# Patient Record
Sex: FEMALE | Race: WHITE | NOT HISPANIC OR LATINO | Employment: OTHER | URBAN - METROPOLITAN AREA
[De-identification: names, ages, dates, MRNs, and addresses within clinical notes are randomized per-mention and may not be internally consistent; named-entity substitution may affect disease eponyms.]

---

## 2018-03-28 LAB
CHOLEST SERPL-MSCNC: 184 MG/DL (ref 0–200)
HDL/CHOLESTEROL RATIO: 3
HDLC SERPL-MCNC: 63 MG/DL (ref 35–70)
LDLC SERPL CALC-MCNC: 99 MG/DL (ref 0–160)
TRIGL SERPL-MCNC: 138 MG/DL (ref 40–160)

## 2018-04-20 RX ORDER — CLOBETASOL PROPIONATE 0.5 MG/G
OINTMENT TOPICAL 2 TIMES DAILY
Qty: 1 TUBE | Refills: 0 | Status: SHIPPED | OUTPATIENT
Start: 2018-04-20 | End: 2018-04-30

## 2018-06-15 ENCOUNTER — OFFICE VISIT (OUTPATIENT)
Dept: OBSTETRICS AND GYNECOLOGY | Facility: CLINIC | Age: 68
End: 2018-06-15
Payer: MEDICARE

## 2018-06-15 VITALS — WEIGHT: 158.75 LBS | HEIGHT: 63 IN | BODY MASS INDEX: 28.13 KG/M2

## 2018-06-15 DIAGNOSIS — N90.4 LICHEN SCLEROSUS ET ATROPHICUS OF THE VULVA: Primary | ICD-10-CM

## 2018-06-15 DIAGNOSIS — Z01.419 ENCOUNTER FOR GYNECOLOGICAL EXAMINATION (GENERAL) (ROUTINE) WITHOUT ABNORMAL FINDINGS: ICD-10-CM

## 2018-06-15 PROCEDURE — G0101 CA SCREEN;PELVIC/BREAST EXAM: HCPCS | Mod: S$PBB,,, | Performed by: OBSTETRICS & GYNECOLOGY

## 2018-06-15 PROCEDURE — 99999 PR PBB SHADOW E&M-EST. PATIENT-LVL II: CPT | Mod: PBBFAC,,, | Performed by: OBSTETRICS & GYNECOLOGY

## 2018-06-15 PROCEDURE — 99212 OFFICE O/P EST SF 10 MIN: CPT | Mod: PBBFAC,PN,25 | Performed by: OBSTETRICS & GYNECOLOGY

## 2018-06-15 PROCEDURE — G0101 CA SCREEN;PELVIC/BREAST EXAM: HCPCS | Mod: PBBFAC,PN | Performed by: OBSTETRICS & GYNECOLOGY

## 2018-06-15 RX ORDER — OMEPRAZOLE 20 MG/1
20 CAPSULE, DELAYED RELEASE ORAL DAILY
COMMUNITY
Start: 2018-04-11 | End: 2020-10-16 | Stop reason: SDUPTHER

## 2018-06-15 RX ORDER — CLOBETASOL PROPIONATE 0.5 MG/G
OINTMENT TOPICAL 2 TIMES DAILY
COMMUNITY
End: 2018-06-15 | Stop reason: SDUPTHER

## 2018-06-15 RX ORDER — CLOBETASOL PROPIONATE 0.5 MG/G
OINTMENT TOPICAL 2 TIMES DAILY
Qty: 45 G | Refills: 3 | Status: SHIPPED | OUTPATIENT
Start: 2018-06-15 | End: 2018-09-28 | Stop reason: SDUPTHER

## 2018-06-15 NOTE — PROGRESS NOTES
Subjective:       Patient ID: Denise VIVAS Cousins is a 68 y.o. female.    Chief Complaint:  Well Woman (Annual Exam, last mammo DIS ordered by pcp. C/o vaginal discomfort)      There is no problem list on file for this patient.      History of Present Illness  68 y.o. yo  here for annual exam. Using Clobetasol with some good relief of itching.  with ED and upset not sexually active. Patietn stopped HRT after stroke discovered on CT scan. Talk x 15 min about this. All questions answered    Past Medical History:   Diagnosis Date    Acid reflux     Graves disease     diffuse Toxic Goiter    Hypertension     IBS (irritable bowel syndrome)     Lichen sclerosus     Migraine     Stroke        Past Surgical History:   Procedure Laterality Date    HYSTERECTOMY      CINDI for ?? cervical cancer, had normal paps       OB History    Para Term  AB Living   0 0 0 0 0 0   SAB TAB Ectopic Multiple Live Births   0 0 0 0               No LMP recorded. Patient has had a hysterectomy.   Date of Last Pap: No result found    Review of Systems  Review of Systems   Constitutional: Negative for fatigue and unexpected weight change.   Respiratory: Negative for shortness of breath.    Cardiovascular: Negative for chest pain.   Gastrointestinal: Negative for abdominal pain, constipation, diarrhea, nausea and vomiting.   Genitourinary: Negative for dysuria.   Musculoskeletal: Negative for back pain.   Skin: Negative for rash.   Neurological: Negative for headaches.   Hematological: Does not bruise/bleed easily.   Psychiatric/Behavioral: Negative for behavioral problems.        Objective:   Physical Exam:   Constitutional: She is oriented to person, place, and time. Vital signs are normal. She appears well-developed and well-nourished. No distress.        Pulmonary/Chest: She exhibits no mass. Right breast exhibits no mass, no nipple discharge, no skin change, no tenderness, no bleeding and no swelling. Left breast  exhibits no mass, no nipple discharge, no skin change, no tenderness, no bleeding and no swelling. Breasts are symmetrical.        Abdominal: Soft. Normal appearance and bowel sounds are normal. She exhibits no distension and no mass. There is no tenderness. There is no rebound.     Genitourinary: Vagina normal. There is no rash, tenderness, lesion or injury on the right labia. There is no rash, tenderness, lesion or injury on the left labia. Uterus is absent. Right adnexum displays no mass, no tenderness and no fullness. Left adnexum displays no mass, no tenderness and no fullness. No erythema, tenderness, rectocele, cystocele or unspecified prolapse of vaginal walls in the vagina. No vaginal discharge found. Cervix exhibits absence.           Musculoskeletal: Normal range of motion and moves all extremeties.      Lymphadenopathy:     She has no axillary adenopathy.        Right: No supraclavicular adenopathy present.        Left: No supraclavicular adenopathy present.    Neurological: She is alert and oriented to person, place, and time.    Skin: Skin is warm and dry.    Psychiatric: She has a normal mood and affect. Her behavior is normal. Judgment normal.        Assessment/ Plan:     1. Lichen sclerosus et atrophicus of the vulva  clobetasol 0.05% (TEMOVATE) 0.05 % Oint   2. Encounter for gynecological examination (general) (routine) without abnormal findings         Follow-up with me in 1 year

## 2018-09-28 DIAGNOSIS — N90.4 LICHEN SCLEROSUS ET ATROPHICUS OF THE VULVA: ICD-10-CM

## 2018-10-01 RX ORDER — CLOBETASOL PROPIONATE 0.5 MG/G
OINTMENT TOPICAL 2 TIMES DAILY
Qty: 45 G | Refills: 3 | Status: SHIPPED | OUTPATIENT
Start: 2018-10-01 | End: 2018-10-02 | Stop reason: SDUPTHER

## 2018-10-02 ENCOUNTER — TELEPHONE (OUTPATIENT)
Dept: OBSTETRICS AND GYNECOLOGY | Facility: CLINIC | Age: 68
End: 2018-10-02

## 2018-10-02 DIAGNOSIS — N90.4 LICHEN SCLEROSUS ET ATROPHICUS OF THE VULVA: ICD-10-CM

## 2018-10-03 RX ORDER — CLOBETASOL PROPIONATE 0.5 MG/G
OINTMENT TOPICAL 2 TIMES DAILY
Qty: 45 G | Refills: 4 | Status: SHIPPED | OUTPATIENT
Start: 2018-10-03 | End: 2020-02-13

## 2019-08-28 ENCOUNTER — OFFICE VISIT (OUTPATIENT)
Dept: FAMILY MEDICINE | Facility: CLINIC | Age: 69
End: 2019-08-28
Payer: MEDICARE

## 2019-08-28 VITALS
TEMPERATURE: 98 F | DIASTOLIC BLOOD PRESSURE: 76 MMHG | WEIGHT: 162.25 LBS | HEIGHT: 63 IN | HEART RATE: 85 BPM | SYSTOLIC BLOOD PRESSURE: 124 MMHG | BODY MASS INDEX: 28.75 KG/M2 | OXYGEN SATURATION: 94 %

## 2019-08-28 DIAGNOSIS — G43.809 OTHER MIGRAINE WITHOUT STATUS MIGRAINOSUS, NOT INTRACTABLE: ICD-10-CM

## 2019-08-28 DIAGNOSIS — E55.9 VITAMIN D DEFICIENCY: ICD-10-CM

## 2019-08-28 DIAGNOSIS — N32.81 OVERACTIVE BLADDER: ICD-10-CM

## 2019-08-28 DIAGNOSIS — N18.30 BENIGN HYPERTENSION WITH CKD (CHRONIC KIDNEY DISEASE) STAGE III: Primary | ICD-10-CM

## 2019-08-28 DIAGNOSIS — I63.9 CEREBRAL INFARCTION, UNSPECIFIED MECHANISM: ICD-10-CM

## 2019-08-28 DIAGNOSIS — K21.9 GASTROESOPHAGEAL REFLUX DISEASE WITHOUT ESOPHAGITIS: ICD-10-CM

## 2019-08-28 DIAGNOSIS — E03.8 OTHER SPECIFIED HYPOTHYROIDISM: ICD-10-CM

## 2019-08-28 DIAGNOSIS — I12.9 BENIGN HYPERTENSION WITH CKD (CHRONIC KIDNEY DISEASE) STAGE III: Primary | ICD-10-CM

## 2019-08-28 DIAGNOSIS — Z11.59 NEED FOR HEPATITIS C SCREENING TEST: ICD-10-CM

## 2019-08-28 DIAGNOSIS — M81.0 OSTEOPOROSIS, UNSPECIFIED OSTEOPOROSIS TYPE, UNSPECIFIED PATHOLOGICAL FRACTURE PRESENCE: ICD-10-CM

## 2019-08-28 DIAGNOSIS — F41.9 ANXIETY: ICD-10-CM

## 2019-08-28 DIAGNOSIS — N18.30 CHRONIC RENAL INSUFFICIENCY, STAGE III (MODERATE): ICD-10-CM

## 2019-08-28 PROBLEM — E78.5 DYSLIPIDEMIA: Status: ACTIVE | Noted: 2019-08-28

## 2019-08-28 PROBLEM — I10 HYPERTENSION: Status: ACTIVE | Noted: 2019-08-28

## 2019-08-28 LAB
ALBUMIN SERPL BCP-MCNC: 4.1 G/DL (ref 3.5–5.2)
ALP SERPL-CCNC: 65 U/L (ref 55–135)
ALT SERPL W/O P-5'-P-CCNC: 21 U/L (ref 10–44)
ANION GAP SERPL CALC-SCNC: 11 MMOL/L (ref 8–16)
AST SERPL-CCNC: 25 U/L (ref 10–40)
BILIRUB SERPL-MCNC: 0.5 MG/DL (ref 0.1–1)
BUN SERPL-MCNC: 17 MG/DL (ref 8–23)
CALCIUM SERPL-MCNC: 9.7 MG/DL (ref 8.7–10.5)
CHLORIDE SERPL-SCNC: 105 MMOL/L (ref 95–110)
CO2 SERPL-SCNC: 26 MMOL/L (ref 23–29)
CREAT SERPL-MCNC: 1 MG/DL (ref 0.5–1.4)
EST. GFR  (AFRICAN AMERICAN): >60 ML/MIN/1.73 M^2
EST. GFR  (NON AFRICAN AMERICAN): 56.8 ML/MIN/1.73 M^2
GLUCOSE SERPL-MCNC: 81 MG/DL (ref 70–110)
POTASSIUM SERPL-SCNC: 4.4 MMOL/L (ref 3.5–5.1)
PROT SERPL-MCNC: 8.1 G/DL (ref 6–8.4)
SODIUM SERPL-SCNC: 142 MMOL/L (ref 136–145)
T4 FREE SERPL-MCNC: 1.34 NG/DL (ref 0.71–1.51)
TSH SERPL DL<=0.005 MIU/L-ACNC: 1.46 UIU/ML (ref 0.4–4)

## 2019-08-28 PROCEDURE — 36415 COLL VENOUS BLD VENIPUNCTURE: CPT

## 2019-08-28 PROCEDURE — 84443 ASSAY THYROID STIM HORMONE: CPT

## 2019-08-28 PROCEDURE — 99999 PR PBB SHADOW E&M-EST. PATIENT-LVL IV: CPT | Mod: PBBFAC,,, | Performed by: INTERNAL MEDICINE

## 2019-08-28 PROCEDURE — 99999 PR PBB SHADOW E&M-EST. PATIENT-LVL IV: ICD-10-PCS | Mod: PBBFAC,,, | Performed by: INTERNAL MEDICINE

## 2019-08-28 PROCEDURE — 99214 OFFICE O/P EST MOD 30 MIN: CPT | Mod: S$PBB,,, | Performed by: INTERNAL MEDICINE

## 2019-08-28 PROCEDURE — 86803 HEPATITIS C AB TEST: CPT

## 2019-08-28 PROCEDURE — 80053 COMPREHEN METABOLIC PANEL: CPT

## 2019-08-28 PROCEDURE — 99214 OFFICE O/P EST MOD 30 MIN: CPT | Mod: PBBFAC,PN | Performed by: INTERNAL MEDICINE

## 2019-08-28 PROCEDURE — 84439 ASSAY OF FREE THYROXINE: CPT

## 2019-08-28 PROCEDURE — 99214 PR OFFICE/OUTPT VISIT, EST, LEVL IV, 30-39 MIN: ICD-10-PCS | Mod: S$PBB,,, | Performed by: INTERNAL MEDICINE

## 2019-08-28 RX ORDER — NORTRIPTYLINE HYDROCHLORIDE 10 MG/1
1 CAPSULE ORAL DAILY
COMMUNITY
Start: 2019-08-21 | End: 2020-02-20 | Stop reason: SDUPTHER

## 2019-08-28 RX ORDER — ERGOCALCIFEROL 1.25 MG/1
50000 CAPSULE ORAL
Qty: 12 CAPSULE | Refills: 3 | Status: SHIPPED | OUTPATIENT
Start: 2019-08-28 | End: 2020-07-12 | Stop reason: SDUPTHER

## 2019-08-28 NOTE — PROGRESS NOTES
[facility] Note    Chief Complaint      Chief Complaint   Patient presents with    Establish Care     Thyroid, HTN.      History of Present Illness      Denise VIVAS Cousins is a 69 y.o. female who presents today for HTN, hypothyroidism.  Patient comes to appointment alone.    Problem List Items Addressed This Visit     Anxiety    Current Assessment & Plan     Has been stressed but doing ok. Not on meds.         Other specified hypothyroidism    Overview     post radioactive treatment for grave's         Current Assessment & Plan     Stable on synthroid 100 mcg daily.  No S/S of hypo/hyperthyroidism.         Relevant Orders    TSH    T4, free    Osteoporosis    Current Assessment & Plan     Didn't tolerate Fosamax in past because of GERD.  Given patient info on Prolia.         Cerebral infarction    Current Assessment & Plan     No residual deficits.  Presented initially with UE weakness.  On asa and statin.         Chronic renal insufficiency, stage III (moderate)    Current Assessment & Plan     GFR stable on 3/2019.  Counseled on avoiding NSAIDS/dehydration.         Benign hypertension with CKD (chronic kidney disease) stage III - Primary    Current Assessment & Plan     BP controlled on losartan 50 and lopressor 50 mg BID.  No CP/SOB/HA.         Relevant Orders    Comprehensive metabolic panel    Migraine headache    Current Assessment & Plan     Sees Dr. Mena at .  Had an MRI, in media tab.  Has only had 2-3 aura.  Taking nortriptyline.         Overactive bladder    Current Assessment & Plan     Controlled on vesicare.         Gastroesophageal reflux disease without esophagitis    Current Assessment & Plan     Stable on prilosec. No nausea/belching.  Sees Dr. Tarango.         Vitamin D deficiency    Current Assessment & Plan     Level low in 3/2019, has not been taking ergo.           Other Visit Diagnoses     Need for hepatitis C screening test        Relevant Orders    Hepatitis C  antibody          Health Maintenance   Topic Date Due    Hepatitis C Screening  1950    Lipid Panel  1950    TETANUS VACCINE  01/12/1966    Mammogram  01/12/1988    High Dose Statin  08/28/2020    Aspirin/Antiplatelet Therapy  08/28/2020    DEXA SCAN  06/01/2021    Colonoscopy  04/01/2027    Pneumococcal Vaccine (65+ Low/Medium Risk)  Completed       Past Medical History:   Diagnosis Date    Acid reflux     Graves disease     diffuse Toxic Goiter    Hypertension     IBS (irritable bowel syndrome)     Lichen sclerosus     Migraine     Stroke        Past Surgical History:   Procedure Laterality Date    EYE SURGERY      HYSTERECTOMY      CINDI for ?? cervical cancer, had normal paps       family history includes Hypertension in her father; Kidney failure in her father; Stomach cancer in her mother.    Social History     Tobacco Use    Smoking status: Never Smoker    Smokeless tobacco: Never Used   Substance Use Topics    Alcohol use: Yes    Drug use: No       Review of Systems   Constitutional: Negative for chills and fever.   HENT: Negative for congestion and sore throat.    Eyes: Negative for blurred vision and discharge.   Respiratory: Negative for cough and shortness of breath.    Cardiovascular: Negative for chest pain and palpitations.   Gastrointestinal: Negative for constipation, diarrhea, nausea and vomiting.   Genitourinary: Negative for dysuria and hematuria.   Musculoskeletal: Negative for falls and myalgias.   Skin: Negative for itching and rash.   Neurological: Negative for dizziness and headaches.        Outpatient Encounter Medications as of 8/28/2019   Medication Sig Note Dispense Refill    aspirin (ECOTRIN) 81 MG EC tablet once a day       atorvastatin (LIPITOR) 20 MG tablet        clobetasol 0.05% (TEMOVATE) 0.05 % Oint Apply topically 2 (two) times daily.  45 g 4    levothyroxine (SYNTHROID) 100 MCG tablet        losartan (COZAAR) 50 MG tablet        metoprolol  "tartrate (LOPRESSOR) 50 MG tablet Take 50 mg by mouth 2 (two) times daily. TAKE 1/2 TAB PO BID       omeprazole (PRILOSEC) 20 MG capsule        VESICARE 10 mg tablet        ergocalciferol (ERGOCALCIFEROL) 50,000 unit Cap Take 1 capsule (50,000 Units total) by mouth every 7 days.  12 capsule 3    nortriptyline (PAMELOR) 10 MG capsule Take 1 capsule by mouth once daily.       [DISCONTINUED] alendronate (FOSAMAX) 70 MG tablet Take 70 mg by mouth once a week. 9/30/2016: Received from: External Pharmacy  3    [DISCONTINUED] clobetasol (CLOBEX) 0.05 % shampoo APPLY AS DIRECTED TO SCALP SECOND AFTER KETOCONAZOLE SHAMPOO 9/30/2016: Received from: External Pharmacy  5     No facility-administered encounter medications on file as of 8/28/2019.         Review of patient's allergies indicates:   Allergen Reactions    Cefaclor     Methimazole (bulk)     Penicillin     Phenobarbital     Sulfa (sulfonamide antibiotics)     Thyroid      Note:     Topamax [topiramate]      GI symptoms       Physical Exam      Vital Signs  Temp: 97.9 °F (36.6 °C)  Temp src: Oral  Pulse: 85  SpO2: (!) 94 %  BP: 124/76  BP Location: Left arm  Patient Position: Sitting  Pain Score: 0-No pain  Height and Weight  Height: 5' 3" (160 cm)  Weight: 73.6 kg (162 lb 4.1 oz)  BSA (Calculated - sq m): 1.81 sq meters  BMI (Calculated): 28.8  Weight in (lb) to have BMI = 25: 140.8]    Physical Exam   Constitutional: She is oriented to person, place, and time. She appears well-developed and well-nourished.   HENT:   Head: Normocephalic and atraumatic.   Right Ear: External ear normal.   Left Ear: External ear normal.   Eyes: Right eye exhibits no discharge. Left eye exhibits no discharge.   Neck: Normal range of motion. No thyromegaly present.   Cardiovascular: Normal rate, regular rhythm, normal heart sounds and intact distal pulses.   No murmur heard.  Pulmonary/Chest: Effort normal and breath sounds normal. No respiratory distress.   Abdominal: Soft. " Bowel sounds are normal. She exhibits no distension. There is no tenderness.   Musculoskeletal: Normal range of motion. She exhibits no deformity.   Neurological: She is alert and oriented to person, place, and time.   Skin: Skin is warm and dry. No rash noted.   Psychiatric: She has a normal mood and affect. Her behavior is normal.        Laboratory:  CBC:  No results for input(s): WBC, RBC, HGB, HCT, PLT, MCV, MCH, MCHC in the last 2160 hours.  CMP:  No results for input(s): GLU, CALCIUM, ALBUMIN, PROT, NA, K, CO2, CL, BUN, ALKPHOS, ALT, AST, BILITOT in the last 2160 hours.    Invalid input(s): CREATININ  URINALYSIS:  No results for input(s): COLORU, CLARITYU, SPECGRAV, PHUR, PROTEINUA, GLUCOSEU, BILIRUBINCON, BLOODU, WBCU, RBCU, BACTERIA, MUCUS, NITRITE, LEUKOCYTESUR, UROBILINOGEN, HYALINECASTS in the last 2160 hours.   LIPIDS:  No results for input(s): TSH, HDL, CHOL, TRIG, LDLCALC, CHOLHDL, NONHDLCHOL, TOTALCHOLEST in the last 2160 hours.  TSH:  No results for input(s): TSH in the last 2160 hours.  A1C:  No results for input(s): HGBA1C in the last 2160 hours.    Radiology:  No imaging on file.    Assessment/Plan     Denise Berrios is a 69 y.o.female with:    1. Benign hypertension with CKD (chronic kidney disease) stage III  - Comprehensive metabolic panel    2. Overactive bladder    3. Gastroesophageal reflux disease without esophagitis    4. Other specified hypothyroidism  - TSH  - T4, free    5. Vitamin D deficiency    6. Need for hepatitis C screening test  - Hepatitis C antibody    7. Osteoporosis, unspecified osteoporosis type, unspecified pathological fracture presence    8. Cerebral infarction, unspecified mechanism    9. Chronic renal insufficiency, stage III (moderate)    10. Anxiety    11. Other migraine without status migrainosus, not intractable    -restart ergo, check vit D next visit  -Continue current medications and maintain follow up with specialists.  Return to clinic in 6  months.      Aleksandra Carey MD  [facility] [place]

## 2019-08-29 ENCOUNTER — TELEPHONE (OUTPATIENT)
Dept: FAMILY MEDICINE | Facility: CLINIC | Age: 69
End: 2019-08-29

## 2019-08-29 LAB — HCV AB SERPL QL IA: NEGATIVE

## 2019-08-29 NOTE — TELEPHONE ENCOUNTER
----- Message from Aleksandra Carey MD sent at 8/29/2019  7:12 AM CDT -----  Thyroid labs look great, kidney function stable.

## 2019-09-03 DIAGNOSIS — Z12.39 BREAST CANCER SCREENING: ICD-10-CM

## 2019-09-06 ENCOUNTER — IMMUNIZATION (OUTPATIENT)
Dept: PHARMACY | Facility: CLINIC | Age: 69
End: 2019-09-06
Payer: MEDICARE

## 2019-09-16 RX ORDER — LOSARTAN POTASSIUM 50 MG/1
50 TABLET ORAL DAILY
Qty: 90 TABLET | Refills: 1 | Status: SHIPPED | OUTPATIENT
Start: 2019-09-16 | End: 2020-01-27 | Stop reason: SDUPTHER

## 2019-09-16 NOTE — TELEPHONE ENCOUNTER
Dr. Carey pt, can you address?    Received refill request from [employer] for   Losartan 50mg 1 po qd.     Last appt 8/28  Next appt is 2/13. Ok to fill?

## 2019-10-15 DIAGNOSIS — N90.4 LICHEN SCLEROSUS ET ATROPHICUS OF THE VULVA: ICD-10-CM

## 2019-10-15 RX ORDER — CLOBETASOL PROPIONATE 0.5 MG/G
OINTMENT TOPICAL
Qty: 45 G | Refills: 15 | Status: SHIPPED | OUTPATIENT
Start: 2019-10-15 | End: 2020-02-23

## 2019-10-28 RX ORDER — LEVOTHYROXINE SODIUM 100 UG/1
100 TABLET ORAL
Qty: 90 TABLET | Refills: 1 | Status: SHIPPED | OUTPATIENT
Start: 2019-10-28 | End: 2020-04-27

## 2019-10-28 RX ORDER — ATORVASTATIN CALCIUM 20 MG/1
20 TABLET, FILM COATED ORAL DAILY
Qty: 90 TABLET | Refills: 1 | Status: SHIPPED | OUTPATIENT
Start: 2019-10-28 | End: 2020-04-27

## 2019-10-28 NOTE — TELEPHONE ENCOUNTER
[facility] faxed refill request for Atorvastatin 20mg 1 po qd and Levothyroxine 100mcg 1 po qd. Last appt 8/28, next appt 2/13    Ok to fill?

## 2019-11-08 ENCOUNTER — OFFICE VISIT (OUTPATIENT)
Dept: FAMILY MEDICINE | Facility: CLINIC | Age: 69
End: 2019-11-08
Payer: MEDICARE

## 2019-11-08 VITALS
HEIGHT: 63 IN | OXYGEN SATURATION: 90 % | TEMPERATURE: 98 F | BODY MASS INDEX: 28.34 KG/M2 | SYSTOLIC BLOOD PRESSURE: 132 MMHG | DIASTOLIC BLOOD PRESSURE: 78 MMHG | WEIGHT: 159.94 LBS | HEART RATE: 75 BPM

## 2019-11-08 DIAGNOSIS — J01.90 ACUTE NON-RECURRENT SINUSITIS, UNSPECIFIED LOCATION: Primary | ICD-10-CM

## 2019-11-08 PROCEDURE — 99999 PR PBB SHADOW E&M-EST. PATIENT-LVL III: ICD-10-PCS | Mod: PBBFAC,,, | Performed by: INTERNAL MEDICINE

## 2019-11-08 PROCEDURE — 99999 PR PBB SHADOW E&M-EST. PATIENT-LVL III: CPT | Mod: PBBFAC,,, | Performed by: INTERNAL MEDICINE

## 2019-11-08 PROCEDURE — 99214 PR OFFICE/OUTPT VISIT, EST, LEVL IV, 30-39 MIN: ICD-10-PCS | Mod: S$PBB,,, | Performed by: INTERNAL MEDICINE

## 2019-11-08 PROCEDURE — 99213 OFFICE O/P EST LOW 20 MIN: CPT | Mod: PBBFAC,PN | Performed by: INTERNAL MEDICINE

## 2019-11-08 PROCEDURE — 99214 OFFICE O/P EST MOD 30 MIN: CPT | Mod: S$PBB,,, | Performed by: INTERNAL MEDICINE

## 2019-11-08 RX ORDER — AZITHROMYCIN 250 MG/1
TABLET, FILM COATED ORAL
Qty: 6 TABLET | Refills: 0 | Status: SHIPPED | OUTPATIENT
Start: 2019-11-08 | End: 2019-11-13

## 2019-11-08 NOTE — PROGRESS NOTES
[facility] Note    Chief Complaint      Chief Complaint   Patient presents with    Otalgia    Sinusitis    Sore Throat     History of Present Illness      Denise VIVAS Cousins is a 69 y.o. female who presents today for sinusitis.  Patient comes to appointment with .    Had sore throat on Monday, felt fatigued.  No fever.  Got more hoarse, sore throat got worse.  Lots of congestion, runny nose with clear sputum.  Lots of post nasal drip.  Has been taking flonase, started old medrol dose kristel. Took allegra and sudafed for 2 days. Both ears are sore.  Has not taken Mucinex.    Problem List Items Addressed This Visit     None      Visit Diagnoses     Acute non-recurrent sinusitis, unspecified location    -  Primary          Health Maintenance   Topic Date Due    Lipid Panel  1950    Mammogram  01/12/1988    High Dose Statin  11/08/2020    Aspirin/Antiplatelet Therapy  11/08/2020    DEXA SCAN  06/01/2021    Colonoscopy  04/01/2027    TETANUS VACCINE  08/28/2029    Hepatitis C Screening  Completed    Pneumococcal Vaccine (65+ High/Highest Risk)  Completed       Past Medical History:   Diagnosis Date    Acid reflux     Graves disease     diffuse Toxic Goiter    Hypertension     IBS (irritable bowel syndrome)     Lichen sclerosus     Migraine     Stroke        Past Surgical History:   Procedure Laterality Date    EYE SURGERY      HYSTERECTOMY      CINDI for ?? cervical cancer, had normal paps       family history includes Hypertension in her father; Kidney failure in her father; Stomach cancer in her mother.    Social History     Tobacco Use    Smoking status: Never Smoker    Smokeless tobacco: Never Used   Substance Use Topics    Alcohol use: Yes    Drug use: No       Review of Systems   Constitutional: Negative for chills and fever.   HENT: Negative for congestion and sore throat.    Eyes: Negative for blurred vision and discharge.   Respiratory: Negative for cough  and shortness of breath.    Cardiovascular: Negative for chest pain and palpitations.   Gastrointestinal: Negative for constipation, diarrhea, nausea and vomiting.   Genitourinary: Negative for dysuria and hematuria.   Musculoskeletal: Negative for falls and myalgias.   Skin: Negative for itching and rash.   Neurological: Negative for dizziness and headaches.        Outpatient Encounter Medications as of 11/8/2019   Medication Sig Dispense Refill    aspirin (ECOTRIN) 81 MG EC tablet once a day      atorvastatin (LIPITOR) 20 MG tablet Take 1 tablet (20 mg total) by mouth once daily. 90 tablet 1    clobetasol 0.05% (TEMOVATE) 0.05 % Oint Apply topically 2 (two) times daily. 45 g 4    clobetasol 0.05% (TEMOVATE) 0.05 % Oint APPLY TOPICALLY TWICE A DAY 45 g 15    ergocalciferol (ERGOCALCIFEROL) 50,000 unit Cap Take 1 capsule (50,000 Units total) by mouth every 7 days. 12 capsule 3    levothyroxine (SYNTHROID) 100 MCG tablet Take 1 tablet (100 mcg total) by mouth before breakfast. 90 tablet 1    losartan (COZAAR) 50 MG tablet Take 1 tablet (50 mg total) by mouth once daily. 90 tablet 1    metoprolol tartrate (LOPRESSOR) 50 MG tablet Take 50 mg by mouth 2 (two) times daily. TAKE 1/2 TAB PO BID      nortriptyline (PAMELOR) 10 MG capsule Take 1 capsule by mouth once daily.      omeprazole (PRILOSEC) 20 MG capsule       VESICARE 10 mg tablet       azithromycin (Z-SIMÓN) 250 MG tablet Take 2 tablets by mouth on day 1; Take 1 tablet by mouth on days 2-5 6 tablet 0     No facility-administered encounter medications on file as of 11/8/2019.         Review of patient's allergies indicates:   Allergen Reactions    Cefaclor     Methimazole (bulk)     Penicillin     Phenobarbital     Sulfa (sulfonamide antibiotics)     Thyroid      Note:     Topamax [topiramate]      GI symptoms       Physical Exam      Vital Signs  Temp: 98.1 °F (36.7 °C)  Temp src: Oral  Pulse: 75  SpO2: (!) 90 %  BP: 132/78  BP Location: Left  "arm  Patient Position: Sitting  Pain Score:   6  Pain Loc: Throat  Height and Weight  Height: 5' 3" (160 cm)  Weight: 72.6 kg (159 lb 15.1 oz)  BSA (Calculated - sq m): 1.8 sq meters  BMI (Calculated): 28.3  Weight in (lb) to have BMI = 25: 140.8]    Physical Exam   Constitutional: She is oriented to person, place, and time. She appears well-developed and well-nourished.   HENT:   Head: Normocephalic and atraumatic.   Right Ear: External ear normal.   Left Ear: External ear normal.   Eyes: Right eye exhibits no discharge. Left eye exhibits no discharge.   Neck: Normal range of motion. No thyromegaly present.   Cardiovascular: Normal rate, regular rhythm, normal heart sounds and intact distal pulses.   No murmur heard.  Pulmonary/Chest: Effort normal and breath sounds normal. No respiratory distress.   Abdominal: Soft. Bowel sounds are normal. She exhibits no distension. There is no tenderness.   Musculoskeletal: Normal range of motion. She exhibits no deformity.   Neurological: She is alert and oriented to person, place, and time.   Skin: Skin is warm and dry. No rash noted.   Psychiatric: She has a normal mood and affect. Her behavior is normal.      Laboratory:  CBC:  No results for input(s): WBC, RBC, HGB, HCT, PLT, MCV, MCH, MCHC in the last 2160 hours.  CMP:  Recent Labs   Lab Result Units 08/28/19  1427   Glucose mg/dL 81   Calcium mg/dL 9.7   Albumin g/dL 4.1   Total Protein g/dL 8.1   Sodium mmol/L 142   Potassium mmol/L 4.4   CO2 mmol/L 26   Chloride mmol/L 105   BUN, Bld mg/dL 17   Alkaline Phosphatase U/L 65   ALT U/L 21   AST U/L 25   Total Bilirubin mg/dL 0.5     URINALYSIS:  No results for input(s): COLORU, CLARITYU, SPECGRAV, PHUR, PROTEINUA, GLUCOSEU, BILIRUBINCON, BLOODU, WBCU, RBCU, BACTERIA, MUCUS, NITRITE, LEUKOCYTESUR, UROBILINOGEN, HYALINECASTS in the last 2160 hours.   LIPIDS:  Recent Labs   Lab Result Units 08/28/19  1427   TSH uIU/mL 1.457     TSH:  Recent Labs   Lab Result Units " 08/28/19  1427   TSH uIU/mL 1.457     A1C:  No results for input(s): HGBA1C in the last 2160 hours.    Radiology:  No imaging on file.    Assessment/Plan     Denise Berrios is a 69 y.o.female with:    1. Acute non-recurrent sinusitis, unspecified location    -Given lack of improvement, will treat with abx.  Will add Mucinex, continue flonase and allegra.  -Continue current medications and maintain follow up with specialists.  Return to clinic in 6 months.      Aleksandra Carey MD  [facility]

## 2019-11-21 ENCOUNTER — IMMUNIZATION (OUTPATIENT)
Dept: PHARMACY | Facility: CLINIC | Age: 69
End: 2019-11-21
Payer: MEDICARE

## 2020-01-27 DIAGNOSIS — N32.81 OVERACTIVE BLADDER: Primary | ICD-10-CM

## 2020-01-27 RX ORDER — METOPROLOL TARTRATE 50 MG/1
50 TABLET ORAL 2 TIMES DAILY
Qty: 180 TABLET | Refills: 1 | Status: SHIPPED | OUTPATIENT
Start: 2020-01-27 | End: 2020-02-20 | Stop reason: SDUPTHER

## 2020-01-27 RX ORDER — LOSARTAN POTASSIUM 50 MG/1
50 TABLET ORAL DAILY
Qty: 90 TABLET | Refills: 1 | Status: SHIPPED | OUTPATIENT
Start: 2020-01-27 | End: 2021-01-14 | Stop reason: SDUPTHER

## 2020-01-27 RX ORDER — SOLIFENACIN SUCCINATE 10 MG/1
10 TABLET, FILM COATED ORAL DAILY
Qty: 90 TABLET | Refills: 1 | Status: SHIPPED | OUTPATIENT
Start: 2020-01-27 | End: 2020-04-08 | Stop reason: SDUPTHER

## 2020-01-27 NOTE — TELEPHONE ENCOUNTER
----- Message from Duke Scott sent at 1/27/2020 10:54 AM CST -----  Contact: Kristi/[facility]  Ms.Kristi called in to speak with someone at the office regarding the patients medications. She can be reached at    Ph:[phone]  Fax:[phone]

## 2020-01-30 ENCOUNTER — PATIENT OUTREACH (OUTPATIENT)
Dept: ADMINISTRATIVE | Facility: HOSPITAL | Age: 70
End: 2020-01-30

## 2020-01-30 NOTE — LETTER
AUTHORIZATION FOR RELEASE OF   CONFIDENTIAL INFORMATION    Dear [facility] ,    We are seeing Denise Berrios, date of birth 1950, in the clinic at [facility]. Aleksandra Carey MD is the patient's PCP. Denise Berrios has an outstanding lab/procedure at the time we reviewed her chart. In order to help keep her health information updated, she has authorized us to request the following medical record(s):        ( X )  MAMMOGRAM                                             Please fax records to [facility], Jenna C Jordan, MD,       [facility]                                                        Please Fax to [facility] at [phone].     Thank you for your help, Ernestine Estrada LPKELVIN-CCC. If I can be of any assistance you can call at [phone]                       Patient Name: Denise Berrios  : 1950  Patient Phone #: [phone]

## 2020-02-10 ENCOUNTER — TELEPHONE (OUTPATIENT)
Dept: FAMILY MEDICINE | Facility: CLINIC | Age: 70
End: 2020-02-10

## 2020-02-10 DIAGNOSIS — E55.9 VITAMIN D DEFICIENCY: Primary | ICD-10-CM

## 2020-02-10 DIAGNOSIS — E78.5 DYSLIPIDEMIA: ICD-10-CM

## 2020-02-10 DIAGNOSIS — E03.8 OTHER SPECIFIED HYPOTHYROIDISM: ICD-10-CM

## 2020-02-10 NOTE — TELEPHONE ENCOUNTER
Pt has an appt 02/13 asking if due for labs, if so please put in [facility] so we can cancel for tomorrow

## 2020-02-10 NOTE — TELEPHONE ENCOUNTER
----- Message from Scarlet Cortes sent at 2/10/2020 11:41 AM CST -----  Contact: Pt  Pt is requesting a callback from office today regarding orders for blood work says she need to get the orders so that she can scheduled the blood work on tomorrow    Pt can be reached at [phone]

## 2020-02-11 ENCOUNTER — LAB VISIT (OUTPATIENT)
Dept: LAB | Facility: HOSPITAL | Age: 70
End: 2020-02-11
Attending: INTERNAL MEDICINE
Payer: MEDICARE

## 2020-02-11 DIAGNOSIS — E03.8 OTHER SPECIFIED HYPOTHYROIDISM: ICD-10-CM

## 2020-02-11 DIAGNOSIS — E55.9 VITAMIN D DEFICIENCY: ICD-10-CM

## 2020-02-11 DIAGNOSIS — E78.5 DYSLIPIDEMIA: ICD-10-CM

## 2020-02-11 LAB
25(OH)D3+25(OH)D2 SERPL-MCNC: 29 NG/ML (ref 30–96)
ALBUMIN SERPL BCP-MCNC: 4 G/DL (ref 3.5–5.2)
ALP SERPL-CCNC: 70 U/L (ref 55–135)
ALT SERPL W/O P-5'-P-CCNC: 26 U/L (ref 10–44)
ANION GAP SERPL CALC-SCNC: 9 MMOL/L (ref 8–16)
AST SERPL-CCNC: 26 U/L (ref 10–40)
BASOPHILS # BLD AUTO: 0.04 K/UL (ref 0–0.2)
BASOPHILS NFR BLD: 0.6 % (ref 0–1.9)
BILIRUB SERPL-MCNC: 0.7 MG/DL (ref 0.1–1)
BUN SERPL-MCNC: 12 MG/DL (ref 8–23)
CALCIUM SERPL-MCNC: 9.9 MG/DL (ref 8.7–10.5)
CHLORIDE SERPL-SCNC: 107 MMOL/L (ref 95–110)
CHOLEST SERPL-MCNC: 195 MG/DL (ref 120–199)
CHOLEST/HDLC SERPL: 3.3 {RATIO} (ref 2–5)
CO2 SERPL-SCNC: 26 MMOL/L (ref 23–29)
CREAT SERPL-MCNC: 1 MG/DL (ref 0.5–1.4)
DIFFERENTIAL METHOD: ABNORMAL
EOSINOPHIL # BLD AUTO: 0.1 K/UL (ref 0–0.5)
EOSINOPHIL NFR BLD: 2 % (ref 0–8)
ERYTHROCYTE [DISTWIDTH] IN BLOOD BY AUTOMATED COUNT: 13.3 % (ref 11.5–14.5)
EST. GFR  (AFRICAN AMERICAN): >60 ML/MIN/1.73 M^2
EST. GFR  (NON AFRICAN AMERICAN): 56 ML/MIN/1.73 M^2
GLUCOSE SERPL-MCNC: 107 MG/DL (ref 70–110)
HCT VFR BLD AUTO: 41.1 % (ref 37–48.5)
HDLC SERPL-MCNC: 60 MG/DL (ref 40–75)
HDLC SERPL: 30.8 % (ref 20–50)
HGB BLD-MCNC: 13.5 G/DL (ref 12–16)
IMM GRANULOCYTES # BLD AUTO: 0.03 K/UL (ref 0–0.04)
IMM GRANULOCYTES NFR BLD AUTO: 0.5 % (ref 0–0.5)
LDLC SERPL CALC-MCNC: 106.2 MG/DL (ref 63–159)
LYMPHOCYTES # BLD AUTO: 1.7 K/UL (ref 1–4.8)
LYMPHOCYTES NFR BLD: 26.5 % (ref 18–48)
MCH RBC QN AUTO: 31.5 PG (ref 27–31)
MCHC RBC AUTO-ENTMCNC: 32.8 G/DL (ref 32–36)
MCV RBC AUTO: 96 FL (ref 82–98)
MONOCYTES # BLD AUTO: 0.8 K/UL (ref 0.3–1)
MONOCYTES NFR BLD: 11.5 % (ref 4–15)
NEUTROPHILS # BLD AUTO: 3.9 K/UL (ref 1.8–7.7)
NEUTROPHILS NFR BLD: 58.9 % (ref 38–73)
NONHDLC SERPL-MCNC: 135 MG/DL
NRBC BLD-RTO: 0 /100 WBC
PLATELET # BLD AUTO: 254 K/UL (ref 150–350)
PMV BLD AUTO: 10.4 FL (ref 9.2–12.9)
POTASSIUM SERPL-SCNC: 4.3 MMOL/L (ref 3.5–5.1)
PROT SERPL-MCNC: 7.8 G/DL (ref 6–8.4)
RBC # BLD AUTO: 4.29 M/UL (ref 4–5.4)
SODIUM SERPL-SCNC: 142 MMOL/L (ref 136–145)
T4 FREE SERPL-MCNC: 1.35 NG/DL (ref 0.71–1.51)
TRIGL SERPL-MCNC: 144 MG/DL (ref 30–150)
TSH SERPL DL<=0.005 MIU/L-ACNC: 1.07 UIU/ML (ref 0.4–4)
WBC # BLD AUTO: 6.53 K/UL (ref 3.9–12.7)

## 2020-02-11 PROCEDURE — 85025 COMPLETE CBC W/AUTO DIFF WBC: CPT

## 2020-02-11 PROCEDURE — 80053 COMPREHEN METABOLIC PANEL: CPT

## 2020-02-11 PROCEDURE — 82306 VITAMIN D 25 HYDROXY: CPT

## 2020-02-11 PROCEDURE — 80061 LIPID PANEL: CPT

## 2020-02-11 PROCEDURE — 84439 ASSAY OF FREE THYROXINE: CPT

## 2020-02-11 PROCEDURE — 84443 ASSAY THYROID STIM HORMONE: CPT

## 2020-02-11 PROCEDURE — 36415 COLL VENOUS BLD VENIPUNCTURE: CPT

## 2020-02-13 ENCOUNTER — OFFICE VISIT (OUTPATIENT)
Dept: FAMILY MEDICINE | Facility: CLINIC | Age: 70
End: 2020-02-13
Payer: MEDICARE

## 2020-02-13 VITALS
OXYGEN SATURATION: 92 % | WEIGHT: 157.06 LBS | TEMPERATURE: 98 F | HEART RATE: 75 BPM | SYSTOLIC BLOOD PRESSURE: 122 MMHG | BODY MASS INDEX: 27.83 KG/M2 | DIASTOLIC BLOOD PRESSURE: 70 MMHG

## 2020-02-13 DIAGNOSIS — N18.30 BENIGN HYPERTENSION WITH CKD (CHRONIC KIDNEY DISEASE) STAGE III: Primary | ICD-10-CM

## 2020-02-13 DIAGNOSIS — E55.9 VITAMIN D DEFICIENCY: ICD-10-CM

## 2020-02-13 DIAGNOSIS — I12.9 BENIGN HYPERTENSION WITH CKD (CHRONIC KIDNEY DISEASE) STAGE III: Primary | ICD-10-CM

## 2020-02-13 DIAGNOSIS — E78.5 DYSLIPIDEMIA: ICD-10-CM

## 2020-02-13 DIAGNOSIS — K58.9 IRRITABLE BOWEL SYNDROME, UNSPECIFIED TYPE: ICD-10-CM

## 2020-02-13 DIAGNOSIS — N32.81 OVERACTIVE BLADDER: ICD-10-CM

## 2020-02-13 DIAGNOSIS — K21.9 GASTROESOPHAGEAL REFLUX DISEASE WITHOUT ESOPHAGITIS: ICD-10-CM

## 2020-02-13 DIAGNOSIS — F41.9 ANXIETY: ICD-10-CM

## 2020-02-13 DIAGNOSIS — E03.8 OTHER SPECIFIED HYPOTHYROIDISM: ICD-10-CM

## 2020-02-13 DIAGNOSIS — G43.809 OTHER MIGRAINE WITHOUT STATUS MIGRAINOSUS, NOT INTRACTABLE: ICD-10-CM

## 2020-02-13 PROCEDURE — 99214 PR OFFICE/OUTPT VISIT, EST, LEVL IV, 30-39 MIN: ICD-10-PCS | Mod: S$PBB,,, | Performed by: INTERNAL MEDICINE

## 2020-02-13 PROCEDURE — 99213 OFFICE O/P EST LOW 20 MIN: CPT | Mod: PBBFAC,PN | Performed by: INTERNAL MEDICINE

## 2020-02-13 PROCEDURE — 99999 PR PBB SHADOW E&M-EST. PATIENT-LVL III: ICD-10-PCS | Mod: PBBFAC,,, | Performed by: INTERNAL MEDICINE

## 2020-02-13 PROCEDURE — 99999 PR PBB SHADOW E&M-EST. PATIENT-LVL III: CPT | Mod: PBBFAC,,, | Performed by: INTERNAL MEDICINE

## 2020-02-13 PROCEDURE — 99214 OFFICE O/P EST MOD 30 MIN: CPT | Mod: S$PBB,,, | Performed by: INTERNAL MEDICINE

## 2020-02-13 RX ORDER — BUTALBITAL, ACETAMINOPHEN AND CAFFEINE 50; 325; 40 MG/1; MG/1; MG/1
1 TABLET ORAL NIGHTLY PRN
COMMUNITY
Start: 2020-01-27 | End: 2021-09-30 | Stop reason: SDUPTHER

## 2020-02-13 NOTE — PROGRESS NOTES
[facility] Note    Chief Complaint      Chief Complaint   Patient presents with    Follow-up     History of Present Illness      Denise VIVAS Cousins is a 70 y.o. female who presents today for follow up of hypothyroidism.  Patient comes to appointment with , [place]    Problem List Items Addressed This Visit     Anxiety    Current Assessment & Plan     Doing ok at this point, gets stressed on occasion. Not on meds.         Other specified hypothyroidism    Overview     post radioactive treatment for grave's         Current Assessment & Plan     Stable on synthroid 100 mcg daily.  No S/S of hypo/hyperthyroidism.         Relevant Orders    TSH    T4, free    Dyslipidemia    Current Assessment & Plan     Stable on lipitor 20 mg daily, no myalgias.         Benign hypertension with CKD (chronic kidney disease) stage III - Primary    Current Assessment & Plan     BP controlled on losartan 50 and lopressor 50 mg BID.  No CP/SOB/HA.         Relevant Orders    Comprehensive metabolic panel    Irritable bowel syndrome    Current Assessment & Plan     Bowel have been regular.         Migraine headache    Current Assessment & Plan     Sees Dr. Mena at .  Had an MRI, in media tab.  Has only had 2-3 aura.  Taking nortriptyline, didn't do well with higher doses in past.  Hasn't been having migraines, just aura.           Overactive bladder    Current Assessment & Plan     Controlled on vesicare.         Gastroesophageal reflux disease without esophagitis    Current Assessment & Plan     Stable on prilosec. No nausea/belching.  Sees Dr. Tarango.         Vitamin D deficiency    Current Assessment & Plan     Level 29 in 2/2020, taking ergo weekly.         Relevant Orders    Vitamin D          Health Maintenance   Topic Date Due    Mammogram  11/01/2019    High Dose Statin  02/13/2021    Aspirin/Antiplatelet Therapy  02/13/2021    DEXA SCAN  06/01/2021    Lipid Panel  02/11/2025    Colonoscopy   04/01/2027    TETANUS VACCINE  08/28/2029    Hepatitis C Screening  Completed    Pneumococcal Vaccine (65+ High/Highest Risk)  Completed       Past Medical History:   Diagnosis Date    Acid reflux     Graves disease     diffuse Toxic Goiter    Hypertension     IBS (irritable bowel syndrome)     Lichen sclerosus     Migraine     Stroke        Past Surgical History:   Procedure Laterality Date    EYE SURGERY      HYSTERECTOMY      CINDI for ?? cervical cancer, had normal paps       family history includes Hypertension in her father; Kidney failure in her father; Stomach cancer in her mother.    Social History     Tobacco Use    Smoking status: Never Smoker    Smokeless tobacco: Never Used   Substance Use Topics    Alcohol use: Yes    Drug use: No       Review of Systems   Constitutional: Negative for chills and fever.   HENT: Negative for congestion and sore throat.    Eyes: Negative for blurred vision and discharge.   Respiratory: Negative for cough and shortness of breath.    Cardiovascular: Negative for chest pain and palpitations.   Gastrointestinal: Negative for constipation, diarrhea, nausea and vomiting.   Genitourinary: Negative for dysuria and hematuria.   Musculoskeletal: Negative for falls and myalgias.   Skin: Negative for itching and rash.   Neurological: Negative for dizziness and headaches.        Outpatient Encounter Medications as of 2/13/2020   Medication Sig Dispense Refill    aspirin (ECOTRIN) 81 MG EC tablet once a day      atorvastatin (LIPITOR) 20 MG tablet Take 1 tablet (20 mg total) by mouth once daily. 90 tablet 1    butalbital-acetaminophen-caffeine -40 mg (FIORICET, ESGIC) -40 mg per tablet Take 1 tablet by mouth nightly as needed.      clobetasol 0.05% (TEMOVATE) 0.05 % Oint APPLY TOPICALLY TWICE A DAY 45 g 15    ergocalciferol (ERGOCALCIFEROL) 50,000 unit Cap Take 1 capsule (50,000 Units total) by mouth every 7 days. 12 capsule 3    levothyroxine  (SYNTHROID) 100 MCG tablet Take 1 tablet (100 mcg total) by mouth before breakfast. 90 tablet 1    losartan (COZAAR) 50 MG tablet Take 1 tablet (50 mg total) by mouth once daily. 90 tablet 1    metoprolol tartrate (LOPRESSOR) 50 MG tablet Take 1 tablet (50 mg total) by mouth 2 (two) times daily. TAKE 1/2 TAB PO  tablet 1    nortriptyline (PAMELOR) 10 MG capsule Take 1 capsule by mouth once daily.      omeprazole (PRILOSEC) 20 MG capsule Take 20 mg by mouth once daily.       VESICARE 10 mg tablet Take 1 tablet (10 mg total) by mouth once daily. 90 tablet 1    [DISCONTINUED] clobetasol 0.05% (TEMOVATE) 0.05 % Oint Apply topically 2 (two) times daily. (Patient not taking: Reported on 2/13/2020) 45 g 4     No facility-administered encounter medications on file as of 2/13/2020.         Review of patient's allergies indicates:   Allergen Reactions    Cefaclor     Methimazole (bulk)     Penicillin     Phenobarbital     Sulfa (sulfonamide antibiotics)     Thyroid      Note:     Topamax [topiramate]      GI symptoms       Physical Exam      Vital Signs  Temp: 98.1 °F (36.7 °C)  Temp src: Oral  Pulse: 75  SpO2: (!) 92 %  BP: 122/70  BP Location: Left arm  Patient Position: Sitting  Pain Score: 0-No pain  Height and Weight  Weight: 71.2 kg (157 lb 1.2 oz)]    Physical Exam   Constitutional: She is oriented to person, place, and time. She appears well-developed and well-nourished.   HENT:   Head: Normocephalic and atraumatic.   Right Ear: External ear normal.   Left Ear: External ear normal.   Eyes: Right eye exhibits no discharge. Left eye exhibits no discharge.   Neck: Normal range of motion. No thyromegaly present.   Cardiovascular: Normal rate, regular rhythm, normal heart sounds and intact distal pulses.   No murmur heard.  Pulmonary/Chest: Effort normal and breath sounds normal. No respiratory distress.   Abdominal: Soft. Bowel sounds are normal. She exhibits no distension. There is no tenderness.    Musculoskeletal: Normal range of motion. She exhibits no deformity.   Neurological: She is alert and oriented to person, place, and time.   Skin: Skin is warm and dry. No rash noted.   Psychiatric: She has a normal mood and affect. Her behavior is normal.      Laboratory:  CBC:  Recent Labs   Lab Result Units 02/11/20  0910   WBC K/uL 6.53   RBC M/uL 4.29   Hemoglobin g/dL 13.5   Hematocrit % 41.1   Platelets K/uL 254   Mean Corpuscular Volume fL 96   Mean Corpuscular Hemoglobin pg 31.5*   Mean Corpuscular Hemoglobin Conc g/dL 32.8     CMP:  Recent Labs   Lab Result Units 02/11/20  0910   Glucose mg/dL 107   Calcium mg/dL 9.9   Albumin g/dL 4.0   Total Protein g/dL 7.8   Sodium mmol/L 142   Potassium mmol/L 4.3   CO2 mmol/L 26   Chloride mmol/L 107   BUN, Bld mg/dL 12   Alkaline Phosphatase U/L 70   ALT U/L 26   AST U/L 26   Total Bilirubin mg/dL 0.7     URINALYSIS:  No results for input(s): COLORU, CLARITYU, SPECGRAV, PHUR, PROTEINUA, GLUCOSEU, BILIRUBINCON, BLOODU, WBCU, RBCU, BACTERIA, MUCUS, NITRITE, LEUKOCYTESUR, UROBILINOGEN, HYALINECASTS in the last 2160 hours.   LIPIDS:  Recent Labs   Lab Result Units 02/11/20  0910   TSH uIU/mL 1.068   HDL mg/dL 60   Cholesterol mg/dL 195   Triglycerides mg/dL 144   LDL Cholesterol mg/dL 106.2   Hdl/Cholesterol Ratio % 30.8   Non-HDL Cholesterol mg/dL 135   Total Cholesterol/HDL Ratio  3.3     TSH:  Recent Labs   Lab Result Units 02/11/20  0910   TSH uIU/mL 1.068     A1C:  No results for input(s): HGBA1C in the last 2160 hours.    Radiology:  No imaging on file.    Assessment/Plan     Denise Berrios is a 70 y.o.female with:    1. Benign hypertension with CKD (chronic kidney disease) stage III  - Comprehensive metabolic panel; Future    2. Other specified hypothyroidism  - TSH; Future  - T4, free; Future    3. Overactive bladder    4. Dyslipidemia    5. Anxiety    6. Gastroesophageal reflux disease without esophagitis    7. Other migraine without status migrainosus, not  intractable    8. Vitamin D deficiency  - Vitamin D; Future    9. Irritable bowel syndrome, unspecified type    -Continue current medications and maintain follow up with specialists.  Return to clinic in 6 months.      Aleksandra Carey MD  [facility] [place]

## 2020-02-13 NOTE — ASSESSMENT & PLAN NOTE
Sees Dr. Mena at .  Had an MRI, in media tab.  Has only had 2-3 aura.  Taking nortriptyline, didn't do well with higher doses in past.  Hasn't been having migraines, just aura.

## 2020-02-14 ENCOUNTER — TELEPHONE (OUTPATIENT)
Dept: ADMINISTRATIVE | Facility: HOSPITAL | Age: 70
End: 2020-02-14

## 2020-02-14 ENCOUNTER — PATIENT OUTREACH (OUTPATIENT)
Dept: ADMINISTRATIVE | Facility: HOSPITAL | Age: 70
End: 2020-02-14

## 2020-02-20 RX ORDER — NORTRIPTYLINE HYDROCHLORIDE 10 MG/1
10 CAPSULE ORAL DAILY
Qty: 90 CAPSULE | Refills: 1 | Status: SHIPPED | OUTPATIENT
Start: 2020-02-20 | End: 2020-08-18 | Stop reason: SDUPTHER

## 2020-02-20 RX ORDER — METOPROLOL TARTRATE 50 MG/1
25 TABLET ORAL 2 TIMES DAILY
Qty: 30 TABLET | Refills: 0 | Status: SHIPPED | OUTPATIENT
Start: 2020-02-20 | End: 2020-02-26 | Stop reason: SDUPTHER

## 2020-02-20 RX ORDER — METOPROLOL TARTRATE 50 MG/1
25 TABLET ORAL 2 TIMES DAILY
Qty: 90 TABLET | Refills: 3 | Status: SHIPPED | OUTPATIENT
Start: 2020-02-20 | End: 2020-02-20 | Stop reason: SDUPTHER

## 2020-02-20 NOTE — TELEPHONE ENCOUNTER
----- Message from Janessa Covarrubias sent at 2/20/2020  9:06 AM CST -----  Contact: patient  Type:  RX Refill Request    Who Called: Denise  Refill or New Rx: refill  RX Name and Strength: metoprolol tartrate (LOPRESSOR) 50 MG tablet                                      How is the patient currently taking it? (ex. 1XDay): 1Xday  Is this a 30 day or 90 day RX: 90 day  Preferred Pharmacy with phone number: [facility] [address]  Local or Mail Order: mail order  Ordering Provider: Aurelio  Would the patient rather a call back or a response via [facility]?  Call back  Best Call Back Number: [phone]  Additional Information: needs a prior authorization sent to [employer] for VESICARE 10 mg tablet; she also needs a 30 day supply of metoprolol tartrate (LOPRESSOR) 50 MG tablet sent to [facility] [address] while waiting on the mail order refills

## 2020-02-20 NOTE — TELEPHONE ENCOUNTER
Pt needed Metoprolol Not Notriptyline.... She takes Metoprolol 50mg 1/2 am 1/2 tab pm, pplease send 30 to [facility] and [facility] to [facility]

## 2020-02-21 DIAGNOSIS — N90.4 LICHEN SCLEROSUS ET ATROPHICUS OF THE VULVA: ICD-10-CM

## 2020-02-23 RX ORDER — CLOBETASOL PROPIONATE 0.5 MG/G
OINTMENT TOPICAL
Qty: 45 G | Refills: 1 | Status: SHIPPED | OUTPATIENT
Start: 2020-02-23 | End: 2021-01-14 | Stop reason: SDUPTHER

## 2020-02-26 RX ORDER — METOPROLOL TARTRATE 50 MG/1
25 TABLET ORAL 2 TIMES DAILY
Qty: 180 TABLET | Refills: 3 | Status: ON HOLD | OUTPATIENT
Start: 2020-02-26 | End: 2020-10-19 | Stop reason: SDUPTHER

## 2020-04-08 DIAGNOSIS — N32.81 OVERACTIVE BLADDER: ICD-10-CM

## 2020-04-08 RX ORDER — SOLIFENACIN SUCCINATE 10 MG/1
10 TABLET, FILM COATED ORAL DAILY
Qty: 90 TABLET | Refills: 1 | Status: SHIPPED | OUTPATIENT
Start: 2020-04-08 | End: 2020-04-21

## 2020-04-08 NOTE — TELEPHONE ENCOUNTER
----- Message from Seema Jose sent at 4/8/2020  2:31 PM CDT -----  Contact: Patient   Type:  RX Refill Request    Who Called:  Denise  Refill or New Rx: refill   RX Name and Strength: VESICARE 10 mg tablet  How is the patient currently taking it? (ex. 1XDay): 1 X day   Is this a 30 day or 90 day RX: 90 day   Preferred Pharmacy with phone number: [id number] - [place], [place] - [address] AT [facility] [address] [phone] (Phone)   Local or Mail Order: local   Ordering Provider: Dr. Carey   Would the patient rather a call back or a response via [facility]?  Call back   Best Call Back Number: [phone]   Additional Information:  Patient needs medication soon. She stated that she doesn't have any more and will not get medication before [facility] sends it by mail. She will need a 30 day supply.

## 2020-04-21 DIAGNOSIS — N32.81 OVERACTIVE BLADDER: ICD-10-CM

## 2020-04-21 RX ORDER — SOLIFENACIN SUCCINATE 10 MG/1
10 TABLET, FILM COATED ORAL DAILY
Qty: 90 TABLET | Refills: 1 | Status: CANCELLED | OUTPATIENT
Start: 2020-04-21

## 2020-04-21 RX ORDER — SOLIFENACIN SUCCINATE 10 MG/1
10 TABLET, FILM COATED ORAL DAILY
Qty: 90 TABLET | Refills: 3 | Status: SHIPPED | OUTPATIENT
Start: 2020-04-21 | End: 2020-08-17 | Stop reason: SDUPTHER

## 2020-04-21 NOTE — TELEPHONE ENCOUNTER
----- Message from Janessa Covarrubias sent at 4/21/2020  9:28 AM CDT -----  Contact: patient  Type:  RX Refill Request    Who Called:  patient  Refill or New Rx: refill  RX Name and Strength: VESICARE 10 mg tablet  How is the patient currently taking it? (ex. 1XDay): as directed  Is this a 30 day or 90 day RX: 30 day  Preferred Pharmacy with phone number: [id number] - [place], [place] - [address] AT [facility] [address]  Local or Mail Order: Local  Ordering Provider: Aurelio  Would the patient rather a call back or a response via [facility]?  Call back  Best Call Back Number: [phone]  Additional Information: needs the generic version as her insurance does not pay for brand name

## 2020-04-21 NOTE — TELEPHONE ENCOUNTER
Patient stated that she needs the generic version of this medication as her insurance does not pay for the brand name. Please advise.

## 2020-04-23 ENCOUNTER — TELEPHONE (OUTPATIENT)
Dept: FAMILY MEDICINE | Facility: CLINIC | Age: 70
End: 2020-04-23

## 2020-04-23 NOTE — TELEPHONE ENCOUNTER
Vesicare  CaseId:[id number];  Status:Approved  Prior Auth;  Coverage Start Date:03/24/2020;  Coverage End Date:12/31/2099

## 2020-04-27 RX ORDER — ATORVASTATIN CALCIUM 20 MG/1
TABLET, FILM COATED ORAL
Qty: 90 TABLET | Refills: 3 | Status: ON HOLD | OUTPATIENT
Start: 2020-04-27 | End: 2020-10-20 | Stop reason: HOSPADM

## 2020-04-27 RX ORDER — LEVOTHYROXINE SODIUM 100 UG/1
TABLET ORAL
Qty: 90 TABLET | Refills: 3 | Status: SHIPPED | OUTPATIENT
Start: 2020-04-27 | End: 2021-04-20 | Stop reason: SDUPTHER

## 2020-08-10 ENCOUNTER — TELEPHONE (OUTPATIENT)
Dept: FAMILY MEDICINE | Facility: CLINIC | Age: 70
End: 2020-08-10

## 2020-08-10 DIAGNOSIS — Z11.59 SCREENING FOR VIRAL DISEASE: Primary | ICD-10-CM

## 2020-08-10 NOTE — TELEPHONE ENCOUNTER
----- Message from Alda Gudino sent at 8/10/2020 10:54 AM CDT -----  Pt called in to speak to the nurse in regards to getting an order to have a COVID 19 test.Pt would like to be tested before she sees the doctor on 8/17/2020.    Pt can be reached at [phone].    Thank you

## 2020-08-11 ENCOUNTER — LAB VISIT (OUTPATIENT)
Dept: LAB | Facility: HOSPITAL | Age: 70
End: 2020-08-11
Attending: INTERNAL MEDICINE
Payer: MEDICARE

## 2020-08-11 DIAGNOSIS — E03.8 OTHER SPECIFIED HYPOTHYROIDISM: ICD-10-CM

## 2020-08-11 DIAGNOSIS — Z11.59 SCREENING FOR VIRAL DISEASE: ICD-10-CM

## 2020-08-11 DIAGNOSIS — E55.9 VITAMIN D DEFICIENCY: ICD-10-CM

## 2020-08-11 DIAGNOSIS — N18.30 BENIGN HYPERTENSION WITH CKD (CHRONIC KIDNEY DISEASE) STAGE III: ICD-10-CM

## 2020-08-11 DIAGNOSIS — I12.9 BENIGN HYPERTENSION WITH CKD (CHRONIC KIDNEY DISEASE) STAGE III: ICD-10-CM

## 2020-08-11 LAB
25(OH)D3+25(OH)D2 SERPL-MCNC: 34 NG/ML (ref 30–96)
ALBUMIN SERPL BCP-MCNC: 3.9 G/DL (ref 3.5–5.2)
ALP SERPL-CCNC: 70 U/L (ref 55–135)
ALT SERPL W/O P-5'-P-CCNC: 23 U/L (ref 10–44)
ANION GAP SERPL CALC-SCNC: 8 MMOL/L (ref 8–16)
AST SERPL-CCNC: 25 U/L (ref 10–40)
BILIRUB SERPL-MCNC: 0.4 MG/DL (ref 0.1–1)
BUN SERPL-MCNC: 20 MG/DL (ref 8–23)
CALCIUM SERPL-MCNC: 9.8 MG/DL (ref 8.7–10.5)
CHLORIDE SERPL-SCNC: 108 MMOL/L (ref 95–110)
CO2 SERPL-SCNC: 28 MMOL/L (ref 23–29)
CREAT SERPL-MCNC: 1.3 MG/DL (ref 0.5–1.4)
EST. GFR  (AFRICAN AMERICAN): 47 ML/MIN/1.73 M^2
EST. GFR  (NON AFRICAN AMERICAN): 41 ML/MIN/1.73 M^2
GLUCOSE SERPL-MCNC: 100 MG/DL (ref 70–110)
POTASSIUM SERPL-SCNC: 4.8 MMOL/L (ref 3.5–5.1)
PROT SERPL-MCNC: 7.7 G/DL (ref 6–8.4)
SARS-COV-2 IGG SERPLBLD QL IA.RAPID: NEGATIVE
SODIUM SERPL-SCNC: 144 MMOL/L (ref 136–145)
T4 FREE SERPL-MCNC: 1.04 NG/DL (ref 0.71–1.51)
TSH SERPL DL<=0.005 MIU/L-ACNC: 3 UIU/ML (ref 0.4–4)

## 2020-08-11 PROCEDURE — 86769 SARS-COV-2 COVID-19 ANTIBODY: CPT

## 2020-08-11 PROCEDURE — 80053 COMPREHEN METABOLIC PANEL: CPT

## 2020-08-11 PROCEDURE — 82306 VITAMIN D 25 HYDROXY: CPT

## 2020-08-11 PROCEDURE — 84439 ASSAY OF FREE THYROXINE: CPT

## 2020-08-11 PROCEDURE — 84443 ASSAY THYROID STIM HORMONE: CPT

## 2020-08-11 PROCEDURE — 36415 COLL VENOUS BLD VENIPUNCTURE: CPT

## 2020-08-17 ENCOUNTER — OFFICE VISIT (OUTPATIENT)
Dept: FAMILY MEDICINE | Facility: CLINIC | Age: 70
End: 2020-08-17
Payer: MEDICARE

## 2020-08-17 VITALS
WEIGHT: 155.31 LBS | TEMPERATURE: 98 F | BODY MASS INDEX: 27.51 KG/M2 | SYSTOLIC BLOOD PRESSURE: 124 MMHG | OXYGEN SATURATION: 98 % | DIASTOLIC BLOOD PRESSURE: 80 MMHG | HEART RATE: 73 BPM

## 2020-08-17 DIAGNOSIS — R42 VERTIGO: ICD-10-CM

## 2020-08-17 DIAGNOSIS — N32.81 OVERACTIVE BLADDER: ICD-10-CM

## 2020-08-17 DIAGNOSIS — E03.8 OTHER SPECIFIED HYPOTHYROIDISM: ICD-10-CM

## 2020-08-17 DIAGNOSIS — K58.9 IRRITABLE BOWEL SYNDROME, UNSPECIFIED TYPE: ICD-10-CM

## 2020-08-17 DIAGNOSIS — I12.9 BENIGN HYPERTENSION WITH CKD (CHRONIC KIDNEY DISEASE) STAGE III: Primary | ICD-10-CM

## 2020-08-17 DIAGNOSIS — Z78.0 ASYMPTOMATIC POSTMENOPAUSAL STATUS: ICD-10-CM

## 2020-08-17 DIAGNOSIS — K21.9 GASTROESOPHAGEAL REFLUX DISEASE WITHOUT ESOPHAGITIS: ICD-10-CM

## 2020-08-17 DIAGNOSIS — M81.0 AGE-RELATED OSTEOPOROSIS WITHOUT CURRENT PATHOLOGICAL FRACTURE: ICD-10-CM

## 2020-08-17 DIAGNOSIS — Z12.31 ENCOUNTER FOR SCREENING MAMMOGRAM FOR MALIGNANT NEOPLASM OF BREAST: ICD-10-CM

## 2020-08-17 DIAGNOSIS — E78.5 DYSLIPIDEMIA: ICD-10-CM

## 2020-08-17 DIAGNOSIS — G43.809 OTHER MIGRAINE WITHOUT STATUS MIGRAINOSUS, NOT INTRACTABLE: ICD-10-CM

## 2020-08-17 DIAGNOSIS — N18.30 BENIGN HYPERTENSION WITH CKD (CHRONIC KIDNEY DISEASE) STAGE III: Primary | ICD-10-CM

## 2020-08-17 DIAGNOSIS — Z86.73 HISTORY OF CEREBRAL INFARCTION: ICD-10-CM

## 2020-08-17 DIAGNOSIS — E55.9 VITAMIN D DEFICIENCY: ICD-10-CM

## 2020-08-17 DIAGNOSIS — F41.9 ANXIETY: ICD-10-CM

## 2020-08-17 DIAGNOSIS — E66.3 OVERWEIGHT: ICD-10-CM

## 2020-08-17 PROCEDURE — 99999 PR PBB SHADOW E&M-EST. PATIENT-LVL IV: CPT | Mod: PBBFAC,,, | Performed by: INTERNAL MEDICINE

## 2020-08-17 PROCEDURE — 99214 OFFICE O/P EST MOD 30 MIN: CPT | Mod: S$PBB,,, | Performed by: INTERNAL MEDICINE

## 2020-08-17 PROCEDURE — 99999 PR PBB SHADOW E&M-EST. PATIENT-LVL IV: ICD-10-PCS | Mod: PBBFAC,,, | Performed by: INTERNAL MEDICINE

## 2020-08-17 PROCEDURE — 99214 OFFICE O/P EST MOD 30 MIN: CPT | Mod: PBBFAC,PN | Performed by: INTERNAL MEDICINE

## 2020-08-17 PROCEDURE — 99214 PR OFFICE/OUTPT VISIT, EST, LEVL IV, 30-39 MIN: ICD-10-PCS | Mod: S$PBB,,, | Performed by: INTERNAL MEDICINE

## 2020-08-17 RX ORDER — SOLIFENACIN SUCCINATE 10 MG/1
10 TABLET, FILM COATED ORAL DAILY
Qty: 90 TABLET | Refills: 3 | Status: SHIPPED | OUTPATIENT
Start: 2020-08-17 | End: 2021-09-21

## 2020-08-17 RX ORDER — ESCITALOPRAM OXALATE 5 MG/1
5 TABLET ORAL DAILY
Qty: 30 TABLET | Refills: 11 | Status: SHIPPED | OUTPATIENT
Start: 2020-08-17 | End: 2020-10-27 | Stop reason: SDUPTHER

## 2020-08-17 NOTE — ASSESSMENT & PLAN NOTE
No panic attacks since last visit.  Has been very emotional, has been stressed.  Her step sons don't speak to each other, so family dynamic is off.  Hasn't been able to see sister because of COVID, worries about nephew who is asst chief of [employer] in [place].  Cat was put to sleep.  Took lexapro in past and did ok with it.

## 2020-08-17 NOTE — ASSESSMENT & PLAN NOTE
Sees Dr. Mena at , last MRI in media.  Taking nortriptyline, didn't do well with higher doses in past.  Hasn't been having migraines recently, just aura.

## 2020-08-17 NOTE — PROGRESS NOTES
[facility] Note    Chief Complaint      Chief Complaint   Patient presents with    Follow-up     6m f/u      History of Present Illness      Denise VIVAS Cousins is a 70 y.o. female who presents today for follow up of hypothyroidism.  Patient comes to appointment alone.    Has been having generalized achiness for the last month, has been taking aleve/advil at night which helps.  Was exercising prior to COVID, has not been back yet.  Weight has gone up.  Has been sleeping ok at night, has been having mild night sweats which is not new.  Had similar symptoms in past when anxiety was acting up.    Woke up one morning in March and couldn't walk because she was so dizzy, went to Dr. Casimiro Cole, put on prednisone and valium for vertigo which solved the issue.  No dizziness today.  Problem List Items Addressed This Visit     Anxiety    Current Assessment & Plan     No panic attacks since last visit.  Has been very emotional, has been stressed.  Her step sons don't speak to each other, so family dynamic is off.  Hasn't been able to see sister because of COVID, worries about nephew who is asst chief of [employer] in [place].  Cat was put to sleep.  Took lexapro in past and did ok with it.         Relevant Medications    escitalopram oxalate (LEXAPRO) 5 MG Tab    Other specified hypothyroidism    Overview     post radioactive treatment for grave's         Current Assessment & Plan     Stable on synthroid 100 mcg daily.  No S/S of hypo/hyperthyroidism.           Relevant Orders    TSH    T4, free    Osteoporosis    Current Assessment & Plan     Didn't tolerate Fosamax in past because of GERD.  Given patient info on Prolia.         History of cerebral infarction    Current Assessment & Plan     No residual deficits.  Presented initially with UE weakness.  On asa and statin.         Dyslipidemia    Current Assessment & Plan     Stable on lipitor 20 mg daily, no myalgias.         Relevant Orders    CBC  auto differential    Lipid Panel    Comprehensive metabolic panel    Benign hypertension with CKD (chronic kidney disease) stage III - Primary    Current Assessment & Plan     BP controlled on losartan 50 and lopressor 50 mg BID.  No CP/SOB/HA.  GFR 41 in 8/2020.         Irritable bowel syndrome    Current Assessment & Plan     Has been having some cramping and soft stools the last couple days.         Migraine headache    Current Assessment & Plan     Sees Dr. Mena at , last MRI in media.  Taking nortriptyline, didn't do well with higher doses in past.  Hasn't been having migraines recently, just aura.           Overactive bladder    Current Assessment & Plan     Controlled on vesicare.  No leakage/incontinence at present.         Relevant Medications    solifenacin (VESICARE) 10 MG tablet    Gastroesophageal reflux disease without esophagitis    Current Assessment & Plan     Stable on prilosec. No nausea/belching.  Sees Dr. Tarango.         Vitamin D deficiency    Current Assessment & Plan     Level 34 in 8/2020 after taking ergo weekly.           Other Visit Diagnoses     Vertigo        Overweight        Encounter for screening mammogram for malignant neoplasm of breast        Relevant Orders    Mammo Digital Screening Bilat w/ Alirio    Asymptomatic postmenopausal status        Relevant Orders    DXA Bone Density Spine And Hip          Health Maintenance   Topic Date Due    Mammogram  04/09/2021    DEXA SCAN  06/01/2021    High Dose Statin  08/17/2021    Aspirin/Antiplatelet Therapy  08/17/2021    Lipid Panel  02/11/2025    TETANUS VACCINE  08/28/2029    Hepatitis C Screening  Completed    Pneumococcal Vaccine (65+ Low/Medium Risk)  Completed       Past Medical History:   Diagnosis Date    Acid reflux     Graves disease     diffuse Toxic Goiter    Hypertension     IBS (irritable bowel syndrome)     Lichen sclerosus     Migraine     Stroke        Past Surgical History:   Procedure Laterality  Date    EYE SURGERY      HYSTERECTOMY      CINDI for ?? cervical cancer, had normal paps       family history includes Hypertension in her father; Kidney failure in her father; Stomach cancer in her mother.    Social History     Tobacco Use    Smoking status: Never Smoker    Smokeless tobacco: Never Used   Substance Use Topics    Alcohol use: Yes    Drug use: No       Review of Systems   Constitutional: Negative for chills and fever.   HENT: Negative for congestion and sore throat.    Eyes: Negative for blurred vision and discharge.   Respiratory: Negative for cough and shortness of breath.    Cardiovascular: Negative for chest pain and palpitations.   Gastrointestinal: Negative for constipation, diarrhea, nausea and vomiting.   Genitourinary: Negative for dysuria and hematuria.   Musculoskeletal: Negative for falls and myalgias.   Skin: Negative for itching and rash.   Neurological: Negative for dizziness and headaches.        Outpatient Encounter Medications as of 8/17/2020   Medication Sig Dispense Refill    aspirin (ECOTRIN) 81 MG EC tablet once a day      atorvastatin (LIPITOR) 20 MG tablet TAKE 1 TABLET DAILY 90 tablet 3    butalbital-acetaminophen-caffeine -40 mg (FIORICET, ESGIC) -40 mg per tablet Take 1 tablet by mouth nightly as needed.      clobetasol 0.05% (TEMOVATE) 0.05 % Oint APPLY TOPICALLY TWICE A DAY 45 g 1    levothyroxine (SYNTHROID) 100 MCG tablet TAKE 1 TABLET BEFORE BREAKFAST 90 tablet 3    losartan (COZAAR) 50 MG tablet Take 1 tablet (50 mg total) by mouth once daily. 90 tablet 1    metoprolol tartrate (LOPRESSOR) 50 MG tablet Take 0.5 tablets (25 mg total) by mouth 2 (two) times daily. 180 tablet 3    nortriptyline (PAMELOR) 10 MG capsule Take 1 capsule (10 mg total) by mouth once daily. 90 capsule 1    omeprazole (PRILOSEC) 20 MG capsule Take 20 mg by mouth once daily.       solifenacin (VESICARE) 10 MG tablet Take 1 tablet (10 mg total) by mouth once daily. 90  tablet 3    VITAMIN D2 1,250 mcg (50,000 unit) capsule TAKE 1 CAPSULE EVERY 7 DAYS 12 capsule 3    [DISCONTINUED] solifenacin (VESICARE) 10 MG tablet Take 1 tablet (10 mg total) by mouth once daily. 90 tablet 3    escitalopram oxalate (LEXAPRO) 5 MG Tab Take 1 tablet (5 mg total) by mouth once daily. 30 tablet 11    [DISCONTINUED] ergocalciferol (ERGOCALCIFEROL) 50,000 unit Cap Take 1 capsule (50,000 Units total) by mouth every 7 days. 12 capsule 3     No facility-administered encounter medications on file as of 8/17/2020.         Review of patient's allergies indicates:   Allergen Reactions    Cefaclor     Methimazole (bulk)     Penicillin     Phenobarbital     Plavix [clopidogrel]     Sulfa (sulfonamide antibiotics)     Tapazole [methimazole]     Thyroid      Note: PTU    Topamax [topiramate]      GI symptoms       Physical Exam      Vital Signs  Temp: 97.6 °F (36.4 °C)  Temp src: Oral  Pulse: 73  SpO2: 98 %  BP: 124/80  BP Location: Left arm  Patient Position: Sitting  Pain Score:   4  Pain Loc: Generalized  Height and Weight  Weight: 70.5 kg (155 lb 5 oz)]  Body mass index is 27.51 kg/m².    Physical Exam  Constitutional:       Appearance: She is well-developed.   HENT:      Head: Normocephalic and atraumatic.      Right Ear: External ear normal.      Left Ear: External ear normal.   Eyes:      General:         Right eye: No discharge.         Left eye: No discharge.   Neck:      Musculoskeletal: Normal range of motion.      Thyroid: No thyromegaly.   Cardiovascular:      Rate and Rhythm: Normal rate and regular rhythm.      Heart sounds: Normal heart sounds. No murmur.   Pulmonary:      Effort: Pulmonary effort is normal. No respiratory distress.      Breath sounds: Normal breath sounds.   Abdominal:      General: Bowel sounds are normal. There is no distension.      Palpations: Abdomen is soft.      Tenderness: There is no abdominal tenderness.   Musculoskeletal: Normal range of motion.          General: No deformity.   Skin:     General: Skin is warm and dry.      Findings: No rash.   Neurological:      Mental Status: She is alert and oriented to person, place, and time.   Psychiatric:         Behavior: Behavior normal.        Laboratory:  CBC:  No results for input(s): WBC, RBC, HGB, HCT, PLT, MCV, MCH, MCHC in the last 2160 hours.  CMP:  Recent Labs   Lab Result Units 08/11/20  1026   Glucose mg/dL 100   Calcium mg/dL 9.8   Albumin g/dL 3.9   Total Protein g/dL 7.7   Sodium mmol/L 144   Potassium mmol/L 4.8   CO2 mmol/L 28   Chloride mmol/L 108   BUN, Bld mg/dL 20   Alkaline Phosphatase U/L 70   ALT U/L 23   AST U/L 25   Total Bilirubin mg/dL 0.4     URINALYSIS:  No results for input(s): COLORU, CLARITYU, SPECGRAV, PHUR, PROTEINUA, GLUCOSEU, BILIRUBINCON, BLOODU, WBCU, RBCU, BACTERIA, MUCUS, NITRITE, LEUKOCYTESUR, UROBILINOGEN, HYALINECASTS in the last 2160 hours.   LIPIDS:  Recent Labs   Lab Result Units 08/11/20  1026   TSH uIU/mL 3.000     TSH:  Recent Labs   Lab Result Units 08/11/20  1026   TSH uIU/mL 3.000     A1C:  No results for input(s): HGBA1C in the last 2160 hours.    Radiology:  No imaging on file.    Assessment/Plan     Denise Berrios is a 70 y.o.female with:    1. Benign hypertension with CKD (chronic kidney disease) stage III    2. Dyslipidemia  - CBC auto differential; Future  - Lipid Panel; Future  - Comprehensive metabolic panel; Future    3. Other specified hypothyroidism  - TSH; Future  - T4, free; Future    4. History of cerebral infarction    5. Vertigo    6. Other migraine without status migrainosus, not intractable    7. Anxiety  - escitalopram oxalate (LEXAPRO) 5 MG Tab; Take 1 tablet (5 mg total) by mouth once daily.  Dispense: 30 tablet; Refill: 11    8. Vitamin D deficiency    9. Gastroesophageal reflux disease without esophagitis    10. Irritable bowel syndrome, unspecified type    11. Overweight    12. Overactive bladder  - solifenacin (VESICARE) 10 MG tablet; Take 1  tablet (10 mg total) by mouth once daily.  Dispense: 90 tablet; Refill: 3    13. Age-related osteoporosis without current pathological fracture    14. Encounter for screening mammogram for malignant neoplasm of breast  - Mammo Digital Screening Bilat w/ Alirio; Future  - Mammo Digital Screening Bilat w/ Alirio    15. Asymptomatic postmenopausal status  - DXA Bone Density Spine And Hip; Future  - DXA Bone Density Spine And Hip     -re-start lexapro 5 mg daily  -Counseled on getting flu shot in Fall  -Counseled on NSAID avoidance given dip in GFR  -Counseled on increasing exercise  -MMG/DEXA sent to [facility] per patient request  -Continue current medications and maintain follow up with specialists.  Return to clinic in 6 months with labs prior.      Aleksandra Carey MD  [facility] [place]

## 2020-08-18 RX ORDER — NORTRIPTYLINE HYDROCHLORIDE 10 MG/1
CAPSULE ORAL
Qty: 90 CAPSULE | Refills: 3 | Status: SHIPPED | OUTPATIENT
Start: 2020-08-18 | End: 2023-07-26 | Stop reason: SDUPTHER

## 2020-08-18 NOTE — PROGRESS NOTES
Refill Routing Note   Medication(s) are not appropriate for processing by [facility]:       - Non-participating provider           Medication reconciliation completed: No      Automatic Epic Generated Protocol Data:        Requested Prescriptions   Pending Prescriptions Disp Refills    nortriptyline (PAMELOR) 10 MG capsule [Pharmacy Med Name: NORTRIPTYLINE CAPS 10MG] 90 capsule 3     Sig: TAKE 1 CAPSULE DAILY       Tricyclic Antidepressants Passed - 8/18/2020 10:19 AM        Passed - Patient is not currently pregnant        Passed - No positive pregnancy test in past 12 months         Passed - Patient has a recent visit in past 12 months or next 90 days              Appointments  past 12m or future 3m with PCP    Date Provider   Last Visit   8/17/2020 Aleksandra Carey MD   Next Visit   Visit date not found Aleksandra Carey MD   ED visits in past 90 days: 0     Note composed:2:03 PM 08/18/2020

## 2020-08-28 ENCOUNTER — TELEPHONE (OUTPATIENT)
Dept: FAMILY MEDICINE | Facility: CLINIC | Age: 70
End: 2020-08-28

## 2020-08-28 NOTE — TELEPHONE ENCOUNTER
PATIENT STATES SINCE  SHE IS ON HER STOMACH MEDS RIGHT NOW, SHE CAN TOLERATE VIT D A LOT BETTER , STATES SHE IS WILLING TO TRY FOSAMAX FIRST ,THEN IF THAT DO NOT WORK THEN SHE WILL TRY PROLIA

## 2020-08-28 NOTE — TELEPHONE ENCOUNTER
Please let patient know her mammogram results are normal.      DEXA still shows osteoporosis and hips are worse.  Needs to be on medication to increase bone density and prevent fracture.  Is she willing to try Prolia?

## 2020-08-31 ENCOUNTER — PATIENT OUTREACH (OUTPATIENT)
Dept: ADMINISTRATIVE | Facility: HOSPITAL | Age: 70
End: 2020-08-31

## 2020-08-31 ENCOUNTER — TELEPHONE (OUTPATIENT)
Dept: ADMINISTRATIVE | Facility: HOSPITAL | Age: 70
End: 2020-08-31

## 2020-08-31 RX ORDER — ALENDRONATE SODIUM 70 MG/1
70 TABLET ORAL
Qty: 12 TABLET | Refills: 3 | Status: SHIPPED | OUTPATIENT
Start: 2020-08-31 | End: 2021-03-31

## 2020-08-31 RX ORDER — ALENDRONATE SODIUM 70 MG/1
70 TABLET ORAL
Qty: 4 TABLET | Refills: 11 | Status: SHIPPED | OUTPATIENT
Start: 2020-08-31 | End: 2020-08-31

## 2020-08-31 RX ORDER — ALENDRONATE SODIUM 70 MG/1
70 TABLET ORAL
Qty: 4 TABLET | Refills: 0 | Status: ON HOLD | OUTPATIENT
Start: 2020-08-31 | End: 2020-10-19 | Stop reason: HOSPADM

## 2020-08-31 NOTE — TELEPHONE ENCOUNTER
Called and spoke with pt in regards of Fosamax being sent to her pharmacy. Pt states that she only needs 30 days of meds sent to [facility], and then a 90 day supply sent to [facility] with 3 refills. Pt states that her insurance won't cover her getting the meds there full term, that why she wants it it to [facility]. Please advise.

## 2020-10-05 ENCOUNTER — TELEPHONE (OUTPATIENT)
Dept: FAMILY MEDICINE | Facility: CLINIC | Age: 70
End: 2020-10-05

## 2020-10-05 ENCOUNTER — CLINICAL SUPPORT (OUTPATIENT)
Dept: FAMILY MEDICINE | Facility: CLINIC | Age: 70
End: 2020-10-05
Payer: MEDICARE

## 2020-10-05 DIAGNOSIS — Z23 NEEDS FLU SHOT: Primary | ICD-10-CM

## 2020-10-05 PROCEDURE — 90694 VACC AIIV4 NO PRSRV 0.5ML IM: CPT | Mod: PBBFAC,PN

## 2020-10-05 PROCEDURE — G0008 ADMIN INFLUENZA VIRUS VAC: HCPCS | Mod: PBBFAC,PN

## 2020-10-05 NOTE — TELEPHONE ENCOUNTER
----- Message from Oneyda Goyal sent at 10/5/2020 12:03 PM CDT -----  Contact: self [phone]  Patient is calling to ask if you have the flu shots for over 65 . Please call

## 2020-10-13 ENCOUNTER — TELEPHONE (OUTPATIENT)
Dept: FAMILY MEDICINE | Facility: CLINIC | Age: 70
End: 2020-10-13

## 2020-10-13 ENCOUNTER — HOSPITAL ENCOUNTER (EMERGENCY)
Facility: HOSPITAL | Age: 70
Discharge: HOME | End: 2020-10-13
Attending: EMERGENCY MEDICINE
Payer: MEDICARE

## 2020-10-13 VITALS
TEMPERATURE: 99 F | HEIGHT: 64 IN | WEIGHT: 150 LBS | RESPIRATION RATE: 22 BRPM | HEART RATE: 72 BPM | SYSTOLIC BLOOD PRESSURE: 153 MMHG | DIASTOLIC BLOOD PRESSURE: 74 MMHG | OXYGEN SATURATION: 97 % | BODY MASS INDEX: 25.61 KG/M2

## 2020-10-13 DIAGNOSIS — R00.2 PALPITATION: ICD-10-CM

## 2020-10-13 DIAGNOSIS — R00.2 PALPITATIONS: Primary | ICD-10-CM

## 2020-10-13 LAB
ALBUMIN SERPL BCP-MCNC: 4 G/DL (ref 3.5–5.2)
ALP SERPL-CCNC: 68 U/L (ref 55–135)
ALT SERPL W/O P-5'-P-CCNC: 19 U/L (ref 10–44)
ANION GAP SERPL CALC-SCNC: 11 MMOL/L (ref 8–16)
AST SERPL-CCNC: 23 U/L (ref 10–40)
BACTERIA #/AREA URNS HPF: ABNORMAL /HPF
BASOPHILS # BLD AUTO: 0.06 K/UL (ref 0–0.2)
BASOPHILS NFR BLD: 0.8 % (ref 0–1.9)
BILIRUB SERPL-MCNC: 0.5 MG/DL (ref 0.1–1)
BILIRUB UR QL STRIP: NEGATIVE
BNP SERPL-MCNC: 101 PG/ML (ref 0–99)
BUN SERPL-MCNC: 16 MG/DL (ref 8–23)
CALCIUM SERPL-MCNC: 9.1 MG/DL (ref 8.7–10.5)
CHLORIDE SERPL-SCNC: 107 MMOL/L (ref 95–110)
CLARITY UR: CLEAR
CO2 SERPL-SCNC: 22 MMOL/L (ref 23–29)
COLOR UR: YELLOW
CREAT SERPL-MCNC: 1 MG/DL (ref 0.5–1.4)
DIFFERENTIAL METHOD: ABNORMAL
EOSINOPHIL # BLD AUTO: 0.2 K/UL (ref 0–0.5)
EOSINOPHIL NFR BLD: 2 % (ref 0–8)
ERYTHROCYTE [DISTWIDTH] IN BLOOD BY AUTOMATED COUNT: 12.6 % (ref 11.5–14.5)
EST. GFR  (AFRICAN AMERICAN): >60 ML/MIN/1.73 M^2
EST. GFR  (NON AFRICAN AMERICAN): 56 ML/MIN/1.73 M^2
GLUCOSE SERPL-MCNC: 110 MG/DL (ref 70–110)
GLUCOSE UR QL STRIP: NEGATIVE
HCT VFR BLD AUTO: 42.4 % (ref 37–48.5)
HGB BLD-MCNC: 14.1 G/DL (ref 12–16)
HGB UR QL STRIP: NEGATIVE
IMM GRANULOCYTES # BLD AUTO: 0.02 K/UL (ref 0–0.04)
IMM GRANULOCYTES NFR BLD AUTO: 0.3 % (ref 0–0.5)
KETONES UR QL STRIP: NEGATIVE
LEUKOCYTE ESTERASE UR QL STRIP: ABNORMAL
LYMPHOCYTES # BLD AUTO: 2.2 K/UL (ref 1–4.8)
LYMPHOCYTES NFR BLD: 29.3 % (ref 18–48)
MAGNESIUM SERPL-MCNC: 1.8 MG/DL (ref 1.6–2.6)
MCH RBC QN AUTO: 31.8 PG (ref 27–31)
MCHC RBC AUTO-ENTMCNC: 33.3 G/DL (ref 32–36)
MCV RBC AUTO: 96 FL (ref 82–98)
MICROSCOPIC COMMENT: ABNORMAL
MONOCYTES # BLD AUTO: 0.9 K/UL (ref 0.3–1)
MONOCYTES NFR BLD: 11.7 % (ref 4–15)
NEUTROPHILS # BLD AUTO: 4.1 K/UL (ref 1.8–7.7)
NEUTROPHILS NFR BLD: 55.9 % (ref 38–73)
NITRITE UR QL STRIP: NEGATIVE
NRBC BLD-RTO: 0 /100 WBC
PH UR STRIP: 7 [PH] (ref 5–8)
PLATELET # BLD AUTO: 257 K/UL (ref 150–350)
PMV BLD AUTO: 10.6 FL (ref 9.2–12.9)
POTASSIUM SERPL-SCNC: 4 MMOL/L (ref 3.5–5.1)
PROT SERPL-MCNC: 7.7 G/DL (ref 6–8.4)
PROT UR QL STRIP: NEGATIVE
RBC # BLD AUTO: 4.43 M/UL (ref 4–5.4)
RBC #/AREA URNS HPF: 5 /HPF (ref 0–4)
SODIUM SERPL-SCNC: 140 MMOL/L (ref 136–145)
SP GR UR STRIP: 1.01 (ref 1–1.03)
SQUAMOUS #/AREA URNS HPF: 1 /HPF
TROPONIN I SERPL DL<=0.01 NG/ML-MCNC: 0.01 NG/ML (ref 0–0.03)
TROPONIN I SERPL DL<=0.01 NG/ML-MCNC: 0.01 NG/ML (ref 0–0.03)
TSH SERPL DL<=0.005 MIU/L-ACNC: 2.92 UIU/ML (ref 0.4–4)
URN SPEC COLLECT METH UR: ABNORMAL
UROBILINOGEN UR STRIP-ACNC: NEGATIVE EU/DL
WBC # BLD AUTO: 7.41 K/UL (ref 3.9–12.7)
WBC #/AREA URNS HPF: 1 /HPF (ref 0–5)

## 2020-10-13 PROCEDURE — 80053 COMPREHEN METABOLIC PANEL: CPT

## 2020-10-13 PROCEDURE — 83880 ASSAY OF NATRIURETIC PEPTIDE: CPT

## 2020-10-13 PROCEDURE — 93005 ELECTROCARDIOGRAM TRACING: CPT

## 2020-10-13 PROCEDURE — 81000 URINALYSIS NONAUTO W/SCOPE: CPT

## 2020-10-13 PROCEDURE — 99285 EMERGENCY DEPT VISIT HI MDM: CPT | Mod: 25

## 2020-10-13 PROCEDURE — 83735 ASSAY OF MAGNESIUM: CPT

## 2020-10-13 PROCEDURE — 84443 ASSAY THYROID STIM HORMONE: CPT

## 2020-10-13 PROCEDURE — 84484 ASSAY OF TROPONIN QUANT: CPT

## 2020-10-13 PROCEDURE — 85025 COMPLETE CBC W/AUTO DIFF WBC: CPT

## 2020-10-13 NOTE — ED TRIAGE NOTES
"intermittent palpitations x 3 days. pt initiall thought it was related to  caffiene intake but palpitations began this am upon waking, pt states also feels " lump in throat"   "

## 2020-10-13 NOTE — MEDICAL/APP STUDENT
History     Chief Complaint   Patient presents with    Palpitations     intermittent palpitations x 3 days. pt initiall thought it was r/t caffiene intake but palpitations began this am upon waking.       Ms Coumeenu is a 70 y.o. F w/ PMHx of HTN, CVA, GERD, Graves disease, anxiety. She presented to ED due to progressively worsening palpitations. Onset 4 days, duration varied, non-exertional, random frequency, no alleviating or aggravating symptoms, she reports associated chest heaviness. She denies CP, fatigue, SOB, light headedness, diaphoresis, leg swelling, headache, unintentional weight change. Denies any PMHx of cardiac issues except the HTN. Saw PCP 6 weeks ago and was put on Lexapro for anxiety - she states it is helping and working. Denies any other new or changes to home Rx. Current cardiac Rx consists of losartan, metoprolol, aspirin, atorvastatin. She presents with her spouse. Her son is a PGY-3 anesthesia at [employer] [facility]. She states a change from decaf coffee to regular strength since a few weeks ago, but denies having coffee this morning.         The history is provided by the patient and medical records.     Interval Hx  10/13/20 -   - EKG upon presentation showed multiple PVCs and regularly irregular rate and rhythym.  - Pt cardiac wrkup insignificant, EKG showed PVCs earlier have resolved currently. Cardiology was consulted. Plan is to increase Metoprolol dosage and discharge pt. With suggestion of seeing PCP soon for referral to cardiologist.     Past Medical History:   Diagnosis Date    Acid reflux     Graves disease     diffuse Toxic Goiter    Hypertension     IBS (irritable bowel syndrome)     Lichen sclerosus     Migraine     Stroke        Past Surgical History:   Procedure Laterality Date    EYE SURGERY      HYSTERECTOMY      CINDI for ?? cervical cancer, had normal paps       Family History   Problem Relation Age of Onset    Kidney failure Father     Hypertension Father      "Stomach cancer Mother     Breast cancer Neg Hx        Social History     Tobacco Use    Smoking status: Never Smoker    Smokeless tobacco: Never Used   Substance Use Topics    Alcohol use: Yes    Drug use: No       Review of Systems   Constitutional: Negative.    HENT: Negative.    Eyes: Negative.    Respiratory: Negative.    Cardiovascular: Positive for palpitations.        Chest heaviness   Gastrointestinal: Negative.    Endocrine: Negative.    Genitourinary: Negative.    Musculoskeletal: Negative.    Allergic/Immunologic: Negative.    Neurological: Negative.    Hematological: Negative.    Psychiatric/Behavioral: Negative.        Physical Exam   BP (!) 213/97   Pulse 95   Temp 98.6 °F (37 °C)   Resp 17   Ht 5' 4" (1.626 m)   Wt 68 kg (150 lb)   SpO2 98%   BMI 25.75 kg/m²     Physical Exam    Nursing note and vitals reviewed.  Constitutional: She appears well-developed and well-nourished.   HENT:   Head: Normocephalic and atraumatic.   Right Ear: External ear normal.   Left Ear: External ear normal.   Nose: Nose normal.   Mouth/Throat: Oropharynx is clear and moist.   Eyes: Conjunctivae and EOM are normal. Pupils are equal, round, and reactive to light.   Neck: Normal range of motion. Neck supple.   Cardiovascular: Intact distal pulses and normal pulses. A regularly irregular rhythm present.  Frequent extrasystoles are present.  Exam reveals gallop and S3.    Murmur heard.  Pulmonary/Chest: Breath sounds normal.   Abdominal: Soft. Bowel sounds are normal.   Genitourinary:    Genitourinary Comments: Not performed     Musculoskeletal: Normal range of motion.   Neurological: She is alert and oriented to person, place, and time. She has normal strength and normal reflexes. GCS score is 15. GCS eye subscore is 4. GCS verbal subscore is 5. GCS motor subscore is 6.   Skin: Skin is warm and dry. Capillary refill takes less than 2 seconds.   Psychiatric: She has a normal mood and affect. Her behavior is normal. " Judgment and thought content normal.         ED Course

## 2020-10-13 NOTE — ED PROVIDER NOTES
Encounter Date: 10/13/2020    COVID Statement  The [place] of [employer] and Fermin Craig, Governor of the [facility], have declared a State of Public Health Emergency due to the spread of a novel coronavirus and disease (COVID-19).  There is no currently accepted treatment except conservative measures and respiratory support if appropriate.  This has lead to significant resource capacity and potential delays in care.      SCRIBE #1 NOTE: I, Hussein Crespo, am scribing for, and in the presence of, Bradley Hernandez MD.       History     Chief Complaint   Patient presents with    Palpitations     intermittent palpitations x 3 days. pt initiall thought it was r/t caffiene intake but palpitations began this am upon waking.       Denise Berrios is a 70 y.o. female who  has a past medical history of Acid reflux, Graves disease, Hypertension, IBS (irritable bowel syndrome), Lichen sclerosus, Migraine, and Stroke.    The patient presents to the ED due to palpitations for the past 3 days. Patient reports associated chest discomfort. She denies chest pain, SOB, leg swelling, diaphoresis or light-headedness.  Patient has been taking Lexapro for anxiety the past 6 weeks but denies other recent medication change. She is currently on Losartan, Metoprolol, baby Aspirin, atorvastatin. She denies cardiac history. She mentions recent change from decaffeinated  coffee to regular strength since a few weeks ago, but did not have coffee this morning.    The history is provided by the patient.       Past Medical History:   Diagnosis Date    Acid reflux     Graves disease     diffuse Toxic Goiter    Hypertension     IBS (irritable bowel syndrome)     Lichen sclerosus     Migraine     Stroke      Past Surgical History:   Procedure Laterality Date    EYE SURGERY      HYSTERECTOMY      CINDI for ?? cervical cancer, had normal paps     Family History   Problem Relation Age of Onset    Kidney failure Father      Hypertension Father     Stomach cancer Mother     Breast cancer Neg Hx      Social History     Tobacco Use    Smoking status: Never Smoker    Smokeless tobacco: Never Used   Substance Use Topics    Alcohol use: Yes    Drug use: No     Review of Systems   Constitutional: Negative for chills, diaphoresis and fever.   HENT: Negative for rhinorrhea, sore throat and trouble swallowing.    Eyes: Negative for visual disturbance.   Respiratory: Negative for cough and shortness of breath.    Cardiovascular: Positive for palpitations. Negative for chest pain and leg swelling.        + chest heaviness   Gastrointestinal: Negative for abdominal pain, diarrhea and vomiting.   Genitourinary: Negative for dysuria and hematuria.   Musculoskeletal: Negative for back pain.   Skin: Negative for rash.   Neurological: Negative for light-headedness, numbness and headaches.   Hematological: Negative for adenopathy.   All other systems reviewed and are negative.      Physical Exam     Initial Vitals [10/13/20 1014]   BP Pulse Resp Temp SpO2   (!) 213/97 (!) 56 17 98.6 °F (37 °C) 97 %      MAP       --         Physical Exam    Constitutional:  Non-toxic appearance. No distress.   HENT:   Head: Normocephalic and atraumatic.   Eyes: Conjunctivae and EOM are normal. Pupils are equal, round, and reactive to light. Right eye exhibits no nystagmus. Left eye exhibits no nystagmus.   Neck: Neck supple.   Cardiovascular: Regular rhythm, S1 normal and S2 normal.   No murmur heard.  Pulmonary/Chest: Breath sounds normal. No respiratory distress. She has no wheezes. She has no rales.   Abdominal: Soft. She exhibits no distension. There is no abdominal tenderness.   Neurological: She is alert. No cranial nerve deficit. GCS eye subscore is 4. GCS verbal subscore is 5. GCS motor subscore is 6.   Skin: Skin is warm. No rash noted.   Psychiatric: She has a normal mood and affect. Her behavior is normal.         ED Course   Procedures  Labs  Reviewed   CBC W/ AUTO DIFFERENTIAL - Abnormal; Notable for the following components:       Result Value    Mean Corpuscular Hemoglobin 31.8 (*)     All other components within normal limits   COMPREHENSIVE METABOLIC PANEL - Abnormal; Notable for the following components:    CO2 22 (*)     eGFR if non  56 (*)     All other components within normal limits   B-TYPE NATRIURETIC PEPTIDE - Abnormal; Notable for the following components:     (*)     All other components within normal limits   URINALYSIS, REFLEX TO URINE CULTURE - Abnormal; Notable for the following components:    Leukocytes, UA 1+ (*)     All other components within normal limits    Narrative:     Specimen Source->Urine   URINALYSIS MICROSCOPIC - Abnormal; Notable for the following components:    RBC, UA 5 (*)     Bacteria Few (*)     All other components within normal limits    Narrative:     Specimen Source->Urine   TSH   MAGNESIUM   TROPONIN I   TROPONIN I   TROPONIN I        ECG Results          EKG 12-lead (In process)  Result time 10/13/20 11:37:11    In process by Interface, Lab In [facility] (10/13/20 11:37:11)                 Narrative:    Test Reason : R00.2,    Vent. Rate : 084 BPM     Atrial Rate : 084 BPM     P-R Int : 166 ms          QRS Dur : 082 ms      QT Int : 414 ms       P-R-T Axes : 069 051 071 degrees     QTc Int : 489 ms    Sinus rhythm with frequent Premature ventricular complexes in a pattern of  bigeminy  Otherwise normal ECG  No previous ECGs available    Referred By: [email or web address]   SELF           Confirmed By:                             Imaging Results          X-Ray Chest AP Portable (Final result)  Result time 10/13/20 11:18:01    Final result by Faiza Seymour MD (10/13/20 11:18:01)                 Impression:      Bilateral faint reticular opacities which could be due to mild interstitial edema, small airways inflammatory process, or mild interstitial lung disease. No focal consolidation.    Blunting  the right costophrenic angle suggesting pleural fluid or scarring    Atherosclerosis      Electronically signed by: Faiza Seymour MD  Date:    10/13/2020  Time:    11:18             Narrative:    EXAMINATION:  XR CHEST AP PORTABLE    CLINICAL HISTORY:  palpitations;    TECHNIQUE:  Single frontal view of the chest was performed.    COMPARISON:  None    FINDINGS:  The mediastinal structures are midline.  The cardiac silhouette is not well evaluated due to AP technique.  There is atherosclerotic calcification of the aortic arch.  There are bilateral reticular opacities which could be due to mild interstitial edema, small airways inflammatory process, or interstitial lung disease.  No focal consolidation.  There is some blunting of the right costophrenic angle suggesting pleural fluid or scarring.    No osseous abnormalities are seen.                                 Medical Decision Making:   Differential Diagnosis:   Differential diagnosis includes but is not limited to:  Arrhythmia, electrolyte abnormality, thyroid disorder, medication effect, ACS    Clinical Tests:   Lab Tests: Reviewed  Radiological Study: Reviewed  Medical Tests: Reviewed  ED Management:  Labs without significant abnormality.  Troponin negative x2.  Chest pain-free on reassessment and in no apparent distress.  Low concern for ACS at this time.     After taking into careful account the historical factors and physical exam findings of the patient's presentation today, in conjunction with the empirical and objective data obtained on ED workup, no acute emergent medical condition has been identified. The patient appears to be low risk for an emergent medical condition and I feel it is safe and appropriate at this time for the patient to be discharged to follow-up as detailed in their discharge instructions for reevaluation and possible continued outpatient workup and management. I have discussed the specifics of the workup with the patient and the  patient has verbalized understanding of the details of the workup, the diagnosis, the treatment plan, and the need for outpatient follow-up.  Although the patient has no emergent etiology today this does not preclude the development of an emergent condition so in addition, I have advised the patient that they can return to the ED and/or activate EMS at any time with worsening of their symptoms, change of their symptoms, or with any other medical complaint.  The patient remained comfortable and stable during their visit in the ED.  Discharge and follow-up instructions discussed with the patient who expressed understanding and willingness to comply with my recommendations.                     ED Course as of Oct 13 1405   Tue Oct 13, 2020   1021 EKG:  Rate 98.  Sinus rhythm with occasional PVCs.  .  No STEMI.    [RN]   1200 Discussed with Dr. Lemus who reviewed patient's ECG. Recommends increasing metoprolol and PCP follow up.    [RN]   1324 Patient resting on reassessment. Sinus rhythm on monitor. Will recommend she take 50mg in am of metoprolol and 25mg at night. PCP follow up in 2-4 days. Return precautions for worsening symptoms dizziness shortness of breath or any other concerns.       [RN]      ED Course User Index  [RN] Bradley Hernandez Jr., MD            Clinical Impression:       ICD-10-CM ICD-9-CM   1. Palpitations  R00.2 785.1   2. Palpitation  R00.2 785.1                          I, Bradley Hernandez,  personally performed the services described in this documentation. All medical record entries made by the scribe were at my direction and in my presence.  I have reviewed the chart and agree that the record reflects my personal performance and is accurate and complete. Bradley Hernandez M.D. 2:06 PM10/13/2020    Portions of this note were dictated using voice recognition software and may contain dictation related errors in spelling/grammar/syntax not found on text review                        Bradley  IZAIAH Hernandez Jr., MD  10/13/20 1392

## 2020-10-13 NOTE — TELEPHONE ENCOUNTER
----- Message from Danni Resendez sent at 10/13/2020  2:47 PM CDT -----  Regarding: call back  Contact: [phone]  Patient is requesting to schedule an appointment for this week for hospital follow up since she was in the ER today.

## 2020-10-14 ENCOUNTER — PES CALL (OUTPATIENT)
Dept: ADMINISTRATIVE | Facility: CLINIC | Age: 70
End: 2020-10-14

## 2020-10-16 ENCOUNTER — OFFICE VISIT (OUTPATIENT)
Dept: FAMILY MEDICINE | Facility: CLINIC | Age: 70
End: 2020-10-16
Payer: MEDICARE

## 2020-10-16 VITALS
WEIGHT: 152.44 LBS | BODY MASS INDEX: 26.17 KG/M2 | SYSTOLIC BLOOD PRESSURE: 142 MMHG | DIASTOLIC BLOOD PRESSURE: 60 MMHG | TEMPERATURE: 97 F | HEART RATE: 71 BPM | OXYGEN SATURATION: 98 %

## 2020-10-16 DIAGNOSIS — I49.3 PVC (PREMATURE VENTRICULAR CONTRACTION): ICD-10-CM

## 2020-10-16 DIAGNOSIS — I12.9 BENIGN HYPERTENSION WITH CKD (CHRONIC KIDNEY DISEASE) STAGE III: ICD-10-CM

## 2020-10-16 DIAGNOSIS — N18.30 BENIGN HYPERTENSION WITH CKD (CHRONIC KIDNEY DISEASE) STAGE III: ICD-10-CM

## 2020-10-16 DIAGNOSIS — K21.9 GASTROESOPHAGEAL REFLUX DISEASE WITHOUT ESOPHAGITIS: Primary | ICD-10-CM

## 2020-10-16 PROCEDURE — 99214 OFFICE O/P EST MOD 30 MIN: CPT | Mod: S$PBB,,, | Performed by: INTERNAL MEDICINE

## 2020-10-16 PROCEDURE — 99999 PR PBB SHADOW E&M-EST. PATIENT-LVL IV: CPT | Mod: PBBFAC,,, | Performed by: INTERNAL MEDICINE

## 2020-10-16 PROCEDURE — 99214 PR OFFICE/OUTPT VISIT, EST, LEVL IV, 30-39 MIN: ICD-10-PCS | Mod: S$PBB,,, | Performed by: INTERNAL MEDICINE

## 2020-10-16 PROCEDURE — 99214 OFFICE O/P EST MOD 30 MIN: CPT | Mod: PBBFAC,PN | Performed by: INTERNAL MEDICINE

## 2020-10-16 PROCEDURE — 99999 PR PBB SHADOW E&M-EST. PATIENT-LVL IV: ICD-10-PCS | Mod: PBBFAC,,, | Performed by: INTERNAL MEDICINE

## 2020-10-16 RX ORDER — OMEPRAZOLE 40 MG/1
40 CAPSULE, DELAYED RELEASE ORAL DAILY
Qty: 30 CAPSULE | Refills: 0 | Status: ON HOLD | OUTPATIENT
Start: 2020-10-16 | End: 2020-10-19 | Stop reason: HOSPADM

## 2020-10-16 RX ORDER — OMEPRAZOLE 40 MG/1
40 CAPSULE, DELAYED RELEASE ORAL DAILY
Qty: 90 CAPSULE | Refills: 3 | Status: SHIPPED | OUTPATIENT
Start: 2020-10-16 | End: 2021-09-30 | Stop reason: SDUPTHER

## 2020-10-16 NOTE — PROGRESS NOTES
[facility] Note    Chief Complaint      Chief Complaint   Patient presents with    Hospital Follow Up     History of Present Illness      Denise VIVAS Coudoryss is a 70 y.o. female who presents today for hospital discharge for palpitations.  Patient comes to appointment alone.    Had issues with reflux, had been taking NSAIDS and eating poorly. Then started having palpitations and discomfot for 2-3 days.  Went to ED on 10/13/2020, BP was very high at 213/97 in ED, pulse 56.  Had frequent PVC's on EKG, has history of this. Metoprolol increased to 50 mg in AM, 25 mg at night. Has not had sensation in chest/palpitations since discharge.  Cut out caffeine since discharge, GERD has been better since starting Prilosec every day.    Problem List Items Addressed This Visit     Benign hypertension with CKD (chronic kidney disease) stage III    Current Assessment & Plan     BP controlled on losartan 50 and lopressor 50/25 mg BID.  No CP/SOB/HA.  GFR 41 in 8/2020.         Gastroesophageal reflux disease without esophagitis - Primary    PVC (premature ventricular contraction)          Health Maintenance   Topic Date Due    High Dose Statin  10/16/2021    Aspirin/Antiplatelet Therapy  10/16/2021    Mammogram  08/28/2022    DEXA SCAN  08/28/2023    Lipid Panel  02/11/2025    TETANUS VACCINE  08/28/2029    Hepatitis C Screening  Completed    Pneumococcal Vaccine (65+ Low/Medium Risk)  Completed       Past Medical History:   Diagnosis Date    Acid reflux     Graves disease     diffuse Toxic Goiter    Hypertension     IBS (irritable bowel syndrome)     Lichen sclerosus     Migraine     Stroke        Past Surgical History:   Procedure Laterality Date    EYE SURGERY      HYSTERECTOMY      CINDI for ?? cervical cancer, had normal paps       family history includes Hypertension in her father; Kidney failure in her father; Stomach cancer in her mother.    Social History     Tobacco Use    Smoking  status: Never Smoker    Smokeless tobacco: Never Used   Substance Use Topics    Alcohol use: Yes    Drug use: No       Review of Systems   Constitutional: Negative for chills and fever.   HENT: Negative for congestion and sore throat.    Eyes: Negative for blurred vision and discharge.   Respiratory: Negative for cough and shortness of breath.    Cardiovascular: Negative for chest pain and palpitations.   Gastrointestinal: Negative for constipation, diarrhea, nausea and vomiting.   Genitourinary: Negative for dysuria and hematuria.   Musculoskeletal: Negative for falls and myalgias.   Skin: Negative for itching and rash.   Neurological: Negative for dizziness and headaches.        Outpatient Encounter Medications as of 10/16/2020   Medication Sig Dispense Refill    alendronate (FOSAMAX) 70 MG tablet Take 1 tablet (70 mg total) by mouth every 7 days. 4 tablet 0    alendronate (FOSAMAX) 70 MG tablet Take 1 tablet (70 mg total) by mouth every 7 days. 12 tablet 3    aspirin (ECOTRIN) 81 MG EC tablet once a day      atorvastatin (LIPITOR) 20 MG tablet TAKE 1 TABLET DAILY 90 tablet 3    butalbital-acetaminophen-caffeine -40 mg (FIORICET, ESGIC) -40 mg per tablet Take 1 tablet by mouth nightly as needed.      clobetasol 0.05% (TEMOVATE) 0.05 % Oint APPLY TOPICALLY TWICE A DAY 45 g 1    escitalopram oxalate (LEXAPRO) 5 MG Tab Take 1 tablet (5 mg total) by mouth once daily. 30 tablet 11    levothyroxine (SYNTHROID) 100 MCG tablet TAKE 1 TABLET BEFORE BREAKFAST 90 tablet 3    losartan (COZAAR) 50 MG tablet Take 1 tablet (50 mg total) by mouth once daily. 90 tablet 1    metoprolol tartrate (LOPRESSOR) 50 MG tablet Take 0.5 tablets (25 mg total) by mouth 2 (two) times daily. 180 tablet 3    nortriptyline (PAMELOR) 10 MG capsule TAKE 1 CAPSULE DAILY 90 capsule 3    omeprazole (PRILOSEC) 40 MG capsule Take 1 capsule (40 mg total) by mouth once daily. 30 capsule 0    solifenacin (VESICARE) 10 MG tablet  Take 1 tablet (10 mg total) by mouth once daily. 90 tablet 3    VITAMIN D2 1,250 mcg (50,000 unit) capsule TAKE 1 CAPSULE EVERY 7 DAYS 12 capsule 3    [DISCONTINUED] omeprazole (PRILOSEC) 20 MG capsule Take 20 mg by mouth once daily.       omeprazole (PRILOSEC) 40 MG capsule Take 1 capsule (40 mg total) by mouth once daily. 90 capsule 3    [DISCONTINUED] ergocalciferol (ERGOCALCIFEROL) 50,000 unit Cap Take 1 capsule (50,000 Units total) by mouth every 7 days. 12 capsule 3    [DISCONTINUED] nortriptyline (PAMELOR) 10 MG capsule Take 1 capsule (10 mg total) by mouth once daily. 90 capsule 1     No facility-administered encounter medications on file as of 10/16/2020.         Review of patient's allergies indicates:   Allergen Reactions    Cefaclor     Methimazole (bulk)     Penicillin     Phenobarbital     Plavix [clopidogrel]     Sulfa (sulfonamide antibiotics)     Tapazole [methimazole]     Thyroid      Note: PTU    Topamax [topiramate]      GI symptoms       Physical Exam      Vital Signs  Temp: 97.2 °F (36.2 °C)  Temp src: Temporal  Pulse: 71  SpO2: 98 %  BP: (!) 142/60  BP Location: Left arm  Patient Position: Sitting  Height and Weight  Weight: 69.2 kg (152 lb 7.2 oz)]  Body mass index is 26.17 kg/m².    Physical Exam  Constitutional:       Appearance: She is well-developed.   HENT:      Head: Normocephalic and atraumatic.      Right Ear: External ear normal.      Left Ear: External ear normal.   Eyes:      General:         Right eye: No discharge.         Left eye: No discharge.   Neck:      Musculoskeletal: Normal range of motion.      Thyroid: No thyromegaly.   Cardiovascular:      Rate and Rhythm: Normal rate and regular rhythm.      Heart sounds: Normal heart sounds. No murmur.   Pulmonary:      Effort: Pulmonary effort is normal. No respiratory distress.      Breath sounds: Normal breath sounds.   Abdominal:      General: Bowel sounds are normal. There is no distension.      Palpations:  Abdomen is soft.      Tenderness: There is no abdominal tenderness.   Musculoskeletal: Normal range of motion.         General: No deformity.   Skin:     General: Skin is warm and dry.      Findings: No rash.   Neurological:      Mental Status: She is alert and oriented to person, place, and time.   Psychiatric:         Behavior: Behavior normal.        Laboratory:  CBC:  Recent Labs   Lab Result Units 10/13/20  1038   WBC K/uL 7.41   RBC M/uL 4.43   Hemoglobin g/dL 14.1   Hematocrit % 42.4   Platelets K/uL 257   Mean Corpuscular Volume fL 96   Mean Corpuscular Hemoglobin pg 31.8*   Mean Corpuscular Hemoglobin Conc g/dL 33.3     CMP:  Recent Labs   Lab Result Units 08/11/20  1026 10/13/20  1038   Glucose mg/dL 100 110   Calcium mg/dL 9.8 9.1   Albumin g/dL 3.9 4.0   Total Protein g/dL 7.7 7.7   Sodium mmol/L 144 140   Potassium mmol/L 4.8 4.0   CO2 mmol/L 28 22*   Chloride mmol/L 108 107   BUN, Bld mg/dL 20 16   Alkaline Phosphatase U/L 70 68   ALT U/L 23 19   AST U/L 25 23   Total Bilirubin mg/dL 0.4 0.5     URINALYSIS:  Recent Labs   Lab Result Units 10/13/20  1204   Color, UA  Yellow   Specific Gravity, UA  1.010   pH, UA  7.0   Protein, UA  Negative   Bacteria /hpf Few*   Nitrite, UA  Negative   Leukocytes, UA  1+*   Urobilinogen, UA EU/dL Negative      LIPIDS:  Recent Labs   Lab Result Units 08/11/20  1026 10/13/20  1038   TSH uIU/mL 3.000 2.923     TSH:  Recent Labs   Lab Result Units 08/11/20  1026 10/13/20  1038   TSH uIU/mL 3.000 2.923     A1C:  No results for input(s): HGBA1C in the last 2160 hours.    Radiology:  No imaging on file.    Assessment/Plan     Denise Berrios is a 70 y.o.female with:    1. Benign hypertension with CKD (chronic kidney disease) stage III    2. Gastroesophageal reflux disease without esophagitis    3. PVC (premature ventricular contraction)       -Rx sent for Prilosec, will continue  -Continue metoprolol, will call me with BP's in 1 week, may need to increase Losartan  -Continue  current medications and maintain follow up with specialists.  Return to clinic PRN.      Aleksandra Carey MD  [facility]

## 2020-10-18 ENCOUNTER — HOSPITAL ENCOUNTER (OUTPATIENT)
Facility: HOSPITAL | Age: 70
Discharge: HOME | End: 2020-10-20
Attending: EMERGENCY MEDICINE | Admitting: HOSPITALIST
Payer: MEDICARE

## 2020-10-18 DIAGNOSIS — R05.9 COUGH: ICD-10-CM

## 2020-10-18 DIAGNOSIS — R00.2 PALPITATIONS: ICD-10-CM

## 2020-10-18 DIAGNOSIS — Z98.890 STATUS POST CORONARY ANGIOGRAM: ICD-10-CM

## 2020-10-18 DIAGNOSIS — I49.3 PVC (PREMATURE VENTRICULAR CONTRACTION): ICD-10-CM

## 2020-10-18 DIAGNOSIS — R94.39 ABNORMAL STRESS TEST: Primary | ICD-10-CM

## 2020-10-18 DIAGNOSIS — R07.9 CHEST PAIN: ICD-10-CM

## 2020-10-18 LAB
ALBUMIN SERPL BCP-MCNC: 4.1 G/DL (ref 3.5–5.2)
ALP SERPL-CCNC: 68 U/L (ref 55–135)
ALT SERPL W/O P-5'-P-CCNC: 31 U/L (ref 10–44)
ANION GAP SERPL CALC-SCNC: 8 MMOL/L (ref 8–16)
AST SERPL-CCNC: 36 U/L (ref 10–40)
BASOPHILS # BLD AUTO: 0.07 K/UL (ref 0–0.2)
BASOPHILS NFR BLD: 0.8 % (ref 0–1.9)
BILIRUB SERPL-MCNC: 0.2 MG/DL (ref 0.1–1)
BNP SERPL-MCNC: 89 PG/ML (ref 0–99)
BUN SERPL-MCNC: 17 MG/DL (ref 8–23)
CALCIUM SERPL-MCNC: 9.6 MG/DL (ref 8.7–10.5)
CHLORIDE SERPL-SCNC: 105 MMOL/L (ref 95–110)
CO2 SERPL-SCNC: 27 MMOL/L (ref 23–29)
CREAT SERPL-MCNC: 1.1 MG/DL (ref 0.5–1.4)
DIFFERENTIAL METHOD: ABNORMAL
EOSINOPHIL # BLD AUTO: 0.2 K/UL (ref 0–0.5)
EOSINOPHIL NFR BLD: 1.9 % (ref 0–8)
ERYTHROCYTE [DISTWIDTH] IN BLOOD BY AUTOMATED COUNT: 12.5 % (ref 11.5–14.5)
EST. GFR  (AFRICAN AMERICAN): 58 ML/MIN/1.73 M^2
EST. GFR  (NON AFRICAN AMERICAN): 50 ML/MIN/1.73 M^2
GLUCOSE SERPL-MCNC: 97 MG/DL (ref 70–110)
HCT VFR BLD AUTO: 40.7 % (ref 37–48.5)
HGB BLD-MCNC: 13.6 G/DL (ref 12–16)
IMM GRANULOCYTES # BLD AUTO: 0.02 K/UL (ref 0–0.04)
IMM GRANULOCYTES NFR BLD AUTO: 0.2 % (ref 0–0.5)
LIPASE SERPL-CCNC: 50 U/L (ref 4–60)
LYMPHOCYTES # BLD AUTO: 2.9 K/UL (ref 1–4.8)
LYMPHOCYTES NFR BLD: 32.6 % (ref 18–48)
MAGNESIUM SERPL-MCNC: 1.9 MG/DL (ref 1.6–2.6)
MCH RBC QN AUTO: 31.9 PG (ref 27–31)
MCHC RBC AUTO-ENTMCNC: 33.4 G/DL (ref 32–36)
MCV RBC AUTO: 96 FL (ref 82–98)
MONOCYTES # BLD AUTO: 1.2 K/UL (ref 0.3–1)
MONOCYTES NFR BLD: 13.2 % (ref 4–15)
NEUTROPHILS # BLD AUTO: 4.5 K/UL (ref 1.8–7.7)
NEUTROPHILS NFR BLD: 51.3 % (ref 38–73)
NRBC BLD-RTO: 0 /100 WBC
PLATELET # BLD AUTO: 241 K/UL (ref 150–350)
PMV BLD AUTO: 10.6 FL (ref 9.2–12.9)
POTASSIUM SERPL-SCNC: 4.3 MMOL/L (ref 3.5–5.1)
PROT SERPL-MCNC: 7.8 G/DL (ref 6–8.4)
RBC # BLD AUTO: 4.26 M/UL (ref 4–5.4)
SODIUM SERPL-SCNC: 140 MMOL/L (ref 136–145)
T4 FREE SERPL-MCNC: 1.13 NG/DL (ref 0.71–1.51)
TROPONIN I SERPL DL<=0.01 NG/ML-MCNC: 0.02 NG/ML (ref 0–0.03)
TSH SERPL DL<=0.005 MIU/L-ACNC: 4.56 UIU/ML (ref 0.4–4)
WBC # BLD AUTO: 8.74 K/UL (ref 3.9–12.7)

## 2020-10-18 PROCEDURE — 85025 COMPLETE CBC W/AUTO DIFF WBC: CPT

## 2020-10-18 PROCEDURE — 84484 ASSAY OF TROPONIN QUANT: CPT

## 2020-10-18 PROCEDURE — 84439 ASSAY OF FREE THYROXINE: CPT

## 2020-10-18 PROCEDURE — 96376 TX/PRO/DX INJ SAME DRUG ADON: CPT

## 2020-10-18 PROCEDURE — 96374 THER/PROPH/DIAG INJ IV PUSH: CPT

## 2020-10-18 PROCEDURE — 83735 ASSAY OF MAGNESIUM: CPT

## 2020-10-18 PROCEDURE — 25000003 PHARM REV CODE 250: Performed by: EMERGENCY MEDICINE

## 2020-10-18 PROCEDURE — 80053 COMPREHEN METABOLIC PANEL: CPT

## 2020-10-18 PROCEDURE — 84443 ASSAY THYROID STIM HORMONE: CPT

## 2020-10-18 PROCEDURE — 99285 EMERGENCY DEPT VISIT HI MDM: CPT | Mod: 25

## 2020-10-18 PROCEDURE — 83880 ASSAY OF NATRIURETIC PEPTIDE: CPT

## 2020-10-18 PROCEDURE — 83690 ASSAY OF LIPASE: CPT

## 2020-10-18 RX ORDER — METOPROLOL TARTRATE 1 MG/ML
5 INJECTION, SOLUTION INTRAVENOUS
Status: COMPLETED | OUTPATIENT
Start: 2020-10-18 | End: 2020-10-18

## 2020-10-18 RX ORDER — METOPROLOL TARTRATE 25 MG/1
25 TABLET, FILM COATED ORAL
Status: COMPLETED | OUTPATIENT
Start: 2020-10-18 | End: 2020-10-19

## 2020-10-18 RX ORDER — METOPROLOL TARTRATE 1 MG/ML
5 INJECTION, SOLUTION INTRAVENOUS
Status: COMPLETED | OUTPATIENT
Start: 2020-10-18 | End: 2020-10-19

## 2020-10-18 RX ADMIN — METOROPROLOL TARTRATE 5 MG: 5 INJECTION, SOLUTION INTRAVENOUS at 11:10

## 2020-10-18 NOTE — Clinical Note
Catheter is inserted into the ostium   left main. Angiography performed of the left coronary arteries in multiple views. Angiography performed via hand injection with 25 mL of contrast.

## 2020-10-18 NOTE — Clinical Note
Catheter insertion attempt made into the ostium   right coronary artery. Angiography performed of the right coronary arteries in multiple views. Angiography performed via hand injection with 15 mL of contrast.

## 2020-10-19 DIAGNOSIS — R00.2 PALPITATIONS: Primary | ICD-10-CM

## 2020-10-19 PROBLEM — I10 ESSENTIAL HYPERTENSION: Status: ACTIVE | Noted: 2020-10-19

## 2020-10-19 LAB
ANION GAP SERPL CALC-SCNC: 10 MMOL/L (ref 8–16)
AORTIC ROOT ANNULUS: 3.58 CM
ASCENDING AORTA: 2.85 CM
AV INDEX (PROSTH): 0.68
AV MEAN GRADIENT: 6 MMHG
AV PEAK GRADIENT: 9 MMHG
AV VALVE AREA: 1.92 CM2
AV VELOCITY RATIO: 0.67
BASOPHILS # BLD AUTO: 0.06 K/UL (ref 0–0.2)
BASOPHILS NFR BLD: 0.7 % (ref 0–1.9)
BSA FOR ECHO PROCEDURE: 1.71 M2
BUN SERPL-MCNC: 14 MG/DL (ref 8–23)
CALCIUM SERPL-MCNC: 9.3 MG/DL (ref 8.7–10.5)
CHLORIDE SERPL-SCNC: 111 MMOL/L (ref 95–110)
CHOLEST SERPL-MCNC: 172 MG/DL (ref 120–199)
CHOLEST/HDLC SERPL: 3.4 {RATIO} (ref 2–5)
CO2 SERPL-SCNC: 19 MMOL/L (ref 23–29)
CREAT SERPL-MCNC: 0.9 MG/DL (ref 0.5–1.4)
CV ECHO LV RWT: 0.65 CM
CV STRESS BASE HR: 88 BPM
DIASTOLIC BLOOD PRESSURE: 85 MMHG
DIFFERENTIAL METHOD: ABNORMAL
DOP CALC AO PEAK VEL: 1.52 M/S
DOP CALC AO VTI: 30.62 CM
DOP CALC LVOT AREA: 2.8 CM2
DOP CALC LVOT DIAMETER: 1.89 CM
DOP CALC LVOT PEAK VEL: 1.02 M/S
DOP CALC LVOT STROKE VOLUME: 58.75 CM3
DOP CALCLVOT PEAK VEL VTI: 20.95 CM
E WAVE DECELERATION TIME: 132.12 MSEC
E/A RATIO: 0.68
E/E' RATIO: 20.6 M/S
ECHO LV POSTERIOR WALL: 1.29 CM (ref 0.6–1.1)
EOSINOPHIL # BLD AUTO: 0.2 K/UL (ref 0–0.5)
EOSINOPHIL NFR BLD: 1.9 % (ref 0–8)
ERYTHROCYTE [DISTWIDTH] IN BLOOD BY AUTOMATED COUNT: 12.8 % (ref 11.5–14.5)
EST. GFR  (AFRICAN AMERICAN): >60 ML/MIN/1.73 M^2
EST. GFR  (NON AFRICAN AMERICAN): >60 ML/MIN/1.73 M^2
FRACTIONAL SHORTENING: 26 % (ref 28–44)
GLUCOSE SERPL-MCNC: 105 MG/DL (ref 70–110)
HCT VFR BLD AUTO: 42.9 % (ref 37–48.5)
HDLC SERPL-MCNC: 51 MG/DL (ref 40–75)
HDLC SERPL: 29.7 % (ref 20–50)
HGB BLD-MCNC: 13.5 G/DL (ref 12–16)
IMM GRANULOCYTES # BLD AUTO: 0.02 K/UL (ref 0–0.04)
IMM GRANULOCYTES NFR BLD AUTO: 0.2 % (ref 0–0.5)
INTERVENTRICULAR SEPTUM: 1.12 CM (ref 0.6–1.1)
LA MAJOR: 4.79 CM
LA MINOR: 4.86 CM
LA WIDTH: 3.27 CM
LDLC SERPL CALC-MCNC: 95 MG/DL (ref 63–159)
LEFT ATRIUM SIZE: 3.86 CM
LEFT ATRIUM VOLUME INDEX: 31.2 ML/M2
LEFT ATRIUM VOLUME: 51.76 CM3
LEFT INTERNAL DIMENSION IN SYSTOLE: 2.92 CM (ref 2.1–4)
LEFT VENTRICLE DIASTOLIC VOLUME INDEX: 41.05 ML/M2
LEFT VENTRICLE DIASTOLIC VOLUME: 68.19 ML
LEFT VENTRICLE MASS INDEX: 99 G/M2
LEFT VENTRICLE SYSTOLIC VOLUME INDEX: 19.7 ML/M2
LEFT VENTRICLE SYSTOLIC VOLUME: 32.71 ML
LEFT VENTRICULAR INTERNAL DIMENSION IN DIASTOLE: 3.96 CM (ref 3.5–6)
LEFT VENTRICULAR MASS: 163.98 G
LV LATERAL E/E' RATIO: 20.6 M/S
LV SEPTAL E/E' RATIO: 20.6 M/S
LYMPHOCYTES # BLD AUTO: 2.4 K/UL (ref 1–4.8)
LYMPHOCYTES NFR BLD: 28.1 % (ref 18–48)
MAGNESIUM SERPL-MCNC: 1.9 MG/DL (ref 1.6–2.6)
MCH RBC QN AUTO: 31.4 PG (ref 27–31)
MCHC RBC AUTO-ENTMCNC: 31.5 G/DL (ref 32–36)
MCV RBC AUTO: 100 FL (ref 82–98)
MONOCYTES # BLD AUTO: 1.1 K/UL (ref 0.3–1)
MONOCYTES NFR BLD: 12.6 % (ref 4–15)
MV PEAK A VEL: 1.51 M/S
MV PEAK E VEL: 1.03 M/S
MV STENOSIS PRESSURE HALF TIME: 38.31 MS
MV VALVE AREA P 1/2 METHOD: 5.74 CM2
NEUTROPHILS # BLD AUTO: 4.7 K/UL (ref 1.8–7.7)
NEUTROPHILS NFR BLD: 56.5 % (ref 38–73)
NONHDLC SERPL-MCNC: 121 MG/DL
NRBC BLD-RTO: 0 /100 WBC
OHS CV CPX 1 MINUTE RECOVERY HEART RATE: 120 BPM
OHS CV CPX 85 PERCENT MAX PREDICTED HEART RATE MALE: 121
OHS CV CPX ESTIMATED METS: 7
OHS CV CPX MAX PREDICTED HEART RATE: 143
OHS CV CPX PATIENT IS FEMALE: 1
OHS CV CPX PATIENT IS MALE: 0
OHS CV CPX PEAK DIASTOLIC BLOOD PRESSURE: 104 MMHG
OHS CV CPX PEAK HEAR RATE: 137 BPM
OHS CV CPX PEAK RATE PRESSURE PRODUCT: ABNORMAL
OHS CV CPX PEAK SYSTOLIC BLOOD PRESSURE: 215 MMHG
OHS CV CPX PERCENT MAX PREDICTED HEART RATE ACHIEVED: 96
OHS CV CPX RATE PRESSURE PRODUCT PRESENTING: ABNORMAL
PISA TR MAX VEL: 2.66 M/S
PLATELET # BLD AUTO: 229 K/UL (ref 150–350)
PMV BLD AUTO: 10.5 FL (ref 9.2–12.9)
POTASSIUM SERPL-SCNC: 4.5 MMOL/L (ref 3.5–5.1)
RA MAJOR: 3.79 CM
RA PRESSURE: 3 MMHG
RA WIDTH: 3.03 CM
RBC # BLD AUTO: 4.3 M/UL (ref 4–5.4)
RIGHT VENTRICULAR END-DIASTOLIC DIMENSION: 2.59 CM
SARS-COV-2 RDRP RESP QL NAA+PROBE: NEGATIVE
SODIUM SERPL-SCNC: 140 MMOL/L (ref 136–145)
STJ: 2.65 CM
STRESS ANGINA INDEX: 0
STRESS ECHO POST EXERCISE DUR MIN: 6 MINUTES
STRESS ECHO POST EXERCISE DUR SEC: 0 SECONDS
SYSTOLIC BLOOD PRESSURE: 143 MMHG
TDI LATERAL: 0.05 M/S
TDI SEPTAL: 0.05 M/S
TDI: 0.05 M/S
TR MAX PG: 28 MMHG
TRICUSPID ANNULAR PLANE SYSTOLIC EXCURSION: 1.78 CM
TRIGL SERPL-MCNC: 130 MG/DL (ref 30–150)
TROPONIN I SERPL DL<=0.01 NG/ML-MCNC: 0.01 NG/ML (ref 0–0.03)
TV REST PULMONARY ARTERY PRESSURE: 31 MMHG
WBC # BLD AUTO: 8.4 K/UL (ref 3.9–12.7)

## 2020-10-19 PROCEDURE — 80061 LIPID PANEL: CPT

## 2020-10-19 PROCEDURE — 25000003 PHARM REV CODE 250: Performed by: HOSPITALIST

## 2020-10-19 PROCEDURE — 36415 COLL VENOUS BLD VENIPUNCTURE: CPT

## 2020-10-19 PROCEDURE — 84484 ASSAY OF TROPONIN QUANT: CPT

## 2020-10-19 PROCEDURE — 83735 ASSAY OF MAGNESIUM: CPT

## 2020-10-19 PROCEDURE — 99204 OFFICE O/P NEW MOD 45 MIN: CPT | Mod: ,,, | Performed by: INTERNAL MEDICINE

## 2020-10-19 PROCEDURE — 80048 BASIC METABOLIC PNL TOTAL CA: CPT

## 2020-10-19 PROCEDURE — 25000003 PHARM REV CODE 250: Performed by: NURSE PRACTITIONER

## 2020-10-19 PROCEDURE — U0002 COVID-19 LAB TEST NON-CDC: HCPCS

## 2020-10-19 PROCEDURE — G0378 HOSPITAL OBSERVATION PER HR: HCPCS

## 2020-10-19 PROCEDURE — 99204 PR OFFICE/OUTPT VISIT, NEW, LEVL IV, 45-59 MIN: ICD-10-PCS | Mod: ,,, | Performed by: INTERNAL MEDICINE

## 2020-10-19 PROCEDURE — 25000003 PHARM REV CODE 250: Performed by: EMERGENCY MEDICINE

## 2020-10-19 PROCEDURE — 85025 COMPLETE CBC W/AUTO DIFF WBC: CPT

## 2020-10-19 RX ORDER — ATORVASTATIN CALCIUM 40 MG/1
40 TABLET, FILM COATED ORAL DAILY
Status: DISCONTINUED | OUTPATIENT
Start: 2020-10-20 | End: 2020-10-20 | Stop reason: HOSPADM

## 2020-10-19 RX ORDER — METOPROLOL TARTRATE 25 MG/1
25 TABLET, FILM COATED ORAL NIGHTLY
Status: DISCONTINUED | OUTPATIENT
Start: 2020-10-19 | End: 2020-10-19

## 2020-10-19 RX ORDER — CLOPIDOGREL BISULFATE 75 MG/1
300 TABLET ORAL ONCE
Status: DISCONTINUED | OUTPATIENT
Start: 2020-10-19 | End: 2020-10-19

## 2020-10-19 RX ORDER — ACETAMINOPHEN 325 MG/1
650 TABLET ORAL EVERY 4 HOURS PRN
Status: DISCONTINUED | OUTPATIENT
Start: 2020-10-19 | End: 2020-10-20 | Stop reason: HOSPADM

## 2020-10-19 RX ORDER — PANTOPRAZOLE SODIUM 40 MG/1
40 TABLET, DELAYED RELEASE ORAL DAILY
Status: DISCONTINUED | OUTPATIENT
Start: 2020-10-19 | End: 2020-10-20 | Stop reason: HOSPADM

## 2020-10-19 RX ORDER — MAG HYDROX/ALUMINUM HYD/SIMETH 200-200-20
30 SUSPENSION, ORAL (FINAL DOSE FORM) ORAL
Status: DISCONTINUED | OUTPATIENT
Start: 2020-10-19 | End: 2020-10-20 | Stop reason: HOSPADM

## 2020-10-19 RX ORDER — METOPROLOL TARTRATE 50 MG/1
50 TABLET ORAL 2 TIMES DAILY
Qty: 180 TABLET | Refills: 3 | Status: SHIPPED | OUTPATIENT
Start: 2020-10-19 | End: 2020-10-19 | Stop reason: SDUPTHER

## 2020-10-19 RX ORDER — METOPROLOL TARTRATE 50 MG/1
50 TABLET ORAL 2 TIMES DAILY
Status: DISCONTINUED | OUTPATIENT
Start: 2020-10-19 | End: 2020-10-20 | Stop reason: HOSPADM

## 2020-10-19 RX ORDER — ATORVASTATIN CALCIUM 20 MG/1
20 TABLET, FILM COATED ORAL DAILY
Status: DISCONTINUED | OUTPATIENT
Start: 2020-10-19 | End: 2020-10-19

## 2020-10-19 RX ORDER — METOPROLOL TARTRATE 50 MG/1
50 TABLET ORAL DAILY
Status: DISCONTINUED | OUTPATIENT
Start: 2020-10-19 | End: 2020-10-19

## 2020-10-19 RX ORDER — OXYBUTYNIN CHLORIDE 5 MG/1
10 TABLET, EXTENDED RELEASE ORAL DAILY
Status: DISCONTINUED | OUTPATIENT
Start: 2020-10-19 | End: 2020-10-20 | Stop reason: HOSPADM

## 2020-10-19 RX ORDER — ASPIRIN 81 MG/1
81 TABLET ORAL DAILY
Status: DISCONTINUED | OUTPATIENT
Start: 2020-10-19 | End: 2020-10-20 | Stop reason: HOSPADM

## 2020-10-19 RX ORDER — CLOPIDOGREL BISULFATE 75 MG/1
75 TABLET ORAL DAILY
Status: DISCONTINUED | OUTPATIENT
Start: 2020-10-20 | End: 2020-10-19

## 2020-10-19 RX ORDER — ONDANSETRON 2 MG/ML
4 INJECTION INTRAMUSCULAR; INTRAVENOUS EVERY 8 HOURS PRN
Status: DISCONTINUED | OUTPATIENT
Start: 2020-10-19 | End: 2020-10-20 | Stop reason: HOSPADM

## 2020-10-19 RX ORDER — NORTRIPTYLINE HYDROCHLORIDE 10 MG/1
10 CAPSULE ORAL DAILY
Status: DISCONTINUED | OUTPATIENT
Start: 2020-10-19 | End: 2020-10-20 | Stop reason: HOSPADM

## 2020-10-19 RX ORDER — TALC
6 POWDER (GRAM) TOPICAL NIGHTLY PRN
Status: DISCONTINUED | OUTPATIENT
Start: 2020-10-19 | End: 2020-10-20 | Stop reason: HOSPADM

## 2020-10-19 RX ORDER — LORAZEPAM 0.5 MG/1
0.5 TABLET ORAL EVERY 6 HOURS PRN
Status: DISCONTINUED | OUTPATIENT
Start: 2020-10-19 | End: 2020-10-20 | Stop reason: HOSPADM

## 2020-10-19 RX ORDER — METOPROLOL TARTRATE 50 MG/1
50 TABLET ORAL 2 TIMES DAILY
Qty: 180 TABLET | Refills: 3 | Status: SHIPPED | OUTPATIENT
Start: 2020-10-19 | End: 2020-10-20 | Stop reason: SDUPTHER

## 2020-10-19 RX ORDER — ESCITALOPRAM OXALATE 5 MG/1
5 TABLET ORAL DAILY
Status: DISCONTINUED | OUTPATIENT
Start: 2020-10-19 | End: 2020-10-20 | Stop reason: HOSPADM

## 2020-10-19 RX ORDER — LOSARTAN POTASSIUM 50 MG/1
50 TABLET ORAL DAILY
Status: DISCONTINUED | OUTPATIENT
Start: 2020-10-19 | End: 2020-10-20 | Stop reason: HOSPADM

## 2020-10-19 RX ORDER — LEVOTHYROXINE SODIUM 100 UG/1
100 TABLET ORAL
Status: DISCONTINUED | OUTPATIENT
Start: 2020-10-19 | End: 2020-10-20 | Stop reason: HOSPADM

## 2020-10-19 RX ORDER — HYDRALAZINE HYDROCHLORIDE 25 MG/1
25 TABLET, FILM COATED ORAL
Status: COMPLETED | OUTPATIENT
Start: 2020-10-19 | End: 2020-10-19

## 2020-10-19 RX ORDER — SODIUM CHLORIDE 0.9 % (FLUSH) 0.9 %
10 SYRINGE (ML) INJECTION
Status: DISCONTINUED | OUTPATIENT
Start: 2020-10-19 | End: 2020-10-20 | Stop reason: HOSPADM

## 2020-10-19 RX ORDER — CLOPIDOGREL BISULFATE 75 MG/1
600 TABLET ORAL ONCE
Status: DISCONTINUED | OUTPATIENT
Start: 2020-10-19 | End: 2020-10-19

## 2020-10-19 RX ORDER — METOPROLOL TARTRATE 50 MG/1
50 TABLET ORAL ONCE
Status: COMPLETED | OUTPATIENT
Start: 2020-10-19 | End: 2020-10-19

## 2020-10-19 RX ADMIN — ESCITALOPRAM OXALATE 5 MG: 5 TABLET, FILM COATED ORAL at 08:10

## 2020-10-19 RX ADMIN — METOPROLOL TARTRATE 50 MG: 50 TABLET, FILM COATED ORAL at 09:10

## 2020-10-19 RX ADMIN — ALUMINUM HYDROXIDE, MAGNESIUM HYDROXIDE, AND SIMETHICONE 30 ML: 200; 200; 20 SUSPENSION ORAL at 08:10

## 2020-10-19 RX ADMIN — ALUMINUM HYDROXIDE, MAGNESIUM HYDROXIDE, AND SIMETHICONE 30 ML: 200; 200; 20 SUSPENSION ORAL at 12:10

## 2020-10-19 RX ADMIN — METOPROLOL TARTRATE 25 MG: 25 TABLET ORAL at 12:10

## 2020-10-19 RX ADMIN — ASPIRIN 81 MG: 81 TABLET, COATED ORAL at 08:10

## 2020-10-19 RX ADMIN — PANTOPRAZOLE SODIUM 40 MG: 40 TABLET, DELAYED RELEASE ORAL at 08:10

## 2020-10-19 RX ADMIN — ATORVASTATIN CALCIUM 20 MG: 20 TABLET, FILM COATED ORAL at 08:10

## 2020-10-19 RX ADMIN — ALUMINUM HYDROXIDE, MAGNESIUM HYDROXIDE, AND SIMETHICONE 30 ML: 200; 200; 20 SUSPENSION ORAL at 09:10

## 2020-10-19 RX ADMIN — METOPROLOL TARTRATE 50 MG: 50 TABLET, FILM COATED ORAL at 11:10

## 2020-10-19 RX ADMIN — Medication 6 MG: at 09:10

## 2020-10-19 RX ADMIN — HYDRALAZINE HYDROCHLORIDE 25 MG: 25 TABLET, FILM COATED ORAL at 01:10

## 2020-10-19 RX ADMIN — Medication 6 MG: at 03:10

## 2020-10-19 RX ADMIN — OXYBUTYNIN CHLORIDE 10 MG: 5 TABLET, EXTENDED RELEASE ORAL at 08:10

## 2020-10-19 RX ADMIN — METOROPROLOL TARTRATE 5 MG: 5 INJECTION, SOLUTION INTRAVENOUS at 12:10

## 2020-10-19 RX ADMIN — LOSARTAN POTASSIUM 50 MG: 50 TABLET ORAL at 08:10

## 2020-10-19 RX ADMIN — LORAZEPAM 0.5 MG: 0.5 TABLET ORAL at 09:10

## 2020-10-19 RX ADMIN — LEVOTHYROXINE SODIUM 100 MCG: 100 TABLET ORAL at 06:10

## 2020-10-19 NOTE — ASSESSMENT & PLAN NOTE
-Renal function stable on admit   -Renal dose meds   -Avoid nephrotoxic meds   -monitor events that may lead to decreased renal perfusion (hypovolemia, hypotension, sepsis)

## 2020-10-19 NOTE — PLAN OF CARE
Discharge instruction and education provided. Patient voices understanding. IV site removed cath tip intact. Telemetry discontinued without adverse reaction. Patient shows no acute distress. Pt received printed prescription.

## 2020-10-19 NOTE — ASSESSMENT & PLAN NOTE
- SBP 140s-180s overnight  - on Metoprolol and Losartan  - continue medications and adjust meds prn

## 2020-10-19 NOTE — ASSESSMENT & PLAN NOTE
-patient presents with intermittent episodes of palpitations over the past week   -Initial EKG shows SR with frequent PVCs, bigeminy pattern   -no chest pain on exam   -patient reports metoprolol recently increased to 50 mg daily + 25 mg qhs, may benefit from metoprolol 50 mg BID   -electrolytes wnl   -cardiology consulted per ED- await recommendations   -monitor tele   -avoid caffeine

## 2020-10-19 NOTE — H&P
[facility]  History & Physical    Patient Name: Denise Berrios  MRN: [id number]  Admission Date: 10/18/2020  Attending Physician: Harsha Puckett, *   Primary Care Provider: Aleksandra Carey MD         Patient information was obtained from patient, past medical records and ER records.     Subjective:     Principal Problem:Palpitations    Chief Complaint:   Chief Complaint   Patient presents with    Palpitations     Pt presents with c/o palpitations that began this evening; pt was seen 4 days ago for the same complaint and told she was experiencing pvcs. Pt denies pain but reports some pressure in her upper chest. Pt as hx of refulx and took prilosec and pepto bismol which she reports eased the pressure.         HPI: Denise Berrios is a 71 yo female with pmh of HTN, HLD, hypothyroidism, TIA, GERD and CKD stage 3 who presented to ED for evaluation of palpitations. She has had episodes of palpitations intermittently over the last several weeks which are precipitated by episodes of reflux.  She was seen earlier this week for a similar presentation and noted to have several PVCs on her EKG, she was given some medication, she followed up with her primary physician who adjusted her blood pressure medications including metoprolol increasing it from 25 mg twice daily to 50 in the morning and 25 at night.  She continued to have issues however which returned today after she drank some milk and ate some vanilla wafers.  She has been having persistent discomfort at the base of the chest with some burning and reflux type symptoms as well as the palpitations. Patient reports drinking regular coffee which may have precipitated event earlier this week but she has since switched back to decaf with ongoing palpitations. She has not seen a cardiologist in the past 5 years. She reports extensive cardiac work up at that time. Pt is a retired Registered Nurse.     Labs unremarkable. CXR  negative. EKG shows SR with frequent PVCs in a pattern of bigeminy. VSS. She received metoprolol and hydralazine. Cardiology consulted per  ED. Pt admitted to [facility].         Past Medical History:   Diagnosis Date    Acid reflux     Graves disease     diffuse Toxic Goiter    Hypertension     IBS (irritable bowel syndrome)     Lichen sclerosus     Migraine     Stroke        Past Surgical History:   Procedure Laterality Date    EYE SURGERY      HYSTERECTOMY      CINDI for ?? cervical cancer, had normal paps       Review of patient's allergies indicates:   Allergen Reactions    Phenobarbital Anaphylaxis    Thyroid      Note: PTU    Topamax [topiramate] Diarrhea     GI symptoms    Cefaclor Rash    Methimazole (bulk) Rash    Penicillin Rash    Plavix [clopidogrel] Rash    Sulfa (sulfonamide antibiotics) Rash    Tapazole [methimazole] Rash       No current facility-administered medications on file prior to encounter.      Current Outpatient Medications on File Prior to Encounter   Medication Sig    aspirin (ECOTRIN) 81 MG EC tablet once a day    atorvastatin (LIPITOR) 20 MG tablet TAKE 1 TABLET DAILY    butalbital-acetaminophen-caffeine -40 mg (FIORICET, ESGIC) -40 mg per tablet Take 1 tablet by mouth nightly as needed.    clobetasol 0.05% (TEMOVATE) 0.05 % Oint APPLY TOPICALLY TWICE A DAY    escitalopram oxalate (LEXAPRO) 5 MG Tab Take 1 tablet (5 mg total) by mouth once daily.    levothyroxine (SYNTHROID) 100 MCG tablet TAKE 1 TABLET BEFORE BREAKFAST    losartan (COZAAR) 50 MG tablet Take 1 tablet (50 mg total) by mouth once daily.    metoprolol tartrate (LOPRESSOR) 50 MG tablet Take 0.5 tablets (25 mg total) by mouth 2 (two) times daily. (Patient taking differently: Take 50 mg by mouth 2 (two) times daily. 50mg in am 25mg pm)    nortriptyline (PAMELOR) 10 MG capsule TAKE 1 CAPSULE DAILY    omeprazole (PRILOSEC) 40 MG capsule Take 1 capsule (40 mg total) by mouth  once daily.    omeprazole (PRILOSEC) 40 MG capsule Take 1 capsule (40 mg total) by mouth once daily.    solifenacin (VESICARE) 10 MG tablet Take 1 tablet (10 mg total) by mouth once daily.    alendronate (FOSAMAX) 70 MG tablet Take 1 tablet (70 mg total) by mouth every 7 days.    alendronate (FOSAMAX) 70 MG tablet Take 1 tablet (70 mg total) by mouth every 7 days.    VITAMIN D2 1,250 mcg (50,000 unit) capsule TAKE 1 CAPSULE EVERY 7 DAYS    [DISCONTINUED] ergocalciferol (ERGOCALCIFEROL) 50,000 unit Cap Take 1 capsule (50,000 Units total) by mouth every 7 days.    [DISCONTINUED] nortriptyline (PAMELOR) 10 MG capsule Take 1 capsule (10 mg total) by mouth once daily.     Family History     Problem Relation (Age of Onset)    Hypertension Father    Kidney failure Father    Stomach cancer Mother        Tobacco Use    Smoking status: Never Smoker    Smokeless tobacco: Never Used   Substance and Sexual Activity    Alcohol use: Yes    Drug use: No    Sexual activity: Yes     Partners: Male     Birth control/protection: See Surgical Hx     Review of Systems   Constitutional: Negative for chills, diaphoresis and fever.   Eyes: Negative for photophobia.   Respiratory: Negative for cough, chest tightness, shortness of breath and wheezing.    Cardiovascular: Positive for palpitations. Negative for chest pain and leg swelling.   Gastrointestinal: Positive for abdominal pain (epigastric ). Negative for diarrhea, nausea and vomiting.   Genitourinary: Negative for dysuria, flank pain, frequency and hematuria.   Musculoskeletal: Negative for back pain and myalgias.   Neurological: Negative for dizziness, syncope, light-headedness and headaches.   Psychiatric/Behavioral: Negative for confusion.     Objective:     Vital Signs (Most Recent):  Temp: 98.6 °F (37 °C) (10/19/20 0229)  Pulse: 79 (10/19/20 0400)  Resp: 16 (10/19/20 0229)  BP: (!) 140/60 (10/19/20 0323)  SpO2: 98 % (10/19/20 0229) Vital Signs (24h Range):  Temp:   [98.6 °F (37 °C)-98.8 °F (37.1 °C)] 98.6 °F (37 °C)  Pulse:  [] 79  Resp:  [15-31] 16  SpO2:  [95 %-98 %] 98 %  BP: (135-211)/(60-79) 140/60     Weight: 68.9 kg (152 lb)  Body mass index is 29.69 kg/m².    Physical Exam  Constitutional:       General: She is not in acute distress.     Appearance: She is well-developed.   HENT:      Head: Normocephalic and atraumatic.   Eyes:      Conjunctiva/sclera: Conjunctivae normal.      Pupils: Pupils are equal, round, and reactive to light.   Neck:      Musculoskeletal: Normal range of motion and neck supple.      Vascular: No JVD.   Cardiovascular:      Rate and Rhythm: Normal rate and regular rhythm.      Pulses: Normal pulses.      Heart sounds: Normal heart sounds.   Pulmonary:      Effort: Pulmonary effort is normal. No respiratory distress.      Breath sounds: Normal breath sounds. No wheezing.   Abdominal:      General: Bowel sounds are normal. There is no distension.      Palpations: Abdomen is soft.      Tenderness: There is no abdominal tenderness. There is no guarding.   Musculoskeletal: Normal range of motion.         General: No tenderness.   Skin:     General: Skin is warm and dry.      Capillary Refill: Capillary refill takes less than 2 seconds.      Findings: No erythema.   Neurological:      Mental Status: She is alert and oriented to person, place, and time.   Psychiatric:         Behavior: Behavior normal.           CRANIAL NERVES     CN III, IV, VI   Pupils are equal, round, and reactive to light.       Significant Labs:   BMP:   Recent Labs   Lab 10/18/20  2148   GLU 97      K 4.3      CO2 27   BUN 17   CREATININE 1.1   CALCIUM 9.6   MG 1.9     CBC:   Recent Labs   Lab 10/18/20  2148 10/19/20  0601   WBC 8.74 8.40   HGB 13.6 13.5   HCT 40.7 42.9    229     Urine Studies: No results for input(s): COLORU, APPEARANCEUA, PHUR, SPECGRAV, PROTEINUA, GLUCUA, KETONESU, BILIRUBINUA, OCCULTUA, NITRITE, UROBILINOGEN, LEUKOCYTESUR, RBCUA,  WBCUA, BACTERIA, SQUAMEPITHEL, HYALINECASTS in the last 48 hours.    Invalid input(s): PILY    Significant Imaging: I have reviewed all pertinent imaging results/findings within the past 24 hours.    Assessment/Plan:     * Palpitations  -patient presents with intermittent episodes of palpitations over the past week   -Initial EKG shows SR with frequent PVCs, bigeminy pattern   -no chest pain on exam   -patient reports metoprolol recently increased to 50 mg daily + 25 mg qhs, may benefit from metoprolol 50 mg BID   -electrolytes wnl   -cardiology consulted per ED- await recommendations   -monitor tele   -avoid caffeine         Essential hypertension  sbp 135-211   Continue losartan 50 mg daily   Continue metoprolol 50 mg daily +25 mg qhs       Irritable bowel syndrome  Chronic stable       Benign hypertension with CKD (chronic kidney disease) stage III  -Renal function stable on admit   -Renal dose meds   -Avoid nephrotoxic meds   -monitor events that may lead to decreased renal perfusion (hypovolemia, hypotension, sepsis)      Dyslipidemia  Continue atorvastatin 20 mg daily       History of cerebral infarction  Continue ASA, statin       Other specified hypothyroidism  Post radioactive tx for Grave's   Continue synthroid 100 mcg daily           Anxiety  Continue escitalopram 5 mg daily         VTE Risk Mitigation (From admission, onward)         Ordered     IP VTE HIGH RISK PATIENT  Once      10/19/20 0057     Place sequential compression device  Until discontinued      10/19/20 0057                   Gina Washington NP  Department of Hospital Medicine   [facility]

## 2020-10-19 NOTE — SUBJECTIVE & OBJECTIVE
Past Medical History:   Diagnosis Date    Acid reflux     Graves disease     diffuse Toxic Goiter    Hypertension     IBS (irritable bowel syndrome)     Lichen sclerosus     Migraine     Stroke        Past Surgical History:   Procedure Laterality Date    EYE SURGERY      HYSTERECTOMY      CINDI for ?? cervical cancer, had normal paps       Review of patient's allergies indicates:   Allergen Reactions    Phenobarbital Anaphylaxis    Thyroid      Note: PTU    Topamax [topiramate] Diarrhea     GI symptoms    Cefaclor Rash    Methimazole (bulk) Rash    Penicillin Rash    Plavix [clopidogrel] Rash    Sulfa (sulfonamide antibiotics) Rash    Tapazole [methimazole] Rash       No current facility-administered medications on file prior to encounter.      Current Outpatient Medications on File Prior to Encounter   Medication Sig    aspirin (ECOTRIN) 81 MG EC tablet once a day    atorvastatin (LIPITOR) 20 MG tablet TAKE 1 TABLET DAILY    butalbital-acetaminophen-caffeine -40 mg (FIORICET, ESGIC) -40 mg per tablet Take 1 tablet by mouth nightly as needed.    clobetasol 0.05% (TEMOVATE) 0.05 % Oint APPLY TOPICALLY TWICE A DAY    escitalopram oxalate (LEXAPRO) 5 MG Tab Take 1 tablet (5 mg total) by mouth once daily.    levothyroxine (SYNTHROID) 100 MCG tablet TAKE 1 TABLET BEFORE BREAKFAST    losartan (COZAAR) 50 MG tablet Take 1 tablet (50 mg total) by mouth once daily.    metoprolol tartrate (LOPRESSOR) 50 MG tablet Take 0.5 tablets (25 mg total) by mouth 2 (two) times daily. (Patient taking differently: Take 50 mg by mouth 2 (two) times daily. 50mg in am 25mg pm)    nortriptyline (PAMELOR) 10 MG capsule TAKE 1 CAPSULE DAILY    omeprazole (PRILOSEC) 40 MG capsule Take 1 capsule (40 mg total) by mouth once daily.    omeprazole (PRILOSEC) 40 MG capsule Take 1 capsule (40 mg total) by mouth once daily.    solifenacin (VESICARE) 10 MG tablet Take 1 tablet (10 mg total) by mouth once daily.     alendronate (FOSAMAX) 70 MG tablet Take 1 tablet (70 mg total) by mouth every 7 days.    alendronate (FOSAMAX) 70 MG tablet Take 1 tablet (70 mg total) by mouth every 7 days.    VITAMIN D2 1,250 mcg (50,000 unit) capsule TAKE 1 CAPSULE EVERY 7 DAYS    [DISCONTINUED] ergocalciferol (ERGOCALCIFEROL) 50,000 unit Cap Take 1 capsule (50,000 Units total) by mouth every 7 days.    [DISCONTINUED] nortriptyline (PAMELOR) 10 MG capsule Take 1 capsule (10 mg total) by mouth once daily.     Family History     Problem Relation (Age of Onset)    Hypertension Father    Kidney failure Father    Stomach cancer Mother        Tobacco Use    Smoking status: Never Smoker    Smokeless tobacco: Never Used   Substance and Sexual Activity    Alcohol use: Yes    Drug use: No    Sexual activity: Yes     Partners: Male     Birth control/protection: See Surgical Hx     Review of Systems   Constitutional: Negative for chills, diaphoresis and fever.   Eyes: Negative for photophobia.   Respiratory: Negative for cough, chest tightness, shortness of breath and wheezing.    Cardiovascular: Positive for palpitations. Negative for chest pain and leg swelling.   Gastrointestinal: Positive for abdominal pain (epigastric ). Negative for diarrhea, nausea and vomiting.   Genitourinary: Negative for dysuria, flank pain, frequency and hematuria.   Musculoskeletal: Negative for back pain and myalgias.   Neurological: Negative for dizziness, syncope, light-headedness and headaches.   Psychiatric/Behavioral: Negative for confusion.     Objective:     Vital Signs (Most Recent):  Temp: 98.6 °F (37 °C) (10/19/20 0229)  Pulse: 79 (10/19/20 0400)  Resp: 16 (10/19/20 0229)  BP: (!) 140/60 (10/19/20 0323)  SpO2: 98 % (10/19/20 0229) Vital Signs (24h Range):  Temp:  [98.6 °F (37 °C)-98.8 °F (37.1 °C)] 98.6 °F (37 °C)  Pulse:  [] 79  Resp:  [15-31] 16  SpO2:  [95 %-98 %] 98 %  BP: (135-211)/(60-79) 140/60     Weight: 68.9 kg (152 lb)  Body mass index is  29.69 kg/m².    Physical Exam  Constitutional:       General: She is not in acute distress.     Appearance: She is well-developed.   HENT:      Head: Normocephalic and atraumatic.   Eyes:      Conjunctiva/sclera: Conjunctivae normal.      Pupils: Pupils are equal, round, and reactive to light.   Neck:      Musculoskeletal: Normal range of motion and neck supple.      Vascular: No JVD.   Cardiovascular:      Rate and Rhythm: Normal rate and regular rhythm.      Pulses: Normal pulses.      Heart sounds: Normal heart sounds.   Pulmonary:      Effort: Pulmonary effort is normal. No respiratory distress.      Breath sounds: Normal breath sounds. No wheezing.   Abdominal:      General: Bowel sounds are normal. There is no distension.      Palpations: Abdomen is soft.      Tenderness: There is no abdominal tenderness. There is no guarding.   Musculoskeletal: Normal range of motion.         General: No tenderness.   Skin:     General: Skin is warm and dry.      Capillary Refill: Capillary refill takes less than 2 seconds.      Findings: No erythema.   Neurological:      Mental Status: She is alert and oriented to person, place, and time.   Psychiatric:         Behavior: Behavior normal.           CRANIAL NERVES     CN III, IV, VI   Pupils are equal, round, and reactive to light.       Significant Labs:   BMP:   Recent Labs   Lab 10/18/20  2148   GLU 97      K 4.3      CO2 27   BUN 17   CREATININE 1.1   CALCIUM 9.6   MG 1.9     CBC:   Recent Labs   Lab 10/18/20  2148 10/19/20  0601   WBC 8.74 8.40   HGB 13.6 13.5   HCT 40.7 42.9    229     Urine Studies: No results for input(s): COLORU, APPEARANCEUA, PHUR, SPECGRAV, PROTEINUA, GLUCUA, KETONESU, BILIRUBINUA, OCCULTUA, NITRITE, UROBILINOGEN, LEUKOCYTESUR, RBCUA, WBCUA, BACTERIA, SQUAMEPITHEL, HYALINECASTS in the last 48 hours.    Invalid input(s): PILY    Significant Imaging: I have reviewed all pertinent imaging results/findings within the past 24  hours.

## 2020-10-19 NOTE — ED NOTES
Patient has verified the spelling of their name and  on armband.   APPEARANCE: Patient is alert, calm, oriented x 4, and does not appear distressed.  SKIN: Skin is normal for race, warm, and dry. Normal skin turgor and mucous membranes moist.  CARDIAC: pt is bradycardic on arrival. ekg performed and shown to MD. Pt denies any chest pain at this time. Pt reports some chest pressure.   RESPIRATORY:Normal rate and effort. Breath sounds clear bilaterally throughout chest. Respirations are equal and unlabored.  Pt denies any SOB or cough.   GASTRO: Bowel sounds normal, abdomen is soft, no tenderness, and no abdominal distention.  MUSCLE: Full ROM. No bony tenderness or soft tissue tenderness. No obvious deformity.  PERIPHERAL VASCULAR: peripheral pulses present. Normal cap refill. No edema. Warm to touch.  NEURO: 5/5 strength major flexors/extensors bilaterally. Sensory intact to light touch bilaterally. [place] coma scale: eyes open spontaneously-4, oriented & converses-5, obeys commands-6. No neurological abnormalities.   MENTAL STATUS: awake, alert and aware of environment.  : Voids without complication

## 2020-10-19 NOTE — H&P (VIEW-ONLY)
[facility]  Cardiology  Consult Note    Patient Name: Denise Berrios  MRN: [id number]  Admission Date: 10/18/2020  Hospital Length of Stay: 0 days  Code Status: Full Code   Attending Provider: Harsha Puckett, *   Consulting Provider: BARBI Anne ANP  Primary Care Physician: Aelksandra Carey MD  Principal Problem:Palpitations    Patient information was obtained from patient, past medical records and ER records.     Inpatient consult to [facility]  Consult performed by: BARBI Dodson ANP  Consult ordered by: Gina Washington NP  Reason for consult: Palpitations; PVCs        Subjective:      Chief Complaint:  Palpitations    HPI:   69yo female with hypothyroidism, PDA closure as a child, CVA, HLP, CKD stage III, IBS, HTN, GERD, PVCs and OAB who presented to the ER with complaints of palpitations. Ms. Berrios reports intermittent palpitations for a while that were not overly concerning. However for the past week she notes an increase in the palpitations. She was seen in the ER last week and her Metoprolol was increased to 50mg in the morning and 25mg in the evening. She reports continued palpitations with no major improvement prompting her to return to the ER. She denies any chest pain, SOB or syncope. She was drinking regular coffee but recently changed back to decaffeinated. She denies excessive soft drink or ETOH intake. She had a full cardiac workup when she had a CVA 5 years ago but none recently. She underwent PDA repair as a child     Hospital Course:    10/18/2020 Presented to the ER with complaints of palpitations. Admitted to [facility]   10/19/2020 Monitored on telemetry overnight with evidence of PVCs and bigeminy; no afib noted. Troponin .017-.014. BP 140s-180s/60s-70s Cardiology consulted for evaluation of palpitations     Past Medical History:   Diagnosis Date    Acid reflux     Graves disease     diffuse Toxic Goiter    Hypertension      IBS (irritable bowel syndrome)     Lichen sclerosus     Migraine     Stroke        Past Surgical History:   Procedure Laterality Date    EYE SURGERY      HYSTERECTOMY      CINDI for ?? cervical cancer, had normal paps       Review of patient's allergies indicates:   Allergen Reactions    Phenobarbital Anaphylaxis    Thyroid      Note: PTU    Topamax [topiramate] Diarrhea     GI symptoms    Cefaclor Rash    Methimazole (bulk) Rash    Penicillin Rash    Plavix [clopidogrel] Rash    Sulfa (sulfonamide antibiotics) Rash    Tapazole [methimazole] Rash       No current facility-administered medications on file prior to encounter.      Current Outpatient Medications on File Prior to Encounter   Medication Sig    aspirin (ECOTRIN) 81 MG EC tablet once a day    atorvastatin (LIPITOR) 20 MG tablet TAKE 1 TABLET DAILY    butalbital-acetaminophen-caffeine -40 mg (FIORICET, ESGIC) -40 mg per tablet Take 1 tablet by mouth nightly as needed.    clobetasol 0.05% (TEMOVATE) 0.05 % Oint APPLY TOPICALLY TWICE A DAY    escitalopram oxalate (LEXAPRO) 5 MG Tab Take 1 tablet (5 mg total) by mouth once daily.    levothyroxine (SYNTHROID) 100 MCG tablet TAKE 1 TABLET BEFORE BREAKFAST    losartan (COZAAR) 50 MG tablet Take 1 tablet (50 mg total) by mouth once daily.    metoprolol tartrate (LOPRESSOR) 50 MG tablet Take 0.5 tablets (25 mg total) by mouth 2 (two) times daily. (Patient taking differently: Take 50 mg by mouth 2 (two) times daily. 50mg in am 25mg pm)    nortriptyline (PAMELOR) 10 MG capsule TAKE 1 CAPSULE DAILY    omeprazole (PRILOSEC) 40 MG capsule Take 1 capsule (40 mg total) by mouth once daily.    omeprazole (PRILOSEC) 40 MG capsule Take 1 capsule (40 mg total) by mouth once daily.    solifenacin (VESICARE) 10 MG tablet Take 1 tablet (10 mg total) by mouth once daily.    alendronate (FOSAMAX) 70 MG tablet Take 1 tablet (70 mg total) by mouth every 7 days.    alendronate (FOSAMAX) 70  MG tablet Take 1 tablet (70 mg total) by mouth every 7 days.    VITAMIN D2 1,250 mcg (50,000 unit) capsule TAKE 1 CAPSULE EVERY 7 DAYS    [DISCONTINUED] ergocalciferol (ERGOCALCIFEROL) 50,000 unit Cap Take 1 capsule (50,000 Units total) by mouth every 7 days.    [DISCONTINUED] nortriptyline (PAMELOR) 10 MG capsule Take 1 capsule (10 mg total) by mouth once daily.     Family History     Problem Relation (Age of Onset)    Hypertension Father    Kidney failure Father    Stomach cancer Mother        Tobacco Use    Smoking status: Never Smoker    Smokeless tobacco: Never Used   Substance and Sexual Activity    Alcohol use: Yes    Drug use: No    Sexual activity: Yes     Partners: Male     Birth control/protection: See Surgical Hx     Review of Systems   Constitution: Negative for chills, decreased appetite, diaphoresis, fever, malaise/fatigue, weight gain and weight loss.   Cardiovascular: Positive for palpitations. Negative for chest pain, claudication, cyanosis, dyspnea on exertion, irregular heartbeat, leg swelling, near-syncope, orthopnea, paroxysmal nocturnal dyspnea and syncope.   Respiratory: Negative for cough, shortness of breath, snoring, sputum production and wheezing.    Endocrine: Negative for cold intolerance, heat intolerance, polydipsia, polyphagia and polyuria.   Skin: Negative for color change, dry skin, itching, nail changes and poor wound healing.   Musculoskeletal: Negative for back pain, gout, joint pain and joint swelling.   Gastrointestinal: Negative for bloating, abdominal pain, constipation, diarrhea, hematemesis, hematochezia, melena, nausea and vomiting.   Genitourinary: Negative for dysuria, hematuria and nocturia.   Neurological: Negative for dizziness, headaches, light-headedness, numbness, paresthesias and weakness.   Psychiatric/Behavioral: Negative for altered mental status, depression and memory loss.     Objective:     Vital Signs (Most Recent):  Temp: 96.6 °F (35.9 °C)  (10/19/20 0921)  Pulse: (!) 56 (10/19/20 0921)  Resp: 17 (10/19/20 0921)  BP: (!) 141/67 (10/19/20 0921)  SpO2: (!) 94 % (10/19/20 0921) Vital Signs (24h Range):  Temp:  [96.6 °F (35.9 °C)-98.8 °F (37.1 °C)] 96.6 °F (35.9 °C)  Pulse:  [] 56  Resp:  [15-31] 17  SpO2:  [94 %-98 %] 94 %  BP: (135-211)/(60-79) 141/67     Weight: 68.9 kg (152 lb)  Body mass index is 29.69 kg/m².    SpO2: (!) 94 %  O2 Device (Oxygen Therapy): room air    No intake or output data in the 24 hours ending 10/19/20 1051    Lines/Drains/Airways     Peripheral Intravenous Line                 Peripheral IV - Single Lumen 10/18/20 2148 20 G Right Forearm less than 1 day                Physical Exam   Constitutional: She is oriented to person, place, and time. She appears well-developed and well-nourished. No distress.   Cardiovascular: Normal rate and regular rhythm. Exam reveals no gallop.   No murmur heard.  Pulmonary/Chest: Effort normal and breath sounds normal. No respiratory distress. She has no wheezes.   Abdominal: Soft. Bowel sounds are normal. She exhibits no distension. There is no abdominal tenderness.   Neurological: She is alert and oriented to person, place, and time.   Skin: Skin is warm and dry.       Significant Labs:     Recent Labs   Lab 10/19/20  0601   WBC 8.40   RBC 4.30   HGB 13.5   HCT 42.9      *   MCH 31.4*   MCHC 31.5*     Recent Labs   Lab 10/19/20  0601      K 4.5   *   CO2 19*   BUN 14   CREATININE 0.9   MG 1.9     Recent Labs   Lab 10/19/20  0001   TROPONINI 0.014       Significant Imaging: ALVINA pending     Assessment and Plan:     * Palpitations  - complaints of palpitations with bigeminy and PVCs; no afib noted on telemetry  - on Metoprolol Tartrate 50mg in the morning and 25mg in the evening; will increase to 50mg po BID  - ALVINA for evaluation of PVCs during exercise  - will arrange for 48 hour Holter monitor as an outpatient to assess PVC burden; if PVC burden high then may need  referral to EP for discussion of PVC ablation   - TSH normal; instructed to avoid excessive caffeine or ETOH intake     Essential hypertension  - SBP 140s-180s overnight  - on Metoprolol and Losartan  - continue medications and adjust meds prn     Dyslipidemia  - continue statin therapy         VTE Risk Mitigation (From admission, onward)         Ordered     IP VTE HIGH RISK PATIENT  Once      10/19/20 0057     Place sequential compression device  Until discontinued      10/19/20 0057                Thank you for your consult.     BARBI Anne, ANP  Cardiology   [facility]

## 2020-10-19 NOTE — CONSULTS
[facility]  Cardiology  Consult Note    Patient Name: Denise Berrios  MRN: [id number]  Admission Date: 10/18/2020  Hospital Length of Stay: 0 days  Code Status: Full Code   Attending Provider: Harsha Puckett, *   Consulting Provider: BARBI Anne ANP  Primary Care Physician: Aleksandra Carey MD  Principal Problem:Palpitations    Patient information was obtained from patient, past medical records and ER records.     Inpatient consult to [facility]  Consult performed by: BARBI Dodson ANP  Consult ordered by: Gina Washington NP  Reason for consult: Palpitations; PVCs        Subjective:      Chief Complaint:  Palpitations    HPI:   69yo female with hypothyroidism, PDA closure as a child, CVA, HLP, CKD stage III, IBS, HTN, GERD, PVCs and OAB who presented to the ER with complaints of palpitations. Ms. Berrios reports intermittent palpitations for a while that were not overly concerning. However for the past week she notes an increase in the palpitations. She was seen in the ER last week and her Metoprolol was increased to 50mg in the morning and 25mg in the evening. She reports continued palpitations with no major improvement prompting her to return to the ER. She denies any chest pain, SOB or syncope. She was drinking regular coffee but recently changed back to decaffeinated. She denies excessive soft drink or ETOH intake. She had a full cardiac workup when she had a CVA 5 years ago but none recently. She underwent PDA repair as a child     Hospital Course:    10/18/2020 Presented to the ER with complaints of palpitations. Admitted to [facility]   10/19/2020 Monitored on telemetry overnight with evidence of PVCs and bigeminy; no afib noted. Troponin .017-.014. BP 140s-180s/60s-70s Cardiology consulted for evaluation of palpitations     Past Medical History:   Diagnosis Date    Acid reflux     Graves disease     diffuse Toxic Goiter    Hypertension      IBS (irritable bowel syndrome)     Lichen sclerosus     Migraine     Stroke        Past Surgical History:   Procedure Laterality Date    EYE SURGERY      HYSTERECTOMY      CINDI for ?? cervical cancer, had normal paps       Review of patient's allergies indicates:   Allergen Reactions    Phenobarbital Anaphylaxis    Thyroid      Note: PTU    Topamax [topiramate] Diarrhea     GI symptoms    Cefaclor Rash    Methimazole (bulk) Rash    Penicillin Rash    Plavix [clopidogrel] Rash    Sulfa (sulfonamide antibiotics) Rash    Tapazole [methimazole] Rash       No current facility-administered medications on file prior to encounter.      Current Outpatient Medications on File Prior to Encounter   Medication Sig    aspirin (ECOTRIN) 81 MG EC tablet once a day    atorvastatin (LIPITOR) 20 MG tablet TAKE 1 TABLET DAILY    butalbital-acetaminophen-caffeine -40 mg (FIORICET, ESGIC) -40 mg per tablet Take 1 tablet by mouth nightly as needed.    clobetasol 0.05% (TEMOVATE) 0.05 % Oint APPLY TOPICALLY TWICE A DAY    escitalopram oxalate (LEXAPRO) 5 MG Tab Take 1 tablet (5 mg total) by mouth once daily.    levothyroxine (SYNTHROID) 100 MCG tablet TAKE 1 TABLET BEFORE BREAKFAST    losartan (COZAAR) 50 MG tablet Take 1 tablet (50 mg total) by mouth once daily.    metoprolol tartrate (LOPRESSOR) 50 MG tablet Take 0.5 tablets (25 mg total) by mouth 2 (two) times daily. (Patient taking differently: Take 50 mg by mouth 2 (two) times daily. 50mg in am 25mg pm)    nortriptyline (PAMELOR) 10 MG capsule TAKE 1 CAPSULE DAILY    omeprazole (PRILOSEC) 40 MG capsule Take 1 capsule (40 mg total) by mouth once daily.    omeprazole (PRILOSEC) 40 MG capsule Take 1 capsule (40 mg total) by mouth once daily.    solifenacin (VESICARE) 10 MG tablet Take 1 tablet (10 mg total) by mouth once daily.    alendronate (FOSAMAX) 70 MG tablet Take 1 tablet (70 mg total) by mouth every 7 days.    alendronate (FOSAMAX) 70  MG tablet Take 1 tablet (70 mg total) by mouth every 7 days.    VITAMIN D2 1,250 mcg (50,000 unit) capsule TAKE 1 CAPSULE EVERY 7 DAYS    [DISCONTINUED] ergocalciferol (ERGOCALCIFEROL) 50,000 unit Cap Take 1 capsule (50,000 Units total) by mouth every 7 days.    [DISCONTINUED] nortriptyline (PAMELOR) 10 MG capsule Take 1 capsule (10 mg total) by mouth once daily.     Family History     Problem Relation (Age of Onset)    Hypertension Father    Kidney failure Father    Stomach cancer Mother        Tobacco Use    Smoking status: Never Smoker    Smokeless tobacco: Never Used   Substance and Sexual Activity    Alcohol use: Yes    Drug use: No    Sexual activity: Yes     Partners: Male     Birth control/protection: See Surgical Hx     Review of Systems   Constitution: Negative for chills, decreased appetite, diaphoresis, fever, malaise/fatigue, weight gain and weight loss.   Cardiovascular: Positive for palpitations. Negative for chest pain, claudication, cyanosis, dyspnea on exertion, irregular heartbeat, leg swelling, near-syncope, orthopnea, paroxysmal nocturnal dyspnea and syncope.   Respiratory: Negative for cough, shortness of breath, snoring, sputum production and wheezing.    Endocrine: Negative for cold intolerance, heat intolerance, polydipsia, polyphagia and polyuria.   Skin: Negative for color change, dry skin, itching, nail changes and poor wound healing.   Musculoskeletal: Negative for back pain, gout, joint pain and joint swelling.   Gastrointestinal: Negative for bloating, abdominal pain, constipation, diarrhea, hematemesis, hematochezia, melena, nausea and vomiting.   Genitourinary: Negative for dysuria, hematuria and nocturia.   Neurological: Negative for dizziness, headaches, light-headedness, numbness, paresthesias and weakness.   Psychiatric/Behavioral: Negative for altered mental status, depression and memory loss.     Objective:     Vital Signs (Most Recent):  Temp: 96.6 °F (35.9 °C)  (10/19/20 0921)  Pulse: (!) 56 (10/19/20 0921)  Resp: 17 (10/19/20 0921)  BP: (!) 141/67 (10/19/20 0921)  SpO2: (!) 94 % (10/19/20 0921) Vital Signs (24h Range):  Temp:  [96.6 °F (35.9 °C)-98.8 °F (37.1 °C)] 96.6 °F (35.9 °C)  Pulse:  [] 56  Resp:  [15-31] 17  SpO2:  [94 %-98 %] 94 %  BP: (135-211)/(60-79) 141/67     Weight: 68.9 kg (152 lb)  Body mass index is 29.69 kg/m².    SpO2: (!) 94 %  O2 Device (Oxygen Therapy): room air    No intake or output data in the 24 hours ending 10/19/20 1051    Lines/Drains/Airways     Peripheral Intravenous Line                 Peripheral IV - Single Lumen 10/18/20 2148 20 G Right Forearm less than 1 day                Physical Exam   Constitutional: She is oriented to person, place, and time. She appears well-developed and well-nourished. No distress.   Cardiovascular: Normal rate and regular rhythm. Exam reveals no gallop.   No murmur heard.  Pulmonary/Chest: Effort normal and breath sounds normal. No respiratory distress. She has no wheezes.   Abdominal: Soft. Bowel sounds are normal. She exhibits no distension. There is no abdominal tenderness.   Neurological: She is alert and oriented to person, place, and time.   Skin: Skin is warm and dry.       Significant Labs:     Recent Labs   Lab 10/19/20  0601   WBC 8.40   RBC 4.30   HGB 13.5   HCT 42.9      *   MCH 31.4*   MCHC 31.5*     Recent Labs   Lab 10/19/20  0601      K 4.5   *   CO2 19*   BUN 14   CREATININE 0.9   MG 1.9     Recent Labs   Lab 10/19/20  0001   TROPONINI 0.014       Significant Imaging: ALVINA pending     Assessment and Plan:     * Palpitations  - complaints of palpitations with bigeminy and PVCs; no afib noted on telemetry  - on Metoprolol Tartrate 50mg in the morning and 25mg in the evening; will increase to 50mg po BID  - ALVINA for evaluation of PVCs during exercise  - will arrange for 48 hour Holter monitor as an outpatient to assess PVC burden; if PVC burden high then may need  referral to EP for discussion of PVC ablation   - TSH normal; instructed to avoid excessive caffeine or ETOH intake     Essential hypertension  - SBP 140s-180s overnight  - on Metoprolol and Losartan  - continue medications and adjust meds prn     Dyslipidemia  - continue statin therapy         VTE Risk Mitigation (From admission, onward)         Ordered     IP VTE HIGH RISK PATIENT  Once      10/19/20 0057     Place sequential compression device  Until discontinued      10/19/20 0057                Thank you for your consult.     BARBI Anne, ANP  Cardiology   [facility]

## 2020-10-19 NOTE — NURSING
Patient called stating she feels her palpitations are getting worse.    VSS, called monitor tech to verify patient's heart rate and rhythm. Normal sinus with bigeminy, HR 70-80.    Manual BP was 140/60  Oxygen sat 98% on RA.    Patient appears restless, requested something for sleep. Will give melatonin and call doctor to relay patient's concerns.    No new orders received, cardiology consult in the AM

## 2020-10-19 NOTE — HPI
71yo female with hypothyroidism, PDA closure as a child, CVA, HLP, CKD stage III, IBS, HTN, GERD, PVCs and OAB who presented to the ER with complaints of palpitations. Ms. Berrios reports intermittent palpitations for a while that were not overly concerning. However for the past week she notes an increase in the palpitations. She was seen in the ER last week and her Metoprolol was increased to 50mg in the morning and 25mg in the evening. She reports continued palpitations with no major improvement prompting her to return to the ER. She denies any chest pain, SOB or syncope. She was drinking regular coffee but recently changed back to decaffeinated. She denies excessive soft drink or ETOH intake. She had a full cardiac workup when she had a CVA 5 years ago but none recently. She underwent PDA repair as a child

## 2020-10-19 NOTE — HPI
Denise Berrios is a 71 yo female with pmh of HTN, HLD, hypothyroidism, TIA, GERD and CKD stage 3 who presented to ED for evaluation of palpitations. She has had episodes of palpitations intermittently over the last several weeks which are precipitated by episodes of reflux.  She was seen earlier this week for a similar presentation and noted to have several PVCs on her EKG, she was given some medication, she followed up with her primary physician who adjusted her blood pressure medications including metoprolol increasing it from 25 mg twice daily to 50 in the morning and 25 at night.  She continued to have issues however which returned today after she drank some milk and ate some vanilla wafers.  She has been having persistent discomfort at the base of the chest with some burning and reflux type symptoms as well as the palpitations. Patient reports drinking regular coffee which may have precipitated event earlier this week but she has since switched back to decaf with ongoing palpitations. She has not seen a cardiologist in the past 5 years. She reports extensive cardiac work up at that time. Pt is a retired Registered Nurse.     Labs unremarkable. CXR negative. EKG shows SR with frequent PVCs in a pattern of bigeminy. VSS. She received metoprolol and hydralazine. Cardiology consulted per  ED. Pt admitted to [facility].

## 2020-10-19 NOTE — PROGRESS NOTES
[facility]                    Pharmacy       Discharge Medication Education    Patient ACCEPTED medication education. Pharmacy has provided education on the name, indication, and possible side effects of the medication(s) prescribed, using teach-back method.     The following medications have also been discussed, during this admission.        Medication List        START taking these medications      metoprolol tartrate 50 MG tablet  Commonly known as: LOPRESSOR  Take 1 tablet (50 mg total) by mouth 2 (two) times daily.            CHANGE how you take these medications      alendronate 70 MG tablet  Commonly known as: FOSAMAX  Take 1 tablet (70 mg total) by mouth every 7 days.  What changed: Another medication with the same name was removed. Continue taking this medication, and follow the directions you see here.     omeprazole 40 MG capsule  Commonly known as: PRILOSEC  Take 1 capsule (40 mg total) by mouth once daily.  What changed: Another medication with the same name was removed. Continue taking this medication, and follow the directions you see here.            CONTINUE taking these medications      aspirin 81 MG EC tablet  Commonly known as: ECOTRIN     atorvastatin 20 MG tablet  Commonly known as: LIPITOR  TAKE 1 TABLET DAILY     butalbital-acetaminophen-caffeine -40 mg -40 mg per tablet  Commonly known as: FIORICET, ESGIC     clobetasol 0.05% 0.05 % Oint  Commonly known as: TEMOVATE  APPLY TOPICALLY TWICE A DAY     escitalopram oxalate 5 MG Tab  Commonly known as: LEXAPRO  Take 1 tablet (5 mg total) by mouth once daily.     levothyroxine 100 MCG tablet  Commonly known as: SYNTHROID  TAKE 1 TABLET BEFORE BREAKFAST     losartan 50 MG tablet  Commonly known as: COZAAR  Take 1 tablet (50 mg total) by mouth once daily.     nortriptyline 10 MG capsule  Commonly known as: PAMELOR  TAKE 1 CAPSULE DAILY     solifenacin 10 MG tablet  Commonly known as: VESICARE  Take 1 tablet (10 mg  total) by mouth once daily.     VITAMIN D2 50,000 unit Cap  Generic drug: ergocalciferol  TAKE 1 CAPSULE EVERY 7 DAYS               Where to Get Your Medications        These medications were sent to [facility] - [address] [address]      Phone: [phone]   metoprolol tartrate 50 MG tablet          Thank you  Alexei Crespo, PharmD  [phone]

## 2020-10-19 NOTE — PLAN OF CARE
TN met with patient and patient's  John [phone] at bedside to complete discharge assessment. Currently, patient lives at with her  and is independent with ADL's. No DME or HH noted. Per John, upon discharge he will provide transportation home and will be available to help as needed. Patient's PCP is Dr. Carey.\      TN updated whiteboard with contact information. Discharge brochure given to patient. TN will continue to follow throughout transitions of care and will assist with discharge needs.          10/19/20 0541   Discharge Assessment   Assessment Type Discharge Planning Assessment   Confirmed/corrected address and phone number on facesheet? Yes   Assessment information obtained from? Patient;Medical Record   Expected Length of Stay (days) 1   Prior to hospitilization cognitive status: Alert/Oriented   Prior to hospitalization functional status: Independent   Current cognitive status: Alert/Oriented   Current Functional Status: Independent   Lives With spouse  ([phone])   Able to Return to Prior Arrangements yes   Is patient able to care for self after discharge?   (Patient will have help at home from her )   Patient's perception of discharge disposition home or selfcare   Readmission Within the Last 30 Days no previous admission in last 30 days   Patient currently being followed by outpatient case management? No   Patient currently receives any other outside agency services? No   Equipment Currently Used at Home none   Do you have any problems affording any of your prescribed medications? No   Is the patient taking medications as prescribed? yes   Does the patient have transportation home? Yes   Transportation Anticipated family or friend will provide   Does the patient receive services at the [facility]? No   Discharge Plan A Home with family   DME Needed Upon Discharge  none   Patient/Family in Agreement with Plan yes

## 2020-10-19 NOTE — ED NOTES
Pt placed on tele monitor and being transported upstairs. Vital signs normal at this. Pt in no apparent distress.

## 2020-10-19 NOTE — SUBJECTIVE & OBJECTIVE
Past Medical History:   Diagnosis Date    Acid reflux     Graves disease     diffuse Toxic Goiter    Hypertension     IBS (irritable bowel syndrome)     Lichen sclerosus     Migraine     Stroke        Past Surgical History:   Procedure Laterality Date    EYE SURGERY      HYSTERECTOMY      CINDI for ?? cervical cancer, had normal paps       Review of patient's allergies indicates:   Allergen Reactions    Phenobarbital Anaphylaxis    Thyroid      Note: PTU    Topamax [topiramate] Diarrhea     GI symptoms    Cefaclor Rash    Methimazole (bulk) Rash    Penicillin Rash    Plavix [clopidogrel] Rash    Sulfa (sulfonamide antibiotics) Rash    Tapazole [methimazole] Rash       No current facility-administered medications on file prior to encounter.      Current Outpatient Medications on File Prior to Encounter   Medication Sig    aspirin (ECOTRIN) 81 MG EC tablet once a day    atorvastatin (LIPITOR) 20 MG tablet TAKE 1 TABLET DAILY    butalbital-acetaminophen-caffeine -40 mg (FIORICET, ESGIC) -40 mg per tablet Take 1 tablet by mouth nightly as needed.    clobetasol 0.05% (TEMOVATE) 0.05 % Oint APPLY TOPICALLY TWICE A DAY    escitalopram oxalate (LEXAPRO) 5 MG Tab Take 1 tablet (5 mg total) by mouth once daily.    levothyroxine (SYNTHROID) 100 MCG tablet TAKE 1 TABLET BEFORE BREAKFAST    losartan (COZAAR) 50 MG tablet Take 1 tablet (50 mg total) by mouth once daily.    metoprolol tartrate (LOPRESSOR) 50 MG tablet Take 0.5 tablets (25 mg total) by mouth 2 (two) times daily. (Patient taking differently: Take 50 mg by mouth 2 (two) times daily. 50mg in am 25mg pm)    nortriptyline (PAMELOR) 10 MG capsule TAKE 1 CAPSULE DAILY    omeprazole (PRILOSEC) 40 MG capsule Take 1 capsule (40 mg total) by mouth once daily.    omeprazole (PRILOSEC) 40 MG capsule Take 1 capsule (40 mg total) by mouth once daily.    solifenacin (VESICARE) 10 MG tablet Take 1 tablet (10 mg total) by mouth once daily.     alendronate (FOSAMAX) 70 MG tablet Take 1 tablet (70 mg total) by mouth every 7 days.    alendronate (FOSAMAX) 70 MG tablet Take 1 tablet (70 mg total) by mouth every 7 days.    VITAMIN D2 1,250 mcg (50,000 unit) capsule TAKE 1 CAPSULE EVERY 7 DAYS    [DISCONTINUED] ergocalciferol (ERGOCALCIFEROL) 50,000 unit Cap Take 1 capsule (50,000 Units total) by mouth every 7 days.    [DISCONTINUED] nortriptyline (PAMELOR) 10 MG capsule Take 1 capsule (10 mg total) by mouth once daily.     Family History     Problem Relation (Age of Onset)    Hypertension Father    Kidney failure Father    Stomach cancer Mother        Tobacco Use    Smoking status: Never Smoker    Smokeless tobacco: Never Used   Substance and Sexual Activity    Alcohol use: Yes    Drug use: No    Sexual activity: Yes     Partners: Male     Birth control/protection: See Surgical Hx     Review of Systems   Constitution: Negative for chills, decreased appetite, diaphoresis, fever, malaise/fatigue, weight gain and weight loss.   Cardiovascular: Positive for palpitations. Negative for chest pain, claudication, cyanosis, dyspnea on exertion, irregular heartbeat, leg swelling, near-syncope, orthopnea, paroxysmal nocturnal dyspnea and syncope.   Respiratory: Negative for cough, shortness of breath, snoring, sputum production and wheezing.    Endocrine: Negative for cold intolerance, heat intolerance, polydipsia, polyphagia and polyuria.   Skin: Negative for color change, dry skin, itching, nail changes and poor wound healing.   Musculoskeletal: Negative for back pain, gout, joint pain and joint swelling.   Gastrointestinal: Negative for bloating, abdominal pain, constipation, diarrhea, hematemesis, hematochezia, melena, nausea and vomiting.   Genitourinary: Negative for dysuria, hematuria and nocturia.   Neurological: Negative for dizziness, headaches, light-headedness, numbness, paresthesias and weakness.   Psychiatric/Behavioral: Negative for altered mental  status, depression and memory loss.     Objective:     Vital Signs (Most Recent):  Temp: 96.6 °F (35.9 °C) (10/19/20 0921)  Pulse: (!) 56 (10/19/20 0921)  Resp: 17 (10/19/20 0921)  BP: (!) 141/67 (10/19/20 0921)  SpO2: (!) 94 % (10/19/20 0921) Vital Signs (24h Range):  Temp:  [96.6 °F (35.9 °C)-98.8 °F (37.1 °C)] 96.6 °F (35.9 °C)  Pulse:  [] 56  Resp:  [15-31] 17  SpO2:  [94 %-98 %] 94 %  BP: (135-211)/(60-79) 141/67     Weight: 68.9 kg (152 lb)  Body mass index is 29.69 kg/m².    SpO2: (!) 94 %  O2 Device (Oxygen Therapy): room air    No intake or output data in the 24 hours ending 10/19/20 1051    Lines/Drains/Airways     Peripheral Intravenous Line                 Peripheral IV - Single Lumen 10/18/20 2148 20 G Right Forearm less than 1 day                Physical Exam   Constitutional: She is oriented to person, place, and time. She appears well-developed and well-nourished. No distress.   Cardiovascular: Normal rate and regular rhythm. Exam reveals no gallop.   No murmur heard.  Pulmonary/Chest: Effort normal and breath sounds normal. No respiratory distress. She has no wheezes.   Abdominal: Soft. Bowel sounds are normal. She exhibits no distension. There is no abdominal tenderness.   Neurological: She is alert and oriented to person, place, and time.   Skin: Skin is warm and dry.       Significant Labs:     Recent Labs   Lab 10/19/20  0601   WBC 8.40   RBC 4.30   HGB 13.5   HCT 42.9      *   MCH 31.4*   MCHC 31.5*     Recent Labs   Lab 10/19/20  0601      K 4.5   *   CO2 19*   BUN 14   CREATININE 0.9   MG 1.9     Recent Labs   Lab 10/19/20  0001   TROPONINI 0.014       Significant Imaging: ALVINA pending

## 2020-10-19 NOTE — DISCHARGE SUMMARY
[facility] Hospital Medicine  Discharge Summary      Patient Name: Denise Berrios  MRN: [id number]  Admission Date: 10/18/2020  Hospital Length of Stay: 0 days  Discharge Date and Time:  10/19/2020 3:56 PM  Attending Physician: Harsha Puckett, *   Discharging Provider: Harsha Puckett MD  Primary Care Provider: Aleksandra Carey MD      HPI:   Denise Berrios is a 71 yo female with pmh of HTN, HLD, hypothyroidism, TIA, GERD and CKD stage 3 who presented to ED for evaluation of palpitations. She has had episodes of palpitations intermittently over the last several weeks which are precipitated by episodes of reflux.  She was seen earlier this week for a similar presentation and noted to have several PVCs on her EKG, she was given some medication, she followed up with her primary physician who adjusted her blood pressure medications including metoprolol increasing it from 25 mg twice daily to 50 in the morning and 25 at night.  She continued to have issues however which returned today after she drank some milk and ate some vanilla wafers.  She has been having persistent discomfort at the base of the chest with some burning and reflux type symptoms as well as the palpitations. Patient reports drinking regular coffee which may have precipitated event earlier this week but she has since switched back to decaf with ongoing palpitations. She has not seen a cardiologist in the past 5 years. She reports extensive cardiac work up at that time. Pt is a retired Registered Nurse.     Labs unremarkable. CXR negative. EKG shows SR with frequent PVCs in a pattern of bigeminy. VSS. She received metoprolol and hydralazine. Cardiology consulted per  ED. Pt admitted to [facility] hospital medicine.         * No surgery found *      Hospital Course:   Ms. Berrios presented with palpitations due to symptomatic PVCs. Initial ACS evaluation with EKG and troponin negative. Performed exercise stress test without  worsening of PVCs. Increasing metoprolol to 50mg bid dosing. Cardiology evaluated while in house. Will set up with Holter as outpatient to assess PVC burden with EP follow up for possible ablation.    BP (!) 149/67 (BP Location: Left arm, Patient Position: Sitting)   Pulse 71   Temp 97.6 °F (36.4 °C) (Axillary)   Resp 17   Ht 5' (1.524 m)   Wt 68.9 kg (152 lb)   SpO2 (!) 93%   Breastfeeding No   BMI 29.69 kg/m²   Physical Exam  Constitutional:       General: She is not in acute distress.     Appearance: She is well-developed.   HENT:      Head: Normocephalic and atraumatic.   Eyes:      Conjunctiva/sclera: Conjunctivae normal.      Pupils: Pupils are equal, round, and reactive to light.   Neck:      Musculoskeletal: Normal range of motion and neck supple.      Vascular: No JVD.   Cardiovascular:      Rate and Rhythm: Normal rate and regular rhythm.      Pulses: Normal pulses.      Heart sounds: Normal heart sounds.   Pulmonary:      Effort: Pulmonary effort is normal. No respiratory distress.      Breath sounds: Normal breath sounds. No wheezing.   Abdominal:      General: Bowel sounds are normal. There is no distension.      Palpations: Abdomen is soft.      Tenderness: There is no abdominal tenderness. There is no guarding.   Musculoskeletal: Normal range of motion.         General: No tenderness.   Skin:     General: Skin is warm and dry.      Capillary Refill: Capillary refill takes less than 2 seconds.      Findings: No erythema.   Neurological:      Mental Status: She is alert and oriented to person, place, and time.   Psychiatric:         Behavior: Behavior normal.      Consults:   Consults (From admission, onward)        Status Ordering Provider     Inpatient consult to [facility]  Once     Provider:  (Not yet assigned)    NIDA Ervin new Assessment & Plan notes have been filed under this hospital service since the last note was generated.  Service: [facility]  Medicine    Final Active Diagnoses:    Diagnosis Date Noted POA    PRINCIPAL PROBLEM:  Palpitations [R00.2] 10/19/2020 Yes    Essential hypertension [I10] 10/19/2020 Yes    Anxiety [F41.9] 08/28/2019 Yes    Other specified hypothyroidism [E03.8] 08/28/2019 Yes    History of cerebral infarction [Z86.73] 08/28/2019 Not Applicable    Dyslipidemia [E78.5] 08/28/2019 Yes    Benign hypertension with CKD (chronic kidney disease) stage III [I12.9, N18.30] 08/28/2019 Yes    Irritable bowel syndrome [K58.9] 09/04/2014 Yes      Problems Resolved During this Admission:       Discharged Condition: good    Disposition: Home or Self Care    Follow Up:  Follow-up Information     Aleksandra Carey MD On 10/30/2020.    Specialty: Internal Medicine  Why: For your hospital follow up  at 2:00pm.  Contact information:  [address]  [address]  [place] [address]  [phone]             Bony Schultz MD On 10/29/2020.    Specialties: Cardiology, INTERVENTIONAL CARDIOLOGY  Why: For your follow up appointment at 9:40 am.  Contact information:  [address]  [address]  [place] [address]  [phone]                 Patient Instructions:      Ambulatory referral/consult to Electrophysiology   Standing Status: Future   Referral Priority: Routine Referral Type: Consultation   Referral Reason: Specialty Services Required   Requested Specialty: Electrophysiology   Number of Visits Requested: 1     Diet Cardiac     Holter Monitor - 3-14 Day Adult   Standing Status: Future Standing Exp. Date: 10/19/21     Activity as tolerated       Significant Diagnostic Studies: Labs:   CMP   Recent Labs   Lab 10/18/20  2148 10/19/20  0601    140   K 4.3 4.5    111*   CO2 27 19*   GLU 97 105   BUN 17 14   CREATININE 1.1 0.9   CALCIUM 9.6 9.3   PROT 7.8  --    ALBUMIN 4.1  --    BILITOT 0.2  --    ALKPHOS 68  --    AST 36  --    ALT 31  --    ANIONGAP 8 10   ESTGFRAFRICA 58* >60   EGFRNONAA 50* >60   , CBC   Recent Labs   Lab  10/18/20  2148 10/19/20  0601   WBC 8.74 8.40   HGB 13.6 13.5   HCT 40.7 42.9    229   , INR No results found for: INR, PROTIME, Lipid Panel   Lab Results   Component Value Date    CHOL 195 02/11/2020    HDL 60 02/11/2020    LDLCALC 106.2 02/11/2020    TRIG 144 02/11/2020    CHOLHDL 30.8 02/11/2020   , Troponin   Recent Labs   Lab 10/13/20  1243 10/18/20  2148 10/19/20  0001   TROPONINI 0.006 0.017 0.014   , A1C: No results for input(s): HGBA1C in the last 4320 hours. and All labs within the past 24 hours have been reviewed  Radiology: X-Ray: CXR: X-Ray Chest 1 View (CXR): No results found for this visit on 10/18/20. and X-Ray Chest PA and Lateral (CXR): No results found for this visit on 10/18/20.  Cardiac Graphics: Echocardiogram: 2D echo with color flow doppler: No results found for this or any previous visit. and Transthoracic echo (TTE) complete (Cupid Only): No results found for this or any previous visit.    Pending Diagnostic Studies:     Procedure Component Value Units Date/Time    Echo Color Flow Doppler? Yes [[id number]]     Order Status: Sent Lab Status: No result     Stress Echo Which stress agent will be used? Treadmill Exercise; Color Flow Doppler? No [[id number]]  (Abnormal) Resulted: 10/19/20 1121    Order Status: Sent Lab Status: In process Updated: 10/19/20 1124     BSA 1.71 m2      TDI SEPTAL 0.05 m/s      LV LATERAL E/E' RATIO 20.60 m/s      LV SEPTAL E/E' RATIO 20.60 m/s      LA WIDTH 3.27 cm      Systolic blood pressure 143 mmHg      Diastolic blood pressure 85 mmHg      HR at rest 88 bpm      RPP 12,584     Peak  bpm      Peak Systolic  mmHg      Peak Diatolic  mmHg      Peak RPP 29,455     Estimated METs 7     Max Predicted      85% Max Predicted      % Max HR Achieved 96     1 Minute Recovery  bpm      OHS CV CPX PATIENT IS MALE 0     OHS CV CPX PATIENT IS FEMALE 1     TDI LATERAL 0.05 m/s      Exercise duration (sec) 0 seconds      LVIDd 3.96 cm       IVS 1.12 cm      Posterior Wall 1.29 cm      Ao root annulus 3.58 cm      LVIDs 2.92 cm      FS 26 %      LA volume 51.76 cm3      STJ 2.65 cm      Ascending aorta 2.85 cm      LV mass 163.98 g      LA size 3.86 cm      Exercise duration (min) 6 minutes      RVDD 2.59 cm      TAPSE 1.78 cm      Angina Index 0     Left Ventricle Relative Wall Thickness 0.65 cm      AV mean gradient 6 mmHg      AV valve area 1.92 cm2      AV Velocity Ratio 0.67     AV index (prosthetic) 0.68     MV valve area p 1/2 method 5.74 cm2      E/A ratio 0.68     Mean e' 0.05 m/s      E wave decelartion time 132.12 msec      LVOT diameter 1.89 cm      LVOT area 2.8 cm2      LVOT peak toni 1.02 m/s      LVOT peak VTI 20.95 cm      Ao peak toni 1.52 m/s      Ao VTI 30.62 cm      LVOT stroke volume 58.75 cm3      AV peak gradient 9 mmHg      E/E' ratio 20.60 m/s      MV Peak E Toni 1.03 m/s      TR Max Toni 2.66 m/s      MV stenosis pressure 1/2 time 38.31 ms      MV Peak A Toni 1.51 m/s      LV Systolic Volume 32.71 mL      LV Systolic Volume Index 19.7 mL/m2      LV Diastolic Volume 68.19 mL      LV Diastolic Volume Index 41.05 mL/m2      LA Volume Index 31.2 mL/m2      LV Mass Index 99 g/m2      RA Major Axis 3.79 cm      Left Atrium Minor Axis 4.86 cm      Left Atrium Major Axis 4.79 cm      Triscuspid Valve Regurgitation Peak Gradient 28 mmHg      RA Width 3.03 cm     Narrative:      · The test was stopped because the patient experienced shortness of   breath.  · There were no arrhythmias during stress.  · The left ventricle is normal in size  · There is left ventricular concentric hypertrophy.       Impression:               Medications:  Reconciled Home Medications:      Medication List      START taking these medications    metoprolol tartrate 50 MG tablet  Commonly known as: LOPRESSOR  Take 1 tablet (50 mg total) by mouth 2 (two) times daily.        CHANGE how you take these medications    alendronate 70 MG tablet  Commonly known as:  FOSAMAX  Take 1 tablet (70 mg total) by mouth every 7 days.  What changed: Another medication with the same name was removed. Continue taking this medication, and follow the directions you see here.     omeprazole 40 MG capsule  Commonly known as: PRILOSEC  Take 1 capsule (40 mg total) by mouth once daily.  What changed: Another medication with the same name was removed. Continue taking this medication, and follow the directions you see here.        CONTINUE taking these medications    aspirin 81 MG EC tablet  Commonly known as: ECOTRIN  once a day     atorvastatin 20 MG tablet  Commonly known as: LIPITOR  TAKE 1 TABLET DAILY     butalbital-acetaminophen-caffeine -40 mg -40 mg per tablet  Commonly known as: FIORICET, ESGIC  Take 1 tablet by mouth nightly as needed.     clobetasol 0.05% 0.05 % Oint  Commonly known as: TEMOVATE  APPLY TOPICALLY TWICE A DAY     escitalopram oxalate 5 MG Tab  Commonly known as: LEXAPRO  Take 1 tablet (5 mg total) by mouth once daily.     levothyroxine 100 MCG tablet  Commonly known as: SYNTHROID  TAKE 1 TABLET BEFORE BREAKFAST     losartan 50 MG tablet  Commonly known as: COZAAR  Take 1 tablet (50 mg total) by mouth once daily.     nortriptyline 10 MG capsule  Commonly known as: PAMELOR  TAKE 1 CAPSULE DAILY     solifenacin 10 MG tablet  Commonly known as: VESICARE  Take 1 tablet (10 mg total) by mouth once daily.     VITAMIN D2 50,000 unit Cap  Generic drug: ergocalciferol  TAKE 1 CAPSULE EVERY 7 DAYS            Indwelling Lines/Drains at time of discharge:   Lines/Drains/Airways     None                 Time spent on the discharge of patient: 40 minutes  Patient was seen and examined on the date of discharge and determined to be suitable for discharge.         Harsha Puckett MD  Department of Hospital Medicine  [facility]

## 2020-10-19 NOTE — HOSPITAL COURSE
10/18/2020 Presented to the ER with complaints of palpitations. Admitted to [facility] Medicine   10/19/2020 Monitored on telemetry overnight with evidence of PVCs and bigeminy; no afib noted. Troponin .017-.014. BP 140s-180s/60s-70s Cardiology consulted for evaluation of palpitations

## 2020-10-19 NOTE — HOSPITAL COURSE
Ms. Cousins presented with palpitations due to symptomatic PVCs. Initial ACS evaluation with EKG and troponin negative. Performed exercise stress test without worsening of PVCs. Increasing metoprolol to 50mg bid dosing. Cardiology evaluated while in house. Will set up with Holter as outpatient to assess PVC burden with EP follow up for possible ablation.    She had a stress test done that was positive for ischemia and so her DC was held so that she could undergo a LHC. She also has significant PVCs during stress.   LHC  · The pre-procedure left ventricular end diastolic pressure was 7.  · The ejection fraction was greater than 55% by visual estimate.  · The coronary arteries were normal..     - Continue medical therapy   - Lopressor for PVCs  - Abnormal stress echo is false +ve dur to the PVCs.   - Holter monitor as outpatient    The patient was discharged to follow up with Cardiology and PCP.

## 2020-10-19 NOTE — ASSESSMENT & PLAN NOTE
- complaints of palpitations with bigeminy and PVCs; no afib noted on telemetry  - on Metoprolol Tartrate 50mg in the morning and 25mg in the evening; will increase to 50mg po BID  - ALVINA for evaluation of PVCs during exercise  - will arrange for 48 hour Holter monitor as an outpatient to assess PVC burden; if PVC burden high then may need referral to EP for discussion of PVC ablation   - TSH normal; instructed to avoid excessive caffeine or ETOH intake

## 2020-10-19 NOTE — PLAN OF CARE
Problem: Adult Inpatient Plan of Care  Goal: Plan of Care Review  Outcome: Ongoing, Progressing  Notes, labs and care plan reviewed

## 2020-10-20 VITALS
SYSTOLIC BLOOD PRESSURE: 159 MMHG | OXYGEN SATURATION: 97 % | DIASTOLIC BLOOD PRESSURE: 75 MMHG | RESPIRATION RATE: 18 BRPM | TEMPERATURE: 97 F | HEIGHT: 60 IN | WEIGHT: 152 LBS | BODY MASS INDEX: 29.84 KG/M2 | HEART RATE: 72 BPM

## 2020-10-20 PROBLEM — Z78.9 FALSE POSITIVE CARDIAC STRESS TEST: Status: ACTIVE | Noted: 2020-10-20

## 2020-10-20 LAB
ANION GAP SERPL CALC-SCNC: 8 MMOL/L (ref 8–16)
BASOPHILS # BLD AUTO: 0.03 K/UL (ref 0–0.2)
BASOPHILS NFR BLD: 0.5 % (ref 0–1.9)
BUN SERPL-MCNC: 18 MG/DL (ref 8–23)
CALCIUM SERPL-MCNC: 9.1 MG/DL (ref 8.7–10.5)
CHLORIDE SERPL-SCNC: 106 MMOL/L (ref 95–110)
CO2 SERPL-SCNC: 26 MMOL/L (ref 23–29)
CREAT SERPL-MCNC: 1 MG/DL (ref 0.5–1.4)
DIFFERENTIAL METHOD: ABNORMAL
EOSINOPHIL # BLD AUTO: 0.1 K/UL (ref 0–0.5)
EOSINOPHIL NFR BLD: 1.7 % (ref 0–8)
ERYTHROCYTE [DISTWIDTH] IN BLOOD BY AUTOMATED COUNT: 12.6 % (ref 11.5–14.5)
EST. GFR  (AFRICAN AMERICAN): >60 ML/MIN/1.73 M^2
EST. GFR  (NON AFRICAN AMERICAN): 56 ML/MIN/1.73 M^2
GLUCOSE SERPL-MCNC: 101 MG/DL (ref 70–110)
HCT VFR BLD AUTO: 38.1 % (ref 37–48.5)
HGB BLD-MCNC: 12.5 G/DL (ref 12–16)
IMM GRANULOCYTES # BLD AUTO: 0.02 K/UL (ref 0–0.04)
IMM GRANULOCYTES NFR BLD AUTO: 0.3 % (ref 0–0.5)
LYMPHOCYTES # BLD AUTO: 2.2 K/UL (ref 1–4.8)
LYMPHOCYTES NFR BLD: 33.8 % (ref 18–48)
MAGNESIUM SERPL-MCNC: 1.9 MG/DL (ref 1.6–2.6)
MCH RBC QN AUTO: 31.6 PG (ref 27–31)
MCHC RBC AUTO-ENTMCNC: 32.8 G/DL (ref 32–36)
MCV RBC AUTO: 96 FL (ref 82–98)
MONOCYTES # BLD AUTO: 0.8 K/UL (ref 0.3–1)
MONOCYTES NFR BLD: 12.6 % (ref 4–15)
NEUTROPHILS # BLD AUTO: 3.4 K/UL (ref 1.8–7.7)
NEUTROPHILS NFR BLD: 51.1 % (ref 38–73)
NRBC BLD-RTO: 0 /100 WBC
PLATELET # BLD AUTO: 224 K/UL (ref 150–350)
PMV BLD AUTO: 11.1 FL (ref 9.2–12.9)
POTASSIUM SERPL-SCNC: 4.1 MMOL/L (ref 3.5–5.1)
RBC # BLD AUTO: 3.96 M/UL (ref 4–5.4)
SODIUM SERPL-SCNC: 140 MMOL/L (ref 136–145)
WBC # BLD AUTO: 6.6 K/UL (ref 3.9–12.7)

## 2020-10-20 PROCEDURE — 93010 EKG 12-LEAD: ICD-10-PCS | Mod: ,,, | Performed by: INTERNAL MEDICINE

## 2020-10-20 PROCEDURE — G0378 HOSPITAL OBSERVATION PER HR: HCPCS

## 2020-10-20 PROCEDURE — 25000003 PHARM REV CODE 250: Performed by: HOSPITALIST

## 2020-10-20 PROCEDURE — 63600175 PHARM REV CODE 636 W HCPCS: Performed by: INTERNAL MEDICINE

## 2020-10-20 PROCEDURE — C1887 CATHETER, GUIDING: HCPCS | Performed by: INTERNAL MEDICINE

## 2020-10-20 PROCEDURE — 25000003 PHARM REV CODE 250: Performed by: INTERNAL MEDICINE

## 2020-10-20 PROCEDURE — 80048 BASIC METABOLIC PNL TOTAL CA: CPT

## 2020-10-20 PROCEDURE — C1894 INTRO/SHEATH, NON-LASER: HCPCS | Performed by: INTERNAL MEDICINE

## 2020-10-20 PROCEDURE — 93010 ELECTROCARDIOGRAM REPORT: CPT | Mod: ,,, | Performed by: INTERNAL MEDICINE

## 2020-10-20 PROCEDURE — 85025 COMPLETE CBC W/AUTO DIFF WBC: CPT

## 2020-10-20 PROCEDURE — 99152 MOD SED SAME PHYS/QHP 5/>YRS: CPT | Mod: ,,, | Performed by: INTERNAL MEDICINE

## 2020-10-20 PROCEDURE — 63600175 PHARM REV CODE 636 W HCPCS: Performed by: NURSE PRACTITIONER

## 2020-10-20 PROCEDURE — 94761 N-INVAS EAR/PLS OXIMETRY MLT: CPT

## 2020-10-20 PROCEDURE — 93005 ELECTROCARDIOGRAM TRACING: CPT

## 2020-10-20 PROCEDURE — 93458 L HRT ARTERY/VENTRICLE ANGIO: CPT | Mod: 26,,, | Performed by: INTERNAL MEDICINE

## 2020-10-20 PROCEDURE — 93458 L HRT ARTERY/VENTRICLE ANGIO: CPT | Performed by: INTERNAL MEDICINE

## 2020-10-20 PROCEDURE — 25000003 PHARM REV CODE 250: Performed by: NURSE PRACTITIONER

## 2020-10-20 PROCEDURE — 83735 ASSAY OF MAGNESIUM: CPT

## 2020-10-20 PROCEDURE — 93458 PR CATH PLACE/CORON ANGIO, IMG SUPER/INTERP,W LEFT HEART VENTRICULOGRAPHY: ICD-10-PCS | Mod: 26,,, | Performed by: INTERNAL MEDICINE

## 2020-10-20 PROCEDURE — C1769 GUIDE WIRE: HCPCS | Performed by: INTERNAL MEDICINE

## 2020-10-20 PROCEDURE — 25500020 PHARM REV CODE 255: Performed by: INTERNAL MEDICINE

## 2020-10-20 PROCEDURE — 96375 TX/PRO/DX INJ NEW DRUG ADDON: CPT | Mod: 59

## 2020-10-20 PROCEDURE — 99152 PR MOD CONSCIOUS SEDATION, SAME PHYS, 5+ YRS, FIRST 15 MIN: ICD-10-PCS | Mod: ,,, | Performed by: INTERNAL MEDICINE

## 2020-10-20 PROCEDURE — 36415 COLL VENOUS BLD VENIPUNCTURE: CPT

## 2020-10-20 RX ORDER — DIPHENHYDRAMINE HYDROCHLORIDE 50 MG/ML
INJECTION INTRAMUSCULAR; INTRAVENOUS
Status: DISCONTINUED | OUTPATIENT
Start: 2020-10-20 | End: 2020-10-20 | Stop reason: HOSPADM

## 2020-10-20 RX ORDER — PROMETHAZINE HYDROCHLORIDE 25 MG/ML
INJECTION, SOLUTION INTRAMUSCULAR; INTRAVENOUS
Status: DISCONTINUED | OUTPATIENT
Start: 2020-10-20 | End: 2020-10-20 | Stop reason: HOSPADM

## 2020-10-20 RX ORDER — HEPARIN SODIUM 1000 [USP'U]/ML
INJECTION, SOLUTION INTRAVENOUS; SUBCUTANEOUS
Status: DISCONTINUED | OUTPATIENT
Start: 2020-10-20 | End: 2020-10-20 | Stop reason: HOSPADM

## 2020-10-20 RX ORDER — FENTANYL CITRATE 50 UG/ML
INJECTION, SOLUTION INTRAMUSCULAR; INTRAVENOUS
Status: DISCONTINUED | OUTPATIENT
Start: 2020-10-20 | End: 2020-10-20 | Stop reason: HOSPADM

## 2020-10-20 RX ORDER — SODIUM CHLORIDE 9 MG/ML
20 INJECTION, SOLUTION INTRAVENOUS CONTINUOUS
Status: ACTIVE | OUTPATIENT
Start: 2020-10-20 | End: 2020-10-20

## 2020-10-20 RX ORDER — IODIXANOL 320 MG/ML
INJECTION, SOLUTION INTRAVASCULAR
Status: DISCONTINUED | OUTPATIENT
Start: 2020-10-20 | End: 2020-10-20 | Stop reason: HOSPADM

## 2020-10-20 RX ORDER — MIDAZOLAM HYDROCHLORIDE 1 MG/ML
INJECTION, SOLUTION INTRAMUSCULAR; INTRAVENOUS
Status: DISCONTINUED | OUTPATIENT
Start: 2020-10-20 | End: 2020-10-20 | Stop reason: HOSPADM

## 2020-10-20 RX ORDER — METOPROLOL TARTRATE 50 MG/1
50 TABLET ORAL 2 TIMES DAILY
Qty: 60 TABLET | Refills: 0 | Status: SHIPPED | OUTPATIENT
Start: 2020-10-20 | End: 2020-11-12 | Stop reason: SDUPTHER

## 2020-10-20 RX ORDER — ATORVASTATIN CALCIUM 40 MG/1
40 TABLET, FILM COATED ORAL DAILY
Qty: 90 TABLET | Refills: 3 | Status: SHIPPED | OUTPATIENT
Start: 2020-10-21 | End: 2020-11-12 | Stop reason: SDUPTHER

## 2020-10-20 RX ORDER — SODIUM CHLORIDE 9 MG/ML
INJECTION, SOLUTION INTRAVENOUS CONTINUOUS
Status: DISCONTINUED | OUTPATIENT
Start: 2020-10-20 | End: 2020-10-20

## 2020-10-20 RX ORDER — LOPERAMIDE HYDROCHLORIDE 2 MG/1
2 CAPSULE ORAL 4 TIMES DAILY PRN
Status: DISCONTINUED | OUTPATIENT
Start: 2020-10-20 | End: 2020-10-20 | Stop reason: HOSPADM

## 2020-10-20 RX ADMIN — LORAZEPAM 0.5 MG: 0.5 TABLET ORAL at 03:10

## 2020-10-20 RX ADMIN — METOPROLOL TARTRATE 50 MG: 50 TABLET, FILM COATED ORAL at 08:10

## 2020-10-20 RX ADMIN — LOSARTAN POTASSIUM 50 MG: 50 TABLET ORAL at 08:10

## 2020-10-20 RX ADMIN — LOPERAMIDE HYDROCHLORIDE 2 MG: 2 CAPSULE ORAL at 11:10

## 2020-10-20 RX ADMIN — LEVOTHYROXINE SODIUM 100 MCG: 100 TABLET ORAL at 06:10

## 2020-10-20 RX ADMIN — ONDANSETRON 4 MG: 2 INJECTION INTRAMUSCULAR; INTRAVENOUS at 10:10

## 2020-10-20 RX ADMIN — OXYBUTYNIN CHLORIDE 10 MG: 5 TABLET, EXTENDED RELEASE ORAL at 08:10

## 2020-10-20 RX ADMIN — NORTRIPTYLINE HYDROCHLORIDE 10 MG: 10 CAPSULE ORAL at 10:10

## 2020-10-20 RX ADMIN — TICAGRELOR 180 MG: 90 TABLET ORAL at 08:10

## 2020-10-20 RX ADMIN — SODIUM CHLORIDE 1000 ML: 0.9 INJECTION, SOLUTION INTRAVENOUS at 12:10

## 2020-10-20 RX ADMIN — SODIUM CHLORIDE: 0.9 INJECTION, SOLUTION INTRAVENOUS at 08:10

## 2020-10-20 RX ADMIN — ATORVASTATIN CALCIUM 40 MG: 40 TABLET, FILM COATED ORAL at 08:10

## 2020-10-20 RX ADMIN — ALUMINUM HYDROXIDE, MAGNESIUM HYDROXIDE, AND SIMETHICONE 30 ML: 200; 200; 20 SUSPENSION ORAL at 06:10

## 2020-10-20 RX ADMIN — ALUMINUM HYDROXIDE, MAGNESIUM HYDROXIDE, AND SIMETHICONE 30 ML: 200; 200; 20 SUSPENSION ORAL at 10:10

## 2020-10-20 RX ADMIN — ASPIRIN 81 MG: 81 TABLET, COATED ORAL at 08:10

## 2020-10-20 RX ADMIN — PANTOPRAZOLE SODIUM 40 MG: 40 TABLET, DELAYED RELEASE ORAL at 08:10

## 2020-10-20 RX ADMIN — ESCITALOPRAM OXALATE 5 MG: 5 TABLET, FILM COATED ORAL at 08:10

## 2020-10-20 NOTE — BRIEF OP NOTE
POST CATH NOTE    HPI:     s/p catheterization secondary to:  Abnormal stress test     Cath Results:  Access: Rt radial   LM: 0% VERONICA- flow  LAD: 0% VERONICA- flow  LCx:  0% VERONICA- flow  RCA: 0 % VERONICA- flow  LVgram: LVEDP 7, LVEF  55%  Intervention:  None   Closure device: Vasc band  Patient tolerated procedure well, no complications    Post Cath Exam:  /63   Pulse 69   Temp 98.1 °F (36.7 °C)   Resp 18   Ht 5' (1.524 m)   Wt 68.9 kg (152 lb)   SpO2 97%   Breastfeeding No   BMI 29.69 kg/m²     Assessment:   Normal coronaries  Medical tx  Holter 48 hors   BB

## 2020-10-20 NOTE — PLAN OF CARE
Plan of care reviewed and pt. verbalized understanding. Alert and oriented x 4. On room air with O2 sats between 95-96%. Normal sinus on telemetry with heart rate in the 60s. 22G in L forearm with dressing clean, dry, and intact. Pt. NPO after midnight per MD order due to having L heart cath in the AM. Bed is in lowest position with wheels locked, call light within reach, and pt. told to call for assistance if needed. Pt. is refusing the bed alarm.

## 2020-10-20 NOTE — NURSING
Slowly removing air from right radial vasc band w/o any incident; no visitor present at this time; pt has no belongings at bedside; sinus on monitor; post procedure teaching initiated and pt verbalizes understanding; iv NS patent left arm iv access

## 2020-10-20 NOTE — INTERVAL H&P NOTE
The patient has been examined and the H&P has been reviewed:    I concur with the findings and changes have been noted since the H&P was written: Stress echo was noted to be abnormal. Hence willplan for angiogram. Patient has reported allergy to Plavix,  She was tried on brilinta and developed reaction. N/Diarrhea. Will proceed with angio diagnostic only.     Anesthesia/Surgery risks, benefits and alternative options discussed and understood by patient/family.          Active Hospital Problems    Diagnosis  POA    *Palpitations [R00.2]  Yes    Essential hypertension [I10]  Yes    Anxiety [F41.9]  Yes    Other specified hypothyroidism [E03.8]  Yes     post radioactive treatment for grave's      History of cerebral infarction [Z86.73]  Not Applicable    Dyslipidemia [E78.5]  Yes    Benign hypertension with CKD (chronic kidney disease) stage III [I12.9, N18.30]  Yes    Irritable bowel syndrome [K58.9]  Yes      Resolved Hospital Problems   No resolved problems to display.

## 2020-10-20 NOTE — NURSING
2 cc of air removed from right radial vasc band. No hematoma noted. Will continue to monitor. Dr whitmore at bedside and discussed results and plan with pt; pt resting quietly in bed w/o complaints

## 2020-10-20 NOTE — PLAN OF CARE
Discharge instruction and education provided. Patient voices understanding. IV site removed cath tip intact. Telemetry discontinued without adverse reaction. Patient shows no acute distress. Medications delivered to bedside.  at bedside. Wheel chair transport ordered.

## 2020-10-20 NOTE — PHARMACY MED REC
"[facility]           Pharmacy  Admission Medication History     The home medication history was taken by Alexei Crespo PharmD.  Based on information gathered for medication list, you may go to "Admission" then "Reconcile Home Medications" tabs to review and/or act upon those items.     Potential issues to be addressed PRIOR TO DISCHARGE  o none  No current facility-administered medications on file prior to encounter.      Current Outpatient Medications on File Prior to Encounter   Medication Sig Dispense Refill    aspirin (ECOTRIN) 81 MG EC tablet once a day      atorvastatin (LIPITOR) 20 MG tablet TAKE 1 TABLET DAILY 90 tablet 3    butalbital-acetaminophen-caffeine -40 mg (FIORICET, ESGIC) -40 mg per tablet Take 1 tablet by mouth nightly as needed.      clobetasol 0.05% (TEMOVATE) 0.05 % Oint APPLY TOPICALLY TWICE A DAY 45 g 1    escitalopram oxalate (LEXAPRO) 5 MG Tab Take 1 tablet (5 mg total) by mouth once daily. 30 tablet 11    levothyroxine (SYNTHROID) 100 MCG tablet TAKE 1 TABLET BEFORE BREAKFAST 90 tablet 3    losartan (COZAAR) 50 MG tablet Take 1 tablet (50 mg total) by mouth once daily. 90 tablet 1    nortriptyline (PAMELOR) 10 MG capsule TAKE 1 CAPSULE DAILY 90 capsule 3    omeprazole (PRILOSEC) 40 MG capsule Take 1 capsule (40 mg total) by mouth once daily. 90 capsule 3    solifenacin (VESICARE) 10 MG tablet Take 1 tablet (10 mg total) by mouth once daily. 90 tablet 3    alendronate (FOSAMAX) 70 MG tablet Take 1 tablet (70 mg total) by mouth every 7 days. 12 tablet 3    VITAMIN D2 1,250 mcg (50,000 unit) capsule TAKE 1 CAPSULE EVERY 7 DAYS 12 capsule 3    [DISCONTINUED] ergocalciferol (ERGOCALCIFEROL) 50,000 unit Cap Take 1 capsule (50,000 Units total) by mouth every 7 days. 12 capsule 3    [DISCONTINUED] nortriptyline (PAMELOR) 10 MG capsule Take 1 capsule (10 mg total) by mouth once daily. 90 capsule 1       Please address this information as you see fit.  " Feel free to contact us if you have any questions or require assistance.    Alexei Crespo, PharmD  [phone]                .    .

## 2020-10-20 NOTE — NURSING
Pt ambulated to bathroom upon arrival to room to urinate and had offered po fluid but declined until in room; bedside report to Mimi; iv fluids in progress and placed on iv pump; right radial drsg cdi and no hematoma or bleeding; pt placed on tele box monitor; pt had no belongings and wore a blue surgical mask for transport; pts spouse at bedside

## 2020-10-20 NOTE — NURSING
Pt to room per stretcher on monitor with nurse; pt has surgical blue mask in place and has no belongings; ns infusing; rt radial drsg cdi and no bleeding nor hematoma

## 2020-10-20 NOTE — PLAN OF CARE
"Pt reporting N/V, diarrhea, and SOB. SpO2 100%.  Pt states "I looked up the side effects of Brilinta this medication and I am having all of them. I think I'm just sensitive to this type of medications." Notified Karl CHOWDARY of reaction and Nate PharmD. Nate PharmD stated "SOB is a common side effect of Brilinta  but please let the doctor know." Karl CHOWDARY stated she would let Yousef MD know.   "

## 2020-10-20 NOTE — PLAN OF CARE
Problem: Adult Inpatient Plan of Care  Goal: Plan of Care Review  Outcome: Ongoing, Progressing    Patient's chart, progress notes, and care plan reviewed. Will be available as needed.

## 2020-10-20 NOTE — NURSING
Remaining air removed from right radial vasc band. Gauze and tegaderm dressing applied c.d.i. No drainage or shadowing noted. No bleeding or hematoma noted around site. +2 eze radial pulses palpated. Skin normal in color, warm to touch, < 3 sec cap refill. Pt tolerated well.  Will continue to monitor pt.

## 2020-10-20 NOTE — PLAN OF CARE
Problem: Adult Inpatient Plan of Care  Goal: Plan of Care Review  10/19/2020 2250 by Gina Rodríguez RN  Outcome: Ongoing, Progressing  10/19/2020 2250 by Gina Rodríguez RN  Reactivated

## 2020-10-20 NOTE — NURSING
Assumed care for this patient in recovery room cath lab; pt is aoo and resting quietly in bed; color pk; radial and pedal pulses palpable bilaterally; iv patent left arm w/ ns infusing; right radial vasc band intact w/ good sensation and circ check and no hematoma nor bleeding

## 2020-10-20 NOTE — PLAN OF CARE
10/20/20 1552   Discharge Assessment   Assessment Type Discharge Planning Assessment   Confirmed/corrected address and phone number on facesheet? Yes   Assessment information obtained from? Patient   Communicated expected length of stay with patient/caregiver yes   Prior to hospitilization cognitive status: Alert/Oriented   Prior to hospitalization functional status: Independent   Current cognitive status: Alert/Oriented   Current Functional Status: Independent   Lives With spouse   Able to Return to Prior Arrangements yes   Is patient able to care for self after discharge? Yes   Patient's perception of discharge disposition home or selfcare   Readmission Within the Last 30 Days no previous admission in last 30 days   Patient currently being followed by outpatient case management? No   Patient currently receives any other outside agency services? No   Equipment Currently Used at Home none   Do you have any problems affording any of your prescribed medications? No   Is the patient taking medications as prescribed? yes   Does the patient have transportation home? Yes   Transportation Anticipated family or friend will provide   Discharge Plan A Home   Discharge Plan B Home with family   DME Needed Upon Discharge  none   Patient/Family in Agreement with Plan yes     Walter Pearl RN,   [phone]

## 2020-10-21 LAB — CATH EF ESTIMATED: 60 %

## 2020-10-23 ENCOUNTER — HOSPITAL ENCOUNTER (OUTPATIENT)
Dept: CARDIOLOGY | Facility: HOSPITAL | Age: 70
Discharge: HOME | End: 2020-10-23
Attending: NURSE PRACTITIONER
Payer: MEDICARE

## 2020-10-23 DIAGNOSIS — R00.2 PALPITATIONS: ICD-10-CM

## 2020-10-23 PROCEDURE — 93227 XTRNL ECG REC<48 HR R&I: CPT | Mod: ,,, | Performed by: INTERNAL MEDICINE

## 2020-10-23 PROCEDURE — 93227 HOLTER MONITOR - 48 HOUR (CUPID ONLY): ICD-10-PCS | Mod: ,,, | Performed by: INTERNAL MEDICINE

## 2020-10-23 PROCEDURE — 93225 XTRNL ECG REC<48 HRS REC: CPT

## 2020-10-26 ENCOUNTER — TELEPHONE (OUTPATIENT)
Dept: CARDIOLOGY | Facility: CLINIC | Age: 70
End: 2020-10-26

## 2020-10-26 NOTE — TELEPHONE ENCOUNTER
----- Message from Kristina Cameron sent at 10/26/2020  9:12 AM CDT -----  Contact: [phone]/Self  Type: Requesting to speak with nurse    Who Called: Pt  Regarding: speak with NP Karl or sooner appt    Would the patient rather a call back or a response via [facility]? Call back  Best Call Back Number: [phone]  Additional Information:

## 2020-10-26 NOTE — TELEPHONE ENCOUNTER
Reached out in regards to message.     Reports the 48 hr holter was completed yesterday at 11:15 am.  Around 5 pm last night she started to experience fullness in her chest and could feel an irregulare pattern of her heart beat. She stated that it resolved this morning after breakfast.   Instructed her to continue to monitor her pulse and if she was to experience symptoms such as but not limited to a high rate, SOB, chest pain then to seek immediate emergent care.     Verbalized understanding, verified appt on 10/29 at 9:40 am

## 2020-10-27 DIAGNOSIS — F41.9 ANXIETY: ICD-10-CM

## 2020-10-27 LAB
OHS CV EVENT MONITOR DAY: 0
OHS CV HOLTER LENGTH DECIMAL HOURS: 48
OHS CV HOLTER LENGTH HOURS: 48
OHS CV HOLTER LENGTH MINUTES: 0

## 2020-10-27 RX ORDER — ESCITALOPRAM OXALATE 5 MG/1
5 TABLET ORAL DAILY
Qty: 90 TABLET | Refills: 3 | Status: SHIPPED | OUTPATIENT
Start: 2020-10-27 | End: 2020-11-12 | Stop reason: SDUPTHER

## 2020-10-27 NOTE — ASSESSMENT & PLAN NOTE
LHC  · The pre-procedure left ventricular end diastolic pressure was 7.  · The ejection fraction was greater than 55% by visual estimate.  · The coronary arteries were normal..     - Continue medical therapy

## 2020-10-27 NOTE — DISCHARGE SUMMARY
[facility] Medicine  Discharge Summary      Patient Name: Denise Berrios  MRN: [id number]  Admission Date: 10/18/2020  Hospital Length of Stay: 0 days  Discharge Date and Time: 10/20/2020  6:02 PM  Attending Physician: Kellee att. providers found   Discharging Provider: Susan Carvajal MD  Primary Care Provider: Aleksandra Carey MD      HPI:   Denise Berrios is a 69 yo female with pmh of HTN, HLD, hypothyroidism, TIA, GERD and CKD stage 3 who presented to ED for evaluation of palpitations. She has had episodes of palpitations intermittently over the last several weeks which are precipitated by episodes of reflux.  She was seen earlier this week for a similar presentation and noted to have several PVCs on her EKG, she was given some medication, she followed up with her primary physician who adjusted her blood pressure medications including metoprolol increasing it from 25 mg twice daily to 50 in the morning and 25 at night.  She continued to have issues however which returned today after she drank some milk and ate some vanilla wafers.  She has been having persistent discomfort at the base of the chest with some burning and reflux type symptoms as well as the palpitations. Patient reports drinking regular coffee which may have precipitated event earlier this week but she has since switched back to decaf with ongoing palpitations. She has not seen a cardiologist in the past 5 years. She reports extensive cardiac work up at that time. Pt is a retired Registered Nurse.     Labs unremarkable. CXR negative. EKG shows SR with frequent PVCs in a pattern of bigeminy. VSS. She received metoprolol and hydralazine. Cardiology consulted per  ED. Pt admitted to [facility].         Procedure(s) (LRB):  Left heart cath (N/A)  Ventriculogram, Left      Hospital Course:   Ms. Berrios presented with palpitations due to symptomatic PVCs. Initial ACS evaluation with EKG and troponin negative.  Performed exercise stress test without worsening of PVCs. Increasing metoprolol to 50mg bid dosing. Cardiology evaluated while in house. Will set up with Holter as outpatient to assess PVC burden with EP follow up for possible ablation.    She had a stress test done that was positive for ischemia and so her DC was held so that she could undergo a LHC. She also has significant PVCs during stress.   LHC  · The pre-procedure left ventricular end diastolic pressure was 7.  · The ejection fraction was greater than 55% by visual estimate.  · The coronary arteries were normal..     - Continue medical therapy   - Lopressor for PVCs  - Abnormal stress echo is false +ve dur to the PVCs.   - Holter monitor as outpatient    The patient was discharged to follow up with Cardiology and PCP.       Consults:   Consults (From admission, onward)        Status Ordering Provider     Inpatient consult to [facility]  Once     Provider:  (Not yet assigned)    NIDA Ervin          * Palpitations  Outpatient monitoring  Titrate BB        False positive cardiac stress test   LHC  · The pre-procedure left ventricular end diastolic pressure was 7.  · The ejection fraction was greater than 55% by visual estimate.  · The coronary arteries were normal..     - Continue medical therapy    Essential hypertension  sbp 135-211   Continue losartan 50 mg daily   Continue metoprolol 50 mg daily +25 mg qhs       Irritable bowel syndrome  Chronic stable       Benign hypertension with CKD (chronic kidney disease) stage III  Stable       Dyslipidemia  Continue atorvastatin 20 mg daily       History of cerebral infarction  Continue ASA, statin       Other specified hypothyroidism  Post radioactive tx for Grave's   Continue synthroid 100 mcg daily           Anxiety  Continue escitalopram 5 mg daily         Final Active Diagnoses:    Diagnosis Date Noted POA    PRINCIPAL PROBLEM:  Palpitations [R00.2] 10/19/2020 Yes    False positive  cardiac stress test [Z78.9] 10/20/2020 Not Applicable    Essential hypertension [I10] 10/19/2020 Yes    Anxiety [F41.9] 08/28/2019 Yes    Other specified hypothyroidism [E03.8] 08/28/2019 Yes    History of cerebral infarction [Z86.73] 08/28/2019 Not Applicable    Dyslipidemia [E78.5] 08/28/2019 Yes    Benign hypertension with CKD (chronic kidney disease) stage III [I12.9, N18.30] 08/28/2019 Yes    Irritable bowel syndrome [K58.9] 09/04/2014 Yes      Problems Resolved During this Admission:       Discharged Condition: good    Disposition: Home or Self Care    Follow Up:  Follow-up Information     Aleksandra Carey MD On 10/30/2020.    Specialty: Internal Medicine  Why: For your hospital follow up  at 2:00pm.  Contact information:  [address] [facility]  [address]  [place] [address]  [phone]             Bony Schultz MD On 10/29/2020.    Specialties: Cardiology, INTERVENTIONAL CARDIOLOGY  Why: For your follow up appointment at 9:40 am.  Contact information:  [address]  [address]  [place] [address]  [phone]                 Patient Instructions:      Ambulatory referral/consult to Electrophysiology   Standing Status: Future   Referral Priority: Routine Referral Type: Consultation   Referral Reason: Specialty Services Required   Requested Specialty: Electrophysiology   Number of Visits Requested: 1     Diet Cardiac     Diet Cardiac     Notify your health care provider if you experience any of the following:  temperature >100.4     Notify your health care provider if you experience any of the following:  difficulty breathing or increased cough     Holter Monitor - 3-14 Day Adult   Standing Status: Future Standing Exp. Date: 10/19/21     Activity as tolerated     Activity as tolerated       Significant Diagnostic Studies: Labs: BMP: No results for input(s): GLU, NA, K, CL, CO2, BUN, CREATININE, CALCIUM, MG in the last 48 hours., CMP No results for input(s): NA, K, CL, CO2, GLU, BUN, CREATININE, CALCIUM,  PROT, ALBUMIN, BILITOT, ALKPHOS, AST, ALT, ANIONGAP, ESTGFRAFRICA, EGFRNONAA in the last 48 hours. and CBC No results for input(s): WBC, HGB, HCT, PLT in the last 48 hours.    Pending Diagnostic Studies:     Procedure Component Value Units Date/Time    EKG 12-LEAD starting tomorrow [[id number]]     Order Status: Sent Lab Status: No result          Medications:  Reconciled Home Medications:      Medication List      START taking these medications    metoprolol tartrate 50 MG tablet  Commonly known as: LOPRESSOR  Take 1 tablet (50 mg total) by mouth 2 (two) times daily.        CHANGE how you take these medications    alendronate 70 MG tablet  Commonly known as: FOSAMAX  Take 1 tablet (70 mg total) by mouth every 7 days.  What changed: Another medication with the same name was removed. Continue taking this medication, and follow the directions you see here.     atorvastatin 40 MG tablet  Commonly known as: LIPITOR  Take 1 tablet (40 mg total) by mouth once daily.  What changed:   · medication strength  · how much to take     omeprazole 40 MG capsule  Commonly known as: PRILOSEC  Take 1 capsule (40 mg total) by mouth once daily.  What changed: Another medication with the same name was removed. Continue taking this medication, and follow the directions you see here.        CONTINUE taking these medications    aspirin 81 MG EC tablet  Commonly known as: ECOTRIN  once a day     butalbital-acetaminophen-caffeine -40 mg -40 mg per tablet  Commonly known as: FIORICET, ESGIC  Take 1 tablet by mouth nightly as needed.     clobetasol 0.05% 0.05 % Oint  Commonly known as: TEMOVATE  APPLY TOPICALLY TWICE A DAY     levothyroxine 100 MCG tablet  Commonly known as: SYNTHROID  TAKE 1 TABLET BEFORE BREAKFAST     losartan 50 MG tablet  Commonly known as: COZAAR  Take 1 tablet (50 mg total) by mouth once daily.     nortriptyline 10 MG capsule  Commonly known as: PAMELOR  TAKE 1 CAPSULE DAILY     solifenacin 10 MG  tablet  Commonly known as: VESICARE  Take 1 tablet (10 mg total) by mouth once daily.     VITAMIN D2 50,000 unit Cap  Generic drug: ergocalciferol  TAKE 1 CAPSULE EVERY 7 DAYS            Indwelling Lines/Drains at time of discharge:   Lines/Drains/Airways     None                 Time spent on the discharge of patient: 35 minutes  Patient was seen and examined on the date of discharge and determined to be suitable for discharge.         Susan Carvajal MD  Department of Hospital Medicine  [facility]

## 2020-10-29 ENCOUNTER — OFFICE VISIT (OUTPATIENT)
Dept: CARDIOLOGY | Facility: CLINIC | Age: 70
End: 2020-10-29
Payer: MEDICARE

## 2020-10-29 VITALS
HEART RATE: 62 BPM | BODY MASS INDEX: 30.27 KG/M2 | SYSTOLIC BLOOD PRESSURE: 122 MMHG | DIASTOLIC BLOOD PRESSURE: 62 MMHG | HEIGHT: 60 IN | OXYGEN SATURATION: 94 % | WEIGHT: 154.19 LBS

## 2020-10-29 DIAGNOSIS — Z78.9 FALSE POSITIVE CARDIAC STRESS TEST: ICD-10-CM

## 2020-10-29 DIAGNOSIS — I12.9 BENIGN HYPERTENSION WITH CKD (CHRONIC KIDNEY DISEASE) STAGE III: ICD-10-CM

## 2020-10-29 DIAGNOSIS — I49.3 PVC (PREMATURE VENTRICULAR CONTRACTION): Primary | ICD-10-CM

## 2020-10-29 DIAGNOSIS — I10 ESSENTIAL HYPERTENSION: ICD-10-CM

## 2020-10-29 DIAGNOSIS — N18.30 BENIGN HYPERTENSION WITH CKD (CHRONIC KIDNEY DISEASE) STAGE III: ICD-10-CM

## 2020-10-29 PROCEDURE — 99999 PR PBB SHADOW E&M-EST. PATIENT-LVL IV: CPT | Mod: PBBFAC,,, | Performed by: INTERNAL MEDICINE

## 2020-10-29 PROCEDURE — 99214 OFFICE O/P EST MOD 30 MIN: CPT | Mod: PBBFAC,PO | Performed by: INTERNAL MEDICINE

## 2020-10-29 PROCEDURE — 99214 OFFICE O/P EST MOD 30 MIN: CPT | Mod: S$PBB,,, | Performed by: INTERNAL MEDICINE

## 2020-10-29 PROCEDURE — 99214 PR OFFICE/OUTPT VISIT, EST, LEVL IV, 30-39 MIN: ICD-10-PCS | Mod: S$PBB,,, | Performed by: INTERNAL MEDICINE

## 2020-10-29 PROCEDURE — 99999 PR PBB SHADOW E&M-EST. PATIENT-LVL IV: ICD-10-PCS | Mod: PBBFAC,,, | Performed by: INTERNAL MEDICINE

## 2020-10-29 NOTE — PATIENT INSTRUCTIONS
Aerobic Exercise for a Healthy Heart  Exercise is a lot more than an energy booster and a stress reliever. It also strengthens your heart muscle, lowers your blood pressure and cholesterol, and burns calories. It can also improve your resting muscle tone, and your mood.     Remember, some activity is better than none.    Choose an aerobic activity  Choose an activity that makes your heart and lungs work harder than they do when you rest or walk normally. This aerobic exercise can improve the way your heart and other muscles use oxygen. Make it fun by exercising with a friend and choosing an activity you enjoy. Here are some ideas:  · Walking  · Swimming  · Bicycling  · Stair climbing  · Dancing  · Jogging  · Gardening  Exercise regularly  If you havent been exercising regularly,  get your doctors OK first. Then start slowly.  Here are some tips:  · Begin exercising 3 times a week for 5 to 10 minutes at a time.  · When you feel comfortable, add a few minutes each session.  · Slowly build up to exercising 3 to 4 times each week. Each session should last for 40 minutes, on average, and involve moderate- to vigorous-intensity physical activity.  · If you have been given nitroglycerin, be sure to carry it when you exercise.  · If you get chest pain (angina) when youre exercising, stop what youre doing, take your nitroglycerin, and call your doctor.  Date Last Reviewed: 6/2/2016  © 3986-8378 [employer] [address] [place] [address]. All rights reserved. This information is not intended as a substitute for professional medical care. Always follow your healthcare professional's instructions.          Eating Heart-Healthy Foods  Eating has a big impact on your heart health. In fact, eating healthier can improve several of your heart risks at once. For instance, it helps you manage weight, cholesterol, and blood pressure. Here are ideas to help you make heart-healthy changes without giving up  all the foods and flavors you love.  Getting started  · Talk with your health care provider about eating plans, such as the DASH or Mediterranean diet. You may also be referred to a dietitian.  · Change a few things at a time. Give yourself time to get used to a few eating changes before adding more.  · Work to create a tasty, healthy eating plan that you can stick to for the rest of your life.    Goals for healthy eating  Below are some tips to improve your eating habits:  · Limit saturated fats and trans fats. Saturated fats raise your levels of cholesterol, so keep these fats to a minimum. They are found in foods such as fatty meats, whole milk, cheese, and palm and coconut oils. Avoid trans fats because they lower good cholesterol as well as raise bad cholesterol. Trans fats are most often found in processed foods.  · Reduce sodium (salt) intake. Eating too much salt may increase your blood pressure. Limit your sodium intake to 2,300 milligrams (mg) per day, or less if your health care provider recommends it. Dining out less often and eating fewer processed foods are two great ways to decrease the amount of salt you consume.  · Managing calories. A calorie is a unit of energy. Your body burns calories for fuel, but if you eat more calories than your body burns, the extras are stored as fat. Your health care provider can help you create a diet plan to manage your calories. This will likely include eating healthier foods as well as exercising regularly. To help you track your progress, keep a diary to record what you eat and how often you exercise.  Choose the right foods  Aim to make these foods staples of your diet. If you have diabetes, you may have different recommendations than what is listed here:  · Fruits and vegetable provide plenty of nutrients without a lot of calories. At meals, fill half your plate with these foods. Split the other half of your plate between whole grains and lean protein.  · Whole  grains are high in fiber and rich in vitamins and nutrients. Good choices include whole-wheat bread, pasta, and brown rice.  · Lean proteins give you nutrition with less fat. Good choices include fish, skinless chicken, and beans.  · Low-fat or nonfat dairy provides nutrients without a lot of fat. Try low-fat or nonfat milk, cheese, or yogurt.  · Healthy fats can be good for you in small amounts. These are unsaturated fats, such as olive oil, nuts, and fish. Try to have at least 2 servings per week of fatty fish such as salmon, sardines, mackerel, rainbow trout, and albacore tuna. These contain omega-3 fatty acids, which are good for your heart. Flaxseed is another source of a heart-healthy fat.  More on heart healthy eating    Read food labels  Healthy eating starts at the grocery store. Be sure to pay attention to food labels on packaged foods. Look for products that are high in fiber and protein, and low in saturated fat, cholesterol, and sodium. Avoid products that contain trans fat. And pay close attention to serving size. For instance, if you plan to eat two servings, double all the numbers on the label.  Prepare food right  A key part of healthy cooking is cutting down on added fat and salt. Look on the internet for lower-fat, lower-sodium recipes. Also, try these tips:  · Remove fat from meat and skin from poultry before cooking.  · Skim fat from the surface of soups and sauces.  · Broil, boil, bake, steam, grill, and microwave food without added fats.  · Choose ingredients that spice up your food without adding calories, fat, or sodium. Try these items: horseradish, hot sauce, lemon, mustard, nonfat salad dressings, and vinegar. For salt-free herbs and spices, try basil, cilantro, cinnamon, pepper, and rosemary.  Date Last Reviewed: 6/25/2015  © 3085-7440 [employer]. [address] [place] [address], [place] [address]. All rights reserved. This information is not intended as a substitute for  professional medical care. Always follow your healthcare professional's instructions.

## 2020-10-29 NOTE — PROGRESS NOTES
Subjective:   @Patient ID:  Denise VIVAS Coumeenu is a 70 y.o. female who presents for follow-up of PVCs/Palpitations  HPI:   Here for post hospital discharge follow up  She is accompanied by her   She stated that she is feeling better. She had the holter monitor which showed rare pvcs, during the monitor she didn't have palpitations, but after she took it off she had a brief episode.   Lopressor helped her a lot.     Historically: admitted with palpitations, stress echo was abnormal, LHC with normal cors. Lopressor dose increased.     She had hx of PDA s/p surgical repair at the age of 1       Prior cardiovascular  Hx  --------------------------------    - Heart Catheterization  10/20/2020  · The pre-procedure left ventricular end diastolic pressure was 7.  · The ejection fraction was greater than 55% by visual estimate.  · The coronary arteries were normal..     - Continue medical therapy   - Lopressor for PVCs  - Abnormal stress echo is false +ve due to the PVCs.     - Holter monitor 10/23/2020  Sinus Rhythm during recording period  Sinus Bradycardia 42 bpm during sleep hours  Rare PACs, PVCs           - ECHO stress 10/19/2020  · The ECG portion of this study is positive for myocardial ischemia.  · The stress echo portion of this study is positive for myocardial ischemia.  · There is left ventricular concentric hypertrophy.  · The left ventricle is normal in size with normal systolic function. The estimated ejection fraction is 60%.  · Indeterminate diastolic function.  · Normal right ventricular systolic function.  · Mild aortic regurgitation.  · Mild pulmonic regurgitation.  · Normal central venous pressure (3 mmHg).  · The estimated PA systolic pressure is 31 mmHg.  · The test was stopped because the patient experienced shortness of breath.  · During stress, the following significant arrhythmias were observed: PVCs.         Patient Active Problem List    Diagnosis Date Noted    False positive cardiac stress  test 10/20/2020    Palpitations 10/19/2020    Essential hypertension 10/19/2020    PVC (premature ventricular contraction) 10/16/2020    Anxiety 08/28/2019    Other specified hypothyroidism 08/28/2019     post radioactive treatment for grave's      Osteoporosis 08/28/2019    History of cerebral infarction 08/28/2019    Dyslipidemia 08/28/2019    Benign hypertension with CKD (chronic kidney disease) stage III 08/28/2019    Overactive bladder 08/28/2019    Gastroesophageal reflux disease without esophagitis 08/28/2019    Vitamin D deficiency 08/28/2019    Irritable bowel syndrome 09/04/2014    Migraine headache 09/04/2014                    LAST HbA1c  No results found for: HGBA1C    Lipid panel  Lab Results   Component Value Date    CHOL 172 10/19/2020    CHOL 195 02/11/2020    CHOL 184 03/28/2018     Lab Results   Component Value Date    HDL 51 10/19/2020    HDL 60 02/11/2020    HDL 63 03/28/2018     Lab Results   Component Value Date    LDLCALC 95.0 10/19/2020    LDLCALC 106.2 02/11/2020    LDLCALC 99 03/28/2018     Lab Results   Component Value Date    TRIG 130 10/19/2020    TRIG 144 02/11/2020    TRIG 138 03/28/2018     Lab Results   Component Value Date    CHOLHDL 29.7 10/19/2020    CHOLHDL 30.8 02/11/2020    CHOLHDL 3 03/28/2018            Review of Systems   Constitution: Negative for chills and fever.   HENT: Negative for hearing loss and nosebleeds.    Eyes: Negative for blurred vision.   Cardiovascular: Negative for chest pain and leg swelling.        As in HPI    Respiratory: Negative for hemoptysis and shortness of breath.    Hematologic/Lymphatic: Negative for bleeding problem.   Skin: Negative for itching.   Musculoskeletal: Negative for falls.   Gastrointestinal: Negative for abdominal pain and hematochezia.   Genitourinary: Negative for hematuria.   Neurological: Negative for dizziness and loss of balance.   Psychiatric/Behavioral: Negative for altered mental status and depression.        Objective:   Physical Exam   Constitutional: She is oriented to person, place, and time. She appears well-developed and well-nourished.   HENT:   Head: Normocephalic and atraumatic.   Eyes: Conjunctivae are normal.   Neck: Neck supple. No JVD present. Carotid bruit is not present.   Cardiovascular: Normal rate, regular rhythm and normal heart sounds. Exam reveals no gallop and no friction rub.   No murmur heard.  Pulses:       Carotid pulses are 2+ on the right side and 2+ on the left side.       Radial pulses are 2+ on the right side and 2+ on the left side.   Pulmonary/Chest: Effort normal and breath sounds normal. No stridor. No respiratory distress. She has no wheezes.   Neurological: She is alert and oriented to person, place, and time.   Skin: Skin is warm and dry.   Psychiatric: She has a normal mood and affect. Her behavior is normal.       Assessment:     1. PVC (premature ventricular contraction)    2. Benign hypertension with CKD (chronic kidney disease) stage III    3. Essential hypertension    4. False positive cardiac stress test        Plan:     - Continue Lopressor 50 mg bid for symptomatic PVCs. Well controlled by meds and limiting caffeine intake   - BP well controlled.      I spent 5-10 minutes asking, assessing, assisting, arranging and advising heart healthy diet improvements. This included low-salt meals, portion control and health food alternatives. I also encourage 30 minutes of moderate exercise 3-4x a week.     Pertinent cardiac images and EKG reviewed independently.    Continue with current medical plan and lifestyle changes.  Return sooner for concerns or questions. If symptoms persist go to the ED  I have reviewed all pertinent data including patient's medical history in detail and updated the computerized patient record.     No orders of the defined types were placed in this encounter.      Follow up as scheduled.     She expressed verbal understanding and agreed with the  plan    Patient's Medications   New Prescriptions    No medications on file   Previous Medications    ALENDRONATE (FOSAMAX) 70 MG TABLET    Take 1 tablet (70 mg total) by mouth every 7 days.    ASPIRIN (ECOTRIN) 81 MG EC TABLET    once a day    ATORVASTATIN (LIPITOR) 40 MG TABLET    Take 1 tablet (40 mg total) by mouth once daily.    BUTALBITAL-ACETAMINOPHEN-CAFFEINE -40 MG (FIORICET, ESGIC) -40 MG PER TABLET    Take 1 tablet by mouth nightly as needed.    CLOBETASOL 0.05% (TEMOVATE) 0.05 % OINT    APPLY TOPICALLY TWICE A DAY    ESCITALOPRAM OXALATE (LEXAPRO) 5 MG TAB    Take 1 tablet (5 mg total) by mouth once daily.    LEVOTHYROXINE (SYNTHROID) 100 MCG TABLET    TAKE 1 TABLET BEFORE BREAKFAST    LOSARTAN (COZAAR) 50 MG TABLET    Take 1 tablet (50 mg total) by mouth once daily.    METOPROLOL TARTRATE (LOPRESSOR) 50 MG TABLET    Take 1 tablet (50 mg total) by mouth 2 (two) times daily.    NORTRIPTYLINE (PAMELOR) 10 MG CAPSULE    TAKE 1 CAPSULE DAILY    OMEPRAZOLE (PRILOSEC) 40 MG CAPSULE    Take 1 capsule (40 mg total) by mouth once daily.    SOLIFENACIN (VESICARE) 10 MG TABLET    Take 1 tablet (10 mg total) by mouth once daily.    VITAMIN D2 1,250 MCG (50,000 UNIT) CAPSULE    TAKE 1 CAPSULE EVERY 7 DAYS   Modified Medications    No medications on file   Discontinued Medications    No medications on file

## 2020-11-12 ENCOUNTER — OFFICE VISIT (OUTPATIENT)
Dept: FAMILY MEDICINE | Facility: CLINIC | Age: 70
End: 2020-11-12
Payer: MEDICARE

## 2020-11-12 VITALS
HEIGHT: 60 IN | WEIGHT: 151.56 LBS | BODY MASS INDEX: 29.76 KG/M2 | OXYGEN SATURATION: 95 % | SYSTOLIC BLOOD PRESSURE: 126 MMHG | TEMPERATURE: 97 F | DIASTOLIC BLOOD PRESSURE: 54 MMHG | HEART RATE: 65 BPM

## 2020-11-12 DIAGNOSIS — R00.2 PALPITATIONS: Primary | ICD-10-CM

## 2020-11-12 DIAGNOSIS — F41.9 ANXIETY: ICD-10-CM

## 2020-11-12 DIAGNOSIS — E78.5 DYSLIPIDEMIA: ICD-10-CM

## 2020-11-12 PROCEDURE — 99213 OFFICE O/P EST LOW 20 MIN: CPT | Mod: PBBFAC,PN | Performed by: INTERNAL MEDICINE

## 2020-11-12 PROCEDURE — 99999 PR PBB SHADOW E&M-EST. PATIENT-LVL III: CPT | Mod: PBBFAC,,, | Performed by: INTERNAL MEDICINE

## 2020-11-12 PROCEDURE — 99214 OFFICE O/P EST MOD 30 MIN: CPT | Mod: S$PBB,,, | Performed by: INTERNAL MEDICINE

## 2020-11-12 PROCEDURE — 99999 PR PBB SHADOW E&M-EST. PATIENT-LVL III: ICD-10-PCS | Mod: PBBFAC,,, | Performed by: INTERNAL MEDICINE

## 2020-11-12 PROCEDURE — 99214 PR OFFICE/OUTPT VISIT, EST, LEVL IV, 30-39 MIN: ICD-10-PCS | Mod: S$PBB,,, | Performed by: INTERNAL MEDICINE

## 2020-11-12 RX ORDER — ESCITALOPRAM OXALATE 5 MG/1
5 TABLET ORAL DAILY
Qty: 90 TABLET | Refills: 3 | Status: SHIPPED | OUTPATIENT
Start: 2020-11-12 | End: 2021-09-30 | Stop reason: SDUPTHER

## 2020-11-12 RX ORDER — METOPROLOL TARTRATE 50 MG/1
50 TABLET ORAL 2 TIMES DAILY
Qty: 180 TABLET | Refills: 3 | Status: SHIPPED | OUTPATIENT
Start: 2020-11-12 | End: 2021-12-03 | Stop reason: SDUPTHER

## 2020-11-12 RX ORDER — ATORVASTATIN CALCIUM 40 MG/1
40 TABLET, FILM COATED ORAL DAILY
Qty: 90 TABLET | Refills: 3 | Status: SHIPPED | OUTPATIENT
Start: 2020-11-12 | End: 2021-03-31 | Stop reason: SDUPTHER

## 2020-11-12 NOTE — PROGRESS NOTES
[facility] Note    Chief Complaint      Chief Complaint   Patient presents with    Hospital Follow Up     pt here for hospital f/u      History of Present Illness      Denise Berrios is a 70 y.o. female who presents today for hospital discharge for palpitations.  Patient comes to appointment with .    Admitted at [facility] 10/18-10/20/2020.  Presented with CP, reflux type symptoms.    Cardiology increased metroprolol to 50 mg BID. Stress echo was positive, LHC was clean.  Thought to be false positive 2/2 PVC's.  Seen by mickie Schultz for DC follow up on 10/29/2020. 2 days ago, was having chest heaviness, felt like reflux.  None since, has been drinking plenty of water.  Needs to start walking.     Problem List Items Addressed This Visit     None          Health Maintenance   Topic Date Due    High Dose Statin  11/12/2021    Mammogram  08/28/2022    DEXA SCAN  08/28/2023    Lipid Panel  10/19/2025    TETANUS VACCINE  08/28/2029    Hepatitis C Screening  Completed    Pneumococcal Vaccine (65+ Low/Medium Risk)  Completed       Past Medical History:   Diagnosis Date    Acid reflux     Graves disease     diffuse Toxic Goiter    Hypertension     IBS (irritable bowel syndrome)     Lichen sclerosus     Migraine     Stroke        Past Surgical History:   Procedure Laterality Date    EYE SURGERY      HYSTERECTOMY      CINDI for ?? cervical cancer, had normal paps    LEFT HEART CATHETERIZATION N/A 10/20/2020    Procedure: Left heart cath;  Surgeon: Bony Schultz MD;  Location: [facility] CATH LAB/EP;  Service: Cardiology;  Laterality: N/A;       family history includes Hypertension in her father; Kidney failure in her father; Stomach cancer in her mother.    Social History     Tobacco Use    Smoking status: Never Smoker    Smokeless tobacco: Never Used   Substance Use Topics    Alcohol use: Yes    Drug use: No       Review of Systems   Constitutional: Negative for chills and  fever.   HENT: Negative for congestion and sore throat.    Eyes: Negative for blurred vision and discharge.   Respiratory: Negative for cough and shortness of breath.    Cardiovascular: Negative for chest pain and palpitations.   Gastrointestinal: Negative for constipation, diarrhea, nausea and vomiting.   Genitourinary: Negative for dysuria and hematuria.   Musculoskeletal: Negative for falls and myalgias.   Skin: Negative for itching and rash.   Neurological: Negative for dizziness and headaches.        Outpatient Encounter Medications as of 11/12/2020   Medication Sig Dispense Refill    alendronate (FOSAMAX) 70 MG tablet Take 1 tablet (70 mg total) by mouth every 7 days. (Patient taking differently: Take 70 mg by mouth every 7 days. Takes on Tuesdays) 12 tablet 3    aspirin (ECOTRIN) 81 MG EC tablet once a day      atorvastatin (LIPITOR) 40 MG tablet Take 1 tablet (40 mg total) by mouth once daily. 90 tablet 3    butalbital-acetaminophen-caffeine -40 mg (FIORICET, ESGIC) -40 mg per tablet Take 1 tablet by mouth nightly as needed.      clobetasol 0.05% (TEMOVATE) 0.05 % Oint APPLY TOPICALLY TWICE A DAY 45 g 1    escitalopram oxalate (LEXAPRO) 5 MG Tab Take 1 tablet (5 mg total) by mouth once daily. 90 tablet 3    levothyroxine (SYNTHROID) 100 MCG tablet TAKE 1 TABLET BEFORE BREAKFAST 90 tablet 3    losartan (COZAAR) 50 MG tablet Take 1 tablet (50 mg total) by mouth once daily. 90 tablet 1    metoprolol tartrate (LOPRESSOR) 50 MG tablet Take 1 tablet (50 mg total) by mouth 2 (two) times daily. 60 tablet 0    nortriptyline (PAMELOR) 10 MG capsule TAKE 1 CAPSULE DAILY 90 capsule 3    omeprazole (PRILOSEC) 40 MG capsule Take 1 capsule (40 mg total) by mouth once daily. 90 capsule 3    solifenacin (VESICARE) 10 MG tablet Take 1 tablet (10 mg total) by mouth once daily. 90 tablet 3    VITAMIN D2 1,250 mcg (50,000 unit) capsule TAKE 1 CAPSULE EVERY 7 DAYS (Patient taking differently: Takes on  mondays) 12 capsule 3    [DISCONTINUED] ergocalciferol (ERGOCALCIFEROL) 50,000 unit Cap Take 1 capsule (50,000 Units total) by mouth every 7 days. 12 capsule 3    [DISCONTINUED] nortriptyline (PAMELOR) 10 MG capsule Take 1 capsule (10 mg total) by mouth once daily. 90 capsule 1     No facility-administered encounter medications on file as of 11/12/2020.         Review of patient's allergies indicates:   Allergen Reactions    Cefaclor     Methimazole (bulk)     Penicillin     Phenobarbital     Plavix [clopidogrel]     Sulfa (sulfonamide antibiotics)     Tapazole [methimazole]     Thyroid      Note: PTU    Topamax [topiramate]      GI symptoms       Physical Exam      Vital Signs  Temp: 97.2 °F (36.2 °C)  Temp src: Oral  Pulse: 65  SpO2: 95 %  BP: (!) 126/54  BP Location: Right arm  Patient Position: Sitting  Pain Score: 0-No pain  Height and Weight  Height: 5' (152.4 cm)  Weight: 68.8 kg (151 lb 9.1 oz)  BSA (Calculated - sq m): 1.71 sq meters  BMI (Calculated): 29.6  Weight in (lb) to have BMI = 25: 127.7]  Body mass index is 29.6 kg/m².    Physical Exam  Constitutional:       Appearance: She is well-developed.   HENT:      Head: Normocephalic and atraumatic.      Right Ear: External ear normal.      Left Ear: External ear normal.   Eyes:      General:         Right eye: No discharge.         Left eye: No discharge.   Neck:      Musculoskeletal: Normal range of motion.      Thyroid: No thyromegaly.   Cardiovascular:      Rate and Rhythm: Normal rate and regular rhythm.      Heart sounds: Normal heart sounds. No murmur.   Pulmonary:      Effort: Pulmonary effort is normal. No respiratory distress.      Breath sounds: Normal breath sounds.   Abdominal:      General: Bowel sounds are normal. There is no distension.      Palpations: Abdomen is soft.      Tenderness: There is no abdominal tenderness.   Musculoskeletal: Normal range of motion.         General: No deformity.   Skin:     General: Skin is warm and  dry.      Findings: No rash.   Neurological:      Mental Status: She is alert and oriented to person, place, and time.   Psychiatric:         Behavior: Behavior normal.        Laboratory:  CBC:  Recent Labs   Lab Result Units 10/18/20  2148 10/19/20  0601 10/20/20  0709   WBC K/uL 8.74 8.40 6.60   RBC M/uL 4.26 4.30 3.96*   Hemoglobin g/dL 13.6 13.5 12.5   Hematocrit % 40.7 42.9 38.1   Platelets K/uL 241 229 224   MCV fL 96 100* 96   MCH pg 31.9* 31.4* 31.6*   MCHC g/dL 33.4 31.5* 32.8     CMP:  Recent Labs   Lab Result Units 10/13/20  1038 10/18/20  2148  10/20/20  0709   Glucose mg/dL 110 97   < > 101   Calcium mg/dL 9.1 9.6   < > 9.1   Albumin g/dL 4.0 4.1  --   --    Total Protein g/dL 7.7 7.8  --   --    Sodium mmol/L 140 140   < > 140   Potassium mmol/L 4.0 4.3   < > 4.1   CO2 mmol/L 22* 27   < > 26   Chloride mmol/L 107 105   < > 106   BUN mg/dL 16 17   < > 18   Alkaline Phosphatase U/L 68 68  --   --    ALT U/L 19 31  --   --    AST U/L 23 36  --   --    Total Bilirubin mg/dL 0.5 0.2  --   --     < > = values in this interval not displayed.     URINALYSIS:  Recent Labs   Lab Result Units 10/13/20  1204   Color, UA  Yellow   Specific Gravity, UA  1.010   pH, UA  7.0   Protein, UA  Negative   Bacteria /hpf Few*   Nitrite, UA  Negative   Leukocytes, UA  1+*   Urobilinogen, UA EU/dL Negative      LIPIDS:  Recent Labs   Lab Result Units 10/13/20  1038 10/18/20  2148 10/19/20  0601   TSH uIU/mL 2.923 4.556*  --    HDL mg/dL  --   --  51   Cholesterol mg/dL  --   --  172   Triglycerides mg/dL  --   --  130   LDL Cholesterol mg/dL  --   --  95.0   HDL/Cholesterol Ratio %  --   --  29.7   Non-HDL Cholesterol mg/dL  --   --  121   Total Cholesterol/HDL Ratio   --   --  3.4     TSH:  Recent Labs   Lab Result Units 10/13/20  1038 10/18/20  2148   TSH uIU/mL 2.923 4.556*     A1C:  No results for input(s): HGBA1C in the last 2160 hours.    Radiology:  No imaging on file.    Assessment/Plan     Denise Berrios is a 70  y.o.female with:    There are no diagnoses linked to this encounter.     -  -Continue current medications and maintain follow up with specialists.  Return to clinic PRN.      Aleksandra Carey MD  [facility]

## 2021-01-10 ENCOUNTER — IMMUNIZATION (OUTPATIENT)
Dept: INTERNAL MEDICINE | Facility: CLINIC | Age: 71
End: 2021-01-10
Payer: MEDICARE

## 2021-01-10 DIAGNOSIS — Z23 NEED FOR VACCINATION: ICD-10-CM

## 2021-01-10 PROCEDURE — 91300 COVID-19, MRNA, LNP-S, PF, 30 MCG/0.3 ML DOSE VACCINE: CPT | Mod: PBBFAC,PO

## 2021-01-14 DIAGNOSIS — N90.4 LICHEN SCLEROSUS ET ATROPHICUS OF THE VULVA: ICD-10-CM

## 2021-01-14 RX ORDER — LOSARTAN POTASSIUM 50 MG/1
50 TABLET ORAL DAILY
Qty: 90 TABLET | Refills: 1 | Status: SHIPPED | OUTPATIENT
Start: 2021-01-14 | End: 2021-08-01 | Stop reason: SDUPTHER

## 2021-01-14 RX ORDER — CLOBETASOL PROPIONATE 0.5 MG/G
OINTMENT TOPICAL
Qty: 45 G | Refills: 1 | Status: SHIPPED | OUTPATIENT
Start: 2021-01-14 | End: 2023-10-05 | Stop reason: SDUPTHER

## 2021-01-31 ENCOUNTER — IMMUNIZATION (OUTPATIENT)
Dept: INTERNAL MEDICINE | Facility: CLINIC | Age: 71
End: 2021-01-31
Payer: MEDICARE

## 2021-01-31 DIAGNOSIS — Z23 NEED FOR VACCINATION: Primary | ICD-10-CM

## 2021-01-31 PROCEDURE — 0002A COVID-19, MRNA, LNP-S, PF, 30 MCG/0.3 ML DOSE VACCINE: CPT | Mod: PBBFAC | Performed by: FAMILY MEDICINE

## 2021-01-31 PROCEDURE — 91300 COVID-19, MRNA, LNP-S, PF, 30 MCG/0.3 ML DOSE VACCINE: CPT | Mod: PBBFAC | Performed by: FAMILY MEDICINE

## 2021-02-14 ENCOUNTER — HOSPITAL ENCOUNTER (EMERGENCY)
Facility: HOSPITAL | Age: 71
Discharge: HOME | End: 2021-02-14
Attending: EMERGENCY MEDICINE
Payer: MEDICARE

## 2021-02-14 VITALS
DIASTOLIC BLOOD PRESSURE: 66 MMHG | HEIGHT: 60 IN | BODY MASS INDEX: 29.45 KG/M2 | SYSTOLIC BLOOD PRESSURE: 157 MMHG | RESPIRATION RATE: 19 BRPM | HEART RATE: 79 BPM | OXYGEN SATURATION: 95 % | WEIGHT: 150 LBS | TEMPERATURE: 98 F

## 2021-02-14 DIAGNOSIS — S42.211A CLOSED FRACTURE OF NECK OF RIGHT HUMERUS, INITIAL ENCOUNTER: ICD-10-CM

## 2021-02-14 DIAGNOSIS — S00.83XA FOREHEAD CONTUSION, INITIAL ENCOUNTER: ICD-10-CM

## 2021-02-14 PROBLEM — M25.511 RIGHT SHOULDER PAIN: Status: ACTIVE | Noted: 2021-02-14

## 2021-02-14 PROCEDURE — 99284 EMERGENCY DEPT VISIT MOD MDM: CPT | Mod: 25

## 2021-02-14 PROCEDURE — 96372 THER/PROPH/DIAG INJ SC/IM: CPT

## 2021-02-14 PROCEDURE — 25000003 PHARM REV CODE 250: Performed by: EMERGENCY MEDICINE

## 2021-02-14 PROCEDURE — 63600175 PHARM REV CODE 636 W HCPCS: Performed by: EMERGENCY MEDICINE

## 2021-02-14 RX ORDER — ONDANSETRON 4 MG/1
4 TABLET, ORALLY DISINTEGRATING ORAL
Status: COMPLETED | OUTPATIENT
Start: 2021-02-14 | End: 2021-02-14

## 2021-02-14 RX ORDER — HYDROCODONE BITARTRATE AND ACETAMINOPHEN 10; 325 MG/1; MG/1
1 TABLET ORAL
Status: COMPLETED | OUTPATIENT
Start: 2021-02-14 | End: 2021-02-14

## 2021-02-14 RX ORDER — HYDROCODONE BITARTRATE AND ACETAMINOPHEN 10; 325 MG/1; MG/1
1 TABLET ORAL EVERY 6 HOURS PRN
Qty: 20 TABLET | Refills: 0 | Status: SHIPPED | OUTPATIENT
Start: 2021-02-14 | End: 2021-02-23

## 2021-02-14 RX ORDER — ONDANSETRON 4 MG/1
4 TABLET, ORALLY DISINTEGRATING ORAL EVERY 6 HOURS PRN
Qty: 10 TABLET | Refills: 0 | Status: SHIPPED | OUTPATIENT
Start: 2021-02-14 | End: 2021-03-31

## 2021-02-14 RX ORDER — HYDROMORPHONE HYDROCHLORIDE 1 MG/ML
1 INJECTION, SOLUTION INTRAMUSCULAR; INTRAVENOUS; SUBCUTANEOUS
Status: COMPLETED | OUTPATIENT
Start: 2021-02-14 | End: 2021-02-14

## 2021-02-14 RX ADMIN — HYDROCODONE BITARTRATE AND ACETAMINOPHEN 1 TABLET: 10; 325 TABLET ORAL at 09:02

## 2021-02-14 RX ADMIN — ONDANSETRON 4 MG: 4 TABLET, ORALLY DISINTEGRATING ORAL at 09:02

## 2021-02-14 RX ADMIN — HYDROMORPHONE HYDROCHLORIDE 1 MG: 1 INJECTION, SOLUTION INTRAMUSCULAR; INTRAVENOUS; SUBCUTANEOUS at 10:02

## 2021-02-15 ENCOUNTER — TELEPHONE (OUTPATIENT)
Dept: ORTHOPEDICS | Facility: CLINIC | Age: 71
End: 2021-02-15

## 2021-02-18 ENCOUNTER — OFFICE VISIT (OUTPATIENT)
Dept: ORTHOPEDICS | Facility: CLINIC | Age: 71
End: 2021-02-18
Payer: MEDICARE

## 2021-02-18 ENCOUNTER — TELEPHONE (OUTPATIENT)
Dept: ORTHOPEDICS | Facility: CLINIC | Age: 71
End: 2021-02-18

## 2021-02-18 ENCOUNTER — PATIENT MESSAGE (OUTPATIENT)
Dept: ORTHOPEDICS | Facility: CLINIC | Age: 71
End: 2021-02-18

## 2021-02-18 VITALS
SYSTOLIC BLOOD PRESSURE: 114 MMHG | BODY MASS INDEX: 29.44 KG/M2 | WEIGHT: 149.94 LBS | DIASTOLIC BLOOD PRESSURE: 62 MMHG | HEART RATE: 71 BPM | HEIGHT: 60 IN

## 2021-02-18 DIAGNOSIS — S42.91XA CLOSED FRACTURE OF RIGHT SHOULDER, INITIAL ENCOUNTER: Primary | ICD-10-CM

## 2021-02-18 DIAGNOSIS — S42.211A CLOSED FRACTURE OF NECK OF RIGHT HUMERUS, INITIAL ENCOUNTER: ICD-10-CM

## 2021-02-18 PROCEDURE — 99999 PR PBB SHADOW E&M-EST. PATIENT-LVL IV: ICD-10-PCS | Mod: PBBFAC,,, | Performed by: ORTHOPAEDIC SURGERY

## 2021-02-18 PROCEDURE — 99203 PR OFFICE/OUTPT VISIT, NEW, LEVL III, 30-44 MIN: ICD-10-PCS | Mod: S$PBB,,, | Performed by: ORTHOPAEDIC SURGERY

## 2021-02-18 PROCEDURE — 99203 OFFICE O/P NEW LOW 30 MIN: CPT | Mod: S$PBB,,, | Performed by: ORTHOPAEDIC SURGERY

## 2021-02-18 PROCEDURE — 99214 OFFICE O/P EST MOD 30 MIN: CPT | Mod: PBBFAC,PN | Performed by: ORTHOPAEDIC SURGERY

## 2021-02-18 PROCEDURE — 99999 PR PBB SHADOW E&M-EST. PATIENT-LVL IV: CPT | Mod: PBBFAC,,, | Performed by: ORTHOPAEDIC SURGERY

## 2021-02-18 RX ORDER — OXYCODONE AND ACETAMINOPHEN 7.5; 325 MG/1; MG/1
1 TABLET ORAL EVERY 6 HOURS PRN
Qty: 28 TABLET | Refills: 0 | Status: SHIPPED | OUTPATIENT
Start: 2021-02-18 | End: 2021-02-25

## 2021-02-18 RX ORDER — METHOCARBAMOL 750 MG/1
TABLET, FILM COATED ORAL
COMMUNITY
Start: 2021-02-16 | End: 2022-01-07

## 2021-02-22 ENCOUNTER — TELEPHONE (OUTPATIENT)
Dept: ORTHOPEDICS | Facility: CLINIC | Age: 71
End: 2021-02-22

## 2021-02-23 ENCOUNTER — TELEPHONE (OUTPATIENT)
Dept: ORTHOPEDICS | Facility: CLINIC | Age: 71
End: 2021-02-23

## 2021-02-23 DIAGNOSIS — S42.91XA CLOSED FRACTURE OF RIGHT SHOULDER, INITIAL ENCOUNTER: Primary | ICD-10-CM

## 2021-02-23 RX ORDER — HYDROCODONE BITARTRATE AND ACETAMINOPHEN 10; 325 MG/1; MG/1
1 TABLET ORAL EVERY 8 HOURS PRN
Qty: 30 TABLET | Refills: 0 | Status: SHIPPED | OUTPATIENT
Start: 2021-02-23 | End: 2021-03-05

## 2021-02-26 ENCOUNTER — HOSPITAL ENCOUNTER (EMERGENCY)
Facility: HOSPITAL | Age: 71
Discharge: HOME | End: 2021-02-27
Attending: EMERGENCY MEDICINE
Payer: MEDICARE
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
## 2021-02-26 DIAGNOSIS — I63.89 CEREBROVASCULAR ACCIDENT (CVA) DUE TO OTHER MECHANISM: ICD-10-CM

## 2021-02-26 DIAGNOSIS — R90.89 ABNORMAL CT OF BRAIN: Primary | ICD-10-CM

## 2021-02-26 DIAGNOSIS — I63.9 CEREBROVASCULAR ACCIDENT (CVA), UNSPECIFIED MECHANISM: ICD-10-CM

## 2021-02-26 LAB
ANION GAP SERPL CALC-SCNC: 15 MMOL/L (ref 8–16)
BUN SERPL-MCNC: 19 MG/DL (ref 8–23)
CALCIUM SERPL-MCNC: 9.5 MG/DL (ref 8.7–10.5)
CHLORIDE SERPL-SCNC: 103 MMOL/L (ref 95–110)
CO2 SERPL-SCNC: 21 MMOL/L (ref 23–29)
CREAT SERPL-MCNC: 0.8 MG/DL (ref 0.5–1.4)
ERYTHROCYTE [DISTWIDTH] IN BLOOD BY AUTOMATED COUNT: 13.6 % (ref 11.5–14.5)
EST. GFR  (AFRICAN AMERICAN): >60 ML/MIN/1.73 M^2
EST. GFR  (NON AFRICAN AMERICAN): >60 ML/MIN/1.73 M^2
GLUCOSE SERPL-MCNC: 92 MG/DL (ref 70–110)
HCT VFR BLD AUTO: 38.7 % (ref 37–48.5)
HGB BLD-MCNC: 12.7 G/DL (ref 12–16)
MCH RBC QN AUTO: 31.7 PG (ref 27–31)
MCHC RBC AUTO-ENTMCNC: 32.8 G/DL (ref 32–36)
MCV RBC AUTO: 97 FL (ref 82–98)
PLATELET # BLD AUTO: 362 K/UL (ref 150–350)
PMV BLD AUTO: 10 FL (ref 9.2–12.9)
POTASSIUM SERPL-SCNC: 4.1 MMOL/L (ref 3.5–5.1)
RBC # BLD AUTO: 4.01 M/UL (ref 4–5.4)
SODIUM SERPL-SCNC: 139 MMOL/L (ref 136–145)
WBC # BLD AUTO: 8.65 K/UL (ref 3.9–12.7)

## 2021-02-26 PROCEDURE — 99285 EMERGENCY DEPT VISIT HI MDM: CPT | Mod: 25

## 2021-02-26 PROCEDURE — 85027 COMPLETE CBC AUTOMATED: CPT

## 2021-02-26 PROCEDURE — 93010 ELECTROCARDIOGRAM REPORT: CPT | Mod: ,,, | Performed by: INTERNAL MEDICINE

## 2021-02-26 PROCEDURE — 25000003 PHARM REV CODE 250: Performed by: EMERGENCY MEDICINE

## 2021-02-26 PROCEDURE — 93010 EKG 12-LEAD: ICD-10-PCS | Mod: ,,, | Performed by: INTERNAL MEDICINE

## 2021-02-26 PROCEDURE — 96361 HYDRATE IV INFUSION ADD-ON: CPT

## 2021-02-26 PROCEDURE — 80048 BASIC METABOLIC PNL TOTAL CA: CPT

## 2021-02-26 PROCEDURE — 96360 HYDRATION IV INFUSION INIT: CPT

## 2021-02-26 PROCEDURE — 93005 ELECTROCARDIOGRAM TRACING: CPT

## 2021-02-26 RX ORDER — SODIUM CHLORIDE 9 MG/ML
INJECTION, SOLUTION INTRAVENOUS
Status: COMPLETED | OUTPATIENT
Start: 2021-02-26 | End: 2021-02-26

## 2021-02-26 RX ADMIN — SODIUM CHLORIDE 500 ML/HR: 0.9 INJECTION, SOLUTION INTRAVENOUS at 08:02

## 2021-02-27 VITALS
HEART RATE: 74 BPM | DIASTOLIC BLOOD PRESSURE: 74 MMHG | SYSTOLIC BLOOD PRESSURE: 170 MMHG | OXYGEN SATURATION: 96 % | TEMPERATURE: 97 F | WEIGHT: 152 LBS | BODY MASS INDEX: 29.69 KG/M2 | RESPIRATION RATE: 18 BRPM

## 2021-02-27 PROCEDURE — 25000003 PHARM REV CODE 250: Performed by: EMERGENCY MEDICINE

## 2021-02-27 RX ORDER — HYDROCODONE BITARTRATE AND ACETAMINOPHEN 5; 325 MG/1; MG/1
1 TABLET ORAL
Status: COMPLETED | OUTPATIENT
Start: 2021-02-27 | End: 2021-02-27

## 2021-02-27 RX ORDER — CILOSTAZOL 50 MG/1
50 TABLET ORAL 2 TIMES DAILY
Qty: 60 TABLET | Refills: 0 | Status: SHIPPED | OUTPATIENT
Start: 2021-02-27 | End: 2021-03-31

## 2021-02-27 RX ORDER — ACETAMINOPHEN 500 MG
1000 TABLET ORAL
Status: DISCONTINUED | OUTPATIENT
Start: 2021-02-27 | End: 2021-02-27 | Stop reason: HOSPADM

## 2021-02-27 RX ORDER — CILOSTAZOL 50 MG/1
100 TABLET ORAL 2 TIMES DAILY
Status: DISCONTINUED | OUTPATIENT
Start: 2021-02-27 | End: 2021-02-27 | Stop reason: HOSPADM

## 2021-02-27 RX ADMIN — HYDROCODONE BITARTRATE AND ACETAMINOPHEN 1 TABLET: 5; 325 TABLET ORAL at 12:02

## 2021-02-27 RX ADMIN — CILOSTAZOL 100 MG: 50 TABLET ORAL at 05:02

## 2021-03-03 ENCOUNTER — TELEPHONE (OUTPATIENT)
Dept: NEUROLOGY | Facility: CLINIC | Age: 71
End: 2021-03-03

## 2021-03-03 NOTE — TELEPHONE ENCOUNTER
----- Message from Karely Mcelroy sent at 3/3/2021  9:13 AM CST -----  Regarding: Appointment  Contact: [phone]  Calling in regards to speaking with nurse to schedule an appointment as soon as possible for stroke follow up, transfer from [facility]. Patient would like to be seen within a week. Please call

## 2021-03-09 ENCOUNTER — HOSPITAL ENCOUNTER (OUTPATIENT)
Dept: RADIOLOGY | Facility: HOSPITAL | Age: 71
Discharge: HOME | End: 2021-03-09
Attending: ORTHOPAEDIC SURGERY
Payer: MEDICARE

## 2021-03-09 ENCOUNTER — OFFICE VISIT (OUTPATIENT)
Dept: ORTHOPEDICS | Facility: CLINIC | Age: 71
End: 2021-03-09
Payer: MEDICARE

## 2021-03-09 VITALS — BODY MASS INDEX: 29.82 KG/M2 | HEIGHT: 60 IN | WEIGHT: 151.88 LBS

## 2021-03-09 DIAGNOSIS — S42.91XA CLOSED FRACTURE OF RIGHT SHOULDER, INITIAL ENCOUNTER: Primary | ICD-10-CM

## 2021-03-09 DIAGNOSIS — S42.91XA CLOSED FRACTURE OF RIGHT SHOULDER, INITIAL ENCOUNTER: ICD-10-CM

## 2021-03-09 PROCEDURE — 99213 PR OFFICE/OUTPT VISIT, EST, LEVL III, 20-29 MIN: ICD-10-PCS | Mod: S$PBB,,, | Performed by: ORTHOPAEDIC SURGERY

## 2021-03-09 PROCEDURE — 99213 OFFICE O/P EST LOW 20 MIN: CPT | Mod: S$PBB,,, | Performed by: ORTHOPAEDIC SURGERY

## 2021-03-09 PROCEDURE — 99999 PR PBB SHADOW E&M-EST. PATIENT-LVL III: ICD-10-PCS | Mod: PBBFAC,,, | Performed by: ORTHOPAEDIC SURGERY

## 2021-03-09 PROCEDURE — 73030 X-RAY EXAM OF SHOULDER: CPT | Mod: TC,PN,RT

## 2021-03-09 PROCEDURE — 99213 OFFICE O/P EST LOW 20 MIN: CPT | Mod: PBBFAC,25,PN | Performed by: ORTHOPAEDIC SURGERY

## 2021-03-09 PROCEDURE — 73030 X-RAY EXAM OF SHOULDER: CPT | Mod: 26,RT,, | Performed by: RADIOLOGY

## 2021-03-09 PROCEDURE — 73030 XR SHOULDER COMPLETE 2 OR MORE VIEWS RIGHT: ICD-10-PCS | Mod: 26,RT,, | Performed by: RADIOLOGY

## 2021-03-09 PROCEDURE — 99999 PR PBB SHADOW E&M-EST. PATIENT-LVL III: CPT | Mod: PBBFAC,,, | Performed by: ORTHOPAEDIC SURGERY

## 2021-03-09 RX ORDER — TRAMADOL HYDROCHLORIDE 50 MG/1
50 TABLET ORAL EVERY 6 HOURS PRN
Qty: 40 TABLET | Refills: 0 | Status: SHIPPED | OUTPATIENT
Start: 2021-03-09 | End: 2021-03-19

## 2021-03-11 ENCOUNTER — HOSPITAL ENCOUNTER (OUTPATIENT)
Dept: RADIOLOGY | Facility: HOSPITAL | Age: 71
Discharge: HOME | End: 2021-03-11
Attending: ORTHOPAEDIC SURGERY
Payer: MEDICARE

## 2021-03-11 DIAGNOSIS — S42.91XA CLOSED FRACTURE OF RIGHT SHOULDER, INITIAL ENCOUNTER: ICD-10-CM

## 2021-03-11 PROCEDURE — 73200 CT UPPER EXTREMITY W/O DYE: CPT | Mod: 26,RT,, | Performed by: RADIOLOGY

## 2021-03-11 PROCEDURE — 73200 CT UPPER EXTREMITY W/O DYE: CPT | Mod: TC,RT

## 2021-03-11 PROCEDURE — 73200 CT SHOULDER WITHOUT CONTRAST RIGHT: ICD-10-PCS | Mod: 26,RT,, | Performed by: RADIOLOGY

## 2021-03-18 ENCOUNTER — OFFICE VISIT (OUTPATIENT)
Dept: ORTHOPEDICS | Facility: CLINIC | Age: 71
End: 2021-03-18
Payer: MEDICARE

## 2021-03-18 VITALS — WEIGHT: 151.88 LBS | HEIGHT: 60 IN | BODY MASS INDEX: 29.82 KG/M2 | TEMPERATURE: 98 F

## 2021-03-18 DIAGNOSIS — S42.91XD CLOSED FRACTURE OF RIGHT SHOULDER WITH ROUTINE HEALING, SUBSEQUENT ENCOUNTER: ICD-10-CM

## 2021-03-18 PROBLEM — S42.91XA CLOSED FRACTURE OF RIGHT SHOULDER: Status: ACTIVE | Noted: 2021-03-18

## 2021-03-18 PROCEDURE — 99213 OFFICE O/P EST LOW 20 MIN: CPT | Mod: S$PBB,,, | Performed by: ORTHOPAEDIC SURGERY

## 2021-03-18 PROCEDURE — 99999 PR PBB SHADOW E&M-EST. PATIENT-LVL IV: CPT | Mod: PBBFAC,,, | Performed by: ORTHOPAEDIC SURGERY

## 2021-03-18 PROCEDURE — 99214 OFFICE O/P EST MOD 30 MIN: CPT | Mod: PBBFAC,PN | Performed by: ORTHOPAEDIC SURGERY

## 2021-03-18 PROCEDURE — 99213 PR OFFICE/OUTPT VISIT, EST, LEVL III, 20-29 MIN: ICD-10-PCS | Mod: S$PBB,,, | Performed by: ORTHOPAEDIC SURGERY

## 2021-03-18 PROCEDURE — 99999 PR PBB SHADOW E&M-EST. PATIENT-LVL IV: ICD-10-PCS | Mod: PBBFAC,,, | Performed by: ORTHOPAEDIC SURGERY

## 2021-03-18 RX ORDER — TRAMADOL HYDROCHLORIDE 50 MG/1
50 TABLET ORAL EVERY 6 HOURS PRN
Qty: 30 TABLET | Refills: 0 | Status: SHIPPED | OUTPATIENT
Start: 2021-03-18 | End: 2021-03-28

## 2021-03-22 ENCOUNTER — CLINICAL SUPPORT (OUTPATIENT)
Dept: REHABILITATION | Facility: HOSPITAL | Age: 71
End: 2021-03-22
Attending: ORTHOPAEDIC SURGERY
Payer: MEDICARE

## 2021-03-22 DIAGNOSIS — M79.601 PAIN OF RIGHT UPPER EXTREMITY: ICD-10-CM

## 2021-03-22 DIAGNOSIS — S42.91XD CLOSED FRACTURE OF RIGHT SHOULDER WITH ROUTINE HEALING, SUBSEQUENT ENCOUNTER: ICD-10-CM

## 2021-03-22 DIAGNOSIS — R53.1 WEAKNESS: ICD-10-CM

## 2021-03-22 DIAGNOSIS — M25.60 STIFFNESS IN JOINT: ICD-10-CM

## 2021-03-22 PROCEDURE — 97165 OT EVAL LOW COMPLEX 30 MIN: CPT | Mod: PN

## 2021-03-22 PROCEDURE — 97110 THERAPEUTIC EXERCISES: CPT | Mod: PN

## 2021-03-26 ENCOUNTER — CLINICAL SUPPORT (OUTPATIENT)
Dept: REHABILITATION | Facility: HOSPITAL | Age: 71
End: 2021-03-26
Attending: ORTHOPAEDIC SURGERY
Payer: MEDICARE

## 2021-03-26 DIAGNOSIS — R53.1 WEAKNESS: ICD-10-CM

## 2021-03-26 DIAGNOSIS — M25.60 STIFFNESS IN JOINT: ICD-10-CM

## 2021-03-26 DIAGNOSIS — M79.601 PAIN OF RIGHT UPPER EXTREMITY: ICD-10-CM

## 2021-03-26 PROCEDURE — 97140 MANUAL THERAPY 1/> REGIONS: CPT | Mod: PN

## 2021-03-26 PROCEDURE — 97110 THERAPEUTIC EXERCISES: CPT | Mod: PN

## 2021-03-28 ENCOUNTER — PATIENT MESSAGE (OUTPATIENT)
Dept: FAMILY MEDICINE | Facility: CLINIC | Age: 71
End: 2021-03-28

## 2021-03-29 ENCOUNTER — LAB VISIT (OUTPATIENT)
Dept: LAB | Facility: HOSPITAL | Age: 71
End: 2021-03-29
Attending: INTERNAL MEDICINE
Payer: MEDICARE

## 2021-03-29 DIAGNOSIS — E03.8 OTHER SPECIFIED HYPOTHYROIDISM: ICD-10-CM

## 2021-03-29 DIAGNOSIS — E78.5 DYSLIPIDEMIA: ICD-10-CM

## 2021-03-29 LAB
ALBUMIN SERPL BCP-MCNC: 3.6 G/DL (ref 3.5–5.2)
ALP SERPL-CCNC: 78 U/L (ref 55–135)
ALT SERPL W/O P-5'-P-CCNC: 25 U/L (ref 10–44)
ANION GAP SERPL CALC-SCNC: 12 MMOL/L (ref 8–16)
AST SERPL-CCNC: 30 U/L (ref 10–40)
BASOPHILS # BLD AUTO: 0.05 K/UL (ref 0–0.2)
BASOPHILS NFR BLD: 0.7 % (ref 0–1.9)
BILIRUB SERPL-MCNC: 0.4 MG/DL (ref 0.1–1)
BUN SERPL-MCNC: 15 MG/DL (ref 8–23)
CALCIUM SERPL-MCNC: 9.3 MG/DL (ref 8.7–10.5)
CHLORIDE SERPL-SCNC: 106 MMOL/L (ref 95–110)
CHOLEST SERPL-MCNC: 141 MG/DL (ref 120–199)
CHOLEST/HDLC SERPL: 3.8 {RATIO} (ref 2–5)
CO2 SERPL-SCNC: 25 MMOL/L (ref 23–29)
CREAT SERPL-MCNC: 1.1 MG/DL (ref 0.5–1.4)
DIFFERENTIAL METHOD: ABNORMAL
EOSINOPHIL # BLD AUTO: 0.3 K/UL (ref 0–0.5)
EOSINOPHIL NFR BLD: 4.4 % (ref 0–8)
ERYTHROCYTE [DISTWIDTH] IN BLOOD BY AUTOMATED COUNT: 12.9 % (ref 11.5–14.5)
EST. GFR  (AFRICAN AMERICAN): 58 ML/MIN/1.73 M^2
EST. GFR  (NON AFRICAN AMERICAN): 50 ML/MIN/1.73 M^2
GLUCOSE SERPL-MCNC: 91 MG/DL (ref 70–110)
HCT VFR BLD AUTO: 39.6 % (ref 37–48.5)
HDLC SERPL-MCNC: 37 MG/DL (ref 40–75)
HDLC SERPL: 26.2 % (ref 20–50)
HGB BLD-MCNC: 12.9 G/DL (ref 12–16)
IMM GRANULOCYTES # BLD AUTO: 0.02 K/UL (ref 0–0.04)
IMM GRANULOCYTES NFR BLD AUTO: 0.3 % (ref 0–0.5)
LDLC SERPL CALC-MCNC: 82.2 MG/DL (ref 63–159)
LYMPHOCYTES # BLD AUTO: 1.6 K/UL (ref 1–4.8)
LYMPHOCYTES NFR BLD: 22.8 % (ref 18–48)
MCH RBC QN AUTO: 31.3 PG (ref 27–31)
MCHC RBC AUTO-ENTMCNC: 32.6 G/DL (ref 32–36)
MCV RBC AUTO: 96 FL (ref 82–98)
MONOCYTES # BLD AUTO: 0.8 K/UL (ref 0.3–1)
MONOCYTES NFR BLD: 11.6 % (ref 4–15)
NEUTROPHILS # BLD AUTO: 4.1 K/UL (ref 1.8–7.7)
NEUTROPHILS NFR BLD: 60.2 % (ref 38–73)
NONHDLC SERPL-MCNC: 104 MG/DL
NRBC BLD-RTO: 0 /100 WBC
PLATELET # BLD AUTO: 241 K/UL (ref 150–450)
PMV BLD AUTO: 10.9 FL (ref 9.2–12.9)
POTASSIUM SERPL-SCNC: 5 MMOL/L (ref 3.5–5.1)
PROT SERPL-MCNC: 7.2 G/DL (ref 6–8.4)
RBC # BLD AUTO: 4.12 M/UL (ref 4–5.4)
SODIUM SERPL-SCNC: 143 MMOL/L (ref 136–145)
T4 FREE SERPL-MCNC: 1.37 NG/DL (ref 0.71–1.51)
TRIGL SERPL-MCNC: 109 MG/DL (ref 30–150)
TSH SERPL DL<=0.005 MIU/L-ACNC: 1.59 UIU/ML (ref 0.4–4)
WBC # BLD AUTO: 6.83 K/UL (ref 3.9–12.7)

## 2021-03-29 PROCEDURE — 80061 LIPID PANEL: CPT | Performed by: INTERNAL MEDICINE

## 2021-03-29 PROCEDURE — 80053 COMPREHEN METABOLIC PANEL: CPT | Performed by: INTERNAL MEDICINE

## 2021-03-29 PROCEDURE — 84439 ASSAY OF FREE THYROXINE: CPT | Performed by: INTERNAL MEDICINE

## 2021-03-29 PROCEDURE — 36415 COLL VENOUS BLD VENIPUNCTURE: CPT | Performed by: INTERNAL MEDICINE

## 2021-03-29 PROCEDURE — 85025 COMPLETE CBC W/AUTO DIFF WBC: CPT | Performed by: INTERNAL MEDICINE

## 2021-03-29 PROCEDURE — 84443 ASSAY THYROID STIM HORMONE: CPT | Performed by: INTERNAL MEDICINE

## 2021-03-31 ENCOUNTER — OFFICE VISIT (OUTPATIENT)
Dept: FAMILY MEDICINE | Facility: CLINIC | Age: 71
End: 2021-03-31
Payer: MEDICARE

## 2021-03-31 VITALS
SYSTOLIC BLOOD PRESSURE: 118 MMHG | OXYGEN SATURATION: 98 % | DIASTOLIC BLOOD PRESSURE: 70 MMHG | TEMPERATURE: 98 F | WEIGHT: 143.44 LBS | HEART RATE: 62 BPM | BODY MASS INDEX: 28.01 KG/M2

## 2021-03-31 DIAGNOSIS — I12.9 BENIGN HYPERTENSION WITH CKD (CHRONIC KIDNEY DISEASE) STAGE III: ICD-10-CM

## 2021-03-31 DIAGNOSIS — I49.3 PVC (PREMATURE VENTRICULAR CONTRACTION): ICD-10-CM

## 2021-03-31 DIAGNOSIS — Z12.31 ENCOUNTER FOR SCREENING MAMMOGRAM FOR MALIGNANT NEOPLASM OF BREAST: Primary | ICD-10-CM

## 2021-03-31 DIAGNOSIS — Z78.9 FALSE POSITIVE CARDIAC STRESS TEST: ICD-10-CM

## 2021-03-31 DIAGNOSIS — I10 ESSENTIAL HYPERTENSION: ICD-10-CM

## 2021-03-31 DIAGNOSIS — S42.91XD CLOSED FRACTURE OF RIGHT SHOULDER WITH ROUTINE HEALING, SUBSEQUENT ENCOUNTER: ICD-10-CM

## 2021-03-31 DIAGNOSIS — E03.8 OTHER SPECIFIED HYPOTHYROIDISM: ICD-10-CM

## 2021-03-31 DIAGNOSIS — N32.81 OVERACTIVE BLADDER: ICD-10-CM

## 2021-03-31 DIAGNOSIS — Z86.73 HISTORY OF CEREBRAL INFARCTION: ICD-10-CM

## 2021-03-31 DIAGNOSIS — M81.0 AGE-RELATED OSTEOPOROSIS WITHOUT CURRENT PATHOLOGICAL FRACTURE: ICD-10-CM

## 2021-03-31 DIAGNOSIS — N18.30 BENIGN HYPERTENSION WITH CKD (CHRONIC KIDNEY DISEASE) STAGE III: ICD-10-CM

## 2021-03-31 DIAGNOSIS — R00.2 PALPITATIONS: ICD-10-CM

## 2021-03-31 DIAGNOSIS — G43.809 OTHER MIGRAINE WITHOUT STATUS MIGRAINOSUS, NOT INTRACTABLE: ICD-10-CM

## 2021-03-31 DIAGNOSIS — K21.9 GASTROESOPHAGEAL REFLUX DISEASE WITHOUT ESOPHAGITIS: ICD-10-CM

## 2021-03-31 DIAGNOSIS — E78.5 DYSLIPIDEMIA: ICD-10-CM

## 2021-03-31 DIAGNOSIS — F41.9 ANXIETY: ICD-10-CM

## 2021-03-31 DIAGNOSIS — K58.9 IRRITABLE BOWEL SYNDROME, UNSPECIFIED TYPE: ICD-10-CM

## 2021-03-31 PROCEDURE — 99999 PR PBB SHADOW E&M-EST. PATIENT-LVL III: ICD-10-PCS | Mod: PBBFAC,,, | Performed by: INTERNAL MEDICINE

## 2021-03-31 PROCEDURE — 99999 PR PBB SHADOW E&M-EST. PATIENT-LVL III: CPT | Mod: PBBFAC,,, | Performed by: INTERNAL MEDICINE

## 2021-03-31 PROCEDURE — 99214 PR OFFICE/OUTPT VISIT, EST, LEVL IV, 30-39 MIN: ICD-10-PCS | Mod: S$PBB,,, | Performed by: INTERNAL MEDICINE

## 2021-03-31 PROCEDURE — 99214 OFFICE O/P EST MOD 30 MIN: CPT | Mod: S$PBB,,, | Performed by: INTERNAL MEDICINE

## 2021-03-31 PROCEDURE — 99213 OFFICE O/P EST LOW 20 MIN: CPT | Mod: PBBFAC,PN | Performed by: INTERNAL MEDICINE

## 2021-03-31 RX ORDER — ATORVASTATIN CALCIUM 80 MG/1
80 TABLET, FILM COATED ORAL DAILY
Qty: 90 TABLET | Refills: 3 | Status: SHIPPED | OUTPATIENT
Start: 2021-03-31 | End: 2022-02-15

## 2021-03-31 RX ORDER — TRAMADOL HYDROCHLORIDE 50 MG/1
50 TABLET ORAL EVERY 6 HOURS PRN
COMMUNITY
End: 2021-05-11 | Stop reason: SDUPTHER

## 2021-03-31 RX ORDER — ATORVASTATIN CALCIUM 80 MG/1
80 TABLET, FILM COATED ORAL DAILY
Qty: 90 TABLET | Refills: 3 | Status: SHIPPED | OUTPATIENT
Start: 2021-03-31 | End: 2021-03-31

## 2021-04-01 ENCOUNTER — CLINICAL SUPPORT (OUTPATIENT)
Dept: REHABILITATION | Facility: HOSPITAL | Age: 71
End: 2021-04-01
Attending: ORTHOPAEDIC SURGERY
Payer: MEDICARE

## 2021-04-01 DIAGNOSIS — Z78.9 DECREASED ACTIVITIES OF DAILY LIVING (ADL): ICD-10-CM

## 2021-04-01 DIAGNOSIS — M25.611 DECREASED RANGE OF MOTION OF RIGHT SHOULDER: ICD-10-CM

## 2021-04-01 DIAGNOSIS — S42.91XD CLOSED FRACTURE OF RIGHT SHOULDER WITH ROUTINE HEALING, SUBSEQUENT ENCOUNTER: Primary | ICD-10-CM

## 2021-04-01 PROBLEM — M25.619 DECREASED RANGE OF MOTION (ROM) OF SHOULDER: Status: ACTIVE | Noted: 2021-04-01

## 2021-04-01 PROCEDURE — 97140 MANUAL THERAPY 1/> REGIONS: CPT | Mod: PN

## 2021-04-01 PROCEDURE — 97110 THERAPEUTIC EXERCISES: CPT | Mod: PN

## 2021-04-06 ENCOUNTER — CLINICAL SUPPORT (OUTPATIENT)
Dept: REHABILITATION | Facility: HOSPITAL | Age: 71
End: 2021-04-06
Attending: ORTHOPAEDIC SURGERY
Payer: MEDICARE

## 2021-04-06 DIAGNOSIS — M25.611 DECREASED RANGE OF MOTION OF RIGHT SHOULDER: ICD-10-CM

## 2021-04-06 DIAGNOSIS — Z78.9 DECREASED ACTIVITIES OF DAILY LIVING (ADL): ICD-10-CM

## 2021-04-06 PROCEDURE — 97110 THERAPEUTIC EXERCISES: CPT | Mod: PN

## 2021-04-06 PROCEDURE — 97140 MANUAL THERAPY 1/> REGIONS: CPT | Mod: PN

## 2021-04-08 ENCOUNTER — CLINICAL SUPPORT (OUTPATIENT)
Dept: REHABILITATION | Facility: HOSPITAL | Age: 71
End: 2021-04-08
Attending: ORTHOPAEDIC SURGERY
Payer: MEDICARE

## 2021-04-08 DIAGNOSIS — Z78.9 DECREASED ACTIVITIES OF DAILY LIVING (ADL): ICD-10-CM

## 2021-04-08 DIAGNOSIS — M25.611 DECREASED RANGE OF MOTION OF RIGHT SHOULDER: ICD-10-CM

## 2021-04-08 PROCEDURE — 97110 THERAPEUTIC EXERCISES: CPT | Mod: PN

## 2021-04-08 PROCEDURE — 97140 MANUAL THERAPY 1/> REGIONS: CPT | Mod: PN

## 2021-04-12 ENCOUNTER — CLINICAL SUPPORT (OUTPATIENT)
Dept: REHABILITATION | Facility: HOSPITAL | Age: 71
End: 2021-04-12
Attending: ORTHOPAEDIC SURGERY
Payer: MEDICARE

## 2021-04-12 DIAGNOSIS — M25.611 DECREASED RANGE OF MOTION OF RIGHT SHOULDER: ICD-10-CM

## 2021-04-12 DIAGNOSIS — Z78.9 DECREASED ACTIVITIES OF DAILY LIVING (ADL): ICD-10-CM

## 2021-04-12 PROCEDURE — 97140 MANUAL THERAPY 1/> REGIONS: CPT | Mod: PN

## 2021-04-12 PROCEDURE — 97110 THERAPEUTIC EXERCISES: CPT | Mod: PN

## 2021-04-14 ENCOUNTER — TELEPHONE (OUTPATIENT)
Dept: ORTHOPEDICS | Facility: CLINIC | Age: 71
End: 2021-04-14

## 2021-04-14 ENCOUNTER — CLINICAL SUPPORT (OUTPATIENT)
Dept: REHABILITATION | Facility: HOSPITAL | Age: 71
End: 2021-04-14
Attending: ORTHOPAEDIC SURGERY
Payer: MEDICARE

## 2021-04-14 DIAGNOSIS — Z78.9 DECREASED ACTIVITIES OF DAILY LIVING (ADL): ICD-10-CM

## 2021-04-14 DIAGNOSIS — S42.91XD CLOSED FRACTURE OF RIGHT SHOULDER WITH ROUTINE HEALING, SUBSEQUENT ENCOUNTER: Primary | ICD-10-CM

## 2021-04-14 DIAGNOSIS — M25.611 DECREASED RANGE OF MOTION OF RIGHT SHOULDER: ICD-10-CM

## 2021-04-14 PROCEDURE — 97140 MANUAL THERAPY 1/> REGIONS: CPT | Mod: PN

## 2021-04-14 PROCEDURE — 97110 THERAPEUTIC EXERCISES: CPT | Mod: PN

## 2021-04-15 ENCOUNTER — OFFICE VISIT (OUTPATIENT)
Dept: ORTHOPEDICS | Facility: CLINIC | Age: 71
End: 2021-04-15
Payer: MEDICARE

## 2021-04-15 ENCOUNTER — HOSPITAL ENCOUNTER (OUTPATIENT)
Dept: RADIOLOGY | Facility: HOSPITAL | Age: 71
Discharge: HOME | End: 2021-04-15
Attending: ORTHOPAEDIC SURGERY
Payer: MEDICARE

## 2021-04-15 ENCOUNTER — OFFICE VISIT (OUTPATIENT)
Dept: NEUROLOGY | Facility: CLINIC | Age: 71
End: 2021-04-15
Payer: MEDICARE

## 2021-04-15 VITALS — BODY MASS INDEX: 28.53 KG/M2 | HEIGHT: 60 IN | WEIGHT: 145.31 LBS

## 2021-04-15 VITALS
BODY MASS INDEX: 28.51 KG/M2 | HEIGHT: 60 IN | WEIGHT: 145.19 LBS | DIASTOLIC BLOOD PRESSURE: 69 MMHG | HEART RATE: 69 BPM | SYSTOLIC BLOOD PRESSURE: 112 MMHG

## 2021-04-15 DIAGNOSIS — S42.91XD CLOSED FRACTURE OF RIGHT SHOULDER WITH ROUTINE HEALING, SUBSEQUENT ENCOUNTER: Primary | ICD-10-CM

## 2021-04-15 DIAGNOSIS — I63.9 CEREBROVASCULAR ACCIDENT (CVA), UNSPECIFIED MECHANISM: Primary | ICD-10-CM

## 2021-04-15 DIAGNOSIS — S42.91XD CLOSED FRACTURE OF RIGHT SHOULDER WITH ROUTINE HEALING, SUBSEQUENT ENCOUNTER: ICD-10-CM

## 2021-04-15 PROCEDURE — 73030 X-RAY EXAM OF SHOULDER: CPT | Mod: 26,RT,, | Performed by: RADIOLOGY

## 2021-04-15 PROCEDURE — 99999 PR PBB SHADOW E&M-EST. PATIENT-LVL III: ICD-10-PCS | Mod: PBBFAC,,, | Performed by: ORTHOPAEDIC SURGERY

## 2021-04-15 PROCEDURE — 99999 PR PBB SHADOW E&M-EST. PATIENT-LVL III: CPT | Mod: PBBFAC,,, | Performed by: ORTHOPAEDIC SURGERY

## 2021-04-15 PROCEDURE — 73030 XR SHOULDER COMPLETE 2 OR MORE VIEWS RIGHT: ICD-10-PCS | Mod: 26,RT,, | Performed by: RADIOLOGY

## 2021-04-15 PROCEDURE — 99204 PR OFFICE/OUTPT VISIT, NEW, LEVL IV, 45-59 MIN: ICD-10-PCS | Mod: S$PBB,,, | Performed by: PSYCHIATRY & NEUROLOGY

## 2021-04-15 PROCEDURE — 99213 OFFICE O/P EST LOW 20 MIN: CPT | Mod: PBBFAC,25,PN | Performed by: ORTHOPAEDIC SURGERY

## 2021-04-15 PROCEDURE — 99999 PR PBB SHADOW E&M-EST. PATIENT-LVL II: CPT | Mod: PBBFAC,,, | Performed by: PSYCHIATRY & NEUROLOGY

## 2021-04-15 PROCEDURE — 73030 X-RAY EXAM OF SHOULDER: CPT | Mod: TC,PN,RT

## 2021-04-15 PROCEDURE — 99204 OFFICE O/P NEW MOD 45 MIN: CPT | Mod: S$PBB,,, | Performed by: PSYCHIATRY & NEUROLOGY

## 2021-04-15 PROCEDURE — 99213 OFFICE O/P EST LOW 20 MIN: CPT | Mod: S$PBB,,, | Performed by: ORTHOPAEDIC SURGERY

## 2021-04-15 PROCEDURE — 99999 PR PBB SHADOW E&M-EST. PATIENT-LVL II: ICD-10-PCS | Mod: PBBFAC,,, | Performed by: PSYCHIATRY & NEUROLOGY

## 2021-04-15 PROCEDURE — 99212 OFFICE O/P EST SF 10 MIN: CPT | Mod: PBBFAC,25,27 | Performed by: PSYCHIATRY & NEUROLOGY

## 2021-04-15 PROCEDURE — 99213 PR OFFICE/OUTPT VISIT, EST, LEVL III, 20-29 MIN: ICD-10-PCS | Mod: S$PBB,,, | Performed by: ORTHOPAEDIC SURGERY

## 2021-04-15 RX ORDER — TRAMADOL HYDROCHLORIDE 50 MG/1
50 TABLET ORAL EVERY 12 HOURS PRN
Qty: 40 TABLET | Refills: 0 | Status: SHIPPED | OUTPATIENT
Start: 2021-04-15 | End: 2021-04-25

## 2021-04-19 ENCOUNTER — CLINICAL SUPPORT (OUTPATIENT)
Dept: REHABILITATION | Facility: HOSPITAL | Age: 71
End: 2021-04-19
Attending: ORTHOPAEDIC SURGERY
Payer: MEDICARE

## 2021-04-19 DIAGNOSIS — Z78.9 DECREASED ACTIVITIES OF DAILY LIVING (ADL): ICD-10-CM

## 2021-04-19 DIAGNOSIS — M25.611 DECREASED RANGE OF MOTION OF RIGHT SHOULDER: ICD-10-CM

## 2021-04-19 PROCEDURE — 97140 MANUAL THERAPY 1/> REGIONS: CPT | Mod: PN

## 2021-04-19 PROCEDURE — 97110 THERAPEUTIC EXERCISES: CPT | Mod: PN

## 2021-04-21 ENCOUNTER — CLINICAL SUPPORT (OUTPATIENT)
Dept: REHABILITATION | Facility: HOSPITAL | Age: 71
End: 2021-04-21
Attending: ORTHOPAEDIC SURGERY
Payer: MEDICARE

## 2021-04-21 DIAGNOSIS — M25.611 DECREASED RANGE OF MOTION OF RIGHT SHOULDER: ICD-10-CM

## 2021-04-21 DIAGNOSIS — Z78.9 DECREASED ACTIVITIES OF DAILY LIVING (ADL): ICD-10-CM

## 2021-04-21 PROCEDURE — 97110 THERAPEUTIC EXERCISES: CPT | Mod: PN

## 2021-04-21 PROCEDURE — 97140 MANUAL THERAPY 1/> REGIONS: CPT | Mod: PN

## 2021-04-22 ENCOUNTER — HOSPITAL ENCOUNTER (OUTPATIENT)
Dept: RADIOLOGY | Facility: HOSPITAL | Age: 71
Discharge: HOME | End: 2021-04-22
Attending: PSYCHIATRY & NEUROLOGY
Payer: MEDICARE

## 2021-04-22 DIAGNOSIS — I63.9 CEREBROVASCULAR ACCIDENT (CVA), UNSPECIFIED MECHANISM: ICD-10-CM

## 2021-04-22 LAB
CREAT SERPL-MCNC: 1.1 MG/DL (ref 0.5–1.4)
SAMPLE: NORMAL

## 2021-04-22 PROCEDURE — 70496 CT ANGIOGRAPHY HEAD: CPT | Mod: 26,,, | Performed by: RADIOLOGY

## 2021-04-22 PROCEDURE — 70496 CT ANGIOGRAPHY HEAD: CPT | Mod: TC

## 2021-04-22 PROCEDURE — 70496 CTA HEAD AND NECK (XPD): ICD-10-PCS | Mod: 26,,, | Performed by: RADIOLOGY

## 2021-04-22 PROCEDURE — 70498 CT ANGIOGRAPHY NECK: CPT | Mod: 26,,, | Performed by: RADIOLOGY

## 2021-04-22 PROCEDURE — 25500020 PHARM REV CODE 255: Performed by: PSYCHIATRY & NEUROLOGY

## 2021-04-22 PROCEDURE — 70498 CTA HEAD AND NECK (XPD): ICD-10-PCS | Mod: 26,,, | Performed by: RADIOLOGY

## 2021-04-22 RX ADMIN — IOHEXOL 75 ML: 350 INJECTION, SOLUTION INTRAVENOUS at 09:04

## 2021-04-27 ENCOUNTER — CLINICAL SUPPORT (OUTPATIENT)
Dept: REHABILITATION | Facility: HOSPITAL | Age: 71
End: 2021-04-27
Attending: ORTHOPAEDIC SURGERY
Payer: MEDICARE

## 2021-04-27 DIAGNOSIS — Z78.9 DECREASED ACTIVITIES OF DAILY LIVING (ADL): ICD-10-CM

## 2021-04-27 DIAGNOSIS — M25.611 DECREASED RANGE OF MOTION OF RIGHT SHOULDER: ICD-10-CM

## 2021-04-27 PROCEDURE — 97140 MANUAL THERAPY 1/> REGIONS: CPT | Mod: PN

## 2021-04-27 PROCEDURE — 97110 THERAPEUTIC EXERCISES: CPT | Mod: PN

## 2021-04-29 ENCOUNTER — CLINICAL SUPPORT (OUTPATIENT)
Dept: REHABILITATION | Facility: HOSPITAL | Age: 71
End: 2021-04-29
Attending: ORTHOPAEDIC SURGERY
Payer: MEDICARE

## 2021-04-29 DIAGNOSIS — Z78.9 DECREASED ACTIVITIES OF DAILY LIVING (ADL): ICD-10-CM

## 2021-04-29 DIAGNOSIS — M25.611 DECREASED RANGE OF MOTION OF RIGHT SHOULDER: ICD-10-CM

## 2021-04-29 PROCEDURE — 97140 MANUAL THERAPY 1/> REGIONS: CPT | Mod: PN

## 2021-04-29 PROCEDURE — 97110 THERAPEUTIC EXERCISES: CPT | Mod: PN

## 2021-05-03 ENCOUNTER — CLINICAL SUPPORT (OUTPATIENT)
Dept: REHABILITATION | Facility: HOSPITAL | Age: 71
End: 2021-05-03
Attending: ORTHOPAEDIC SURGERY
Payer: MEDICARE

## 2021-05-03 DIAGNOSIS — Z78.9 DECREASED ACTIVITIES OF DAILY LIVING (ADL): ICD-10-CM

## 2021-05-03 DIAGNOSIS — M25.611 DECREASED RANGE OF MOTION OF RIGHT SHOULDER: ICD-10-CM

## 2021-05-03 PROCEDURE — 97110 THERAPEUTIC EXERCISES: CPT | Mod: PN

## 2021-05-03 PROCEDURE — 97140 MANUAL THERAPY 1/> REGIONS: CPT | Mod: PN

## 2021-05-05 ENCOUNTER — CLINICAL SUPPORT (OUTPATIENT)
Dept: REHABILITATION | Facility: HOSPITAL | Age: 71
End: 2021-05-05
Attending: ORTHOPAEDIC SURGERY
Payer: MEDICARE

## 2021-05-05 DIAGNOSIS — M25.611 DECREASED RANGE OF MOTION OF RIGHT SHOULDER: ICD-10-CM

## 2021-05-05 DIAGNOSIS — Z78.9 DECREASED ACTIVITIES OF DAILY LIVING (ADL): ICD-10-CM

## 2021-05-05 PROCEDURE — 97140 MANUAL THERAPY 1/> REGIONS: CPT | Mod: PN

## 2021-05-05 PROCEDURE — 97110 THERAPEUTIC EXERCISES: CPT | Mod: PN

## 2021-05-10 ENCOUNTER — CLINICAL SUPPORT (OUTPATIENT)
Dept: REHABILITATION | Facility: HOSPITAL | Age: 71
End: 2021-05-10
Attending: ORTHOPAEDIC SURGERY
Payer: MEDICARE

## 2021-05-10 DIAGNOSIS — M25.611 DECREASED RANGE OF MOTION OF RIGHT SHOULDER: ICD-10-CM

## 2021-05-10 DIAGNOSIS — Z78.9 DECREASED ACTIVITIES OF DAILY LIVING (ADL): ICD-10-CM

## 2021-05-10 PROCEDURE — 97110 THERAPEUTIC EXERCISES: CPT | Mod: PN

## 2021-05-10 PROCEDURE — 97140 MANUAL THERAPY 1/> REGIONS: CPT | Mod: PN

## 2021-05-11 RX ORDER — TRAMADOL HYDROCHLORIDE 50 MG/1
50 TABLET ORAL EVERY 6 HOURS PRN
Qty: 28 TABLET | Refills: 0 | Status: SHIPPED | OUTPATIENT
Start: 2021-05-11 | End: 2021-06-18

## 2021-05-12 ENCOUNTER — CLINICAL SUPPORT (OUTPATIENT)
Dept: REHABILITATION | Facility: HOSPITAL | Age: 71
End: 2021-05-12
Attending: ORTHOPAEDIC SURGERY
Payer: MEDICARE

## 2021-05-12 DIAGNOSIS — Z78.9 DECREASED ACTIVITIES OF DAILY LIVING (ADL): ICD-10-CM

## 2021-05-12 DIAGNOSIS — M25.611 DECREASED RANGE OF MOTION OF RIGHT SHOULDER: ICD-10-CM

## 2021-05-12 PROCEDURE — 97110 THERAPEUTIC EXERCISES: CPT | Mod: PN

## 2021-05-12 PROCEDURE — 97140 MANUAL THERAPY 1/> REGIONS: CPT | Mod: PN

## 2021-05-17 ENCOUNTER — CLINICAL SUPPORT (OUTPATIENT)
Dept: REHABILITATION | Facility: HOSPITAL | Age: 71
End: 2021-05-17
Attending: ORTHOPAEDIC SURGERY
Payer: MEDICARE

## 2021-05-17 DIAGNOSIS — Z78.9 DECREASED ACTIVITIES OF DAILY LIVING (ADL): ICD-10-CM

## 2021-05-17 DIAGNOSIS — M25.611 DECREASED RANGE OF MOTION OF RIGHT SHOULDER: ICD-10-CM

## 2021-05-17 PROCEDURE — 97140 MANUAL THERAPY 1/> REGIONS: CPT | Mod: PN

## 2021-05-17 PROCEDURE — 97110 THERAPEUTIC EXERCISES: CPT | Mod: PN

## 2021-05-19 ENCOUNTER — CLINICAL SUPPORT (OUTPATIENT)
Dept: REHABILITATION | Facility: HOSPITAL | Age: 71
End: 2021-05-19
Attending: ORTHOPAEDIC SURGERY
Payer: MEDICARE

## 2021-05-19 DIAGNOSIS — M25.611 DECREASED RANGE OF MOTION OF RIGHT SHOULDER: ICD-10-CM

## 2021-05-19 DIAGNOSIS — Z78.9 DECREASED ACTIVITIES OF DAILY LIVING (ADL): ICD-10-CM

## 2021-05-19 PROCEDURE — 97110 THERAPEUTIC EXERCISES: CPT | Mod: PN

## 2021-05-19 PROCEDURE — 97140 MANUAL THERAPY 1/> REGIONS: CPT | Mod: PN

## 2021-05-26 ENCOUNTER — CLINICAL SUPPORT (OUTPATIENT)
Dept: REHABILITATION | Facility: HOSPITAL | Age: 71
End: 2021-05-26
Attending: ORTHOPAEDIC SURGERY
Payer: MEDICARE

## 2021-05-26 DIAGNOSIS — Z78.9 DECREASED ACTIVITIES OF DAILY LIVING (ADL): ICD-10-CM

## 2021-05-26 DIAGNOSIS — M25.611 DECREASED RANGE OF MOTION OF RIGHT SHOULDER: ICD-10-CM

## 2021-05-26 PROCEDURE — 97110 THERAPEUTIC EXERCISES: CPT | Mod: PN

## 2021-05-26 PROCEDURE — 97140 MANUAL THERAPY 1/> REGIONS: CPT | Mod: PN

## 2021-05-27 ENCOUNTER — OFFICE VISIT (OUTPATIENT)
Dept: ORTHOPEDICS | Facility: CLINIC | Age: 71
End: 2021-05-27
Payer: MEDICARE

## 2021-05-27 ENCOUNTER — HOSPITAL ENCOUNTER (OUTPATIENT)
Dept: RADIOLOGY | Facility: HOSPITAL | Age: 71
Discharge: HOME | End: 2021-05-27
Attending: ORTHOPAEDIC SURGERY
Payer: MEDICARE

## 2021-05-27 VITALS — HEIGHT: 60 IN | BODY MASS INDEX: 28.47 KG/M2 | WEIGHT: 145 LBS

## 2021-05-27 DIAGNOSIS — S42.91XD CLOSED FRACTURE OF RIGHT SHOULDER WITH ROUTINE HEALING, SUBSEQUENT ENCOUNTER: ICD-10-CM

## 2021-05-27 DIAGNOSIS — S42.91XD CLOSED FRACTURE OF RIGHT SHOULDER WITH ROUTINE HEALING, SUBSEQUENT ENCOUNTER: Primary | ICD-10-CM

## 2021-05-27 PROCEDURE — 99999 PR PBB SHADOW E&M-EST. PATIENT-LVL IV: ICD-10-PCS | Mod: PBBFAC,,, | Performed by: ORTHOPAEDIC SURGERY

## 2021-05-27 PROCEDURE — 99999 PR PBB SHADOW E&M-EST. PATIENT-LVL IV: CPT | Mod: PBBFAC,,, | Performed by: ORTHOPAEDIC SURGERY

## 2021-05-27 PROCEDURE — 99214 OFFICE O/P EST MOD 30 MIN: CPT | Mod: PBBFAC,25,PN | Performed by: ORTHOPAEDIC SURGERY

## 2021-05-27 PROCEDURE — 73030 X-RAY EXAM OF SHOULDER: CPT | Mod: TC,PN,RT

## 2021-05-27 PROCEDURE — 99213 OFFICE O/P EST LOW 20 MIN: CPT | Mod: S$PBB,,, | Performed by: ORTHOPAEDIC SURGERY

## 2021-05-27 PROCEDURE — 73030 XR SHOULDER COMPLETE 2 OR MORE VIEWS RIGHT: ICD-10-PCS | Mod: 26,RT,, | Performed by: RADIOLOGY

## 2021-05-27 PROCEDURE — 73030 X-RAY EXAM OF SHOULDER: CPT | Mod: 26,RT,, | Performed by: RADIOLOGY

## 2021-05-27 PROCEDURE — 99213 PR OFFICE/OUTPT VISIT, EST, LEVL III, 20-29 MIN: ICD-10-PCS | Mod: S$PBB,,, | Performed by: ORTHOPAEDIC SURGERY

## 2021-05-27 RX ORDER — TRAMADOL HYDROCHLORIDE 50 MG/1
50 TABLET ORAL EVERY 6 HOURS PRN
Qty: 40 TABLET | Refills: 0 | Status: SHIPPED | OUTPATIENT
Start: 2021-05-27 | End: 2021-06-06

## 2021-06-02 ENCOUNTER — CLINICAL SUPPORT (OUTPATIENT)
Dept: REHABILITATION | Facility: HOSPITAL | Age: 71
End: 2021-06-02
Attending: ORTHOPAEDIC SURGERY
Payer: MEDICARE

## 2021-06-02 DIAGNOSIS — Z78.9 DECREASED ACTIVITIES OF DAILY LIVING (ADL): ICD-10-CM

## 2021-06-02 DIAGNOSIS — S42.91XD CLOSED FRACTURE OF RIGHT SHOULDER WITH ROUTINE HEALING, SUBSEQUENT ENCOUNTER: ICD-10-CM

## 2021-06-02 DIAGNOSIS — M25.611 DECREASED RANGE OF MOTION OF RIGHT SHOULDER: ICD-10-CM

## 2021-06-02 PROCEDURE — 97110 THERAPEUTIC EXERCISES: CPT | Mod: PN

## 2021-06-02 PROCEDURE — 97140 MANUAL THERAPY 1/> REGIONS: CPT | Mod: PN

## 2021-06-07 ENCOUNTER — CLINICAL SUPPORT (OUTPATIENT)
Dept: REHABILITATION | Facility: HOSPITAL | Age: 71
End: 2021-06-07
Attending: ORTHOPAEDIC SURGERY
Payer: MEDICARE

## 2021-06-07 DIAGNOSIS — Z78.9 DECREASED ACTIVITIES OF DAILY LIVING (ADL): ICD-10-CM

## 2021-06-07 DIAGNOSIS — M25.611 DECREASED RANGE OF MOTION OF RIGHT SHOULDER: ICD-10-CM

## 2021-06-07 PROCEDURE — 97140 MANUAL THERAPY 1/> REGIONS: CPT | Mod: PN

## 2021-06-07 PROCEDURE — 97110 THERAPEUTIC EXERCISES: CPT | Mod: PN

## 2021-06-09 ENCOUNTER — CLINICAL SUPPORT (OUTPATIENT)
Dept: REHABILITATION | Facility: HOSPITAL | Age: 71
End: 2021-06-09
Attending: ORTHOPAEDIC SURGERY
Payer: MEDICARE

## 2021-06-09 DIAGNOSIS — M25.611 DECREASED RANGE OF MOTION OF RIGHT SHOULDER: ICD-10-CM

## 2021-06-09 DIAGNOSIS — Z78.9 DECREASED ACTIVITIES OF DAILY LIVING (ADL): ICD-10-CM

## 2021-06-09 PROCEDURE — 97140 MANUAL THERAPY 1/> REGIONS: CPT | Mod: PN

## 2021-06-09 PROCEDURE — 97110 THERAPEUTIC EXERCISES: CPT | Mod: PN

## 2021-06-14 ENCOUNTER — CLINICAL SUPPORT (OUTPATIENT)
Dept: REHABILITATION | Facility: HOSPITAL | Age: 71
End: 2021-06-14
Attending: ORTHOPAEDIC SURGERY
Payer: MEDICARE

## 2021-06-14 DIAGNOSIS — M25.611 DECREASED RANGE OF MOTION OF RIGHT SHOULDER: ICD-10-CM

## 2021-06-14 DIAGNOSIS — Z78.9 DECREASED ACTIVITIES OF DAILY LIVING (ADL): ICD-10-CM

## 2021-06-14 PROCEDURE — 97140 MANUAL THERAPY 1/> REGIONS: CPT | Mod: PN

## 2021-06-14 PROCEDURE — 97110 THERAPEUTIC EXERCISES: CPT | Mod: PN

## 2021-06-16 ENCOUNTER — CLINICAL SUPPORT (OUTPATIENT)
Dept: REHABILITATION | Facility: HOSPITAL | Age: 71
End: 2021-06-16
Attending: ORTHOPAEDIC SURGERY
Payer: MEDICARE

## 2021-06-16 DIAGNOSIS — M25.611 DECREASED RANGE OF MOTION OF RIGHT SHOULDER: ICD-10-CM

## 2021-06-16 DIAGNOSIS — Z78.9 DECREASED ACTIVITIES OF DAILY LIVING (ADL): ICD-10-CM

## 2021-06-16 PROCEDURE — 97140 MANUAL THERAPY 1/> REGIONS: CPT | Mod: PN

## 2021-06-16 PROCEDURE — 97110 THERAPEUTIC EXERCISES: CPT | Mod: PN

## 2021-06-21 ENCOUNTER — CLINICAL SUPPORT (OUTPATIENT)
Dept: REHABILITATION | Facility: HOSPITAL | Age: 71
End: 2021-06-21
Attending: ORTHOPAEDIC SURGERY
Payer: MEDICARE

## 2021-06-21 DIAGNOSIS — Z78.9 DECREASED ACTIVITIES OF DAILY LIVING (ADL): ICD-10-CM

## 2021-06-21 DIAGNOSIS — M25.611 DECREASED RANGE OF MOTION OF RIGHT SHOULDER: ICD-10-CM

## 2021-06-21 PROCEDURE — 97140 MANUAL THERAPY 1/> REGIONS: CPT | Mod: PN

## 2021-06-21 PROCEDURE — 97110 THERAPEUTIC EXERCISES: CPT | Mod: PN

## 2021-06-23 ENCOUNTER — CLINICAL SUPPORT (OUTPATIENT)
Dept: REHABILITATION | Facility: HOSPITAL | Age: 71
End: 2021-06-23
Attending: ORTHOPAEDIC SURGERY
Payer: MEDICARE

## 2021-06-23 DIAGNOSIS — Z78.9 DECREASED ACTIVITIES OF DAILY LIVING (ADL): ICD-10-CM

## 2021-06-23 DIAGNOSIS — M25.611 DECREASED RANGE OF MOTION OF RIGHT SHOULDER: ICD-10-CM

## 2021-06-23 PROCEDURE — 97110 THERAPEUTIC EXERCISES: CPT | Mod: PN

## 2021-06-23 PROCEDURE — 97140 MANUAL THERAPY 1/> REGIONS: CPT | Mod: PN

## 2021-06-24 ENCOUNTER — HOSPITAL ENCOUNTER (OUTPATIENT)
Dept: RADIOLOGY | Facility: HOSPITAL | Age: 71
Discharge: HOME | End: 2021-06-24
Attending: ORTHOPAEDIC SURGERY
Payer: MEDICARE

## 2021-06-24 ENCOUNTER — OFFICE VISIT (OUTPATIENT)
Dept: ORTHOPEDICS | Facility: CLINIC | Age: 71
End: 2021-06-24
Payer: MEDICARE

## 2021-06-24 ENCOUNTER — TELEPHONE (OUTPATIENT)
Dept: ORTHOPEDICS | Facility: CLINIC | Age: 71
End: 2021-06-24

## 2021-06-24 VITALS — BODY MASS INDEX: 28.47 KG/M2 | WEIGHT: 145 LBS | HEIGHT: 60 IN

## 2021-06-24 DIAGNOSIS — S42.91XD CLOSED FRACTURE OF RIGHT SHOULDER WITH ROUTINE HEALING, SUBSEQUENT ENCOUNTER: Primary | ICD-10-CM

## 2021-06-24 DIAGNOSIS — S42.91XD CLOSED FRACTURE OF RIGHT SHOULDER WITH ROUTINE HEALING, SUBSEQUENT ENCOUNTER: ICD-10-CM

## 2021-06-24 PROCEDURE — 20610 DRAIN/INJ JOINT/BURSA W/O US: CPT | Mod: S$PBB,RT,, | Performed by: ORTHOPAEDIC SURGERY

## 2021-06-24 PROCEDURE — 99213 PR OFFICE/OUTPT VISIT, EST, LEVL III, 20-29 MIN: ICD-10-PCS | Mod: S$PBB,25,, | Performed by: ORTHOPAEDIC SURGERY

## 2021-06-24 PROCEDURE — 99213 OFFICE O/P EST LOW 20 MIN: CPT | Mod: S$PBB,25,, | Performed by: ORTHOPAEDIC SURGERY

## 2021-06-24 PROCEDURE — 73030 XR SHOULDER COMPLETE 2 OR MORE VIEWS RIGHT: ICD-10-PCS | Mod: 26,RT,, | Performed by: RADIOLOGY

## 2021-06-24 PROCEDURE — 20610 DRAIN/INJ JOINT/BURSA W/O US: CPT | Mod: PBBFAC,PN | Performed by: ORTHOPAEDIC SURGERY

## 2021-06-24 PROCEDURE — 73030 X-RAY EXAM OF SHOULDER: CPT | Mod: 26,RT,, | Performed by: RADIOLOGY

## 2021-06-24 PROCEDURE — 20610 PR DRAIN/INJECT LARGE JOINT/BURSA: ICD-10-PCS | Mod: S$PBB,RT,, | Performed by: ORTHOPAEDIC SURGERY

## 2021-06-24 PROCEDURE — 99213 OFFICE O/P EST LOW 20 MIN: CPT | Mod: PBBFAC,25,PN | Performed by: ORTHOPAEDIC SURGERY

## 2021-06-24 PROCEDURE — 99999 PR PBB SHADOW E&M-EST. PATIENT-LVL III: ICD-10-PCS | Mod: PBBFAC,,, | Performed by: ORTHOPAEDIC SURGERY

## 2021-06-24 PROCEDURE — 73030 X-RAY EXAM OF SHOULDER: CPT | Mod: TC,PN,RT

## 2021-06-24 PROCEDURE — 99999 PR PBB SHADOW E&M-EST. PATIENT-LVL III: CPT | Mod: PBBFAC,,, | Performed by: ORTHOPAEDIC SURGERY

## 2021-06-24 RX ORDER — ETODOLAC 400 MG/1
400 TABLET, EXTENDED RELEASE ORAL DAILY
Qty: 30 TABLET | Refills: 0 | Status: SHIPPED | OUTPATIENT
Start: 2021-06-24 | End: 2021-08-01 | Stop reason: SDUPTHER

## 2021-06-24 RX ORDER — TRIAMCINOLONE ACETONIDE 40 MG/ML
40 INJECTION, SUSPENSION INTRA-ARTICULAR; INTRAMUSCULAR
Status: COMPLETED | OUTPATIENT
Start: 2021-06-24 | End: 2021-06-24

## 2021-06-24 RX ADMIN — TRIAMCINOLONE ACETONIDE 40 MG: 40 INJECTION, SUSPENSION INTRA-ARTICULAR; INTRAMUSCULAR at 03:06

## 2021-06-28 ENCOUNTER — TELEPHONE (OUTPATIENT)
Dept: REHABILITATION | Facility: HOSPITAL | Age: 71
End: 2021-06-28

## 2021-07-08 ENCOUNTER — TELEPHONE (OUTPATIENT)
Dept: NEUROLOGY | Facility: CLINIC | Age: 71
End: 2021-07-08

## 2021-07-08 DIAGNOSIS — I63.532 CEREBROVASCULAR ACCIDENT (CVA) DUE TO OCCLUSION OF LEFT POSTERIOR CEREBRAL ARTERY: Primary | ICD-10-CM

## 2021-08-01 RX ORDER — LOSARTAN POTASSIUM 50 MG/1
50 TABLET ORAL DAILY
Qty: 90 TABLET | Refills: 1 | Status: SHIPPED | OUTPATIENT
Start: 2021-08-01 | End: 2022-02-04 | Stop reason: SDUPTHER

## 2021-08-03 ENCOUNTER — TELEPHONE (OUTPATIENT)
Dept: NEUROLOGY | Facility: CLINIC | Age: 71
End: 2021-08-03

## 2021-08-04 ENCOUNTER — PATIENT OUTREACH (OUTPATIENT)
Dept: ADMINISTRATIVE | Facility: OTHER | Age: 71
End: 2021-08-04

## 2021-08-05 ENCOUNTER — HOSPITAL ENCOUNTER (OUTPATIENT)
Dept: CARDIOLOGY | Facility: HOSPITAL | Age: 71
Discharge: HOME | End: 2021-08-05
Attending: PSYCHIATRY & NEUROLOGY
Payer: MEDICARE

## 2021-08-05 ENCOUNTER — OFFICE VISIT (OUTPATIENT)
Dept: ORTHOPEDICS | Facility: CLINIC | Age: 71
End: 2021-08-05
Payer: MEDICARE

## 2021-08-05 VITALS — WEIGHT: 145 LBS | BODY MASS INDEX: 28.32 KG/M2

## 2021-08-05 VITALS — HEIGHT: 60 IN | WEIGHT: 145 LBS | BODY MASS INDEX: 28.47 KG/M2

## 2021-08-05 DIAGNOSIS — S42.91XD CLOSED FRACTURE OF RIGHT SHOULDER WITH ROUTINE HEALING, SUBSEQUENT ENCOUNTER: Primary | ICD-10-CM

## 2021-08-05 DIAGNOSIS — I63.532 CEREBROVASCULAR ACCIDENT (CVA) DUE TO OCCLUSION OF LEFT POSTERIOR CEREBRAL ARTERY: ICD-10-CM

## 2021-08-05 LAB
AORTIC ROOT ANNULUS: 3.51 CM
ASCENDING AORTA: 3.08 CM
AV INDEX (PROSTH): 0.91
AV MEAN GRADIENT: 1 MMHG
AV PEAK GRADIENT: 2 MMHG
AV VALVE AREA: 2.8 CM2
AV VELOCITY RATIO: 0.99
BSA FOR ECHO PROCEDURE: 1.67 M2
CV ECHO LV RWT: 0.43 CM
DOP CALC AO PEAK VEL: 0.77 M/S
DOP CALC AO VTI: 16.86 CM
DOP CALC LVOT AREA: 3.1 CM2
DOP CALC LVOT DIAMETER: 1.98 CM
DOP CALC LVOT PEAK VEL: 0.76 M/S
DOP CALC LVOT STROKE VOLUME: 47.27 CM3
DOP CALCLVOT PEAK VEL VTI: 15.36 CM
E WAVE DECELERATION TIME: 265.22 MSEC
E/A RATIO: 0.95
E/E' RATIO: 20.2 M/S
ECHO LV POSTERIOR WALL: 1.07 CM (ref 0.6–1.1)
EJECTION FRACTION: 55 %
FRACTIONAL SHORTENING: 39 % (ref 28–44)
INTERVENTRICULAR SEPTUM: 1 CM (ref 0.6–1.1)
LA MAJOR: 4.21 CM
LA MINOR: 4.68 CM
LA WIDTH: 3.65 CM
LEFT ATRIUM SIZE: 3.82 CM
LEFT ATRIUM VOLUME INDEX MOD: 26.4 ML/M2
LEFT ATRIUM VOLUME INDEX: 32.2 ML/M2
LEFT ATRIUM VOLUME MOD: 43.03 CM3
LEFT ATRIUM VOLUME: 52.53 CM3
LEFT INTERNAL DIMENSION IN SYSTOLE: 3.01 CM (ref 2.1–4)
LEFT VENTRICLE DIASTOLIC VOLUME INDEX: 70.47 ML/M2
LEFT VENTRICLE DIASTOLIC VOLUME: 114.86 ML
LEFT VENTRICLE MASS INDEX: 115 G/M2
LEFT VENTRICLE SYSTOLIC VOLUME INDEX: 21.7 ML/M2
LEFT VENTRICLE SYSTOLIC VOLUME: 35.29 ML
LEFT VENTRICULAR INTERNAL DIMENSION IN DIASTOLE: 4.94 CM (ref 3.5–6)
LEFT VENTRICULAR MASS: 186.9 G
LV LATERAL E/E' RATIO: 20.2 M/S
LV SEPTAL E/E' RATIO: 20.2 M/S
MV A" WAVE DURATION": 14.27 MSEC
MV MEAN GRADIENT: 1 MMHG
MV PEAK A VEL: 1.06 M/S
MV PEAK E VEL: 1.01 M/S
MV PEAK GRADIENT: 5 MMHG
MV STENOSIS PRESSURE HALF TIME: 76.91 MS
MV VALVE AREA P 1/2 METHOD: 2.86 CM2
PISA TR MAX VEL: 1.57 M/S
PULM VEIN S/D RATIO: 0.85
PV PEAK D VEL: 0.54 M/S
PV PEAK S VEL: 0.46 M/S
PV PEAK VELOCITY: 1.15 CM/S
RA MAJOR: 3.83 CM
RA PRESSURE: 3 MMHG
RA WIDTH: 3.05 CM
RIGHT VENTRICULAR END-DIASTOLIC DIMENSION: 2.39 CM
RV TISSUE DOPPLER FREE WALL SYSTOLIC VELOCITY 1 (APICAL 4 CHAMBER VIEW): 11.13 CM/S
STJ: 2.76 CM
TDI LATERAL: 0.05 M/S
TDI SEPTAL: 0.05 M/S
TDI: 0.05 M/S
TR MAX PG: 10 MMHG
TRICUSPID ANNULAR PLANE SYSTOLIC EXCURSION: 1.81 CM
TV REST PULMONARY ARTERY PRESSURE: 13 MMHG

## 2021-08-05 PROCEDURE — 93306 TTE W/DOPPLER COMPLETE: CPT | Mod: 26,,, | Performed by: INTERNAL MEDICINE

## 2021-08-05 PROCEDURE — 99213 OFFICE O/P EST LOW 20 MIN: CPT | Mod: S$PBB,,, | Performed by: PHYSICIAN ASSISTANT

## 2021-08-05 PROCEDURE — 93306 TTE W/DOPPLER COMPLETE: CPT

## 2021-08-05 PROCEDURE — 99999 PR PBB SHADOW E&M-EST. PATIENT-LVL III: ICD-10-PCS | Mod: PBBFAC,,, | Performed by: PHYSICIAN ASSISTANT

## 2021-08-05 PROCEDURE — 99999 PR PBB SHADOW E&M-EST. PATIENT-LVL III: CPT | Mod: PBBFAC,,, | Performed by: PHYSICIAN ASSISTANT

## 2021-08-05 PROCEDURE — 99213 OFFICE O/P EST LOW 20 MIN: CPT | Mod: PBBFAC,25,PN | Performed by: PHYSICIAN ASSISTANT

## 2021-08-05 PROCEDURE — 93306 ECHO (CUPID ONLY): ICD-10-PCS | Mod: 26,,, | Performed by: INTERNAL MEDICINE

## 2021-08-05 PROCEDURE — 99213 PR OFFICE/OUTPT VISIT, EST, LEVL III, 20-29 MIN: ICD-10-PCS | Mod: S$PBB,,, | Performed by: PHYSICIAN ASSISTANT

## 2021-08-11 ENCOUNTER — HOSPITAL ENCOUNTER (OUTPATIENT)
Dept: RADIOLOGY | Facility: HOSPITAL | Age: 71
Discharge: HOME | End: 2021-08-11
Attending: PHYSICIAN ASSISTANT
Payer: MEDICARE

## 2021-08-11 DIAGNOSIS — S42.91XD CLOSED FRACTURE OF RIGHT SHOULDER WITH ROUTINE HEALING, SUBSEQUENT ENCOUNTER: ICD-10-CM

## 2021-08-11 PROCEDURE — 73221 MRI JOINT UPR EXTREM W/O DYE: CPT | Mod: TC,RT

## 2021-08-11 PROCEDURE — 73221 MRI JOINT UPR EXTREM W/O DYE: CPT | Mod: 26,RT,, | Performed by: RADIOLOGY

## 2021-08-11 PROCEDURE — 73221 MRI SHOULDER WITHOUT CONTRAST RIGHT: ICD-10-PCS | Mod: 26,RT,, | Performed by: RADIOLOGY

## 2021-08-17 ENCOUNTER — OFFICE VISIT (OUTPATIENT)
Dept: ORTHOPEDICS | Facility: CLINIC | Age: 71
End: 2021-08-17
Payer: MEDICARE

## 2021-08-17 VITALS — BODY MASS INDEX: 28.47 KG/M2 | WEIGHT: 145 LBS | HEIGHT: 60 IN

## 2021-08-17 DIAGNOSIS — S42.91XD CLOSED FRACTURE OF RIGHT SHOULDER WITH ROUTINE HEALING, SUBSEQUENT ENCOUNTER: Primary | ICD-10-CM

## 2021-08-17 PROCEDURE — 99213 PR OFFICE/OUTPT VISIT, EST, LEVL III, 20-29 MIN: ICD-10-PCS | Mod: S$PBB,,, | Performed by: PHYSICIAN ASSISTANT

## 2021-08-17 PROCEDURE — 99213 OFFICE O/P EST LOW 20 MIN: CPT | Mod: S$PBB,,, | Performed by: PHYSICIAN ASSISTANT

## 2021-08-17 PROCEDURE — 99213 OFFICE O/P EST LOW 20 MIN: CPT | Mod: PBBFAC,PN | Performed by: PHYSICIAN ASSISTANT

## 2021-08-17 PROCEDURE — 99999 PR PBB SHADOW E&M-EST. PATIENT-LVL III: ICD-10-PCS | Mod: PBBFAC,,, | Performed by: PHYSICIAN ASSISTANT

## 2021-08-17 PROCEDURE — 99999 PR PBB SHADOW E&M-EST. PATIENT-LVL III: CPT | Mod: PBBFAC,,, | Performed by: PHYSICIAN ASSISTANT

## 2021-09-02 ENCOUNTER — PATIENT MESSAGE (OUTPATIENT)
Dept: NEUROLOGY | Facility: CLINIC | Age: 71
End: 2021-09-02

## 2021-09-09 RX ORDER — TRAMADOL HYDROCHLORIDE 50 MG/1
50 TABLET ORAL EVERY 6 HOURS PRN
Qty: 40 TABLET | Refills: 2 | Status: SHIPPED | OUTPATIENT
Start: 2021-09-09 | End: 2021-11-09 | Stop reason: SDUPTHER

## 2021-09-09 RX ORDER — ETODOLAC 400 MG/1
400 TABLET, EXTENDED RELEASE ORAL DAILY
Qty: 30 TABLET | Refills: 0 | Status: SHIPPED | OUTPATIENT
Start: 2021-09-09 | End: 2021-11-03

## 2021-09-21 ENCOUNTER — TELEPHONE (OUTPATIENT)
Dept: FAMILY MEDICINE | Facility: CLINIC | Age: 71
End: 2021-09-21

## 2021-09-21 DIAGNOSIS — N32.81 OVERACTIVE BLADDER: ICD-10-CM

## 2021-09-21 DIAGNOSIS — E55.9 VITAMIN D DEFICIENCY: ICD-10-CM

## 2021-09-21 DIAGNOSIS — E78.5 DYSLIPIDEMIA: ICD-10-CM

## 2021-09-21 DIAGNOSIS — E03.8 OTHER SPECIFIED HYPOTHYROIDISM: Primary | ICD-10-CM

## 2021-09-21 RX ORDER — SOLIFENACIN SUCCINATE 10 MG/1
TABLET, FILM COATED ORAL
Qty: 90 TABLET | Refills: 3 | Status: SHIPPED | OUTPATIENT
Start: 2021-09-21 | End: 2022-08-30

## 2021-09-22 RX ORDER — TRAMADOL HYDROCHLORIDE 50 MG/1
50 TABLET ORAL EVERY 6 HOURS PRN
Qty: 40 TABLET | Refills: 2 | Status: CANCELLED | OUTPATIENT
Start: 2021-09-22

## 2021-09-24 ENCOUNTER — PATIENT OUTREACH (OUTPATIENT)
Dept: ADMINISTRATIVE | Facility: OTHER | Age: 71
End: 2021-09-24

## 2021-09-24 ENCOUNTER — LAB VISIT (OUTPATIENT)
Dept: LAB | Facility: HOSPITAL | Age: 71
End: 2021-09-24
Attending: INTERNAL MEDICINE
Payer: MEDICARE

## 2021-09-24 DIAGNOSIS — E55.9 VITAMIN D DEFICIENCY: ICD-10-CM

## 2021-09-24 DIAGNOSIS — E78.5 DYSLIPIDEMIA: ICD-10-CM

## 2021-09-24 DIAGNOSIS — E03.8 OTHER SPECIFIED HYPOTHYROIDISM: ICD-10-CM

## 2021-09-24 LAB
25(OH)D3+25(OH)D2 SERPL-MCNC: 39 NG/ML (ref 30–96)
ALBUMIN SERPL BCP-MCNC: 3.4 G/DL (ref 3.5–5.2)
ALP SERPL-CCNC: 63 U/L (ref 55–135)
ALT SERPL W/O P-5'-P-CCNC: 20 U/L (ref 10–44)
ANION GAP SERPL CALC-SCNC: 9 MMOL/L (ref 8–16)
AST SERPL-CCNC: 23 U/L (ref 10–40)
BASOPHILS # BLD AUTO: 0.07 K/UL (ref 0–0.2)
BASOPHILS NFR BLD: 1.1 % (ref 0–1.9)
BILIRUB SERPL-MCNC: 0.6 MG/DL (ref 0.1–1)
BUN SERPL-MCNC: 19 MG/DL (ref 8–23)
CALCIUM SERPL-MCNC: 9.6 MG/DL (ref 8.7–10.5)
CHLORIDE SERPL-SCNC: 108 MMOL/L (ref 95–110)
CHOLEST SERPL-MCNC: 161 MG/DL (ref 120–199)
CHOLEST/HDLC SERPL: 3.5 {RATIO} (ref 2–5)
CO2 SERPL-SCNC: 24 MMOL/L (ref 23–29)
CREAT SERPL-MCNC: 1 MG/DL (ref 0.5–1.4)
DIFFERENTIAL METHOD: ABNORMAL
EOSINOPHIL # BLD AUTO: 0.4 K/UL (ref 0–0.5)
EOSINOPHIL NFR BLD: 6 % (ref 0–8)
ERYTHROCYTE [DISTWIDTH] IN BLOOD BY AUTOMATED COUNT: 13.4 % (ref 11.5–14.5)
EST. GFR  (AFRICAN AMERICAN): >60 ML/MIN/1.73 M^2
EST. GFR  (NON AFRICAN AMERICAN): 56 ML/MIN/1.73 M^2
GLUCOSE SERPL-MCNC: 94 MG/DL (ref 70–110)
HCT VFR BLD AUTO: 38.7 % (ref 37–48.5)
HDLC SERPL-MCNC: 46 MG/DL (ref 40–75)
HDLC SERPL: 28.6 % (ref 20–50)
HGB BLD-MCNC: 12.3 G/DL (ref 12–16)
IMM GRANULOCYTES # BLD AUTO: 0.02 K/UL (ref 0–0.04)
IMM GRANULOCYTES NFR BLD AUTO: 0.3 % (ref 0–0.5)
LDLC SERPL CALC-MCNC: 96 MG/DL (ref 63–159)
LYMPHOCYTES # BLD AUTO: 1.1 K/UL (ref 1–4.8)
LYMPHOCYTES NFR BLD: 17.1 % (ref 18–48)
MCH RBC QN AUTO: 30.7 PG (ref 27–31)
MCHC RBC AUTO-ENTMCNC: 31.8 G/DL (ref 32–36)
MCV RBC AUTO: 97 FL (ref 82–98)
MONOCYTES # BLD AUTO: 0.7 K/UL (ref 0.3–1)
MONOCYTES NFR BLD: 11 % (ref 4–15)
NEUTROPHILS # BLD AUTO: 4.3 K/UL (ref 1.8–7.7)
NEUTROPHILS NFR BLD: 64.5 % (ref 38–73)
NONHDLC SERPL-MCNC: 115 MG/DL
NRBC BLD-RTO: 0 /100 WBC
PLATELET # BLD AUTO: 245 K/UL (ref 150–450)
PMV BLD AUTO: 10.1 FL (ref 9.2–12.9)
POTASSIUM SERPL-SCNC: 4.8 MMOL/L (ref 3.5–5.1)
PROT SERPL-MCNC: 6.9 G/DL (ref 6–8.4)
RBC # BLD AUTO: 4.01 M/UL (ref 4–5.4)
SODIUM SERPL-SCNC: 141 MMOL/L (ref 136–145)
T4 FREE SERPL-MCNC: 1.32 NG/DL (ref 0.71–1.51)
TRIGL SERPL-MCNC: 95 MG/DL (ref 30–150)
TSH SERPL DL<=0.005 MIU/L-ACNC: 0.51 UIU/ML (ref 0.4–4)
WBC # BLD AUTO: 6.62 K/UL (ref 3.9–12.7)

## 2021-09-24 PROCEDURE — 82306 VITAMIN D 25 HYDROXY: CPT | Performed by: INTERNAL MEDICINE

## 2021-09-24 PROCEDURE — 85025 COMPLETE CBC W/AUTO DIFF WBC: CPT | Performed by: INTERNAL MEDICINE

## 2021-09-24 PROCEDURE — 80061 LIPID PANEL: CPT | Performed by: INTERNAL MEDICINE

## 2021-09-24 PROCEDURE — 36415 COLL VENOUS BLD VENIPUNCTURE: CPT | Performed by: INTERNAL MEDICINE

## 2021-09-24 PROCEDURE — 84439 ASSAY OF FREE THYROXINE: CPT | Performed by: INTERNAL MEDICINE

## 2021-09-24 PROCEDURE — 80053 COMPREHEN METABOLIC PANEL: CPT | Performed by: INTERNAL MEDICINE

## 2021-09-24 PROCEDURE — 84443 ASSAY THYROID STIM HORMONE: CPT | Performed by: INTERNAL MEDICINE

## 2021-09-28 ENCOUNTER — OFFICE VISIT (OUTPATIENT)
Dept: ORTHOPEDICS | Facility: CLINIC | Age: 71
End: 2021-09-28
Payer: MEDICARE

## 2021-09-28 VITALS — HEIGHT: 60 IN | BODY MASS INDEX: 28.48 KG/M2 | WEIGHT: 145.06 LBS

## 2021-09-28 DIAGNOSIS — S42.91XD CLOSED FRACTURE OF RIGHT SHOULDER WITH ROUTINE HEALING, SUBSEQUENT ENCOUNTER: Primary | ICD-10-CM

## 2021-09-28 PROCEDURE — 20610 PR DRAIN/INJECT LARGE JOINT/BURSA: ICD-10-PCS | Mod: S$PBB,RT,, | Performed by: ORTHOPAEDIC SURGERY

## 2021-09-28 PROCEDURE — 20610 DRAIN/INJ JOINT/BURSA W/O US: CPT | Mod: S$PBB,RT,, | Performed by: ORTHOPAEDIC SURGERY

## 2021-09-28 PROCEDURE — 20610 DRAIN/INJ JOINT/BURSA W/O US: CPT | Mod: PBBFAC,PN,RT | Performed by: ORTHOPAEDIC SURGERY

## 2021-09-28 PROCEDURE — 99213 PR OFFICE/OUTPT VISIT, EST, LEVL III, 20-29 MIN: ICD-10-PCS | Mod: S$PBB,25,, | Performed by: ORTHOPAEDIC SURGERY

## 2021-09-28 PROCEDURE — 99213 OFFICE O/P EST LOW 20 MIN: CPT | Mod: S$PBB,25,, | Performed by: ORTHOPAEDIC SURGERY

## 2021-09-28 PROCEDURE — 99999 PR PBB SHADOW E&M-EST. PATIENT-LVL III: CPT | Mod: PBBFAC,,, | Performed by: ORTHOPAEDIC SURGERY

## 2021-09-28 PROCEDURE — 99213 OFFICE O/P EST LOW 20 MIN: CPT | Mod: PBBFAC,PN,25 | Performed by: ORTHOPAEDIC SURGERY

## 2021-09-28 PROCEDURE — 99999 PR PBB SHADOW E&M-EST. PATIENT-LVL III: ICD-10-PCS | Mod: PBBFAC,,, | Performed by: ORTHOPAEDIC SURGERY

## 2021-09-28 RX ORDER — TRIAMCINOLONE ACETONIDE 40 MG/ML
40 INJECTION, SUSPENSION INTRA-ARTICULAR; INTRAMUSCULAR
Status: COMPLETED | OUTPATIENT
Start: 2021-09-28 | End: 2021-09-28

## 2021-09-28 RX ADMIN — TRIAMCINOLONE ACETONIDE 40 MG: 40 INJECTION, SUSPENSION INTRA-ARTICULAR; INTRAMUSCULAR at 09:09

## 2021-09-30 ENCOUNTER — OFFICE VISIT (OUTPATIENT)
Dept: FAMILY MEDICINE | Facility: CLINIC | Age: 71
End: 2021-09-30
Payer: MEDICARE

## 2021-09-30 VITALS
DIASTOLIC BLOOD PRESSURE: 76 MMHG | BODY MASS INDEX: 26.01 KG/M2 | TEMPERATURE: 98 F | OXYGEN SATURATION: 94 % | SYSTOLIC BLOOD PRESSURE: 136 MMHG | WEIGHT: 133.19 LBS | HEART RATE: 61 BPM

## 2021-09-30 DIAGNOSIS — M81.0 AGE-RELATED OSTEOPOROSIS WITHOUT CURRENT PATHOLOGICAL FRACTURE: ICD-10-CM

## 2021-09-30 DIAGNOSIS — I12.9 BENIGN HYPERTENSION WITH CKD (CHRONIC KIDNEY DISEASE) STAGE III: ICD-10-CM

## 2021-09-30 DIAGNOSIS — I49.3 PVC (PREMATURE VENTRICULAR CONTRACTION): ICD-10-CM

## 2021-09-30 DIAGNOSIS — Z78.9 FALSE POSITIVE CARDIAC STRESS TEST: ICD-10-CM

## 2021-09-30 DIAGNOSIS — E78.5 DYSLIPIDEMIA: ICD-10-CM

## 2021-09-30 DIAGNOSIS — F41.9 ANXIETY: ICD-10-CM

## 2021-09-30 DIAGNOSIS — E03.8 OTHER SPECIFIED HYPOTHYROIDISM: ICD-10-CM

## 2021-09-30 DIAGNOSIS — G43.809 OTHER MIGRAINE WITHOUT STATUS MIGRAINOSUS, NOT INTRACTABLE: ICD-10-CM

## 2021-09-30 DIAGNOSIS — S42.91XD CLOSED FRACTURE OF RIGHT SHOULDER WITH ROUTINE HEALING, SUBSEQUENT ENCOUNTER: ICD-10-CM

## 2021-09-30 DIAGNOSIS — I10 ESSENTIAL HYPERTENSION: ICD-10-CM

## 2021-09-30 DIAGNOSIS — N18.30 BENIGN HYPERTENSION WITH CKD (CHRONIC KIDNEY DISEASE) STAGE III: ICD-10-CM

## 2021-09-30 DIAGNOSIS — K21.9 GASTROESOPHAGEAL REFLUX DISEASE WITHOUT ESOPHAGITIS: ICD-10-CM

## 2021-09-30 DIAGNOSIS — Z12.31 ENCOUNTER FOR SCREENING MAMMOGRAM FOR MALIGNANT NEOPLASM OF BREAST: Primary | ICD-10-CM

## 2021-09-30 DIAGNOSIS — K58.9 IRRITABLE BOWEL SYNDROME, UNSPECIFIED TYPE: ICD-10-CM

## 2021-09-30 DIAGNOSIS — N32.81 OVERACTIVE BLADDER: ICD-10-CM

## 2021-09-30 DIAGNOSIS — Z86.73 HISTORY OF CEREBRAL INFARCTION: ICD-10-CM

## 2021-09-30 DIAGNOSIS — E55.9 VITAMIN D DEFICIENCY: ICD-10-CM

## 2021-09-30 PROCEDURE — 99214 OFFICE O/P EST MOD 30 MIN: CPT | Mod: PBBFAC,PN | Performed by: INTERNAL MEDICINE

## 2021-09-30 PROCEDURE — 99214 OFFICE O/P EST MOD 30 MIN: CPT | Mod: S$PBB,,, | Performed by: INTERNAL MEDICINE

## 2021-09-30 PROCEDURE — 99999 PR PBB SHADOW E&M-EST. PATIENT-LVL IV: CPT | Mod: PBBFAC,,, | Performed by: INTERNAL MEDICINE

## 2021-09-30 PROCEDURE — 99214 PR OFFICE/OUTPT VISIT, EST, LEVL IV, 30-39 MIN: ICD-10-PCS | Mod: S$PBB,,, | Performed by: INTERNAL MEDICINE

## 2021-09-30 PROCEDURE — 99999 PR PBB SHADOW E&M-EST. PATIENT-LVL IV: ICD-10-PCS | Mod: PBBFAC,,, | Performed by: INTERNAL MEDICINE

## 2021-09-30 RX ORDER — ESCITALOPRAM OXALATE 10 MG/1
10 TABLET ORAL DAILY
Qty: 90 TABLET | Refills: 3 | Status: SHIPPED | OUTPATIENT
Start: 2021-09-30 | End: 2022-03-29 | Stop reason: SDUPTHER

## 2021-09-30 RX ORDER — LEVOTHYROXINE SODIUM 100 UG/1
100 TABLET ORAL
Qty: 90 TABLET | Refills: 3 | Status: SHIPPED | OUTPATIENT
Start: 2021-09-30 | End: 2022-03-29 | Stop reason: SDUPTHER

## 2021-09-30 RX ORDER — OMEPRAZOLE 40 MG/1
40 CAPSULE, DELAYED RELEASE ORAL DAILY
Qty: 90 CAPSULE | Refills: 3 | Status: SHIPPED | OUTPATIENT
Start: 2021-09-30 | End: 2022-09-21

## 2021-09-30 RX ORDER — BUTALBITAL, ACETAMINOPHEN AND CAFFEINE 50; 325; 40 MG/1; MG/1; MG/1
1 TABLET ORAL NIGHTLY PRN
Qty: 30 TABLET | Refills: 5 | Status: SHIPPED | OUTPATIENT
Start: 2021-09-30

## 2021-10-05 ENCOUNTER — PATIENT MESSAGE (OUTPATIENT)
Dept: ADMINISTRATIVE | Facility: HOSPITAL | Age: 71
End: 2021-10-05

## 2021-10-27 ENCOUNTER — PATIENT MESSAGE (OUTPATIENT)
Dept: FAMILY MEDICINE | Facility: CLINIC | Age: 71
End: 2021-10-27
Payer: MEDICARE

## 2021-10-29 ENCOUNTER — TELEPHONE (OUTPATIENT)
Dept: ADMINISTRATIVE | Facility: HOSPITAL | Age: 71
End: 2021-10-29
Payer: MEDICARE

## 2021-11-08 ENCOUNTER — PATIENT OUTREACH (OUTPATIENT)
Dept: ADMINISTRATIVE | Facility: OTHER | Age: 71
End: 2021-11-08
Payer: MEDICARE

## 2021-11-09 ENCOUNTER — OFFICE VISIT (OUTPATIENT)
Dept: ORTHOPEDICS | Facility: CLINIC | Age: 71
End: 2021-11-09
Payer: MEDICARE

## 2021-11-09 VITALS — WEIGHT: 133 LBS | BODY MASS INDEX: 25.97 KG/M2

## 2021-11-09 DIAGNOSIS — S42.91XD CLOSED FRACTURE OF RIGHT SHOULDER WITH ROUTINE HEALING, SUBSEQUENT ENCOUNTER: Primary | ICD-10-CM

## 2021-11-09 PROCEDURE — 20610 DRAIN/INJ JOINT/BURSA W/O US: CPT | Mod: S$PBB,RT,, | Performed by: ORTHOPAEDIC SURGERY

## 2021-11-09 PROCEDURE — 99213 PR OFFICE/OUTPT VISIT, EST, LEVL III, 20-29 MIN: ICD-10-PCS | Mod: 25,S$PBB,, | Performed by: ORTHOPAEDIC SURGERY

## 2021-11-09 PROCEDURE — 99999 PR PBB SHADOW E&M-EST. PATIENT-LVL III: CPT | Mod: PBBFAC,,, | Performed by: ORTHOPAEDIC SURGERY

## 2021-11-09 PROCEDURE — 20610 DRAIN/INJ JOINT/BURSA W/O US: CPT | Mod: PBBFAC,PN,RT | Performed by: ORTHOPAEDIC SURGERY

## 2021-11-09 PROCEDURE — 99999 PR PBB SHADOW E&M-EST. PATIENT-LVL III: ICD-10-PCS | Mod: PBBFAC,,, | Performed by: ORTHOPAEDIC SURGERY

## 2021-11-09 PROCEDURE — 99213 OFFICE O/P EST LOW 20 MIN: CPT | Mod: PBBFAC,PN | Performed by: ORTHOPAEDIC SURGERY

## 2021-11-09 PROCEDURE — 99213 OFFICE O/P EST LOW 20 MIN: CPT | Mod: 25,S$PBB,, | Performed by: ORTHOPAEDIC SURGERY

## 2021-11-09 PROCEDURE — 20610 PR DRAIN/INJECT LARGE JOINT/BURSA: ICD-10-PCS | Mod: S$PBB,RT,, | Performed by: ORTHOPAEDIC SURGERY

## 2021-11-09 RX ORDER — TRAMADOL HYDROCHLORIDE 50 MG/1
50 TABLET ORAL EVERY 12 HOURS PRN
Qty: 40 TABLET | Refills: 2 | Status: SHIPPED | OUTPATIENT
Start: 2021-11-09 | End: 2022-02-04 | Stop reason: SDUPTHER

## 2021-11-09 RX ORDER — ETODOLAC 400 MG/1
TABLET, EXTENDED RELEASE ORAL
Qty: 30 TABLET | Refills: 3 | Status: SHIPPED | OUTPATIENT
Start: 2021-11-09 | End: 2022-01-18 | Stop reason: SDUPTHER

## 2021-11-09 RX ORDER — TRIAMCINOLONE ACETONIDE 40 MG/ML
40 INJECTION, SUSPENSION INTRA-ARTICULAR; INTRAMUSCULAR
Status: COMPLETED | OUTPATIENT
Start: 2021-11-09 | End: 2021-11-09

## 2021-11-09 RX ADMIN — TRIAMCINOLONE ACETONIDE 40 MG: 40 INJECTION, SUSPENSION INTRA-ARTICULAR; INTRAMUSCULAR at 09:11

## 2021-11-19 ENCOUNTER — TELEPHONE (OUTPATIENT)
Dept: FAMILY MEDICINE | Facility: CLINIC | Age: 71
End: 2021-11-19
Payer: MEDICARE

## 2021-11-19 DIAGNOSIS — M80.00XD AGE-RELATED OSTEOPOROSIS WITH CURRENT PATHOLOGICAL FRACTURE WITH ROUTINE HEALING: ICD-10-CM

## 2021-12-29 ENCOUNTER — INFUSION (OUTPATIENT)
Dept: INFECTIOUS DISEASES | Facility: HOSPITAL | Age: 71
End: 2021-12-29
Attending: INTERNAL MEDICINE
Payer: MEDICARE

## 2021-12-29 VITALS
WEIGHT: 133.69 LBS | SYSTOLIC BLOOD PRESSURE: 162 MMHG | BODY MASS INDEX: 26.11 KG/M2 | HEART RATE: 70 BPM | DIASTOLIC BLOOD PRESSURE: 70 MMHG | RESPIRATION RATE: 16 BRPM | OXYGEN SATURATION: 97 % | TEMPERATURE: 97 F

## 2021-12-29 DIAGNOSIS — M80.00XD AGE-RELATED OSTEOPOROSIS WITH CURRENT PATHOLOGICAL FRACTURE WITH ROUTINE HEALING: Primary | ICD-10-CM

## 2021-12-29 DIAGNOSIS — M81.0 AGE-RELATED OSTEOPOROSIS WITHOUT CURRENT PATHOLOGICAL FRACTURE: ICD-10-CM

## 2021-12-29 DIAGNOSIS — M25.611 DECREASED RANGE OF MOTION OF RIGHT SHOULDER: ICD-10-CM

## 2021-12-29 PROCEDURE — 96372 THER/PROPH/DIAG INJ SC/IM: CPT

## 2021-12-29 PROCEDURE — 63600175 PHARM REV CODE 636 W HCPCS: Mod: JG | Performed by: INTERNAL MEDICINE

## 2021-12-29 RX ADMIN — DENOSUMAB 60 MG: 60 INJECTION SUBCUTANEOUS at 11:12

## 2022-01-03 ENCOUNTER — HOSPITAL ENCOUNTER (EMERGENCY)
Facility: HOSPITAL | Age: 72
Discharge: HOME | End: 2022-01-03
Attending: EMERGENCY MEDICINE
Payer: MEDICARE

## 2022-01-03 VITALS
RESPIRATION RATE: 18 BRPM | HEIGHT: 60 IN | DIASTOLIC BLOOD PRESSURE: 89 MMHG | WEIGHT: 130 LBS | HEART RATE: 73 BPM | TEMPERATURE: 98 F | OXYGEN SATURATION: 97 % | SYSTOLIC BLOOD PRESSURE: 117 MMHG | BODY MASS INDEX: 25.52 KG/M2

## 2022-01-03 DIAGNOSIS — R53.1 WEAKNESS: ICD-10-CM

## 2022-01-03 DIAGNOSIS — R51.9 NONINTRACTABLE HEADACHE, UNSPECIFIED CHRONICITY PATTERN, UNSPECIFIED HEADACHE TYPE: Primary | ICD-10-CM

## 2022-01-03 LAB
ALBUMIN SERPL BCP-MCNC: 3.2 G/DL (ref 3.5–5.2)
ALP SERPL-CCNC: 58 U/L (ref 55–135)
ALT SERPL W/O P-5'-P-CCNC: 27 U/L (ref 10–44)
ANION GAP SERPL CALC-SCNC: 7 MMOL/L (ref 8–16)
AST SERPL-CCNC: 25 U/L (ref 10–40)
BACTERIA #/AREA URNS AUTO: ABNORMAL /HPF
BASOPHILS # BLD AUTO: 0.03 K/UL (ref 0–0.2)
BASOPHILS NFR BLD: 0.4 % (ref 0–1.9)
BILIRUB SERPL-MCNC: 0.4 MG/DL (ref 0.1–1)
BILIRUB UR QL STRIP: NEGATIVE
BUN SERPL-MCNC: 11 MG/DL (ref 8–23)
CALCIUM SERPL-MCNC: 7.3 MG/DL (ref 8.7–10.5)
CHLORIDE SERPL-SCNC: 112 MMOL/L (ref 95–110)
CLARITY UR REFRACT.AUTO: CLEAR
CO2 SERPL-SCNC: 22 MMOL/L (ref 23–29)
COLOR UR AUTO: YELLOW
CREAT SERPL-MCNC: 0.8 MG/DL (ref 0.5–1.4)
CTP QC/QA: YES
DIFFERENTIAL METHOD: ABNORMAL
EOSINOPHIL # BLD AUTO: 0.1 K/UL (ref 0–0.5)
EOSINOPHIL NFR BLD: 1.1 % (ref 0–8)
ERYTHROCYTE [DISTWIDTH] IN BLOOD BY AUTOMATED COUNT: 13.1 % (ref 11.5–14.5)
EST. GFR  (AFRICAN AMERICAN): >60 ML/MIN/1.73 M^2
EST. GFR  (NON AFRICAN AMERICAN): >60 ML/MIN/1.73 M^2
GLUCOSE SERPL-MCNC: 81 MG/DL (ref 70–110)
GLUCOSE UR QL STRIP: NEGATIVE
HCT VFR BLD AUTO: 39.8 % (ref 37–48.5)
HGB BLD-MCNC: 12.6 G/DL (ref 12–16)
HGB UR QL STRIP: NEGATIVE
HYALINE CASTS UR QL AUTO: 15 /LPF
IMM GRANULOCYTES # BLD AUTO: 0.03 K/UL (ref 0–0.04)
IMM GRANULOCYTES NFR BLD AUTO: 0.4 % (ref 0–0.5)
KETONES UR QL STRIP: NEGATIVE
LEUKOCYTE ESTERASE UR QL STRIP: ABNORMAL
LYMPHOCYTES # BLD AUTO: 1.3 K/UL (ref 1–4.8)
LYMPHOCYTES NFR BLD: 17.6 % (ref 18–48)
MAGNESIUM SERPL-MCNC: 1.6 MG/DL (ref 1.6–2.6)
MCH RBC QN AUTO: 31.3 PG (ref 27–31)
MCHC RBC AUTO-ENTMCNC: 31.7 G/DL (ref 32–36)
MCV RBC AUTO: 99 FL (ref 82–98)
MICROSCOPIC COMMENT: ABNORMAL
MONOCYTES # BLD AUTO: 1 K/UL (ref 0.3–1)
MONOCYTES NFR BLD: 12.5 % (ref 4–15)
NEUTROPHILS # BLD AUTO: 5.2 K/UL (ref 1.8–7.7)
NEUTROPHILS NFR BLD: 68 % (ref 38–73)
NITRITE UR QL STRIP: NEGATIVE
NRBC BLD-RTO: 0 /100 WBC
PH UR STRIP: 6 [PH] (ref 5–8)
PHOSPHATE SERPL-MCNC: 1.8 MG/DL (ref 2.7–4.5)
PLATELET # BLD AUTO: 239 K/UL (ref 150–450)
PMV BLD AUTO: 10 FL (ref 9.2–12.9)
POTASSIUM SERPL-SCNC: 4.2 MMOL/L (ref 3.5–5.1)
PROT SERPL-MCNC: 6.4 G/DL (ref 6–8.4)
PROT UR QL STRIP: NEGATIVE
RBC # BLD AUTO: 4.03 M/UL (ref 4–5.4)
RBC #/AREA URNS AUTO: 2 /HPF (ref 0–4)
SARS-COV-2 RDRP RESP QL NAA+PROBE: NEGATIVE
SODIUM SERPL-SCNC: 141 MMOL/L (ref 136–145)
SP GR UR STRIP: 1.01 (ref 1–1.03)
TROPONIN I SERPL DL<=0.01 NG/ML-MCNC: 0.01 NG/ML (ref 0–0.03)
TSH SERPL DL<=0.005 MIU/L-ACNC: 0.56 UIU/ML (ref 0.4–4)
URN SPEC COLLECT METH UR: ABNORMAL
WBC # BLD AUTO: 7.61 K/UL (ref 3.9–12.7)
WBC #/AREA URNS AUTO: 4 /HPF (ref 0–5)

## 2022-01-03 PROCEDURE — 93010 ELECTROCARDIOGRAM REPORT: CPT | Mod: ,,, | Performed by: INTERNAL MEDICINE

## 2022-01-03 PROCEDURE — 84484 ASSAY OF TROPONIN QUANT: CPT | Performed by: STUDENT IN AN ORGANIZED HEALTH CARE EDUCATION/TRAINING PROGRAM

## 2022-01-03 PROCEDURE — 85025 COMPLETE CBC W/AUTO DIFF WBC: CPT | Performed by: STUDENT IN AN ORGANIZED HEALTH CARE EDUCATION/TRAINING PROGRAM

## 2022-01-03 PROCEDURE — 96374 THER/PROPH/DIAG INJ IV PUSH: CPT

## 2022-01-03 PROCEDURE — U0002 COVID-19 LAB TEST NON-CDC: HCPCS | Performed by: EMERGENCY MEDICINE

## 2022-01-03 PROCEDURE — 83735 ASSAY OF MAGNESIUM: CPT | Performed by: STUDENT IN AN ORGANIZED HEALTH CARE EDUCATION/TRAINING PROGRAM

## 2022-01-03 PROCEDURE — 63600175 PHARM REV CODE 636 W HCPCS: Performed by: STUDENT IN AN ORGANIZED HEALTH CARE EDUCATION/TRAINING PROGRAM

## 2022-01-03 PROCEDURE — 25000003 PHARM REV CODE 250: Performed by: STUDENT IN AN ORGANIZED HEALTH CARE EDUCATION/TRAINING PROGRAM

## 2022-01-03 PROCEDURE — 96375 TX/PRO/DX INJ NEW DRUG ADDON: CPT

## 2022-01-03 PROCEDURE — 84443 ASSAY THYROID STIM HORMONE: CPT | Performed by: STUDENT IN AN ORGANIZED HEALTH CARE EDUCATION/TRAINING PROGRAM

## 2022-01-03 PROCEDURE — 80053 COMPREHEN METABOLIC PANEL: CPT | Performed by: STUDENT IN AN ORGANIZED HEALTH CARE EDUCATION/TRAINING PROGRAM

## 2022-01-03 PROCEDURE — 81001 URINALYSIS AUTO W/SCOPE: CPT | Performed by: STUDENT IN AN ORGANIZED HEALTH CARE EDUCATION/TRAINING PROGRAM

## 2022-01-03 PROCEDURE — 96361 HYDRATE IV INFUSION ADD-ON: CPT

## 2022-01-03 PROCEDURE — 84100 ASSAY OF PHOSPHORUS: CPT | Performed by: STUDENT IN AN ORGANIZED HEALTH CARE EDUCATION/TRAINING PROGRAM

## 2022-01-03 PROCEDURE — 93005 ELECTROCARDIOGRAM TRACING: CPT

## 2022-01-03 PROCEDURE — 93010 EKG 12-LEAD: ICD-10-PCS | Mod: ,,, | Performed by: INTERNAL MEDICINE

## 2022-01-03 PROCEDURE — 99284 PR EMERGENCY DEPT VISIT,LEVEL IV: ICD-10-PCS | Mod: GC,CS,, | Performed by: EMERGENCY MEDICINE

## 2022-01-03 PROCEDURE — 99284 EMERGENCY DEPT VISIT MOD MDM: CPT | Mod: 25

## 2022-01-03 PROCEDURE — 99284 EMERGENCY DEPT VISIT MOD MDM: CPT | Mod: GC,CS,, | Performed by: EMERGENCY MEDICINE

## 2022-01-03 RX ORDER — METOCLOPRAMIDE HYDROCHLORIDE 5 MG/ML
10 INJECTION INTRAMUSCULAR; INTRAVENOUS
Status: COMPLETED | OUTPATIENT
Start: 2022-01-03 | End: 2022-01-03

## 2022-01-03 RX ORDER — DIPHENHYDRAMINE HYDROCHLORIDE 50 MG/ML
25 INJECTION INTRAMUSCULAR; INTRAVENOUS
Status: COMPLETED | OUTPATIENT
Start: 2022-01-03 | End: 2022-01-03

## 2022-01-03 RX ADMIN — SODIUM CHLORIDE 1000 ML: 0.9 INJECTION, SOLUTION INTRAVENOUS at 01:01

## 2022-01-03 RX ADMIN — METOCLOPRAMIDE 10 MG: 5 INJECTION, SOLUTION INTRAMUSCULAR; INTRAVENOUS at 01:01

## 2022-01-03 RX ADMIN — DIPHENHYDRAMINE HYDROCHLORIDE 25 MG: 50 INJECTION INTRAMUSCULAR; INTRAVENOUS at 01:01

## 2022-01-03 NOTE — ED NOTES
LOC: The patient is awake, alert, and oriented to self, place, time, and situation. Pt is calm and cooperative. Affect is appropriate.  Speech is appropriate and clear.     APPEARANCE: Patient resting comfortably in no acute distress.  Patient is clean and well groomed.    SKIN: The skin is warm and dry; color consistent with ethnicity.  Patient has normal skin turgor and moist mucus membranes.  Skin intact; no breakdown or bruising noted.     MUSCULOSKELETAL: Patient moving upper and lower extremities without difficulty; denies pain in the extremities or back.  Reports generalized weakness.     RESPIRATORY: Airway is open and patent. Respirations spontaneous, even, easy, and non-labored.  Patient has a normal effort and rate.  No accessory muscle use noted. Denies cough.     CARDIAC:  Normal rate noted.  No peripheral edema noted. No complaints of chest pain.      ABDOMEN: Soft and non tender to palpation.  No distention noted. Pt denies abdominal pain; denies nausea, vomiting, diarrhea, or constipation.    NEUROLOGIC: Eyes open spontaneously.  Behavior appropriate to situation.  Follows commands; facial expression symmetrical.  Purposeful motor response noted; normal sensation in all extremities. Pt reports headache; denies lightheadedness or dizziness; reports visual disturbances when having a headache; denies loss of balance; denies unilateral weakness.

## 2022-01-03 NOTE — ED PROVIDER NOTES
I received signout from the previous provider.     Chief complaint:  Headache (Total body weakness in bed for 2d)      Pertinent history &exam:  Denise VIVAS Coudoryss is a 71 y.o. female with pertinent PMH of migraines, hypertension, and Graves disease  who presented to emergency department with complaint of HA    Vitals:    01/03/22 1134   BP: 117/89   Pulse: 73   Resp: 18   Temp: 98.4 °F (36.9 °C)       Imaging Studies:    No orders to display       Medications Given:  Medications   sodium chloride 0.9% bolus 1,000 mL (0 mLs Intravenous Stopped 1/3/22 1440)   metoclopramide HCl injection 10 mg (10 mg Intravenous Given 1/3/22 1342)   diphenhydrAMINE injection 25 mg (25 mg Intravenous Given 1/3/22 1341)       Pending Items/ MDM:  Patient was signed out pending labs.  I have reviewed them and they are unremarkable.  Her urinalysis showed 1+ leukocytes and rare bacteria the patient has no urinary symptoms never does not require treatment of asymptomatic bacteriuria. Patient is requesting to leave, she feels much better after receiving fluids.      Diagnostic Impression:    1. Nonintractable headache, unspecified chronicity pattern, unspecified headache type    2. Weakness         ED Disposition Condition    Discharge Good        ED Prescriptions     None        Follow-up Information     Follow up With Specialties Details Why Contact Info    Aleksandra Carey MD Internal Medicine Schedule an appointment as soon as possible for a visit  To discuss your recent ER visit and any additional concerns that you may have [address]  [address]  [facility] [address]  [phone]      [facility] - Emergency Dept Emergency Medicine Go to  As needed, If symptoms worsen [address] [address] [phone]          Patient and/or family understands the plan and is in agreement, verbalized understanding, questions answered              Harsha Zhou MD  Emergency Medicine       Harsha Zhou,  MD  Resident  01/03/22 3458       Jared Cline MD  01/04/22 1210

## 2022-01-03 NOTE — ED PROVIDER NOTES
Encounter Date: 1/3/2022       History     Chief Complaint   Patient presents with    Headache     Total body weakness in bed for 2d     71-year-old female with PMH of migraines, hypertension, and Graves disease presents with headache and generalized weakness.  The headache started 2 days ago, is intermittent, frontal, currently 3/10 intensity, and associated with aura and nausea.  Patient did not take any medications but has had significant improvement in her headache.  She reports that 2 days ago she all of a sudden started feeling very weak with body aches.  She denies any focal weakness or difficulty with speech.  She denies fever, chills, cough, congestion, chest pain, shortness of breath, abdominal pain, vomiting, dysuria, hematuria, diarrhea, constipation, and black/bloody stools.  No syncopal episodes.  She is not actively nauseous.  Overall she is feeling much better but the symptoms were concerning yesterday so she wanted to come in for evaluation.    The history is provided by the patient.     Review of patient's allergies indicates:   Allergen Reactions    Phenobarbital Anaphylaxis    Penicillins Other (See Comments)    Phenobarbital sodium     Propylthiouracil     Thyroid      Note: PTU    Topamax [topiramate] Diarrhea     GI symptoms    Cefaclor Rash and Other (See Comments)    Clopidogrel Rash    Methimazole Rash and Other (See Comments)    Methimazole (bulk) Rash    Penicillin Rash    Sulfa (sulfonamide antibiotics) Rash and Other (See Comments)     Past Medical History:   Diagnosis Date    Acid reflux     Graves disease     diffuse Toxic Goiter    Hypertension     IBS (irritable bowel syndrome)     Lichen sclerosus     Migraine     Stroke      Past Surgical History:   Procedure Laterality Date    EYE SURGERY      HYSTERECTOMY      CINDI for ?? cervical cancer, had normal paps    LEFT HEART CATHETERIZATION N/A 10/20/2020    Procedure: Left heart cath;  Surgeon: Bony Schultz,  MD;  Location: [facility] CATH LAB/EP;  Service: Cardiology;  Laterality: N/A;     Family History   Problem Relation Age of Onset    Kidney failure Father     Hypertension Father     Stomach cancer Mother     Breast cancer Neg Hx      Social History     Tobacco Use    Smoking status: Never Smoker    Smokeless tobacco: Never Used   Substance Use Topics    Alcohol use: Yes    Drug use: No     Review of Systems   Constitutional: Positive for fatigue. Negative for chills and fever.   HENT: Negative for congestion and sinus pain.    Eyes: Positive for visual disturbance. Negative for pain.   Respiratory: Negative for cough and shortness of breath.    Cardiovascular: Negative for chest pain and leg swelling.   Gastrointestinal: Positive for nausea. Negative for abdominal pain, constipation, diarrhea and vomiting.   Endocrine: Negative for polydipsia and polyuria.   Genitourinary: Negative for dysuria and hematuria.   Musculoskeletal: Negative for arthralgias and back pain.   Skin: Negative for color change and rash.   Neurological: Positive for weakness (Generalized), light-headedness and headaches. Negative for dizziness, syncope and speech difficulty.       Physical Exam     Initial Vitals [01/03/22 1134]   BP Pulse Resp Temp SpO2   117/89 73 18 98.4 °F (36.9 °C) 97 %      MAP       --         Physical Exam    Nursing note and vitals reviewed.  Constitutional: She appears well-developed and well-nourished. She is not diaphoretic. No distress.   Patient resting comfortably in bed in no acute distress   HENT:   Head: Normocephalic and atraumatic.   Eyes: Conjunctivae and EOM are normal. Pupils are equal, round, and reactive to light.   Neck: Neck supple.   Normal range of motion.  Cardiovascular: Normal rate, regular rhythm, normal heart sounds and intact distal pulses.   No murmur heard.  Pulmonary/Chest: Breath sounds normal. No respiratory distress. She has no wheezes. She has no rhonchi. She has no rales.    Abdominal: Abdomen is soft. She exhibits no distension. There is no abdominal tenderness. There is no rebound and no guarding.   Musculoskeletal:         General: No tenderness or edema.      Cervical back: Normal range of motion and neck supple.     Neurological: She is alert and oriented to person, place, and time.   Patient has 5/5 strength and full intact sensation to bilateral upper and lower extremities.  Normal speech   Skin: Skin is warm and dry. Capillary refill takes less than 2 seconds.   Psychiatric: She has a normal mood and affect.         ED Course   Procedures  Labs Reviewed   CBC W/ AUTO DIFFERENTIAL - Abnormal; Notable for the following components:       Result Value    MCV 99 (*)     MCH 31.3 (*)     MCHC 31.7 (*)     Lymph % 17.6 (*)     All other components within normal limits   COMPREHENSIVE METABOLIC PANEL - Abnormal; Notable for the following components:    Chloride 112 (*)     CO2 22 (*)     Calcium 7.3 (*)     Albumin 3.2 (*)     Anion Gap 7 (*)     All other components within normal limits   PHOSPHORUS - Abnormal; Notable for the following components:    Phosphorus 1.8 (*)     All other components within normal limits   URINALYSIS, REFLEX TO URINE CULTURE - Abnormal; Notable for the following components:    Leukocytes, UA 1+ (*)     All other components within normal limits    Narrative:     Specimen Source->Urine   URINALYSIS MICROSCOPIC - Abnormal; Notable for the following components:    Hyaline Casts, UA 15 (*)     All other components within normal limits    Narrative:     Specimen Source->Urine   MAGNESIUM   TROPONIN I   TSH   SARS-COV-2 RDRP GENE    Narrative:     This test utilizes isothermal nucleic acid amplification   technology to detect the SARS-CoV-2 RdRp nucleic acid segment.   The analytical sensitivity (limit of detection) is 125 genome   equivalents/mL.   A POSITIVE result implies infection with the SARS-CoV-2 virus;   the patient is presumed to be contagious.     A  NEGATIVE result means that SARS-CoV-2 nucleic acids are not   present above the limit of detection. A NEGATIVE result should be   treated as presumptive. It does not rule out the possibility of   COVID-19 and should not be the sole basis for treatment decisions.   If COVID-19 is strongly suspected based on clinical and exposure   history, re-testing using an alternate molecular assay should be   considered.   This test is only for use under the Food and Drug   Administration s Emergency Use Authorization (EUA).   Commercial kits are provided by [employer].   Performance characteristics of the EUA have been independently   verified by [facility] Department of   Pathology and Laboratory Medicine.   _________________________________________________________________   The authorized Fact Sheet for Healthcare Providers and the authorized Fact   Sheet for Patients of the ID NOW COVID-19 are available on the FDA   website:     https://www.fda.gov/media/863224/download  https://www.fda.gov/media/418215/download         EKG Readings: (Independently Interpreted)   Normal sinus rhythm at a rate of 71, prolonged QT of 504, normal intervals otherwise, no STEMI, normal T-wave morphology.  Overall unremarkable EKG other than prolonged QT       Imaging Results    None          Medications   sodium chloride 0.9% bolus 1,000 mL (0 mLs Intravenous Stopped 1/3/22 1440)   metoclopramide HCl injection 10 mg (10 mg Intravenous Given 1/3/22 1342)   diphenhydrAMINE injection 25 mg (25 mg Intravenous Given 1/3/22 1341)     Medical Decision Making:   Initial Assessment:   71-year-old female with PMH of migraines, hypertension, and Graves disease presents with a 2 day history of improving generalized weakness and frontal headache with associated nausea and or, she is hemodynamically stable, physical exam is unremarkable with a normal neurological exam.  Given the patient's age, we will obtain a broad workup for generalized  weakness including labs and UA.  I will give Reglan and Benadryl in addition to 1 L of fluids for her headache.  Differential Diagnosis:   Migraine, cluster headache, tension headache, UTI, electrolyte derangement, ACS  Clinical Tests:   Lab Tests: Ordered  Medical Tests: Ordered  ED Management:  Patient care handed off to the incoming team at 2:00PM. EKG unremarkable. Labs and UA still pending.  I suspect the patient's workup will be unremarkable and she can be discharged home without outpatient management.            Attending Attestation:   Physician Attestation Statement for Resident:  As the supervising MD   Physician Attestation Statement: I have personally seen and examined this patient.   I agree with the above history. -:   As the supervising MD I agree with the above PE.    As the supervising MD I agree with the above treatment, course, plan, and disposition.                         Clinical Impression:   Final diagnoses:  [R53.1] Weakness  [R51.9] Nonintractable headache, unspecified chronicity pattern, unspecified headache type (Primary)          ED Disposition Condition    Discharge Good        ED Prescriptions     None        Follow-up Information     Follow up With Specialties Details Why Contact Info    Aleksandra Carey MD Internal Medicine Schedule an appointment as soon as possible for a visit  To discuss your recent ER visit and any additional concerns that you may have [address]  [address]  [facility] [address]  [phone]      [facility] - Emergency Dept Emergency Medicine Go to  As needed, If symptoms worsen [address]  [facility] [address]  [phone]           Mando Hernandez MD  Resident  01/03/22 1550       Jared Cline MD  01/04/22 121

## 2022-01-03 NOTE — ED NOTES
Bed: [facility]  Expected date:   Expected time:   Means of arrival:   Comments:

## 2022-01-03 NOTE — DISCHARGE INSTRUCTIONS
It was a pleasure taking care of you today!     Diagnosis: Headache / Generalized Weakness      Follow-Up Plan:  - Follow-up with primary care doctor within 3 - 5 days to discuss your recent ER visit and any additional concerns that you may have.  - Additional testing and/or evaluation as directed by your primary doctor    Return to the Emergency Department for symptoms including but not limited to: persistence or worsening of symptoms, shortness of breath or chest pain, inability to drink without vomiting, passing out/fainting/ loss of consciousness, or if you have other concerns.

## 2022-01-03 NOTE — ED TRIAGE NOTES
Denise VIVAS Cousins, a 71 y.o. female presents to the ED w/ complaint of fatigue, body aches, headache x 2 days. Hx of migraines with visual disturbances.     Triage note:  Chief Complaint   Patient presents with    Headache     Total body weakness in bed for 2d     Review of patient's allergies indicates:   Allergen Reactions    Phenobarbital Anaphylaxis    Penicillins Other (See Comments)    Phenobarbital sodium     Propylthiouracil     Thyroid      Note: PTU    Topamax [topiramate] Diarrhea     GI symptoms    Cefaclor Rash and Other (See Comments)    Clopidogrel Rash    Methimazole Rash and Other (See Comments)    Methimazole (bulk) Rash    Penicillin Rash    Sulfa (sulfonamide antibiotics) Rash and Other (See Comments)     Past Medical History:   Diagnosis Date    Acid reflux     Graves disease     diffuse Toxic Goiter    Hypertension     IBS (irritable bowel syndrome)     Lichen sclerosus     Migraine     Stroke

## 2022-01-06 ENCOUNTER — TELEPHONE (OUTPATIENT)
Dept: FAMILY MEDICINE | Facility: CLINIC | Age: 72
End: 2022-01-06
Payer: MEDICARE

## 2022-01-06 NOTE — TELEPHONE ENCOUNTER
----- Message from Traci Merino sent at 1/6/2022  3:53 PM CST -----  Contact: [phone]  Patient would like to get medical advice.  Symptoms (please be specific):  chills, clammy, headaches, weakness, no appetite, congested, headache, states she has never felt this terrible before.  How long have you had these symptoms: 4 days  Would you like a call back, or a response through your [facility] portal?:   no  Pharmacy name and phone # (copy from chart):    [id number] - [place] - [address] AT [facility] [address]  [facility] [place] [address]  Phone: [phone] Fax: [phone]  Comments:  Covid negative Tuesday at [facility] but states not flu. States symptoms have worsened since she went to ER on Tuesday.

## 2022-01-06 NOTE — TELEPHONE ENCOUNTER
"Spoke to pt. S/o started Tuesday with headache, weakness, no fever. Went to ER, they found nothing, vitals were good and was dx'd with "nonspecific headache" covid was negative. Now c/o extreme weakness, feels feverish, headache, diarrhea, fatigue, feels like a post nasal drip, a little bit of coughing and congestion.   "

## 2022-01-07 ENCOUNTER — OFFICE VISIT (OUTPATIENT)
Dept: INTERNAL MEDICINE | Facility: CLINIC | Age: 72
End: 2022-01-07
Payer: MEDICARE

## 2022-01-07 VITALS
OXYGEN SATURATION: 98 % | SYSTOLIC BLOOD PRESSURE: 122 MMHG | DIASTOLIC BLOOD PRESSURE: 74 MMHG | WEIGHT: 129.75 LBS | TEMPERATURE: 98 F | HEART RATE: 72 BPM | BODY MASS INDEX: 25.34 KG/M2

## 2022-01-07 DIAGNOSIS — R05.9 COUGH: Primary | ICD-10-CM

## 2022-01-07 DIAGNOSIS — R53.1 GENERALIZED WEAKNESS: ICD-10-CM

## 2022-01-07 DIAGNOSIS — J01.90 ACUTE NON-RECURRENT SINUSITIS, UNSPECIFIED LOCATION: ICD-10-CM

## 2022-01-07 LAB
CTP QC/QA: YES
CTP QC/QA: YES
FLUAV AG NPH QL: NEGATIVE
FLUBV AG NPH QL: NEGATIVE
SARS-COV-2 RDRP RESP QL NAA+PROBE: NEGATIVE

## 2022-01-07 PROCEDURE — 99214 PR OFFICE/OUTPT VISIT, EST, LEVL IV, 30-39 MIN: ICD-10-PCS | Mod: S$PBB,,, | Performed by: INTERNAL MEDICINE

## 2022-01-07 PROCEDURE — 99214 OFFICE O/P EST MOD 30 MIN: CPT | Mod: S$PBB,,, | Performed by: INTERNAL MEDICINE

## 2022-01-07 PROCEDURE — 99999 PR PBB SHADOW E&M-EST. PATIENT-LVL III: ICD-10-PCS | Mod: PBBFAC,,, | Performed by: INTERNAL MEDICINE

## 2022-01-07 PROCEDURE — 87804 INFLUENZA ASSAY W/OPTIC: CPT | Mod: 59,PBBFAC | Performed by: INTERNAL MEDICINE

## 2022-01-07 PROCEDURE — U0002 COVID-19 LAB TEST NON-CDC: HCPCS | Mod: PBBFAC | Performed by: INTERNAL MEDICINE

## 2022-01-07 PROCEDURE — 99213 OFFICE O/P EST LOW 20 MIN: CPT | Mod: PBBFAC | Performed by: INTERNAL MEDICINE

## 2022-01-07 PROCEDURE — 99999 PR PBB SHADOW E&M-EST. PATIENT-LVL III: CPT | Mod: PBBFAC,,, | Performed by: INTERNAL MEDICINE

## 2022-01-07 RX ORDER — METHYLPREDNISOLONE 4 MG/1
TABLET ORAL
Qty: 21 EACH | Refills: 0 | Status: SHIPPED | OUTPATIENT
Start: 2022-01-07 | End: 2022-01-28

## 2022-01-07 RX ORDER — AZITHROMYCIN 250 MG/1
TABLET, FILM COATED ORAL
Qty: 6 TABLET | Refills: 0 | Status: SHIPPED | OUTPATIENT
Start: 2022-01-07 | End: 2022-01-12

## 2022-01-07 NOTE — PATIENT INSTRUCTIONS
Try the following over the counter medications to help with your symptoms:  1. Antihistamine once daily (either Zyrtec, Allegra, Claritin or Xyzal)  2. Flonase nasal spray once daily (fluticasone is generic)    3. Mucinex twice daily (Guaifenesin is generic)    Drink of lots water.

## 2022-01-07 NOTE — PROGRESS NOTES
[facility] Note    Chief Complaint      Chief Complaint   Patient presents with    Hospital Follow Up     History of Present Illness      Denise Berrios is a 71 y.o. female who presents today for cough, congestion, fatigue.  Patient comes to appointment with spouse, John Berrios.    Had severe headache/weakness/body aches starting 1/1/2022.  Had 1 episode of diarrhea.  No dysuria/hematuria/abd pain.   1/3/2022, went to ED.  COVID 19 test negative.  Labs were WNL, D/C'ed home.  Per , has not been eating and drinking as much as she should.  Weakness is so bad that she can't get up and down stairs at her house.  Temp last night 99.3.  Main complaints now are headaches and weakness.  Taking Tramadol and NSAIDS for her shoulder which helped some with headaches.  Took Fioricet for headaches.  Didn't sleep at all 2 nights ago, last night was better (takes melatonin for sleep).    Does have sinus pressure and congestion.    Problem List Items Addressed This Visit    None     Visit Diagnoses     Cough    -  Primary    Relevant Orders    POCT Influenza A/B    POCT COVID-19 Rapid Screening    Generalized weakness        Acute non-recurrent sinusitis, unspecified location              Health Maintenance   Topic Date Due    Mammogram  10/22/2022    High Dose Statin  01/07/2023    DEXA SCAN  08/28/2023    Lipid Panel  09/24/2026    TETANUS VACCINE  08/28/2029    Hepatitis C Screening  Completed       Past Medical History:   Diagnosis Date    Acid reflux     Graves disease     diffuse Toxic Goiter    Hypertension     IBS (irritable bowel syndrome)     Lichen sclerosus     Migraine     Stroke        Past Surgical History:   Procedure Laterality Date    EYE SURGERY      HYSTERECTOMY      CINDI for ?? cervical cancer, had normal paps    LEFT HEART CATHETERIZATION N/A 10/20/2020    Procedure: Left heart cath;  Surgeon: Bony Schultz MD;  Location: [facility] CATH LAB/EP;  Service: Cardiology;   Laterality: N/A;       family history includes Hypertension in her father; Kidney failure in her father; Stomach cancer in her mother.    Social History     Tobacco Use    Smoking status: Never Smoker    Smokeless tobacco: Never Used   Substance Use Topics    Alcohol use: Yes    Drug use: No       Review of Systems   Constitutional: Positive for malaise/fatigue. Negative for chills and fever.   HENT: Positive for congestion. Negative for sore throat.    Respiratory: Positive for cough. Negative for shortness of breath.    Cardiovascular: Negative for chest pain and palpitations.   Gastrointestinal: Positive for diarrhea. Negative for constipation, nausea and vomiting.   Genitourinary: Negative for dysuria and hematuria.   Musculoskeletal: Negative for falls.   Neurological: Positive for weakness and headaches.        Outpatient Encounter Medications as of 1/7/2022   Medication Sig Dispense Refill    aspirin (ECOTRIN) 81 MG EC tablet once a day      atorvastatin (LIPITOR) 80 MG tablet Take 1 tablet (80 mg total) by mouth once daily. 90 tablet 3    butalbital-acetaminophen-caffeine -40 mg (FIORICET, ESGIC) -40 mg per tablet Take 1 tablet by mouth nightly as needed for Headaches. 30 tablet 5    clobetasol 0.05% (TEMOVATE) 0.05 % Oint APPLY TOPICALLY TWICE A DAY 45 g 1    ergocalciferol (VITAMIN D2) 50,000 unit Cap Take 1 capsule (50,000 Units total) by mouth every 7 days. 12 capsule 3    EScitalopram oxalate (LEXAPRO) 10 MG tablet Take 1 tablet (10 mg total) by mouth once daily. (Patient taking differently: Take 10 mg by mouth 2 (two) times a day.) 90 tablet 3    etodolac (LODINE XL) 400 MG 24 hr tablet TAKE 1 TABLET(400 MG) BY MOUTH EVERY DAY 30 tablet 3    levothyroxine (SYNTHROID) 100 MCG tablet Take 1 tablet (100 mcg total) by mouth before breakfast. 90 tablet 3    losartan (COZAAR) 50 MG tablet Take 1 tablet (50 mg total) by mouth once daily. 90 tablet 1    metoprolol tartrate (LOPRESSOR)  50 MG tablet TAKE 1 TABLET TWICE A  tablet 3    nortriptyline (PAMELOR) 10 MG capsule TAKE 1 CAPSULE DAILY 90 capsule 3    omeprazole (PRILOSEC) 40 MG capsule Take 1 capsule (40 mg total) by mouth once daily. 90 capsule 3    solifenacin (VESICARE) 10 MG tablet TAKE 1 TABLET DAILY 90 tablet 3    traMADoL (ULTRAM) 50 mg tablet Take 1 tablet (50 mg total) by mouth every 12 (twelve) hours as needed for Pain. 40 tablet 2    azithromycin (Z-SIMÓN) 250 MG tablet Take 2 tablets by mouth on day 1; Take 1 tablet by mouth on days 2-5 6 tablet 0    methylPREDNISolone (MEDROL DOSEPACK) 4 mg tablet use as directed 21 each 0    [DISCONTINUED] methocarbamoL (ROBAXIN) 750 MG Tab TAKE 2 TABLETS BY MOUTH FOUR TIMES DAILY       No facility-administered encounter medications on file as of 1/7/2022.        Review of patient's allergies indicates:   Allergen Reactions    Phenobarbital Anaphylaxis    Penicillins Other (See Comments)    Phenobarbital sodium     Propylthiouracil     Thyroid      Note: PTU    Topamax [topiramate] Diarrhea     GI symptoms    Cefaclor Rash and Other (See Comments)    Clopidogrel Rash    Methimazole Rash and Other (See Comments)    Methimazole (bulk) Rash    Penicillin Rash    Sulfa (sulfonamide antibiotics) Rash and Other (See Comments)       Physical Exam      Vital Signs  Temp: 97.8 °F (36.6 °C)  Pulse: 72  SpO2: 98 %  BP: 122/74  BP Location: Left arm  Patient Position: Sitting  Pain Score:   5  Pain Loc: Shoulder  Height and Weight  Weight: 58.8 kg (129 lb 11.9 oz)]    Physical Exam  Constitutional:       Appearance: She is well-developed.   HENT:      Head: Normocephalic and atraumatic.      Right Ear: External ear normal.      Left Ear: External ear normal.   Eyes:      General:         Right eye: No discharge.         Left eye: No discharge.   Cardiovascular:      Rate and Rhythm: Normal rate and regular rhythm.      Heart sounds: Normal heart sounds. No murmur  heard.      Pulmonary:      Effort: Pulmonary effort is normal. No respiratory distress.      Breath sounds: Normal breath sounds.   Abdominal:      General: There is no distension.      Palpations: Abdomen is soft.      Tenderness: There is no abdominal tenderness. There is no guarding.   Musculoskeletal:         General: Normal range of motion.      Cervical back: Normal range of motion.   Skin:     General: Skin is warm and dry.   Neurological:      Mental Status: She is alert and oriented to person, place, and time.   Psychiatric:         Behavior: Behavior normal.          Laboratory:  CBC:  Recent Labs   Lab Result Units 01/03/22  1419   WBC K/uL 7.61   RBC M/uL 4.03   Hemoglobin g/dL 12.6   Hematocrit % 39.8   Platelets K/uL 239   MCV fL 99*   MCH pg 31.3*   MCHC g/dL 31.7*     CMP:  Recent Labs   Lab Result Units 01/03/22  1419   Glucose mg/dL 81   Calcium mg/dL 7.3*   Albumin g/dL 3.2*   Total Protein g/dL 6.4   Sodium mmol/L 141   Potassium mmol/L 4.2   CO2 mmol/L 22*   Chloride mmol/L 112*   BUN mg/dL 11   Alkaline Phosphatase U/L 58   ALT U/L 27   AST U/L 25   Total Bilirubin mg/dL 0.4     URINALYSIS:  Recent Labs   Lab Result Units 01/03/22  1527   Color, UA  Yellow   Specific Gravity, UA  1.010   pH, UA  6.0   Protein, UA  Negative   Bacteria /hpf Rare   Nitrite, UA  Negative   Leukocytes, UA  1+*   Hyaline Casts, UA /lpf 15*      LIPIDS:  Recent Labs   Lab Result Units 01/03/22  1419   TSH uIU/mL 0.560     TSH:  Recent Labs   Lab Result Units 01/03/22  1419   TSH uIU/mL 0.560     A1C:  No results for input(s): HGBA1C in the last 2160 hours.    Radiology:  No results found in the last 30 days.     Assessment/Plan     Denise Berrios is a 71 y.o.female with:    1. Cough  - POCT Influenza A/B  - POCT COVID-19 Rapid Screening    2. Generalized weakness    3. Acute non-recurrent sinusitis, unspecified location    -rapid flu and COVID done today  -treat for sinusitis/URI  -drink lots of water  -Continue  current medications and maintain follow up with specialists.    -Follow up if symptoms worsen or fail to improve.       Aleksandra Carey MD  [facility]

## 2022-01-10 ENCOUNTER — TELEPHONE (OUTPATIENT)
Dept: INTERNAL MEDICINE | Facility: CLINIC | Age: 72
End: 2022-01-10
Payer: MEDICARE

## 2022-01-10 NOTE — TELEPHONE ENCOUNTER
Should continue to improve, continue to hydrate.  Can take tylenol PRN to help with any fever/chills type symptoms.

## 2022-01-10 NOTE — TELEPHONE ENCOUNTER
----- Message from Jodi Hernandez sent at 1/10/2022 10:49 AM CST -----  Contact: Pt @[phone]  Patient is returning a phone call.  Who left a message for the patient: Provider     Does patient know what this is regarding:  Yes     Would you like a call back, or a response through your [facility] portal?:   yes     Comments:    Pt wants to let the provider know that she felt good on yesterday but today shaking with a nasal congestion. Please have provider call back to advise.

## 2022-01-10 NOTE — TELEPHONE ENCOUNTER
Follow up from 01/07 visit yesterday feeling fine, today woke up with nasal congestion, feels like chill but temp normal, feels a little shaky but thinks it is the meds.   Drinking Gatorade, headache is gone.

## 2022-01-18 RX ORDER — ETODOLAC 400 MG/1
400 TABLET, EXTENDED RELEASE ORAL DAILY
Qty: 90 TABLET | Refills: 0 | Status: SHIPPED | OUTPATIENT
Start: 2022-01-18 | End: 2022-03-31

## 2022-01-18 NOTE — TELEPHONE ENCOUNTER
----- Message from Hamida Covarrubias sent at 1/18/2022 10:09 AM CST -----  Contact: Denise [phone]  Requesting an RX refill or new RX.    Is this a refill or new RX: Refill    RX name and strength :  etodolac (LODINE XL) 400 MG 24 hr tablet    Patient advised that in the future they can use their [facility] account to request a refill?:  Yes    Patient advised that RX refills can take up to 72 hours?:  Yes    Pharmacy name and phone # :  [facility] - [place], [place] - [address]    Comments:

## 2022-02-04 RX ORDER — LOSARTAN POTASSIUM 50 MG/1
50 TABLET ORAL DAILY
Qty: 90 TABLET | Refills: 1 | Status: SHIPPED | OUTPATIENT
Start: 2022-02-04 | End: 2022-02-04

## 2022-02-04 RX ORDER — TRAMADOL HYDROCHLORIDE 50 MG/1
50 TABLET ORAL EVERY 12 HOURS PRN
Qty: 40 TABLET | Refills: 2 | Status: SHIPPED | OUTPATIENT
Start: 2022-02-04 | End: 2022-04-11

## 2022-02-04 NOTE — TELEPHONE ENCOUNTER
----- Message from Glenda Gonsalez sent at 2/4/2022 10:28 AM CST -----  Contact: [phone]  Requesting an RX refill or new RX.  Is this a refill or new RX: refill   RX name and strength (copy/paste from chart):  losartan (COZAAR) 50 MG tablet  Is this a 30 day or 90 day RX: 90  Pharmacy name and phone # (copy/paste from chart):  [id number] - [place] - [address] [facility] [place] [facility]   Phone:  [phone]:  [phone]  The doctors have asked that we provide their patients with the following 2 reminders -- prescription refills can take up to 72 hours, and a friendly reminder that in the future you can use your [facility] account to request refills: Patient states she is completely out of this Rx. Please advise.       Requesting an RX refill or new RX.  Is this a refill or new RX: New   RX name and strength (copy/paste from chart):  traMADoL (ULTRAM) 50 mg tablet  Is this a 30 day or 90 day RX: 30   Pharmacy name and phone # (copy/paste from chart):  [facility] #[id number] - [place] - [address] [place] [facility]   Phone:  [phone]:  [phone]  The doctors have asked that we provide their patients with the following 2 reminders -- prescription refills can take up to 72 hours, and a friendly reminder that in the future you can use your [facility] account to request refills:

## 2022-02-04 NOTE — TELEPHONE ENCOUNTER
No new care gaps identified.  Powered by [employer]. Reference number: [id number].   2/04/2022 10:39:40 AM CST

## 2022-02-11 ENCOUNTER — OFFICE VISIT (OUTPATIENT)
Dept: ORTHOPEDICS | Facility: CLINIC | Age: 72
End: 2022-02-11
Payer: MEDICARE

## 2022-02-11 ENCOUNTER — HOSPITAL ENCOUNTER (OUTPATIENT)
Dept: RADIOLOGY | Facility: HOSPITAL | Age: 72
Discharge: HOME | End: 2022-02-11
Attending: ORTHOPAEDIC SURGERY
Payer: MEDICARE

## 2022-02-11 VITALS — BODY MASS INDEX: 25.32 KG/M2 | HEIGHT: 60 IN | WEIGHT: 129 LBS

## 2022-02-11 DIAGNOSIS — G89.29 CHRONIC RIGHT SHOULDER PAIN: ICD-10-CM

## 2022-02-11 DIAGNOSIS — M25.511 CHRONIC RIGHT SHOULDER PAIN: ICD-10-CM

## 2022-02-11 DIAGNOSIS — S42.91XD CLOSED FRACTURE OF RIGHT SHOULDER WITH ROUTINE HEALING, SUBSEQUENT ENCOUNTER: Primary | ICD-10-CM
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
## 2022-02-11 PROCEDURE — 73030 X-RAY EXAM OF SHOULDER: CPT | Mod: TC,PN,RT

## 2022-02-11 PROCEDURE — 73030 XR SHOULDER COMPLETE 2 OR MORE VIEWS RIGHT: ICD-10-PCS | Mod: 26,RT,, | Performed by: RADIOLOGY

## 2022-02-11 PROCEDURE — 99999 PR PBB SHADOW E&M-EST. PATIENT-LVL II: ICD-10-PCS | Mod: PBBFAC,,, | Performed by: ORTHOPAEDIC SURGERY

## 2022-02-11 PROCEDURE — 99214 PR OFFICE/OUTPT VISIT, EST, LEVL IV, 30-39 MIN: ICD-10-PCS | Mod: 25,S$PBB,, | Performed by: ORTHOPAEDIC SURGERY

## 2022-02-11 PROCEDURE — 99212 OFFICE O/P EST SF 10 MIN: CPT | Mod: PBBFAC,PN | Performed by: ORTHOPAEDIC SURGERY

## 2022-02-11 PROCEDURE — 96372 THER/PROPH/DIAG INJ SC/IM: CPT | Mod: ,,, | Performed by: ORTHOPAEDIC SURGERY

## 2022-02-11 PROCEDURE — 99214 OFFICE O/P EST MOD 30 MIN: CPT | Mod: 25,S$PBB,, | Performed by: ORTHOPAEDIC SURGERY

## 2022-02-11 PROCEDURE — 73030 X-RAY EXAM OF SHOULDER: CPT | Mod: 26,RT,, | Performed by: RADIOLOGY

## 2022-02-11 PROCEDURE — 99999 PR PBB SHADOW E&M-EST. PATIENT-LVL II: CPT | Mod: PBBFAC,,, | Performed by: ORTHOPAEDIC SURGERY

## 2022-02-11 PROCEDURE — 96372 PR INJECTION,THERAP/PROPH/DIAG2ST, IM OR SUBCUT: ICD-10-PCS | Mod: ,,, | Performed by: ORTHOPAEDIC SURGERY

## 2022-02-11 RX ORDER — TRIAMCINOLONE ACETONIDE 40 MG/ML
40 INJECTION, SUSPENSION INTRA-ARTICULAR; INTRAMUSCULAR
Status: DISCONTINUED | OUTPATIENT
Start: 2022-02-11 | End: 2022-02-11 | Stop reason: HOSPADM

## 2022-02-11 RX ADMIN — TRIAMCINOLONE ACETONIDE 40 MG: 40 INJECTION, SUSPENSION INTRA-ARTICULAR; INTRAMUSCULAR at 01:02

## 2022-02-11 NOTE — PROGRESS NOTES
Subjective:       Patient ID: Denise Berrios is a 72 y.o. female.    Chief Complaint: Follow-up (3 month right shoulder pain.)  Denise Berrios is a 72 y.o. female presenting for f/u of a right shoulder fracture 1 year ago. She last had a CSI 11/09/21, two total. Pt states that pain never resolves completely but that the injections help for a few weeks. Stiffness is still present even with injections. Pt takes Tramadol BID and Etoldolac daily with some improvement in pain. Pain worsens with activity, rest helps. Pt also had a fall into a vanity yesterday and hit her right arm which has caused a lot of pain today.     Previously, she and Dr. Finnegan have discussed reverse total shoulder replacement. She is considering this but would like to hold off until pain interferes with function/ quality of life.     Past Medical History:   Diagnosis Date    Acid reflux     Graves disease     diffuse Toxic Goiter    Hypertension     IBS (irritable bowel syndrome)     Lichen sclerosus     Migraine     Stroke      Past Surgical History:   Procedure Laterality Date    EYE SURGERY      HYSTERECTOMY      CINDI for ?? cervical cancer, had normal paps    LEFT HEART CATHETERIZATION N/A 10/20/2020    Procedure: Left heart cath;  Surgeon: Bony Schultz MD;  Location: [facility] CATH LAB/EP;  Service: Cardiology;  Laterality: N/A;     Family History   Problem Relation Age of Onset    Kidney failure Father     Hypertension Father     Stomach cancer Mother     Breast cancer Neg Hx      Social History     Socioeconomic History    Marital status:    Tobacco Use    Smoking status: Never Smoker    Smokeless tobacco: Never Used   Substance and Sexual Activity    Alcohol use: Yes    Drug use: No    Sexual activity: Yes     Partners: Male     Birth control/protection: See Surgical Hx       Current Outpatient Medications   Medication Sig Dispense Refill    aspirin (ECOTRIN) 81 MG EC tablet once a day      atorvastatin  (LIPITOR) 80 MG tablet Take 1 tablet (80 mg total) by mouth once daily. 90 tablet 3    butalbital-acetaminophen-caffeine -40 mg (FIORICET, ESGIC) -40 mg per tablet Take 1 tablet by mouth nightly as needed for Headaches. 30 tablet 5    clobetasol 0.05% (TEMOVATE) 0.05 % Oint APPLY TOPICALLY TWICE A DAY 45 g 1    ergocalciferol (VITAMIN D2) 50,000 unit Cap Take 1 capsule (50,000 Units total) by mouth every 7 days. 12 capsule 3    EScitalopram oxalate (LEXAPRO) 10 MG tablet Take 1 tablet (10 mg total) by mouth once daily. (Patient taking differently: Take 10 mg by mouth 2 (two) times a day.) 90 tablet 3    etodolac (LODINE XL) 400 MG 24 hr tablet Take 1 tablet (400 mg total) by mouth once daily. TAKE 1 TABLET(400 MG) BY MOUTH EVERY DAY 90 tablet 0    levothyroxine (SYNTHROID) 100 MCG tablet Take 1 tablet (100 mcg total) by mouth before breakfast. 90 tablet 3    losartan (COZAAR) 50 MG tablet TAKE 1 TABLET(50 MG) BY MOUTH EVERY DAY 90 tablet 3    metoprolol tartrate (LOPRESSOR) 50 MG tablet TAKE 1 TABLET TWICE A  tablet 3    nortriptyline (PAMELOR) 10 MG capsule TAKE 1 CAPSULE DAILY 90 capsule 3    omeprazole (PRILOSEC) 40 MG capsule Take 1 capsule (40 mg total) by mouth once daily. 90 capsule 3    solifenacin (VESICARE) 10 MG tablet TAKE 1 TABLET DAILY 90 tablet 3    traMADoL (ULTRAM) 50 mg tablet Take 1 tablet (50 mg total) by mouth every 12 (twelve) hours as needed for Pain. 40 tablet 2     No current facility-administered medications for this visit.     Review of patient's allergies indicates:   Allergen Reactions    Phenobarbital Anaphylaxis    Penicillins Other (See Comments)    Phenobarbital sodium     Propylthiouracil     Thyroid      Note: PTU    Topamax [topiramate] Diarrhea     GI symptoms    Cefaclor Rash and Other (See Comments)    Clopidogrel Rash    Methimazole Rash and Other (See Comments)    Methimazole (bulk) Rash    Penicillin Rash    Sulfa (sulfonamide  antibiotics) Rash and Other (See Comments)       Review of Systems   Constitutional: Negative for fever.   HENT: Negative for drooling and voice change.    Eyes: Negative for visual disturbance.   Respiratory: Negative for cough.    Gastrointestinal: Negative for nausea and vomiting.   Genitourinary: Negative for flank pain.   Musculoskeletal: Positive for arthralgias and myalgias. Negative for gait problem and joint swelling.   Skin: Negative for color change, rash and wound.   Neurological: Negative for dizziness and syncope.   Psychiatric/Behavioral: Negative for agitation and confusion.       Objective:      Vitals:    02/11/22 1328   Weight: 58.5 kg (129 lb)   Height: 5' (1.524 m)     Physical Exam  Musculoskeletal:      Right upper arm: Swelling and tenderness present. No bony tenderness.        Arms:          Right Shoulder Exam     Tenderness   The patient is experiencing no tenderness.    Range of Motion   Active abduction: normal   Passive abduction: normal   Extension: normal   External rotation: abnormal   Forward flexion: normal Right shoulder forward flexion: painful.   Internal rotation 90 degrees: normal     Tests   Morel test: positive  Cross arm: negative  Drop arm: negative    Other   Erythema: absent  Sensation: normal  Pulse: present      Left Shoulder Exam     Tenderness   The patient is experiencing no tenderness.     Range of Motion   The patient has normal left shoulder ROM.    Other   Sensation: normal  Pulse: present           Lab Review: Radiology review: Xray   Diagnostics Review: X-Ray: Reviewed; No acute fracture or dislocation. I personally reviewed x-ray and agree with radiologist findings.      Assessment:       1. Closed fracture of right shoulder with routine healing, subsequent encounter    2. Chronic right shoulder pain    3. Fall at home, initial encounter        Plan:           X ray ordered today to evaluate for possible humeral fracture 2/2 fall. Negative for fracture or  new displacement.  Option given for joint injection to help with pain control, pt wishes to proceed.   Discussed surgical options given continued need for injections and lack of significant improvement. Pt would like to think about it and will RTC when she is ready to proceed   Pt to continue Etoldolac prn for pain. D/C for any GI upset.   Advised to ice area of fall

## 2022-02-18 ENCOUNTER — TELEPHONE (OUTPATIENT)
Dept: NEUROLOGY | Facility: CLINIC | Age: 72
End: 2022-02-18
Payer: MEDICARE

## 2022-02-18 NOTE — TELEPHONE ENCOUNTER
----- Message from Blanche Morel MA sent at 2/17/2022  4:46 PM CST -----  Contact: Pt    ----- Message -----  From: Brianna Alicia  Sent: 2/17/2022   9:25 AM CST  To: [facility]    Type:  Sooner Appoointment Request    Caller is requesting a sooner appointment.  Caller declined first available appointment listed below.  Caller will not accept being placed on the waitlist and is requesting a message be sent to doctor.  Name of Caller:Pt  When is the first available appointment?none available  Symptoms:migraine with flashes of light  Would the patient rather a call back or a response via [facility]? Call back  Best Call Back Number:[phone]  Additional Information: Pt asked for a call back to discuss appt date and time

## 2022-02-28 ENCOUNTER — OFFICE VISIT (OUTPATIENT)
Dept: NEUROLOGY | Facility: CLINIC | Age: 72
End: 2022-02-28
Payer: MEDICARE

## 2022-02-28 VITALS
WEIGHT: 133.5 LBS | DIASTOLIC BLOOD PRESSURE: 76 MMHG | HEART RATE: 65 BPM | BODY MASS INDEX: 26.21 KG/M2 | HEIGHT: 60 IN | SYSTOLIC BLOOD PRESSURE: 144 MMHG

## 2022-02-28 DIAGNOSIS — H53.9 VISUAL DISTURBANCES: Primary | ICD-10-CM

## 2022-02-28 PROCEDURE — 99999 PR PBB SHADOW E&M-EST. PATIENT-LVL III: CPT | Mod: PBBFAC,,, | Performed by: PSYCHIATRY & NEUROLOGY

## 2022-02-28 PROCEDURE — 99213 OFFICE O/P EST LOW 20 MIN: CPT | Mod: PBBFAC | Performed by: PSYCHIATRY & NEUROLOGY

## 2022-02-28 PROCEDURE — 99214 OFFICE O/P EST MOD 30 MIN: CPT | Mod: S$PBB,,, | Performed by: PSYCHIATRY & NEUROLOGY

## 2022-02-28 PROCEDURE — 99214 PR OFFICE/OUTPT VISIT, EST, LEVL IV, 30-39 MIN: ICD-10-PCS | Mod: S$PBB,,, | Performed by: PSYCHIATRY & NEUROLOGY

## 2022-02-28 PROCEDURE — 99999 PR PBB SHADOW E&M-EST. PATIENT-LVL III: ICD-10-PCS | Mod: PBBFAC,,, | Performed by: PSYCHIATRY & NEUROLOGY

## 2022-02-28 NOTE — PROGRESS NOTES
"  Denise Berrios is a 72 y.o. year old female that  presents with complains of increasing visual auras and forgetfulness. She is accompanied by her .  Chief Complaint   Patient presents with    Follow up    Visual Disturbance      Flashing Lights     Memory Loss     Recall time is slower    .     HPI:  Ms Coumeenu has hypertension, IBS (irritable bowel syndrome), Lichen sclerosus, Graves disease, episodic migraine with aura, vertigo, and history of L occipital infarct without residual deficits.  She comes in today stating that she has been doing well from a stroke standpoint but the reason why she is here is because in the past month she has been experiencing an unusual change in the frequency of her visual auras which for couple of weeks were occurring daily, off and on, and also lasting longer than usual " but in the past couple of days they seem to be gradually subsiding, it looks like switching to nortriptyline has helped".  Mrs eBrrios describe her visual auras as " flashing lights that move around my visual fields and interfere with my vision". No associated HA, vertigo, double vision, altered awareness, focal weakness or numbness, slurred speech or language impairment.  Said that had a recent eye exam that was normal.  On the other hand, her  expressed that she is getting more and more forgetful and can not recall very recent conversations or movies that she just saw.  No changes in language, gait, bladder control, personality, mood, or behavior.       Past Medical History:   Diagnosis Date    Acid reflux     Graves disease     diffuse Toxic Goiter    Hypertension     IBS (irritable bowel syndrome)     Lichen sclerosus     Migraine     Stroke      Social History     Socioeconomic History    Marital status:    Tobacco Use    Smoking status: Never Smoker    Smokeless tobacco: Never Used   Substance and Sexual Activity    Alcohol use: Yes    Drug use: No    Sexual activity: " Yes     Partners: Male     Birth control/protection: See Surgical Hx     Past Surgical History:   Procedure Laterality Date    EYE SURGERY      HYSTERECTOMY      CINDI for ?? cervical cancer, had normal paps    LEFT HEART CATHETERIZATION N/A 10/20/2020    Procedure: Left heart cath;  Surgeon: Bony Schultz MD;  Location: [facility] CATH LAB/EP;  Service: Cardiology;  Laterality: N/A;     Family History   Problem Relation Age of Onset    Kidney failure Father     Hypertension Father     Stomach cancer Mother     Breast cancer Neg Hx            Review of Systems  General ROS: negative for chills, fever or weight loss  Psychological ROS: negative for hallucination, depression or suicidal ideation  Ophthalmic ROS: negative for blurry vision, photophobia or eye pain  ENT ROS: negative for epistaxis, sore throat or rhinorrhea  Respiratory ROS: no cough, shortness of breath, or wheezing  Cardiovascular ROS: no chest pain or dyspnea on exertion  Gastrointestinal ROS: no abdominal pain, change in bowel habits, or black/ bloody stools  Genito-Urinary ROS: no dysuria, trouble voiding, or hematuria  Musculoskeletal ROS: negative for gait disturbance or muscular weakness  Neurological ROS: no syncope or seizures; no ataxia  Dermatological ROS: negative for pruritis, rash and jaundice      Physical Exam:  BP (!) 144/76 (BP Location: Left arm, Patient Position: Sitting)   Pulse 65   Ht 5' (1.524 m)   Wt 60.6 kg (133 lb 7.8 oz)   BMI 26.07 kg/m²   General appearance: alert, cooperative, no distress  Constitutional:Oriented to person, place, and time.appears well-developed and well-nourished.   HEENT: Normocephalic, atraumatic, neck symmetrical, no nasal discharge   Eyes: conjunctivae/corneas clear, PERRL, EOM's intact  Lungs: clear to auscultation bilaterally, no dullness to percussion bilaterally  Heart: regular rate and rhythm without rub; no displacement of the PMI   Abdomen: soft, non-tender; bowel sounds normoactive; no  organomegaly  Extremities: extremities symmetric; no clubbing, cyanosis, or edema  Integument: Skin color, texture, turgor normal; no rashes; hair distrubution normal  Neurologic:   Mental status: alert and awake, oriented x 4, comprehension, naming, and repetition intact. No right to left confusion. Performs serial 7's without difficulty .No dysarthria.  CN 2-12: pupils 4 mm bilaterally, reactive to light. Fundi without papilledema. Visual fields full to confrontation. EOM full without nystagmus. Face sensation normal in all distributions. Face symmetric. Hearing grossly intact. Palate elevates well. Tongue midline without atrophy or fasciculations.  Motor: 5/5 all over  Sensory: intact in all modalities.  DTR's: 2+ all over.  Plantars: no tested.  Coordination: finger to nose and heel-knee-shin intact.  Gait: no ataxia or bradykinesia                  LABS:    Complete Blood Count  Lab Results   Component Value Date    RBC 4.03 01/03/2022    HGB 12.6 01/03/2022    HCT 39.8 01/03/2022    MCV 99 (H) 01/03/2022    MCH 31.3 (H) 01/03/2022    MCHC 31.7 (L) 01/03/2022    RDW 13.1 01/03/2022     01/03/2022    MPV 10.0 01/03/2022    GRAN 5.2 01/03/2022    GRAN 68.0 01/03/2022    LYMPH 1.3 01/03/2022    LYMPH 17.6 (L) 01/03/2022    MONO 1.0 01/03/2022    MONO 12.5 01/03/2022    EOS 0.1 01/03/2022    BASO 0.03 01/03/2022    EOSINOPHIL 1.1 01/03/2022    BASOPHIL 0.4 01/03/2022    DIFFMETHOD Automated 01/03/2022       Comprehensive Metabolic Panel  Lab Results   Component Value Date    GLU 81 01/03/2022    BUN 11 01/03/2022    CREATININE 0.8 01/03/2022     01/03/2022    K 4.2 01/03/2022     (H) 01/03/2022    PROT 6.4 01/03/2022    ALBUMIN 3.2 (L) 01/03/2022    BILITOT 0.4 01/03/2022    AST 25 01/03/2022    ALKPHOS 58 01/03/2022    CO2 22 (L) 01/03/2022    ALT 27 01/03/2022    ANIONGAP 7 (L) 01/03/2022    EGFRNONAA >60.0 01/03/2022    ESTGFRAFRICA >60.0 01/03/2022       TSH  Lab Results   Component Value  Date    TSH 0.560 01/03/2022         Assessment: 71 y/o with hypertension, IBS (irritable bowel syndrome), Lichen sclerosus, Graves disease, episodic migraine with aura, vertigo, and history of L occipital infarct without residual deficits, presents for evaluation of recent increase frequency of visual scotomas as described above.  Neuro exam is unrevealing.  Although migraine with visual auras is certainly a very likely explanation, she had a previous L occipital infarct, thus I would like to exclude the possibility of recurrent infarct or even occipital lobe seizures as potential etiologies for patient inordinate frequency of visual symptomatology.          ICD-10-CM ICD-9-CM    1. Visual disturbances  H53.9 368.9 MRI Brain Without Contrast     The encounter diagnosis was Visual disturbances.      Plan:  1) Visual disturbance: as above  2) Episodic migraine with aura  3) HTN  4) IBS (irritable bowel syndrome)  5) Lichen sclerosus  6) Graves disease,  7) Vertigo  8) History of L occipital infarct without residual deficits            Orders Placed This Encounter   Procedures    MRI Brain Without Contrast           Kuldeep Morales MD

## 2022-03-11 ENCOUNTER — HOSPITAL ENCOUNTER (OUTPATIENT)
Dept: RADIOLOGY | Facility: HOSPITAL | Age: 72
Discharge: HOME | End: 2022-03-11
Attending: PSYCHIATRY & NEUROLOGY
Payer: MEDICARE

## 2022-03-11 DIAGNOSIS — H53.9 VISUAL DISTURBANCES: ICD-10-CM

## 2022-03-11 PROCEDURE — 70551 MRI BRAIN STEM W/O DYE: CPT | Mod: TC

## 2022-03-11 PROCEDURE — 70551 MRI BRAIN STEM W/O DYE: CPT | Mod: 26,,, | Performed by: RADIOLOGY

## 2022-03-11 PROCEDURE — 70551 MRI BRAIN WITHOUT CONTRAST: ICD-10-PCS | Mod: 26,,, | Performed by: RADIOLOGY

## 2022-03-21 ENCOUNTER — TELEPHONE (OUTPATIENT)
Dept: NEUROLOGY | Facility: CLINIC | Age: 72
End: 2022-03-21
Payer: MEDICARE

## 2022-03-21 NOTE — TELEPHONE ENCOUNTER
"Per "Spoke to Mrs Andrade and explained that her CTA brain and neck showed no evidence of significant vascular arterial disease. Her TTE was also unimpressive. Advised to continue Plavix.     "

## 2022-03-22 ENCOUNTER — LAB VISIT (OUTPATIENT)
Dept: LAB | Facility: HOSPITAL | Age: 72
End: 2022-03-22
Attending: INTERNAL MEDICINE
Payer: MEDICARE

## 2022-03-22 DIAGNOSIS — E03.8 OTHER SPECIFIED HYPOTHYROIDISM: ICD-10-CM

## 2022-03-22 LAB
ALBUMIN SERPL BCP-MCNC: 3.4 G/DL (ref 3.5–5.2)
ALP SERPL-CCNC: 40 U/L (ref 55–135)
ALT SERPL W/O P-5'-P-CCNC: 32 U/L (ref 10–44)
ANION GAP SERPL CALC-SCNC: 8 MMOL/L (ref 8–16)
AST SERPL-CCNC: 33 U/L (ref 10–40)
BASOPHILS # BLD AUTO: 0.04 K/UL (ref 0–0.2)
BASOPHILS NFR BLD: 0.6 % (ref 0–1.9)
BILIRUB SERPL-MCNC: 0.5 MG/DL (ref 0.1–1)
BUN SERPL-MCNC: 15 MG/DL (ref 8–23)
CALCIUM SERPL-MCNC: 9.1 MG/DL (ref 8.7–10.5)
CHLORIDE SERPL-SCNC: 107 MMOL/L (ref 95–110)
CO2 SERPL-SCNC: 27 MMOL/L (ref 23–29)
CREAT SERPL-MCNC: 0.9 MG/DL (ref 0.5–1.4)
DIFFERENTIAL METHOD: ABNORMAL
EOSINOPHIL # BLD AUTO: 0.1 K/UL (ref 0–0.5)
EOSINOPHIL NFR BLD: 2 % (ref 0–8)
ERYTHROCYTE [DISTWIDTH] IN BLOOD BY AUTOMATED COUNT: 13.2 % (ref 11.5–14.5)
EST. GFR  (AFRICAN AMERICAN): >60 ML/MIN/1.73 M^2
EST. GFR  (NON AFRICAN AMERICAN): >60 ML/MIN/1.73 M^2
GLUCOSE SERPL-MCNC: 89 MG/DL (ref 70–110)
HCT VFR BLD AUTO: 38.1 % (ref 37–48.5)
HGB BLD-MCNC: 12.4 G/DL (ref 12–16)
IMM GRANULOCYTES # BLD AUTO: 0.03 K/UL (ref 0–0.04)
IMM GRANULOCYTES NFR BLD AUTO: 0.5 % (ref 0–0.5)
LYMPHOCYTES # BLD AUTO: 1.7 K/UL (ref 1–4.8)
LYMPHOCYTES NFR BLD: 25.9 % (ref 18–48)
MCH RBC QN AUTO: 32 PG (ref 27–31)
MCHC RBC AUTO-ENTMCNC: 32.5 G/DL (ref 32–36)
MCV RBC AUTO: 98 FL (ref 82–98)
MONOCYTES # BLD AUTO: 0.8 K/UL (ref 0.3–1)
MONOCYTES NFR BLD: 12.3 % (ref 4–15)
NEUTROPHILS # BLD AUTO: 3.8 K/UL (ref 1.8–7.7)
NEUTROPHILS NFR BLD: 58.7 % (ref 38–73)
NRBC BLD-RTO: 0 /100 WBC
PLATELET # BLD AUTO: 240 K/UL (ref 150–450)
PMV BLD AUTO: 9.9 FL (ref 9.2–12.9)
POTASSIUM SERPL-SCNC: 4.5 MMOL/L (ref 3.5–5.1)
PROT SERPL-MCNC: 6.7 G/DL (ref 6–8.4)
RBC # BLD AUTO: 3.87 M/UL (ref 4–5.4)
SODIUM SERPL-SCNC: 142 MMOL/L (ref 136–145)
T4 FREE SERPL-MCNC: 1.08 NG/DL (ref 0.71–1.51)
TSH SERPL DL<=0.005 MIU/L-ACNC: 1.61 UIU/ML (ref 0.4–4)
WBC # BLD AUTO: 6.44 K/UL (ref 3.9–12.7)

## 2022-03-22 PROCEDURE — 80053 COMPREHEN METABOLIC PANEL: CPT | Performed by: INTERNAL MEDICINE

## 2022-03-22 PROCEDURE — 84443 ASSAY THYROID STIM HORMONE: CPT | Performed by: INTERNAL MEDICINE

## 2022-03-22 PROCEDURE — 36415 COLL VENOUS BLD VENIPUNCTURE: CPT | Performed by: INTERNAL MEDICINE

## 2022-03-22 PROCEDURE — 85025 COMPLETE CBC W/AUTO DIFF WBC: CPT | Performed by: INTERNAL MEDICINE

## 2022-03-22 PROCEDURE — 84439 ASSAY OF FREE THYROXINE: CPT | Performed by: INTERNAL MEDICINE

## 2022-03-29 ENCOUNTER — OFFICE VISIT (OUTPATIENT)
Dept: INTERNAL MEDICINE | Facility: CLINIC | Age: 72
End: 2022-03-29
Payer: MEDICARE

## 2022-03-29 VITALS
WEIGHT: 134.5 LBS | DIASTOLIC BLOOD PRESSURE: 84 MMHG | OXYGEN SATURATION: 94 % | SYSTOLIC BLOOD PRESSURE: 130 MMHG | BODY MASS INDEX: 26.26 KG/M2 | HEART RATE: 70 BPM | TEMPERATURE: 98 F

## 2022-03-29 DIAGNOSIS — I10 ESSENTIAL HYPERTENSION: ICD-10-CM

## 2022-03-29 DIAGNOSIS — E03.8 OTHER SPECIFIED HYPOTHYROIDISM: ICD-10-CM

## 2022-03-29 DIAGNOSIS — Z86.73 HISTORY OF CEREBRAL INFARCTION: ICD-10-CM

## 2022-03-29 DIAGNOSIS — F41.9 ANXIETY: ICD-10-CM

## 2022-03-29 DIAGNOSIS — R00.2 PALPITATIONS: ICD-10-CM

## 2022-03-29 DIAGNOSIS — E78.5 DYSLIPIDEMIA: ICD-10-CM

## 2022-03-29 DIAGNOSIS — M81.0 AGE-RELATED OSTEOPOROSIS WITHOUT CURRENT PATHOLOGICAL FRACTURE: ICD-10-CM

## 2022-03-29 DIAGNOSIS — G43.809 OTHER MIGRAINE WITHOUT STATUS MIGRAINOSUS, NOT INTRACTABLE: ICD-10-CM

## 2022-03-29 DIAGNOSIS — E55.9 VITAMIN D DEFICIENCY: ICD-10-CM

## 2022-03-29 DIAGNOSIS — S42.91XD CLOSED FRACTURE OF RIGHT SHOULDER WITH ROUTINE HEALING, SUBSEQUENT ENCOUNTER: ICD-10-CM

## 2022-03-29 DIAGNOSIS — K21.9 GASTROESOPHAGEAL REFLUX DISEASE WITHOUT ESOPHAGITIS: ICD-10-CM

## 2022-03-29 DIAGNOSIS — I49.3 PVC (PREMATURE VENTRICULAR CONTRACTION): ICD-10-CM

## 2022-03-29 PROBLEM — N18.30 BENIGN HYPERTENSION WITH CKD (CHRONIC KIDNEY DISEASE) STAGE III: Status: RESOLVED | Noted: 2019-08-28 | Resolved: 2022-03-29

## 2022-03-29 PROBLEM — I12.9 BENIGN HYPERTENSION WITH CKD (CHRONIC KIDNEY DISEASE) STAGE III: Status: RESOLVED | Noted: 2019-08-28 | Resolved: 2022-03-29

## 2022-03-29 PROCEDURE — 99999 PR PBB SHADOW E&M-EST. PATIENT-LVL III: ICD-10-PCS | Mod: PBBFAC,,, | Performed by: INTERNAL MEDICINE

## 2022-03-29 PROCEDURE — 99214 PR OFFICE/OUTPT VISIT, EST, LEVL IV, 30-39 MIN: ICD-10-PCS | Mod: S$PBB,,, | Performed by: INTERNAL MEDICINE

## 2022-03-29 PROCEDURE — 99214 OFFICE O/P EST MOD 30 MIN: CPT | Mod: S$PBB,,, | Performed by: INTERNAL MEDICINE

## 2022-03-29 PROCEDURE — 99213 OFFICE O/P EST LOW 20 MIN: CPT | Mod: PBBFAC | Performed by: INTERNAL MEDICINE

## 2022-03-29 PROCEDURE — 99999 PR PBB SHADOW E&M-EST. PATIENT-LVL III: CPT | Mod: PBBFAC,,, | Performed by: INTERNAL MEDICINE

## 2022-03-29 RX ORDER — METOPROLOL TARTRATE 50 MG/1
50 TABLET ORAL 2 TIMES DAILY
Qty: 180 TABLET | Refills: 3 | Status: SHIPPED | OUTPATIENT
Start: 2022-03-29 | End: 2023-04-04 | Stop reason: SDUPTHER

## 2022-03-29 RX ORDER — ESCITALOPRAM OXALATE 20 MG/1
20 TABLET ORAL DAILY
Qty: 90 TABLET | Refills: 3 | Status: SHIPPED | OUTPATIENT
Start: 2022-03-29 | End: 2023-03-06

## 2022-03-29 RX ORDER — LEVOTHYROXINE SODIUM 100 UG/1
100 TABLET ORAL
Qty: 90 TABLET | Refills: 3 | Status: SHIPPED | OUTPATIENT
Start: 2022-03-29 | End: 2022-07-05 | Stop reason: SDUPTHER

## 2022-03-29 NOTE — ASSESSMENT & PLAN NOTE
Stopped PT 2/2 pain.  Sees Dr. Finnegan/ANDREW Flores who is considering a reverse total shoulder replacement.  Pt is hesitant, got steroid injection in 2/2022 with no relief. Still taking Tramadol, doesn't help much with pain.
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
## 2022-03-29 NOTE — PROGRESS NOTES
[facility] Note    Chief Complaint      Chief Complaint   Patient presents with    Follow-up     History of Present Illness      Denise Berrios is a 72 y.o. female who presents today for follow up of HTN.  Patient comes to appointment with spouse, John Berrios.    Problem List Items Addressed This Visit     Anxiety    Current Assessment & Plan     No panic attacks since last visit, has been feeling frustrated about her shoulder.  Stable on Lexapro 10 mg BID. No SI/HI.           Relevant Medications    EScitalopram oxalate (LEXAPRO) 20 MG tablet    Other specified hypothyroidism    Overview     post radioactive treatment for grave's           Current Assessment & Plan     Stable on synthroid 100 mcg daily.  No S/S of hypo/hyperthyroidism.             Relevant Medications    levothyroxine (SYNTHROID) 100 MCG tablet    Osteoporosis    Current Assessment & Plan     Doesn't tolerate fosamax, last DEXA 8/2020.           History of cerebral infarction    Current Assessment & Plan     No residual deficits.  Seen on CT done by Dr. Mena after accident, sent to [facility] ED for stroke workup, MRI done in ED with old strokes. Presented initially with UE weakness.  On asa and statin.  Has not tolerated Plavix, had reaction.           Dyslipidemia    Current Assessment & Plan     Stable on lipitor 80 mg daily, no myalgias.  The ASCVD Risk score (Velma DC Jr., et al., 2013) failed to calculate for the following reasons:    The patient has a prior MI or stroke diagnosis             Migraine headache    Current Assessment & Plan     Sees Dr. Morales, last MRI in media.  Taking nortriptyline, didn't do well with higher doses in past.  Has had 1 migraine in last 2-3 months.           Gastroesophageal reflux disease without esophagitis    Current Assessment & Plan     Stable on prilosec. No nausea/belching.  Sees Dr. Tarango.           Vitamin D deficiency    Current Assessment & Plan     Level 39 in 9/2021. After  taking ergo weekly.           PVC (premature ventricular contraction)    Current Assessment & Plan     Stable on Lopressor BID, no issues at present.           Palpitations    Relevant Medications    metoprolol tartrate (LOPRESSOR) 50 MG tablet    Essential hypertension    Current Assessment & Plan     BP controlled on losartan 50 and lopressor 50 mg BID.  No CP/SOB/HA.             Closed fracture of right shoulder    Current Assessment & Plan     Stopped PT 2/2 pain.  Sees Dr. Finnegan/ANDREW Flores who is considering a reverse total shoulder replacement.  Pt is hesitant, got steroid injection in 2/2022 with no relief. Still taking Tramadol, doesn't help much with pain.                 Health Maintenance   Topic Date Due    Mammogram  10/22/2022    High Dose Statin  03/29/2023    DEXA Scan  08/28/2023    Lipid Panel  09/24/2026    TETANUS VACCINE  08/28/2029    Hepatitis C Screening  Completed       Past Medical History:   Diagnosis Date    Acid reflux     Graves disease     diffuse Toxic Goiter    Hypertension     IBS (irritable bowel syndrome)     Lichen sclerosus     Migraine     Stroke        Past Surgical History:   Procedure Laterality Date    EYE SURGERY      HYSTERECTOMY      CINDI for ?? cervical cancer, had normal paps    LEFT HEART CATHETERIZATION N/A 10/20/2020    Procedure: Left heart cath;  Surgeon: Bony Schultz MD;  Location: [facility] CATH LAB[facility]EP;  Service: Cardiology;  Laterality: N/A;       family history includes Hypertension in her father; Kidney failure in her father; Stomach cancer in her mother.    Social History     Tobacco Use    Smoking status: Never Smoker    Smokeless tobacco: Never Used   Substance Use Topics    Alcohol use: Yes    Drug use: No       Review of Systems   Constitutional: Negative for chills and fever.   HENT: Negative for sore throat.    Respiratory: Negative for cough and shortness of breath.    Cardiovascular: Negative for chest pain and palpitations.    Gastrointestinal: Negative for constipation, diarrhea, nausea and vomiting.   Genitourinary: Negative for dysuria and hematuria.   Musculoskeletal: Negative for falls.   Neurological: Negative for headaches.        Outpatient Encounter Medications as of 3/29/2022   Medication Sig Dispense Refill    aspirin (ECOTRIN) 81 MG EC tablet once a day      atorvastatin (LIPITOR) 80 MG tablet TAKE 1 TABLET DAILY 90 tablet 3    butalbital-acetaminophen-caffeine -40 mg (FIORICET, ESGIC) -40 mg per tablet Take 1 tablet by mouth nightly as needed for Headaches. 30 tablet 5    clobetasol 0.05% (TEMOVATE) 0.05 % Oint APPLY TOPICALLY TWICE A DAY 45 g 1    ergocalciferol (VITAMIN D2) 50,000 unit Cap Take 1 capsule (50,000 Units total) by mouth every 7 days. 12 capsule 3    etodolac (LODINE XL) 400 MG 24 hr tablet Take 1 tablet (400 mg total) by mouth once daily. TAKE 1 TABLET(400 MG) BY MOUTH EVERY DAY 90 tablet 0    losartan (COZAAR) 50 MG tablet TAKE 1 TABLET(50 MG) BY MOUTH EVERY DAY 90 tablet 3    nortriptyline (PAMELOR) 10 MG capsule TAKE 1 CAPSULE DAILY 90 capsule 3    omeprazole (PRILOSEC) 40 MG capsule Take 1 capsule (40 mg total) by mouth once daily. 90 capsule 3    solifenacin (VESICARE) 10 MG tablet TAKE 1 TABLET DAILY 90 tablet 3    traMADoL (ULTRAM) 50 mg tablet Take 1 tablet (50 mg total) by mouth every 12 (twelve) hours as needed for Pain. 40 tablet 2    [DISCONTINUED] EScitalopram oxalate (LEXAPRO) 10 MG tablet Take 1 tablet (10 mg total) by mouth once daily. (Patient taking differently: Take 10 mg by mouth 2 (two) times a day.) 90 tablet 3    [DISCONTINUED] levothyroxine (SYNTHROID) 100 MCG tablet Take 1 tablet (100 mcg total) by mouth before breakfast. 90 tablet 3    [DISCONTINUED] metoprolol tartrate (LOPRESSOR) 50 MG tablet TAKE 1 TABLET TWICE A  tablet 3    EScitalopram oxalate (LEXAPRO) 20 MG tablet Take 1 tablet (20 mg total) by mouth once daily. 90 tablet 3    levothyroxine  (SYNTHROID) 100 MCG tablet Take 1 tablet (100 mcg total) by mouth before breakfast. 90 tablet 3    metoprolol tartrate (LOPRESSOR) 50 MG tablet Take 1 tablet (50 mg total) by mouth 2 (two) times daily. 180 tablet 3     No facility-administered encounter medications on file as of 3/29/2022.        Review of patient's allergies indicates:   Allergen Reactions    Phenobarbital Anaphylaxis    Penicillins Other (See Comments)    Phenobarbital sodium     Propylthiouracil     Thyroid      Note: PTU    Topamax [topiramate] Diarrhea     GI symptoms    Cefaclor Rash and Other (See Comments)    Clopidogrel Rash    Methimazole Rash and Other (See Comments)    Methimazole (bulk) Rash    Penicillin Rash    Sulfa (sulfonamide antibiotics) Rash and Other (See Comments)       Physical Exam      Vital Signs  Temp: 98.3 °F (36.8 °C)  Pulse: 70  SpO2: (!) 94 %  BP: 130/84  Pain Score:   5  Pain Loc: Shoulder  Height and Weight  Weight: 61 kg (134 lb 7.7 oz)]    Physical Exam  Constitutional:       Appearance: She is well-developed.   HENT:      Head: Normocephalic and atraumatic.      Right Ear: External ear normal.      Left Ear: External ear normal.   Eyes:      General:         Right eye: No discharge.         Left eye: No discharge.   Cardiovascular:      Rate and Rhythm: Normal rate and regular rhythm.      Heart sounds: Normal heart sounds. No murmur heard.  Pulmonary:      Effort: Pulmonary effort is normal. No respiratory distress.      Breath sounds: Normal breath sounds.   Abdominal:      General: There is no distension.      Palpations: Abdomen is soft.      Tenderness: There is no abdominal tenderness. There is no guarding.   Musculoskeletal:         General: Normal range of motion.      Cervical back: Normal range of motion.   Skin:     General: Skin is warm and dry.   Neurological:      Mental Status: She is alert and oriented to person, place, and time.   Psychiatric:         Behavior: Behavior normal.           Laboratory:  CBC:  Recent Labs   Lab Result Units 01/03/22  1419 03/22/22  0901   WBC K/uL 7.61 6.44   RBC M/uL 4.03 3.87*   Hemoglobin g/dL 12.6 12.4   Hematocrit % 39.8 38.1   Platelets K/uL 239 240   MCV fL 99* 98   MCH pg 31.3* 32.0*   MCHC g/dL 31.7* 32.5     CMP:  Recent Labs   Lab Result Units 01/03/22  1419 03/22/22  0901   Glucose mg/dL 81 89   Calcium mg/dL 7.3* 9.1   Albumin g/dL 3.2* 3.4*   Total Protein g/dL 6.4 6.7   Sodium mmol/L 141 142   Potassium mmol/L 4.2 4.5   CO2 mmol/L 22* 27   Chloride mmol/L 112* 107   BUN mg/dL 11 15   Alkaline Phosphatase U/L 58 40*   ALT U/L 27 32   AST U/L 25 33   Total Bilirubin mg/dL 0.4 0.5     URINALYSIS:  Recent Labs   Lab Result Units 01/03/22  1527   Color, UA  Yellow   Specific Gravity, UA  1.010   pH, UA  6.0   Protein, UA  Negative   Bacteria /hpf Rare   Nitrite, UA  Negative   Leukocytes, UA  1+*   Hyaline Casts, UA /lpf 15*      LIPIDS:  Recent Labs   Lab Result Units 01/03/22  1419 03/22/22  0901   TSH uIU/mL 0.560 1.610     TSH:  Recent Labs   Lab Result Units 01/03/22  1419 03/22/22  0901   TSH uIU/mL 0.560 1.610     A1C:  No results for input(s): HGBA1C in the last 2160 hours.    Radiology:  No results found in the last 30 days.     Assessment/Plan     Denise Berrios is a 72 y.o.female with:    1. Palpitations  - metoprolol tartrate (LOPRESSOR) 50 MG tablet; Take 1 tablet (50 mg total) by mouth 2 (two) times daily.  Dispense: 180 tablet; Refill: 3    2. Anxiety  - EScitalopram oxalate (LEXAPRO) 20 MG tablet; Take 1 tablet (20 mg total) by mouth once daily.  Dispense: 90 tablet; Refill: 3    3. Other specified hypothyroidism  - levothyroxine (SYNTHROID) 100 MCG tablet; Take 1 tablet (100 mcg total) by mouth before breakfast.  Dispense: 90 tablet; Refill: 3    4. Essential hypertension    5. PVC (premature ventricular contraction)    6. Dyslipidemia    7. Other migraine without status migrainosus, not intractable    8. History of cerebral  infarction    9. Vitamin D deficiency    10. Gastroesophageal reflux disease without esophagitis    11. Age-related osteoporosis without current pathological fracture    12. Closed fracture of right shoulder with routine healing, subsequent encounter       -change lexapro to 20 mg daily   -urged to move up appt with ortho to discuss surgery given continued pain and lack of ROM  -Continue current medications and maintain follow up with specialists.    -Follow up in about 6 months (around 9/29/2022) for follow up of medical problems.       Aleksandra Carey MD  [facility]

## 2022-03-29 NOTE — ASSESSMENT & PLAN NOTE
No panic attacks since last visit, has been feeling frustrated about her shoulder.  Stable on Lexapro 10 mg BID. No SI/HI.

## 2022-03-29 NOTE — ASSESSMENT & PLAN NOTE
Stable on lipitor 80 mg daily, no myalgias.  The ASCVD Risk score (Jim ROSA Jr., et al., 2013) failed to calculate for the following reasons:    The patient has a prior MI or stroke diagnosis

## 2022-03-29 NOTE — ASSESSMENT & PLAN NOTE
Sees Dr. Morales, last MRI in media.  Taking nortriptyline, didn't do well with higher doses in past.  Has had 1 migraine in last 2-3 months.

## 2022-03-31 RX ORDER — ETODOLAC 400 MG/1
TABLET, EXTENDED RELEASE ORAL
Qty: 90 TABLET | Refills: 3 | Status: SHIPPED | OUTPATIENT
Start: 2022-03-31 | End: 2022-09-29

## 2022-04-11 RX ORDER — TRAMADOL HYDROCHLORIDE 50 MG/1
TABLET ORAL
Qty: 40 TABLET | Refills: 0 | Status: SHIPPED | OUTPATIENT
Start: 2022-04-11 | End: 2022-05-11 | Stop reason: SDUPTHER

## 2022-05-03 ENCOUNTER — TELEPHONE (OUTPATIENT)
Dept: INTERNAL MEDICINE | Facility: CLINIC | Age: 72
End: 2022-05-03
Payer: MEDICARE

## 2022-05-03 NOTE — TELEPHONE ENCOUNTER
----- Message from Trisha GINO Lares sent at 5/3/2022 10:55 AM CDT -----  Contact: PT - [phone]  Caller: PT - [phone]    Reason: requesting refill  traMADoL (ULTRAM) 50 mg tablet 40 tablet       [id number] - [place] - [address] [facility] [address]  [address] [address]  Phone: [phone] Fax: [phone]

## 2022-05-11 RX ORDER — TRAMADOL HYDROCHLORIDE 50 MG/1
50 TABLET ORAL EVERY 12 HOURS PRN
Qty: 40 TABLET | Refills: 0 | Status: SHIPPED | OUTPATIENT
Start: 2022-05-11 | End: 2022-05-13 | Stop reason: SDUPTHER

## 2022-05-11 NOTE — TELEPHONE ENCOUNTER
----- Message from Prema Pineda sent at 5/11/2022 11:47 AM CDT -----  Contact: Self [phone]  Requesting an RX refill or new RX.    Is this a refill or new RX: refill    RX name and strength (copy/paste from chart):  traMADoL (ULTRAM) 50 mg tablet    Is this a 30 day or 90 day RX: 30    Pharmacy name and phone # (copy/paste from chart):      [id number] - [place] - [address] AT [facility] [address]  [address] [address]  Phone: [phone] Fax: [phone]

## 2022-05-11 NOTE — TELEPHONE ENCOUNTER
No new care gaps identified.  Health [facility] Embedded Care Gaps. Reference number: [id number]. 5/11/2022   11:56:36 AM GABET

## 2022-05-12 ENCOUNTER — PATIENT OUTREACH (OUTPATIENT)
Dept: ADMINISTRATIVE | Facility: OTHER | Age: 72
End: 2022-05-12
Payer: MEDICARE

## 2022-05-12 NOTE — PROGRESS NOTES
Health Maintenance Due   Topic Date Due    Sign Pain Contract  Never done    Complete Opioid Risk Tool  Never done    COVID-19 Vaccine (3 - Booster for Pfizer series) 06/30/2021     Updates were requested from care everywhere.  Chart was reviewed for overdue Proactive [facility] Encounters (EMMETT) topics (CRS, Breast Cancer Screening, Eye exam)  Health Maintenance has been updated.  LINKS immunization registry triggered.  Immunizations were reconciled.

## 2022-05-13 ENCOUNTER — HOSPITAL ENCOUNTER (OUTPATIENT)
Dept: RADIOLOGY | Facility: HOSPITAL | Age: 72
Discharge: HOME | End: 2022-05-13
Attending: ORTHOPAEDIC SURGERY
Payer: MEDICARE

## 2022-05-13 ENCOUNTER — OFFICE VISIT (OUTPATIENT)
Dept: ORTHOPEDICS | Facility: CLINIC | Age: 72
End: 2022-05-13
Payer: MEDICARE

## 2022-05-13 ENCOUNTER — PATIENT MESSAGE (OUTPATIENT)
Dept: ORTHOPEDICS | Facility: CLINIC | Age: 72
End: 2022-05-13

## 2022-05-13 VITALS — WEIGHT: 134.5 LBS | BODY MASS INDEX: 26.26 KG/M2

## 2022-05-13 DIAGNOSIS — M25.511 CHRONIC RIGHT SHOULDER PAIN: Primary | ICD-10-CM
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
## 2022-05-13 DIAGNOSIS — S42.91XD CLOSED FRACTURE OF RIGHT SHOULDER WITH ROUTINE HEALING, SUBSEQUENT ENCOUNTER: ICD-10-CM

## 2022-05-13 DIAGNOSIS — G89.29 CHRONIC RIGHT SHOULDER PAIN: Primary | ICD-10-CM

## 2022-05-13 PROCEDURE — 99214 PR OFFICE/OUTPT VISIT, EST, LEVL IV, 30-39 MIN: ICD-10-PCS | Mod: S$PBB,,, | Performed by: ORTHOPAEDIC SURGERY

## 2022-05-13 PROCEDURE — 71100 XR RIBS 2 VIEW RIGHT: ICD-10-PCS | Mod: 26,RT,, | Performed by: RADIOLOGY

## 2022-05-13 PROCEDURE — 73030 X-RAY EXAM OF SHOULDER: CPT | Mod: 26,RT,, | Performed by: RADIOLOGY

## 2022-05-13 PROCEDURE — 73030 XR SHOULDER COMPLETE 2 OR MORE VIEWS RIGHT: ICD-10-PCS | Mod: 26,RT,, | Performed by: RADIOLOGY

## 2022-05-13 PROCEDURE — 73030 X-RAY EXAM OF SHOULDER: CPT | Mod: TC,PN,RT

## 2022-05-13 PROCEDURE — 99999 PR PBB SHADOW E&M-EST. PATIENT-LVL IV: ICD-10-PCS | Mod: PBBFAC,,, | Performed by: ORTHOPAEDIC SURGERY

## 2022-05-13 PROCEDURE — 99999 PR PBB SHADOW E&M-EST. PATIENT-LVL IV: CPT | Mod: PBBFAC,,, | Performed by: ORTHOPAEDIC SURGERY

## 2022-05-13 PROCEDURE — 99214 OFFICE O/P EST MOD 30 MIN: CPT | Mod: PBBFAC,PN | Performed by: ORTHOPAEDIC SURGERY

## 2022-05-13 PROCEDURE — 71100 X-RAY EXAM RIBS UNI 2 VIEWS: CPT | Mod: TC,PN,RT

## 2022-05-13 PROCEDURE — 99214 OFFICE O/P EST MOD 30 MIN: CPT | Mod: S$PBB,,, | Performed by: ORTHOPAEDIC SURGERY

## 2022-05-13 PROCEDURE — 71100 X-RAY EXAM RIBS UNI 2 VIEWS: CPT | Mod: 26,RT,, | Performed by: RADIOLOGY

## 2022-05-13 RX ORDER — SODIUM CHLORIDE 9 MG/ML
INJECTION, SOLUTION INTRAVENOUS CONTINUOUS
Status: CANCELLED | OUTPATIENT
Start: 2022-05-13

## 2022-05-13 RX ORDER — TRANEXAMIC ACID 100 MG/ML
1000 INJECTION, SOLUTION INTRAVENOUS
Status: CANCELLED | OUTPATIENT
Start: 2022-05-13

## 2022-05-13 RX ORDER — CEFAZOLIN SODIUM 2 G/50ML
2 SOLUTION INTRAVENOUS
Status: CANCELLED | OUTPATIENT
Start: 2022-05-13

## 2022-05-13 RX ORDER — MUPIROCIN 20 MG/G
OINTMENT TOPICAL
Status: CANCELLED | OUTPATIENT
Start: 2022-05-13

## 2022-05-13 RX ORDER — TRAMADOL HYDROCHLORIDE 50 MG/1
50 TABLET ORAL EVERY 12 HOURS PRN
Qty: 40 TABLET | Refills: 0 | Status: SHIPPED | OUTPATIENT
Start: 2022-05-13 | End: 2022-06-24 | Stop reason: SDUPTHER

## 2022-05-13 NOTE — PROGRESS NOTES
Subjective:      Patient ID: Denise Berrios is a 72 y.o. female.    Chief Complaint: Pain of the Right Shoulder      HPI: Denise Berrios is here in follow-up of chronic right shoulder pain related to previous proximal humus fracture. She is >1 year out from injury. She has received periodic CSI for the shoulder since the injury with modest improvement in pain. Sh was last seen and treated with CSI about 3 months ago. She reports injection was not helpful at all. Today, still complains of pain with any amount of movement, only non-painful at rest. She is still using Tramadol with modest benefit. She also reports another fall a few days ago in her home, she tripped stepping up inter her kitchen and landed on her bent arm. She states slight increase in pain, no change in function. Also has bruising of her breast and concerned for rib fracture.     We have previously discussed the option for reverse total shoulder, initially she was not interested. But her shoulder is limited her ADLs and quality of life. She has decided to proceed with reverse total shoulder replacement.      Past Medical History:   Diagnosis Date    Acid reflux     Graves disease     diffuse Toxic Goiter    Hypertension     IBS (irritable bowel syndrome)     Lichen sclerosus     Migraine     Stroke        Current Outpatient Medications:     aspirin (ECOTRIN) 81 MG EC tablet, once a day, Disp: , Rfl:     atorvastatin (LIPITOR) 80 MG tablet, TAKE 1 TABLET DAILY, Disp: 90 tablet, Rfl: 3    butalbital-acetaminophen-caffeine -40 mg (FIORICET, ESGIC) -40 mg per tablet, Take 1 tablet by mouth nightly as needed for Headaches., Disp: 30 tablet, Rfl: 5    clobetasol 0.05% (TEMOVATE) 0.05 % Oint, APPLY TOPICALLY TWICE A DAY, Disp: 45 g, Rfl: 1    EScitalopram oxalate (LEXAPRO) 20 MG tablet, Take 1 tablet (20 mg total) by mouth once daily., Disp: 90 tablet, Rfl: 3    etodolac (LODINE XL) 400 MG 24 hr tablet, TAKE 1 TABLET DAILY, Disp:  90 tablet, Rfl: 3    levothyroxine (SYNTHROID) 100 MCG tablet, Take 1 tablet (100 mcg total) by mouth before breakfast., Disp: 90 tablet, Rfl: 3    losartan (COZAAR) 50 MG tablet, TAKE 1 TABLET(50 MG) BY MOUTH EVERY DAY, Disp: 90 tablet, Rfl: 3    metoprolol tartrate (LOPRESSOR) 50 MG tablet, Take 1 tablet (50 mg total) by mouth 2 (two) times daily., Disp: 180 tablet, Rfl: 3    nortriptyline (PAMELOR) 10 MG capsule, TAKE 1 CAPSULE DAILY, Disp: 90 capsule, Rfl: 3    omeprazole (PRILOSEC) 40 MG capsule, Take 1 capsule (40 mg total) by mouth once daily., Disp: 90 capsule, Rfl: 3    solifenacin (VESICARE) 10 MG tablet, TAKE 1 TABLET DAILY, Disp: 90 tablet, Rfl: 3    VITAMIN D2 1,250 mcg (50,000 unit) capsule, TAKE 1 CAPSULE EVERY 7 DAYS, Disp: 12 capsule, Rfl: 3    traMADoL (ULTRAM) 50 mg tablet, Take 1 tablet (50 mg total) by mouth every 12 (twelve) hours as needed for Pain., Disp: 40 tablet, Rfl: 0  Review of patient's allergies indicates:   Allergen Reactions    Phenobarbital Anaphylaxis    Penicillins Other (See Comments)    Phenobarbital sodium     Propylthiouracil     Thyroid      Note: PTU    Topamax [topiramate] Diarrhea     GI symptoms    Cefaclor Rash and Other (See Comments)    Clopidogrel Rash    Methimazole Rash and Other (See Comments)    Methimazole (bulk) Rash    Penicillin Rash    Sulfa (sulfonamide antibiotics) Rash and Other (See Comments)       Wt 61 kg (134 lb 7.7 oz)   BMI 26.26 kg/m²     Review of Systems   Constitutional: Negative for chills and fever.   Cardiovascular: Negative for chest pain and palpitations.   Respiratory: Negative for shortness of breath and wheezing.    Skin: Negative for poor wound healing and rash.   Musculoskeletal: Positive for arthritis, falls, joint pain and stiffness.   Gastrointestinal: Negative for nausea and vomiting.   Genitourinary: Negative for dysuria and hematuria.   Neurological: Negative for seizures and tremors.    Psychiatric/Behavioral: Negative for altered mental status.   Allergic/Immunologic: Negative for environmental allergies and persistent infections.         Objective:    Ortho Exam       Right SHOULDER:  Skin: No rashes or lesions on exposed areas.  Atrophy: none noted.  Tenderness to palpation: None.  AROM (deg): abduction- 45, flexion-75, rotation- restricted, painful rotation- present.  Rotator cuff: limited by pain  Morel Impingement test- positive.  Neer Impingement test- positive.  Cross arm adduction test- positive.  Drop arm - positive.  Instability testing: negative.   Distal neuro: normal, no muscle wasting or atrophy.  Pulses: Positive peripheral pulses..  GEN: Well developed, well nourished female. AAOX3. No acute distress.   Normocephalic, atraumatic.   JESSICA  Breathing unlabored.  Mood and affect appropriate.     Assessment:     Imaging: Previous reviewed. deformity of the right proximal humerus, unchanged from 06/24/2021  Should xray repeated today 2/2 fall No new fractures. Also no rib fractures seen        1. Chronic right shoulder pain    2. Closed fracture of right shoulder with routine healing, subsequent encounter    3. Fall, initial encounter          Plan:       Orders Placed This Encounter    X-ray Shoulder 2 or More Views Right    X-Ray Ribs 2 View Right    CT Shoulder Without Contrast Right    traMADoL (ULTRAM) 50 mg tablet    Case Request Operating Room: ARTHROPLASTY, SHOULDER, TOTAL, REVERSE      No new fractures   Previous conservative treatment has failed to provide meaningful benefit. ADLS and quality of life are impaired  Recommended reverse total shoulder replacement; she is agreeable  Pre, hyacinth, and post operative procedures and expectations discussed. All questions were answered. Consent forms were explained and signed by the patient.   Denise Montgomerys will contact us if there are any questions, concerns, or changes in medical status prior to surgery.    Discussed with  Dr. Finnegan, agrees with the above  Recommends CT shoulder with 3D recon    Follow up for PO care.

## 2022-06-09 NOTE — TELEPHONE ENCOUNTER
----- Message from Cassius Johnson sent at 10/27/2020  9:50 AM CDT -----  Type:  RX Refill Request    Who Called: Denise  Refill or New Rx: refill  RX Name and Strength: escitalopram oxalate (LEXAPRO) 5 MG Tab  How is the patient currently taking it? (ex. 1XDay): 1XDay  Is this a 30 day or 90 day RX: 30  Preferred Pharmacy with phone number: [id number] - [place] - [address] AT [facility] [address] [phone] (Phone)  [phone] (Fax)  Local or Mail Order: local  Ordering Provider: Dr. Carey  Would the patient rather a call back or a response via [facility]? Call back  Best Call Back Number: [phone]  Additional Information: [facility] told the patient that they don't have any refills on this medication for the patient       She originally injured her back in January after she fell twice.  Initially she was having severe pain with radiation down the right leg.  She states that the radiation down the leg has subsided but she continues to have a chronic daily pinching pain over the right lower back.  She denies numbness, weakness, tingling.  She usually takes 600 mg of ibuprofen every morning to help with this pain and she reports improvement.  Pain usually gets worse with standing and lifting.  She did have an x-ray done at the time of injury which was unremarkable.

## 2022-06-14 ENCOUNTER — TELEPHONE (OUTPATIENT)
Dept: ORTHOPEDICS | Facility: CLINIC | Age: 72
End: 2022-06-14
Payer: MEDICARE

## 2022-06-14 NOTE — TELEPHONE ENCOUNTER
----- Message from Noah Sofia sent at 6/14/2022  1:32 PM CDT -----  Type:  Needs Medical Advice    Who Called: #self  Reason:regarding her upcoming surgery. She was exposed to covid recently from her step son who lives with them. Her 5 day isolation period will be ending on Saturday so she may have to to reschedule surgery   Would the patient rather a call back or a response via [facility]? call  Best Call Back Number: [phone]  Additional Information:none

## 2022-06-14 NOTE — TELEPHONE ENCOUNTER
----- Message from Marisela Flroes PA-C sent at 6/14/2022  2:49 PM CDT -----  That is fine   ----- Message -----  From: Kavitha Cleveland MA  Sent: 6/14/2022   1:59 PM CDT  To: Marisela Flores PA-C    Patient stated she is having her second Prolia shot on 6/20. She wants to make sure there will be no issues with her procedure scheduled for 7/5. I did tell her it should be ok as long as she receives the injections on the shoulder that is not being operated on. Please Advise  ----- Message -----  From: Noah Salazarliz  Sent: 6/14/2022   1:35 PM CDT  To: Sandra Antonio Staff    Type:  Needs Medical Advice    Who Called: #self  Reason:regarding her upcoming surgery. She was exposed to covid recently from her step son who lives with them. Her 5 day isolation period will be ending on Saturday so she may have to to reschedule surgery   Would the patient rather a call back or a response via [facility]? call  Best Call Back Number: [phone]  Additional Information:none

## 2022-06-20 ENCOUNTER — TELEPHONE (OUTPATIENT)
Dept: ORTHOPEDICS | Facility: CLINIC | Age: 72
End: 2022-06-20
Payer: MEDICARE

## 2022-06-20 NOTE — TELEPHONE ENCOUNTER
Patient rescheduled Total Shoulder for August 19th. Informed patient that someone will reach out to her for her new preadmission appointment and CT of shoulder. Patient verbalized understanding and all questions answered.

## 2022-06-24 RX ORDER — TRAMADOL HYDROCHLORIDE 50 MG/1
50 TABLET ORAL EVERY 12 HOURS PRN
Qty: 40 TABLET | Refills: 0 | Status: SHIPPED | OUTPATIENT
Start: 2022-06-24 | End: 2022-09-20

## 2022-06-30 ENCOUNTER — INFUSION (OUTPATIENT)
Dept: INFECTIOUS DISEASES | Facility: HOSPITAL | Age: 72
End: 2022-06-30
Attending: INTERNAL MEDICINE
Payer: MEDICARE

## 2022-06-30 VITALS — BODY MASS INDEX: 26.16 KG/M2 | WEIGHT: 133.94 LBS

## 2022-06-30 DIAGNOSIS — M81.0 AGE-RELATED OSTEOPOROSIS WITHOUT CURRENT PATHOLOGICAL FRACTURE: ICD-10-CM

## 2022-06-30 DIAGNOSIS — M80.00XD AGE-RELATED OSTEOPOROSIS WITH CURRENT PATHOLOGICAL FRACTURE WITH ROUTINE HEALING: Primary | ICD-10-CM

## 2022-06-30 DIAGNOSIS — M25.611 DECREASED RANGE OF MOTION OF RIGHT SHOULDER: ICD-10-CM

## 2022-06-30 PROCEDURE — 96372 THER/PROPH/DIAG INJ SC/IM: CPT

## 2022-06-30 PROCEDURE — 63600175 PHARM REV CODE 636 W HCPCS: Mod: JG | Performed by: INTERNAL MEDICINE

## 2022-06-30 RX ADMIN — DENOSUMAB 60 MG: 60 INJECTION SUBCUTANEOUS at 10:06

## 2022-06-30 NOTE — PROGRESS NOTES
Pt arrived for  Prolia injection, give 60 mg SQ via left arm tolerated well.  Left in NAD.  Presently taking Vit D.

## 2022-07-05 DIAGNOSIS — E03.8 OTHER SPECIFIED HYPOTHYROIDISM: ICD-10-CM

## 2022-07-05 NOTE — TELEPHONE ENCOUNTER
----- Message from Malik Diallo sent at 7/5/2022 12:13 PM CDT -----  Contact: pt.  .Type:  Needs Medical Advice    Who Called: pt    Pharmacy name and phone #:  [facility] #[id number] - [address] AT [facility] [address]   Phone: [phone]  Fax:  [phone]  Would the patient rather a call back or a response via [facility]? Call back  Best Call Back Number: [phone]   Additional Information: Pt. Needs a refill on her levothyroxine (SYNTHROID) 100 MCG tablet

## 2022-07-05 NOTE — TELEPHONE ENCOUNTER
Care Due:                  Date            Visit Type   Department     Provider  --------------------------------------------------------------------------------                                EP -                              PRIMARY      [facility] PRIMARY  Last Visit: 03-      CARE ([facility])   CARE           Alkesandra Carey                               -                              [facility] PRIMARY  Next Visit: 09-      CARE ([facility])   [facility]           Aleksandra Carey                                                            Last  Test          Frequency    Reason                     Performed    Due Date  --------------------------------------------------------------------------------    Lipid Panel.  12 months..  atorvastatin.............  09- 09-    Health [facility] Embedded Care Gaps. Reference number: [id number]. 7/05/2022   1:14:44 PM CDT

## 2022-07-06 RX ORDER — LEVOTHYROXINE SODIUM 100 UG/1
100 TABLET ORAL
Qty: 90 TABLET | Refills: 0 | Status: SHIPPED | OUTPATIENT
Start: 2022-07-06 | End: 2022-09-29 | Stop reason: SDUPTHER

## 2022-07-12 RX ORDER — TRAMADOL HYDROCHLORIDE 50 MG/1
50 TABLET ORAL EVERY 6 HOURS PRN
Qty: 40 TABLET | Refills: 0 | Status: SHIPPED | OUTPATIENT
Start: 2022-07-12 | End: 2022-07-22

## 2022-07-26 ENCOUNTER — TELEPHONE (OUTPATIENT)
Dept: ORTHOPEDICS | Facility: CLINIC | Age: 72
End: 2022-07-26
Payer: MEDICARE

## 2022-07-26 NOTE — TELEPHONE ENCOUNTER
Spoke with patient. CT scheduled. Pre op appointment confirmed. Informed to stop Aspirin 3 days prior to surgery.

## 2022-07-26 NOTE — TELEPHONE ENCOUNTER
----- Message from Noah Sofia sent at 7/26/2022  1:59 PM CDT -----  Type:  Needs Medical Advice    Who Called: self  Reason:questions regarding surgery    Best Call Back Number: [phone]  Additional Information: none

## 2022-07-28 ENCOUNTER — HOSPITAL ENCOUNTER (OUTPATIENT)
Dept: RADIOLOGY | Facility: HOSPITAL | Age: 72
Discharge: HOME | End: 2022-07-28
Attending: ORTHOPAEDIC SURGERY
Payer: MEDICARE

## 2022-07-28 DIAGNOSIS — S42.91XD CLOSED FRACTURE OF RIGHT SHOULDER WITH ROUTINE HEALING, SUBSEQUENT ENCOUNTER: ICD-10-CM

## 2022-07-28 DIAGNOSIS — M25.511 CHRONIC RIGHT SHOULDER PAIN: ICD-10-CM

## 2022-07-28 DIAGNOSIS — G89.29 CHRONIC RIGHT SHOULDER PAIN: ICD-10-CM

## 2022-07-28 PROCEDURE — 73200 CT UPPER EXTREMITY W/O DYE: CPT | Mod: 26,RT,, | Performed by: RADIOLOGY

## 2022-07-28 PROCEDURE — 73200 CT SHOULDER WITHOUT CONTRAST RIGHT: ICD-10-PCS | Mod: 26,RT,, | Performed by: RADIOLOGY

## 2022-07-28 PROCEDURE — 73200 CT UPPER EXTREMITY W/O DYE: CPT | Mod: TC,RT

## 2022-08-03 ENCOUNTER — TELEPHONE (OUTPATIENT)
Dept: ORTHOPEDICS | Facility: CLINIC | Age: 72
End: 2022-08-03
Payer: MEDICARE

## 2022-08-03 RX ORDER — TRAMADOL HYDROCHLORIDE 50 MG/1
50 TABLET ORAL EVERY 6 HOURS PRN
Qty: 40 TABLET | Refills: 0 | Status: SHIPPED | OUTPATIENT
Start: 2022-08-03 | End: 2022-08-13

## 2022-08-03 NOTE — TELEPHONE ENCOUNTER
----- Message from Andrey Cronin sent at 8/3/2022  9:36 AM CDT -----  .Type:  RX Refill Request    Who Called: patient  Refill or New Rx: refill  RX Name and Strength:traMADoL (ULTRAM) 50 mg tablet  How is the patient currently taking it? (ex. 1XDay): Take 1 tablet (50 mg total) by mouth every 12 (twelve) hours as needed for Pain  Is this a 30 day or 90 day RX: 40 tablets  Preferred Pharmacy with phone number: [id number] - [place] - [address] AT [facility] [address]   Phone:  [phone]  Fax:  [phone]  Local or Mail Order: Local  Ordering Provider: Bradley Finnegan  Would the patient rather a call back or a response via [facility]? Call back  Best Call Back Number: [phone]   Additional Information: patient would also like to receive a call back to talk to ravi

## 2022-08-15 ENCOUNTER — TELEPHONE (OUTPATIENT)
Dept: INTERNAL MEDICINE | Facility: CLINIC | Age: 72
End: 2022-08-15
Payer: MEDICARE

## 2022-08-15 NOTE — TELEPHONE ENCOUNTER
----- Message from Sharmin Gamble, Patient Care Assistant sent at 8/15/2022  2:18 PM CDT -----  Type:  Needs Medical Advice    Who Called:  pt  Symptoms (please be specific):  Patient would like a call back regarding pre-op lab    Would the patient rather a call back or a response via [facility]?  Please call  Best Call Back Number:  [phone]  Additional Information:

## 2022-08-15 NOTE — TELEPHONE ENCOUNTER
States she is having shoulder replacement and asking abnout labs. Seeing them tomorrow for pre op, will ask them if she needs to be cleared by us.

## 2022-08-16 ENCOUNTER — CLINICAL SUPPORT (OUTPATIENT)
Dept: LAB | Facility: HOSPITAL | Age: 72
DRG: 483 | End: 2022-08-16
Attending: ORTHOPAEDIC SURGERY
Payer: MEDICARE

## 2022-08-16 ENCOUNTER — HOSPITAL ENCOUNTER (OUTPATIENT)
Dept: PREADMISSION TESTING | Facility: HOSPITAL | Age: 72
Discharge: HOME | DRG: 483 | End: 2022-08-16
Attending: ORTHOPAEDIC SURGERY
Payer: MEDICARE

## 2022-08-16 ENCOUNTER — ANESTHESIA EVENT (OUTPATIENT)
Dept: SURGERY | Facility: HOSPITAL | Age: 72
DRG: 483 | End: 2022-08-16
Payer: MEDICARE

## 2022-08-16 VITALS
HEIGHT: 60 IN | TEMPERATURE: 98 F | WEIGHT: 135.5 LBS | RESPIRATION RATE: 16 BRPM | DIASTOLIC BLOOD PRESSURE: 65 MMHG | BODY MASS INDEX: 26.6 KG/M2 | HEART RATE: 57 BPM | OXYGEN SATURATION: 99 % | SYSTOLIC BLOOD PRESSURE: 139 MMHG

## 2022-08-16 DIAGNOSIS — Z01.818 PREOP TESTING: Primary | ICD-10-CM

## 2022-08-16 DIAGNOSIS — Z01.818 PREOP TESTING: ICD-10-CM

## 2022-08-16 RX ORDER — LIDOCAINE HYDROCHLORIDE 10 MG/ML
1 INJECTION, SOLUTION EPIDURAL; INFILTRATION; INTRACAUDAL; PERINEURAL ONCE
Status: CANCELLED | OUTPATIENT
Start: 2022-08-16 | End: 2022-08-16

## 2022-08-16 RX ORDER — SCOLOPAMINE TRANSDERMAL SYSTEM 1 MG/1
1 PATCH, EXTENDED RELEASE TRANSDERMAL
Status: CANCELLED | OUTPATIENT
Start: 2022-08-16

## 2022-08-16 RX ORDER — SODIUM CHLORIDE, SODIUM LACTATE, POTASSIUM CHLORIDE, CALCIUM CHLORIDE 600; 310; 30; 20 MG/100ML; MG/100ML; MG/100ML; MG/100ML
INJECTION, SOLUTION INTRAVENOUS CONTINUOUS
Status: CANCELLED | OUTPATIENT
Start: 2022-08-16

## 2022-08-16 NOTE — PRE-PROCEDURE INSTRUCTIONS
Admit     Allergies, medical, surgical, family and psychosocial histories reviewed with patient. Periop plan of care reviewed. Preop instructions given, including medications to take and to hold. Hibiclens soap and instructions on use given. Time allotted for questions to be addressed.  Patient verbalized understanding.

## 2022-08-16 NOTE — ANESTHESIA PREPROCEDURE EVALUATION
08/16/2022  Denise Berrios is a 72 y.o., female scheduled for ARTHROPLASTY, SHOULDER, TOTAL, REVERSE (Right ) 8/19/22.    Past Medical History:   Diagnosis Date    Acid reflux     Graves disease     diffuse Toxic Goiter    Hypertension     IBS (irritable bowel syndrome)     Lichen sclerosus     Migraine     Stroke      Past Surgical History:   Procedure Laterality Date    EYE SURGERY      HYSTERECTOMY      CINDI for ?? cervical cancer, had normal paps    LEFT HEART CATHETERIZATION N/A 10/20/2020    Procedure: Left heart cath;  Surgeon: Bony Schultz MD;  Location: [facility] CATH LAB/EP;  Service: Cardiology;  Laterality: N/A;       Pre-op Assessment    I have reviewed the Patient Summary Reports.     I have reviewed the Nursing Notes.    I have reviewed the Medications.     Review of Systems  Anesthesia Hx:  No problems with previous Anesthesia Denies Hx of Anesthetic complications   Denies Personal Hx of Anesthesia complications.   Social:  Non-Smoker, Social Alcohol Use    Hematology/Oncology:     Oncology Normal     Cardiovascular:   Exercise tolerance: good Denies Pacemaker. Hypertension, well controlled   Denies Angina. hyperlipidemia    Pulmonary:  Pulmonary Normal  Denies Shortness of breath.    Renal/:  Renal/ Normal     Hepatic/GI:   GERD, well controlled Denies Liver Disease.    Musculoskeletal:  Musculoskeletal Normal    Neurological:   Denies TIA. CVA, no residual symptoms Headaches Denies Seizures.    Endocrine:   Denies Diabetes. Hypothyroidism Denies Hyperthyroidism.  Thyroid Disease, Graves Disease, s/p radioactive iodine Rx  Denies Obesity / BMI > 30, Denies Morbid Obesity / BMI > 40  Psych:   anxiety          Physical Exam  General: Well nourished and Cooperative    Airway:  Mallampati: IV / III  Mouth Opening: Small, but > 3cm  TM Distance: Normal  Tongue: Normal  Neck ROM:  Normal ROM    Dental:  Intact    Chest/Lungs:  Clear to auscultation, Normal Respiratory Rate    Heart:  Rate: Normal  Rhythm: Regular Rhythm  Sounds: Normal        Anesthesia Plan  Type of Anesthesia, risks & benefits discussed:    Anesthesia Type: Gen ETT  Intra-op Monitoring Plan: Standard ASA Monitors  Post Op Pain Control Plan: multimodal analgesia  Induction:  IV  Airway Plan: Direct, Post-Induction  Informed Consent: Informed consent signed with the Patient and all parties understand the risks and agree with anesthesia plan.  All questions answered.   ASA Score: 3  Anesthesia Plan Notes: Anesthesia consent to be signed prior to surgery 8/19/22      Ready For Surgery From Anesthesia Perspective.     .

## 2022-08-16 NOTE — DISCHARGE INSTRUCTIONS
Your surgery is scheduled for 8/19/22.    Please report to [facility] on the 1st Floor at 0930 a.m.    THIS TIME IS SUBJECT TO CHANGE.  YOU WILL RECEIVE A PHONE CALL THE DAY BEFORE SURGERY BY 3:30 PM TO CONFIRM YOUR TIME OF ARRIVAL.  IF YOU HAVE NOT RECEIVED A PHONE CALL BY 3:30 PM THE DAY BEFORE YOUR SURGERY PLEASE CALL [phone]     INSTRUCTIONS IMPORTANT!!!  ¨ Do not eat or drink after 12 midnight-including water, candy, gum, & mints. OK to brush teeth.      ____  Proceed to [facility] on *** for additional testing.        ____  Do not wear makeup, including mascara.  ____  No powder, lotions or creams to surgical area.  ____  Please remove all jewelry, including piercings and leave at home.  ____  No money or valuables needed. Please leave at home.  ____  Please bring any documents given by your doctor.  ____  If going home the same day, arrange for a ride home. You will not be able to             drive if Anesthesia was used.  ____  Children under 18 years require a parent / guardian present the entire time             they are in surgery / recovery.  ____  Wear loose fitting clothing. Allow for dressings, bandages.  ____  Stop Aspirin, Ibuprofen, Motrin, Aleve, Goody's/BC powders, Excedrine and Naproxen (NSAIDS) at least 3-5 days before surgery, unless otherwise instructed by your doctor, or the nurse.   You MAY use Tylenol/acetaminophen until day of surgery.  ____  Wash the surgical area with Hibiclens the night before surgery, and again the             morning of surgery.  Be sure to rinse hibiclens off completely (if instructed by   nurse).  ____  If you take diabetic medication, do not take am of surgery unless instructed by Doctor.  ____  Call MD for temperature above 101 degrees or any other signs of infection such as Urinary (bladder) infection, Upper respiratory infection, skin boils, etc.  ____ Stop taking any Fish Oil supplement or any Vitamins that contain Vitamin E at  least 5 days prior to surgery.  ____ Do Not wear your contact lenses the day of your procedure.  You may wear your glasses.      ____Do not shave surgical site for 3 days prior to surgery.  ____ Practice Good hand washing before, during, and after procedure.      I have read or had read and explained to me, and understand the above information.  Additional comments or instructions:  For additional questions call [phone]      ANESTHESIA SIDE EFFECTS  -For the first 24 hours after surgery:  Do not drive, use heavy equipment, make important decisions, or drink alcohol  -It is normal to feel sleepy for several hours.  Rest until you are more awake.  -Have someone stay with you, if needed.  They can watch for problems and help keep you safe.  -Some possible post anesthesia side effects include: nausea and vomiting, sore throat and hoarseness, sleepiness, and dizziness.        Pre-Op Bathing Instructions    Before surgery, you can play an important role in your own health.    Because skin is not sterile, we need to be sure that your skin is as free of germs as possible. By following the instructions below, you can reduce the number of germs on your skin before surgery.    IMPORTANT: You will need to shower with a special soap called Hibiclens*, available at any pharmacy.  If you are allergic to Chlorhexidine (the antiseptic in Hibiclens), use an antibacterial soap such as Dial Soap for your preoperative shower.  You will shower with Hibiclens both the night before your surgery and the morning of your surgery.  Do not use Hibiclens on the head, face or genitals to avoid injury to those areas.    STEP #1: THE NIGHT BEFORE YOUR SURGERY     Do not shave the area of your body where your surgery will be performed.  Shower and wash your hair and body as usual with your normal soap and shampoo.  Rinse your hair and body thoroughly after you shower to remove all soap residue.  With your hand, apply one packet of Hibiclens soap to  the surgical site.   Wash the site gently for five (5) minutes. Do not scrub your skin too hard.   Do not wash with your regular soap after Hibiclens is used.  Rinse your body thoroughly.  Pat yourself dry with a clean, soft towel.  Do not use lotion, cream, or powder.  Wear clean clothes.    STEP #2: THE MORNING OF YOUR SURGERY     Repeat Step #1.    * Not to be used by people allergic to Chlorhexidine.     DISPLAY PLAN FREE TEXT

## 2022-08-19 ENCOUNTER — ANESTHESIA (OUTPATIENT)
Dept: SURGERY | Facility: HOSPITAL | Age: 72
DRG: 483 | End: 2022-08-19
Payer: MEDICARE

## 2022-08-19 ENCOUNTER — HOSPITAL ENCOUNTER (INPATIENT)
Facility: HOSPITAL | Age: 72
LOS: 1 days | Discharge: HOME | DRG: 483 | End: 2022-08-20
Attending: ORTHOPAEDIC SURGERY | Admitting: ORTHOPAEDIC SURGERY
Payer: MEDICARE

## 2022-08-19 DIAGNOSIS — S42.91XD CLOSED FRACTURE OF RIGHT SHOULDER WITH ROUTINE HEALING, SUBSEQUENT ENCOUNTER: ICD-10-CM

## 2022-08-19 DIAGNOSIS — M25.511 CHRONIC RIGHT SHOULDER PAIN: ICD-10-CM

## 2022-08-19 DIAGNOSIS — M19.019 SHOULDER ARTHRITIS: Primary | ICD-10-CM

## 2022-08-19 DIAGNOSIS — G89.29 CHRONIC RIGHT SHOULDER PAIN: ICD-10-CM

## 2022-08-19 LAB
ANION GAP SERPL CALC-SCNC: 8 MMOL/L (ref 8–16)
BUN SERPL-MCNC: 12 MG/DL (ref 8–23)
CALCIUM SERPL-MCNC: 7.7 MG/DL (ref 8.7–10.5)
CHLORIDE SERPL-SCNC: 110 MMOL/L (ref 95–110)
CO2 SERPL-SCNC: 22 MMOL/L (ref 23–29)
CREAT SERPL-MCNC: 0.8 MG/DL (ref 0.5–1.4)
EST. GFR  (NO RACE VARIABLE): >60 ML/MIN/1.73 M^2
GLUCOSE SERPL-MCNC: 111 MG/DL (ref 70–110)
POTASSIUM SERPL-SCNC: 4.3 MMOL/L (ref 3.5–5.1)
SODIUM SERPL-SCNC: 140 MMOL/L (ref 136–145)

## 2022-08-19 PROCEDURE — 94761 N-INVAS EAR/PLS OXIMETRY MLT: CPT

## 2022-08-19 PROCEDURE — 25000003 PHARM REV CODE 250: Performed by: ORTHOPAEDIC SURGERY

## 2022-08-19 PROCEDURE — 25000003 PHARM REV CODE 250: Performed by: NURSE PRACTITIONER

## 2022-08-19 PROCEDURE — 71000039 HC RECOVERY, EACH ADD'L HOUR: Performed by: ORTHOPAEDIC SURGERY

## 2022-08-19 PROCEDURE — 71000033 HC RECOVERY, INTIAL HOUR: Performed by: ORTHOPAEDIC SURGERY

## 2022-08-19 PROCEDURE — 63600175 PHARM REV CODE 636 W HCPCS: Performed by: STUDENT IN AN ORGANIZED HEALTH CARE EDUCATION/TRAINING PROGRAM

## 2022-08-19 PROCEDURE — 23472 RECONSTRUCT SHOULDER JOINT: CPT | Mod: RT,,, | Performed by: ORTHOPAEDIC SURGERY

## 2022-08-19 PROCEDURE — 27800903 OPTIME MED/SURG SUP & DEVICES OTHER IMPLANTS: Performed by: ORTHOPAEDIC SURGERY

## 2022-08-19 PROCEDURE — 36000711: Performed by: ORTHOPAEDIC SURGERY

## 2022-08-19 PROCEDURE — 11000001 HC ACUTE MED/SURG PRIVATE ROOM

## 2022-08-19 PROCEDURE — C1713 ANCHOR/SCREW BN/BN,TIS/BN: HCPCS | Performed by: ORTHOPAEDIC SURGERY

## 2022-08-19 PROCEDURE — C1769 GUIDE WIRE: HCPCS | Performed by: ORTHOPAEDIC SURGERY

## 2022-08-19 PROCEDURE — 25000003 PHARM REV CODE 250: Performed by: STUDENT IN AN ORGANIZED HEALTH CARE EDUCATION/TRAINING PROGRAM

## 2022-08-19 PROCEDURE — C9290 INJ, BUPIVACAINE LIPOSOME: HCPCS | Performed by: STUDENT IN AN ORGANIZED HEALTH CARE EDUCATION/TRAINING PROGRAM

## 2022-08-19 PROCEDURE — 63600175 PHARM REV CODE 636 W HCPCS: Performed by: ORTHOPAEDIC SURGERY

## 2022-08-19 PROCEDURE — 80048 BASIC METABOLIC PNL TOTAL CA: CPT | Performed by: ORTHOPAEDIC SURGERY

## 2022-08-19 PROCEDURE — 23472 PR RECONSTR TOTAL SHOULDER IMPLANT: ICD-10-PCS | Mod: RT,,, | Performed by: ORTHOPAEDIC SURGERY

## 2022-08-19 PROCEDURE — 27201423 OPTIME MED/SURG SUP & DEVICES STERILE SUPPLY: Performed by: ORTHOPAEDIC SURGERY

## 2022-08-19 PROCEDURE — 37000009 HC ANESTHESIA EA ADD 15 MINS: Performed by: ORTHOPAEDIC SURGERY

## 2022-08-19 PROCEDURE — 37000008 HC ANESTHESIA 1ST 15 MINUTES: Performed by: ORTHOPAEDIC SURGERY

## 2022-08-19 PROCEDURE — 64415 NJX AA&/STRD BRCH PLXS IMG: CPT | Performed by: STUDENT IN AN ORGANIZED HEALTH CARE EDUCATION/TRAINING PROGRAM

## 2022-08-19 PROCEDURE — 63600175 PHARM REV CODE 636 W HCPCS: Performed by: NURSE PRACTITIONER

## 2022-08-19 PROCEDURE — 36000710: Performed by: ORTHOPAEDIC SURGERY

## 2022-08-19 PROCEDURE — 36415 COLL VENOUS BLD VENIPUNCTURE: CPT | Performed by: ORTHOPAEDIC SURGERY

## 2022-08-19 PROCEDURE — C1776 JOINT DEVICE (IMPLANTABLE): HCPCS | Performed by: ORTHOPAEDIC SURGERY

## 2022-08-19 DEVICE — SCREW PERIPH REUNION 4.5X16MM: Type: IMPLANTABLE DEVICE | Site: SHOULDER | Status: FUNCTIONAL

## 2022-08-19 DEVICE — WIRE FIXATION REUN NIT PILT: Type: IMPLANTABLE DEVICE | Site: SHOULDER | Status: FUNCTIONAL

## 2022-08-19 DEVICE — SCREW BONE GLENOID 4.5X24MM: Type: IMPLANTABLE DEVICE | Site: SHOULDER | Status: FUNCTIONAL

## 2022-08-19 DEVICE — CUP HUM REUNION RFX TRAIL ADPT: Type: IMPLANTABLE DEVICE | Site: SHOULDER | Status: FUNCTIONAL

## 2022-08-19 DEVICE — IMPLANTABLE DEVICE: Type: IMPLANTABLE DEVICE | Site: SHOULDER | Status: FUNCTIONAL

## 2022-08-19 DEVICE — COMPONENT GLENOSPHERE 32X2MM: Type: IMPLANTABLE DEVICE | Site: SHOULDER | Status: FUNCTIONAL

## 2022-08-19 DEVICE — SCREW REUNION 6.5X24MM: Type: IMPLANTABLE DEVICE | Site: SHOULDER | Status: FUNCTIONAL

## 2022-08-19 DEVICE — PLUG BONE #4: Type: IMPLANTABLE DEVICE | Site: SHOULDER | Status: FUNCTIONAL

## 2022-08-19 DEVICE — SCREW BONE TSA 4.5X32MM: Type: IMPLANTABLE DEVICE | Site: SHOULDER | Status: FUNCTIONAL

## 2022-08-19 DEVICE — CEMENT BONE ANTIBIO SIMPLEX P: Type: IMPLANTABLE DEVICE | Site: SHOULDER | Status: FUNCTIONAL

## 2022-08-19 DEVICE — PUTTY HEMASORB BONE RESORBABLE: Type: IMPLANTABLE DEVICE | Site: SHOULDER | Status: FUNCTIONAL

## 2022-08-19 DEVICE — CUP HUMERAL REUN RSA 32X4MM: Type: IMPLANTABLE DEVICE | Site: SHOULDER | Status: FUNCTIONAL

## 2022-08-19 RX ORDER — CLOBETASOL PROPIONATE 0.5 MG/G
CREAM TOPICAL 2 TIMES DAILY
Status: DISCONTINUED | OUTPATIENT
Start: 2022-08-19 | End: 2022-08-20 | Stop reason: HOSPADM

## 2022-08-19 RX ORDER — ATORVASTATIN CALCIUM 40 MG/1
80 TABLET, FILM COATED ORAL DAILY
Status: DISCONTINUED | OUTPATIENT
Start: 2022-08-20 | End: 2022-08-20 | Stop reason: HOSPADM

## 2022-08-19 RX ORDER — KETOROLAC TROMETHAMINE 30 MG/ML
15 INJECTION, SOLUTION INTRAMUSCULAR; INTRAVENOUS ONCE
Status: COMPLETED | OUTPATIENT
Start: 2022-08-19 | End: 2022-08-19

## 2022-08-19 RX ORDER — TRANEXAMIC ACID 100 MG/ML
1000 INJECTION, SOLUTION INTRAVENOUS
Status: COMPLETED | OUTPATIENT
Start: 2022-08-19 | End: 2022-08-19

## 2022-08-19 RX ORDER — LIDOCAINE HYDROCHLORIDE 10 MG/ML
1 INJECTION, SOLUTION EPIDURAL; INFILTRATION; INTRACAUDAL; PERINEURAL ONCE
Status: DISCONTINUED | OUTPATIENT
Start: 2022-08-19 | End: 2022-08-19 | Stop reason: HOSPADM

## 2022-08-19 RX ORDER — ROCURONIUM BROMIDE 10 MG/ML
INJECTION, SOLUTION INTRAVENOUS
Status: DISCONTINUED | OUTPATIENT
Start: 2022-08-19 | End: 2022-08-19

## 2022-08-19 RX ORDER — SODIUM CHLORIDE 0.9 % (FLUSH) 0.9 %
10 SYRINGE (ML) INJECTION
Status: DISCONTINUED | OUTPATIENT
Start: 2022-08-19 | End: 2022-08-20 | Stop reason: HOSPADM

## 2022-08-19 RX ORDER — SODIUM CHLORIDE, SODIUM LACTATE, POTASSIUM CHLORIDE, CALCIUM CHLORIDE 600; 310; 30; 20 MG/100ML; MG/100ML; MG/100ML; MG/100ML
INJECTION, SOLUTION INTRAVENOUS CONTINUOUS
Status: DISCONTINUED | OUTPATIENT
Start: 2022-08-19 | End: 2022-08-19

## 2022-08-19 RX ORDER — CEFAZOLIN SODIUM 2 G/50ML
2 SOLUTION INTRAVENOUS
Status: COMPLETED | OUTPATIENT
Start: 2022-08-19 | End: 2022-08-20

## 2022-08-19 RX ORDER — CEFAZOLIN SODIUM 2 G/50ML
2 SOLUTION INTRAVENOUS
Status: COMPLETED | OUTPATIENT
Start: 2022-08-19 | End: 2022-08-19

## 2022-08-19 RX ORDER — MUPIROCIN 20 MG/G
OINTMENT TOPICAL 2 TIMES DAILY
Status: DISCONTINUED | OUTPATIENT
Start: 2022-08-19 | End: 2022-08-20 | Stop reason: HOSPADM

## 2022-08-19 RX ORDER — ACETAMINOPHEN 325 MG/1
650 TABLET ORAL EVERY 4 HOURS PRN
Status: DISCONTINUED | OUTPATIENT
Start: 2022-08-19 | End: 2022-08-20 | Stop reason: HOSPADM

## 2022-08-19 RX ORDER — HYDROMORPHONE HYDROCHLORIDE 2 MG/ML
0.5 INJECTION, SOLUTION INTRAMUSCULAR; INTRAVENOUS; SUBCUTANEOUS EVERY 5 MIN PRN
Status: DISCONTINUED | OUTPATIENT
Start: 2022-08-19 | End: 2022-08-19 | Stop reason: HOSPADM

## 2022-08-19 RX ORDER — DEXAMETHASONE SODIUM PHOSPHATE 4 MG/ML
INJECTION, SOLUTION INTRA-ARTICULAR; INTRALESIONAL; INTRAMUSCULAR; INTRAVENOUS; SOFT TISSUE
Status: DISCONTINUED | OUTPATIENT
Start: 2022-08-19 | End: 2022-08-19

## 2022-08-19 RX ORDER — LEVOTHYROXINE SODIUM 50 UG/1
100 TABLET ORAL
Status: DISCONTINUED | OUTPATIENT
Start: 2022-08-20 | End: 2022-08-20 | Stop reason: HOSPADM

## 2022-08-19 RX ORDER — MIDAZOLAM HYDROCHLORIDE 1 MG/ML
INJECTION, SOLUTION INTRAMUSCULAR; INTRAVENOUS
Status: DISCONTINUED | OUTPATIENT
Start: 2022-08-19 | End: 2022-08-19

## 2022-08-19 RX ORDER — SODIUM CHLORIDE 9 MG/ML
INJECTION, SOLUTION INTRAVENOUS CONTINUOUS
Status: DISCONTINUED | OUTPATIENT
Start: 2022-08-19 | End: 2022-08-20 | Stop reason: HOSPADM

## 2022-08-19 RX ORDER — MUPIROCIN 20 MG/G
OINTMENT TOPICAL
Status: DISCONTINUED | OUTPATIENT
Start: 2022-08-19 | End: 2022-08-19 | Stop reason: HOSPADM

## 2022-08-19 RX ORDER — LOSARTAN POTASSIUM 50 MG/1
50 TABLET ORAL DAILY
Status: DISCONTINUED | OUTPATIENT
Start: 2022-08-20 | End: 2022-08-20 | Stop reason: HOSPADM

## 2022-08-19 RX ORDER — OXYCODONE HYDROCHLORIDE 5 MG/1
10 TABLET ORAL EVERY 4 HOURS PRN
Status: DISCONTINUED | OUTPATIENT
Start: 2022-08-19 | End: 2022-08-20 | Stop reason: HOSPADM

## 2022-08-19 RX ORDER — BUPIVACAINE HYDROCHLORIDE 2.5 MG/ML
INJECTION, SOLUTION EPIDURAL; INFILTRATION; INTRACAUDAL
Status: DISCONTINUED | OUTPATIENT
Start: 2022-08-19 | End: 2022-08-19

## 2022-08-19 RX ORDER — LIDOCAINE HYDROCHLORIDE 20 MG/ML
INJECTION, SOLUTION EPIDURAL; INFILTRATION; INTRACAUDAL; PERINEURAL
Status: DISCONTINUED | OUTPATIENT
Start: 2022-08-19 | End: 2022-08-19

## 2022-08-19 RX ORDER — FENTANYL CITRATE 50 UG/ML
INJECTION, SOLUTION INTRAMUSCULAR; INTRAVENOUS
Status: DISCONTINUED | OUTPATIENT
Start: 2022-08-19 | End: 2022-08-19

## 2022-08-19 RX ORDER — NORTRIPTYLINE HYDROCHLORIDE 10 MG/1
10 CAPSULE ORAL DAILY
Status: DISCONTINUED | OUTPATIENT
Start: 2022-08-20 | End: 2022-08-20 | Stop reason: HOSPADM

## 2022-08-19 RX ORDER — ASPIRIN 81 MG/1
81 TABLET ORAL ONCE
Status: COMPLETED | OUTPATIENT
Start: 2022-08-19 | End: 2022-08-19

## 2022-08-19 RX ORDER — SODIUM CHLORIDE 9 MG/ML
INJECTION, SOLUTION INTRAVENOUS CONTINUOUS
Status: DISCONTINUED | OUTPATIENT
Start: 2022-08-19 | End: 2022-08-19

## 2022-08-19 RX ORDER — PROPOFOL 10 MG/ML
VIAL (ML) INTRAVENOUS
Status: DISCONTINUED | OUTPATIENT
Start: 2022-08-19 | End: 2022-08-19

## 2022-08-19 RX ORDER — ONDANSETRON 8 MG/1
8 TABLET, ORALLY DISINTEGRATING ORAL EVERY 8 HOURS PRN
Status: DISCONTINUED | OUTPATIENT
Start: 2022-08-19 | End: 2022-08-20 | Stop reason: HOSPADM

## 2022-08-19 RX ORDER — SCOLOPAMINE TRANSDERMAL SYSTEM 1 MG/1
1 PATCH, EXTENDED RELEASE TRANSDERMAL
Status: DISCONTINUED | OUTPATIENT
Start: 2022-08-19 | End: 2022-08-19 | Stop reason: HOSPADM

## 2022-08-19 RX ORDER — PANTOPRAZOLE SODIUM 40 MG/1
40 TABLET, DELAYED RELEASE ORAL DAILY
Status: DISCONTINUED | OUTPATIENT
Start: 2022-08-20 | End: 2022-08-20 | Stop reason: HOSPADM

## 2022-08-19 RX ORDER — VANCOMYCIN HCL IN 5 % DEXTROSE 1G/250ML
1000 PLASTIC BAG, INJECTION (ML) INTRAVENOUS
Status: COMPLETED | OUTPATIENT
Start: 2022-08-19 | End: 2022-08-20

## 2022-08-19 RX ORDER — HYDROMORPHONE HYDROCHLORIDE 2 MG/ML
0.2 INJECTION, SOLUTION INTRAMUSCULAR; INTRAVENOUS; SUBCUTANEOUS EVERY 5 MIN PRN
Status: DISCONTINUED | OUTPATIENT
Start: 2022-08-19 | End: 2022-08-19 | Stop reason: HOSPADM

## 2022-08-19 RX ORDER — PHENYLEPHRINE HYDROCHLORIDE 10 MG/ML
INJECTION INTRAVENOUS
Status: DISCONTINUED | OUTPATIENT
Start: 2022-08-19 | End: 2022-08-19

## 2022-08-19 RX ORDER — ONDANSETRON 2 MG/ML
4 INJECTION INTRAMUSCULAR; INTRAVENOUS DAILY PRN
Status: DISCONTINUED | OUTPATIENT
Start: 2022-08-19 | End: 2022-08-19 | Stop reason: HOSPADM

## 2022-08-19 RX ORDER — ONDANSETRON 2 MG/ML
INJECTION INTRAMUSCULAR; INTRAVENOUS
Status: DISCONTINUED | OUTPATIENT
Start: 2022-08-19 | End: 2022-08-19

## 2022-08-19 RX ORDER — NEOSTIGMINE METHYLSULFATE 1 MG/ML
INJECTION, SOLUTION INTRAVENOUS
Status: DISCONTINUED | OUTPATIENT
Start: 2022-08-19 | End: 2022-08-19

## 2022-08-19 RX ORDER — DOCUSATE SODIUM 100 MG/1
100 CAPSULE, LIQUID FILLED ORAL 2 TIMES DAILY
Status: DISCONTINUED | OUTPATIENT
Start: 2022-08-19 | End: 2022-08-20 | Stop reason: HOSPADM

## 2022-08-19 RX ORDER — OXYBUTYNIN CHLORIDE 5 MG/1
10 TABLET, EXTENDED RELEASE ORAL DAILY
Status: DISCONTINUED | OUTPATIENT
Start: 2022-08-20 | End: 2022-08-20 | Stop reason: HOSPADM

## 2022-08-19 RX ORDER — METOPROLOL TARTRATE 50 MG/1
50 TABLET ORAL 2 TIMES DAILY
Status: DISCONTINUED | OUTPATIENT
Start: 2022-08-19 | End: 2022-08-20 | Stop reason: HOSPADM

## 2022-08-19 RX ORDER — ESCITALOPRAM OXALATE 10 MG/1
20 TABLET ORAL DAILY
Status: DISCONTINUED | OUTPATIENT
Start: 2022-08-20 | End: 2022-08-20 | Stop reason: HOSPADM

## 2022-08-19 RX ORDER — BUTALBITAL, ACETAMINOPHEN AND CAFFEINE 50; 325; 40 MG/1; MG/1; MG/1
1 TABLET ORAL NIGHTLY PRN
Status: DISCONTINUED | OUTPATIENT
Start: 2022-08-19 | End: 2022-08-20 | Stop reason: HOSPADM

## 2022-08-19 RX ORDER — TRAMADOL HYDROCHLORIDE 50 MG/1
50 TABLET ORAL EVERY 4 HOURS PRN
Status: DISCONTINUED | OUTPATIENT
Start: 2022-08-19 | End: 2022-08-20 | Stop reason: HOSPADM

## 2022-08-19 RX ADMIN — BUPIVACAINE HYDROCHLORIDE 10 ML: 2.5 INJECTION, SOLUTION EPIDURAL; INFILTRATION; INTRACAUDAL; PERINEURAL at 09:08

## 2022-08-19 RX ADMIN — PHENYLEPHRINE HYDROCHLORIDE 20 MCG/MIN: 10 INJECTION INTRAVENOUS at 10:08

## 2022-08-19 RX ADMIN — PROPOFOL 120 MG: 10 INJECTION, EMULSION INTRAVENOUS at 10:08

## 2022-08-19 RX ADMIN — METOPROLOL TARTRATE 50 MG: 50 TABLET, FILM COATED ORAL at 08:08

## 2022-08-19 RX ADMIN — SODIUM CHLORIDE, SODIUM LACTATE, POTASSIUM CHLORIDE, AND CALCIUM CHLORIDE: .6; .31; .03; .02 INJECTION, SOLUTION INTRAVENOUS at 10:08

## 2022-08-19 RX ADMIN — NEOSTIGMINE METHYLSULFATE 5 MG: 1 INJECTION INTRAVENOUS at 01:08

## 2022-08-19 RX ADMIN — DOCUSATE SODIUM 100 MG: 100 CAPSULE, LIQUID FILLED ORAL at 08:08

## 2022-08-19 RX ADMIN — VANCOMYCIN HYDROCHLORIDE 1000 MG: 1 INJECTION, POWDER, LYOPHILIZED, FOR SOLUTION INTRAVENOUS at 06:08

## 2022-08-19 RX ADMIN — KETOROLAC TROMETHAMINE 15 MG: 30 INJECTION, SOLUTION INTRAMUSCULAR at 04:08

## 2022-08-19 RX ADMIN — PROPOFOL 30 MG: 10 INJECTION, EMULSION INTRAVENOUS at 12:08

## 2022-08-19 RX ADMIN — PHENYLEPHRINE HYDROCHLORIDE 100 MCG: 10 INJECTION INTRAVENOUS at 12:08

## 2022-08-19 RX ADMIN — FENTANYL CITRATE 100 MCG: 50 INJECTION, SOLUTION INTRAMUSCULAR; INTRAVENOUS at 10:08

## 2022-08-19 RX ADMIN — BUPIVACAINE 10 ML: 13.3 INJECTION, SUSPENSION, LIPOSOMAL INFILTRATION at 09:08

## 2022-08-19 RX ADMIN — LIDOCAINE HYDROCHLORIDE 100 MG: 20 INJECTION, SOLUTION EPIDURAL; INFILTRATION; INTRACAUDAL; PERINEURAL at 10:08

## 2022-08-19 RX ADMIN — SCOPALAMINE 1 PATCH: 1 PATCH, EXTENDED RELEASE TRANSDERMAL at 08:08

## 2022-08-19 RX ADMIN — MUPIROCIN: 20 OINTMENT TOPICAL at 08:08

## 2022-08-19 RX ADMIN — PHENYLEPHRINE HYDROCHLORIDE 50 MCG: 10 INJECTION INTRAVENOUS at 11:08

## 2022-08-19 RX ADMIN — PHENYLEPHRINE HYDROCHLORIDE 40 MCG/MIN: 10 INJECTION INTRAVENOUS at 10:08

## 2022-08-19 RX ADMIN — ROCURONIUM BROMIDE 10 MG: 10 INJECTION, SOLUTION INTRAVENOUS at 12:08

## 2022-08-19 RX ADMIN — ROCURONIUM BROMIDE 50 MG: 10 INJECTION, SOLUTION INTRAVENOUS at 10:08

## 2022-08-19 RX ADMIN — ROCURONIUM BROMIDE 20 MG: 10 INJECTION, SOLUTION INTRAVENOUS at 11:08

## 2022-08-19 RX ADMIN — MIDAZOLAM 2 MG: 1 INJECTION INTRAMUSCULAR; INTRAVENOUS at 09:08

## 2022-08-19 RX ADMIN — GLYCOPYRROLATE 0.8 MG: 0.2 INJECTION, SOLUTION INTRAMUSCULAR; INTRAVITREAL at 01:08

## 2022-08-19 RX ADMIN — ASPIRIN 81 MG: 81 TABLET, COATED ORAL at 04:08

## 2022-08-19 RX ADMIN — PHENYLEPHRINE HYDROCHLORIDE 100 MCG: 10 INJECTION INTRAVENOUS at 10:08

## 2022-08-19 RX ADMIN — DEXAMETHASONE SODIUM PHOSPHATE 8 MG: 4 INJECTION, SOLUTION INTRA-ARTICULAR; INTRALESIONAL; INTRAMUSCULAR; INTRAVENOUS; SOFT TISSUE at 11:08

## 2022-08-19 RX ADMIN — CEFAZOLIN SODIUM 2 G: 2 SOLUTION INTRAVENOUS at 10:08

## 2022-08-19 RX ADMIN — TRANEXAMIC ACID 1000 MG: 100 INJECTION, SOLUTION INTRAVENOUS at 11:08

## 2022-08-19 RX ADMIN — ROCURONIUM BROMIDE 5 MG: 10 INJECTION, SOLUTION INTRAVENOUS at 12:08

## 2022-08-19 RX ADMIN — SODIUM CHLORIDE: 0.9 INJECTION, SOLUTION INTRAVENOUS at 06:08

## 2022-08-19 RX ADMIN — ONDANSETRON 4 MG: 2 INJECTION, SOLUTION INTRAMUSCULAR; INTRAVENOUS at 01:08

## 2022-08-19 RX ADMIN — CEFAZOLIN SODIUM 2 G: 2 SOLUTION INTRAVENOUS at 08:08

## 2022-08-19 NOTE — PLAN OF CARE
08/19/22 1727   Admission   Initial VN Admission Questions Complete   Shift   Virtual Nurse - Patient Verbalized Approval Of Camera Use   Safety/Activity   Patient Rounds visualized patient;placement of personal items at bedside;bed wheels locked;bed in low position;call light in patient/parent reach;clutter free environment maintained;ID band on  ( and sister at bedside)

## 2022-08-19 NOTE — TRANSFER OF CARE
Anesthesia Transfer of Care Note    Patient: Denise Berrios    Procedure(s) Performed: Procedure(s) (LRB):  ARTHROPLASTY, SHOULDER, TOTAL, REVERSE (Right)    Patient location: PACU    Anesthesia Type: general    Transport from OR: Transported from OR on 6-10 L/min O2 by face mask with adequate spontaneous ventilation    Post pain: adequate analgesia    Post assessment: no apparent anesthetic complications    Post vital signs: stable    Level of consciousness: awake and alert    Nausea/Vomiting: no nausea/vomiting    Complications: none    Transfer of care protocol was followed      Last vitals:   Visit Vitals  BP (!) 127/56   Pulse (!) 57   Temp 36.7 °C (98.1 °F)   Resp 16   Ht 5' (1.524 m)   Wt 59.4 kg (131 lb)   SpO2 98%   Breastfeeding No   BMI 25.58 kg/m²

## 2022-08-19 NOTE — ANESTHESIA PROCEDURE NOTES
Peripheral Block    Patient location during procedure: pre-op   Block not for primary anesthetic.  Reason for block: at surgeon's request and post-op pain management   Post-op Pain Location: Right shoulder   Start time: 8/19/2022 9:34 AM  Timeout: 8/19/2022 9:31 AM   End time: 8/19/2022 9:37 AM    Staffing  Authorizing Provider: Miguel Angel Young MD  Performing Provider: Gabriel Samuel MD    Preanesthetic Checklist  Completed: patient identified, IV checked, site marked, risks and benefits discussed, surgical consent, monitors and equipment checked, pre-op evaluation and timeout performed  Peripheral Block  Patient position: sitting  Prep: ChloraPrep  Patient monitoring: heart rate, cardiac monitor, continuous pulse ox, continuous capnometry and frequent blood pressure checks  Block type: interscalene  Laterality: right  Injection technique: single shot  Needle  Needle type: Stimuplex   Needle gauge: 21 G  Needle length: 4 in  Needle localization: anatomical landmarks and ultrasound guidance   -ultrasound image captured on disc.  Assessment  Injection assessment: negative aspiration, negative parasthesia and local visualized surrounding nerve  Paresthesia pain: none  Heart rate change: no  Slow fractionated injection: yes  Pain Tolerance: comfortable throughout block and no complaints      Additional Notes  VSS.  [facility] RN monitoring vitals throughout procedure.  Patient tolerated procedure well.     C5, C6, C7 nerve roots, carotid artery, internal jugular vein, anterior scalene, middle scalene, sternocleidomastoid, omohyoid mucle identified under ultrasound. Area cleaned with chloraprep prior to insertion of stimuplex needle. Prior to local injection, aspiration negative for heme. Posterior C5 and C6 nerve root coverage by local anesthetic visualized under ultrasound - tracked distally to see nerve with continuous local anesthetic coverage. No complaints of parasthesias or discomfort throughout block.

## 2022-08-19 NOTE — ANESTHESIA PROCEDURE NOTES
Intubation    Date/Time: 8/19/2022 10:38 AM  Performed by: Suzan Gonsalez MD  Authorized by: Miguel Angel Young MD     Intubation:     Induction:  Intravenous    Intubated:  Postinduction    Mask Ventilation:  Easy mask    Attempts:  1    Attempted By:  Resident anesthesiologist    Method of Intubation:  Video laryngoscopy    Blade:  Rivas 3    Laryngeal View Grade: Grade I - full view of cords      Difficult Airway Encountered?: No      Complications:  None    Airway Device:  Oral endotracheal tube    Airway Device Size:  7.0    Style/Cuff Inflation:  Cuffed (inflated to minimal occlusive pressure)    Tube secured:  23    Secured at:  The teeth    Placement Verified By:  Capnometry and Revisualization with laryngoscopy    Complicating Factors:  None    Findings Post-Intubation:  BS equal bilateral

## 2022-08-19 NOTE — H&P
Patient ID: Denise Berrios is a 72 y.o. female.     Chief Complaint: Pain of the Right Shoulder        HPI: Denise Berrios is here in follow-up of chronic right shoulder pain related to previous proximal humus fracture. She is >1 year out from injury. She has received periodic CSI for the shoulder since the injury with modest improvement in pain. Sh was last seen and treated with CSI about 3 months ago. She reports injection was not helpful at all. Today, still complains of pain with any amount of movement, only non-painful at rest. She is still using Tramadol with modest benefit. She also reports another fall a few days ago in her home, she tripped stepping up inter her kitchen and landed on her bent arm. She states slight increase in pain, no change in function. Also has bruising of her breast and concerned for rib fracture.      We have previously discussed the option for reverse total shoulder, initially she was not interested. But her shoulder is limited her ADLs and quality of life. She has decided to proceed with reverse total shoulder replacement.            Past Medical History:   Diagnosis Date    Acid reflux      Graves disease       diffuse Toxic Goiter    Hypertension      IBS (irritable bowel syndrome)      Lichen sclerosus      Migraine      Stroke           Current Outpatient Medications:     aspirin (ECOTRIN) 81 MG EC tablet, once a day, Disp: , Rfl:     atorvastatin (LIPITOR) 80 MG tablet, TAKE 1 TABLET DAILY, Disp: 90 tablet, Rfl: 3    butalbital-acetaminophen-caffeine -40 mg (FIORICET, ESGIC) -40 mg per tablet, Take 1 tablet by mouth nightly as needed for Headaches., Disp: 30 tablet, Rfl: 5    clobetasol 0.05% (TEMOVATE) 0.05 % Oint, APPLY TOPICALLY TWICE A DAY, Disp: 45 g, Rfl: 1    EScitalopram oxalate (LEXAPRO) 20 MG tablet, Take 1 tablet (20 mg total) by mouth once daily., Disp: 90 tablet, Rfl: 3    etodolac (LODINE XL) 400 MG 24 hr tablet, TAKE 1 TABLET DAILY,  Disp: 90 tablet, Rfl: 3    levothyroxine (SYNTHROID) 100 MCG tablet, Take 1 tablet (100 mcg total) by mouth before breakfast., Disp: 90 tablet, Rfl: 3    losartan (COZAAR) 50 MG tablet, TAKE 1 TABLET(50 MG) BY MOUTH EVERY DAY, Disp: 90 tablet, Rfl: 3    metoprolol tartrate (LOPRESSOR) 50 MG tablet, Take 1 tablet (50 mg total) by mouth 2 (two) times daily., Disp: 180 tablet, Rfl: 3    nortriptyline (PAMELOR) 10 MG capsule, TAKE 1 CAPSULE DAILY, Disp: 90 capsule, Rfl: 3    omeprazole (PRILOSEC) 40 MG capsule, Take 1 capsule (40 mg total) by mouth once daily., Disp: 90 capsule, Rfl: 3    solifenacin (VESICARE) 10 MG tablet, TAKE 1 TABLET DAILY, Disp: 90 tablet, Rfl: 3    VITAMIN D2 1,250 mcg (50,000 unit) capsule, TAKE 1 CAPSULE EVERY 7 DAYS, Disp: 12 capsule, Rfl: 3    traMADoL (ULTRAM) 50 mg tablet, Take 1 tablet (50 mg total) by mouth every 12 (twelve) hours as needed for Pain., Disp: 40 tablet, Rfl: 0        Review of patient's allergies indicates:   Allergen Reactions    Phenobarbital Anaphylaxis    Penicillins Other (See Comments)    Phenobarbital sodium      Propylthiouracil      Thyroid         Note: PTU    Topamax [topiramate] Diarrhea       GI symptoms    Cefaclor Rash and Other (See Comments)    Clopidogrel Rash    Methimazole Rash and Other (See Comments)    Methimazole (bulk) Rash    Penicillin Rash    Sulfa (sulfonamide antibiotics) Rash and Other (See Comments)         Wt 61 kg (134 lb 7.7 oz)   BMI 26.26 kg/m²      Review of Systems   Constitutional: Negative for chills and fever.   Cardiovascular: Negative for chest pain and palpitations.   Respiratory: Negative for shortness of breath and wheezing.    Skin: Negative for poor wound healing and rash.   Musculoskeletal: Positive for arthritis, falls, joint pain and stiffness.   Gastrointestinal: Negative for nausea and vomiting.   Genitourinary: Negative for dysuria and hematuria.   Neurological: Negative for seizures and tremors.    Psychiatric/Behavioral: Negative for altered mental status.   Allergic/Immunologic: Negative for environmental allergies and persistent infections.          Objective:    Ortho Exam       Right SHOULDER:  Skin: No rashes or lesions on exposed areas.  Atrophy: none noted.  Tenderness to palpation: None.  AROM (deg): abduction- 45, flexion-75, rotation- restricted, painful rotation- present.  Rotator cuff: limited by pain  Morel Impingement test- positive.  Neer Impingement test- positive.  Cross arm adduction test- positive.  Drop arm - positive.  Instability testing: negative.   Distal neuro: normal, no muscle wasting or atrophy.  Pulses: Positive peripheral pulses..  GEN: Well developed, well nourished female. AAOX3. No acute distress.   Normocephalic, atraumatic.   JESSICA  Breathing unlabored.  Mood and affect appropriate.      Assessment:      Imaging: Previous reviewed. deformity of the right proximal humerus, unchanged from 06/24/2021  Should xray repeated today 2/2 fall No new fractures. Also no rib fractures seen         1. Chronic right shoulder pain    2. Closed fracture of right shoulder with routine healing, subsequent encounter    3. Fall, initial encounter           Plan:           Orders Placed This Encounter    X-ray Shoulder 2 or More Views Right    X-Ray Ribs 2 View Right    CT Shoulder Without Contrast Right    traMADoL (ULTRAM) 50 mg tablet    Case Request Operating Room: ARTHROPLASTY, SHOULDER, TOTAL, REVERSE      No new fractures   Previous conservative treatment has failed to provide meaningful benefit. ADLS and quality of life are impaired  Recommended reverse total shoulder replacement; she is agreeable  Pre, hyacinth, and post operative procedures and expectations discussed. All questions were answered. Consent forms were explained and signed by the patient.   Denise Berrios will contact us if there are any questions, concerns, or changes in medical status prior to  surgery.     Discussed with Dr. Finnegan, agrees with the above  Recommends CT shoulder with 3D recon

## 2022-08-19 NOTE — OP NOTE
[place] - Surgery ([facility])  Surgery Department  Operative Note    SUMMARY     Date of Procedure: 8/19/2022     Procedure: Procedure(s) (LRB):  ARTHROPLASTY, SHOULDER, TOTAL, REVERSE (Right)     Surgeon(s) and Role:     * Bradley Finnegan Jr., MD - Primary    Assisting Surgeon: None    Pre-Operative Diagnosis: Chronic right shoulder pain [M25.511, G89.29]  Closed fracture of right shoulder with routine healing, subsequent encounter [S42.91XD]    Post-Operative Diagnosis: Post-Op Diagnosis Codes:     * Chronic right shoulder pain [M25.511, G89.29]     * Closed fracture of right shoulder with routine healing, subsequent encounter [S42.91XD]    Anesthesia: General    Operative Findings (including complications, if any):  Posttraumatic arthritis right shoulder     Description of Technical Procedures:     Preop diagnosis:  Posttraumatic arthritis with deformity right shoulder proximal humerus    Postop diagnosis: Same.      Operative procedure:  Reverse right total shoulder arthroplasty using Gabino components cemented.      Surgeon: Kain.      Anesthesia: General.      EBL:  200 cc.      Complications:  None.      Specimen:  None.      Operative procedure in detail as follows:     After operative consent was obtained the patient brought the operating room placed supine operating table.  Anesthesia by general endotracheal method performed by the anesthesia staff.  After the patient was asleep carefully placed in the beach chair position head secured right shoulder and upper extremity prepped and draped out in the normal sterile fashion.    A deltopectoral skin incision made with a 10 blade.  Careful dissection carried down through the deltopectoral interval the cephalic vein was identified and retracted medially and protected throughout the procedure.  Deep retractors were placed in the conjoined tendon brought medially and the deltoid brought laterally.  Subscapularis was reflected off of the bone directly with the  electrocautery exposing the bone and the proximal humerus.  The proximal humerus had a severe malunion of multiple fragments.  The main articular fragments had collapsed and were surrounded by osteophytes an old callus from the fracture.  Severe deformity was noted.  The glenoid was also deformed from previous irregular wear.      The humerus was addressed 1st x 1st performing an extensive soft tissue release including removal of the subscapularis and the remaining rotator cuff attachments.  Axillary nerve was identified and protected throughout the procedure.  A guidewire was then placed into the humeral canal and checked under C-arm control followed by insertion of the hand reamers up to a size 12.  The cutting jig was then placed using the intramedullary cutting jig in 30° of retroversion the jig was then pinned in place followed by cutting of the bone on the proximal humerus.  Osteophytes and callus also removed with the rongeur or and the osteotome.  The hand broaches were then used in 30° of retroversion but only a size 7 broach could be placed even though the distal canal had been reamed up to a 12 the proximal canal had so much sclerotic bone that was not possible to a ream beyond a size 7.  It was felt that a cemented stem would be necessary at this point.      Attention then turned to the glenoid which was exposed carefully and soft tissue release performed removing capsule and labrum from the glenoid.  There was irregular wear noted.  The central portion was determined and a guide pin placed in the appropriate angulation into the central inferior portion of the glenoid.  Following this reaming was performed which smooth out the irregularities in the glenoid itself.  After smoothing down and removing surrounding osteophytes the glenoid base plate was carefully screwed in place with a long central screw which achieved good purchase 4 peripheral screws were also drilled and each locked in place with good  purchase.  Following this the glenosphere carefully impacted into place with good seating.      Attention then turned back to the humeral canal where a cement restrictor was placed distally and the intramedullary canal prepared with irrigation and thorough drying in standard cement technique.      The cement was then carefully injected under pressure into the intramedullary canal and held during partial thickening and following this insertion of a size 6 cemented component in 30° of retroversion carefully impacted into place and held during hardening excess cement removed.  Trial reduction maneuver was then performed with different size polyethylene liners and finally the real component impacted in the place reduced and range of motion was noted to be full with good stability.  The wound irrigated again with antibiotic saline solution under pulse lavage hemostasis achieved with Bovie.  The rotator cuff was not possible to repair because of missing proximal bone so the deltopectoral interval closed with 2-0 Vicryl and subcutaneous tissue closed with 2-0 Vicryl as well Steri-Strips applied to the skin followed by a sterile dressing and a sling the patient then extubated brought to recovery room in stable condition all sponge needle counts reported as correct no complications    Significant Surgical Tasks Conducted by the Assistant(s), if Applicable: none    Estimated Blood Loss (EBL): * No values recorded between 8/19/2022 11:09 AM and 8/19/2022  2:22 PM *           Implants:   Implant Name Type Inv. Item Serial No.  Lot No. LRB No. Used Action   PUTTY HEMASORB HEMOSTAT 4GM - [id number]  PUTTY HEMASORB HEMOSTAT 4GM  [id number] Right 1 Implanted   ReUnion RSA Gleniod Baseplate 28mm    Stellaris [id number] Right 1 Implanted   SCREW REUNION 6.5X24MM - [id number]  SCREW REUNION 6.5X24MM  [employer] MIGUEL. [id number] Right 1 Implanted   SCREW BONE GLENOID 4.5X24MM - [id number]  SCREW BONE GLENOID 4.5X24MM  MARLEY  [employer] MIGUEL. [id number] Right 1 Implanted   COMPONENT GLENOSPHERE 32X2MM - [id number]  COMPONENT GLENOSPHERE 32X2MM  MARLEY [employer] MIGUEL. [id number] Right 1 Implanted   SCREW BONE TSA 4.5X32MM - [id number]  SCREW BONE TSA 4.5X32MM  MARLEY [employer] MIGUEL. [id number] Right 1 Implanted   SCREW PERIPH REUNION 4.5X16MM - [id number]  SCREW PERIPH REUNION 4.5X16MM  MARLEY [employer] MIGUEL. [id number] Right 1 Implanted   SCREW BONE GLENOID 4.5X24MM - [id number]  SCREW BONE GLENOID 4.5X24MM  MARLEY [employer] MIGUEL. [id number] Right 1 Implanted   PLUG BONE #2 - [id number]  PLUG BONE #2  [employer] MIGUEL. [id number] Right 1 Implanted and Explanted   ReUnion TSA Modular Humeral Stem    MARLEY [id number] Right 1 Implanted   PLUG BONE #4 - [id number]  PLUG BONE #4  [employer] MIGUEL.  Right 1 Implanted   CEMENT BONE ANTIBIO SIMPLEX P - [id number]  CEMENT BONE ANTIBIO SIMPLEX P  MARLEY [employer] MIGUEL.  Right 1 Implanted   CEMENT BONE ANTIBIO SIMPLEX P - [id number]  CEMENT BONE ANTIBIO SIMPLEX P  MARLEY [employer] MIGUEL.  Right 1 Implanted   INSERT REUNION X3 HUM 6X36MM - [id number]  INSERT REUNION X3 HUM 6X36MM  MARLEY [employer] MIGUEL. [id number] Right 1 Implanted   CUP HUMERAL REUN RSA 32X4MM - [id number]  CUP HUMERAL REUN RSA 32X4MM  MARLEY [employer] MIGUEL. [id number] Right 1 Implanted       Specimens:   Specimen (24h ago, onward)            None                  Condition: Good    Disposition: PACU - hemodynamically stable.    Attestation: I was present and scrubbed for the entire procedure.

## 2022-08-19 NOTE — PLAN OF CARE
Pt recently came up from surgery to right shoulder; brace present. Nerve block in full effect. Pt reports pain of 0/10 currently. Ambulated to restroom w/ assist. Gave scheduled Ketorolac and aspirin. May need prn pain meds as block wears off.

## 2022-08-19 NOTE — ANESTHESIA POSTPROCEDURE EVALUATION
Anesthesia Post Evaluation    Patient: Denise Berrios    Procedure(s) Performed: Procedure(s) (LRB):  ARTHROPLASTY, SHOULDER, TOTAL, REVERSE (Right)    Final Anesthesia Type: general      Patient location during evaluation: PACU  Patient participation: Yes- Able to Participate  Level of consciousness: awake and alert  Post-procedure vital signs: reviewed and stable  Pain management: adequate  Airway patency: patent    PONV status at discharge: No PONV  Anesthetic complications: no      Cardiovascular status: blood pressure returned to baseline  Respiratory status: unassisted  Hydration status: euvolemic  Follow-up not needed.          Vitals Value Taken Time   /60 08/19/22 1628   Temp 36.5 °C (97.7 °F) 08/19/22 1628   Pulse 60 08/19/22 1634   Resp 18 08/19/22 1628   SpO2 90 % 08/19/22 1634         Event Time   Out of Recovery 15:43:00         Pain/Haroon Score: Pain Rating Prior to Med Admin: 0 (8/19/2022  4:59 PM)  Haroon Score: 10 (8/19/2022  2:22 PM)

## 2022-08-20 VITALS
WEIGHT: 139.75 LBS | OXYGEN SATURATION: 93 % | BODY MASS INDEX: 27.44 KG/M2 | DIASTOLIC BLOOD PRESSURE: 55 MMHG | TEMPERATURE: 98 F | SYSTOLIC BLOOD PRESSURE: 112 MMHG | HEART RATE: 71 BPM | HEIGHT: 60 IN | RESPIRATION RATE: 18 BRPM

## 2022-08-20 LAB
BASOPHILS # BLD AUTO: 0.01 K/UL (ref 0–0.2)
BASOPHILS NFR BLD: 0.1 % (ref 0–1.9)
DIFFERENTIAL METHOD: ABNORMAL
EOSINOPHIL # BLD AUTO: 0 K/UL (ref 0–0.5)
EOSINOPHIL NFR BLD: 0 % (ref 0–8)
ERYTHROCYTE [DISTWIDTH] IN BLOOD BY AUTOMATED COUNT: 13.1 % (ref 11.5–14.5)
HCT VFR BLD AUTO: 26.8 % (ref 37–48.5)
HGB BLD-MCNC: 9 G/DL (ref 12–16)
IMM GRANULOCYTES # BLD AUTO: 0.03 K/UL (ref 0–0.04)
IMM GRANULOCYTES NFR BLD AUTO: 0.3 % (ref 0–0.5)
LYMPHOCYTES # BLD AUTO: 0.9 K/UL (ref 1–4.8)
LYMPHOCYTES NFR BLD: 8.1 % (ref 18–48)
MCH RBC QN AUTO: 31 PG (ref 27–31)
MCHC RBC AUTO-ENTMCNC: 33.6 G/DL (ref 32–36)
MCV RBC AUTO: 92 FL (ref 82–98)
MONOCYTES # BLD AUTO: 1 K/UL (ref 0.3–1)
MONOCYTES NFR BLD: 8.7 % (ref 4–15)
NEUTROPHILS # BLD AUTO: 9.3 K/UL (ref 1.8–7.7)
NEUTROPHILS NFR BLD: 82.8 % (ref 38–73)
NRBC BLD-RTO: 0 /100 WBC
PLATELET # BLD AUTO: 188 K/UL (ref 150–450)
PMV BLD AUTO: 10.5 FL (ref 9.2–12.9)
RBC # BLD AUTO: 2.9 M/UL (ref 4–5.4)
WBC # BLD AUTO: 11.26 K/UL (ref 3.9–12.7)

## 2022-08-20 PROCEDURE — 99900035 HC TECH TIME PER 15 MIN (STAT)

## 2022-08-20 PROCEDURE — 97166 OT EVAL MOD COMPLEX 45 MIN: CPT

## 2022-08-20 PROCEDURE — 94761 N-INVAS EAR/PLS OXIMETRY MLT: CPT

## 2022-08-20 PROCEDURE — 97161 PT EVAL LOW COMPLEX 20 MIN: CPT

## 2022-08-20 PROCEDURE — 36415 COLL VENOUS BLD VENIPUNCTURE: CPT | Performed by: ORTHOPAEDIC SURGERY

## 2022-08-20 PROCEDURE — 85025 COMPLETE CBC W/AUTO DIFF WBC: CPT | Performed by: ORTHOPAEDIC SURGERY

## 2022-08-20 PROCEDURE — 63600175 PHARM REV CODE 636 W HCPCS: Performed by: ORTHOPAEDIC SURGERY

## 2022-08-20 PROCEDURE — 25000003 PHARM REV CODE 250: Performed by: ORTHOPAEDIC SURGERY

## 2022-08-20 PROCEDURE — 97535 SELF CARE MNGMENT TRAINING: CPT

## 2022-08-20 RX ORDER — KETOROLAC TROMETHAMINE 30 MG/ML
15 INJECTION, SOLUTION INTRAMUSCULAR; INTRAVENOUS ONCE
Status: COMPLETED | OUTPATIENT
Start: 2022-08-20 | End: 2022-08-20

## 2022-08-20 RX ORDER — HYDROCODONE BITARTRATE AND ACETAMINOPHEN 7.5; 325 MG/1; MG/1
1 TABLET ORAL EVERY 4 HOURS PRN
Qty: 40 TABLET | Refills: 0 | Status: SHIPPED | OUTPATIENT
Start: 2022-08-20 | End: 2022-08-25 | Stop reason: SDUPTHER

## 2022-08-20 RX ADMIN — SODIUM CHLORIDE: 0.9 INJECTION, SOLUTION INTRAVENOUS at 04:08

## 2022-08-20 RX ADMIN — NORTRIPTYLINE HYDROCHLORIDE 10 MG: 10 CAPSULE ORAL at 11:08

## 2022-08-20 RX ADMIN — VANCOMYCIN HYDROCHLORIDE 1000 MG: 1 INJECTION, POWDER, LYOPHILIZED, FOR SOLUTION INTRAVENOUS at 05:08

## 2022-08-20 RX ADMIN — ESCITALOPRAM OXALATE 20 MG: 10 TABLET ORAL at 10:08

## 2022-08-20 RX ADMIN — LEVOTHYROXINE SODIUM 100 MCG: 50 TABLET ORAL at 05:08

## 2022-08-20 RX ADMIN — BUTALBITAL, ACETAMINOPHEN, AND CAFFEINE 1 TABLET: 50; 325; 40 TABLET ORAL at 01:08

## 2022-08-20 RX ADMIN — MUPIROCIN: 20 OINTMENT TOPICAL at 10:08

## 2022-08-20 RX ADMIN — KETOROLAC TROMETHAMINE 15 MG: 30 INJECTION, SOLUTION INTRAMUSCULAR; INTRAVENOUS at 11:08

## 2022-08-20 RX ADMIN — CEFAZOLIN SODIUM 2 G: 2 SOLUTION INTRAVENOUS at 04:08

## 2022-08-20 RX ADMIN — PANTOPRAZOLE SODIUM 40 MG: 40 TABLET, DELAYED RELEASE ORAL at 10:08

## 2022-08-20 RX ADMIN — OXYBUTYNIN CHLORIDE 10 MG: 5 TABLET, EXTENDED RELEASE ORAL at 10:08

## 2022-08-20 RX ADMIN — METOPROLOL TARTRATE 50 MG: 50 TABLET, FILM COATED ORAL at 10:08

## 2022-08-20 RX ADMIN — ATORVASTATIN CALCIUM 80 MG: 40 TABLET, FILM COATED ORAL at 10:08

## 2022-08-20 RX ADMIN — LOSARTAN POTASSIUM 50 MG: 50 TABLET, FILM COATED ORAL at 10:08

## 2022-08-20 RX ADMIN — OXYCODONE HYDROCHLORIDE 10 MG: 5 TABLET ORAL at 08:08

## 2022-08-20 RX ADMIN — DOCUSATE SODIUM 100 MG: 100 CAPSULE, LIQUID FILLED ORAL at 10:08

## 2022-08-20 NOTE — PLAN OF CARE
AOX4. VS stable. Safety maintained. Meds given per MAR. Denies pain at this time. Dressing to right shoulder CDI. Sling @ bedside. Resting quietly. SR up X 2. Call light in reach. Bed alarm set. Will continue to monitor.       Problem: Adult Inpatient Plan of Care  Goal: Plan of Care Review  Outcome: Ongoing, Progressing  Goal: Patient-Specific Goal (Individualized)  Outcome: Ongoing, Progressing     Problem: Bleeding (Orthopaedic Fracture)  Goal: Absence of Bleeding  Outcome: Ongoing, Progressing     Problem: Neurovascular Compromise (Orthopaedic Fracture)  Goal: Effective Tissue Perfusion  Outcome: Ongoing, Progressing     Problem: Pain (Orthopaedic Fracture)  Goal: Acceptable Pain Control  Outcome: Ongoing, Progressing

## 2022-08-20 NOTE — PT/OT/SLP EVAL
Physical Therapy Evaluation    Patient Name:  Denise Berrios   MRN:  [id number]    Recommendations:     Discharge Recommendations:  outpatient OT, outpatient PT   Discharge Equipment Recommendations:     Barriers to discharge: None    Assessment:     Denise Berrios is a 72 y.o. female admitted with a medical diagnosis of Shoulder arthritis.  She presents with the following impairments/functional limitations:  weakness, impaired sensation, impaired self care skills, gait instability, impaired balance, decreased upper extremity function, decreased ROM, impaired skin, orthopedic precautions   Pt D/C home.     Rehab Prognosis: Good; patient would benefit from acute skilled PT services to address these deficits and reach maximum level of function.    Recent Surgery: Procedure(s) (LRB):  ARTHROPLASTY, SHOULDER, TOTAL, REVERSE (Right) 1 Day Post-Op    Plan:     During this hospitalization, patient to be seen   to address the identified rehab impairments via gait training, therapeutic activities, therapeutic exercises, neuromuscular re-education and progress toward the following goals:    · Plan of Care Expires:       Subjective     Chief Complaint: Shoulder Pain  Patient/Family Comments/goals: Return home  Pain/Comfort:  · Pain Rating 1: 6/10  · Location - Side 1: Right  · Location 1: shoulder  · Pain Addressed 1: Reposition, Distraction, Pre-medicate for activity  · Pain Rating Post-Intervention 1: 6/10    Patients cultural, spiritual, Latter-day conflicts given the current situation: no    Living Environment:  2 story house with  and sister helping.   Prior to admission, patients level of function was Limited due to shoulder pain.  Equipment used at home:  .  DME owned (not currently used): none.  Upon discharge, patient will have assistance from Family.    Objective:     Communicated with nurse prior to session.  Patient found supine upon PT entry to room.    General Precautions: Standard, fall   Orthopedic  Precautions:N/A   Braces: UE Sling  Respiratory Status: Room air    Exams:  · Cognitive Exam:  Patient is oriented to Person, Place, Time and Situation  · Gross Motor Coordination:  WFL  · RLE ROM: WNL  · RLE Strength: WNL  · LLE ROM: WNL  · LLE Strength: WNL    Functional Mobility:  · Bed Mobility:     · Supine to Sit: modified independence  · Sit to Supine: modified independence  · Transfers:     · Sit to Stand:  supervision with no AD  · Gait: supervison 200ft x 2  · Balance: fair +    Therapeutic Activities and Exercises:   Pt performed sit to stand with supervision without AD. Pt then ambulated down the lewis for 200ft with CGA. Pt seemed to become more sure of herself during ambulation and was able to ambulate 250ft with supervision. Pt then performed stand to sit with supervision.    AM-PAC 6 CLICK MOBILITY  Total Score:23     Patient left sitting edge of bed with OT present.    GOALS:   Multidisciplinary Problems     Physical Therapy Goals     Not on file                History:     Past Medical History:   Diagnosis Date    Acid reflux     Graves disease     diffuse Toxic Goiter    Hypertension     IBS (irritable bowel syndrome)     Lichen sclerosus     Migraine     Stroke        Past Surgical History:   Procedure Laterality Date    EYE SURGERY      HYSTERECTOMY      CINDI for ?? cervical cancer, had normal paps    LEFT HEART CATHETERIZATION N/A 10/20/2020    Procedure: Left heart cath;  Surgeon: Bony Schultz MD;  Location: [facility] CATH LAB/EP;  Service: Cardiology;  Laterality: N/A;       Time Tracking:     PT Received On: 08/20/22  PT Start Time: 1133     PT Stop Time: 1154  PT Total Time (min): 21 min     Billable Minutes: Evaluation 21 08/20/2022

## 2022-08-20 NOTE — PT/OT/SLP EVAL
Occupational Therapy   Evaluation    Name: Denise Berrios  MRN: [id number]  Admitting Diagnosis:  Shoulder arthritis  Recent Surgery: Procedure(s) (LRB):  ARTHROPLASTY, SHOULDER, TOTAL, REVERSE (Right) 1 Day Post-Op    Recommendations:     Discharge Recommendations: outpatient OT  Discharge Equipment Recommendations:   (SC vs TTB per pt preference)  Barriers to discharge:       Assessment:     Denise Berrios is a 72 y.o. female with a medical diagnosis of Shoulder arthritis.  She presents with pain but good tolerance for all therapeutic activities. Performance deficits affecting function: weakness, impaired sensation, impaired functional mobility, impaired self care skills, impaired balance, gait instability, decreased upper extremity function, decreased ROM, impaired skin, orthopedic precautions.      Rehab Prognosis: Good; patient would benefit from acute skilled OT services to address these deficits and reach maximum level of function.       Plan:     Patient to be seen   to address the above listed problems via self-care/home management, therapeutic activities, therapeutic exercises  · Plan of Care Expires: 08/20/22  · Plan of Care Reviewed with: patient, spouse, sibling    Subjective     Chief Complaint: Pt reports pain R shoulder  Patient/Family Comments/goals: To return home, to take less/no pain medication    Occupational Profile:  Living Environment: Pt lives with spouse in 2 SH, bed and bath available on first floor, 1 JELLY, tub/sh  Previous level of function: Indep ADLs and functional mobility   Roles and Routines: Caretaker to self and home. Pt states that since her accident, she has not been able to cook/clean/drive. She grocery shops with her   Equipment Used at Home:  none  Assistance upon Discharge: Spouse and sister who is staying with her to assist with recovery    Pain/Comfort:  · Pain Rating 1: 6/10  · Location - Side 1: Right  · Location - Orientation 1: generalized  · Location 1:  shoulder  · Pain Addressed 1: Reposition, Distraction, Cessation of Activity, Pre-medicate for activity  · Pain Rating Post-Intervention 1:  (did not rate)    Patients cultural, spiritual, Shinto conflicts given the current situation:      Objective:     Communicated with: nsfrancine prior to session.  Patient found HOB elevated with peripheral IV upon OT entry to room.    General Precautions: Standard, fall   Orthopedic Precautions: (RUE TSA)   Braces: UE Sling  Respiratory Status: Room air    Occupational Performance:    Bed Mobility:    · Patient completed Rolling/Turning to Left with  modified independence  · Patient completed Scooting/Bridging with modified independence  · Patient completed Supine to Sit with modified independence  · Patient completed Sit to Supine with modified independence    Functional Mobility/Transfers:  · Patient completed Sit <> Stand Transfer with supervision  with  no assistive device   Functional Mobility: Pt with fair to fair+ dynamic seated and standing balance.  ·  improved as pt continued ambulating in hallway with PT    Activities of Daily Living:  · Upper Body Dressing: minimum assistance and moderate assistance to don shirt and camisole overhead  · Lower Body Dressing: moderate assistance to don B socks, pants, and undergarments    Cognitive/Visual Perceptual:  Cognitive/Psychosocial Skills:     -       Oriented to: Person, Place, Time and Situation   -       Follows Commands/attention:Follows multistep  commands  -       Communication: clear/fluent  -       Memory: No Deficits noted  -       Safety awareness/insight to disability: intact   -       Mood/Affect/Coping skills/emotional control: Appropriate to situation    Physical Exam:  Postural examination/scapula alignment:    -       Rounded shoulders  Skin integrity:Surgical wound R shoulder, dressing c/d/i  Sensation:    -       Impaired R hand 2/2 anesthesia per pt report  Motor Planning:    -       WFL  Dominant hand:    -        R handed  Upper Extremity Range of Motion: LUE WFL     Upper Extremity Strength:  LUE grossly 3+ to 4-/5   Strength:  B hands grossly 3+/5  Fine Motor Coordination:    -       Intact  Gross motor coordination:   WFL     AMPAC 6 Click ADL:  AMPAC Total Score: 17    Treatment & Education:  Pt educated on role of OT and POC.   Pt performing skills as listed above.   Pt educated on use of adaptive dressing techniques, sister assisting pt well with dressing. Pt performed x1 pendulum swing in session and reports that she had completed these before in therapy.     Patient left HOB elevated with all lines intact, call button in reach, nsg notified and spouse and sister present    GOALS:   Multidisciplinary Problems       Occupational Therapy Goals       Not on file                    History:     Past Medical History:   Diagnosis Date    Acid reflux     Graves disease     diffuse Toxic Goiter    Hypertension     IBS (irritable bowel syndrome)     Lichen sclerosus     Migraine     Stroke        Past Surgical History:   Procedure Laterality Date    EYE SURGERY      HYSTERECTOMY      CINDI for ?? cervical cancer, had normal paps    LEFT HEART CATHETERIZATION N/A 10/20/2020    Procedure: Left heart cath;  Surgeon: Bony Schultz MD;  Location[facility] LAB/EP;  Service: Cardiology;  Laterality: N/A;       Time Tracking:     OT Date of Treatment: 08/20/22  OT Start Time: 1133  OT Stop Time: 1213  OT Total Time (min): 40 min    Billable Minutes:Evaluation 15  Self Care/Home Management 25    8/20/2022

## 2022-08-20 NOTE — PLAN OF CARE
08/20/22 1058   Final Note   Assessment Type Final Discharge Note   Anticipated Discharge Disposition Home   What phone number can be called within the next 1-3 days to see how you are doing after discharge? [phone]   Post-Acute Status   Discharge Delays None known at this time      spoke with patient regarding discharge plan to home. Patient spouse Von and sister were at the bedside. Patient lives with her spouse and independent with her ADL's. Pt has no medical equipment or HH services in the home. Pt is retired and still drives. Patient has no social issues or needs at this time.    Pt's spouse and sister will transport pt home this evening.  Follow up appointments scheduled below. No additional needs at this time.       Future Appointments   Date Time Provider Department Center   8/30/2022 10:30 AM Micheline Lay PA-C [facility] ORTHO [facility]   9/29/2022 10:15 AM Aleksandra Carey MD [facility] PR[facility]ARE [place]   1/3/2023 11:45 AM INJECTION [facility] AMB INF [facility]

## 2022-08-20 NOTE — PROGRESS NOTES
VN cued into room to review discharge instructions.  Went over doctor specific instructions, new medications, where to , follow up appointments, when to call the doctor.  All questions answered.  Informed pt of survey.  Transport requested.

## 2022-08-22 ENCOUNTER — TELEPHONE (OUTPATIENT)
Dept: ORTHOPEDICS | Facility: CLINIC | Age: 72
End: 2022-08-22
Payer: MEDICARE

## 2022-08-22 NOTE — TELEPHONE ENCOUNTER
Spoke with patient. Patient stated that she has a lot of pain from surgery. Informed patient that the pain is normal after the type of procedure that she had done. Advised to alternate taking ibuprofen along with the Norco to help with pain relief. Patient verbalized understanding.

## 2022-08-22 NOTE — DISCHARGE SUMMARY
[facility] Surg  Orthopedics  Discharge Summary      Patient Name: Denise Berrios  MRN: [id number]  Admission Date: 8/19/2022  Hospital Length of Stay: 1 days  Discharge Date and Time: 8/20/2022 12:47 PM  Attending Physician: No att. providers found   Discharging Provider: Bradley Finnegan Jr, MD  Primary Care Provider: Aleksandra Carey MD    HPI:  72-year-old female admitted for right total shoulder arthroplasty    Procedure(s) (LRB):  ARTHROPLASTY, SHOULDER, TOTAL, REVERSE (Right)      Hospital Course:  Patient was admitted on the morning of surgery underwent right total shoulder reverse arthroplasty without complication.  She was admitted overnight for pain control and IV antibiotics   On postop day 1 she was doing well she was afebrile and was able to ambulate with some assistance   She was tolerating p.o. and pain was well controlled   She was felt ready for discharge home in stable condition  Regular diet  Instructed in appropriate wound care and gentle range of motion for the right shoulder with use of the sling when walking        Significant Diagnostic Studies: No pertinent studies.    Pending Diagnostic Studies:     None        Final Active Diagnoses:    Diagnosis Date Noted POA    PRINCIPAL PROBLEM:  Shoulder arthritis [M19.019] 08/19/2022 Yes      Problems Resolved During this Admission:      Discharged Condition: good    Disposition: Home or Self Care    Follow Up:    Patient Instructions:   No discharge procedures on file.  Medications:  Reconciled Home Medications:      Medication List      START taking these medications    HYDROcodone-acetaminophen 7.5-325 mg per tablet  Commonly known as: NORCO  Take 1 tablet by mouth every 4 (four) hours as needed for Pain.        CONTINUE taking these medications    aspirin 81 MG EC tablet  Commonly known as: ECOTRIN  once a day     atorvastatin 80 MG tablet  Commonly known as: LIPITOR  TAKE 1 TABLET DAILY     butalbital-acetaminophen-caffeine -40 mg  -40 mg per tablet  Commonly known as: FIORICET, ESGIC  Take 1 tablet by mouth nightly as needed for Headaches.     clobetasol 0.05% 0.05 % Oint  Commonly known as: TEMOVATE  APPLY TOPICALLY TWICE A DAY     EScitalopram oxalate 20 MG tablet  Commonly known as: LEXAPRO  Take 1 tablet (20 mg total) by mouth once daily.     etodolac 400 MG 24 hr tablet  Commonly known as: LODINE XL  TAKE 1 TABLET DAILY     levothyroxine 100 MCG tablet  Commonly known as: SYNTHROID  Take 1 tablet (100 mcg total) by mouth before breakfast.     losartan 50 MG tablet  Commonly known as: COZAAR  TAKE 1 TABLET(50 MG) BY MOUTH EVERY DAY     metoprolol tartrate 50 MG tablet  Commonly known as: LOPRESSOR  Take 1 tablet (50 mg total) by mouth 2 (two) times daily.     nortriptyline 10 MG capsule  Commonly known as: PAMELOR  TAKE 1 CAPSULE DAILY     omeprazole 40 MG capsule  Commonly known as: PRILOSEC  Take 1 capsule (40 mg total) by mouth once daily.     solifenacin 10 MG tablet  Commonly known as: VESICARE  TAKE 1 TABLET DAILY     traMADoL 50 mg tablet  Commonly known as: ULTRAM  Take 1 tablet (50 mg total) by mouth every 12 (twelve) hours as needed for Pain.     VITAMIN D2 50,000 unit Cap  Generic drug: ergocalciferol  TAKE 1 CAPSULE EVERY 7 DAYS            Bradley Finnegan Jr, MD  Orthopedics  [place] - [facility] Surg

## 2022-08-22 NOTE — TELEPHONE ENCOUNTER
----- Message from Justina King sent at 8/22/2022  3:20 PM CDT -----  Needs advice from nurse:      Who Called:pt  Regarding:patient in a lot of pain since surgery and is not getting a lot of relief//pain scale: pain is a 15  Would the patient rather a call back or VIA [facility]?  Best Call Back number:[phone]  Additional Info:

## 2022-08-25 RX ORDER — HYDROCODONE BITARTRATE AND ACETAMINOPHEN 7.5; 325 MG/1; MG/1
1 TABLET ORAL EVERY 4 HOURS PRN
Qty: 40 TABLET | Refills: 0 | Status: SHIPPED | OUTPATIENT
Start: 2022-08-25 | End: 2022-09-20

## 2022-08-26 ENCOUNTER — PES CALL (OUTPATIENT)
Dept: ADMINISTRATIVE | Facility: OTHER | Age: 72
End: 2022-08-26
Payer: MEDICARE

## 2022-08-26 ENCOUNTER — TELEPHONE (OUTPATIENT)
Dept: ORTHOPEDICS | Facility: CLINIC | Age: 72
End: 2022-08-26
Payer: MEDICARE

## 2022-08-26 NOTE — TELEPHONE ENCOUNTER
----- Message from Bradley Finnegan Jr., MD sent at 8/25/2022  5:22 PM CDT -----  Contact: pt  OK  ----- Message -----  From: Kavitha Cleveland MA  Sent: 8/25/2022   3:29 PM CDT  To: Bradley Finnegan Jr., MD    Patient is requesting refill on Norco  ----- Message -----  From: Ming Galeano  Sent: 8/25/2022   1:18 PM CDT  To: [facility] ALLEN Staff    Type:  RX Refill Request    Who Called: PT  Refill or New Rx:refill  RX Name and Strength:HYDROcodone-acetaminophen (NORCO) 7.5-325 mg per tablet  How is the patient currently taking it? (ex. 1XDay):Take 1 tablet by mouth every 4 (four) hours as needed for Pain. - Oral  Is this a 30 day or 90 day RX:40  Preferred Pharmacy with phone number:[id number]  [place]  [facility] [address] AT [facility] [address]   Phone: [phone]  Fax:  [phone]    Local or Mail Order:local  Ordering Provider:[facility]  Would the patient rather a call back or a response via [facility]? Call to verify   Best Call Back Number:[phone]  Additional Information: states she has enough for 2 days of medication

## 2022-08-30 ENCOUNTER — OFFICE VISIT (OUTPATIENT)
Dept: ORTHOPEDICS | Facility: CLINIC | Age: 72
End: 2022-08-30
Payer: MEDICARE

## 2022-08-30 ENCOUNTER — HOSPITAL ENCOUNTER (OUTPATIENT)
Dept: RADIOLOGY | Facility: HOSPITAL | Age: 72
Discharge: HOME | End: 2022-08-30
Attending: PHYSICIAN ASSISTANT
Payer: MEDICARE

## 2022-08-30 VITALS — WEIGHT: 139 LBS | BODY MASS INDEX: 27.29 KG/M2 | HEIGHT: 60 IN

## 2022-08-30 DIAGNOSIS — Z96.611 STATUS POST TOTAL SHOULDER ARTHROPLASTY, RIGHT: ICD-10-CM

## 2022-08-30 DIAGNOSIS — Z96.611 STATUS POST REVERSE TOTAL SHOULDER REPLACEMENT, RIGHT: Primary | ICD-10-CM

## 2022-08-30 DIAGNOSIS — Z96.611 STATUS POST TOTAL SHOULDER ARTHROPLASTY, RIGHT: Primary | ICD-10-CM

## 2022-08-30 PROCEDURE — 99024 PR POST-OP FOLLOW-UP VISIT: ICD-10-PCS | Mod: POP,,, | Performed by: PHYSICIAN ASSISTANT

## 2022-08-30 PROCEDURE — 99024 POSTOP FOLLOW-UP VISIT: CPT | Mod: POP,,, | Performed by: PHYSICIAN ASSISTANT

## 2022-08-30 PROCEDURE — 73030 X-RAY EXAM OF SHOULDER: CPT | Mod: 26,RT,, | Performed by: RADIOLOGY

## 2022-08-30 PROCEDURE — 73030 X-RAY EXAM OF SHOULDER: CPT | Mod: TC,PN,RT

## 2022-08-30 PROCEDURE — 99214 OFFICE O/P EST MOD 30 MIN: CPT | Mod: PBBFAC,PN | Performed by: PHYSICIAN ASSISTANT

## 2022-08-30 PROCEDURE — 99999 PR PBB SHADOW E&M-EST. PATIENT-LVL IV: ICD-10-PCS | Mod: PBBFAC,,, | Performed by: PHYSICIAN ASSISTANT

## 2022-08-30 PROCEDURE — 73030 XR SHOULDER COMPLETE 2 OR MORE VIEWS RIGHT: ICD-10-PCS | Mod: 26,RT,, | Performed by: RADIOLOGY

## 2022-08-30 PROCEDURE — 99999 PR PBB SHADOW E&M-EST. PATIENT-LVL IV: CPT | Mod: PBBFAC,,, | Performed by: PHYSICIAN ASSISTANT

## 2022-08-30 RX ORDER — HYDROCODONE BITARTRATE AND ACETAMINOPHEN 7.5; 325 MG/1; MG/1
1 TABLET ORAL EVERY 6 HOURS PRN
Qty: 28 TABLET | Refills: 0 | Status: SHIPPED | OUTPATIENT
Start: 2022-08-30 | End: 2022-09-20

## 2022-08-30 NOTE — PROGRESS NOTES
Subjective:      Patient ID: Denise Berrios is a 72 y.o. female.    Chief Complaint: Post-op Evaluation (R shoulder )      HPI: Denise Berrios is here for a postop visit. She is 12 days s/p RIGHT, Reverse, Total Shoulder Arthroplasty (DOS: 8-). The patient is complaining of intermittent moderate to severe pain that requires Rx Norco for relief. She is not c/o fevers, nausea, SOB, HA, or new onset injury. Pt wearing UE sling during times of activity. She has not begun formal therapy yet.     Past Medical History:   Diagnosis Date    Acid reflux     Graves disease     diffuse Toxic Goiter    Hypertension     IBS (irritable bowel syndrome)     Lichen sclerosus     Migraine     Stroke        Current Outpatient Medications:     aspirin (ECOTRIN) 81 MG EC tablet, once a day, Disp: , Rfl:     atorvastatin (LIPITOR) 80 MG tablet, TAKE 1 TABLET DAILY, Disp: 90 tablet, Rfl: 3    butalbital-acetaminophen-caffeine -40 mg (FIORICET, ESGIC) -40 mg per tablet, Take 1 tablet by mouth nightly as needed for Headaches., Disp: 30 tablet, Rfl: 5    clobetasol 0.05% (TEMOVATE) 0.05 % Oint, APPLY TOPICALLY TWICE A DAY, Disp: 45 g, Rfl: 1    EScitalopram oxalate (LEXAPRO) 20 MG tablet, Take 1 tablet (20 mg total) by mouth once daily., Disp: 90 tablet, Rfl: 3    etodolac (LODINE XL) 400 MG 24 hr tablet, TAKE 1 TABLET DAILY, Disp: 90 tablet, Rfl: 3    HYDROcodone-acetaminophen (NORCO) 7.5-325 mg per tablet, Take 1 tablet by mouth every 4 (four) hours as needed for Pain., Disp: 40 tablet, Rfl: 0    levothyroxine (SYNTHROID) 100 MCG tablet, Take 1 tablet (100 mcg total) by mouth before breakfast., Disp: 90 tablet, Rfl: 0    losartan (COZAAR) 50 MG tablet, TAKE 1 TABLET(50 MG) BY MOUTH EVERY DAY, Disp: 90 tablet, Rfl: 3    metoprolol tartrate (LOPRESSOR) 50 MG tablet, Take 1 tablet (50 mg total) by mouth 2 (two) times daily., Disp: 180 tablet, Rfl: 3    nortriptyline (PAMELOR) 10 MG capsule, TAKE 1 CAPSULE DAILY, Disp:  90 capsule, Rfl: 3    omeprazole (PRILOSEC) 40 MG capsule, Take 1 capsule (40 mg total) by mouth once daily., Disp: 90 capsule, Rfl: 3    solifenacin (VESICARE) 10 MG tablet, TAKE 1 TABLET DAILY, Disp: 90 tablet, Rfl: 3    traMADoL (ULTRAM) 50 mg tablet, Take 1 tablet (50 mg total) by mouth every 12 (twelve) hours as needed for Pain., Disp: 40 tablet, Rfl: 0    VITAMIN D2 1,250 mcg (50,000 unit) capsule, TAKE 1 CAPSULE EVERY 7 DAYS, Disp: 12 capsule, Rfl: 3  Review of patient's allergies indicates:   Allergen Reactions    Phenobarbital Anaphylaxis    Penicillins Other (See Comments)    Phenobarbital sodium     Propylthiouracil     Thyroid      Note: PTU    Topamax [topiramate] Diarrhea     GI symptoms    Cefaclor Rash and Other (See Comments)    Clopidogrel Rash    Methimazole Rash and Other (See Comments)    Methimazole (bulk) Rash    Penicillin Rash    Sulfa (sulfonamide antibiotics) Rash and Other (See Comments)       Ht 5' (1.524 m)   Wt 63 kg (139 lb)   BMI 27.15 kg/m²     Review of Systems   Constitutional:  Negative for chills and fever.   Respiratory:  Negative for shortness of breath.    Cardiovascular:  Negative for chest pain and leg swelling.   Gastrointestinal:  Negative for nausea and vomiting.   Genitourinary:  Negative for dysuria.   Musculoskeletal:  Positive for joint pain and myalgias. Negative for falls.   Skin:  Negative for rash.   Neurological:  Negative for dizziness and sensory change.        Objective:    Ortho Exam   Right Shoulder:  Wound margins are well approximated and healing nicely. No redness, warmth, drainage, or other signs of infection.   ROM elbow, wrist and fingers full without difficulty.    Sensation grossly intact distally. Pulses present.    GEN: Well developed, well nourished female. AAOX3. No acute distress.   Breathing unlabored.  Mood and affect appropriate.         Assessment:     Imaging:     I have personally reviewed and interpreted the radiographs. Xray of the  Right Shoulder demonstrate:    2v R shoulder show well-positioned and adhered total joint arthroplasty hardware components. No additional acute osseous abnormalities appreciated.        1. Status post reverse total shoulder replacement, right          Plan:       Pain control: We discussed a multi-modal approach to pain including ice, NSAIDs staggered with Rx Norco for uncontrolled pain only  Therapy: PT referral provided. Pt to begin follow 2wks postop for gentle shoulder ROM. Pt instructed on gentle ROM pendulums to begin today.  WB: NWB to R shoulder x3mos  DME: sling to be worn during activity and while leaving the house  Wound Care: Regular wound care explained with soap and water.    Follow Up: 4wks

## 2022-09-13 ENCOUNTER — TELEPHONE (OUTPATIENT)
Dept: INTERNAL MEDICINE | Facility: CLINIC | Age: 72
End: 2022-09-13
Payer: MEDICARE

## 2022-09-13 NOTE — TELEPHONE ENCOUNTER
Asking for A1C to be scheduled on 10/3 at 10am at the [place] location. Can you sign? Last one was July.

## 2022-09-13 NOTE — TELEPHONE ENCOUNTER
----- Message from Lakeisha Ledesma sent at 9/13/2022 10:34 AM CDT -----  Regarding: appt  Type:  Patient Returning Call    Who Called:patient      Who Left Message for Patient:n/a    Does the patient know what this is regarding?:would like to reschedule his appt to the same day as his wife     Would the patient rather a call back or a response via [facility]? Call back     Best Call Back Number:[phone]  Additional Information: n/a

## 2022-09-20 ENCOUNTER — TELEPHONE (OUTPATIENT)
Dept: ORTHOPEDICS | Facility: CLINIC | Age: 72
End: 2022-09-20
Payer: MEDICARE

## 2022-09-20 RX ORDER — HYDROCODONE BITARTRATE AND ACETAMINOPHEN 5; 325 MG/1; MG/1
1 TABLET ORAL EVERY 12 HOURS PRN
Qty: 14 TABLET | Refills: 0 | Status: ON HOLD | OUTPATIENT
Start: 2022-09-20 | End: 2023-05-04

## 2022-09-20 RX ORDER — NALOXONE HYDROCHLORIDE 4 MG/.1ML
SPRAY NASAL
Qty: 1 EACH | Refills: 11 | Status: SHIPPED | OUTPATIENT
Start: 2022-09-20

## 2022-09-20 NOTE — TELEPHONE ENCOUNTER
----- Message from Mariana Farley sent at 9/20/2022  1:46 PM CDT -----  Type:  RX Refill Request    Who Called: pt      HYDROcodone-acetaminophen (NORCO) 7.5-325 mg per tablet 28 tablet 0 8/30/2022  No  Sig - Route: Take 1 tablet by mouth every 6 (six) hours as needed for Pain (as needed for severe pain only). - Oral  Sent to pharmacy as: HYDROcodone-acetaminophen (NORCO) 7.5-325 mg per tablet  Class: Normal  Earliest Fill Date: 8/30/2022  Notes to Pharmacy: n/a   Order: [id number]  Date/Time Signed: 8/30/2022 10:59      E-Prescribing Status: Receipt confirmed by pharmacy (8/30/2022 11:00 AM CDT)    Pharmacy      [facility] #[id number] - [place] - [address] AT [facility] [address]            Would the patient rather a call back or a response via [facility]? call  Best Call Back Number:[phone] (M)   Additional Information: also wants to speak to provider about her PT before she is begin

## 2022-09-20 NOTE — TELEPHONE ENCOUNTER
Spoke with Ms. Berrios- pt states she didn't start therapy and would like a refill on her pain medication- Norco. Per pt, she didn't realize she was suppose to start PT. Pt was informed this is required / advised following surgery- pt expressed v/u.

## 2022-09-21 ENCOUNTER — CLINICAL SUPPORT (OUTPATIENT)
Dept: REHABILITATION | Facility: HOSPITAL | Age: 72
End: 2022-09-21
Payer: MEDICARE

## 2022-09-21 DIAGNOSIS — R53.1 WEAKNESS: ICD-10-CM

## 2022-09-21 DIAGNOSIS — Z96.611 STATUS POST REVERSE TOTAL SHOULDER REPLACEMENT, RIGHT: ICD-10-CM

## 2022-09-21 DIAGNOSIS — M25.60 STIFFNESS DUE TO IMMOBILITY: ICD-10-CM

## 2022-09-21 DIAGNOSIS — Z74.09 STIFFNESS DUE TO IMMOBILITY: ICD-10-CM

## 2022-09-21 DIAGNOSIS — M79.601 PAIN OF RIGHT UPPER EXTREMITY: ICD-10-CM

## 2022-09-21 PROCEDURE — 97165 OT EVAL LOW COMPLEX 30 MIN: CPT | Mod: PN

## 2022-09-21 PROCEDURE — 97110 THERAPEUTIC EXERCISES: CPT | Mod: PN

## 2022-09-21 NOTE — PLAN OF CARE
"  [facility] Occupational Therapy  Initial Evaluation     Date: 9/21/2022  Patient: Denise Berrios  Chart Number: [id number]    Therapy Diagnosis:   Encounter Diagnoses   Name Primary?    Status post reverse total shoulder replacement, right     Pain of right upper extremity     Weakness     Stiffness due to immobility      Physician: Micheline Lay PA-C    Physician Orders: OT evaluate and treat  Medical Diagnosis: Z96.611 (ICD-10-CM) - Status post reverse total shoulder replacement, right  Evaluation Date: 9/21/2022  Insurance Authorization period Expiration: 12/31/2022  Plan of Care Expiration Period: 11/18/2022  Next MD appointment: 10/3/2022    Visit # / Visits Authorized: 1 / 20  Time In:10:00  Time Out: 10:45  Total Billable Time: 45 minutes    Precautions: Standard and Weightbearing    Subjective     Involved Side: Right  Dominant Side: Right  Date of Onset: DOS:8/19/2022  Mechanism of Injury: insidious onset with progressively worsening symptoms right shoulder pain resulting in Reverse total shoulder  History of Current Condition: pt presents to clinic today 1 month postop without sling intact. Pt with decreased range of motion, weakness and pain all of which limit her functionally  Surgical Procedure: ARTHROPLASTY, SHOULDER, TOTAL, REVERSE (Right)   Imaging: see imaging   Previous Therapy: received therapy     Patient's Goals for Therapy: to decrease pain and increase functional use    Pain:  Functional Pain Scale Rating 0-10:   5/10 on average  0/10 at best  10/10 at worst  Location: "whole shoulder area" right side  Description: Aching  Aggravating Factors: Laying, Night Time, and Lifting  Easing Factors: pain medication    Occupation:  Retired    Functional Limitations/Social History:    Previous functional status includes: Independent with all ADLs.     Current FunctionalStatus   Home/Living environment : lives with their family      Limitation of Functional Status as " follows:   ADLs/IADLs:     - Feeding: Independent    - Bathing: Independent    - Dressing/Grooming: Independent    - Driving: not driving     Leisure: difficulty with housework, gym routine      Past Medical History/Physical Systems Review:   Denise VIVAS Cousins  has a past medical history of Acid reflux, Graves disease, Hypertension, IBS (irritable bowel syndrome), Lichen sclerosus, Migraine, and Stroke.    Denise VIVAS Cousins  has a past surgical history that includes Hysterectomy; Eye surgery; Left heart catheterization (N/A, 10/20/2020); and Reverse total shoulder arthroplasty (Right, 8/19/2022).    Denise has a current medication list which includes the following prescription(s): aspirin, atorvastatin, butalbital-acetaminophen-caffeine -40 mg, clobetasol 0.05%, escitalopram oxalate, etodolac, hydrocodone-acetaminophen, levothyroxine, losartan, metoprolol tartrate, naloxone, nortriptyline, omeprazole, solifenacin, and vitamin d2.    Review of patient's allergies indicates:   Allergen Reactions    Phenobarbital Anaphylaxis    Penicillins Other (See Comments)    Phenobarbital sodium     Propylthiouracil     Thyroid      Note: PTU    Topamax [topiramate] Diarrhea     GI symptoms    Cefaclor Rash and Other (See Comments)    Clopidogrel Rash    Methimazole Rash and Other (See Comments)    Methimazole (bulk) Rash    Penicillin Rash    Sulfa (sulfonamide antibiotics) Rash and Other (See Comments)        Objective     Sensation Test: Patient denies any numbness/tingling    Observation/Inspection: rounded shoulders    Range of Motion/Strength:   Shoulder  Left   Right  Pain/Dysfunction with Movement    AROM PROM MMT AROM PROM MMT    Flexion WNL WNL WNL  NT    Extension WNL WNL WNL NT NT NT    Abduction WNL WNL WNL NT 80 NT    HorizAdduction WNL WNL WNL NT NT NT    Internal rotation WNL WNL WNL NT To stomach NT    ER at 90° abd WNL WNL WNL NT NT NT    ER at 0° abd WNL WNL WNL NT 35 NT    NT=Not tested  ROM Comments:  Pain at end range    Painful Arc: none noted    Tenderness upon Palpation:      Positive: tenderness noted at incision    Special Tests: deferred    Scapular Control/Dyskinesis:    Normal / Subtle / Obvious  Comments    Left  Normal -    Right  Nota assessed -       CMS Impairment/Limitation/Restriction for FOTO Shoulder Survey    Therapist reviewed FOTO scores for Denise Berrios on 9/21/2022.   FOTO documents entered into [employer] - see Media section.    Limitation Score: 48%  Category: Self Care         Treatment     Treatment Time In: 1035  Treatment Time Out: 1045  Total Treatment time separate from Evaluation time:10    Denise received therapeutic exercises for 10 minutes including:  -pendulums, shoulder shrugs, scapular retraction, elbow, wrist and hand ROM    Home Exercise Program/Education:  Issued HEP (see patient instructions in EMR) and educated on modality use for pain management . Exercises were reviewed and Denise was able to demonstrate them prior to the end of the session.   Pt received a written copy of exercises to perform at home. Denise demonstrated fair  understanding of the education provided.  Pt was advised to perform these exercises free of pain, and to stop performing them if pain occurs.    Patient/Family Education: role of OT, goals for OT, scheduling/cancellations - pt verbalized understanding. Discussed insurance limitations with patient.    Assessment     Denise Berrios is a 72 y.o. female referred to outpatient occupational therapy and presents with a medical diagnosis of right reverse total shoulder, resulting in Decreased ROM, Decreased muscle strength, Increased pain, and Scar Adhesions and demonstrates limitations as described in the chart below. Following medical record review it is determined that pt will benefit from occupational therapy services in order to maximize pain free and/or functional use of right shoulder. The following goals were discussed with the patient and  patient is in agreement with them as to be addressed in the treatment plan. The patient's rehab potential is Good.     Anticipated barriers to occupational therapy: none  Pt has no cultural, educational or language barriers to learning provided.    Profile and History Assessment of Occupational Performance Level of Clinical Decision Making Complexity Score   Occupational Profile:   Denise Berrios is a 72 y.o. female who lives with their family and is currently retired. Denise Berrios has difficulty with  driving/transportation management, housework/household chores, and gym routine  affecting his/her daily functional abilities. His/her main goal for therapy is to decrease pain and increase functional use.     Comorbidities:   history of CVA and HTN    Medical and Therapy History Review:   Expanded               Performance Deficits    Physical:  Joint Mobility  Muscle Power/Strength  Muscle Endurance  Skin Integrity/Scar Formation  Muscle Tone  Postural Control  Pain    Cognitive:  No Deficits    Psychosocial:    No Deficits     Clinical Decision Making:  low    Assessment Process:  Problem-Focused Assessments    Modification/Need for Assistance:  Not Necessary    Intervention Selection:  Several Treatment Options       low  Based on PMHX, co morbidities , data from assessments and functional level of assistance required with task and clinical presentation directly impacting function.       The following goals were discussed with the patient and patient is in agreement with them as to be addressed in the treatment plan.     Goals:   Short Term Goals to be met in 4 weeks: (10/21/2022)  1) Initiate Hep   2) Pt will increase Right shoulder PROM by 10 degrees grossly for improved performance with overhead ADL's  3) Pt will report 6/10 pain in (Right)shoulder at worst  4) Pt will progress to active assisted range of motion exercises  5) Patient will be able to achieve less than or equal to 35% on FOTO shoulder survey  demonstrating overall improved functional ability with upper extremity.     Long Term Goals to be met by discharge:  1) Independent with HEP  2) Pt will demonstrate (Right) shoulder AROM WFL grossly for [place] with ADL's  3) Pt will demonstrate (Right) shoulder MMT WFL grossly for [place] with functional activities  4) Independent and pain free with ADL's and IADL's  5) Patient will be able to achieve less than or equal to 15% on FOTO shoulder survey demonstrating overall improved functional ability with upper extremity.       Plan   Certification Period/Plan of care expiration: 9/21/2022 to 11/18/2022.    Outpatient Occupational Therapy 2 times weekly for 8 weeks to include the following interventions: Manual therapy/joint mobilizations, Modalities for pain management, Therapeutic exercises/activities., Strengthening, Joint Protection, and Energy Conservation.      CONCHITA Rivera

## 2022-09-26 ENCOUNTER — CLINICAL SUPPORT (OUTPATIENT)
Dept: REHABILITATION | Facility: HOSPITAL | Age: 72
End: 2022-09-26
Payer: MEDICARE

## 2022-09-26 DIAGNOSIS — R53.1 WEAKNESS: ICD-10-CM

## 2022-09-26 DIAGNOSIS — M79.601 PAIN OF RIGHT UPPER EXTREMITY: Primary | ICD-10-CM

## 2022-09-26 DIAGNOSIS — Z74.09 STIFFNESS DUE TO IMMOBILITY: ICD-10-CM

## 2022-09-26 DIAGNOSIS — M25.60 STIFFNESS DUE TO IMMOBILITY: ICD-10-CM

## 2022-09-26 PROCEDURE — 97140 MANUAL THERAPY 1/> REGIONS: CPT | Mod: PN

## 2022-09-26 PROCEDURE — 97110 THERAPEUTIC EXERCISES: CPT | Mod: PN

## 2022-09-26 NOTE — PROGRESS NOTES
"  Occupational Therapy Treatment Note     Date: 9/26/2022  Name: Denise Berrios  Clinic Number: [id number]    Therapy Diagnosis:   Encounter Diagnoses   Name Primary?    Pain of right upper extremity Yes    Weakness     Stiffness due to immobility      Physician: Micheline Lay PA-C    Physician Orders: OT evaluate and treat  Medical Diagnosis: Z96.611 (ICD-10-CM) - Status post reverse total shoulder replacement, right  Evaluation Date: 9/21/2022  Insurance Authorization period Expiration: 12/31/2022  Plan of Care Expiration Period: 11/18/2022  Next MD appointment: 10/3/2022     Visit # / Visits Authorized: 2 / 20  Time In:8:45  Time Out: 9:30  Total Billable Time: 45 minutes     Precautions: Standard and Weightbearing    Subjective     Pt reports: "I only have pain when I move this arm."  she was compliant with home exercise program given last session.   Response to previous treatment:decreased pain  Functional change: able to progress with exercises    Pain: 2/10  Location: right shoulder      Objective     Objective Measures updated at progress report unless specified.     Treatment   Pt 5 weeks 3 days post op, to clinic with sling intact    Denise received the following supervised modalities after being cleared for contradictions for 5 minutes:   -moist hot pack to right shoulder for pain management and tissue extensibility    Denise received the following manual therapy techniques for 10 minutes:   -consisting of patient supine for Right shoulder lateral telescoping, upper trapezius soft tissue mobilization, pectoralis lift, PROM with endrange stretching,gentle shoulder oscillations    Denise received therapeutic exercises for 30 minutes including:  Exercises        PROM (Right) Shoulder Flexion/Abduction/Internal rotation/External Rotation 10x     Supine chest press 2/10   -external rotation  2/10   Shoulder shrugs 30x   Scapular retraction 30x   pendulums 3 ways  30x   Elbow flexion/extension 30x     "     Home Exercises and Education Provided     Education provided:   - Progress towards goals     Written Home Exercises Provided: Patient instructed to cont prior HEP.  Exercises were reviewed and Denise was able to demonstrate them prior to the end of the session.  Denise demonstrated good  understanding of the HEP provided.     See EMR under Patient Instructions for exercises provided prior visit.      Assessment   Pt with good participation this date. Pain report low throughout session. Soft tissue restrictions noted in upper traps and at incision however responded well to manual. Pt able to complete all exercises in a pain free range of motion and with good technique. Pt meeting post op landmarks.     Denise is progressing well towards her goals and there are no updates to goals at this time. Pt prognosis is Good.     Pt will continue to benefit from skilled outpatient occupational therapy to address the deficits listed in the problem list on initial evaluation provide pt/family education and to maximize pt's level of independence in the home and community environment.     Anticipated barriers to occupational therapy: none    Pt's spiritual, cultural and educational needs considered and pt agreeable to plan of care and goals.    Goals:  Short Term Goals to be met in 4 weeks: (10/21/2022)  1) Initiate Hep -met, ongoing  2) Pt will increase Right shoulder PROM by 10 degrees grossly for improved performance with overhead activities of daily living's -Not met, progressing  3) Pt will report 6/10 pain in (Right)shoulder at worst -Not met, progressing  4) Pt will progress to active assisted range of motion exercises -Not met, progressing  5) Patient will be able to achieve less than or equal to 35% on FOTO shoulder survey demonstrating overall improved functional ability with upper extremity. -Not met, progressing     Long Term Goals to be met by discharge:  1) Independent with home exercise program -Not met,  progressing  2) Pt will demonstrate (Right) shoulder AROM WFL grossly for [place] with activities of daily living's -Not met, progressing  3) Pt will demonstrate (Right) shoulder MMT WFL grossly for [place] with functional activities -Not met, progressing  4) Independent and pain free with ADL's and IADL's -Not met, progressing  5) Patient will be able to achieve less than or equal to 15% on FOTO shoulder survey demonstrating overall improved functional ability with upper extremity.-Not met, progressing     Plan   Continue with OT plan of care   Updates/Grading for next session: progress as able      CONCHITA Rivera

## 2022-09-29 ENCOUNTER — OFFICE VISIT (OUTPATIENT)
Dept: INTERNAL MEDICINE | Facility: CLINIC | Age: 72
End: 2022-09-29
Payer: MEDICARE

## 2022-09-29 DIAGNOSIS — I49.3 PVC (PREMATURE VENTRICULAR CONTRACTION): ICD-10-CM

## 2022-09-29 DIAGNOSIS — E03.8 OTHER SPECIFIED HYPOTHYROIDISM: ICD-10-CM

## 2022-09-29 DIAGNOSIS — I10 ESSENTIAL HYPERTENSION: ICD-10-CM

## 2022-09-29 DIAGNOSIS — Z86.73 HISTORY OF CEREBRAL INFARCTION: ICD-10-CM

## 2022-09-29 DIAGNOSIS — M81.0 AGE-RELATED OSTEOPOROSIS WITHOUT CURRENT PATHOLOGICAL FRACTURE: ICD-10-CM

## 2022-09-29 DIAGNOSIS — R29.898 LEFT LEG WEAKNESS: Primary | ICD-10-CM

## 2022-09-29 DIAGNOSIS — Z23 NEED FOR INFLUENZA VACCINATION: ICD-10-CM

## 2022-09-29 DIAGNOSIS — Z96.611 HISTORY OF RIGHT SHOULDER REPLACEMENT: ICD-10-CM

## 2022-09-29 DIAGNOSIS — D64.9 ANEMIA, UNSPECIFIED TYPE: ICD-10-CM

## 2022-09-29 DIAGNOSIS — N32.81 OVERACTIVE BLADDER: ICD-10-CM

## 2022-09-29 DIAGNOSIS — G43.809 OTHER MIGRAINE WITHOUT STATUS MIGRAINOSUS, NOT INTRACTABLE: ICD-10-CM

## 2022-09-29 DIAGNOSIS — E78.5 DYSLIPIDEMIA: ICD-10-CM

## 2022-09-29 DIAGNOSIS — R29.818 OTHER SYMPTOMS AND SIGNS INVOLVING THE NERVOUS SYSTEM: ICD-10-CM

## 2022-09-29 DIAGNOSIS — F41.9 ANXIETY: ICD-10-CM

## 2022-09-29 DIAGNOSIS — K21.9 GASTROESOPHAGEAL REFLUX DISEASE WITHOUT ESOPHAGITIS: ICD-10-CM

## 2022-09-29 DIAGNOSIS — Z12.31 ENCOUNTER FOR SCREENING MAMMOGRAM FOR MALIGNANT NEOPLASM OF BREAST: ICD-10-CM

## 2022-09-29 PROBLEM — M79.601 PAIN OF RIGHT UPPER EXTREMITY: Status: RESOLVED | Noted: 2022-09-21 | Resolved: 2022-09-29

## 2022-09-29 PROBLEM — M25.60 STIFFNESS DUE TO IMMOBILITY: Status: RESOLVED | Noted: 2022-09-21 | Resolved: 2022-09-29

## 2022-09-29 PROBLEM — R53.1 WEAKNESS: Status: RESOLVED | Noted: 2022-09-21 | Resolved: 2022-09-29

## 2022-09-29 PROBLEM — Z74.09 STIFFNESS DUE TO IMMOBILITY: Status: RESOLVED | Noted: 2022-09-21 | Resolved: 2022-09-29

## 2022-09-29 PROCEDURE — 99999 PR PBB SHADOW E&M-EST. PATIENT-LVL IV: ICD-10-PCS | Mod: PBBFAC,,, | Performed by: INTERNAL MEDICINE

## 2022-09-29 PROCEDURE — 99214 OFFICE O/P EST MOD 30 MIN: CPT | Mod: PBBFAC,25 | Performed by: INTERNAL MEDICINE

## 2022-09-29 PROCEDURE — G0008 ADMIN INFLUENZA VIRUS VAC: HCPCS | Mod: PBBFAC

## 2022-09-29 PROCEDURE — 99214 OFFICE O/P EST MOD 30 MIN: CPT | Mod: S$PBB,,, | Performed by: INTERNAL MEDICINE

## 2022-09-29 PROCEDURE — 99214 PR OFFICE/OUTPT VISIT, EST, LEVL IV, 30-39 MIN: ICD-10-PCS | Mod: S$PBB,,, | Performed by: INTERNAL MEDICINE

## 2022-09-29 PROCEDURE — 99999 PR PBB SHADOW E&M-EST. PATIENT-LVL IV: CPT | Mod: PBBFAC,,, | Performed by: INTERNAL MEDICINE

## 2022-09-29 RX ORDER — LEVOTHYROXINE SODIUM 100 UG/1
100 TABLET ORAL
Qty: 90 TABLET | Refills: 3 | Status: SHIPPED | OUTPATIENT
Start: 2022-09-29 | End: 2022-10-04 | Stop reason: SDUPTHER

## 2022-09-29 NOTE — ASSESSMENT & PLAN NOTE
Stable on Lexapro 20 mg, doing better since last visit when we increased to 20 mg. No SI/HI/panic attacks.   no

## 2022-09-29 NOTE — PROGRESS NOTES
[facility] Note    Chief Complaint      Chief Complaint   Patient presents with    Follow-up       History of Present Illness      Denise VIVAS Cousins is a 72 y.o. female who presents today for follow up of HTN.  Patient comes to appointment with spouse, John Cousins.  Cards: Yousef, Neuro: Andrew, GI: Sukhdeep    Was talking to sister in recliner yesterday, was holding cup of coffee in left hand.  Wanted to sit cup down but couldn't. Wanted to get up to go to kitchen but couldn't get up due to left leg weakness.  Felt like she was dragging left leg, felt numb.  This happened intermittently for a few hours but got better with walking/stretching.  Hand/arm were fine after initial episode.    Had right shoulder replacement surgery 8/19/2022 with Dr. Finnegan, has had 1 week of PT.  Can not lift arm all the way up yet.  Still has pain.    Problem List Items Addressed This Visit       Anxiety    Current Assessment & Plan     Stable on Lexapro 20 mg, doing better since last visit when we increased to 20 mg. No SI/HI/panic attacks.         Other specified hypothyroidism    Overview     post radioactive treatment for grave's         Current Assessment & Plan     Stable on synthroid 100 mcg daily.  No S/S of hypo/hyperthyroidism.           Relevant Medications    levothyroxine (SYNTHROID) 100 MCG tablet    Other Relevant Orders    TSH    T4, FREE    Osteoporosis    Current Assessment & Plan     Stable on Prolia. Doesn't tolerate fosamax, last DEXA 8/2020.         History of cerebral infarction    Current Assessment & Plan     No residual deficits.  Seen on CT done by Dr. Mena after accident, sent to [facility] for stroke workup, MRI done in ED with old strokes. Presented initially with UE weakness.  On asa and statin.  Has not tolerated Plavix, had reaction.         Dyslipidemia    Current Assessment & Plan     Stable on lipitor 80 mg daily, no myalgias.  The 10-year ASCVD risk score (Jacob DK, et al., 2019)  is: 14.5%    Values used to calculate the score:      Age: 72 years      Sex: Female      Is Non- : No      Diabetic: No      Tobacco smoker: No      Systolic Blood Pressure: 112 mmHg      Is BP treated: Yes      HDL Cholesterol: 46 mg/dL      Total Cholesterol: 161 mg/dL           Relevant Orders    CBC Auto Differential    Lipid Panel    Comprehensive Metabolic Panel    Migraine headache    Current Assessment & Plan     Sees Dr. Morales, last MRI in media.  Taking nortriptyline, didn't do well with higher doses in past.  Has had 1 migraine in last 2-3 months.         Overactive bladder    Current Assessment & Plan     Controlled on vesicare.  No leakage/incontinence at present.         Gastroesophageal reflux disease without esophagitis    Current Assessment & Plan     Stable on prilosec. No nausea/belching.  Sees Dr. Tarango.         PVC (premature ventricular contraction)    Current Assessment & Plan     Stable on Lopressor BID, no issues at present.         Essential hypertension    Current Assessment & Plan     BP controlled on losartan 50 and lopressor 50 mg BID.  No CP/SOB/HA.            Other Visit Diagnoses       Left leg weakness    -  Primary    Relevant Orders    CT Head Without Contrast    Anemia, unspecified type        Relevant Orders    Iron and TIBC    Ferritin    Other symptoms and signs involving the nervous system        History of right shoulder replacement        Encounter for screening mammogram for malignant neoplasm of breast        Relevant Orders    Mammo Digital Screening Bilat w/ Alirio            Health Maintenance   Topic Date Due    Mammogram  10/22/2022    DEXA Scan  08/28/2023    High Dose Statin  08/30/2023    Lipid Panel  09/24/2026    TETANUS VACCINE  08/28/2029    Hepatitis C Screening  Completed       Past Medical History:   Diagnosis Date    Acid reflux     Graves disease     diffuse Toxic Goiter    Hypertension     IBS (irritable bowel syndrome)      Lichen sclerosus     Migraine     Stroke        Past Surgical History:   Procedure Laterality Date    EYE SURGERY      HYSTERECTOMY      CINDI for ?? cervical cancer, had normal paps    LEFT HEART CATHETERIZATION N/A 10/20/2020    Procedure: Left heart cath;  Surgeon: Bony Schultz MD;  Location: [facility] CATH LAB/EP;  Service: Cardiology;  Laterality: N/A;    REVERSE TOTAL SHOULDER ARTHROPLASTY Right 8/19/2022    Procedure: ARTHROPLASTY, SHOULDER, TOTAL, REVERSE;  Surgeon: Bradley Finnegan Jr., MD;  Location: [facility] OR;  Service: Orthopedics;  Laterality: Right;  SONIA linda CONFIRMED/8/18/Shaista Finnegan- hemosorb AT        family history includes Hypertension in her father; Kidney failure in her father; Stomach cancer in her mother.    Social History     Tobacco Use    Smoking status: Never    Smokeless tobacco: Never   Substance Use Topics    Alcohol use: Yes    Drug use: No       Review of Systems   Constitutional:  Negative for chills and fever.   HENT:  Negative for sore throat.    Respiratory:  Negative for cough and shortness of breath.    Cardiovascular:  Negative for chest pain and palpitations.   Gastrointestinal:  Negative for constipation, diarrhea, nausea and vomiting.   Genitourinary:  Negative for dysuria and hematuria.   Musculoskeletal:  Negative for falls.   Neurological:  Negative for headaches.      Outpatient Encounter Medications as of 9/29/2022   Medication Sig Dispense Refill    aspirin (ECOTRIN) 81 MG EC tablet once a day      atorvastatin (LIPITOR) 80 MG tablet TAKE 1 TABLET DAILY 90 tablet 3    butalbital-acetaminophen-caffeine -40 mg (FIORICET, ESGIC) -40 mg per tablet Take 1 tablet by mouth nightly as needed for Headaches. 30 tablet 5    clobetasol 0.05% (TEMOVATE) 0.05 % Oint APPLY TOPICALLY TWICE A DAY 45 g 1    EScitalopram oxalate (LEXAPRO) 20 MG tablet Take 1 tablet (20 mg total) by mouth once daily. 90 tablet 3    HYDROcodone-acetaminophen (NORCO) 5-325  mg per tablet Take 1 tablet by mouth every 12 (twelve) hours as needed for Pain (as needed for severe pain only). 14 tablet 0    losartan (COZAAR) 50 MG tablet TAKE 1 TABLET(50 MG) BY MOUTH EVERY DAY 90 tablet 3    metoprolol tartrate (LOPRESSOR) 50 MG tablet Take 1 tablet (50 mg total) by mouth 2 (two) times daily. 180 tablet 3    naloxone (NARCAN) 4 mg/actuation Spry 4mg by nasal route as needed for opioid overdose; may repeat every 2-3 minutes in alternating nostrils until medical help arrives. Call 911 1 each 11    nortriptyline (PAMELOR) 10 MG capsule TAKE 1 CAPSULE DAILY 90 capsule 3    omeprazole (PRILOSEC) 40 MG capsule TAKE 1 CAPSULE(40 MG) BY MOUTH EVERY DAY 90 capsule 3    solifenacin (VESICARE) 10 MG tablet TAKE 1 TABLET DAILY 90 tablet 3    VITAMIN D2 1,250 mcg (50,000 unit) capsule TAKE 1 CAPSULE EVERY 7 DAYS 12 capsule 3    [DISCONTINUED] levothyroxine (SYNTHROID) 100 MCG tablet Take 1 tablet (100 mcg total) by mouth before breakfast. 90 tablet 0    levothyroxine (SYNTHROID) 100 MCG tablet Take 1 tablet (100 mcg total) by mouth before breakfast. 90 tablet 3    [DISCONTINUED] etodolac (LODINE XL) 400 MG 24 hr tablet TAKE 1 TABLET DAILY (Patient not taking: Reported on 9/29/2022) 90 tablet 3     No facility-administered encounter medications on file as of 9/29/2022.        Review of patient's allergies indicates:   Allergen Reactions    Phenobarbital Anaphylaxis    Penicillins Other (See Comments)    Phenobarbital sodium     Propylthiouracil     Thyroid      Note: PTU    Topamax [topiramate] Diarrhea     GI symptoms    Cefaclor Rash and Other (See Comments)    Clopidogrel Rash    Methimazole Rash and Other (See Comments)    Methimazole (bulk) Rash    Penicillin Rash    Sulfa (sulfonamide antibiotics) Rash and Other (See Comments)       Physical Exam      Vital Signs  Temp: (P) 98.4 °F (36.9 °C)  Pulse: (P) 70  SpO2: (P) 96 %  BP: (P) 132/70  Pain Score: (P) 0-No pain  Height and Weight  Height: (P) 5'  (152.4 cm)  Weight: (P) 59.2 kg (130 lb 10 oz)  BSA (Calculated - sq m): (P) 1.58 sq meters  BMI (Calculated): (P) 25.5  Weight in (lb) to have BMI = 25: (P) 127.7]    Physical Exam  Constitutional:       Appearance: She is well-developed.   HENT:      Head: Normocephalic and atraumatic.      Right Ear: External ear normal.      Left Ear: External ear normal.   Eyes:      General:         Right eye: No discharge.         Left eye: No discharge.   Cardiovascular:      Rate and Rhythm: Normal rate and regular rhythm.      Heart sounds: Normal heart sounds. No murmur heard.  Pulmonary:      Effort: Pulmonary effort is normal. No respiratory distress.      Breath sounds: Normal breath sounds.   Abdominal:      General: There is no distension.      Palpations: Abdomen is soft.      Tenderness: There is no abdominal tenderness. There is no guarding.   Musculoskeletal:         General: Normal range of motion.      Cervical back: Normal range of motion.   Skin:     General: Skin is warm and dry.   Neurological:      Mental Status: She is alert and oriented to person, place, and time.   Psychiatric:         Behavior: Behavior normal.        Laboratory:  CBC:  Recent Labs   Lab Result Units 08/16/22  0932 08/20/22  0607   WBC K/uL 6.42 11.26   RBC M/uL 3.93* 2.90*   Hemoglobin g/dL 12.2 9.0*   Hematocrit % 36.4* 26.8*   Platelets K/uL 248 188   MCV fL 93 92   MCH pg 31.0 31.0   MCHC g/dL 33.5 33.6     CMP:  Recent Labs   Lab Result Units 08/19/22  1500   Glucose mg/dL 111*   Calcium mg/dL 7.7*   Sodium mmol/L 140   Potassium mmol/L 4.3   CO2 mmol/L 22*   Chloride mmol/L 110   BUN mg/dL 12     URINALYSIS:  No results for input(s): COLORU, CLARITYU, SPECGRAV, PHUR, PROTEINUA, GLUCOSEU, BILIRUBINCON, BLOODU, WBCU, RBCU, BACTERIA, MUCUS, NITRITE, LEUKOCYTESUR, UROBILINOGEN, HYALINECASTS in the last 2160 hours.     LIPIDS:  No results for input(s): TSH, HDL, CHOL, TRIG, LDLCALC, CHOLHDL, NONHDLCHOL, TOTALCHOLEST in the last 2160  hours.    TSH:  No results for input(s): TSH in the last 2160 hours.    A1C:  No results for input(s): HGBA1C in the last 2160 hours.    Radiology:  No results found in the last 30 days.     Assessment/Plan     Denise Berrios is a 72 y.o.female with:    1. Left leg weakness  - CT Head Without Contrast; Future    2. Dyslipidemia  - CBC Auto Differential; Future  - Lipid Panel; Future  - Comprehensive Metabolic Panel; Future    3. Other specified hypothyroidism  - TSH; Future  - T4, FREE; Future  - levothyroxine (SYNTHROID) 100 MCG tablet; Take 1 tablet (100 mcg total) by mouth before breakfast.  Dispense: 90 tablet; Refill: 3    4. Anemia, unspecified type  - Iron and TIBC; Future  - Ferritin; Future    5. Essential hypertension    6. Anxiety    7. Other migraine without status migrainosus, not intractable    8. PVC (premature ventricular contraction)    9. History of cerebral infarction    10. Overactive bladder    11. Gastroesophageal reflux disease without esophagitis    12. Age-related osteoporosis without current pathological fracture    13. Other symptoms and signs involving the nervous system    14. History of right shoulder replacement    15. Encounter for screening mammogram for malignant neoplasm of breast  - Mammo Digital Screening Bilat w/ Alirio; Future  - Mammo Digital Screening Bilat w/ Lairio     -plan for DEXA after 6/2023 to evaluate Prolia effectiveness  -flu shot today  -Continue current medications and maintain follow up with specialists.    -Follow up in about 6 months (around 3/29/2023) for follow up of medical problems.       Aleksandra Carey MD  [facility]

## 2022-09-29 NOTE — ASSESSMENT & PLAN NOTE
Stable on lipitor 80 mg daily, no myalgias.  The 10-year ASCVD risk score (Jacob WEST, et al., 2019) is: 14.5%    Values used to calculate the score:      Age: 72 years      Sex: Female      Is Non- : No      Diabetic: No      Tobacco smoker: No      Systolic Blood Pressure: 112 mmHg      Is BP treated: Yes      HDL Cholesterol: 46 mg/dL      Total Cholesterol: 161 mg/dL

## 2022-09-29 NOTE — ASSESSMENT & PLAN NOTE
No residual deficits.  Seen on CT done by Dr. Mena after accident, sent to [place] ED for stroke workup, MRI done in ED with old strokes. Presented initially with UE weakness.  On asa and statin.  Has not tolerated Plavix, had reaction.

## 2022-09-29 NOTE — ADDENDUM NOTE
6/13/2018       RE: Sami Tobias  [address]  [place] [address]     Dear Colleague,    Thank you for referring your patient, Sami oTbias, to the [facility]. Please see a copy of my visit note below.        INTERVENTIONAL RADIOLOGY CONSULTATION    Name: Sami Tobias  Age: 2 year old   Referring Physician: Dr. 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 He previously had issues with cutaneous bleeding, and underwent successful surgical resection without further bleeding issues. He was last seen Feb 2018 after he had sudden enlargement and pain involving a right lower quadrant lump in the region of groin. Ultrasound showed normal testicles and lump due to hemorrhage within a lymphatic bleb. This was his first such episode. Since then, the swelling has completely resolved and he is pain free. No fever, erythema, or swelling. He has been taking one half baby aspirin per day.     He is otherwise well.    PAST MEDICAL HISTORY:   Past Medical History:   Diagnosis Date     Hemangioma     right trunk,flank area, bleeds     Speech delay        PAST SURGICAL HISTORY:   Past Surgical History:   Procedure Laterality Date     EXCISE LESION GROIN Right 11/11/2015    Procedure: EXCISE LESION GROIN;  Surgeon: DIANELYS Johnson MD;  Location: [facility] OR     EXCISE MASS TRUNK Right 6/8/2016    Procedure: EXCISE MASS TRUNK;  Surgeon: DIANELYS Johnson MD;  Location: [facility] OR     SOFT TISSUE SURGERY      laser procedures on birthmark       FAMILY HISTORY:   No family history on file.    SOCIAL HISTORY:   Social History   Substance Use Topics     Smoking status: Never Smoker     Smokeless tobacco: Never Used     Alcohol use Not on file       PROBLEM LIST:   Patient Active Problem List    Diagnosis Date Noted     Vascular malformation  Addended by: FAITH MARTÍNEZ on: 9/29/2022 10:47 AM     Modules accepted: Orders     "02/23/2014     Priority: Medium     Vascular birthmark 2015     Priority: Medium       MEDICATIONS:   Prescription Medications as of 6/13/2018             ASPIRIN PO Take 40 mg by mouth daily      Facility Administered Medications as of 6/13/2018             fentaNYL (SUBLIMAZE) injection as needed for moderate to severe pain          ALLERGIES:   Review of patient's allergies indicates no known allergies.    ROS:  Skin: as above  Ears/Nose/Throat: negative  Respiratory: No shortness of breath, cough  Cardiovascular: negative  Musculoskeletal: as above  Psychiatric: negative      Physical Examination:   VITALS:   BP 96/60  Pulse 113  Temp 96.8  F (36  C) (Tympanic)  Resp 20  Ht 1.105 m (3' 7.5\")  Wt 17.8 kg (39 lb 3.2 oz)  SpO2 97%  BMI 14.56 kg/m2  Constitutional: healthy, alert and no distress  Head: Normocephalic. No masses, lesions, tenderness or abnormalities  Cardiovascular: No cyanosis  Respiratory: Normal respiratory effort  RLQ skin: Well healed surgical scar RLQ anterior abdomen. No palpable lump or visible abnormality at the right groin.   Psychiatric: affect normal/bright and mentation appears normal.    Labs:    BMP RESULTS:  No results found for: NA, POTASSIUM, CHLORIDE, CO2, ANIONGAP, GLC, BUN, CR, GFRESTIMATED, GFRESTBLACK, VICENTE     CBC RESULTS:  Lab Results   Component Value Date    WBC 4.1 (L) 02/02/2018    RBC 4.24 02/02/2018    HGB 11.4 02/02/2018    HCT 34.7 02/02/2018    MCV 82 02/02/2018    MCH 26.9 02/02/2018    MCHC 32.9 02/02/2018    RDW 14.0 02/02/2018     02/02/2018       INR/PTT:  Lab Results   Component Value Date    INR 1.01 02/02/2018    PTT 30 02/02/2018       Diagnostic studies:   US today reviewed by me, which shows complete resolution of hemorrhage previously seen in lymphatic bleb at right groin. 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 He completely recovered from this and has had no further such episodes. He is currently asymptomatic.     Plan:  Since there have been no further episodes, parents agree that no treatment at present. We would prefer to see if this is a recurrent problem prior to treating. At a minimum, we will see Sami back in [facility] in 6-12 months.    It was a pleasure to see Sami and his family. Thank you for involving the Interventional Radiology service in his care.      I spent approximately 15 minutes face to face time with this patient.     Siria Simmons MD  Interventional Radiology Attending  [facility]  Pager [phone]    CC  Patient Care Team:  Lauri Engel as PCP - General (Pediatrics)  Lauri Engel as Referring Physician (Pediatrics)  Harika Andrade MD as MD (Dermatology)  Schwab, Briana, RN as Nurse Coordinator  DIANELYS Johnson MD as MD (Plastic Surgery)  Siria Simmons MD as MD (Radiology)  Charo Rey MD as MD (Oncology)  LAURI ENGEL

## 2022-09-30 ENCOUNTER — HOSPITAL ENCOUNTER (OUTPATIENT)
Dept: RADIOLOGY | Facility: HOSPITAL | Age: 72
Discharge: HOME | End: 2022-09-30
Attending: INTERNAL MEDICINE
Payer: MEDICARE

## 2022-09-30 DIAGNOSIS — R29.898 LEFT LEG WEAKNESS: ICD-10-CM

## 2022-09-30 PROCEDURE — 70450 CT HEAD/BRAIN W/O DYE: CPT | Mod: TC

## 2022-09-30 PROCEDURE — 70450 CT HEAD/BRAIN W/O DYE: CPT | Mod: 26,,, | Performed by: RADIOLOGY

## 2022-09-30 PROCEDURE — 70450 CT HEAD WITHOUT CONTRAST: ICD-10-PCS | Mod: 26,,, | Performed by: RADIOLOGY

## 2022-10-03 ENCOUNTER — HOSPITAL ENCOUNTER (OUTPATIENT)
Dept: RADIOLOGY | Facility: HOSPITAL | Age: 72
Discharge: HOME | End: 2022-10-03
Attending: ORTHOPAEDIC SURGERY
Payer: MEDICARE

## 2022-10-03 ENCOUNTER — OFFICE VISIT (OUTPATIENT)
Dept: ORTHOPEDICS | Facility: CLINIC | Age: 72
End: 2022-10-03
Payer: MEDICARE

## 2022-10-03 VITALS — WEIGHT: 130 LBS | BODY MASS INDEX: 25.52 KG/M2 | HEIGHT: 60 IN

## 2022-10-03 DIAGNOSIS — M19.019 SHOULDER ARTHRITIS: ICD-10-CM

## 2022-10-03 DIAGNOSIS — M54.32 SCIATICA OF LEFT SIDE: ICD-10-CM

## 2022-10-03 DIAGNOSIS — M25.511 RIGHT SHOULDER PAIN, UNSPECIFIED CHRONICITY: Primary | ICD-10-CM

## 2022-10-03 DIAGNOSIS — M25.511 RIGHT SHOULDER PAIN, UNSPECIFIED CHRONICITY: ICD-10-CM

## 2022-10-03 PROCEDURE — 99213 OFFICE O/P EST LOW 20 MIN: CPT | Mod: PBBFAC,PN | Performed by: ORTHOPAEDIC SURGERY

## 2022-10-03 PROCEDURE — 73030 X-RAY EXAM OF SHOULDER: CPT | Mod: TC,PN,RT

## 2022-10-03 PROCEDURE — 73030 XR SHOULDER COMPLETE 2 OR MORE VIEWS RIGHT: ICD-10-PCS | Mod: 26,RT,, | Performed by: RADIOLOGY

## 2022-10-03 PROCEDURE — 99024 POSTOP FOLLOW-UP VISIT: CPT | Mod: POP,,, | Performed by: ORTHOPAEDIC SURGERY

## 2022-10-03 PROCEDURE — 99999 PR PBB SHADOW E&M-EST. PATIENT-LVL III: ICD-10-PCS | Mod: PBBFAC,,, | Performed by: ORTHOPAEDIC SURGERY

## 2022-10-03 PROCEDURE — 99999 PR PBB SHADOW E&M-EST. PATIENT-LVL III: CPT | Mod: PBBFAC,,, | Performed by: ORTHOPAEDIC SURGERY

## 2022-10-03 PROCEDURE — 99024 PR POST-OP FOLLOW-UP VISIT: ICD-10-PCS | Mod: POP,,, | Performed by: ORTHOPAEDIC SURGERY

## 2022-10-03 PROCEDURE — 73030 X-RAY EXAM OF SHOULDER: CPT | Mod: 26,RT,, | Performed by: RADIOLOGY

## 2022-10-03 RX ORDER — TRAMADOL HYDROCHLORIDE 50 MG/1
50 TABLET ORAL EVERY 12 HOURS PRN
Qty: 60 TABLET | Refills: 0 | Status: SHIPPED | OUTPATIENT
Start: 2022-10-03 | End: 2022-10-13

## 2022-10-03 NOTE — PROGRESS NOTES
Subjective:      Patient ID: Denise Berrios is a 72 y.o. female.  Chief Complaint: Post-op Evaluation (Right shoulder arthoplasty ( Sx 8/9))      HPI  Denise Berrios is a  72 y.o. female presenting today for post op visit.  She is s/p right reverse total shoulder arthroplasty now little over 6 weeks postop  She is doing well some pain reported but overall doing well she has started physical therapies at 1 visit   On unrelated matter she is having pain in her left leg possibly related to sciatica she would like referral for that as well.     Review of patient's allergies indicates:   Allergen Reactions    Phenobarbital Anaphylaxis    Penicillins Other (See Comments)    Phenobarbital sodium     Propylthiouracil     Thyroid      Note: PTU    Topamax [topiramate] Diarrhea     GI symptoms    Cefaclor Rash and Other (See Comments)    Clopidogrel Rash    Methimazole Rash and Other (See Comments)    Methimazole (bulk) Rash    Penicillin Rash    Sulfa (sulfonamide antibiotics) Rash and Other (See Comments)         Current Outpatient Medications   Medication Sig Dispense Refill    aspirin (ECOTRIN) 81 MG EC tablet once a day      atorvastatin (LIPITOR) 80 MG tablet TAKE 1 TABLET DAILY 90 tablet 3    butalbital-acetaminophen-caffeine -40 mg (FIORICET, ESGIC) -40 mg per tablet Take 1 tablet by mouth nightly as needed for Headaches. 30 tablet 5    clobetasol 0.05% (TEMOVATE) 0.05 % Oint APPLY TOPICALLY TWICE A DAY 45 g 1    EScitalopram oxalate (LEXAPRO) 20 MG tablet Take 1 tablet (20 mg total) by mouth once daily. 90 tablet 3    HYDROcodone-acetaminophen (NORCO) 5-325 mg per tablet Take 1 tablet by mouth every 12 (twelve) hours as needed for Pain (as needed for severe pain only). 14 tablet 0    levothyroxine (SYNTHROID) 100 MCG tablet Take 1 tablet (100 mcg total) by mouth before breakfast. 90 tablet 3    losartan (COZAAR) 50 MG tablet TAKE 1 TABLET(50 MG) BY MOUTH EVERY DAY 90 tablet 3    metoprolol tartrate  (LOPRESSOR) 50 MG tablet Take 1 tablet (50 mg total) by mouth 2 (two) times daily. 180 tablet 3    naloxone (NARCAN) 4 mg/actuation Spry 4mg by nasal route as needed for opioid overdose; may repeat every 2-3 minutes in alternating nostrils until medical help arrives. Call 911 1 each 11    nortriptyline (PAMELOR) 10 MG capsule TAKE 1 CAPSULE DAILY 90 capsule 3    omeprazole (PRILOSEC) 40 MG capsule TAKE 1 CAPSULE(40 MG) BY MOUTH EVERY DAY 90 capsule 3    solifenacin (VESICARE) 10 MG tablet TAKE 1 TABLET DAILY 90 tablet 3    VITAMIN D2 1,250 mcg (50,000 unit) capsule TAKE 1 CAPSULE EVERY 7 DAYS 12 capsule 3     No current facility-administered medications for this visit.       Past Medical History:   Diagnosis Date    Acid reflux     Graves disease     diffuse Toxic Goiter    Hypertension     IBS (irritable bowel syndrome)     Lichen sclerosus     Migraine     Stroke        Past Surgical History:   Procedure Laterality Date    EYE SURGERY      HYSTERECTOMY      CINDI for ?? cervical cancer, had normal paps    LEFT HEART CATHETERIZATION N/A 10/20/2020    Procedure: Left heart cath;  Surgeon: Bony Schultz MD;  Location: [facility] CATH LAB/EP;  Service: Cardiology;  Laterality: N/A;    REVERSE TOTAL SHOULDER ARTHROPLASTY Right 8/19/2022    Procedure: ARTHROPLASTY, SHOULDER, TOTAL, REVERSE;  Surgeon: Bradley Finnegan Jr., MD;  Location: [facility] OR;  Service: Orthopedics;  Laterality: Right;  SONIA linda CONFIRMED/8/18/Shaista Finnegan- hemosorb AT        OBJECTIVE:   PHYSICAL EXAM:  Height: 5' (152.4 cm) Weight: 59 kg (130 lb)  Vitals:    10/03/22 0856   Weight: 59 kg (130 lb)   Height: 5' (1.524 m)   PainSc:   4   PainLoc: Shoulder     Ortho/SPM Exam  Examination right shoulder incision looks good well-healed range of motion shoulder is excellent minimal pain   No instability strength is weak   Neurologic exam intact    RADIOGRAPHS:  AP lateral x-ray right shoulder demonstrates good position prosthesis  cemented humeral stem  Comments: I have personally reviewed the imaging and I agree with the above radiologist's report.    ASSESSMENT/PLAN:     IMPRESSION:  1. Status post right shoulder reverse arthroplasty.      2. New onset sciatica left leg    PLAN:  I have rate made referral for her left leg sciatica to neuro surgery   For the shoulder she can start weaning out of the sling especially at home   Continue therapy  Ultram and Advil for pain   No heavy lifting       FOLLOW UP:  6 weeks    Disclaimer: This note has been generated using voice-recognition software. There may be typographical errors that have been missed during proof-reading.

## 2022-10-04 DIAGNOSIS — E03.8 OTHER SPECIFIED HYPOTHYROIDISM: Primary | ICD-10-CM

## 2022-10-04 RX ORDER — LEVOTHYROXINE SODIUM 88 UG/1
88 TABLET ORAL
Qty: 90 TABLET | Refills: 3 | Status: SHIPPED | OUTPATIENT
Start: 2022-10-04 | End: 2023-07-26 | Stop reason: SDUPTHER

## 2022-10-04 NOTE — PROGRESS NOTES
"  Occupational Therapy Treatment Note     Date: 10/5/2022  Name: Denise Berrios  Clinic Number: [id number]    Therapy Diagnosis:   Encounter Diagnoses   Name Primary?    Pain of right upper extremity Yes    Weakness     Stiffness due to immobility      Physician: Micheline Lay PA-C    Physician Orders: OT evaluate and treat  Medical Diagnosis: Z96.611 (ICD-10-CM) - Status post reverse total shoulder replacement, right  Evaluation Date: 9/21/2022  Insurance Authorization period Expiration: 12/31/2022  Plan of Care Expiration Period: 11/18/2022  Next MD appointment: 10/3/2022     Visit # / Visits Authorized: 3 / 20  Time In:8:30  Time Out: 9:15  Total Billable Time: 45 minutes     Precautions: Standard and Weightbearing    Subjective     Pt reports: "My shoulder has been hurting a lot more, he told me I can come out of the sling more but I already had been. Being in the sling hurt me more."  she was compliant with home exercise program given last session.   Response to previous treatment:increased pain  Functional change: increased range of motion     Pain: 6/10  Location: right shoulder      Objective     Objective Measures updated at progress report unless specified.   Range of Motion/Strength:   Shoulder Right     PROM   Flexion 120(+5)   Extension NT   Abduction 95(+15)   HorizAdduction NT   Internal rotation To stomach   ER at 90° abd NT   ER at 0° abd 50(+15)   NT=Not tested  ROM Comments: Pain at end range  Treatment   Pt 6 weeks 5 days post op, to clinic with sling intact    Denise received the following supervised modalities after being cleared for contradictions for 5 minutes:   -moist hot pack to right shoulder for pain management and tissue extensibility    Denise received the following manual therapy techniques for 10 minutes:   -consisting of patient supine for Right shoulder lateral telescoping, upper trapezius soft tissue mobilization, pectoralis lift, PROM with endrange stretching,gentle shoulder " oscillations    Denise received therapeutic exercises for 30 minutes including:  Exercises        PROM (Right) Shoulder Flexion/Abduction/Internal rotation/External Rotation 10x     Supine chest press/Flexion 2/15   -external rotation  2/15   Shoulder shrugs 30x   Scapular retraction 30x   Standing dowel abduction 2/15   pulleys 3minutes   Wall slides Left assisting Right 2/10     Home Exercises and Education Provided     Education provided:   - Progress towards goals     Written Home Exercises Provided: Patient instructed to cont prior HEP.  Exercises were reviewed and Denise was able to demonstrate them prior to the end of the session.  Denise demonstrated good  understanding of the HEP provided.     See EMR under Patient Instructions for exercises provided prior visit.      Assessment   Pt with increased pain today however it did not limit her in today's session. Soft tissue restrictions noted in upper traps and at incision however responded well to manual. Pt able to complete all exercises in a pain free range of motion and with good technique. Tolerated progression of treatment well.  Reporting decreased pain after today's session. Pt meeting post op landmarks.     Denise is progressing well towards her goals and there are no updates to goals at this time. Pt prognosis is Good.     Pt will continue to benefit from skilled outpatient occupational therapy to address the deficits listed in the problem list on initial evaluation provide pt/family education and to maximize pt's level of independence in the home and community environment.     Anticipated barriers to occupational therapy: none    Pt's spiritual, cultural and educational needs considered and pt agreeable to plan of care and goals.    Goals:  Short Term Goals to be met in 4 weeks: (10/21/2022)  1) Initiate Hep -met, ongoing  2) Pt will increase Right shoulder PROM by 10 degrees grossly for improved performance with overhead activities of daily living's  -met  3) Pt will report 6/10 pain in (Right)shoulder at worst -met  4) Pt will progress to active assisted range of motion exercises -met  5) Patient will be able to achieve less than or equal to 35% on FOTO shoulder survey demonstrating overall improved functional ability with upper extremity. -Not met, progressing     Long Term Goals to be met by discharge:  1) Independent with home exercise program -Not met, progressing  2) Pt will demonstrate (Right) shoulder AROM WFL grossly for [place] with activities of daily living's -Not met, progressing  3) Pt will demonstrate (Right) shoulder MMT WFL grossly for [place] with functional activities -Not met, progressing  4) Independent and pain free with ADL's and IADL's -Not met, progressing  5) Patient will be able to achieve less than or equal to 15% on FOTO shoulder survey demonstrating overall improved functional ability with upper extremity.-Not met, progressing     Plan   Continue with OT plan of care   Updates/Grading for next session: progress as able      CONCHITA Rivera

## 2022-10-05 ENCOUNTER — TELEPHONE (OUTPATIENT)
Dept: NEUROSURGERY | Facility: CLINIC | Age: 72
End: 2022-10-05
Payer: MEDICARE

## 2022-10-05 ENCOUNTER — CLINICAL SUPPORT (OUTPATIENT)
Dept: REHABILITATION | Facility: HOSPITAL | Age: 72
End: 2022-10-05
Payer: MEDICARE

## 2022-10-05 DIAGNOSIS — M25.60 STIFFNESS DUE TO IMMOBILITY: ICD-10-CM

## 2022-10-05 DIAGNOSIS — R53.1 WEAKNESS: ICD-10-CM

## 2022-10-05 DIAGNOSIS — Z74.09 STIFFNESS DUE TO IMMOBILITY: ICD-10-CM

## 2022-10-05 DIAGNOSIS — M79.601 PAIN OF RIGHT UPPER EXTREMITY: Primary | ICD-10-CM

## 2022-10-05 DIAGNOSIS — M54.32 SCIATICA OF LEFT SIDE: Primary | ICD-10-CM

## 2022-10-05 PROCEDURE — 97140 MANUAL THERAPY 1/> REGIONS: CPT | Mod: PN

## 2022-10-05 PROCEDURE — 97110 THERAPEUTIC EXERCISES: CPT | Mod: PN

## 2022-10-06 ENCOUNTER — HOSPITAL ENCOUNTER (OUTPATIENT)
Dept: RADIOLOGY | Facility: HOSPITAL | Age: 72
Discharge: HOME | End: 2022-10-06
Attending: PHYSICIAN ASSISTANT
Payer: MEDICARE

## 2022-10-06 ENCOUNTER — OFFICE VISIT (OUTPATIENT)
Dept: NEUROSURGERY | Facility: CLINIC | Age: 72
End: 2022-10-06
Payer: MEDICARE

## 2022-10-06 VITALS
HEIGHT: 60 IN | DIASTOLIC BLOOD PRESSURE: 68 MMHG | WEIGHT: 131 LBS | BODY MASS INDEX: 25.72 KG/M2 | HEART RATE: 76 BPM | SYSTOLIC BLOOD PRESSURE: 154 MMHG

## 2022-10-06 DIAGNOSIS — R29.898 LEFT LEG WEAKNESS: Primary | ICD-10-CM

## 2022-10-06 DIAGNOSIS — M51.36 DDD (DEGENERATIVE DISC DISEASE), LUMBAR: ICD-10-CM

## 2022-10-06 DIAGNOSIS — R29.2 HOFFMAN SIGN PRESENT: ICD-10-CM

## 2022-10-06 DIAGNOSIS — M54.32 SCIATICA OF LEFT SIDE: ICD-10-CM

## 2022-10-06 DIAGNOSIS — R25.8 CLONUS: ICD-10-CM

## 2022-10-06 DIAGNOSIS — R29.2 HYPERREFLEXIA: ICD-10-CM

## 2022-10-06 PROCEDURE — 99214 OFFICE O/P EST MOD 30 MIN: CPT | Mod: S$PBB,,, | Performed by: PHYSICIAN ASSISTANT

## 2022-10-06 PROCEDURE — 99999 PR PBB SHADOW E&M-EST. PATIENT-LVL V: ICD-10-PCS | Mod: PBBFAC,,, | Performed by: PHYSICIAN ASSISTANT

## 2022-10-06 PROCEDURE — 99214 PR OFFICE/OUTPT VISIT, EST, LEVL IV, 30-39 MIN: ICD-10-PCS | Mod: S$PBB,,, | Performed by: PHYSICIAN ASSISTANT

## 2022-10-06 PROCEDURE — 72110 X-RAY EXAM L-2 SPINE 4/>VWS: CPT | Mod: TC,FY

## 2022-10-06 PROCEDURE — 99215 OFFICE O/P EST HI 40 MIN: CPT | Mod: PBBFAC,PN | Performed by: PHYSICIAN ASSISTANT

## 2022-10-06 PROCEDURE — 72110 XR LUMBAR SPINE AP AND LAT WITH FLEX/EXT: ICD-10-PCS | Mod: 26,,, | Performed by: RADIOLOGY

## 2022-10-06 PROCEDURE — 99999 PR PBB SHADOW E&M-EST. PATIENT-LVL V: CPT | Mod: PBBFAC,,, | Performed by: PHYSICIAN ASSISTANT

## 2022-10-06 PROCEDURE — 72110 X-RAY EXAM L-2 SPINE 4/>VWS: CPT | Mod: 26,,, | Performed by: RADIOLOGY

## 2022-10-06 NOTE — PROGRESS NOTES
Subjective:     Patient ID:  Denise Berrios is a 72 y.o. female.    Julio Cesar    Chief Complaint:  Left leg weakness    HPI    Denise Berrios is a 72 y.o. female who presents with the above CC.  Patient states for the past 1-2 weeks she has noted that when she goes from sitting to standing her left leg will buckle and get weak.  This comes and goes and has become less frequent over the last 1-2 weeks.  This was something new and she brought it up to her primary care physician who ordered a CT had which was negative.  She notices it more when she is sitting for a long time goes to get up and gets better with walking.  She denies any neck pain back pain.  No arm pain or leg pain.  She recently had right shoulder surgery.    She does have a history of TIA and stroke which presented with left leg weakness.  She has a history of Graves disease with abnormal thyroid labs recently.    She states that she has had balance trouble or the past year.  She has had falls.    Patient has not had PT or ESIs for the spine.  No spine surgery.     Patient denies any recent accidents or trauma, no saddle anesthesias, and no bowel or bladder incontinence.    Patient has difficulty with balance or gait, no difficulty tying shoes or buttoning clothes, is not dropping things, does not have difficulty opening containers, and has had no change in handwriting.      Review of Systems:    Review of Systems   Constitutional:  Negative for chills, diaphoresis, fever, malaise/fatigue and weight loss.   HENT:  Positive for congestion and tinnitus. Negative for ear discharge, ear pain, hearing loss, nosebleeds, sinus pain and sore throat.    Eyes:  Negative for blurred vision, double vision, photophobia, pain, discharge and redness.   Respiratory:  Negative for cough, hemoptysis, sputum production, shortness of breath, wheezing and stridor.    Cardiovascular:  Negative for chest pain, palpitations, orthopnea, leg swelling and PND.    Gastrointestinal:  Negative for abdominal pain, blood in stool, constipation, diarrhea, heartburn, melena, nausea and vomiting.   Genitourinary:  Negative for dysuria, flank pain, frequency, hematuria and urgency.   Musculoskeletal:  Positive for falls. Negative for back pain, joint pain, myalgias and neck pain.   Skin:  Negative for itching and rash.   Neurological:  Positive for weakness. Negative for dizziness, tingling, tremors, sensory change, speech change, seizures, loss of consciousness and headaches.   Endo/Heme/Allergies:  Negative for environmental allergies and polydipsia. Does not bruise/bleed easily.   Psychiatric/Behavioral:  Negative for depression, hallucinations, memory loss and substance abuse. The patient is not nervous/anxious and does not have insomnia.        Past Medical History:   Diagnosis Date    Acid reflux     Graves disease     diffuse Toxic Goiter    Hypertension     IBS (irritable bowel syndrome)     Lichen sclerosus     Migraine     Stroke      Past Surgical History:   Procedure Laterality Date    EYE SURGERY      HYSTERECTOMY      CINDI for ?? cervical cancer, had normal paps    LEFT HEART CATHETERIZATION N/A 10/20/2020    Procedure: Left heart cath;  Surgeon: Bony Schultz MD;  Location: [facility] CATH LAB/EP;  Service: Cardiology;  Laterality: N/A;    REVERSE TOTAL SHOULDER ARTHROPLASTY Right 8/19/2022    Procedure: ARTHROPLASTY, SHOULDER, TOTAL, REVERSE;  Surgeon: Bradley Finnegan Jr., MD;  Location: [facility] OR;  Service: Orthopedics;  Laterality: Right;  SONIA linda CONFIRMED/8/18/Shaista Finnegan- hemosorb AT      Current Outpatient Medications on File Prior to Visit   Medication Sig Dispense Refill    aspirin (ECOTRIN) 81 MG EC tablet once a day      atorvastatin (LIPITOR) 80 MG tablet TAKE 1 TABLET DAILY 90 tablet 3    butalbital-acetaminophen-caffeine -40 mg (FIORICET, ESGIC) -40 mg per tablet Take 1 tablet by mouth nightly as needed for Headaches.  30 tablet 5    clobetasol 0.05% (TEMOVATE) 0.05 % Oint APPLY TOPICALLY TWICE A DAY 45 g 1    EScitalopram oxalate (LEXAPRO) 20 MG tablet Take 1 tablet (20 mg total) by mouth once daily. 90 tablet 3    HYDROcodone-acetaminophen (NORCO) 5-325 mg per tablet Take 1 tablet by mouth every 12 (twelve) hours as needed for Pain (as needed for severe pain only). 14 tablet 0    levothyroxine (SYNTHROID) 88 MCG tablet Take 1 tablet (88 mcg total) by mouth before breakfast. 90 tablet 3    losartan (COZAAR) 50 MG tablet TAKE 1 TABLET(50 MG) BY MOUTH EVERY DAY 90 tablet 3    metoprolol tartrate (LOPRESSOR) 50 MG tablet Take 1 tablet (50 mg total) by mouth 2 (two) times daily. 180 tablet 3    naloxone (NARCAN) 4 mg/actuation Spry 4mg by nasal route as needed for opioid overdose; may repeat every 2-3 minutes in alternating nostrils until medical help arrives. Call 911 1 each 11    nortriptyline (PAMELOR) 10 MG capsule TAKE 1 CAPSULE DAILY 90 capsule 3    omeprazole (PRILOSEC) 40 MG capsule TAKE 1 CAPSULE(40 MG) BY MOUTH EVERY DAY 90 capsule 3    solifenacin (VESICARE) 10 MG tablet TAKE 1 TABLET DAILY 90 tablet 3    traMADoL (ULTRAM) 50 mg tablet Take 1 tablet (50 mg total) by mouth every 12 (twelve) hours as needed for Pain. 60 tablet 0    VITAMIN D2 1,250 mcg (50,000 unit) capsule TAKE 1 CAPSULE EVERY 7 DAYS 12 capsule 3     No current facility-administered medications on file prior to visit.     Review of patient's allergies indicates:   Allergen Reactions    Phenobarbital Anaphylaxis    Penicillins Other (See Comments)    Phenobarbital sodium     Propylthiouracil     Thyroid      Note: PTU    Topamax [topiramate] Diarrhea     GI symptoms    Cefaclor Rash and Other (See Comments)    Clopidogrel Rash    Methimazole Rash and Other (See Comments)    Methimazole (bulk) Rash    Penicillin Rash    Sulfa (sulfonamide antibiotics) Rash and Other (See Comments)     Social History     Socioeconomic History    Marital status:     Tobacco Use    Smoking status: Never    Smokeless tobacco: Never   Substance and Sexual Activity    Alcohol use: Yes    Drug use: No    Sexual activity: Yes     Partners: Male     Birth control/protection: See Surgical Hx     Family History   Problem Relation Age of Onset    Kidney failure Father     Hypertension Father     Stomach cancer Mother     Breast cancer Neg Hx        Objective:      Vitals:    10/06/22 1006   BP: (!) 154/68   Pulse: 76   Weight: 59.4 kg (131 lb)   Height: 5' (1.524 m)   PainSc: 0-No pain         Physical Exam:      General:  Denise Berrios is well-developed, well-nourished, appears stated age, in no acute distress, alert and oriented to person, place, and time.    Pulmonary/Chest:  Respiratory effort normal  Abdominal: Exhibits no distension  Psychiatric:  Normal mood and affect.  Behavior is normal.  Judgement and thought content normal      Musculoskeletal:    Patient is able to walk heel to toe at a slow pace and somewhat off balance.    Cervical ROM:   No pain in cervical spine flexion, extension, left rotation, and right rotation, left lateral bending, and right lateral bending.    Cervical Spine Palpation:  No tenderness to cervical spine palpation.    Cervical Spine Inspection:  Normal with no surgical scars and no visible rashes.    Lumbar ROM:   No pain in lumbar flexion, extension, left lateral bending, and right lateral bending.    Lumbar Spine Inspection:  Normal with no surgical scars and no visible rashes.    Lumbar Spine Palpation:  No tenderness to low back palpation.    SI Joint Palpation:  No tenderness to SI Joint palpation.    Straight Leg Raise:  Negative right and left SLR.      Neurological:      Muscle strength against resistance:     Right Left   Deltoid  4 / 5 5 / 5   Biceps 5 / 5 5 / 5   Triceps 5 / 5 5 / 5   Wrist flexion  5 / 5 5 / 5   Wrist extension 5 / 5 5 / 5   Finger abduction 5 / 5 5 / 5   Thumb opposition 5 / 5 5 / 5   Handgrip 5 / 5 5 / 5   Hip  flexion  5 / 5 5 / 5   Hip extension 5 / 5 5 / 5   Hip abduction 5 / 5 5 / 5   Hip adduction 5/ 5 5 / 5   Knee extension  5 / 5 5 / 5   Knee flexion  5 / 5 5 / 5   Dorsiflexion  5 / 5 5 / 5   EHL  5 / 5 5 / 5   Plantar flexion  5 / 5 5 / 5   Inversion of the feet 5 / 5 5 / 5   Eversion of the feet 5 / 5 5 / 5       Reflexes:     Right Left   Triceps 3+ 3+   Biceps 3+ 3+   Brachioradialis 3+ 3+   Patellar 3+ 3+   Achilles 3+ 3+     Babinski: Negative bilaterally  Clonus:  Positive bilaterally  Bond: Positive bilaterally  No pronator drift   Pupils equal and reactive to light extraocular movements intact   Tongue midline   No facial droop   Cranial nerves 2-12 grossly intact    On gross examination of the bilateral upper and lower extremities, patient has no signs of clubbing, cyanosis, or edema.       XRAY Interpretation:     Lumbar spine xrays were personally reviewed today.  No fractures.  No movement on flexion and extension.  Mild degenerative disc disease      CT head results    Impression:     No acute intracranial findings as detailed above specifically without evidence for acute intracranial hemorrhage or sulcal effacement to suggest large territory recent infarction     Scattered prior infarcts with encephalomalacia again identified.        Electronically signed by: Ezra Loza,   Date:                                            09/30/2022  Time:                                           15:37    Assessment:          1. Left leg weakness    2. DDD (degenerative disc disease), lumbar    3. Hyperreflexia    4. Bond sign present    5. Clonus            Plan:          Orders Placed This Encounter    MRI Brain Without Contrast    MRI Cervical Spine Without Contrast       Patient presenting with on and off left leg weakness that started 1-2 weeks ago.  She is myelopathic on exam without myelopathic symptoms.  I do not think this is coming from her low back.  Will get MRI of the brain with her history of  TIA and MRI cervical spine to assess for spinal cord compression.    Follow-up after MRIs are done for further evaluation and recommendation going forward.    Follow-Up:  Follow up in about 2 weeks (around 10/20/2022). If there are any questions prior to this, the patient was instructed to contact the office.       Mckenna Marcus [facility], PA-C  Neurosurgery  [facility]  10/06/2022

## 2022-10-10 NOTE — PROGRESS NOTES
"  Occupational Therapy Treatment Note     Date: 10/11/2022  Name: Denise Berrios  Clinic Number: [id number]    Therapy Diagnosis:   Encounter Diagnoses   Name Primary?    Decreased activities of daily living (ADL) Yes    Pain of right upper extremity     Weakness     Stiffness due to immobility      Physician: Micheline Lay PA-C    Physician Orders: OT evaluate and treat  Medical Diagnosis: Z96.611 (ICD-10-CM) - Status post reverse total shoulder replacement, right  Evaluation Date: 9/21/2022  Insurance Authorization period Expiration: 12/31/2022  Plan of Care Expiration Period: 11/18/2022  Next MD appointment: 11/14/2022     Visit # / Visits Authorized: 4 / 20  Time In:9:55  Time Out: 10:40  Total Billable Time: 45 minutes     Precautions: Standard and Weightbearing    Subjective     Pt reports: "I'm feeling much better than I did last week."  she was compliant with home exercise program given last session.   Response to previous treatment:decreased pain  Functional change: increased range of motion     Pain: 4/10  Location: right shoulder      Objective     Objective Measures updated at progress report unless specified.   Range of Motion/Strength:   Shoulder Right     PROM   Flexion 120(=)   Extension NT   Abduction 105(+10)   HorizAdduction NT   Internal rotation To stomach   ER at 90° abd NT   ER at 0° abd 50(+5)   NT=Not tested  ROM Comments: Pain at end range  Treatment   Pt 7 weeks 4 days post op, to clinic with sling intact    Denise received the following supervised modalities after being cleared for contradictions for 5 minutes:   -moist hot pack to right shoulder for pain management and tissue extensibility    Denise received the following manual therapy techniques for 10 minutes:   -consisting of patient supine for Right shoulder lateral telescoping, upper trapezius soft tissue mobilization, pectoralis lift, PROM with endrange stretching,gentle shoulder oscillations    Denise received therapeutic " exercises for 30 minutes including:  Exercises        PROM (Right) Shoulder Flexion/Abduction/Internal rotation/External Rotation 10x     Supine chest press/Flexion 2/15   -external rotation  2/15   Sidelying external rotation/Abduction 2/15   Shoulder shrugs 30x   Scapular retraction 30x   Standing dowel abduction 2/15   pulleys 3minutes   Wall slides Left assisting Right 2/15     Home Exercises and Education Provided     Education provided:   - Progress towards goals     Written Home Exercises Provided: Patient instructed to cont prior HEP.  Exercises were reviewed and Denise was able to demonstrate them prior to the end of the session.  Denise demonstrated good  understanding of the HEP provided.     See EMR under Patient Instructions for exercises provided prior visit.      Assessment   Pt with decreased pain today. Better endurance noted with exercises.  Soft tissue restrictions noted in upper traps and at incision however responded well to manual. Pt able to complete all exercises in a pain free range of motion and with good technique. Pt meeting post op landmarks.     Denise is progressing well towards her goals and there are no updates to goals at this time. Pt prognosis is Good.     Pt will continue to benefit from skilled outpatient occupational therapy to address the deficits listed in the problem list on initial evaluation provide pt/family education and to maximize pt's level of independence in the home and community environment.     Anticipated barriers to occupational therapy: none    Pt's spiritual, cultural and educational needs considered and pt agreeable to plan of care and goals.    Goals:  Short Term Goals to be met in 4 weeks: (10/21/2022)  1) Initiate Hep -met, ongoing  2) Pt will increase Right shoulder PROM by 10 degrees grossly for improved performance with overhead activities of daily living's -met  3) Pt will report 6/10 pain in (Right)shoulder at worst -met  4) Pt will progress to active  assisted range of motion exercises -met  5) Patient will be able to achieve less than or equal to 35% on FOTO shoulder survey demonstrating overall improved functional ability with upper extremity. -Not met, progressing     Long Term Goals to be met by discharge:  1) Independent with home exercise program -Not met, progressing  2) Pt will demonstrate (Right) shoulder AROM WFL grossly for [place] with activities of daily living's -Not met, progressing  3) Pt will demonstrate (Right) shoulder MMT WFL grossly for [place] with functional activities -Not met, progressing  4) Independent and pain free with ADL's and IADL's -Not met, progressing  5) Patient will be able to achieve less than or equal to 15% on FOTO shoulder survey demonstrating overall improved functional ability with upper extremity.-Not met, progressing     Plan   Continue with OT plan of care   Updates/Grading for next session: progress as able      CONCHITA Rivera

## 2022-10-11 ENCOUNTER — CLINICAL SUPPORT (OUTPATIENT)
Dept: REHABILITATION | Facility: HOSPITAL | Age: 72
End: 2022-10-11
Payer: MEDICARE

## 2022-10-11 DIAGNOSIS — M79.601 PAIN OF RIGHT UPPER EXTREMITY: ICD-10-CM

## 2022-10-11 DIAGNOSIS — M25.60 STIFFNESS DUE TO IMMOBILITY: ICD-10-CM

## 2022-10-11 DIAGNOSIS — Z78.9 DECREASED ACTIVITIES OF DAILY LIVING (ADL): Primary | ICD-10-CM

## 2022-10-11 DIAGNOSIS — Z74.09 STIFFNESS DUE TO IMMOBILITY: ICD-10-CM

## 2022-10-11 DIAGNOSIS — R53.1 WEAKNESS: ICD-10-CM

## 2022-10-11 PROCEDURE — 97110 THERAPEUTIC EXERCISES: CPT | Mod: PN

## 2022-10-11 PROCEDURE — 97140 MANUAL THERAPY 1/> REGIONS: CPT | Mod: PN

## 2022-10-12 NOTE — PROGRESS NOTES
"  Occupational Therapy Treatment Note     Date: 10/13/2022  Name: Denise Berrios  Clinic Number: [id number]    Therapy Diagnosis:   Encounter Diagnoses   Name Primary?    Pain of right upper extremity Yes    Weakness     Stiffness due to immobility        Physician: Micheline Lay PA-C    Physician Orders: OT evaluate and treat  Medical Diagnosis: Z96.611 (ICD-10-CM) - Status post reverse total shoulder replacement, right  Evaluation Date: 9/21/2022  Insurance Authorization period Expiration: 12/31/2022  Plan of Care Expiration Period: 11/18/2022  Next MD appointment: 11/14/2022     Visit # / Visits Authorized: 5/ 20 FOTO:41%  Time In:10:45  Time Out: 11:30  Total Billable Time: 45 minutes     Precautions: Standard and Weightbearing    Subjective     Pt reports: "I was sore after last time but overall it's feeling better."  she was compliant with home exercise program given last session.   Response to previous treatment:decreased pain  Functional change: improved FOTO score    Pain: 3/10  Location: right shoulder      Objective     Objective Measures updated at progress report unless specified.   Range of Motion/Strength:   Shoulder Right     PROM   Flexion 120(=)   Extension NT   Abduction 105(+10)   HorizAdduction NT   Internal rotation To stomach   ER at 90° abd NT   ER at 0° abd 50(+5)   NT=Not tested  ROM Comments: Pain at end range  Treatment   Pt 7 weeks 6days post op, to clinic with sling intact    Denise received the following supervised modalities after being cleared for contradictions for 5 minutes:   -moist hot pack to right shoulder for pain management and tissue extensibility    Denise received the following manual therapy techniques for 10 minutes:   -consisting of patient supine for Right shoulder lateral telescoping, upper trapezius soft tissue mobilization, pectoralis lift, PROM with endrange stretching,gentle shoulder oscillations    Denise received therapeutic exercises for 30 minutes " including:  Exercises        PROM (Right) Shoulder Flexion/Abduction/Internal rotation/External Rotation 10x     Supine chest press/Flexion 2/15   -external rotation  2/15   Sidelying external rotation/Abduction 2/15   Shoulder shrugs 30x   Scapular retraction 30x   Standing dowel abduction 2/15   pulleys 3minutes   Wall slides Left assisting Right 2/15     Home Exercises and Education Provided     Education provided:   - Progress towards goals     Written Home Exercises Provided: Patient instructed to cont prior HEP.  Exercises were reviewed and Denise was able to demonstrate them prior to the end of the session.  Denise demonstrated good  understanding of the HEP provided.     See EMR under Patient Instructions for exercises provided prior visit.      Assessment   Pt with decreased pain today. Better endurance noted with exercises.  Soft tissue restrictions noted in upper traps and at incision however responded well to manual. Pt able to complete all exercises in a pain free range of motion and with good technique. Pt with improved FOTO score indicating increased functional use self care tasks. Pt meeting post op landmarks.     Denise is progressing well towards her goals and there are no updates to goals at this time. Pt prognosis is Good.     Pt will continue to benefit from skilled outpatient occupational therapy to address the deficits listed in the problem list on initial evaluation provide pt/family education and to maximize pt's level of independence in the home and community environment.     Anticipated barriers to occupational therapy: none    Pt's spiritual, cultural and educational needs considered and pt agreeable to plan of care and goals.    Goals:  Short Term Goals to be met in 4 weeks: (10/21/2022)  1) Initiate Hep -met, ongoing  2) Pt will increase Right shoulder PROM by 10 degrees grossly for improved performance with overhead activities of daily living's -met  3) Pt will report 6/10 pain in  (Right)shoulder at worst -met  4) Pt will progress to active assisted range of motion exercises -met  5) Patient will be able to achieve less than or equal to 35% on FOTO shoulder survey demonstrating overall improved functional ability with upper extremity. -Not met, progressing     Long Term Goals to be met by discharge:  1) Independent with home exercise program -Not met, progressing  2) Pt will demonstrate (Right) shoulder AROM WFL grossly for [place] with activities of daily living's -Not met, progressing  3) Pt will demonstrate (Right) shoulder MMT WFL grossly for [place] with functional activities -Not met, progressing  4) Independent and pain free with ADL's and IADL's -Not met, progressing  5) Patient will be able to achieve less than or equal to 15% on FOTO shoulder survey demonstrating overall improved functional ability with upper extremity.-Not met, progressing     Plan   Continue with OT plan of care   Updates/Grading for next session: progress as able      CONCHITA Rivera

## 2022-10-13 ENCOUNTER — CLINICAL SUPPORT (OUTPATIENT)
Dept: REHABILITATION | Facility: HOSPITAL | Age: 72
End: 2022-10-13
Payer: MEDICARE

## 2022-10-13 DIAGNOSIS — R53.1 WEAKNESS: ICD-10-CM

## 2022-10-13 DIAGNOSIS — M79.601 PAIN OF RIGHT UPPER EXTREMITY: Primary | ICD-10-CM

## 2022-10-13 DIAGNOSIS — M25.60 STIFFNESS DUE TO IMMOBILITY: ICD-10-CM

## 2022-10-13 DIAGNOSIS — Z74.09 STIFFNESS DUE TO IMMOBILITY: ICD-10-CM

## 2022-10-13 PROCEDURE — 97110 THERAPEUTIC EXERCISES: CPT | Mod: PN

## 2022-10-13 PROCEDURE — 97140 MANUAL THERAPY 1/> REGIONS: CPT | Mod: PN

## 2022-10-18 ENCOUNTER — CLINICAL SUPPORT (OUTPATIENT)
Dept: REHABILITATION | Facility: HOSPITAL | Age: 72
End: 2022-10-18
Payer: MEDICARE

## 2022-10-18 DIAGNOSIS — M25.60 STIFFNESS DUE TO IMMOBILITY: ICD-10-CM

## 2022-10-18 DIAGNOSIS — M79.601 PAIN OF RIGHT UPPER EXTREMITY: Primary | ICD-10-CM

## 2022-10-18 DIAGNOSIS — Z74.09 STIFFNESS DUE TO IMMOBILITY: ICD-10-CM

## 2022-10-18 DIAGNOSIS — R53.1 WEAKNESS: ICD-10-CM

## 2022-10-18 PROCEDURE — 97140 MANUAL THERAPY 1/> REGIONS: CPT | Mod: PN

## 2022-10-18 PROCEDURE — 97110 THERAPEUTIC EXERCISES: CPT | Mod: PN

## 2022-10-18 NOTE — PROGRESS NOTES
"  Occupational Therapy Treatment Note     Date: 10/18/2022  Name: Denise Berrios  Clinic Number: [id number]    Therapy Diagnosis:   Encounter Diagnoses   Name Primary?    Pain of right upper extremity Yes    Weakness     Stiffness due to immobility      Physician: Micheline Lay PA-C    Physician Orders: OT evaluate and treat  Medical Diagnosis: Z96.611 (ICD-10-CM) - Status post reverse total shoulder replacement, right  Evaluation Date: 9/21/2022  Insurance Authorization period Expiration: 12/31/2022  Plan of Care Expiration Period: 11/18/2022  Next MD appointment: 11/14/2022     Visit # / Visits Authorized: 6/ 20 FOTO:41%  Time In:10:00  Time Out: 10:45  Total Billable Time: 45 minutes     Precautions: Standard and Weightbearing    Subjective     Pt reports: "I'm feeling a little tight this morning."  she was compliant with home exercise program given last session.   Response to previous treatment:increased pain  Functional change: better endurance with exercises    Pain: 3-4/10  Location: right shoulder      Objective     Objective Measures updated at progress report unless specified.   Range of Motion/Strength:   Shoulder Right     PROM   Flexion 120(=)   Extension NT   Abduction 105(+10)   HorizAdduction NT   Internal rotation To stomach   ER at 90° abd NT   ER at 0° abd 50(+5)   NT=Not tested  ROM Comments: Pain at end range  Treatment   Pt 8 weeks 4 days post op    Denise received the following manual therapy techniques for 10 minutes:   -consisting of patient supine for Right shoulder lateral telescoping, upper trapezius soft tissue mobilization, pectoralis lift, PROM with endrange stretching,gentle shoulder oscillations    Denise received therapeutic exercises for 35 minutes including:  Exercises    Scifit UBE forward/Reverse 3minutes each direction   PROM (Right) Shoulder Flexion/Abduction/Internal rotation/External Rotation 10x     Supine chest press/Flexion 2/15   -external rotation  2/15   Sidelying " external rotation/Abduction 2/15   Theraband extension pulls Yellow  2/15   Theraband rows Yellow   2/15   Standing dowel abduction 2/15   pulleys 3minutes   Wall slides  2/15     Home Exercises and Education Provided     Education provided:   - Progress towards goals     Written Home Exercises Provided: Patient instructed to cont prior HEP.  Exercises were reviewed and Denise was able to demonstrate them prior to the end of the session.  Denise demonstrated good  understanding of the HEP provided.     See EMR under Patient Instructions for exercises provided prior visit.      Assessment   Pt with slight increase in pain however remains low. Better endurance noted with exercises.  Soft tissue restrictions noted in upper traps and at incision however responded well to manual. Pt able to complete all exercises in a pain free range of motion and with good technique. Tolerated progression of treatment well. Pt meeting post op landmarks.     Denise is progressing well towards her goals and there are no updates to goals at this time. Pt prognosis is Good.     Pt will continue to benefit from skilled outpatient occupational therapy to address the deficits listed in the problem list on initial evaluation provide pt/family education and to maximize pt's level of independence in the home and community environment.     Anticipated barriers to occupational therapy: none    Pt's spiritual, cultural and educational needs considered and pt agreeable to plan of care and goals.    Goals:  Short Term Goals to be met in 4 weeks: (10/21/2022)  1) Initiate Hep -met, ongoing  2) Pt will increase Right shoulder PROM by 10 degrees grossly for improved performance with overhead activities of daily living's -met  3) Pt will report 6/10 pain in (Right)shoulder at worst -met  4) Pt will progress to active assisted range of motion exercises -met  5) Patient will be able to achieve less than or equal to 35% on FOTO shoulder survey demonstrating  overall improved functional ability with upper extremity. -Not met, progressing     Long Term Goals to be met by discharge:  1) Independent with home exercise program -Not met, progressing  2) Pt will demonstrate (Right) shoulder AROM WFL grossly for [place] with activities of daily living's -Not met, progressing  3) Pt will demonstrate (Right) shoulder MMT WFL grossly for [place] with functional activities -Not met, progressing  4) Independent and pain free with ADL's and IADL's -Not met, progressing  5) Patient will be able to achieve less than or equal to 15% on FOTO shoulder survey demonstrating overall improved functional ability with upper extremity.-Not met, progressing     Plan   Continue with OT plan of care   Updates/Grading for next session: progress as able      CONCHITA Rivera

## 2022-10-19 NOTE — PROGRESS NOTES
"  Occupational Therapy Treatment Note     Date: 10/20/2022  Name: Denise Berrios  Clinic Number: [id number]    Therapy Diagnosis:   Encounter Diagnoses   Name Primary?    Pain of right upper extremity Yes    Weakness     Stiffness due to immobility      Physician: Micheline Lay PA-C    Physician Orders: OT evaluate and treat  Medical Diagnosis: Z96.611 (ICD-10-CM) - Status post reverse total shoulder replacement, right  Evaluation Date: 9/21/2022  Insurance Authorization period Expiration: 12/31/2022  Plan of Care Expiration Period: 11/18/2022  Next MD appointment: 11/14/2022     Visit # / Visits Authorized: 7/ 20 FOTO:41%  Time In:2:30  Time Out:3:15  Total Billable Time: 45 minutes     Precautions: Standard and Weightbearing    Subjective     Pt reports: "I was doing more earlier today with this arm so I'm a little more sore today than usual."  she was compliant with home exercise program given last session.   Response to previous treatment:same pain  Functional change: better endurance with exercises    Pain: 3-4/10  Location: right shoulder      Objective     Objective Measures updated at progress report unless specified.   Range of Motion/Strength:   Shoulder Right     PROM   Flexion 120(=)   Extension NT   Abduction 105(+10)   HorizAdduction NT   Internal rotation To stomach   ER at 90° abd NT   ER at 0° abd 50(+5)   NT=Not tested  ROM Comments: Pain at end range  Treatment   Pt 8 weeks 6 days post op    Denise received the following manual therapy techniques for 10 minutes:   -consisting of patient supine for Right shoulder lateral telescoping, upper trapezius soft tissue mobilization, pectoralis lift, PROM with endrange stretching,gentle shoulder oscillations    Denise received therapeutic exercises for 35 minutes including:  Exercises    Scifit UBE forward/Reverse 3minutes each direction   PROM (Right) Shoulder Flexion/Abduction/Internal rotation/External Rotation 10x     Supine chest press/Flexion " 1#  2/15   -external rotation  2/15   Sidelying external rotation/Abduction 2/15   Theraband extension pulls Yellow  2/15   Theraband rows Yellow   2/15   Standing dowel abduction 2/15   pulleys 3minutes   Wall slides  2/15     Home Exercises and Education Provided     Education provided:   - Progress towards goals     Written Home Exercises Provided: Patient instructed to cont prior HEP.  Exercises were reviewed and Denise was able to demonstrate them prior to the end of the session.  Denise demonstrated good  understanding of the HEP provided.     See EMR under Patient Instructions for exercises provided prior visit.      Assessment   Pain report remains the same this session.  Pt continues with some soft tissue limitations however responds well to manual. Pt able to increase weight with dowel exercises without complaints and good technique.  Pt meeting post op landmarks.     Denise is progressing well towards her goals and there are no updates to goals at this time. Pt prognosis is Good.     Pt will continue to benefit from skilled outpatient occupational therapy to address the deficits listed in the problem list on initial evaluation provide pt/family education and to maximize pt's level of independence in the home and community environment.     Anticipated barriers to occupational therapy: none    Pt's spiritual, cultural and educational needs considered and pt agreeable to plan of care and goals.    Goals:  Short Term Goals to be met in 4 weeks: (10/21/2022)  1) Initiate Hep -met, ongoing  2) Pt will increase Right shoulder PROM by 10 degrees grossly for improved performance with overhead activities of daily living's -met  3) Pt will report 6/10 pain in (Right)shoulder at worst -met  4) Pt will progress to active assisted range of motion exercises -met  5) Patient will be able to achieve less than or equal to 35% on FOTO shoulder survey demonstrating overall improved functional ability with upper extremity.  -Not met, progressing     Long Term Goals to be met by discharge:  1) Independent with home exercise program -Not met, progressing  2) Pt will demonstrate (Right) shoulder AROM WFL grossly for [place] with activities of daily living's -Not met, progressing  3) Pt will demonstrate (Right) shoulder MMT WFL grossly for [place] with functional activities -Not met, progressing  4) Independent and pain free with ADL's and IADL's -Not met, progressing  5) Patient will be able to achieve less than or equal to 15% on FOTO shoulder survey demonstrating overall improved functional ability with upper extremity.-Not met, progressing     Plan   Continue with OT plan of care   Updates/Grading for next session: progress as able      CONCHITA Rivera

## 2022-10-20 ENCOUNTER — CLINICAL SUPPORT (OUTPATIENT)
Dept: REHABILITATION | Facility: HOSPITAL | Age: 72
End: 2022-10-20
Attending: PHYSICIAN ASSISTANT
Payer: MEDICARE

## 2022-10-20 DIAGNOSIS — R53.1 WEAKNESS: ICD-10-CM

## 2022-10-20 DIAGNOSIS — Z74.09 STIFFNESS DUE TO IMMOBILITY: ICD-10-CM

## 2022-10-20 DIAGNOSIS — M25.60 STIFFNESS DUE TO IMMOBILITY: ICD-10-CM

## 2022-10-20 DIAGNOSIS — M79.601 PAIN OF RIGHT UPPER EXTREMITY: Primary | ICD-10-CM

## 2022-10-20 PROCEDURE — 97110 THERAPEUTIC EXERCISES: CPT | Mod: PN

## 2022-10-20 PROCEDURE — 97140 MANUAL THERAPY 1/> REGIONS: CPT | Mod: PN

## 2022-10-27 ENCOUNTER — TELEPHONE (OUTPATIENT)
Dept: NEUROSURGERY | Facility: CLINIC | Age: 72
End: 2022-10-27
Payer: MEDICARE

## 2022-10-27 NOTE — TELEPHONE ENCOUNTER
Contacted pt back in regards of her two MRIs that she had to cancel due to her getting the COVID vaccination and she got sick. I stated to pt that I will send a message to Mckenna  so she can order the two new orders for her MRI of the brain and one for her cervical spine. Pt is also requesting that her appointment with Mckenna be changed due to her getting ill. Pt voiced understanding.

## 2022-10-28 ENCOUNTER — TELEPHONE (OUTPATIENT)
Dept: NEUROSURGERY | Facility: CLINIC | Age: 72
End: 2022-10-28
Payer: MEDICARE

## 2022-10-28 NOTE — TELEPHONE ENCOUNTER
----- Message from Amaury Hewitt MA sent at 10/27/2022 11:20 AM CDT -----  Regarding: FW: call back  Contact: [phone]  Pt stated that she could not make the two MRI that were scheduled and had to cancel them due to her getting the COVID vaccination shot and that made her sick. Pt is requesting to have two new orders for her MRI of cervical spine and MRI of brain. Please advise     MT  ----- Message -----  From: Danni Resendez  Sent: 10/27/2022  11:09 AM CDT  To: [facility] Staff  Subject: call back                                        Who Called: PT     Patient called in requesting to speak with you. Did not specify why just said regards to her MRI. Please advise.

## 2022-10-28 NOTE — TELEPHONE ENCOUNTER
The MRI of the brain and cervical spine were ordered on 10/06.    Can you schedule them from those orders?    Thanks,  Mckenna [facility], PA-C  Neurosurgery  [facility]  10/28/2022

## 2022-10-31 ENCOUNTER — CLINICAL SUPPORT (OUTPATIENT)
Dept: REHABILITATION | Facility: HOSPITAL | Age: 72
End: 2022-10-31
Payer: MEDICARE

## 2022-10-31 DIAGNOSIS — Z74.09 STIFFNESS DUE TO IMMOBILITY: ICD-10-CM

## 2022-10-31 DIAGNOSIS — R53.1 WEAKNESS: ICD-10-CM

## 2022-10-31 DIAGNOSIS — Z78.9 DECREASED ACTIVITIES OF DAILY LIVING (ADL): Primary | ICD-10-CM

## 2022-10-31 DIAGNOSIS — M25.60 STIFFNESS DUE TO IMMOBILITY: ICD-10-CM

## 2022-10-31 PROCEDURE — 97110 THERAPEUTIC EXERCISES: CPT | Mod: PN

## 2022-10-31 PROCEDURE — 97140 MANUAL THERAPY 1/> REGIONS: CPT | Mod: PN

## 2022-10-31 NOTE — PROGRESS NOTES
"  Occupational Therapy Treatment Note     Date: 10/31/2022  Name: Denise Berrios  Clinic Number: [id number]    Therapy Diagnosis:   Encounter Diagnoses   Name Primary?    Decreased activities of daily living (ADL) Yes    Weakness     Stiffness due to immobility        Physician: Micheline Lay PA-C    Physician Orders: OT evaluate and treat  Medical Diagnosis: Z96.611 (ICD-10-CM) - Status post reverse total shoulder replacement, right  Evaluation Date: 9/21/2022  Insurance Authorization period Expiration: 12/31/2022  Plan of Care Expiration Period: 11/18/2022  Next MD appointment: 11/14/2022     Visit # / Visits Authorized: 8/ 20 FOTO:41%  Time In:10:15  Time Out:11:00  Total Billable Time: 45 minutes     Precautions: Standard and Weightbearing    Subjective     Pt reports: "I got the Covid booster last week and it knocked me out all week."  she was compliant with home exercise program given last session.   Response to previous treatment:increased pain  Functional change: increased ROM    Pain: 5/10  Location: right shoulder      Objective     Objective Measures updated at progress report unless specified.   Range of Motion/Strength:   Shoulder Right      PROM AROM   Flexion 130(+10) 60   Extension NT 40   Abduction 105(=) 60   HorizAdduction NT 10   Internal rotation 50(+50) To stomach   ER at 90° abd 35(+35) NT   ER at 0° abd 60(+10) 15   NT=Not tested  ROM Comments: Pain at end range  Treatment   Pt 10 weeks 3 days post op    Denise received the following manual therapy techniques for 10 minutes:   -consisting of patient supine for Right shoulder lateral telescoping, upper trapezius soft tissue mobilization, pectoralis lift, PROM with endrange stretching,gentle shoulder oscillations    Denise received therapeutic exercises for 35 minutes including:  Exercises    Scifit UBE forward/Reverse 3minutes each direction   PROM (Right) Shoulder Flexion/Abduction/Internal rotation/External Rotation 10x     Supine chest " press/Flexion 1#  2/15   -external rotation  2/15   Sidelying external rotation/Abduction 1#  2/15   Theraband extension pulls Red  2/15   Theraband rows Red   2/15   Standing dowel abduction 2/15   pulleys 3minutes   Wall slides  2/15     Home Exercises and Education Provided     Education provided:   - Progress towards goals     Written Home Exercises Provided: Patient instructed to cont prior HEP.  Exercises were reviewed and Denise was able to demonstrate them prior to the end of the session.  Denise demonstrated good  understanding of the HEP provided.     See EMR under Patient Instructions for exercises provided prior visit.      Assessment   Pt with increased pain report this date. Despite that she was still able to progress with exercises. Range of motion steadily improving.  Pt continues with some soft tissue limitations however responds well to manual. Pt meeting post op landmarks.     Denise is progressing well towards her goals and there are no updates to goals at this time. Pt prognosis is Good.     Pt will continue to benefit from skilled outpatient occupational therapy to address the deficits listed in the problem list on initial evaluation provide pt/family education and to maximize pt's level of independence in the home and community environment.     Anticipated barriers to occupational therapy: none    Pt's spiritual, cultural and educational needs considered and pt agreeable to plan of care and goals.    Goals:  Short Term Goals to be met in 4 weeks: (10/21/2022)  1) Initiate Hep -met, ongoing  2) Pt will increase Right shoulder PROM by 10 degrees grossly for improved performance with overhead activities of daily living's -met  3) Pt will report 6/10 pain in (Right)shoulder at worst -met  4) Pt will progress to active assisted range of motion exercises -met  5) Patient will be able to achieve less than or equal to 35% on FOTO shoulder survey demonstrating overall improved functional ability with  upper extremity. -Not met, progressing     Long Term Goals to be met by discharge:  1) Independent with home exercise program -Not met, progressing  2) Pt will demonstrate (Right) shoulder AROM WFL grossly for [place] with activities of daily living's -Not met, progressing  3) Pt will demonstrate (Right) shoulder MMT WFL grossly for [place] with functional activities -Not met, progressing  4) Independent and pain free with ADL's and IADL's -Not met, progressing  5) Patient will be able to achieve less than or equal to 15% on FOTO shoulder survey demonstrating overall improved functional ability with upper extremity.-Not met, progressing     Plan   Continue with OT plan of care   Updates/Grading for next session: progress as able      CONCHITA Rivera

## 2022-11-01 NOTE — PROGRESS NOTES
"  Occupational Therapy Treatment Note     Date: 11/2/2022  Name: Denise Berrios  Clinic Number: [id number]    Therapy Diagnosis:   Encounter Diagnoses   Name Primary?    Pain of right upper extremity Yes    Weakness     Stiffness due to immobility        Physician: Micheline Lay PA-C    Physician Orders: OT evaluate and treat  Medical Diagnosis: Z96.611 (ICD-10-CM) - Status post reverse total shoulder replacement, right  Evaluation Date: 9/21/2022  Insurance Authorization period Expiration: 12/31/2022  Plan of Care Expiration Period: 11/18/2022  Next MD appointment: 11/14/2022     Visit # / Visits Authorized: 9/ 20 FOTO:41%  Time In:9:55  Time Out:10:40  Total Billable Time: 45 minutes     Precautions: Standard and Weightbearing    Subjective     Pt reports: "I've been having a lot of pain the last couple of days."  she was compliant with home exercise program given last session.   Response to previous treatment:increased pain  Functional change: none noted from previous session    Pain: 5-6/10  Location: right shoulder      Objective     Objective Measures updated at progress report unless specified.   Range of Motion/Strength:   Shoulder Right      PROM AROM   Flexion 130(+10) 60   Extension NT 40   Abduction 105(=) 60   HorizAdduction NT 10   Internal rotation 50(+50) To stomach   ER at 90° abd 35(+35) NT   ER at 0° abd 60(+10) 15   NT=Not tested  ROM Comments: Pain at end range  Treatment   Pt 10 weeks 5 days post op    Denise received the following manual therapy techniques for 10 minutes:   -consisting of patient supine for Right shoulder lateral telescoping, upper trapezius soft tissue mobilization, pectoralis lift, PROM with endrange stretching,gentle shoulder oscillations    Denise received therapeutic exercises for 35 minutes including:  Exercises    Scifit UBE forward/Reverse 3minutes each direction   PROM (Right) Shoulder Flexion/Abduction/Internal rotation/External Rotation 10x     Supine chest " press/Flexion 1#  2/15   -external rotation  2/15   Sidelying external rotation/Abduction 2/15   Theraband extension pulls Red  2/15   Theraband rows Red   2/15   Standing dowel abduction 2/15   pulleys 3minutes   Wall slides  2/15     Home Exercises and Education Provided     Education provided:   - Progress towards goals     Written Home Exercises Provided: Patient instructed to cont prior HEP.  Exercises were reviewed and Denise was able to demonstrate them prior to the end of the session.  Denise demonstrated good  understanding of the HEP provided.     See EMR under Patient Instructions for exercises provided prior visit.      Assessment   Pt with increased pain report this date. Increased pain did not limit her in session. Range of motion steadily improving.  Pt continues with some soft tissue limitations however responds well to manual. Pt meeting post op landmarks.     Denise is progressing well towards her goals and there are no updates to goals at this time. Pt prognosis is Good.     Pt will continue to benefit from skilled outpatient occupational therapy to address the deficits listed in the problem list on initial evaluation provide pt/family education and to maximize pt's level of independence in the home and community environment.     Anticipated barriers to occupational therapy: none    Pt's spiritual, cultural and educational needs considered and pt agreeable to plan of care and goals.    Goals:  Short Term Goals to be met in 4 weeks: (10/21/2022)  1) Initiate Hep -met, ongoing  2) Pt will increase Right shoulder PROM by 10 degrees grossly for improved performance with overhead activities of daily living's -met  3) Pt will report 6/10 pain in (Right)shoulder at worst -met  4) Pt will progress to active assisted range of motion exercises -met  5) Patient will be able to achieve less than or equal to 35% on FOTO shoulder survey demonstrating overall improved functional ability with upper extremity.  -Not met, progressing     Long Term Goals to be met by discharge:  1) Independent with home exercise program -Not met, progressing  2) Pt will demonstrate (Right) shoulder AROM WFL grossly for [place] with activities of daily living's -Not met, progressing  3) Pt will demonstrate (Right) shoulder MMT WFL grossly for [place] with functional activities -Not met, progressing  4) Independent and pain free with ADL's and IADL's -Not met, progressing  5) Patient will be able to achieve less than or equal to 15% on FOTO shoulder survey demonstrating overall improved functional ability with upper extremity.-Not met, progressing     Plan   Continue with OT plan of care   Updates/Grading for next session: progress as able      CONCHITA Rivera

## 2022-11-02 ENCOUNTER — CLINICAL SUPPORT (OUTPATIENT)
Dept: REHABILITATION | Facility: HOSPITAL | Age: 72
End: 2022-11-02
Payer: MEDICARE

## 2022-11-02 ENCOUNTER — TELEPHONE (OUTPATIENT)
Dept: ORTHOPEDICS | Facility: CLINIC | Age: 72
End: 2022-11-02
Payer: MEDICARE

## 2022-11-02 DIAGNOSIS — M79.601 PAIN OF RIGHT UPPER EXTREMITY: Primary | ICD-10-CM

## 2022-11-02 DIAGNOSIS — M25.60 STIFFNESS DUE TO IMMOBILITY: ICD-10-CM

## 2022-11-02 DIAGNOSIS — Z74.09 STIFFNESS DUE TO IMMOBILITY: ICD-10-CM

## 2022-11-02 DIAGNOSIS — R53.1 WEAKNESS: ICD-10-CM

## 2022-11-02 PROCEDURE — 97140 MANUAL THERAPY 1/> REGIONS: CPT | Mod: PN

## 2022-11-02 PROCEDURE — 97110 THERAPEUTIC EXERCISES: CPT | Mod: PN

## 2022-11-02 RX ORDER — TRAMADOL HYDROCHLORIDE 50 MG/1
50 TABLET ORAL EVERY 6 HOURS PRN
Qty: 30 TABLET | Refills: 0 | Status: SHIPPED | OUTPATIENT
Start: 2022-11-02 | End: 2022-11-12

## 2022-11-02 NOTE — TELEPHONE ENCOUNTER
----- Message from Bradley Finnegan Jr., MD sent at 11/2/2022  4:29 PM CDT -----  OK for ultram  ----- Message -----  From: Kavitha Cleveland MA  Sent: 11/2/2022   3:25 PM CDT  To: Bradley Finnegan Jr., MD    Patient is requesting refill on  pain medication    ----- Message -----  From: Noah Sofia  Sent: 11/2/2022  12:12 PM CDT  To: [facility] ALL[facility] Staff    Type:  Needs Medical Advice    Who Called: self  Reason:questions for the nurse   Would the patient rather a call back or a response via [facility]? call  Best Call Back Number: [phone]  Additional Information: none

## 2022-11-07 ENCOUNTER — CLINICAL SUPPORT (OUTPATIENT)
Dept: REHABILITATION | Facility: HOSPITAL | Age: 72
End: 2022-11-07
Payer: MEDICARE

## 2022-11-07 DIAGNOSIS — R52 PAIN: ICD-10-CM

## 2022-11-07 DIAGNOSIS — M25.60 STIFFNESS IN JOINT: ICD-10-CM

## 2022-11-07 DIAGNOSIS — R53.1 WEAKNESS: ICD-10-CM

## 2022-11-07 DIAGNOSIS — M79.601 PAIN OF RIGHT UPPER EXTREMITY: ICD-10-CM

## 2022-11-07 PROCEDURE — 97140 MANUAL THERAPY 1/> REGIONS: CPT | Mod: PN

## 2022-11-07 PROCEDURE — 97110 THERAPEUTIC EXERCISES: CPT | Mod: PN

## 2022-11-07 NOTE — PROGRESS NOTES
"  Occupational Therapy Treatment Note     Date: 11/7/2022  Name: Denise Berrios  Clinic Number: [id number]    Therapy Diagnosis:   Encounter Diagnoses   Name Primary?    Pain of right upper extremity     Weakness     Stiffness in joint     Pain          Physician: Micheline Lay PA-C    Physician Orders: OT evaluate and treat  Medical Diagnosis: Z96.611 (ICD-10-CM) - Status post reverse total shoulder replacement, right  Evaluation Date: 9/21/2022  Insurance Authorization period Expiration: 12/31/2022  Plan of Care Expiration Period: 11/18/2022  Next MD appointment: 11/15/2022     Visit # / Visits Authorized: 9/ 20 FOTO:41%  Time In:1015  Time Out:1100  Total Billable Time: 45 minutes     Precautions: Standard and Weightbearing    Subjective     Pt reports: "I've been having a lot of pain the last couple of days. I'm just upset because I thought I wouldn't still be having pain this far out from surgery"  she was compliant with home exercise program given last session.   Response to previous treatment:increased pain  Functional change: none noted from previous session    Pain: 5-6/10  Location: right shoulder      Objective     Objective Measures updated at progress report unless specified.   Range of Motion/Strength:   Shoulder Right      PROM AROM   Flexion 130(+10) 60   Extension NT 40   Abduction 105(=) 60   HorizAdduction NT 10   Internal rotation 50(+50) To stomach   ER at 90° abd 35(+35) NT   ER at 0° abd 60(+10) 15   NT=Not tested  ROM Comments: Pain at end range  Treatment   Pt 10 weeks 5 days post op    Denise received the following manual therapy techniques for 10 minutes:   -consisting of patient supine for Right shoulder lateral telescoping, upper trapezius soft tissue mobilization, pectoralis lift, PROM with endrange stretching,gentle shoulder oscillations    Denise received therapeutic exercises for 35 minutes including:  Exercises    PROM (Right) Shoulder Flexion/Abduction/Internal " rotation/External Rotation 10x     Supine chest press/Flexion  Dowel   2/15   -external rotation  2/15   Sidelying external rotation/Abduction 2/15   Standing dowel abduction 2/15   pulleys 3minutes     Home Exercises and Education Provided     Education provided:   - Progress towards goals     Written Home Exercises Provided: Patient instructed to cont prior HEP.  Exercises were reviewed and Denise was able to demonstrate them prior to the end of the session.  Denise demonstrated good  understanding of the HEP provided.     See EMR under Patient Instructions for exercises provided prior visit.      Assessment   Pt with increased pain at this date. Due to increased pain, pt did not complete resistive exercises and only completed AROM through pain free range.  Pt continues with some soft tissue limitations however responds well to manual.     Denise is progressing well towards her goals and there are no updates to goals at this time. Pt prognosis is Good.     Pt will continue to benefit from skilled outpatient occupational therapy to address the deficits listed in the problem list on initial evaluation provide pt/family education and to maximize pt's level of independence in the home and community environment.     Anticipated barriers to occupational therapy: none    Pt's spiritual, cultural and educational needs considered and pt agreeable to plan of care and goals.    Goals:  Short Term Goals to be met in 4 weeks: (10/21/2022)  1) Initiate Hep -met, ongoing  2) Pt will increase Right shoulder PROM by 10 degrees grossly for improved performance with overhead activities of daily living's -met  3) Pt will report 6/10 pain in (Right)shoulder at worst -met  4) Pt will progress to active assisted range of motion exercises -met  5) Patient will be able to achieve less than or equal to 35% on FOTO shoulder survey demonstrating overall improved functional ability with upper extremity. -Not met, progressing     Long Term  Goals to be met by discharge:  1) Independent with home exercise program -Not met, progressing  2) Pt will demonstrate (Right) shoulder AROM WFL grossly for [place] with activities of daily living's -Not met, progressing  3) Pt will demonstrate (Right) shoulder MMT WFL grossly for [place] with functional activities -Not met, progressing  4) Independent and pain free with ADL's and IADL's -Not met, progressing  5) Patient will be able to achieve less than or equal to 15% on FOTO shoulder survey demonstrating overall improved functional ability with upper extremity.-Not met, progressing     Plan   Continue with OT plan of care   Updates/Grading for next session: progress as able      Juliana Dorado, OTR/L

## 2022-11-14 ENCOUNTER — OFFICE VISIT (OUTPATIENT)
Dept: ORTHOPEDICS | Facility: CLINIC | Age: 72
End: 2022-11-14
Payer: MEDICARE

## 2022-11-14 VITALS — HEIGHT: 60 IN | BODY MASS INDEX: 25.71 KG/M2 | WEIGHT: 130.94 LBS

## 2022-11-14 DIAGNOSIS — M19.019 SHOULDER ARTHRITIS: Primary | ICD-10-CM

## 2022-11-14 PROCEDURE — 99024 PR POST-OP FOLLOW-UP VISIT: ICD-10-PCS | Mod: POP,,, | Performed by: ORTHOPAEDIC SURGERY

## 2022-11-14 PROCEDURE — 99213 OFFICE O/P EST LOW 20 MIN: CPT | Mod: PBBFAC,PN | Performed by: ORTHOPAEDIC SURGERY

## 2022-11-14 PROCEDURE — 99999 PR PBB SHADOW E&M-EST. PATIENT-LVL III: ICD-10-PCS | Mod: PBBFAC,,, | Performed by: ORTHOPAEDIC SURGERY

## 2022-11-14 PROCEDURE — 99999 PR PBB SHADOW E&M-EST. PATIENT-LVL III: CPT | Mod: PBBFAC,,, | Performed by: ORTHOPAEDIC SURGERY

## 2022-11-14 PROCEDURE — 99024 POSTOP FOLLOW-UP VISIT: CPT | Mod: POP,,, | Performed by: ORTHOPAEDIC SURGERY

## 2022-11-14 RX ORDER — TRAMADOL HYDROCHLORIDE 50 MG/1
50 TABLET ORAL EVERY 12 HOURS PRN
Qty: 60 TABLET | Refills: 0 | Status: SHIPPED | OUTPATIENT
Start: 2022-11-14 | End: 2022-11-24

## 2022-11-14 RX ORDER — IBUPROFEN 800 MG/1
800 TABLET ORAL 2 TIMES DAILY WITH MEALS
Qty: 60 TABLET | Refills: 1 | Status: SHIPPED | OUTPATIENT
Start: 2022-11-14 | End: 2022-12-14

## 2022-11-14 RX ORDER — TRAMADOL HYDROCHLORIDE 50 MG/1
50 TABLET ORAL EVERY 6 HOURS PRN
COMMUNITY

## 2022-11-14 NOTE — PROGRESS NOTES
Subjective:      Patient ID: Denise Berrios is a 72 y.o. female.  Chief Complaint: Post-op Evaluation (3 mth p/o- rt shoulder ) and Medication Refill (discuss Tramadol )      HPI  Denise Berrios is a  72 y.o. female presenting today for post op visit.  She is s/p shoulder reverse arthroplasty now about 3 months postop  Still having quite a bit of pain actually she feels like the pain is getting worse   She is in therapy and does feel like therapy causes pain but at the same time therapy is helping her in terms of motion and function in the right shoulder   .     Review of patient's allergies indicates:   Allergen Reactions    Phenobarbital Anaphylaxis    Penicillins Other (See Comments)    Phenobarbital sodium     Propylthiouracil     Thyroid      Note: PTU    Topamax [topiramate] Diarrhea     GI symptoms    Cefaclor Rash and Other (See Comments)    Clopidogrel Rash    Methimazole Rash and Other (See Comments)    Methimazole (bulk) Rash    Penicillin Rash    Sulfa (sulfonamide antibiotics) Rash and Other (See Comments)         Current Outpatient Medications   Medication Sig Dispense Refill    atorvastatin (LIPITOR) 80 MG tablet TAKE 1 TABLET DAILY 90 tablet 3    EScitalopram oxalate (LEXAPRO) 20 MG tablet Take 1 tablet (20 mg total) by mouth once daily. 90 tablet 3    levothyroxine (SYNTHROID) 88 MCG tablet Take 1 tablet (88 mcg total) by mouth before breakfast. 90 tablet 3    losartan (COZAAR) 50 MG tablet TAKE 1 TABLET(50 MG) BY MOUTH EVERY DAY 90 tablet 3    metoprolol tartrate (LOPRESSOR) 50 MG tablet Take 1 tablet (50 mg total) by mouth 2 (two) times daily. 180 tablet 3    nortriptyline (PAMELOR) 10 MG capsule TAKE 1 CAPSULE DAILY 90 capsule 3    omeprazole (PRILOSEC) 40 MG capsule TAKE 1 CAPSULE(40 MG) BY MOUTH EVERY DAY 90 capsule 3    solifenacin (VESICARE) 10 MG tablet TAKE 1 TABLET DAILY 90 tablet 3    VITAMIN D2 1,250 mcg (50,000 unit) capsule TAKE 1 CAPSULE EVERY 7 DAYS 12 capsule 3    aspirin  (ECOTRIN) 81 MG EC tablet once a day      butalbital-acetaminophen-caffeine -40 mg (FIORICET, ESGIC) -40 mg per tablet Take 1 tablet by mouth nightly as needed for Headaches. (Patient not taking: Reported on 11/14/2022) 30 tablet 5    clobetasol 0.05% (TEMOVATE) 0.05 % Oint APPLY TOPICALLY TWICE A DAY (Patient not taking: Reported on 11/14/2022) 45 g 1    HYDROcodone-acetaminophen (NORCO) 5-325 mg per tablet Take 1 tablet by mouth every 12 (twelve) hours as needed for Pain (as needed for severe pain only). (Patient not taking: Reported on 11/14/2022) 14 tablet 0    naloxone (NARCAN) 4 mg/actuation Spry 4mg by nasal route as needed for opioid overdose; may repeat every 2-3 minutes in alternating nostrils until medical help arrives. Call 911 (Patient not taking: Reported on 11/14/2022) 1 each 11    traMADoL (ULTRAM) 50 mg tablet Take 50 mg by mouth every 6 (six) hours as needed for Pain.      traMADoL (ULTRAM) 50 mg tablet Take 1 tablet (50 mg total) by mouth every 12 (twelve) hours as needed for Pain. 60 tablet 0     No current facility-administered medications for this visit.       Past Medical History:   Diagnosis Date    Acid reflux     Graves disease     diffuse Toxic Goiter    Hypertension     IBS (irritable bowel syndrome)     Lichen sclerosus     Migraine     Stroke        Past Surgical History:   Procedure Laterality Date    EYE SURGERY      HYSTERECTOMY      CINDI for ?? cervical cancer, had normal paps    LEFT HEART CATHETERIZATION N/A 10/20/2020    Procedure: Left heart cath;  Surgeon: Bony Schultz MD;  Location: [facility] CATH LAB/EP;  Service: Cardiology;  Laterality: N/A;    REVERSE TOTAL SHOULDER ARTHROPLASTY Right 8/19/2022    Procedure: ARTHROPLASTY, SHOULDER, TOTAL, REVERSE;  Surgeon: Bradley Finnegan Jr., MD;  Location: [facility] OR;  Service: Orthopedics;  Laterality: Right;  SONIA linda CONFIRMED/8/18/Shaista Finnegan- hemosorb AT        OBJECTIVE:   PHYSICAL EXAM:  Height:  5' (152.4 cm) Weight: 59.4 kg (130 lb 15.3 oz)  Vitals:    11/14/22 1314   Weight: 59.4 kg (130 lb 15.3 oz)   Height: 5' (1.524 m)   PainSc:   8   PainLoc: Shoulder     Ortho/SPM Exam  Exam of the right shoulder shows the incision well healed no evidence of infection no swelling no redness   Range of motion is actually pretty good with only minimal pain with forward elevation and abduction internal external rotation are excellent   She does have weakness with elevation but she is able to elevate to about 90° with slight difficulty neurologic exam intact neck nontender    RADIOGRAPHS:  None  Comments: I have personally reviewed the imaging and I agree with the above radiologist's report.    ASSESSMENT/PLAN:     IMPRESSION:  Status post right shoulder reverse arthroplasty with continued pain    PLAN:  I explained to the patient not really sure why she is having so much pain certainly we can put her on an anti-inflammatory so I have given her Motrin 800 b.i.d. with food in addition to the Ultram which she had requested  Also make referral to pain management see if they can help her out over the next several months   Continue therapy    FOLLOW UP:  6-8 weeks    Disclaimer: This note has been generated using voice-recognition software. There may be typographical errors that have been missed during proof-reading.

## 2022-11-15 ENCOUNTER — TELEPHONE (OUTPATIENT)
Dept: ORTHOPEDICS | Facility: CLINIC | Age: 72
End: 2022-11-15
Payer: MEDICARE

## 2022-11-17 ENCOUNTER — HOSPITAL ENCOUNTER (OUTPATIENT)
Dept: RADIOLOGY | Facility: HOSPITAL | Age: 72
Discharge: HOME | End: 2022-11-17
Attending: PHYSICIAN ASSISTANT
Payer: MEDICARE

## 2022-11-17 DIAGNOSIS — R29.2 HYPERREFLEXIA: ICD-10-CM

## 2022-11-17 DIAGNOSIS — R25.8 CLONUS: ICD-10-CM

## 2022-11-17 DIAGNOSIS — R29.2 HOFFMAN SIGN PRESENT: ICD-10-CM

## 2022-11-17 DIAGNOSIS — R29.898 LEFT LEG WEAKNESS: ICD-10-CM

## 2022-11-17 PROCEDURE — 70551 MRI BRAIN STEM W/O DYE: CPT | Mod: 26,,, | Performed by: RADIOLOGY

## 2022-11-17 PROCEDURE — 70551 MRI BRAIN WITHOUT CONTRAST: ICD-10-PCS | Mod: 26,,, | Performed by: RADIOLOGY

## 2022-11-17 PROCEDURE — 72141 MRI NECK SPINE W/O DYE: CPT | Mod: 26,,, | Performed by: RADIOLOGY

## 2022-11-17 PROCEDURE — 70551 MRI BRAIN STEM W/O DYE: CPT | Mod: TC

## 2022-11-17 PROCEDURE — 72141 MRI CERVICAL SPINE WITHOUT CONTRAST: ICD-10-PCS | Mod: 26,,, | Performed by: RADIOLOGY

## 2022-11-17 PROCEDURE — 72141 MRI NECK SPINE W/O DYE: CPT | Mod: TC

## 2022-12-01 ENCOUNTER — OFFICE VISIT (OUTPATIENT)
Dept: NEUROSURGERY | Facility: CLINIC | Age: 72
End: 2022-12-01
Payer: MEDICARE

## 2022-12-01 VITALS
HEIGHT: 60 IN | DIASTOLIC BLOOD PRESSURE: 69 MMHG | WEIGHT: 130.06 LBS | SYSTOLIC BLOOD PRESSURE: 139 MMHG | TEMPERATURE: 98 F | BODY MASS INDEX: 25.53 KG/M2

## 2022-12-01 DIAGNOSIS — R53.1 WEAKNESS: Primary | ICD-10-CM

## 2022-12-01 PROCEDURE — 99213 OFFICE O/P EST LOW 20 MIN: CPT | Mod: PBBFAC,PN | Performed by: PHYSICIAN ASSISTANT

## 2022-12-01 PROCEDURE — 99999 PR PBB SHADOW E&M-EST. PATIENT-LVL III: CPT | Mod: PBBFAC,,, | Performed by: PHYSICIAN ASSISTANT

## 2022-12-01 PROCEDURE — 99999 PR PBB SHADOW E&M-EST. PATIENT-LVL III: ICD-10-PCS | Mod: PBBFAC,,, | Performed by: PHYSICIAN ASSISTANT

## 2022-12-01 PROCEDURE — 99214 PR OFFICE/OUTPT VISIT, EST, LEVL IV, 30-39 MIN: ICD-10-PCS | Mod: S$PBB,,, | Performed by: PHYSICIAN ASSISTANT

## 2022-12-01 PROCEDURE — 99214 OFFICE O/P EST MOD 30 MIN: CPT | Mod: S$PBB,,, | Performed by: PHYSICIAN ASSISTANT

## 2022-12-01 NOTE — PROGRESS NOTES
Subjective:     Patient ID:  Denise Berrios is a 72 y.o. female.    Julio Cesar      Interval History    Denise Berrios is a 72 y.o. female who presents for follow up.  Patient states that her thyroid levels were high and she decreased her medication and since then she has not noticed any symptoms in her left leg at all.  She is also noticed less jittery feeling overall in occasional shaking episodes.  She no longer has any left leg weakness.      HPI     Denise Berrios is a 72 y.o. female who presents with the above CC.  Patient states for the past 1-2 weeks she has noted that when she goes from sitting to standing her left leg will buckle and get weak.  This comes and goes and has become less frequent over the last 1-2 weeks.  This was something new and she brought it up to her primary care physician who ordered a CT had which was negative.  She notices it more when she is sitting for a long time goes to get up and gets better with walking.  She denies any neck pain back pain.  No arm pain or leg pain.  She recently had right shoulder surgery.    She does have a history of TIA and stroke which presented with left leg weakness.  She has a history of Graves disease with abnormal thyroid labs recently.    She states that she has had balance trouble or the past year.  She has had falls.     Patient has not had PT or ESIs for the spine.  No spine surgery.      Patient denies any recent accidents or trauma, no saddle anesthesias, and no bowel or bladder incontinence.     Patient has difficulty with balance or gait, no difficulty tying shoes or buttoning clothes, is not dropping things, does not have difficulty opening containers, and has had no change in handwriting.    Review of Systems:  Constitution: Negative for chills, fever, night sweats and weight loss.   Musculoskeletal: Negative for falls.   Gastrointestinal: Negative for bowel incontinence, nausea and vomiting.   Genitourinary: Negative for bladder  incontinence.   Neurological: Negative for disturbances in coordination and loss of balance.      Objective:      Vitals:    12/01/22 1523   BP: 139/69   Temp: 98 °F (36.7 °C)   Weight: 59 kg (130 lb 1.1 oz)   Height: 5' (1.524 m)   PainSc:   5         Physical Exam:      General:  Denise Berrios is well-developed, well-nourished, appears stated age, in no acute distress, alert and oriented to person, place, and time.    Pulmonary/Chest:  Respiratory effort normal  Abdominal: Exhibits no distension  Psychiatric:  Normal mood and affect.  Behavior is normal.  Judgement and thought content normal      Neurological:  Alert and oriented to person, place, and time    Muscle strength against resistance:     Right Left   Hip flexion  5 / 5 5 / 5   Hip extension 5 / 5 5 / 5   Hip abduction 5 / 5 5 / 5   Hip adduction  5 / 5 5 / 5   Knee extension  5 / 5 5 / 5   Knee flexion 5 / 5 5 / 5   Dorsiflexion  5 / 5 5 / 5   EHL  5 / 5 5 / 5   Plantar flexion  5 / 5 5 / 5   Inversion of the feet 5 / 5 5 / 5   Eversion of the feet  5 / 5 5 / 5       Reflexes:     Right Left   Patellar 3+ 3+   Achilles 3+ 3+     Clonus:  Negative bilaterally    On gross examination of the bilateral upper extremities, patient has full painfree ROM with no signs of clubbing, cyanosis, edema, or weakness.       MRI brain/cspine results    Impression:     Degenerative changes are identified in the cervical spine without evidence of overt cord impingement or cord compression.  No underlying cord signal abnormality.     There is some curvature of the cervical spine with degenerative changes generally resulting in mild to moderate degrees of left foraminal stenosis is detailed above.  At C6-7, disc bulging with a superimposed right paracentral protrusion mildly flattens the traversing cord without impingement.        Electronically signed by: Vladimir Nielsen  Date:                                            11/17/2022  Time:                                            09:39      Impression:     No acute intracranial process.     Remote left parieto-occipital and right occipital lobe infarcts with encephalomalacia. Remote bilateral thalamic lacunar type infarcts in punctate cerebellar infarcts.  No new acute infarction.  The chronic infarcts are noted on the prior study dated 02/27/2021     Electronically signed by resident: Kamlesh Pillai  Date:                                            11/17/2022  Time:                                           08:58     Electronically signed by: Vladimir Nielsen  Date:                                            11/17/2022  Time:                                           09:53      Assessment:          1. Weakness            Plan:             Patient is currently asymptomatic in her left lower extremity.  No leg pain or leg weakness.  She noticed a change after her decrease in thyroid medication.      MRI brain and cervical spine unremarkable for any new process.  If she starts having symptoms again would consider getting MRI thoracic and lumbar spine.  She will message me through the portal if anything else comes up.      Follow-Up:  Follow up if symptoms worsen or fail to improve. If there are any questions prior to this, the patient was instructed to contact the office.       JOHANA Urias, PA-C  Neurosurgery  [facility] [place]  12/01/2022

## 2022-12-06 ENCOUNTER — PES CALL (OUTPATIENT)
Dept: ADMINISTRATIVE | Facility: OTHER | Age: 72
End: 2022-12-06
Payer: MEDICARE

## 2022-12-10 ENCOUNTER — PATIENT MESSAGE (OUTPATIENT)
Dept: PAIN MEDICINE | Facility: CLINIC | Age: 72
End: 2022-12-10
Payer: MEDICARE

## 2022-12-12 ENCOUNTER — TELEPHONE (OUTPATIENT)
Dept: PAIN MEDICINE | Facility: CLINIC | Age: 72
End: 2022-12-12
Payer: MEDICARE

## 2022-12-14 ENCOUNTER — TELEPHONE (OUTPATIENT)
Dept: ORTHOPEDICS | Facility: CLINIC | Age: 72
End: 2022-12-14
Payer: MEDICARE

## 2022-12-14 RX ORDER — TRAMADOL HYDROCHLORIDE 50 MG/1
50 TABLET ORAL
Qty: 30 TABLET | Refills: 0 | Status: SHIPPED | OUTPATIENT
Start: 2022-12-14 | End: 2023-01-13

## 2022-12-14 NOTE — TELEPHONE ENCOUNTER
----- Message from Leslie Covarrubias sent at 12/14/2022 10:33 AM CST -----  Type:  RX Refill Request    Who Called: Patient   Refill or New Rx:refill   RX Name and Strength:traMADoL (ULTRAM) 50 mg tablet         How is the patient currently taking it? (ex. 1XDay):2 times a day/Sig - Route: Take 50 mg by mouth every 6 (six) hours as needed for Pain. - Oral  Is this a 30 day or 90 day RX:60   Preferred Pharmacy with phone number:[facility] [phone]   L  ocal or Mail Order:Local   Ordering Provider:Dr. Finnegan  Would the patient rather a call back or a response via [facility]? Call back   Best Call Back Number:[phone]  Additional Information:

## 2022-12-19 ENCOUNTER — OFFICE VISIT (OUTPATIENT)
Dept: ORTHOPEDICS | Facility: CLINIC | Age: 72
End: 2022-12-19
Payer: MEDICARE

## 2022-12-19 ENCOUNTER — HOSPITAL ENCOUNTER (OUTPATIENT)
Dept: RADIOLOGY | Facility: HOSPITAL | Age: 72
Discharge: HOME | End: 2022-12-19
Attending: ORTHOPAEDIC SURGERY
Payer: MEDICARE

## 2022-12-19 VITALS — BODY MASS INDEX: 25.53 KG/M2 | HEIGHT: 60 IN | WEIGHT: 130.06 LBS

## 2022-12-19 DIAGNOSIS — G89.29 CHRONIC RIGHT SHOULDER PAIN: Primary | ICD-10-CM

## 2022-12-19 DIAGNOSIS — G89.29 CHRONIC RIGHT SHOULDER PAIN: ICD-10-CM

## 2022-12-19 DIAGNOSIS — M25.511 CHRONIC RIGHT SHOULDER PAIN: ICD-10-CM

## 2022-12-19 DIAGNOSIS — M25.511 CHRONIC RIGHT SHOULDER PAIN: Primary | ICD-10-CM

## 2022-12-19 DIAGNOSIS — M19.019 SHOULDER ARTHRITIS: ICD-10-CM

## 2022-12-19 PROCEDURE — 99999 PR PBB SHADOW E&M-EST. PATIENT-LVL IV: CPT | Mod: PBBFAC,,, | Performed by: ORTHOPAEDIC SURGERY

## 2022-12-19 PROCEDURE — 99999 PR PBB SHADOW E&M-EST. PATIENT-LVL IV: ICD-10-PCS | Mod: PBBFAC,,, | Performed by: ORTHOPAEDIC SURGERY

## 2022-12-19 PROCEDURE — 73030 X-RAY EXAM OF SHOULDER: CPT | Mod: 26,RT,, | Performed by: INTERNAL MEDICINE

## 2022-12-19 PROCEDURE — 99213 OFFICE O/P EST LOW 20 MIN: CPT | Mod: S$PBB,,, | Performed by: ORTHOPAEDIC SURGERY

## 2022-12-19 PROCEDURE — 99213 PR OFFICE/OUTPT VISIT, EST, LEVL III, 20-29 MIN: ICD-10-PCS | Mod: S$PBB,,, | Performed by: ORTHOPAEDIC SURGERY

## 2022-12-19 PROCEDURE — 99214 OFFICE O/P EST MOD 30 MIN: CPT | Mod: PBBFAC,PN | Performed by: ORTHOPAEDIC SURGERY

## 2022-12-19 PROCEDURE — 73030 X-RAY EXAM OF SHOULDER: CPT | Mod: TC,PN,RT

## 2022-12-19 PROCEDURE — 73030 XR SHOULDER COMPLETE 2 OR MORE VIEWS RIGHT: ICD-10-PCS | Mod: 26,RT,, | Performed by: INTERNAL MEDICINE

## 2022-12-19 RX ORDER — IBUPROFEN 800 MG/1
800 TABLET ORAL EVERY 6 HOURS PRN
COMMUNITY

## 2022-12-19 RX ORDER — DICLOFENAC SODIUM 10 MG/G
2 GEL TOPICAL 3 TIMES DAILY
Qty: 200 G | Refills: 1 | Status: SHIPPED | OUTPATIENT
Start: 2022-12-19

## 2022-12-19 RX ORDER — DICLOFENAC SODIUM 10 MG/G
2 GEL TOPICAL 3 TIMES DAILY
Qty: 100 G | Refills: 3 | Status: SHIPPED | OUTPATIENT
Start: 2022-12-19

## 2022-12-19 RX ORDER — TRAMADOL HYDROCHLORIDE 50 MG/1
50 TABLET ORAL EVERY 12 HOURS PRN
Qty: 60 TABLET | Refills: 0 | Status: SHIPPED | OUTPATIENT
Start: 2022-12-19 | End: 2022-12-29

## 2022-12-19 RX ORDER — IBUPROFEN 800 MG/1
800 TABLET ORAL
Qty: 90 TABLET | Refills: 1 | Status: SHIPPED | OUTPATIENT
Start: 2022-12-19 | End: 2023-01-18

## 2022-12-19 NOTE — PROGRESS NOTES
Subjective:      Patient ID: Denise Berrios is a 72 y.o. female.  Chief Complaint: Follow-up (4 mth p/o- rt shoulder ) and Medication Management (discuss Tramadol / Ibuprofen 800)      HPI  Denise Berrios is a  72 y.o. female presenting today for follow up of right shoulder reverse arthroplasty.  She reports that she is now about 4 months postop she has completed physical therapy but still having quite a bit of pain   We did make referral to pain management but she was not able to get in so will need to revisit this idea   She is requesting more pain medicine currently on Ultram 1 or 2 per day.    Review of patient's allergies indicates:   Allergen Reactions    Phenobarbital Anaphylaxis    Penicillins Other (See Comments)    Phenobarbital sodium     Propylthiouracil     Thyroid      Note: PTU    Topamax [topiramate] Diarrhea     GI symptoms    Cefaclor Rash and Other (See Comments)    Clopidogrel Rash    Methimazole Rash and Other (See Comments)    Methimazole (bulk) Rash    Penicillin Rash    Sulfa (sulfonamide antibiotics) Rash and Other (See Comments)         Current Outpatient Medications   Medication Sig Dispense Refill    atorvastatin (LIPITOR) 80 MG tablet TAKE 1 TABLET DAILY 90 tablet 3    clobetasol 0.05% (TEMOVATE) 0.05 % Oint APPLY TOPICALLY TWICE A DAY 45 g 1    EScitalopram oxalate (LEXAPRO) 20 MG tablet Take 1 tablet (20 mg total) by mouth once daily. 90 tablet 3    losartan (COZAAR) 50 MG tablet TAKE 1 TABLET(50 MG) BY MOUTH EVERY DAY 90 tablet 3    metoprolol tartrate (LOPRESSOR) 50 MG tablet Take 1 tablet (50 mg total) by mouth 2 (two) times daily. 180 tablet 3    nortriptyline (PAMELOR) 10 MG capsule TAKE 1 CAPSULE DAILY 90 capsule 3    omeprazole (PRILOSEC) 40 MG capsule TAKE 1 CAPSULE(40 MG) BY MOUTH EVERY DAY 90 capsule 3    solifenacin (VESICARE) 10 MG tablet TAKE 1 TABLET DAILY 90 tablet 3    VITAMIN D2 1,250 mcg (50,000 unit) capsule TAKE 1 CAPSULE EVERY 7 DAYS 12 capsule 3    aspirin  (ECOTRIN) 81 MG EC tablet once a day      butalbital-acetaminophen-caffeine -40 mg (FIORICET, ESGIC) -40 mg per tablet Take 1 tablet by mouth nightly as needed for Headaches. (Patient not taking: Reported on 12/19/2022) 30 tablet 5    diclofenac sodium (VOLTAREN) 1 % Gel Apply 2 g topically 3 (three) times daily. 200 g 1    HYDROcodone-acetaminophen (NORCO) 5-325 mg per tablet Take 1 tablet by mouth every 12 (twelve) hours as needed for Pain (as needed for severe pain only). (Patient not taking: Reported on 12/19/2022) 14 tablet 0    ibuprofen (ADVIL,MOTRIN) 800 MG tablet Take 800 mg by mouth every 6 (six) hours as needed for Pain.      ibuprofen (ADVIL,MOTRIN) 800 MG tablet Take 1 tablet (800 mg total) by mouth 3 (three) times daily with meals. 90 tablet 1    levothyroxine (SYNTHROID) 88 MCG tablet Take 1 tablet (88 mcg total) by mouth before breakfast. (Patient not taking: Reported on 12/19/2022) 90 tablet 3    naloxone (NARCAN) 4 mg/actuation Spry 4mg by nasal route as needed for opioid overdose; may repeat every 2-3 minutes in alternating nostrils until medical help arrives. Call 911 (Patient not taking: Reported on 12/19/2022) 1 each 11    traMADoL (ULTRAM) 50 mg tablet Take 50 mg by mouth every 6 (six) hours as needed for Pain.      traMADoL (ULTRAM) 50 mg tablet Take 1 tablet (50 mg total) by mouth every 24 hours as needed for Pain. (Patient not taking: Reported on 12/19/2022) 30 tablet 0    traMADoL (ULTRAM) 50 mg tablet Take 1 tablet (50 mg total) by mouth every 12 (twelve) hours as needed for Pain. 60 tablet 0     No current facility-administered medications for this visit.       Past Medical History:   Diagnosis Date    Acid reflux     Graves disease     diffuse Toxic Goiter    Hypertension     IBS (irritable bowel syndrome)     Lichen sclerosus     Migraine     Stroke        Past Surgical History:   Procedure Laterality Date    EYE SURGERY      HYSTERECTOMY      CINDI for ?? cervical cancer,  had normal paps    LEFT HEART CATHETERIZATION N/A 10/20/2020    Procedure: Left heart cath;  Surgeon: Bony Schultz MD;  Location: [facility] CATH LAB/EP;  Service: Cardiology;  Laterality: N/A;    REVERSE TOTAL SHOULDER ARTHROPLASTY Right 8/19/2022    Procedure: ARTHROPLASTY, SHOULDER, TOTAL, REVERSE;  Surgeon: Bradley Finnegan Jr., MD;  Location: [facility] OR;  Service: Orthopedics;  Laterality: Right;  SONIA linda CONFIRMED/8/18/Shaista Finnegan- hemosorb AT        OBJECTIVE:   PHYSICAL EXAM:  Height: 5' (152.4 cm) Weight: 59 kg (130 lb 1.1 oz)  Vitals:    12/19/22 1418   Weight: 59 kg (130 lb 1.1 oz)   Height: 5' (1.524 m)   PainSc:   7   PainLoc: Shoulder     Ortho/SPM Exam  Examination right shoulder the incision well healed no evidence of infection no swelling no tenderness  Range of motion slightly decreased really no pain with movement active elevation is limited there is some weakness   No popping clicking or instability neurologic exam intact    RADIOGRAPHS:  AP lateral x-ray right shoulder show good position to the prosthesis no loosening or change in position  Comments: I have personally reviewed the imaging and I agree with the above radiologist's report.    ASSESSMENT/PLAN:     IMPRESSION:  Chronic right shoulder pain after previous reverse arthroplasty    PLAN:  I will make referral to pain management   I did refill Ultram and Motrin but I want her to follow-up with pain management to see if they can maximize her pain treatment   We will hold off on therapy now since she has completed the regimen for this   I have encouraged her to use her arm as much as possible   I would also like her to try some Voltaren gel topical as well       FOLLOW UP:  2-3 months    Disclaimer: This note has been generated using voice-recognition software. There may be typographical errors that have been missed during proof-reading.

## 2022-12-22 ENCOUNTER — TELEPHONE (OUTPATIENT)
Dept: INTERNAL MEDICINE | Facility: CLINIC | Age: 72
End: 2022-12-22
Payer: MEDICARE

## 2022-12-22 NOTE — TELEPHONE ENCOUNTER
----- Message from Megan Luque RN sent at 2022  9:49 AM CST -----  Hi Dr. Carey, Ms. Yash is scheduled to come in for prolia on .  The therapy plan is .  Can you please review the plan and sign if you wish to continue treatment?    Thanks,    CARMEN [facility] RN

## 2023-01-06 ENCOUNTER — INFUSION (OUTPATIENT)
Dept: INFECTIOUS DISEASES | Facility: HOSPITAL | Age: 73
End: 2023-01-06
Attending: INTERNAL MEDICINE
Payer: MEDICARE

## 2023-01-06 VITALS
DIASTOLIC BLOOD PRESSURE: 74 MMHG | OXYGEN SATURATION: 99 % | HEART RATE: 70 BPM | WEIGHT: 130.94 LBS | BODY MASS INDEX: 25.58 KG/M2 | TEMPERATURE: 97 F | SYSTOLIC BLOOD PRESSURE: 172 MMHG

## 2023-01-06 DIAGNOSIS — M80.00XD AGE-RELATED OSTEOPOROSIS WITH CURRENT PATHOLOGICAL FRACTURE WITH ROUTINE HEALING: Primary | ICD-10-CM

## 2023-01-06 PROCEDURE — 96372 THER/PROPH/DIAG INJ SC/IM: CPT

## 2023-01-06 PROCEDURE — 63600175 PHARM REV CODE 636 W HCPCS: Mod: JG | Performed by: INTERNAL MEDICINE

## 2023-01-06 RX ADMIN — DENOSUMAB 60 MG: 60 INJECTION SUBCUTANEOUS at 11:01

## 2023-01-26 ENCOUNTER — PATIENT MESSAGE (OUTPATIENT)
Dept: PRIMARY CARE CLINIC | Facility: CLINIC | Age: 73
End: 2023-01-26
Payer: MEDICARE

## 2023-01-31 ENCOUNTER — PATIENT OUTREACH (OUTPATIENT)
Dept: ADMINISTRATIVE | Facility: HOSPITAL | Age: 73
End: 2023-01-31
Payer: MEDICARE

## 2023-01-31 NOTE — PROGRESS NOTES
There are no preventive care reminders to display for this patient.    Triggered LINKS. Updated Care Everywhere. Imported outside mammography results received from [facility] into media. Chart review completed.

## 2023-02-06 ENCOUNTER — PES CALL (OUTPATIENT)
Dept: ADMINISTRATIVE | Facility: CLINIC | Age: 73
End: 2023-02-06
Payer: MEDICARE

## 2023-02-13 ENCOUNTER — OFFICE VISIT (OUTPATIENT)
Dept: ORTHOPEDICS | Facility: CLINIC | Age: 73
End: 2023-02-13
Payer: MEDICARE

## 2023-02-13 VITALS — WEIGHT: 130 LBS | HEIGHT: 60 IN | BODY MASS INDEX: 25.52 KG/M2

## 2023-02-13 DIAGNOSIS — M19.019 SHOULDER ARTHRITIS: Primary | ICD-10-CM

## 2023-02-13 PROCEDURE — 99999 PR PBB SHADOW E&M-EST. PATIENT-LVL III: ICD-10-PCS | Mod: PBBFAC,,, | Performed by: ORTHOPAEDIC SURGERY

## 2023-02-13 PROCEDURE — 99999 PR PBB SHADOW E&M-EST. PATIENT-LVL III: CPT | Mod: PBBFAC,,, | Performed by: ORTHOPAEDIC SURGERY

## 2023-02-13 PROCEDURE — 99213 PR OFFICE/OUTPT VISIT, EST, LEVL III, 20-29 MIN: ICD-10-PCS | Mod: S$PBB,,, | Performed by: ORTHOPAEDIC SURGERY

## 2023-02-13 PROCEDURE — 99213 OFFICE O/P EST LOW 20 MIN: CPT | Mod: S$PBB,,, | Performed by: ORTHOPAEDIC SURGERY

## 2023-02-13 PROCEDURE — 99213 OFFICE O/P EST LOW 20 MIN: CPT | Mod: PBBFAC,PN | Performed by: ORTHOPAEDIC SURGERY

## 2023-02-13 RX ORDER — PREGABALIN 75 MG/1
CAPSULE ORAL
COMMUNITY
Start: 2023-01-13

## 2023-02-13 NOTE — PROGRESS NOTES
Subjective:      Patient ID: Denise Berrios is a 73 y.o. female.  Chief Complaint: Follow-up (right shoulder reverse arthoplasty)      HPI  Denise Berrios is a  73 y.o. female presenting today for follow up of right shoulder reverse arthroplasty.  She reports that she is now about 6 months postop  She is feeling much better   The pain medicine doctor really helped her out quite a bit with modification of medications as well as an injection and she is reporting less pain.    Review of patient's allergies indicates:   Allergen Reactions    Phenobarbital Anaphylaxis    Penicillins Other (See Comments)    Phenobarbital sodium     Propylthiouracil     Thyroid      Note: PTU    Topamax [topiramate] Diarrhea     GI symptoms    Cefaclor Rash and Other (See Comments)    Clopidogrel Rash    Methimazole Rash and Other (See Comments)    Methimazole (bulk) Rash    Penicillin Rash    Sulfa (sulfonamide antibiotics) Rash and Other (See Comments)         Current Outpatient Medications   Medication Sig Dispense Refill    aspirin (ECOTRIN) 81 MG EC tablet once a day      atorvastatin (LIPITOR) 80 MG tablet TAKE 1 TABLET DAILY 90 tablet 2    butalbital-acetaminophen-caffeine -40 mg (FIORICET, ESGIC) -40 mg per tablet Take 1 tablet by mouth nightly as needed for Headaches. 30 tablet 5    clobetasol 0.05% (TEMOVATE) 0.05 % Oint APPLY TOPICALLY TWICE A DAY 45 g 1    diclofenac sodium (VOLTAREN) 1 % Gel Apply 2 g topically 3 (three) times daily. 200 g 1    diclofenac sodium (VOLTAREN) 1 % Gel Apply 2 g topically 3 (three) times daily. 100 g 3    EScitalopram oxalate (LEXAPRO) 20 MG tablet Take 1 tablet (20 mg total) by mouth once daily. 90 tablet 3    HYDROcodone-acetaminophen (NORCO) 5-325 mg per tablet Take 1 tablet by mouth every 12 (twelve) hours as needed for Pain (as needed for severe pain only). 14 tablet 0    ibuprofen (ADVIL,MOTRIN) 800 MG tablet Take 800 mg by mouth every 6 (six) hours as needed for Pain.       levothyroxine (SYNTHROID) 88 MCG tablet Take 1 tablet (88 mcg total) by mouth before breakfast. 90 tablet 3    losartan (COZAAR) 50 MG tablet TAKE 1 TABLET(50 MG) BY MOUTH EVERY DAY 90 tablet 3    metoprolol tartrate (LOPRESSOR) 50 MG tablet Take 1 tablet (50 mg total) by mouth 2 (two) times daily. 180 tablet 3    naloxone (NARCAN) 4 mg/actuation Spry 4mg by nasal route as needed for opioid overdose; may repeat every 2-3 minutes in alternating nostrils until medical help arrives. Call 911 1 each 11    nortriptyline (PAMELOR) 10 MG capsule TAKE 1 CAPSULE DAILY 90 capsule 3    omeprazole (PRILOSEC) 40 MG capsule TAKE 1 CAPSULE(40 MG) BY MOUTH EVERY DAY 90 capsule 3    solifenacin (VESICARE) 10 MG tablet TAKE 1 TABLET DAILY 90 tablet 3    traMADoL (ULTRAM) 50 mg tablet Take 50 mg by mouth every 6 (six) hours as needed for Pain.      VITAMIN D2 1,250 mcg (50,000 unit) capsule TAKE 1 CAPSULE EVERY 7 DAYS 12 capsule 3    pregabalin (LYRICA) 75 MG capsule TAKE 1 CAPSULE BY MOUTH TWICE DAILY FOR NEUROPATHIC PAIN       No current facility-administered medications for this visit.       Past Medical History:   Diagnosis Date    Acid reflux     Graves disease     diffuse Toxic Goiter    Hypertension     IBS (irritable bowel syndrome)     Lichen sclerosus     Migraine     Stroke        Past Surgical History:   Procedure Laterality Date    EYE SURGERY      HYSTERECTOMY      CINDI for ?? cervical cancer, had normal paps    LEFT HEART CATHETERIZATION N/A 10/20/2020    Procedure: Left heart cath;  Surgeon: Bony Schultz MD;  Location: [facility] CATH LAB/EP;  Service: Cardiology;  Laterality: N/A;    REVERSE TOTAL SHOULDER ARTHROPLASTY Right 8/19/2022    Procedure: ARTHROPLASTY, SHOULDER, TOTAL, REVERSE;  Surgeon: Bradley Finnegan Jr., MD;  Location: [facility] OR;  Service: Orthopedics;  Laterality: Right;  SONIA linda CONFIRMED/8/18/Shaista Finnegan- hemosorb AT        OBJECTIVE:   PHYSICAL EXAM:  Height: 5' (152.4 cm)  Weight: 59 kg (130 lb)  Vitals:    02/13/23 1314   Weight: 59 kg (130 lb)   Height: 5' (1.524 m)   PainSc:   4   PainLoc: Shoulder     Ortho/SPM Exam  Examination right shoulder no tenderness no swelling no evidence of infection range of motion excellent strength improved neurologic exam intact    RADIOGRAPHS:  None  Comments: I have personally reviewed the imaging and I agree with the above radiologist's report.    ASSESSMENT/PLAN:     IMPRESSION:  Status post right shoulder reverse arthroplasty    PLAN:  Continue home exercise program for the right shoulder advance to full activities  Continue pain medicine regimen per pain management    FOLLOW UP:  2 months    Disclaimer: This note has been generated using voice-recognition software. There may be typographical errors that have been missed during proof-reading.

## 2023-03-06 DIAGNOSIS — F41.9 ANXIETY: ICD-10-CM

## 2023-03-06 RX ORDER — ESCITALOPRAM OXALATE 20 MG/1
20 TABLET ORAL DAILY
Qty: 90 TABLET | Refills: 1 | Status: SHIPPED | OUTPATIENT
Start: 2023-03-06 | End: 2023-09-05

## 2023-03-06 NOTE — TELEPHONE ENCOUNTER
Refill Decision Note   Denise Berrios  is requesting a refill authorization.  Brief Assessment and Rationale for Refill:  Approve     Medication Therapy Plan:       Medication Reconciliation Completed: No    Comments:     No Care Gaps recommended.     Note composed:12:55 PM 03/06/2023

## 2023-03-06 NOTE — TELEPHONE ENCOUNTER
No new care gaps identified.  [facility] Embedded Care Gaps. Reference number: [id number]. 3/06/2023   1:35:02 AM CST

## 2023-03-30 RX ORDER — LOSARTAN POTASSIUM 50 MG/1
50 TABLET ORAL DAILY
Qty: 90 TABLET | Refills: 3 | Status: SHIPPED | OUTPATIENT
Start: 2023-03-30 | End: 2023-10-05 | Stop reason: SDUPTHER

## 2023-03-30 NOTE — TELEPHONE ENCOUNTER
----- Message from Soumya Tesfaye sent at 3/30/2023  9:33 AM CDT -----  .Type:  RX Refill Request    Who Called: pt   Refill or New Rx:refill  RX Name and Strength:losartan (COZAAR) 50 MG tablet  How is the patient currently taking it? (ex. 1XDay):TAKE 1 TABLET(50 MG) BY MOUTH EVERY DAY  Is this a 30 day or 90 day RX:90 tablets  Preferred Pharmacy with phone number:[id number] - [place] - [address] AT [facility] [address]  Local or Mail Order:local  Ordering Provider:Aurelio  Would the patient rather a call back or a response via [facility]? Call back  Best Call Back Number:[phone]  Additional Information:

## 2023-03-31 ENCOUNTER — LAB VISIT (OUTPATIENT)
Dept: LAB | Facility: HOSPITAL | Age: 73
End: 2023-03-31
Payer: MEDICARE

## 2023-03-31 DIAGNOSIS — E03.8 OTHER SPECIFIED HYPOTHYROIDISM: ICD-10-CM

## 2023-03-31 LAB
T4 FREE SERPL-MCNC: 1.23 NG/DL (ref 0.71–1.51)
TSH SERPL DL<=0.005 MIU/L-ACNC: 1.36 UIU/ML (ref 0.4–4)

## 2023-03-31 PROCEDURE — 84443 ASSAY THYROID STIM HORMONE: CPT | Performed by: INTERNAL MEDICINE

## 2023-03-31 PROCEDURE — 84439 ASSAY OF FREE THYROXINE: CPT | Performed by: INTERNAL MEDICINE

## 2023-03-31 PROCEDURE — 36415 COLL VENOUS BLD VENIPUNCTURE: CPT | Performed by: INTERNAL MEDICINE

## 2023-04-04 ENCOUNTER — OFFICE VISIT (OUTPATIENT)
Dept: PRIMARY CARE CLINIC | Facility: CLINIC | Age: 73
End: 2023-04-04
Payer: MEDICARE

## 2023-04-04 VITALS
WEIGHT: 142.19 LBS | HEART RATE: 58 BPM | BODY MASS INDEX: 27.92 KG/M2 | SYSTOLIC BLOOD PRESSURE: 120 MMHG | OXYGEN SATURATION: 96 % | DIASTOLIC BLOOD PRESSURE: 76 MMHG | HEIGHT: 60 IN

## 2023-04-04 DIAGNOSIS — E78.5 DYSLIPIDEMIA: ICD-10-CM

## 2023-04-04 DIAGNOSIS — G43.809 OTHER MIGRAINE WITHOUT STATUS MIGRAINOSUS, NOT INTRACTABLE: ICD-10-CM

## 2023-04-04 DIAGNOSIS — Z78.0 ASYMPTOMATIC POSTMENOPAUSAL STATE: ICD-10-CM

## 2023-04-04 DIAGNOSIS — I49.3 PVC (PREMATURE VENTRICULAR CONTRACTION): ICD-10-CM

## 2023-04-04 DIAGNOSIS — F41.9 ANXIETY: ICD-10-CM

## 2023-04-04 DIAGNOSIS — I70.0 AORTIC ATHEROSCLEROSIS: Primary | ICD-10-CM

## 2023-04-04 DIAGNOSIS — M80.00XD AGE-RELATED OSTEOPOROSIS WITH CURRENT PATHOLOGICAL FRACTURE WITH ROUTINE HEALING: ICD-10-CM

## 2023-04-04 DIAGNOSIS — I10 ESSENTIAL HYPERTENSION: ICD-10-CM

## 2023-04-04 DIAGNOSIS — K21.9 GASTROESOPHAGEAL REFLUX DISEASE WITHOUT ESOPHAGITIS: ICD-10-CM

## 2023-04-04 DIAGNOSIS — S42.91XD CLOSED FRACTURE OF RIGHT SHOULDER WITH ROUTINE HEALING, SUBSEQUENT ENCOUNTER: ICD-10-CM

## 2023-04-04 DIAGNOSIS — Z79.899 ENCOUNTER FOR LONG-TERM (CURRENT) USE OF MEDICATIONS: ICD-10-CM

## 2023-04-04 DIAGNOSIS — E03.8 OTHER SPECIFIED HYPOTHYROIDISM: ICD-10-CM

## 2023-04-04 DIAGNOSIS — R00.2 PALPITATIONS: ICD-10-CM

## 2023-04-04 DIAGNOSIS — Z86.73 HISTORY OF CEREBRAL INFARCTION: ICD-10-CM

## 2023-04-04 DIAGNOSIS — N32.81 OVERACTIVE BLADDER: ICD-10-CM

## 2023-04-04 PROBLEM — M81.0 OSTEOPOROSIS: Status: RESOLVED | Noted: 2019-08-28 | Resolved: 2023-04-04

## 2023-04-04 PROBLEM — R52 PAIN: Status: RESOLVED | Noted: 2022-11-07 | Resolved: 2023-04-04

## 2023-04-04 PROBLEM — M79.601 PAIN OF RIGHT UPPER EXTREMITY: Status: RESOLVED | Noted: 2022-11-07 | Resolved: 2023-04-04

## 2023-04-04 PROBLEM — R53.1 WEAKNESS: Status: RESOLVED | Noted: 2022-11-07 | Resolved: 2023-04-04

## 2023-04-04 PROBLEM — M25.619 DECREASED RANGE OF MOTION (ROM) OF SHOULDER: Status: RESOLVED | Noted: 2021-04-01 | Resolved: 2023-04-04

## 2023-04-04 PROBLEM — M25.60 STIFFNESS IN JOINT: Status: RESOLVED | Noted: 2022-11-07 | Resolved: 2023-04-04

## 2023-04-04 PROCEDURE — 99999 PR PBB SHADOW E&M-EST. PATIENT-LVL IV: CPT | Mod: PBBFAC,,, | Performed by: INTERNAL MEDICINE

## 2023-04-04 PROCEDURE — 99214 OFFICE O/P EST MOD 30 MIN: CPT | Mod: S$PBB,,, | Performed by: INTERNAL MEDICINE

## 2023-04-04 PROCEDURE — 99214 PR OFFICE/OUTPT VISIT, EST, LEVL IV, 30-39 MIN: ICD-10-PCS | Mod: S$PBB,,, | Performed by: INTERNAL MEDICINE

## 2023-04-04 PROCEDURE — 99999 PR PBB SHADOW E&M-EST. PATIENT-LVL IV: ICD-10-PCS | Mod: PBBFAC,,, | Performed by: INTERNAL MEDICINE

## 2023-04-04 PROCEDURE — 99214 OFFICE O/P EST MOD 30 MIN: CPT | Mod: PBBFAC | Performed by: INTERNAL MEDICINE

## 2023-04-04 RX ORDER — METOPROLOL TARTRATE 50 MG/1
50 TABLET ORAL 2 TIMES DAILY
Qty: 180 TABLET | Refills: 3 | Status: SHIPPED | OUTPATIENT
Start: 2023-04-04 | End: 2023-10-05 | Stop reason: SDUPTHER

## 2023-04-04 NOTE — ASSESSMENT & PLAN NOTE
Stable on lipitor 80 mg daily, no myalgias.  The 10-year ASCVD risk score (Jacob WEST, et al., 2019) is: 34.4%    Values used to calculate the score:      Age: 73 years      Sex: Female      Is Non- : No      Diabetic: No      Tobacco smoker: No      Systolic Blood Pressure: 172 mmHg      Is BP treated: Yes      HDL Cholesterol: 49 mg/dL      Total Cholesterol: 170 mg/dL

## 2023-04-04 NOTE — ASSESSMENT & PLAN NOTE
Sees Dr. Morales, last MRI in media.  Taking nortriptyline, didn't do well with higher doses in past.  This regimen has been working well for her.

## 2023-04-04 NOTE — PROGRESS NOTES
[facility] Note    Chief Complaint      Chief Complaint   Patient presents with    Follow-up       History of Present Illness      Denise Berrios is a 73 y.o. female who presents today for follow up of HTN.  Patient comes to appointment with spouse, John Thackersins.  Cards: Yousef, Neuro: Andrew, GI: Tarango    Problem List Items Addressed This Visit       Anxiety    Current Assessment & Plan     Stable on Lexapro 20 mg, doing ok on this. No SI/HI/panic attacks.           Other specified hypothyroidism    Overview     post radioactive treatment for grave's           Current Assessment & Plan     Stable on synthroid 100 mcg daily.  No S/S of hypo/hyperthyroidism.             Relevant Orders    TSH    T4, FREE    History of cerebral infarction    Current Assessment & Plan     No residual deficits.  Seen on CT done by Dr. Mena after accident, sent to [place] ED for stroke workup, MRI done in ED with old strokes. Presented initially with UE weakness.  On asa and statin.  Has not tolerated Plavix, had reaction.           Dyslipidemia    Current Assessment & Plan     Stable on lipitor 80 mg daily, no myalgias.  The 10-year ASCVD risk score (Gosport DK, et al., 2019) is: 34.4%    Values used to calculate the score:      Age: 73 years      Sex: Female      Is Non- : No      Diabetic: No      Tobacco smoker: No      Systolic Blood Pressure: 172 mmHg      Is BP treated: Yes      HDL Cholesterol: 49 mg/dL      Total Cholesterol: 170 mg/dL             Relevant Orders    CBC Auto Differential    Lipid Panel    Comprehensive Metabolic Panel    Migraine headache    Current Assessment & Plan     Sees Dr. Morales, last MRI in media.  Taking nortriptyline, didn't do well with higher doses in past.  This regimen has been working well for her.           Overactive bladder    Gastroesophageal reflux disease without esophagitis    Current Assessment & Plan     Stable on prilosec. No  nausea/belching.  Sees Dr. Tarango.           PVC (premature ventricular contraction)    Current Assessment & Plan     Stable on Lopressor BID, no issues at present. Has brief episode of palpitations on occasion.           Essential hypertension    Current Assessment & Plan     BP controlled on losartan 50 and lopressor 50 mg BID.  No CP/SOB/HA.             Closed fracture of right shoulder    Current Assessment & Plan     Stopped PT 2/2 pain.  Sees Dr. Finnegan/ANDREW Flores, s/p reverse total shoulder replacement in 8/2022. Planning for 1 more injection.  Still taking Tramadol but now also on Lyrica with some relief.           Age-related osteoporosis with current pathological fracture with routine healing    Current Assessment & Plan     Stable on Prolia. Doesn't tolerate fosamax, last DEXA 8/2020.           Aortic atherosclerosis - Primary    Current Assessment & Plan     On previous imaging, stable.            Other Visit Diagnoses       Asymptomatic postmenopausal state        Relevant Orders    DXA Bone Density Axial Skeleton 1 or more sites    Encounter for long-term (current) use of medications        Relevant Orders    Hemoglobin A1C    Palpitations        Relevant Medications    metoprolol tartrate (LOPRESSOR) 50 MG tablet            Health Maintenance   Topic Date Due    DEXA Scan  08/28/2023    High Dose Statin  04/04/2024    Lipid Panel  10/03/2027    TETANUS VACCINE  08/28/2029    Hepatitis C Screening  Completed       Past Medical History:   Diagnosis Date    Acid reflux     Graves disease     diffuse Toxic Goiter    Hypertension     IBS (irritable bowel syndrome)     Lichen sclerosus     Migraine     Stroke        Past Surgical History:   Procedure Laterality Date    EYE SURGERY      HYSTERECTOMY      CINDI for ?? cervical cancer, had normal paps    LEFT HEART CATHETERIZATION N/A 10/20/2020    Procedure: Left heart cath;  Surgeon: Bony Schultz MD;  Location: [facility] CATH LAB/EP;  Service: Cardiology;   Laterality: N/A;    REVERSE TOTAL SHOULDER ARTHROPLASTY Right 8/19/2022    Procedure: ARTHROPLASTY, SHOULDER, TOTAL, REVERSE;  Surgeon: Bradley Finnegan Jr., MD;  Location: [facility];  Service: Orthopedics;  Laterality: Right;  SONIA linda CONFIRMED/8/18/Shaista Finnegan- hemosorb AT        family history includes Hypertension in her father; Kidney failure in her father; Stomach cancer in her mother.    Social History     Tobacco Use    Smoking status: Never     Passive exposure: Never    Smokeless tobacco: Never   Substance Use Topics    Alcohol use: Yes    Drug use: No       Review of Systems   Constitutional:  Negative for chills and fever.   HENT:  Negative for sore throat.    Respiratory:  Negative for cough and shortness of breath.    Cardiovascular:  Negative for chest pain and palpitations.   Gastrointestinal:  Negative for constipation, diarrhea, nausea and vomiting.   Genitourinary:  Negative for dysuria and hematuria.   Musculoskeletal:  Negative for falls.   Neurological:  Negative for headaches.      Outpatient Encounter Medications as of 4/4/2023   Medication Sig Dispense Refill    aspirin (ECOTRIN) 81 MG EC tablet once a day      atorvastatin (LIPITOR) 80 MG tablet TAKE 1 TABLET DAILY 90 tablet 2    butalbital-acetaminophen-caffeine -40 mg (FIORICET, ESGIC) -40 mg per tablet Take 1 tablet by mouth nightly as needed for Headaches. 30 tablet 5    clobetasol 0.05% (TEMOVATE) 0.05 % Oint APPLY TOPICALLY TWICE A DAY 45 g 1    diclofenac sodium (VOLTAREN) 1 % Gel Apply 2 g topically 3 (three) times daily. 200 g 1    diclofenac sodium (VOLTAREN) 1 % Gel Apply 2 g topically 3 (three) times daily. 100 g 3    EScitalopram oxalate (LEXAPRO) 20 MG tablet Take 1 tablet (20 mg total) by mouth once daily. 90 tablet 1    HYDROcodone-acetaminophen (NORCO) 5-325 mg per tablet Take 1 tablet by mouth every 12 (twelve) hours as needed for Pain (as needed for severe pain only). 14 tablet 0     ibuprofen (ADVIL,MOTRIN) 800 MG tablet Take 800 mg by mouth every 6 (six) hours as needed for Pain.      levothyroxine (SYNTHROID) 88 MCG tablet Take 1 tablet (88 mcg total) by mouth before breakfast. 90 tablet 3    losartan (COZAAR) 50 MG tablet Take 1 tablet (50 mg total) by mouth once daily. 90 tablet 3    naloxone (NARCAN) 4 mg/actuation Spry 4mg by nasal route as needed for opioid overdose; may repeat every 2-3 minutes in alternating nostrils until medical help arrives. Call 911 1 each 11    nortriptyline (PAMELOR) 10 MG capsule TAKE 1 CAPSULE DAILY 90 capsule 3    omeprazole (PRILOSEC) 40 MG capsule TAKE 1 CAPSULE(40 MG) BY MOUTH EVERY DAY 90 capsule 3    pregabalin (LYRICA) 75 MG capsule TAKE 1 CAPSULE BY MOUTH TWICE DAILY FOR NEUROPATHIC PAIN      solifenacin (VESICARE) 10 MG tablet TAKE 1 TABLET DAILY 90 tablet 3    traMADoL (ULTRAM) 50 mg tablet Take 50 mg by mouth every 6 (six) hours as needed for Pain.      VITAMIN D2 1,250 mcg (50,000 unit) capsule TAKE 1 CAPSULE EVERY 7 DAYS 12 capsule 3    [DISCONTINUED] metoprolol tartrate (LOPRESSOR) 50 MG tablet Take 1 tablet (50 mg total) by mouth 2 (two) times daily. 180 tablet 3    metoprolol tartrate (LOPRESSOR) 50 MG tablet Take 1 tablet (50 mg total) by mouth 2 (two) times daily. 180 tablet 3    [DISCONTINUED] losartan (COZAAR) 50 MG tablet TAKE 1 TABLET(50 MG) BY MOUTH EVERY DAY 90 tablet 3     No facility-administered encounter medications on file as of 4/4/2023.        Review of patient's allergies indicates:   Allergen Reactions    Phenobarbital Anaphylaxis    Penicillins Other (See Comments)    Phenobarbital sodium     Propylthiouracil     Thyroid      Note: PTU    Topamax [topiramate] Diarrhea     GI symptoms    Cefaclor Rash and Other (See Comments)    Clopidogrel Rash    Methimazole Rash and Other (See Comments)    Methimazole (bulk) Rash    Penicillin Rash    Sulfa (sulfonamide antibiotics) Rash and Other (See Comments)       Physical Exam      Vital  Signs  Pulse: (!) 58  SpO2: 96 %  BP: 120/76  Pain Score:   4  Pain Loc: Shoulder  Height and Weight  Height: 5' (152.4 cm)  Weight: 64.5 kg (142 lb 3.2 oz)  BSA (Calculated - sq m): 1.65 sq meters  BMI (Calculated): 27.8  Weight in (lb) to have BMI = 25: 127.7]    Physical Exam  Constitutional:       Appearance: She is well-developed.   HENT:      Head: Normocephalic and atraumatic.      Right Ear: External ear normal.      Left Ear: External ear normal.   Eyes:      General:         Right eye: No discharge.         Left eye: No discharge.   Cardiovascular:      Rate and Rhythm: Normal rate and regular rhythm.      Heart sounds: Normal heart sounds. No murmur heard.  Pulmonary:      Effort: Pulmonary effort is normal. No respiratory distress.      Breath sounds: Normal breath sounds.   Abdominal:      General: There is no distension.      Palpations: Abdomen is soft.      Tenderness: There is no abdominal tenderness. There is no guarding.   Musculoskeletal:         General: Normal range of motion.      Cervical back: Normal range of motion.   Skin:     General: Skin is warm and dry.   Neurological:      Mental Status: She is alert and oriented to person, place, and time.   Psychiatric:         Behavior: Behavior normal.        Laboratory:  CBC:  No results for input(s): WBC, RBC, HGB, HCT, PLT, MCV, MCH, MCHC in the last 2160 hours.    CMP:  No results for input(s): GLU, CALCIUM, ALBUMIN, PROT, NA, K, CO2, CL, BUN, ALKPHOS, ALT, AST, BILITOT in the last 2160 hours.    Invalid input(s): CREATININ    URINALYSIS:  No results for input(s): COLORU, CLARITYU, SPECGRAV, PHUR, PROTEINUA, GLUCOSEU, BILIRUBINCON, BLOODU, WBCU, RBCU, BACTERIA, MUCUS, NITRITE, LEUKOCYTESUR, UROBILINOGEN, HYALINECASTS in the last 2160 hours.     LIPIDS:  Recent Labs   Lab Result Units 03/31/23  1328   TSH uIU/mL 1.365       TSH:  Recent Labs   Lab Result Units 03/31/23  1328   TSH uIU/mL 1.365       A1C:  No results for input(s): HGBA1C in the  last 2160 hours.    Radiology:  No results found in the last 30 days.     Assessment/Plan     Denise Berrios is a 73 y.o.female with:    1. Aortic atherosclerosis    2. Anxiety    3. Other migraine without status migrainosus, not intractable    4. History of cerebral infarction    5. Dyslipidemia  - CBC Auto Differential; Future  - Lipid Panel; Future  - Comprehensive Metabolic Panel; Future    6. Essential hypertension    7. PVC (premature ventricular contraction)    8. Other specified hypothyroidism  - TSH; Future  - T4, FREE; Future    9. Gastroesophageal reflux disease without esophagitis    10. Age-related osteoporosis with current pathological fracture with routine healing    11. Asymptomatic postmenopausal state  - DXA Bone Density Axial Skeleton 1 or more sites; Future  - DXA Bone Density Axial Skeleton 1 or more sites    12. Encounter for long-term (current) use of medications  - Hemoglobin A1C; Future    13. Closed fracture of right shoulder with routine healing, subsequent encounter    14. Palpitations  - metoprolol tartrate (LOPRESSOR) 50 MG tablet; Take 1 tablet (50 mg total) by mouth 2 (two) times daily.  Dispense: 180 tablet; Refill: 3    15. Overactive bladder       -plan for DEXA after 6/2023 to evaluate Prolia effectiveness  -Continue current medications and maintain follow up with specialists.    -Follow up in about 6 months (around 10/4/2023) for follow up of medical problems.       Aleksandra Carey MD  [facility]

## 2023-04-04 NOTE — ASSESSMENT & PLAN NOTE
No residual deficits.  Seen on CT done by Dr. Mena after accident, sent to [place] ED for stroke workup, MRI done in ED with old strokes. Presented initially with UE weakness.  On asa and statin.  Has not tolerated Plavix, had reaction.

## 2023-04-04 NOTE — ASSESSMENT & PLAN NOTE
Stopped PT 2/2 pain.  Sees Dr. Finnegan/ANDREW Flores, s/p reverse total shoulder replacement in 8/2022. Planning for 1 more injection.  Still taking Tramadol but now also on Lyrica with some relief.

## 2023-04-10 ENCOUNTER — HOSPITAL ENCOUNTER (OUTPATIENT)
Dept: RADIOLOGY | Facility: HOSPITAL | Age: 73
Discharge: HOME | End: 2023-04-10
Attending: ORTHOPAEDIC SURGERY
Payer: MEDICARE

## 2023-04-10 ENCOUNTER — OFFICE VISIT (OUTPATIENT)
Dept: ORTHOPEDICS | Facility: CLINIC | Age: 73
End: 2023-04-10
Payer: MEDICARE

## 2023-04-10 VITALS — BODY MASS INDEX: 27.88 KG/M2 | WEIGHT: 142 LBS | HEIGHT: 60 IN

## 2023-04-10 DIAGNOSIS — M25.511 RIGHT SHOULDER PAIN, UNSPECIFIED CHRONICITY: Primary | ICD-10-CM

## 2023-04-10 DIAGNOSIS — M25.511 RIGHT SHOULDER PAIN, UNSPECIFIED CHRONICITY: ICD-10-CM

## 2023-04-10 PROCEDURE — 73030 X-RAY EXAM OF SHOULDER: CPT | Mod: TC,PN,RT

## 2023-04-10 PROCEDURE — 73030 X-RAY EXAM OF SHOULDER: CPT | Mod: 26,RT,, | Performed by: RADIOLOGY

## 2023-04-10 PROCEDURE — 99999 PR PBB SHADOW E&M-EST. PATIENT-LVL III: ICD-10-PCS | Mod: PBBFAC,,, | Performed by: ORTHOPAEDIC SURGERY

## 2023-04-10 PROCEDURE — 99213 OFFICE O/P EST LOW 20 MIN: CPT | Mod: S$PBB,,, | Performed by: ORTHOPAEDIC SURGERY

## 2023-04-10 PROCEDURE — 73030 XR SHOULDER COMPLETE 2 OR MORE VIEWS RIGHT: ICD-10-PCS | Mod: 26,RT,, | Performed by: RADIOLOGY

## 2023-04-10 PROCEDURE — 99999 PR PBB SHADOW E&M-EST. PATIENT-LVL III: CPT | Mod: PBBFAC,,, | Performed by: ORTHOPAEDIC SURGERY

## 2023-04-10 PROCEDURE — 99213 OFFICE O/P EST LOW 20 MIN: CPT | Mod: PBBFAC,PN | Performed by: ORTHOPAEDIC SURGERY

## 2023-04-10 PROCEDURE — 99213 PR OFFICE/OUTPT VISIT, EST, LEVL III, 20-29 MIN: ICD-10-PCS | Mod: S$PBB,,, | Performed by: ORTHOPAEDIC SURGERY

## 2023-04-10 RX ORDER — MELOXICAM 15 MG/1
15 TABLET ORAL DAILY
Qty: 30 TABLET | Refills: 1 | Status: ON HOLD | OUTPATIENT
Start: 2023-04-10 | End: 2023-05-04

## 2023-04-10 NOTE — PROGRESS NOTES
Subjective:      Patient ID: Denise Berrios is a 73 y.o. female.  Chief Complaint: Follow-up of the Right Shoulder      HPI  Denise Berrios is a  73 y.o. female presenting today for follow up of right shoulder reverse arthroplasty.  She reports that she is now about 9 months postop she is doing relatively well but does have flare-ups of pain from time to time   She is also been followed by pain management which is helping she is taking Lyrica.    Review of patient's allergies indicates:   Allergen Reactions    Phenobarbital Anaphylaxis    Penicillins Other (See Comments)    Phenobarbital sodium     Propylthiouracil     Thyroid      Note: PTU    Topamax [topiramate] Diarrhea     GI symptoms    Cefaclor Rash and Other (See Comments)    Clopidogrel Rash    Methimazole Rash and Other (See Comments)    Methimazole (bulk) Rash    Penicillin Rash    Sulfa (sulfonamide antibiotics) Rash and Other (See Comments)         Current Outpatient Medications   Medication Sig Dispense Refill    aspirin (ECOTRIN) 81 MG EC tablet once a day      atorvastatin (LIPITOR) 80 MG tablet TAKE 1 TABLET DAILY 90 tablet 2    butalbital-acetaminophen-caffeine -40 mg (FIORICET, ESGIC) -40 mg per tablet Take 1 tablet by mouth nightly as needed for Headaches. 30 tablet 5    clobetasol 0.05% (TEMOVATE) 0.05 % Oint APPLY TOPICALLY TWICE A DAY 45 g 1    diclofenac sodium (VOLTAREN) 1 % Gel Apply 2 g topically 3 (three) times daily. 200 g 1    diclofenac sodium (VOLTAREN) 1 % Gel Apply 2 g topically 3 (three) times daily. 100 g 3    EScitalopram oxalate (LEXAPRO) 20 MG tablet Take 1 tablet (20 mg total) by mouth once daily. 90 tablet 1    HYDROcodone-acetaminophen (NORCO) 5-325 mg per tablet Take 1 tablet by mouth every 12 (twelve) hours as needed for Pain (as needed for severe pain only). 14 tablet 0    ibuprofen (ADVIL,MOTRIN) 800 MG tablet Take 800 mg by mouth every 6 (six) hours as needed for Pain.      levothyroxine (SYNTHROID) 88  MCG tablet Take 1 tablet (88 mcg total) by mouth before breakfast. 90 tablet 3    losartan (COZAAR) 50 MG tablet Take 1 tablet (50 mg total) by mouth once daily. 90 tablet 3    metoprolol tartrate (LOPRESSOR) 50 MG tablet Take 1 tablet (50 mg total) by mouth 2 (two) times daily. 180 tablet 3    naloxone (NARCAN) 4 mg/actuation Spry 4mg by nasal route as needed for opioid overdose; may repeat every 2-3 minutes in alternating nostrils until medical help arrives. Call 911 1 each 11    nortriptyline (PAMELOR) 10 MG capsule TAKE 1 CAPSULE DAILY 90 capsule 3    omeprazole (PRILOSEC) 40 MG capsule TAKE 1 CAPSULE(40 MG) BY MOUTH EVERY DAY 90 capsule 3    pregabalin (LYRICA) 75 MG capsule TAKE 1 CAPSULE BY MOUTH TWICE DAILY FOR NEUROPATHIC PAIN      solifenacin (VESICARE) 10 MG tablet TAKE 1 TABLET DAILY 90 tablet 3    traMADoL (ULTRAM) 50 mg tablet Take 50 mg by mouth every 6 (six) hours as needed for Pain.      VITAMIN D2 1,250 mcg (50,000 unit) capsule TAKE 1 CAPSULE EVERY 7 DAYS 12 capsule 3    meloxicam (MOBIC) 15 MG tablet Take 1 tablet (15 mg total) by mouth once daily. 30 tablet 1     No current facility-administered medications for this visit.       Past Medical History:   Diagnosis Date    Acid reflux     Graves disease     diffuse Toxic Goiter    Hypertension     IBS (irritable bowel syndrome)     Lichen sclerosus     Migraine     Stroke        Past Surgical History:   Procedure Laterality Date    EYE SURGERY      HYSTERECTOMY      CINDI for ?? cervical cancer, had normal paps    LEFT HEART CATHETERIZATION N/A 10/20/2020    Procedure: Left heart cath;  Surgeon: Bony Schultz MD;  Location: [facility] CATH LAB/EP;  Service: Cardiology;  Laterality: N/A;    REVERSE TOTAL SHOULDER ARTHROPLASTY Right 8/19/2022    Procedure: ARTHROPLASTY, SHOULDER, TOTAL, REVERSE;  Surgeon: Bradley Finnegan Jr., MD;  Location: [facility] OR;  Service: Orthopedics;  Laterality: Right;  SONIA linda CONFIRMED/8/18/Shaista Finnegan- hemosorb  AT        OBJECTIVE:   PHYSICAL EXAM:  Height: 5' (152.4 cm) Weight: 64.4 kg (142 lb)  Vitals:    04/10/23 1345   Weight: 64.4 kg (142 lb)   Height: 5' (1.524 m)   PainSc:   8     Ortho/SPM Exam  Examination right shoulder the incision looks good well-healed no redness no swelling no evidence of infection   Range of motion excellent   Strength improving   Neurologic exam intact    RADIOGRAPHS:  AP lateral x-ray right shoulder shows no evidence of loosening good position  Comments: I have personally reviewed the imaging and I agree with the above radiologist's report.    ASSESSMENT/PLAN:     IMPRESSION:  Status post reverse right shoulder arthroplasty    PLAN:  Continue strengthening exercises for the right shoulder otherwise regular activities   I have started her on Mobic 15 mg once a day with food  Also Voltaren gel topical    FOLLOW UP:  3-4 months    Disclaimer: This note has been generated using voice-recognition software. There may be typographical errors that have been missed during proof-reading.

## 2023-04-17 ENCOUNTER — TELEPHONE (OUTPATIENT)
Dept: PRIMARY CARE CLINIC | Facility: CLINIC | Age: 73
End: 2023-04-17
Payer: MEDICARE

## 2023-04-17 RX ORDER — PROMETHAZINE HYDROCHLORIDE AND DEXTROMETHORPHAN HYDROBROMIDE 6.25; 15 MG/5ML; MG/5ML
5 SYRUP ORAL EVERY 4 HOURS PRN
Qty: 118 ML | Refills: 0 | Status: SHIPPED | OUTPATIENT
Start: 2023-04-17 | End: 2023-04-27

## 2023-04-17 RX ORDER — AZITHROMYCIN 250 MG/1
TABLET, FILM COATED ORAL
Qty: 6 TABLET | Refills: 0 | Status: SHIPPED | OUTPATIENT
Start: 2023-04-17 | End: 2023-04-22

## 2023-04-17 NOTE — TELEPHONE ENCOUNTER
Sore throat, coughing, congestion, fever x6 days . Covid negative. Taking Mucinex, asking for zpak and cough meds to be sent to [facility]

## 2023-05-04 ENCOUNTER — TELEPHONE (OUTPATIENT)
Dept: PRIMARY CARE CLINIC | Facility: CLINIC | Age: 73
End: 2023-05-04
Payer: MEDICARE

## 2023-05-04 ENCOUNTER — HOSPITAL ENCOUNTER (INPATIENT)
Facility: HOSPITAL | Age: 73
LOS: 1 days | Discharge: HOME | DRG: 305 | End: 2023-05-06
Attending: EMERGENCY MEDICINE | Admitting: HOSPITALIST
Payer: MEDICARE

## 2023-05-04 DIAGNOSIS — N30.00 ACUTE CYSTITIS WITHOUT HEMATURIA: Primary | ICD-10-CM

## 2023-05-04 DIAGNOSIS — R07.9 CHEST PAIN: ICD-10-CM

## 2023-05-04 DIAGNOSIS — R11.0 NAUSEA: ICD-10-CM

## 2023-05-04 DIAGNOSIS — R79.89 ELEVATED TROPONIN: ICD-10-CM

## 2023-05-04 PROBLEM — I16.0 HYPERTENSIVE URGENCY: Status: ACTIVE | Noted: 2023-05-04

## 2023-05-04 PROBLEM — I16.0 HYPERTENSIVE URGENCY: Chronic | Status: ACTIVE | Noted: 2023-05-04

## 2023-05-04 LAB
ALBUMIN SERPL BCP-MCNC: 4.1 G/DL (ref 3.5–5.2)
ALP SERPL-CCNC: 43 U/L (ref 55–135)
ALT SERPL W/O P-5'-P-CCNC: 15 U/L (ref 10–44)
ANION GAP SERPL CALC-SCNC: 11 MMOL/L (ref 8–16)
AST SERPL-CCNC: 25 U/L (ref 10–40)
BASOPHILS # BLD AUTO: 0.07 K/UL (ref 0–0.2)
BASOPHILS NFR BLD: 0.8 % (ref 0–1.9)
BILIRUB SERPL-MCNC: 0.4 MG/DL (ref 0.1–1)
BILIRUB UR QL STRIP: NEGATIVE
BUN SERPL-MCNC: 14 MG/DL (ref 8–23)
CALCIUM SERPL-MCNC: 9.4 MG/DL (ref 8.7–10.5)
CHLORIDE SERPL-SCNC: 106 MMOL/L (ref 95–110)
CLARITY UR: CLEAR
CO2 SERPL-SCNC: 24 MMOL/L (ref 23–29)
COLOR UR: YELLOW
CREAT SERPL-MCNC: 1.2 MG/DL (ref 0.5–1.4)
DIFFERENTIAL METHOD: NORMAL
EOSINOPHIL # BLD AUTO: 0 K/UL (ref 0–0.5)
EOSINOPHIL NFR BLD: 0.5 % (ref 0–8)
ERYTHROCYTE [DISTWIDTH] IN BLOOD BY AUTOMATED COUNT: 12.6 % (ref 11.5–14.5)
EST. GFR  (NO RACE VARIABLE): 47 ML/MIN/1.73 M^2
GLUCOSE SERPL-MCNC: 106 MG/DL (ref 70–110)
GLUCOSE UR QL STRIP: NEGATIVE
HCT VFR BLD AUTO: 41.6 % (ref 37–48.5)
HGB BLD-MCNC: 13.9 G/DL (ref 12–16)
HGB UR QL STRIP: NEGATIVE
IMM GRANULOCYTES # BLD AUTO: 0.03 K/UL (ref 0–0.04)
IMM GRANULOCYTES NFR BLD AUTO: 0.3 % (ref 0–0.5)
KETONES UR QL STRIP: NEGATIVE
LEUKOCYTE ESTERASE UR QL STRIP: ABNORMAL
LIPASE SERPL-CCNC: 43 U/L (ref 4–60)
LYMPHOCYTES # BLD AUTO: 1.7 K/UL (ref 1–4.8)
LYMPHOCYTES NFR BLD: 19.2 % (ref 18–48)
MCH RBC QN AUTO: 30.8 PG (ref 27–31)
MCHC RBC AUTO-ENTMCNC: 33.4 G/DL (ref 32–36)
MCV RBC AUTO: 92 FL (ref 82–98)
MICROSCOPIC COMMENT: ABNORMAL
MONOCYTES # BLD AUTO: 0.7 K/UL (ref 0.3–1)
MONOCYTES NFR BLD: 8.5 % (ref 4–15)
NEUTROPHILS # BLD AUTO: 6.1 K/UL (ref 1.8–7.7)
NEUTROPHILS NFR BLD: 70.7 % (ref 38–73)
NITRITE UR QL STRIP: NEGATIVE
NRBC BLD-RTO: 0 /100 WBC
PH UR STRIP: 7 [PH] (ref 5–8)
PLATELET # BLD AUTO: 264 K/UL (ref 150–450)
PMV BLD AUTO: 10.4 FL (ref 9.2–12.9)
POTASSIUM SERPL-SCNC: 4.2 MMOL/L (ref 3.5–5.1)
PROT SERPL-MCNC: 8 G/DL (ref 6–8.4)
PROT UR QL STRIP: NEGATIVE
RBC # BLD AUTO: 4.51 M/UL (ref 4–5.4)
SODIUM SERPL-SCNC: 141 MMOL/L (ref 136–145)
SP GR UR STRIP: 1.01 (ref 1–1.03)
SQUAMOUS #/AREA URNS HPF: 0 /HPF
TROPONIN I SERPL DL<=0.01 NG/ML-MCNC: 0.03 NG/ML (ref 0–0.03)
URN SPEC COLLECT METH UR: ABNORMAL
UROBILINOGEN UR STRIP-ACNC: NEGATIVE EU/DL
WBC # BLD AUTO: 8.58 K/UL (ref 3.9–12.7)
WBC #/AREA URNS HPF: 9 /HPF (ref 0–5)

## 2023-05-04 PROCEDURE — 63600175 PHARM REV CODE 636 W HCPCS: Performed by: STUDENT IN AN ORGANIZED HEALTH CARE EDUCATION/TRAINING PROGRAM

## 2023-05-04 PROCEDURE — 63600175 PHARM REV CODE 636 W HCPCS: Performed by: PHYSICIAN ASSISTANT

## 2023-05-04 PROCEDURE — 93010 EKG 12-LEAD: ICD-10-PCS | Mod: ,,, | Performed by: INTERNAL MEDICINE

## 2023-05-04 PROCEDURE — G0378 HOSPITAL OBSERVATION PER HR: HCPCS

## 2023-05-04 PROCEDURE — 93010 ELECTROCARDIOGRAM REPORT: CPT | Mod: ,,, | Performed by: INTERNAL MEDICINE

## 2023-05-04 PROCEDURE — 25000003 PHARM REV CODE 250: Performed by: EMERGENCY MEDICINE

## 2023-05-04 PROCEDURE — 96374 THER/PROPH/DIAG INJ IV PUSH: CPT

## 2023-05-04 PROCEDURE — 25000003 PHARM REV CODE 250: Performed by: STUDENT IN AN ORGANIZED HEALTH CARE EDUCATION/TRAINING PROGRAM

## 2023-05-04 PROCEDURE — 93005 ELECTROCARDIOGRAM TRACING: CPT

## 2023-05-04 PROCEDURE — 99285 EMERGENCY DEPT VISIT HI MDM: CPT

## 2023-05-04 PROCEDURE — 85025 COMPLETE CBC W/AUTO DIFF WBC: CPT | Performed by: PHYSICIAN ASSISTANT

## 2023-05-04 PROCEDURE — 25000003 PHARM REV CODE 250: Performed by: PHYSICIAN ASSISTANT

## 2023-05-04 PROCEDURE — 96372 THER/PROPH/DIAG INJ SC/IM: CPT | Performed by: STUDENT IN AN ORGANIZED HEALTH CARE EDUCATION/TRAINING PROGRAM

## 2023-05-04 PROCEDURE — 80053 COMPREHEN METABOLIC PANEL: CPT | Performed by: PHYSICIAN ASSISTANT

## 2023-05-04 PROCEDURE — 81000 URINALYSIS NONAUTO W/SCOPE: CPT | Performed by: PHYSICIAN ASSISTANT

## 2023-05-04 PROCEDURE — 96361 HYDRATE IV INFUSION ADD-ON: CPT

## 2023-05-04 PROCEDURE — 84484 ASSAY OF TROPONIN QUANT: CPT | Performed by: PHYSICIAN ASSISTANT

## 2023-05-04 PROCEDURE — 83690 ASSAY OF LIPASE: CPT | Performed by: PHYSICIAN ASSISTANT

## 2023-05-04 RX ORDER — NALOXONE HCL 0.4 MG/ML
0.02 VIAL (ML) INJECTION
Status: DISCONTINUED | OUTPATIENT
Start: 2023-05-04 | End: 2023-05-06 | Stop reason: HOSPADM

## 2023-05-04 RX ORDER — LOSARTAN POTASSIUM 50 MG/1
100 TABLET ORAL DAILY
Status: DISCONTINUED | OUTPATIENT
Start: 2023-05-04 | End: 2023-05-04

## 2023-05-04 RX ORDER — ASPIRIN 325 MG
325 TABLET, DELAYED RELEASE (ENTERIC COATED) ORAL
Status: COMPLETED | OUTPATIENT
Start: 2023-05-04 | End: 2023-05-04

## 2023-05-04 RX ORDER — LOSARTAN POTASSIUM 50 MG/1
50 TABLET ORAL DAILY
Status: DISCONTINUED | OUTPATIENT
Start: 2023-05-05 | End: 2023-05-04

## 2023-05-04 RX ORDER — ONDANSETRON 2 MG/ML
4 INJECTION INTRAMUSCULAR; INTRAVENOUS
Status: COMPLETED | OUTPATIENT
Start: 2023-05-04 | End: 2023-05-04

## 2023-05-04 RX ORDER — ACETAMINOPHEN 325 MG/1
650 TABLET ORAL EVERY 4 HOURS PRN
Status: DISCONTINUED | OUTPATIENT
Start: 2023-05-04 | End: 2023-05-06 | Stop reason: HOSPADM

## 2023-05-04 RX ORDER — LEVOTHYROXINE SODIUM 88 UG/1
88 TABLET ORAL
Status: DISCONTINUED | OUTPATIENT
Start: 2023-05-05 | End: 2023-05-06 | Stop reason: HOSPADM

## 2023-05-04 RX ORDER — TALC
6 POWDER (GRAM) TOPICAL NIGHTLY PRN
Status: DISCONTINUED | OUTPATIENT
Start: 2023-05-04 | End: 2023-05-06 | Stop reason: HOSPADM

## 2023-05-04 RX ORDER — DEXTROSE 40 %
15 GEL (GRAM) ORAL
Status: DISCONTINUED | OUTPATIENT
Start: 2023-05-04 | End: 2023-05-06 | Stop reason: HOSPADM

## 2023-05-04 RX ORDER — HEPARIN SODIUM 5000 [USP'U]/ML
5000 INJECTION, SOLUTION INTRAVENOUS; SUBCUTANEOUS EVERY 8 HOURS
Status: DISCONTINUED | OUTPATIENT
Start: 2023-05-04 | End: 2023-05-06 | Stop reason: HOSPADM

## 2023-05-04 RX ORDER — ESCITALOPRAM OXALATE 10 MG/1
20 TABLET ORAL DAILY
Status: DISCONTINUED | OUTPATIENT
Start: 2023-05-05 | End: 2023-05-06 | Stop reason: HOSPADM

## 2023-05-04 RX ORDER — HYDRALAZINE HYDROCHLORIDE 25 MG/1
25 TABLET, FILM COATED ORAL EVERY 8 HOURS PRN
Status: DISCONTINUED | OUTPATIENT
Start: 2023-05-04 | End: 2023-05-06 | Stop reason: HOSPADM

## 2023-05-04 RX ORDER — ASPIRIN 81 MG/1
81 TABLET ORAL DAILY
Status: DISCONTINUED | OUTPATIENT
Start: 2023-05-05 | End: 2023-05-06 | Stop reason: HOSPADM

## 2023-05-04 RX ORDER — NORTRIPTYLINE HYDROCHLORIDE 10 MG/1
10 CAPSULE ORAL DAILY
Status: DISCONTINUED | OUTPATIENT
Start: 2023-05-05 | End: 2023-05-06 | Stop reason: HOSPADM

## 2023-05-04 RX ORDER — DEXTROSE 40 %
30 GEL (GRAM) ORAL
Status: DISCONTINUED | OUTPATIENT
Start: 2023-05-04 | End: 2023-05-06 | Stop reason: HOSPADM

## 2023-05-04 RX ORDER — HYDROCODONE BITARTRATE AND ACETAMINOPHEN 5; 325 MG/1; MG/1
1 TABLET ORAL EVERY 6 HOURS PRN
Status: DISCONTINUED | OUTPATIENT
Start: 2023-05-04 | End: 2023-05-06 | Stop reason: HOSPADM

## 2023-05-04 RX ORDER — LOSARTAN POTASSIUM 50 MG/1
100 TABLET ORAL DAILY
Status: DISCONTINUED | OUTPATIENT
Start: 2023-05-05 | End: 2023-05-05

## 2023-05-04 RX ORDER — BUTALBITAL, ACETAMINOPHEN AND CAFFEINE 50; 325; 40 MG/1; MG/1; MG/1
1 TABLET ORAL NIGHTLY PRN
Status: DISCONTINUED | OUTPATIENT
Start: 2023-05-04 | End: 2023-05-06 | Stop reason: HOSPADM

## 2023-05-04 RX ORDER — ATORVASTATIN CALCIUM 40 MG/1
80 TABLET, FILM COATED ORAL DAILY
Status: DISCONTINUED | OUTPATIENT
Start: 2023-05-05 | End: 2023-05-06 | Stop reason: HOSPADM

## 2023-05-04 RX ORDER — SIMETHICONE 80 MG
1 TABLET,CHEWABLE ORAL 4 TIMES DAILY PRN
Status: DISCONTINUED | OUTPATIENT
Start: 2023-05-04 | End: 2023-05-06 | Stop reason: HOSPADM

## 2023-05-04 RX ORDER — ONDANSETRON 2 MG/ML
4 INJECTION INTRAMUSCULAR; INTRAVENOUS EVERY 8 HOURS PRN
Status: DISCONTINUED | OUTPATIENT
Start: 2023-05-04 | End: 2023-05-06 | Stop reason: HOSPADM

## 2023-05-04 RX ORDER — LOSARTAN POTASSIUM 50 MG/1
50 TABLET ORAL ONCE
Status: COMPLETED | OUTPATIENT
Start: 2023-05-04 | End: 2023-05-04

## 2023-05-04 RX ORDER — AMLODIPINE BESYLATE 5 MG/1
5 TABLET ORAL DAILY
Status: DISCONTINUED | OUTPATIENT
Start: 2023-05-04 | End: 2023-05-06

## 2023-05-04 RX ORDER — PANTOPRAZOLE SODIUM 40 MG/1
40 TABLET, DELAYED RELEASE ORAL DAILY
Status: DISCONTINUED | OUTPATIENT
Start: 2023-05-05 | End: 2023-05-06 | Stop reason: HOSPADM

## 2023-05-04 RX ORDER — PREGABALIN 75 MG/1
75 CAPSULE ORAL 2 TIMES DAILY
Status: DISCONTINUED | OUTPATIENT
Start: 2023-05-04 | End: 2023-05-06 | Stop reason: HOSPADM

## 2023-05-04 RX ORDER — METOPROLOL TARTRATE 50 MG/1
50 TABLET ORAL 2 TIMES DAILY
Status: DISCONTINUED | OUTPATIENT
Start: 2023-05-04 | End: 2023-05-04

## 2023-05-04 RX ORDER — SODIUM CHLORIDE 0.9 % (FLUSH) 0.9 %
10 SYRINGE (ML) INJECTION EVERY 12 HOURS PRN
Status: DISCONTINUED | OUTPATIENT
Start: 2023-05-04 | End: 2023-05-06 | Stop reason: HOSPADM

## 2023-05-04 RX ORDER — OXYBUTYNIN CHLORIDE 5 MG/1
10 TABLET, EXTENDED RELEASE ORAL DAILY
Status: DISCONTINUED | OUTPATIENT
Start: 2023-05-05 | End: 2023-05-06 | Stop reason: HOSPADM

## 2023-05-04 RX ORDER — GLUCAGON 1 MG
1 KIT INJECTION
Status: DISCONTINUED | OUTPATIENT
Start: 2023-05-04 | End: 2023-05-06 | Stop reason: HOSPADM

## 2023-05-04 RX ADMIN — PREGABALIN 75 MG: 75 CAPSULE ORAL at 08:05

## 2023-05-04 RX ADMIN — LOSARTAN POTASSIUM 50 MG: 50 TABLET, FILM COATED ORAL at 08:05

## 2023-05-04 RX ADMIN — ONDANSETRON HYDROCHLORIDE 4 MG: 2 INJECTION, SOLUTION INTRAMUSCULAR; INTRAVENOUS at 08:05

## 2023-05-04 RX ADMIN — SODIUM CHLORIDE 1000 ML: 0.9 INJECTION, SOLUTION INTRAVENOUS at 03:05

## 2023-05-04 RX ADMIN — AMLODIPINE BESYLATE 5 MG: 5 TABLET ORAL at 10:05

## 2023-05-04 RX ADMIN — HEPARIN SODIUM 5000 UNITS: 5000 INJECTION INTRAVENOUS; SUBCUTANEOUS at 10:05

## 2023-05-04 RX ADMIN — ASPIRIN 325 MG: 325 TABLET, COATED ORAL at 04:05

## 2023-05-04 RX ADMIN — LOSARTAN POTASSIUM 50 MG: 50 TABLET, FILM COATED ORAL at 07:05

## 2023-05-04 RX ADMIN — Medication 6 MG: at 10:05

## 2023-05-04 RX ADMIN — ONDANSETRON HYDROCHLORIDE 4 MG: 2 SOLUTION INTRAMUSCULAR; INTRAVENOUS at 03:05

## 2023-05-04 NOTE — ED NOTES
Pt arrive from home complains of nausea X 10 day. Pt report dizziness and tachycardia that started this morning after shower. Pt appears to be comfortable and in no distress.

## 2023-05-04 NOTE — TELEPHONE ENCOUNTER
----- Message from Saritha Finney sent at 5/4/2023  1:06 PM CDT -----  Type:  Needs Medical Advice    Who Called: pt    Symptoms (please be specific): pt needs to be seen for  nauseated /weakness/heart racing pt  stated that this has been going on for 1 week      Would the patient rather a call back or a response via [facility]? call  Best Call Back Number: [phone]  Additional Information:

## 2023-05-04 NOTE — ED TRIAGE NOTES
C/o nausea over past 10 days, unrelieved with Zofran. Denies cough/cold, fever. Denies injury. Appears well. States she has been forcing herself to eat and drink but admits that fluid intake has been inadequate. This morning she experienced lightheadedness and palpitations when getting out of the shower and became concerned. Symptoms resolved with rest.

## 2023-05-04 NOTE — FIRST PROVIDER EVALUATION
Emergency Department TeleTriage Encounter Note      CHIEF COMPLAINT    Chief Complaint   Patient presents with    Nausea     Pt reports nausea for past 10 days unrelieved by PO zofran. Pt denies emesis or abd pain.        VITAL SIGNS   Initial Vitals [05/04/23 1444]   BP Pulse Resp Temp SpO2   (!) 163/72 68 18 97.9 °F (36.6 °C) 96 %      MAP       --            ALLERGIES    Review of patient's allergies indicates:   Allergen Reactions    Phenobarbital Anaphylaxis    Penicillins Other (See Comments)    Phenobarbital sodium     Propylthiouracil     Thyroid      Note: PTU    Topamax [topiramate] Diarrhea     GI symptoms    Cefaclor Rash and Other (See Comments)    Clopidogrel Rash    Methimazole Rash and Other (See Comments)    Methimazole (bulk) Rash    Penicillin Rash    Sulfa (sulfonamide antibiotics) Rash and Other (See Comments)       PROVIDER TRIAGE NOTE  Patient presents with 10 day history of severe nausea. + Epigastric pain, but thought that was her usual GERD. Today she became tachycardic in the shower and is having weakness.        ORDERS  Labs Reviewed - No data to display    ED Orders (720h ago, onward)      None              Virtual Visit Note: The provider triage portion of this emergency department evaluation and documentation was performed via [employer], a HIPAA-compliant telemedicine application, in concert with a tele-presenter in the room. A face to face patient evaluation with one of my colleagues will occur once the patient is placed in an emergency department room.      DISCLAIMER: This note was prepared with [employer] voice recognition transcription software. Garbled syntax, mangled pronouns, and other bizarre constructions may be attributed to that software system.

## 2023-05-04 NOTE — ED PROVIDER NOTES
Encounter Date: 5/4/2023       History     Chief Complaint   Patient presents with    Nausea     Pt reports nausea for past 10 days unrelieved by PO zofran. Pt denies emesis or abd pain.      Denise Berrios is a 73 y.o. female who  has a past medical history of Acid reflux, Graves disease, Hypertension, IBS (irritable bowel syndrome), Lichen sclerosus, Migraine, and Stroke.    The patient presents to the ED due to feeling nauseated.  Patient reports approximately 10 days of symptoms.  She denies any associated symptoms.  No dizziness weakness numbness vomiting diarrhea chest pain or shortness of breath reported.  Today she came in because she got out of the shower and felt lightheaded and had some palpitations.  These have resolved and patient has been asymptomatic from this standpoint for the rest of the day.  She is tried taking over-the-counter Zofran for these symptoms.  She denies any other concerns at this time.    The history is provided by the patient.   Review of patient's allergies indicates:   Allergen Reactions    Phenobarbital Anaphylaxis    Penicillins Other (See Comments)    Phenobarbital sodium     Propylthiouracil     Thyroid      Note: PTU    Topamax [topiramate] Diarrhea     GI symptoms    Cefaclor Rash and Other (See Comments)    Clopidogrel Rash    Methimazole Rash and Other (See Comments)    Methimazole (bulk) Rash    Penicillin Rash    Sulfa (sulfonamide antibiotics) Rash and Other (See Comments)     Past Medical History:   Diagnosis Date    Acid reflux     Graves disease     diffuse Toxic Goiter    Hypertension     IBS (irritable bowel syndrome)     Lichen sclerosus     Migraine     Stroke      Past Surgical History:   Procedure Laterality Date    EYE SURGERY      HYSTERECTOMY      CINDI for ?? cervical cancer, had normal paps    LEFT HEART CATHETERIZATION N/A 10/20/2020    Procedure: Left heart cath;  Surgeon: Bony Schultz MD;  Location: [facility] CATH LAB/EP;  Service: Cardiology;   Laterality: N/A;    REVERSE TOTAL SHOULDER ARTHROPLASTY Right 8/19/2022    Procedure: ARTHROPLASTY, SHOULDER, TOTAL, REVERSE;  Surgeon: Bradley Finnegan Jr., MD;  Location: [facility] OR;  Service: Orthopedics;  Laterality: Right;  SONIA linda CONFIRMED/8/18/Shaista Finnegan- hemosorb AT      Family History   Problem Relation Age of Onset    Kidney failure Father     Hypertension Father     Stomach cancer Mother     Breast cancer Neg Hx      Social History     Tobacco Use    Smoking status: Never     Passive exposure: Never    Smokeless tobacco: Never   Substance Use Topics    Alcohol use: Yes    Drug use: No     Review of Systems   Constitutional:  Negative for chills and fever.   HENT:  Negative for sore throat.    Respiratory:  Negative for cough and shortness of breath.    Cardiovascular:  Negative for chest pain.   Gastrointestinal:  Negative for nausea and vomiting.   Genitourinary:  Negative for dysuria, frequency and urgency.   Musculoskeletal:  Negative for back pain, neck pain and neck stiffness.   Skin:  Negative for rash and wound.   Neurological:  Negative for syncope and weakness.   Hematological:  Does not bruise/bleed easily.   Psychiatric/Behavioral:  Negative for agitation, behavioral problems and confusion.      Physical Exam     Initial Vitals [05/04/23 1444]   BP Pulse Resp Temp SpO2   (!) 163/72 68 18 97.9 °F (36.6 °C) 96 %      MAP       --         Physical Exam    Constitutional:  Non-toxic appearance. No distress.   HENT:   Head: Normocephalic and atraumatic.   Eyes: Conjunctivae and EOM are normal. Pupils are equal, round, and reactive to light. Right eye exhibits no nystagmus. Left eye exhibits no nystagmus.   Neck: Neck supple.   Cardiovascular:  Regular rhythm, S1 normal and S2 normal.           No murmur heard.  Pulmonary/Chest: Breath sounds normal. No respiratory distress. She has no wheezes. She has no rales.   Abdominal: Abdomen is soft. She exhibits no distension. There  is no abdominal tenderness.   Musculoskeletal:      Cervical back: Neck supple.     Neurological: She is alert. No cranial nerve deficit. GCS eye subscore is 4. GCS verbal subscore is 5. GCS motor subscore is 6.   CN 2-12 intact  Finger to nose within normal limits  Negative romberg  Gait normal  Equal strength to bilateral upper extremities, SILT  Equal strength to bilateral lower extremities, SILT     Skin: Skin is warm. No rash noted.   Psychiatric: She has a normal mood and affect. Her behavior is normal.       ED Course   Procedures  Labs Reviewed   COMPREHENSIVE METABOLIC PANEL - Abnormal; Notable for the following components:       Result Value    Alkaline Phosphatase 43 (*)     eGFR 47 (*)     All other components within normal limits   URINALYSIS, REFLEX TO URINE CULTURE - Abnormal; Notable for the following components:    Leukocytes, UA 1+ (*)     All other components within normal limits    Narrative:     Specimen Source->Urine   TROPONIN I - Abnormal; Notable for the following components:    Troponin I 0.030 (*)     All other components within normal limits   URINALYSIS MICROSCOPIC - Abnormal; Notable for the following components:    WBC, UA 9 (*)     All other components within normal limits    Narrative:     Specimen Source->Urine   CBC W/ AUTO DIFFERENTIAL   LIPASE        ECG Results              EKG 12-lead (In process)  Result time 05/04/23 16:26:24      In process by Interface, Lab In [facility] (05/04/23 16:26:24)                   Narrative:    Test Reason : R11.0,    Vent. Rate : 063 BPM     Atrial Rate : 063 BPM     P-R Int : 184 ms          QRS Dur : 086 ms      QT Int : 450 ms       P-R-T Axes : 060 054 088 degrees     QTc Int : 460 ms    Normal sinus rhythm  Nonspecific T wave abnormality  Prolonged QT  Abnormal ECG  When compared with ECG of 16-AUG-2022 09:25,  Left posterior fascicular block is no longer Present  Nonspecific T wave abnormality, worse in Lateral leads    Referred By:  [email or web address]   SELF           Confirmed By:                                   Imaging Results    None          Medications   sodium chloride 0.9% flush 10 mL (has no administration in time range)   naloxone 0.4 mg/mL injection 0.02 mg (has no administration in time range)   glucagon (human recombinant) injection 1 mg (has no administration in time range)   dextrose 10% bolus 125 mL 125 mL (has no administration in time range)   dextrose 10% bolus 250 mL 250 mL (has no administration in time range)   dextrose 40 % gel 15,000 mg (has no administration in time range)   dextrose 40 % gel 30,000 mg (has no administration in time range)   heparin (porcine) injection 5,000 Units (5,000 Units Subcutaneous Given 5/5/23 0652)   acetaminophen tablet 650 mg (has no administration in time range)   HYDROcodone-acetaminophen 5-325 mg per tablet 1 tablet (has no administration in time range)   ondansetron injection 4 mg (4 mg Intravenous Given 5/5/23 0835)   melatonin tablet 6 mg (6 mg Oral Given 5/4/23 2234)   simethicone chewable tablet 80 mg (has no administration in time range)   aspirin EC tablet 81 mg (81 mg Oral Given 5/5/23 0839)   atorvastatin tablet 80 mg (80 mg Oral Given 5/5/23 0839)   butalbital-acetaminophen-caffeine -40 mg per tablet 1 tablet (has no administration in time range)   nortriptyline capsule 10 mg (has no administration in time range)   pantoprazole EC tablet 40 mg (40 mg Oral Given 5/5/23 0839)   pregabalin capsule 75 mg (75 mg Oral Given 5/5/23 0839)   oxybutynin 24 hr tablet 10 mg (10 mg Oral Given 5/5/23 0839)   levothyroxine tablet 88 mcg (88 mcg Oral Given 5/5/23 0652)   EScitalopram oxalate tablet 20 mg (20 mg Oral Given 5/5/23 0838)   hydrALAZINE tablet 25 mg (has no administration in time range)   amLODIPine tablet 5 mg (0 mg Oral Hold 5/5/23 0900)   losartan tablet 50 mg (has no administration in time range)   ondansetron injection 4 mg (4 mg Intravenous Given 5/4/23 1522)   sodium chloride  0.9% bolus 1,000 mL 1,000 mL (0 mLs Intravenous Stopped 5/4/23 1625)   aspirin EC tablet 325 mg (325 mg Oral Given 5/4/23 1645)   losartan tablet 50 mg (50 mg Oral Given 5/4/23 1915)   losartan tablet 50 mg (50 mg Oral Given 5/4/23 2000)     Medical Decision Making:   Differential Diagnosis:   Differential Diagnosis includes, but is not limited to:  CVA/TIA, vertigo, anemia/blood loss, cardiogenic shock, arrhythmia, orthostatic hypotension, dehydration, medication side effect, vitamin deficiency, liver disease, hypothyroidism, drug intoxication/withdrawal, metabolic derangement.    ED Management:  Patient with near syncopal symptoms nausea  Elevated troponin here.Will plan to admit for near syncope/ acs r/o given symptoms earlier today                ED Course as of 05/05/23 1035   Thu May 04, 2023   1548 CBC auto differential  No significant abnormality.    [RN]   1600 Comprehensive metabolic panel(!)  No significant abnormality.    [RN]   1600 Troponin I(!)  Elevated    [RN]   1620 EKG: Rate 63.  Normal sinus rhythm.  Nonspecific ST changes.  No STEMI.   MS.  No significant change from 08/16/2022 [RN]      ED Course User Index  [RN] Bradley Hernandez Jr., MD                   Clinical Impression:   Final diagnoses:  [R11.0] Nausea  [R77.8] Elevated troponin        ED Disposition Condition    Observation           Portions of this note were dictated using voice recognition software and may contain dictation related errors in spelling/grammar/syntax not found on text review          Bradley Hernandez Jr., MD  05/05/23 4275

## 2023-05-05 LAB
ANION GAP SERPL CALC-SCNC: 10 MMOL/L (ref 8–16)
BASOPHILS # BLD AUTO: 0.05 K/UL (ref 0–0.2)
BASOPHILS NFR BLD: 0.7 % (ref 0–1.9)
BUN SERPL-MCNC: 12 MG/DL (ref 8–23)
CALCIUM SERPL-MCNC: 8.4 MG/DL (ref 8.7–10.5)
CHLORIDE SERPL-SCNC: 110 MMOL/L (ref 95–110)
CO2 SERPL-SCNC: 21 MMOL/L (ref 23–29)
CREAT SERPL-MCNC: 0.9 MG/DL (ref 0.5–1.4)
DIFFERENTIAL METHOD: ABNORMAL
EOSINOPHIL # BLD AUTO: 0.1 K/UL (ref 0–0.5)
EOSINOPHIL NFR BLD: 1.7 % (ref 0–8)
ERYTHROCYTE [DISTWIDTH] IN BLOOD BY AUTOMATED COUNT: 12.8 % (ref 11.5–14.5)
EST. GFR  (NO RACE VARIABLE): >60 ML/MIN/1.73 M^2
GLUCOSE SERPL-MCNC: 85 MG/DL (ref 70–110)
HCT VFR BLD AUTO: 36.1 % (ref 37–48.5)
HGB BLD-MCNC: 11.9 G/DL (ref 12–16)
IMM GRANULOCYTES # BLD AUTO: 0.02 K/UL (ref 0–0.04)
IMM GRANULOCYTES NFR BLD AUTO: 0.3 % (ref 0–0.5)
LYMPHOCYTES # BLD AUTO: 2.6 K/UL (ref 1–4.8)
LYMPHOCYTES NFR BLD: 33.6 % (ref 18–48)
MCH RBC QN AUTO: 30.5 PG (ref 27–31)
MCHC RBC AUTO-ENTMCNC: 33 G/DL (ref 32–36)
MCV RBC AUTO: 93 FL (ref 82–98)
MONOCYTES # BLD AUTO: 0.7 K/UL (ref 0.3–1)
MONOCYTES NFR BLD: 9.7 % (ref 4–15)
NEUTROPHILS # BLD AUTO: 4.1 K/UL (ref 1.8–7.7)
NEUTROPHILS NFR BLD: 54 % (ref 38–73)
NRBC BLD-RTO: 0 /100 WBC
PLATELET # BLD AUTO: 218 K/UL (ref 150–450)
PMV BLD AUTO: 10.9 FL (ref 9.2–12.9)
POTASSIUM SERPL-SCNC: 3.7 MMOL/L (ref 3.5–5.1)
RBC # BLD AUTO: 3.9 M/UL (ref 4–5.4)
SODIUM SERPL-SCNC: 141 MMOL/L (ref 136–145)
WBC # BLD AUTO: 7.6 K/UL (ref 3.9–12.7)

## 2023-05-05 PROCEDURE — 36415 COLL VENOUS BLD VENIPUNCTURE: CPT | Performed by: STUDENT IN AN ORGANIZED HEALTH CARE EDUCATION/TRAINING PROGRAM

## 2023-05-05 PROCEDURE — 11000001 HC ACUTE MED/SURG PRIVATE ROOM

## 2023-05-05 PROCEDURE — 63600175 PHARM REV CODE 636 W HCPCS: Performed by: STUDENT IN AN ORGANIZED HEALTH CARE EDUCATION/TRAINING PROGRAM

## 2023-05-05 PROCEDURE — 63600175 PHARM REV CODE 636 W HCPCS: Performed by: NURSE PRACTITIONER

## 2023-05-05 PROCEDURE — 36415 COLL VENOUS BLD VENIPUNCTURE: CPT | Performed by: NURSE PRACTITIONER

## 2023-05-05 PROCEDURE — 87040 BLOOD CULTURE FOR BACTERIA: CPT | Mod: 59 | Performed by: NURSE PRACTITIONER

## 2023-05-05 PROCEDURE — 96372 THER/PROPH/DIAG INJ SC/IM: CPT | Performed by: STUDENT IN AN ORGANIZED HEALTH CARE EDUCATION/TRAINING PROGRAM

## 2023-05-05 PROCEDURE — 85025 COMPLETE CBC W/AUTO DIFF WBC: CPT | Performed by: STUDENT IN AN ORGANIZED HEALTH CARE EDUCATION/TRAINING PROGRAM

## 2023-05-05 PROCEDURE — 25000003 PHARM REV CODE 250: Performed by: STUDENT IN AN ORGANIZED HEALTH CARE EDUCATION/TRAINING PROGRAM

## 2023-05-05 PROCEDURE — 80048 BASIC METABOLIC PNL TOTAL CA: CPT | Performed by: STUDENT IN AN ORGANIZED HEALTH CARE EDUCATION/TRAINING PROGRAM

## 2023-05-05 RX ORDER — CIPROFLOXACIN 2 MG/ML
400 INJECTION, SOLUTION INTRAVENOUS
Status: DISCONTINUED | OUTPATIENT
Start: 2023-05-05 | End: 2023-05-06 | Stop reason: HOSPADM

## 2023-05-05 RX ORDER — LOSARTAN POTASSIUM 50 MG/1
50 TABLET ORAL DAILY
Status: DISCONTINUED | OUTPATIENT
Start: 2023-05-06 | End: 2023-05-06 | Stop reason: HOSPADM

## 2023-05-05 RX ADMIN — ONDANSETRON HYDROCHLORIDE 4 MG: 2 INJECTION, SOLUTION INTRAMUSCULAR; INTRAVENOUS at 08:05

## 2023-05-05 RX ADMIN — ESCITALOPRAM OXALATE 20 MG: 10 TABLET ORAL at 08:05

## 2023-05-05 RX ADMIN — ATORVASTATIN CALCIUM 80 MG: 40 TABLET, FILM COATED ORAL at 08:05

## 2023-05-05 RX ADMIN — HEPARIN SODIUM 5000 UNITS: 5000 INJECTION INTRAVENOUS; SUBCUTANEOUS at 09:05

## 2023-05-05 RX ADMIN — ASPIRIN 81 MG: 81 TABLET, COATED ORAL at 08:05

## 2023-05-05 RX ADMIN — Medication 6 MG: at 10:05

## 2023-05-05 RX ADMIN — HEPARIN SODIUM 5000 UNITS: 5000 INJECTION INTRAVENOUS; SUBCUTANEOUS at 02:05

## 2023-05-05 RX ADMIN — PREGABALIN 75 MG: 75 CAPSULE ORAL at 09:05

## 2023-05-05 RX ADMIN — PREGABALIN 75 MG: 75 CAPSULE ORAL at 08:05

## 2023-05-05 RX ADMIN — PANTOPRAZOLE SODIUM 40 MG: 40 TABLET, DELAYED RELEASE ORAL at 08:05

## 2023-05-05 RX ADMIN — HEPARIN SODIUM 5000 UNITS: 5000 INJECTION INTRAVENOUS; SUBCUTANEOUS at 06:05

## 2023-05-05 RX ADMIN — OXYBUTYNIN CHLORIDE 10 MG: 5 TABLET, EXTENDED RELEASE ORAL at 08:05

## 2023-05-05 RX ADMIN — NORTRIPTYLINE HYDROCHLORIDE 10 MG: 10 CAPSULE ORAL at 10:05

## 2023-05-05 RX ADMIN — CIPROFLOXACIN 400 MG: 2 INJECTION, SOLUTION INTRAVENOUS at 02:05

## 2023-05-05 RX ADMIN — LEVOTHYROXINE SODIUM 88 MCG: 88 TABLET ORAL at 06:05

## 2023-05-05 NOTE — HPI
Denise Berrios is a 73 year old female with past medical history of GERD, Graves disease, hypertension, IBS who presents with nausea.    Patient states that over the last 10 days, she has had significant nausea with poor oral intake but not associated with vomiting.  Oral Zofran without significant relief.  Patient reports an episode lightheadedness and palpitations when getting out of the shower, which prompted her to go to ED evaluation.    In the ED, patient was noted to be hypertensive to 163/72, with blood pressure readings as high as 223/88.  CBC was completely unremarkable.  CMP shows slight elevation of alk-phos and decreased GFR.  Troponin of 0.03.  UA showed slight leukocyte esterase and WBCs.  EKG showed nonspecific T-wave changes.    Medicine consulted for patient's hypertension, troponin leak, and nausea

## 2023-05-05 NOTE — NURSING
[facility] RN met with [facility] regarding [facility] follow up upon discharge. Pt agreeable to hospital follow up .RN informed pt of scheduled appointment and that appointment will also appear on d/c AVS. Patient informed to bring all medication bottles to [facility] follow up appointment.  [facility] information handout, appointment letter and folder provided to patient. Spouse will provide transportation to appointment per patient    Patient Contact info: [phone] (    Person providing transportation contact info:  John Berrios (Spouse)   [phone] (Mobile)    Barriers to attending [facility] visit: none    Future Appointments   Date Time Provider Department Center   5/22/2023  8:30 AM Aminata Rubalcava MD [facility] IMPRI [facility]   7/10/2023 11:45 AM INJECTION [facility] INF [facility]   8/7/2023  2:00 PM Micheline Lay PA-C [facility] ORTHO [facility]   10/3/2023  8:50 AM APPOINTMENT [facility] LAB [facility]   10/5/2023 10:15 AM Aleksandra Carey MD [facility]

## 2023-05-05 NOTE — SUBJECTIVE & OBJECTIVE
Past Medical History:   Diagnosis Date    Acid reflux     Graves disease     diffuse Toxic Goiter    Hypertension     IBS (irritable bowel syndrome)     Lichen sclerosus     Migraine     Stroke        Past Surgical History:   Procedure Laterality Date    EYE SURGERY      HYSTERECTOMY      CINDI for ?? cervical cancer, had normal paps    LEFT HEART CATHETERIZATION N/A 10/20/2020    Procedure: Left heart cath;  Surgeon: Bony Schultz MD;  Location: [facility] CATH LAB/EP;  Service: Cardiology;  Laterality: N/A;    REVERSE TOTAL SHOULDER ARTHROPLASTY Right 8/19/2022    Procedure: ARTHROPLASTY, SHOULDER, TOTAL, REVERSE;  Surgeon: Bradley Finnegan Jr., MD;  Location: [facility] OR;  Service: Orthopedics;  Laterality: Right;  MARLEY- maddi CONFIRMED/8/18/Shaista Finnegan- hemosorb AT        Review of patient's allergies indicates:   Allergen Reactions    Phenobarbital Anaphylaxis    Penicillins Other (See Comments)    Phenobarbital sodium     Propylthiouracil     Thyroid      Note: PTU    Topamax [topiramate] Diarrhea     GI symptoms    Cefaclor Rash and Other (See Comments)    Clopidogrel Rash    Methimazole Rash and Other (See Comments)    Methimazole (bulk) Rash    Penicillin Rash    Sulfa (sulfonamide antibiotics) Rash and Other (See Comments)       No current facility-administered medications on file prior to encounter.     Current Outpatient Medications on File Prior to Encounter   Medication Sig    aspirin (ECOTRIN) 81 MG EC tablet once a day    atorvastatin (LIPITOR) 80 MG tablet TAKE 1 TABLET DAILY    butalbital-acetaminophen-caffeine -40 mg (FIORICET, ESGIC) -40 mg per tablet Take 1 tablet by mouth nightly as needed for Headaches.    clobetasol 0.05% (TEMOVATE) 0.05 % Oint APPLY TOPICALLY TWICE A DAY    diclofenac sodium (VOLTAREN) 1 % Gel Apply 2 g topically 3 (three) times daily.    EScitalopram oxalate (LEXAPRO) 20 MG tablet Take 1 tablet (20 mg total) by mouth once daily.    ibuprofen  (ADVIL,MOTRIN) 800 MG tablet Take 800 mg by mouth every 6 (six) hours as needed for Pain.    levothyroxine (SYNTHROID) 88 MCG tablet Take 1 tablet (88 mcg total) by mouth before breakfast.    losartan (COZAAR) 50 MG tablet Take 1 tablet (50 mg total) by mouth once daily.    metoprolol tartrate (LOPRESSOR) 50 MG tablet Take 1 tablet (50 mg total) by mouth 2 (two) times daily.    nortriptyline (PAMELOR) 10 MG capsule TAKE 1 CAPSULE DAILY    omeprazole (PRILOSEC) 40 MG capsule TAKE 1 CAPSULE(40 MG) BY MOUTH EVERY DAY    pregabalin (LYRICA) 75 MG capsule TAKE 1 CAPSULE BY MOUTH TWICE DAILY FOR NEUROPATHIC PAIN    solifenacin (VESICARE) 10 MG tablet TAKE 1 TABLET DAILY    traMADoL (ULTRAM) 50 mg tablet Take 50 mg by mouth every 6 (six) hours as needed for Pain.    VITAMIN D2 1,250 mcg (50,000 unit) capsule TAKE 1 CAPSULE EVERY 7 DAYS    diclofenac sodium (VOLTAREN) 1 % Gel Apply 2 g topically 3 (three) times daily.    naloxone (NARCAN) 4 mg/actuation Spry 4mg by nasal route as needed for opioid overdose; may repeat every 2-3 minutes in alternating nostrils until medical help arrives. Call 911    [DISCONTINUED] HYDROcodone-acetaminophen (NORCO) 5-325 mg per tablet Take 1 tablet by mouth every 12 (twelve) hours as needed for Pain (as needed for severe pain only).    [DISCONTINUED] meloxicam (MOBIC) 15 MG tablet Take 1 tablet (15 mg total) by mouth once daily.     Family History       Problem Relation (Age of Onset)    Hypertension Father    Kidney failure Father    Stomach cancer Mother          Tobacco Use    Smoking status: Never     Passive exposure: Never    Smokeless tobacco: Never   Substance and Sexual Activity    Alcohol use: Yes    Drug use: No    Sexual activity: Yes     Partners: Male     Birth control/protection: See Surgical Hx     Review of Systems   Constitutional:  Negative for activity change and appetite change.   HENT:  Negative for ear discharge and ear pain.    Eyes:  Negative for pain and redness.    Respiratory:  Negative for cough.    Cardiovascular:  Positive for palpitations. Negative for leg swelling.   Gastrointestinal:  Positive for nausea.   Endocrine: Negative for polydipsia.   Genitourinary:  Negative for menstrual problem and urgency.   Musculoskeletal:  Negative for myalgias and neck pain.   Skin:  Negative for pallor and rash.   Allergic/Immunologic: Negative for food allergies and immunocompromised state.   Neurological:  Positive for weakness and light-headedness. Negative for tremors and syncope.   Hematological:  Negative for adenopathy. Does not bruise/bleed easily.   Psychiatric/Behavioral:  Negative for confusion and decreased concentration.    Objective:     Vital Signs (Most Recent):  Temp: 98 °F (36.7 °C) (05/04/23 2046)  Pulse: 62 (05/04/23 2200)  Resp: 16 (05/04/23 2046)  BP: (!) 153/70 (05/04/23 2200)  SpO2: 97 % (05/04/23 2046)   Vital Signs (24h Range):  Temp:  [97.9 °F (36.6 °C)-98 °F (36.7 °C)] 98 °F (36.7 °C)  Pulse:  [59-68] 62  Resp:  [16-20] 16  SpO2:  [96 %-98 %] 97 %  BP: (153-223)/(70-90) 153/70     Weight: 60.8 kg (134 lb)  Body mass index is 26.17 kg/m².     Physical Exam  Constitutional:       General: She is not in acute distress.     Appearance: Normal appearance. She is not ill-appearing.   HENT:      Right Ear: There is no impacted cerumen.      Left Ear: There is no impacted cerumen.      Nose: No congestion.      Mouth/Throat:      Pharynx: No oropharyngeal exudate or posterior oropharyngeal erythema.   Eyes:      General:         Right eye: No discharge.         Left eye: No discharge.   Cardiovascular:      Rate and Rhythm: Normal rate.      Heart sounds:     No friction rub.   Pulmonary:      Breath sounds: No wheezing or rales.   Abdominal:      Palpations: There is no mass.      Hernia: No hernia is present.   Musculoskeletal:         General: No deformity.      Right lower leg: No edema.   Neurological:      Mental Status: She is alert.   Psychiatric:          Mood and Affect: Mood normal.         Behavior: Behavior normal.              Significant Labs: All pertinent labs within the past 24 hours have been reviewed.  A1C: No results for input(s): HGBA1C in the last 4320 hours.  Bilirubin:   Recent Labs   Lab 05/04/23  1517   BILITOT 0.4     Blood Culture: No results for input(s): LABBLOO in the last 48 hours.  BMP:   Recent Labs   Lab 05/04/23  1517         K 4.2      CO2 24   BUN 14   CREATININE 1.2   CALCIUM 9.4     CBC:   Recent Labs   Lab 05/04/23  1517   WBC 8.58   HGB 13.9   HCT 41.6        CMP:   Recent Labs   Lab 05/04/23  1517      K 4.2      CO2 24      BUN 14   CREATININE 1.2   CALCIUM 9.4   PROT 8.0   ALBUMIN 4.1   BILITOT 0.4   ALKPHOS 43*   AST 25   ALT 15   ANIONGAP 11     Coagulation: No results for input(s): PT, INR, APTT in the last 48 hours.  Lipase:   Recent Labs   Lab 05/04/23  1517   LIPASE 43     Lipid Panel: No results for input(s): CHOL, HDL, LDLCALC, TRIG, CHOLHDL in the last 48 hours.    Significant Imaging: I have reviewed all pertinent imaging results/findings within the past 24 hours.  I have reviewed and interpreted all pertinent imaging results/findings within the past 24 hours.

## 2023-05-05 NOTE — ASSESSMENT & PLAN NOTE
Unclear etiology for 10 day persistent nausea with poor oral intake  Lead to admit for observation for poor oral intake    Plan:  IV Zofran as needed  IV Compazine as needed

## 2023-05-05 NOTE — H&P
[facility] Medicine  History & Physical    Patient Name: Denise Berrios  MRN: [id number]  Patient Class: OP- Observation  Admission Date: 5/4/2023  Attending Physician: Jabier Lua MD  Primary Care Provider: Aleksandra Carey MD         Patient information was obtained from patient and ER records.     Subjective:     Principal Problem:Hypertensive urgency    Chief Complaint:   Chief Complaint   Patient presents with    Nausea     Pt reports nausea for past 10 days unrelieved by PO zofran. Pt denies emesis or abd pain.         HPI: Denise Berrios is a 73 year old female with past medical history of GERD, Graves disease, hypertension, IBS who presents with nausea.    Patient states that over the last 10 days, she has had significant nausea with poor oral intake but not associated with vomiting.  Oral Zofran without significant relief.  Patient reports an episode lightheadedness and palpitations when getting out of the shower, which prompted her to go to ED evaluation.    In the ED, patient was noted to be hypertensive to 163/72, with blood pressure readings as high as 223/88.  CBC was completely unremarkable.  CMP shows slight elevation of alk-phos and decreased GFR.  Troponin of 0.03.  UA showed slight leukocyte esterase and WBCs.  EKG showed nonspecific T-wave changes.    Medicine consulted for patient's hypertension, troponin leak, and nausea              Past Medical History:   Diagnosis Date    Acid reflux     Graves disease     diffuse Toxic Goiter    Hypertension     IBS (irritable bowel syndrome)     Lichen sclerosus     Migraine     Stroke        Past Surgical History:   Procedure Laterality Date    EYE SURGERY      HYSTERECTOMY      CINDI for ?? cervical cancer, had normal paps    LEFT HEART CATHETERIZATION N/A 10/20/2020    Procedure: Left heart cath;  Surgeon: Bony Schultz MD;  Location: [facility] CATH LAB/EP;  Service: Cardiology;  Laterality: N/A;    REVERSE TOTAL  SHOULDER ARTHROPLASTY Right 8/19/2022    Procedure: ARTHROPLASTY, SHOULDER, TOTAL, REVERSE;  Surgeon: Bradley Finnegan Jr., MD;  Location: [facility];  Service: Orthopedics;  Laterality: Right;  SONIA linda CONFIRMED/8/18/Shaista Finnegan- hemosorb AT        Review of patient's allergies indicates:   Allergen Reactions    Phenobarbital Anaphylaxis    Penicillins Other (See Comments)    Phenobarbital sodium     Propylthiouracil     Thyroid      Note: PTU    Topamax [topiramate] Diarrhea     GI symptoms    Cefaclor Rash and Other (See Comments)    Clopidogrel Rash    Methimazole Rash and Other (See Comments)    Methimazole (bulk) Rash    Penicillin Rash    Sulfa (sulfonamide antibiotics) Rash and Other (See Comments)       No current facility-administered medications on file prior to encounter.     Current Outpatient Medications on File Prior to Encounter   Medication Sig    aspirin (ECOTRIN) 81 MG EC tablet once a day    atorvastatin (LIPITOR) 80 MG tablet TAKE 1 TABLET DAILY    butalbital-acetaminophen-caffeine -40 mg (FIORICET, ESGIC) -40 mg per tablet Take 1 tablet by mouth nightly as needed for Headaches.    clobetasol 0.05% (TEMOVATE) 0.05 % Oint APPLY TOPICALLY TWICE A DAY    diclofenac sodium (VOLTAREN) 1 % Gel Apply 2 g topically 3 (three) times daily.    EScitalopram oxalate (LEXAPRO) 20 MG tablet Take 1 tablet (20 mg total) by mouth once daily.    ibuprofen (ADVIL,MOTRIN) 800 MG tablet Take 800 mg by mouth every 6 (six) hours as needed for Pain.    levothyroxine (SYNTHROID) 88 MCG tablet Take 1 tablet (88 mcg total) by mouth before breakfast.    losartan (COZAAR) 50 MG tablet Take 1 tablet (50 mg total) by mouth once daily.    metoprolol tartrate (LOPRESSOR) 50 MG tablet Take 1 tablet (50 mg total) by mouth 2 (two) times daily.    nortriptyline (PAMELOR) 10 MG capsule TAKE 1 CAPSULE DAILY    omeprazole (PRILOSEC) 40 MG capsule TAKE 1 CAPSULE(40 MG) BY MOUTH  EVERY DAY    pregabalin (LYRICA) 75 MG capsule TAKE 1 CAPSULE BY MOUTH TWICE DAILY FOR NEUROPATHIC PAIN    solifenacin (VESICARE) 10 MG tablet TAKE 1 TABLET DAILY    traMADoL (ULTRAM) 50 mg tablet Take 50 mg by mouth every 6 (six) hours as needed for Pain.    VITAMIN D2 1,250 mcg (50,000 unit) capsule TAKE 1 CAPSULE EVERY 7 DAYS    diclofenac sodium (VOLTAREN) 1 % Gel Apply 2 g topically 3 (three) times daily.    naloxone (NARCAN) 4 mg/actuation Spry 4mg by nasal route as needed for opioid overdose; may repeat every 2-3 minutes in alternating nostrils until medical help arrives. Call 911    [DISCONTINUED] HYDROcodone-acetaminophen (NORCO) 5-325 mg per tablet Take 1 tablet by mouth every 12 (twelve) hours as needed for Pain (as needed for severe pain only).    [DISCONTINUED] meloxicam (MOBIC) 15 MG tablet Take 1 tablet (15 mg total) by mouth once daily.     Family History       Problem Relation (Age of Onset)    Hypertension Father    Kidney failure Father    Stomach cancer Mother          Tobacco Use    Smoking status: Never     Passive exposure: Never    Smokeless tobacco: Never   Substance and Sexual Activity    Alcohol use: Yes    Drug use: No    Sexual activity: Yes     Partners: Male     Birth control/protection: See Surgical Hx     Review of Systems   Constitutional:  Negative for activity change and appetite change.   HENT:  Negative for ear discharge and ear pain.    Eyes:  Negative for pain and redness.   Respiratory:  Negative for cough.    Cardiovascular:  Positive for palpitations. Negative for leg swelling.   Gastrointestinal:  Positive for nausea.   Endocrine: Negative for polydipsia.   Genitourinary:  Negative for menstrual problem and urgency.   Musculoskeletal:  Negative for myalgias and neck pain.   Skin:  Negative for pallor and rash.   Allergic/Immunologic: Negative for food allergies and immunocompromised state.   Neurological:  Positive for weakness and light-headedness. Negative  for tremors and syncope.   Hematological:  Negative for adenopathy. Does not bruise/bleed easily.   Psychiatric/Behavioral:  Negative for confusion and decreased concentration.    Objective:     Vital Signs (Most Recent):  Temp: 98 °F (36.7 °C) (05/04/23 2046)  Pulse: 62 (05/04/23 2200)  Resp: 16 (05/04/23 2046)  BP: (!) 153/70 (05/04/23 2200)  SpO2: 97 % (05/04/23 2046)   Vital Signs (24h Range):  Temp:  [97.9 °F (36.6 °C)-98 °F (36.7 °C)] 98 °F (36.7 °C)  Pulse:  [59-68] 62  Resp:  [16-20] 16  SpO2:  [96 %-98 %] 97 %  BP: (153-223)/(70-90) 153/70     Weight: 60.8 kg (134 lb)  Body mass index is 26.17 kg/m².     Physical Exam  Constitutional:       General: She is not in acute distress.     Appearance: Normal appearance. She is not ill-appearing.   HENT:      Right Ear: There is no impacted cerumen.      Left Ear: There is no impacted cerumen.      Nose: No congestion.      Mouth/Throat:      Pharynx: No oropharyngeal exudate or posterior oropharyngeal erythema.   Eyes:      General:         Right eye: No discharge.         Left eye: No discharge.   Cardiovascular:      Rate and Rhythm: Normal rate.      Heart sounds:     No friction rub.   Pulmonary:      Breath sounds: No wheezing or rales.   Abdominal:      Palpations: There is no mass.      Hernia: No hernia is present.   Musculoskeletal:         General: No deformity.      Right lower leg: No edema.   Neurological:      Mental Status: She is alert.   Psychiatric:         Mood and Affect: Mood normal.         Behavior: Behavior normal.              Significant Labs: All pertinent labs within the past 24 hours have been reviewed.  A1C: No results for input(s): HGBA1C in the last 4320 hours.  Bilirubin:   Recent Labs   Lab 05/04/23  1517   BILITOT 0.4     Blood Culture: No results for input(s): LABBLOO in the last 48 hours.  BMP:   Recent Labs   Lab 05/04/23  1517         K 4.2      CO2 24   BUN 14   CREATININE 1.2   CALCIUM 9.4     CBC:    Recent Labs   Lab 05/04/23  1517   WBC 8.58   HGB 13.9   HCT 41.6        CMP:   Recent Labs   Lab 05/04/23  1517      K 4.2      CO2 24      BUN 14   CREATININE 1.2   CALCIUM 9.4   PROT 8.0   ALBUMIN 4.1   BILITOT 0.4   ALKPHOS 43*   AST 25   ALT 15   ANIONGAP 11     Coagulation: No results for input(s): PT, INR, APTT in the last 48 hours.  Lipase:   Recent Labs   Lab 05/04/23  1517   LIPASE 43     Lipid Panel: No results for input(s): CHOL, HDL, LDLCALC, TRIG, CHOLHDL in the last 48 hours.    Significant Imaging: I have reviewed all pertinent imaging results/findings within the past 24 hours.  I have reviewed and interpreted all pertinent imaging results/findings within the past 24 hours.    Assessment/Plan:     * Hypertensive urgency  Patient has a current diagnosis of hypertensive urgency (without evidence of end organ damage) which is controlled.  Latest blood pressure and vitals reviewed-   Temp:  [97.9 °F (36.6 °C)-98 °F (36.7 °C)]   Pulse:  [59-68]   Resp:  [16-20]   BP: (153-223)/(70-90)   SpO2:  [96 %-98 %] .   Patient currently off IV antihypertensives.   Home meds for hypertension were reviewed and noted below.   Hypertension Medications             losartan (COZAAR) 50 MG tablet Take 1 tablet (50 mg total) by mouth once daily.    metoprolol tartrate (LOPRESSOR) 50 MG tablet Take 1 tablet (50 mg total) by mouth 2 (two) times daily.          Medication adjustment for hospital antihypertensives is as follows-     Increase losartan 50 to 100 mg daily  Start amlodipine 5 mg  Give hydralazine for systolic greater than 180  Continue Lopressor, consider Coreg    Will aim for controlled BP reduction by medications noted above. Monitor and mitigate end organ damage as indicated.    Elevated troponin  Elevated on presentation, likely due to demand ischemia  EKG without ST segment elevations     Plan:  May consider consulting Cardiology outpatient        Nausea  Unclear etiology for 10  day persistent nausea with poor oral intake  Lead to admit for observation for poor oral intake    Plan:  IV Zofran as needed  IV Compazine as needed      Essential hypertension  Severely hypertensive to max of 220 systolics    Plan:  On losartan 50 mg at home      PVC (premature ventricular contraction)  Reports palpitations    Plan:  Continue Lopressor  Ordered continuous tele      Overactive bladder  No current complaints of overactive bladder    Plan:  Continue VESIcare      Migraine headache  No current complaints of headaches     Plan:   Give Fioricet as needed  May give ibuprofen and or Tylenol      Dyslipidemia  Continue atorvastatin 80 mg      Other specified hypothyroidism  Continue levothyroxine 88 mcg      Anxiety  Somewhat anxious on exam    Plan:  Continue Lexapro 20 mg      VTE Risk Mitigation (From admission, onward)         Ordered     heparin (porcine) injection 5,000 Units  Every 8 hours         05/04/23 1758     IP VTE HIGH RISK PATIENT  Once         05/04/23 1758     Place sequential compression device  Until discontinued         05/04/23 1758                   On 05/04/2023, patient should be placed in hospital observation services under my care.        Jabier Lua MD  Department of Hospital Medicine  [facility] - Telemetry

## 2023-05-05 NOTE — CARE UPDATE
She was admitted to the hospital medicine service for further care of hypertensive urgency.  Per the patient she is been having nausea for the past 10 days that has been unrelieved by oral Zofran at home.  Patient does deny any vomiting and abdominal pain at home.  Patient does have a history of IBS.  She states this nausea has stopped her from taking her home blood pressure medications.  After resuming the patient's home meds her blood pressure did normalize.  We will resume her normal hypertensive regimen.  There is a concern for afebrile state in this patient.  Her temp this morning was 100 oral.  She was noted with a slight UTI admission.  Will start Cipro.      Guillermo Alvarado NP

## 2023-05-05 NOTE — PLAN OF CARE
SW met with pt at bedside to complete assessment. Pt is AxO X3  and able to verbally answer assessment questions. Pt confirmed demographic information. Pt reports to live at home with spouse John and feel safe. Pt has no needs and reports being independent of [facility]. Pt had past shoulder surgery but reports no current DME in use. Pt able to drive and complete her own errands. Pt spouse to transport home at time of discharge. SW updated whiteboard with [facility] name and contact information. SW confirmed pt understanding of Observation unit and expected discharge plan. SW will continue to follow pt throughout care and assist with any discharge needs.         05/05/23 0987   Discharge Planning   Assessment Type Discharge Planning Brief Assessment   Resource/Environmental Concerns none   Support Systems Spouse/significant other   Equipment Currently Used at Home none   Current Living Arrangements home   Patient/Family Anticipates Transition to home with family   Patient/Family Anticipated Services at Transition none   DME Needed Upon Discharge  none   Discharge Plan A Home with family       Future Appointments   Date Time Provider Department Center   5/22/2023  8:30 AM Aminata Rubalcava MD [facility] IMPRI [facility]   7/10/2023 11:45 AM INJECTION [facility] INF [facility]   8/7/2023  2:00 PM Micheline Lay PA-C [facility] ORTHO [facility]   10/3/2023  8:50 AM APPOINTMENT [facility] LAB [facility]   10/5/2023 10:15 AM Aleksandra Carey MD [facility]  [phone]

## 2023-05-05 NOTE — PLAN OF CARE
VIRTUAL NURSE:  Labs, notes, orders, and careplan reviewed. VN to be available as needed.    Problem: Adult Inpatient Plan of Care  Goal: Plan of Care Review  Outcome: Ongoing, Progressing  Goal: Patient-Specific Goal (Individualized)  Outcome: Ongoing, Progressing  Goal: Absence of Hospital-Acquired Illness or Injury  Outcome: Ongoing, Progressing  Goal: Optimal Comfort and Wellbeing  Outcome: Ongoing, Progressing  Goal: Readiness for Transition of Care  Outcome: Ongoing, Progressing     Problem: Fall Injury Risk  Goal: Absence of Fall and Fall-Related Injury  Outcome: Ongoing, Progressing     Problem: Fall Injury Risk  Goal: Absence of Fall and Fall-Related Injury  Outcome: Ongoing, Progressing     Problem: Chest Pain  Goal: Resolution of Chest Pain Symptoms  Outcome: Ongoing, Progressing     Problem: Hypertension Comorbidity  Goal: Blood Pressure in Desired Range  Outcome: Ongoing, Progressing     Problem: Pain Chronic (Persistent) (Comorbidity Management)  Goal: Acceptable Pain Control and Functional Ability  Outcome: Ongoing, Progressing     Problem: Nausea and Vomiting  Goal: Fluid and Electrolyte Balance  Outcome: Ongoing, Progressing     Problem: Fatigue  Goal: Improved Activity Tolerance  Outcome: Ongoing, Progressing

## 2023-05-05 NOTE — ASSESSMENT & PLAN NOTE
Elevated on presentation, likely due to demand ischemia  EKG without ST segment elevations     Plan:  May consider consulting Cardiology outpatient

## 2023-05-05 NOTE — ASSESSMENT & PLAN NOTE
No current complaints of headaches     Plan:   Give Fioricet as needed  May give ibuprofen and or Tylenol

## 2023-05-05 NOTE — ASSESSMENT & PLAN NOTE
Patient has a current diagnosis of hypertensive urgency (without evidence of end organ damage) which is controlled.  Latest blood pressure and vitals reviewed-   Temp:  [97.9 °F (36.6 °C)-98 °F (36.7 °C)]   Pulse:  [59-68]   Resp:  [16-20]   BP: (153-223)/(70-90)   SpO2:  [96 %-98 %] .   Patient currently off IV antihypertensives.   Home meds for hypertension were reviewed and noted below.   Hypertension Medications             losartan (COZAAR) 50 MG tablet Take 1 tablet (50 mg total) by mouth once daily.    metoprolol tartrate (LOPRESSOR) 50 MG tablet Take 1 tablet (50 mg total) by mouth 2 (two) times daily.          Medication adjustment for hospital antihypertensives is as follows-     Increase losartan 50 to 100 mg daily  Start amlodipine 5 mg  Give hydralazine for systolic greater than 180  Continue Lopressor, consider Coreg    Will aim for controlled BP reduction by medications noted above. Monitor and mitigate end organ damage as indicated.

## 2023-05-06 VITALS
TEMPERATURE: 97 F | BODY MASS INDEX: 26.69 KG/M2 | HEART RATE: 76 BPM | OXYGEN SATURATION: 92 % | HEIGHT: 60 IN | WEIGHT: 135.94 LBS | DIASTOLIC BLOOD PRESSURE: 56 MMHG | SYSTOLIC BLOOD PRESSURE: 115 MMHG | RESPIRATION RATE: 18 BRPM

## 2023-05-06 PROBLEM — I16.0 HYPERTENSIVE URGENCY: Chronic | Status: RESOLVED | Noted: 2023-05-04 | Resolved: 2023-05-06

## 2023-05-06 PROBLEM — N39.0 UTI (URINARY TRACT INFECTION): Status: ACTIVE | Noted: 2023-05-06

## 2023-05-06 LAB
ANION GAP SERPL CALC-SCNC: 9 MMOL/L (ref 8–16)
BASOPHILS # BLD AUTO: 0.06 K/UL (ref 0–0.2)
BASOPHILS NFR BLD: 1 % (ref 0–1.9)
BUN SERPL-MCNC: 11 MG/DL (ref 8–23)
CALCIUM SERPL-MCNC: 7.9 MG/DL (ref 8.7–10.5)
CHLORIDE SERPL-SCNC: 108 MMOL/L (ref 95–110)
CO2 SERPL-SCNC: 21 MMOL/L (ref 23–29)
CREAT SERPL-MCNC: 1 MG/DL (ref 0.5–1.4)
DIFFERENTIAL METHOD: ABNORMAL
EOSINOPHIL # BLD AUTO: 0.2 K/UL (ref 0–0.5)
EOSINOPHIL NFR BLD: 3.2 % (ref 0–8)
ERYTHROCYTE [DISTWIDTH] IN BLOOD BY AUTOMATED COUNT: 12.7 % (ref 11.5–14.5)
EST. GFR  (NO RACE VARIABLE): 59 ML/MIN/1.73 M^2
GLUCOSE SERPL-MCNC: 92 MG/DL (ref 70–110)
HCT VFR BLD AUTO: 34.3 % (ref 37–48.5)
HGB BLD-MCNC: 11.2 G/DL (ref 12–16)
IMM GRANULOCYTES # BLD AUTO: 0.01 K/UL (ref 0–0.04)
IMM GRANULOCYTES NFR BLD AUTO: 0.2 % (ref 0–0.5)
LYMPHOCYTES # BLD AUTO: 2.3 K/UL (ref 1–4.8)
LYMPHOCYTES NFR BLD: 36.7 % (ref 18–48)
MCH RBC QN AUTO: 30.6 PG (ref 27–31)
MCHC RBC AUTO-ENTMCNC: 32.7 G/DL (ref 32–36)
MCV RBC AUTO: 94 FL (ref 82–98)
MONOCYTES # BLD AUTO: 0.9 K/UL (ref 0.3–1)
MONOCYTES NFR BLD: 14 % (ref 4–15)
NEUTROPHILS # BLD AUTO: 2.8 K/UL (ref 1.8–7.7)
NEUTROPHILS NFR BLD: 44.9 % (ref 38–73)
NRBC BLD-RTO: 0 /100 WBC
PLATELET # BLD AUTO: 194 K/UL (ref 150–450)
PMV BLD AUTO: 10.5 FL (ref 9.2–12.9)
POTASSIUM SERPL-SCNC: 3.7 MMOL/L (ref 3.5–5.1)
RBC # BLD AUTO: 3.66 M/UL (ref 4–5.4)
SODIUM SERPL-SCNC: 138 MMOL/L (ref 136–145)
WBC # BLD AUTO: 6.29 K/UL (ref 3.9–12.7)

## 2023-05-06 PROCEDURE — 25000003 PHARM REV CODE 250: Performed by: NURSE PRACTITIONER

## 2023-05-06 PROCEDURE — 36415 COLL VENOUS BLD VENIPUNCTURE: CPT | Performed by: STUDENT IN AN ORGANIZED HEALTH CARE EDUCATION/TRAINING PROGRAM

## 2023-05-06 PROCEDURE — 25000003 PHARM REV CODE 250: Performed by: STUDENT IN AN ORGANIZED HEALTH CARE EDUCATION/TRAINING PROGRAM

## 2023-05-06 PROCEDURE — 63600175 PHARM REV CODE 636 W HCPCS: Performed by: NURSE PRACTITIONER

## 2023-05-06 PROCEDURE — 63600175 PHARM REV CODE 636 W HCPCS: Performed by: STUDENT IN AN ORGANIZED HEALTH CARE EDUCATION/TRAINING PROGRAM

## 2023-05-06 PROCEDURE — 80048 BASIC METABOLIC PNL TOTAL CA: CPT | Performed by: STUDENT IN AN ORGANIZED HEALTH CARE EDUCATION/TRAINING PROGRAM

## 2023-05-06 PROCEDURE — 85025 COMPLETE CBC W/AUTO DIFF WBC: CPT | Performed by: STUDENT IN AN ORGANIZED HEALTH CARE EDUCATION/TRAINING PROGRAM

## 2023-05-06 RX ORDER — PROMETHAZINE HYDROCHLORIDE 12.5 MG/1
12.5 TABLET ORAL EVERY 6 HOURS PRN
Qty: 10 TABLET | Refills: 0 | Status: SHIPPED | OUTPATIENT
Start: 2023-05-06

## 2023-05-06 RX ORDER — CIPROFLOXACIN 500 MG/1
500 TABLET ORAL EVERY 12 HOURS
Qty: 10 TABLET | Refills: 0 | Status: SHIPPED | OUTPATIENT
Start: 2023-05-06 | End: 2023-05-11

## 2023-05-06 RX ADMIN — PREGABALIN 75 MG: 75 CAPSULE ORAL at 09:05

## 2023-05-06 RX ADMIN — HEPARIN SODIUM 5000 UNITS: 5000 INJECTION INTRAVENOUS; SUBCUTANEOUS at 06:05

## 2023-05-06 RX ADMIN — LEVOTHYROXINE SODIUM 88 MCG: 88 TABLET ORAL at 06:05

## 2023-05-06 RX ADMIN — CIPROFLOXACIN 400 MG: 2 INJECTION, SOLUTION INTRAVENOUS at 01:05

## 2023-05-06 RX ADMIN — ESCITALOPRAM OXALATE 20 MG: 10 TABLET ORAL at 09:05

## 2023-05-06 RX ADMIN — SIMETHICONE 80 MG: 80 TABLET, CHEWABLE ORAL at 09:05

## 2023-05-06 RX ADMIN — ATORVASTATIN CALCIUM 80 MG: 40 TABLET, FILM COATED ORAL at 09:05

## 2023-05-06 RX ADMIN — OXYBUTYNIN CHLORIDE 10 MG: 5 TABLET, EXTENDED RELEASE ORAL at 09:05

## 2023-05-06 RX ADMIN — ASPIRIN 81 MG: 81 TABLET, COATED ORAL at 09:05

## 2023-05-06 RX ADMIN — LOSARTAN POTASSIUM 50 MG: 50 TABLET, FILM COATED ORAL at 09:05

## 2023-05-06 RX ADMIN — PANTOPRAZOLE SODIUM 40 MG: 40 TABLET, DELAYED RELEASE ORAL at 09:05

## 2023-05-06 RX ADMIN — NORTRIPTYLINE HYDROCHLORIDE 10 MG: 10 CAPSULE ORAL at 09:05

## 2023-05-06 NOTE — PLAN OF CARE
Discharge orders noted. Additional clinical references attached. Patient's discharge instructions reviewed by this VN with patient and . Education provided on home care instructions, medications, diagnosis, when to seek medical attention, and follow-up appointments. New medications sent to patient's pharmacy. Patient/ verbalized understanding. Patient's  to provide ride/transportation home. Allowed time for questions. Floor nurse notified.

## 2023-05-06 NOTE — PLAN OF CARE
05/06/23 0834   Final Note   Assessment Type Final Discharge Note   Anticipated Discharge Disposition Home   Hospital Resources/Appts/Education Provided Appointments scheduled and added to AVS   Post-Acute Status   Discharge Delays None known at this time     Patient states that she has discharge transportation home with her spouse .

## 2023-05-06 NOTE — DISCHARGE SUMMARY
[facility] Medicine  Discharge Summary      Patient Name: Denise Berrios  MRN: [id number]  TREMAINE: [id number]  Patient Class: IP- Inpatient  Admission Date: 5/4/2023  Hospital Length of Stay: 1 days  Discharge Date and Time:  05/06/2023 9:05 AM  Attending Physician: Layla Benavides*   Discharging Provider: Guillermo Alvarado NP  Primary Care Provider: Aleksandra Carey MD    Primary Care Team: Networked reference to record PCT     HPI:   Denise Berrios is a 73 year old female with past medical history of GERD, Graves disease, hypertension, IBS who presents with nausea.    Patient states that over the last 10 days, she has had significant nausea with poor oral intake but not associated with vomiting.  Oral Zofran without significant relief.  Patient reports an episode lightheadedness and palpitations when getting out of the shower, which prompted her to go to ED evaluation.    In the ED, patient was noted to be hypertensive to 163/72, with blood pressure readings as high as 223/88.  CBC was completely unremarkable.  CMP shows slight elevation of alk-phos and decreased GFR.  Troponin of 0.03.  UA showed slight leukocyte esterase and WBCs.  EKG showed nonspecific T-wave changes.    Medicine consulted for patient's hypertension, troponin leak, and nausea              * No surgery found *      Hospital Course:   She was admitted to the hospital medicine service for further care of hypertensive urgency.  Per the patient she is been having nausea for the past 10 days that has been unrelieved by oral Zofran at home.  Patient does deny any vomiting and abdominal pain at home.  Patient does have a history of IBS.  She states this nausea has stopped her from taking her home blood pressure medications.  After resuming the patient's home meds her blood pressure did normalize.  We will resume her normal hypertensive regimen.  There is a concern for afebrile state in this patient.  Her temp this  morning was 100 oral.  She was noted with a slight UTI admission.  Will start Cipro 500 mg PO bid for 5 days.       Goals of Care Treatment Preferences:  Code Status: Full Code      Consults:     No new Assessment & Plan notes have been filed under this hospital service since the last note was generated.  Service: Hospital Medicine    Final Active Diagnoses:    Diagnosis Date Noted POA    UTI (urinary tract infection) [N39.0] 05/06/2023 Yes    Nausea [R11.0] 05/04/2023 Yes     Chronic    Elevated troponin [R77.8] 05/04/2023 Yes    Essential hypertension [I10] 10/19/2020 Yes    PVC (premature ventricular contraction) [I49.3] 10/16/2020 Yes    Anxiety [F41.9] 08/28/2019 Yes    Other specified hypothyroidism [E03.8] 08/28/2019 Yes    Overactive bladder [N32.81] 08/28/2019 Yes    Dyslipidemia [E78.5] 08/28/2019 Yes    Migraine headache [G43.909] 09/04/2014 Yes      Problems Resolved During this Admission:    Diagnosis Date Noted Date Resolved POA    PRINCIPAL PROBLEM:  Hypertensive urgency [I16.0] 05/04/2023 05/06/2023 Yes     Chronic       Discharged Condition: stable    Disposition: Home or Self Care    Follow Up:   Follow-up Information     Aminata Rubalcava MD Follow up on 5/22/2023.    Specialty: Internal Medicine  Why: OUT PATIENT  SERVICES/Hospital followup- please report to the clinic at 8:30 am to meet with the MD  Contact information:  [address]  [address]  [facility] [address]  [phone]             Aleksandra Carey MD Follow up in 1 week(s).    Specialty: Internal Medicine  Contact information:  [address]  [address]  [facility] [address]  [phone]                       Patient Instructions:      Ambulatory referral/consult to Gastroenterology   Standing Status: Future   Referral Priority: Routine Referral Type: Consultation   Referral Reason: Specialty Services Required   Requested Specialty: Gastroenterology   Number of Visits Requested: 1     Diet Cardiac     Notify  your health care provider if you experience any of the following:  temperature >100.4     Notify your health care provider if you experience any of the following:  persistent nausea and vomiting or diarrhea     Notify your health care provider if you experience any of the following:  severe uncontrolled pain     Notify your health care provider if you experience any of the following:  redness, tenderness, or signs of infection (pain, swelling, redness, odor or green/yellow discharge around incision site)     Notify your health care provider if you experience any of the following:  difficulty breathing or increased cough     Notify your health care provider if you experience any of the following:  severe persistent headache     Notify your health care provider if you experience any of the following:  worsening rash     Notify your health care provider if you experience any of the following:  persistent dizziness, light-headedness, or visual disturbances     Notify your health care provider if you experience any of the following:  increased confusion or weakness     Activity as tolerated       Significant Diagnostic Studies: Labs: All labs within the past 24 hours have been reviewed    Pending Diagnostic Studies:     None         Medications:  Reconciled Home Medications:      Medication List      START taking these medications    ciprofloxacin HCl 500 MG tablet  Commonly known as: CIPRO  Take 1 tablet (500 mg total) by mouth every 12 (twelve) hours. for 5 days        CONTINUE taking these medications    aspirin 81 MG EC tablet  Commonly known as: ECOTRIN  once a day     atorvastatin 80 MG tablet  Commonly known as: LIPITOR  TAKE 1 TABLET DAILY     butalbital-acetaminophen-caffeine -40 mg -40 mg per tablet  Commonly known as: FIORICET, ESGIC  Take 1 tablet by mouth nightly as needed for Headaches.     clobetasol 0.05% 0.05 % Oint  Commonly known as: TEMOVATE  APPLY TOPICALLY TWICE A DAY     * diclofenac  sodium 1 % Gel  Commonly known as: VOLTAREN  Apply 2 g topically 3 (three) times daily.     * diclofenac sodium 1 % Gel  Commonly known as: VOLTAREN  Apply 2 g topically 3 (three) times daily.     EScitalopram oxalate 20 MG tablet  Commonly known as: LEXAPRO  Take 1 tablet (20 mg total) by mouth once daily.     ibuprofen 800 MG tablet  Commonly known as: ADVIL,MOTRIN  Take 800 mg by mouth every 6 (six) hours as needed for Pain.     levothyroxine 88 MCG tablet  Commonly known as: SYNTHROID  Take 1 tablet (88 mcg total) by mouth before breakfast.     losartan 50 MG tablet  Commonly known as: COZAAR  Take 1 tablet (50 mg total) by mouth once daily.     metoprolol tartrate 50 MG tablet  Commonly known as: LOPRESSOR  Take 1 tablet (50 mg total) by mouth 2 (two) times daily.     naloxone 4 mg/actuation Spry  Commonly known as: NARCAN  4mg by nasal route as needed for opioid overdose; may repeat every 2-3 minutes in alternating nostrils until medical help arrives. Call 911     nortriptyline 10 MG capsule  Commonly known as: PAMELOR  TAKE 1 CAPSULE DAILY     omeprazole 40 MG capsule  Commonly known as: PRILOSEC  TAKE 1 CAPSULE(40 MG) BY MOUTH EVERY DAY     pregabalin 75 MG capsule  Commonly known as: LYRICA  TAKE 1 CAPSULE BY MOUTH TWICE DAILY FOR NEUROPATHIC PAIN     solifenacin 10 MG tablet  Commonly known as: VESICARE  TAKE 1 TABLET DAILY     traMADoL 50 mg tablet  Commonly known as: ULTRAM  Take 50 mg by mouth every 6 (six) hours as needed for Pain.     VITAMIN D2 50,000 unit Cap  Generic drug: ergocalciferol  TAKE 1 CAPSULE EVERY 7 DAYS         * This list has 2 medication(s) that are the same as other medications prescribed for you. Read the directions carefully, and ask your doctor or other care provider to review them with you.                Indwelling Lines/Drains at time of discharge:   Lines/Drains/Airways     None                 Time spent on the discharge of patient: 35 minutes         Guillermo CHRISTIAN  EPI Alvarado  Department of [facility] Medicine  [facility]

## 2023-05-06 NOTE — HOSPITAL COURSE
She was admitted to the hospital medicine service for further care of hypertensive urgency.  Per the patient she is been having nausea for the past 10 days that has been unrelieved by oral Zofran at home.  Patient does deny any vomiting and abdominal pain at home.  Patient does have a history of IBS.  She states this nausea has stopped her from taking her home blood pressure medications.  After resuming the patient's home meds her blood pressure did normalize.  We will resume her normal hypertensive regimen.  There is a concern for afebrile state in this patient.  Her temp this morning was 100 oral.  She was noted with a slight UTI admission.  Will start Cipro.

## 2023-05-06 NOTE — PLAN OF CARE
Discharge orders noted. AVS prepared with medication details, clinical reference list and 24/7 [facility] Nurse On Call number attached. AVS review to follow.

## 2023-05-09 ENCOUNTER — PATIENT OUTREACH (OUTPATIENT)
Dept: ADMINISTRATIVE | Facility: CLINIC | Age: 73
End: 2023-05-09
Payer: MEDICARE

## 2023-05-09 NOTE — PROGRESS NOTES
C3 nurse spoke with Denise Berrios for a TCC post hospital discharge follow up call. Pt denies any new symptoms or complaints. The pt states she is planning to cancel the HOSFU with Aminata Rubalcava MD (Internal Medicine) on 5/22/2023 at 0830 and schedule a HOSFU with her PCP Aleksandra Carey MD (Internal Medicine) within 1 week. Pt states she has both MD clinic numbers and plans to call and update her appt schedule. Message routed to PCP.

## 2023-05-10 LAB
BACTERIA BLD CULT: NORMAL
BACTERIA BLD CULT: NORMAL

## 2023-05-21 NOTE — SUBJECTIVE & OBJECTIVE
Interval History: cont IV abx    Review of Systems   Constitutional:  Negative for chills and fatigue.   Gastrointestinal:  Negative for nausea and vomiting.   Genitourinary:  Negative for hematuria.   Objective:     Vital Signs (Most Recent):  Temp: 96.6 °F (35.9 °C) (05/06/23 0735)  Pulse: 76 (05/06/23 0735)  Resp: 18 (05/06/23 0735)  BP: (!) 115/56 (05/06/23 0735)  SpO2: (!) 92 % (05/06/23 0735) Vital Signs (24h Range):        Weight: 61.7 kg (135 lb 15.1 oz)  Body mass index is 26.55 kg/m².  No intake or output data in the 24 hours ending 05/21/23 1135      Physical Exam  Vitals and nursing note reviewed.   Constitutional:       Appearance: She is obese.   HENT:      Nose: Nose normal.      Mouth/Throat:      Mouth: Mucous membranes are moist.   Eyes:      Extraocular Movements: Extraocular movements intact.   Cardiovascular:      Rate and Rhythm: Normal rate and regular rhythm.   Pulmonary:      Effort: Pulmonary effort is normal.      Breath sounds: Normal breath sounds.   Abdominal:      General: Bowel sounds are normal.      Palpations: Abdomen is soft.   Musculoskeletal:         General: Normal range of motion.   Skin:     General: Skin is warm.      Capillary Refill: Capillary refill takes 2 to 3 seconds.   Neurological:      Mental Status: She is alert.   Psychiatric:         Mood and Affect: Mood normal.         Behavior: Behavior normal.           Significant Labs: All pertinent labs within the past 24 hours have been reviewed.    Significant Imaging: I have reviewed all pertinent imaging results/findings within the past 24 hours.

## 2023-05-21 NOTE — PROGRESS NOTES
[facility] Medicine  Progress Note    Patient Name: Denise Berrios  MRN: [id number]  Patient Class: IP- Inpatient   Admission Date: 5/4/2023  Length of Stay: 1 days  Attending Physician: No att. providers found  Primary Care Provider: Aleksandra Carey MD        Subjective:     Principal Problem:Hypertensive urgency        HPI:  Denise Berrios is a 73 year old female with past medical history of GERD, Graves disease, hypertension, IBS who presents with nausea.    Patient states that over the last 10 days, she has had significant nausea with poor oral intake but not associated with vomiting.  Oral Zofran without significant relief.  Patient reports an episode lightheadedness and palpitations when getting out of the shower, which prompted her to go to ED evaluation.    In the ED, patient was noted to be hypertensive to 163/72, with blood pressure readings as high as 223/88.  CBC was completely unremarkable.  CMP shows slight elevation of alk-phos and decreased GFR.  Troponin of 0.03.  UA showed slight leukocyte esterase and WBCs.  EKG showed nonspecific T-wave changes.    Medicine consulted for patient's hypertension, troponin leak, and nausea              Overview/Hospital Course:      She was admitted to the hospital medicine service for further care of hypertensive urgency.  Per the patient she is been having nausea for the past 10 days that has been unrelieved by oral Zofran at home.  Patient does deny any vomiting and abdominal pain at home.  Patient does have a history of IBS.  She states this nausea has stopped her from taking her home blood pressure medications.  After resuming the patient's home meds her blood pressure did normalize.  We will resume her normal hypertensive regimen.  There is a concern for afebrile state in this patient.  Her temp this morning was 100 oral.  She was noted with a slight UTI admission.  Will start Cipro.      Interval History: cont IV abx    Review of Systems    Constitutional:  Negative for chills and fatigue.   Gastrointestinal:  Negative for nausea and vomiting.   Genitourinary:  Negative for hematuria.   Objective:     Vital Signs (Most Recent):  Temp: 96.6 °F (35.9 °C) (05/06/23 0735)  Pulse: 76 (05/06/23 0735)  Resp: 18 (05/06/23 0735)  BP: (!) 115/56 (05/06/23 0735)  SpO2: (!) 92 % (05/06/23 0735) Vital Signs (24h Range):        Weight: 61.7 kg (135 lb 15.1 oz)  Body mass index is 26.55 kg/m².  No intake or output data in the 24 hours ending 05/21/23 1135      Physical Exam  Vitals and nursing note reviewed.   Constitutional:       Appearance: She is obese.   HENT:      Nose: Nose normal.      Mouth/Throat:      Mouth: Mucous membranes are moist.   Eyes:      Extraocular Movements: Extraocular movements intact.   Cardiovascular:      Rate and Rhythm: Normal rate and regular rhythm.   Pulmonary:      Effort: Pulmonary effort is normal.      Breath sounds: Normal breath sounds.   Abdominal:      General: Bowel sounds are normal.      Palpations: Abdomen is soft.   Musculoskeletal:         General: Normal range of motion.   Skin:     General: Skin is warm.      Capillary Refill: Capillary refill takes 2 to 3 seconds.   Neurological:      Mental Status: She is alert.   Psychiatric:         Mood and Affect: Mood normal.         Behavior: Behavior normal.           Significant Labs: All pertinent labs within the past 24 hours have been reviewed.    Significant Imaging: I have reviewed all pertinent imaging results/findings within the past 24 hours.      Assessment/Plan:      Elevated troponin  Elevated on presentation, likely due to demand ischemia  EKG without ST segment elevations     Plan:  May consider consulting Cardiology outpatient        Nausea  Unclear etiology for 10 day persistent nausea with poor oral intake  Lead to admit for observation for poor oral intake    Plan:  IV Zofran as needed  IV Compazine as needed      Essential hypertension  Severely  hypertensive to max of 220 systolics    Plan:  On losartan 50 mg at home      PVC (premature ventricular contraction)  Reports palpitations    Plan:  Continue Lopressor  Ordered continuous tele      Overactive bladder  No current complaints of overactive bladder    Plan:  Continue VESIcare      Migraine headache  No current complaints of headaches     Plan:   Give Fioricet as needed  May give ibuprofen and or Tylenol      Dyslipidemia  Continue atorvastatin 80 mg      Other specified hypothyroidism  Continue levothyroxine 88 mcg      Anxiety  Somewhat anxious on exam    Plan:  Continue Lexapro 20 mg        VTE Risk Mitigation (From admission, onward)         Ordered     IP VTE HIGH RISK PATIENT  Once         05/04/23 1758                Discharge Planning   BROOK: 5/6/2023     Code Status: Prior   Is the patient medically ready for discharge?:     Reason for patient still in hospital (select all that apply): Treatment, Imaging and Consult recommendations  Discharge Plan A: Home with family   Discharge Delays: None known at this time              Guillermo Alvarado NP  Department of Hospital Medicine   [place] [facility]

## 2023-07-10 ENCOUNTER — INFUSION (OUTPATIENT)
Dept: INFECTIOUS DISEASES | Facility: HOSPITAL | Age: 73
End: 2023-07-10
Payer: MEDICARE

## 2023-07-10 VITALS
SYSTOLIC BLOOD PRESSURE: 122 MMHG | HEIGHT: 60 IN | BODY MASS INDEX: 29.11 KG/M2 | DIASTOLIC BLOOD PRESSURE: 59 MMHG | OXYGEN SATURATION: 99 % | RESPIRATION RATE: 18 BRPM | WEIGHT: 148.25 LBS | HEART RATE: 67 BPM | TEMPERATURE: 98 F

## 2023-07-10 DIAGNOSIS — M80.00XD AGE-RELATED OSTEOPOROSIS WITH CURRENT PATHOLOGICAL FRACTURE WITH ROUTINE HEALING: Primary | ICD-10-CM

## 2023-07-10 PROCEDURE — 63600175 PHARM REV CODE 636 W HCPCS: Mod: JZ,JG | Performed by: INTERNAL MEDICINE

## 2023-07-10 PROCEDURE — 96372 THER/PROPH/DIAG INJ SC/IM: CPT

## 2023-07-10 RX ADMIN — DENOSUMAB 60 MG: 60 INJECTION SUBCUTANEOUS at 12:07

## 2023-07-10 NOTE — PROGRESS NOTES
Pt arrived for  Prolia injection, give 60 mg SQ via left arm tolerated well.  Denies dental procedures < 6 months. Presently taking Vit D. Next appt scheduled. Left in NAD.

## 2023-07-14 NOTE — PROCEDURES
Large Joint Aspiration/Injection    Date/Time: 2/11/2022 1:30 PM  Performed by: Marisela Flores PA-C  Authorized by: Marisela Flores PA-C     Location:  Shoulder  Medications:  40 mg triamcinolone acetonide 40 mg/mL    PROCEDURE NOTE:  I have explained the risks, benefits, and alternatives of the procedure in detail.  The patient voices understanding and all questions have been answered.  The patient agrees to proceed as planned, consents to injection. Pause for timeout. After a sterile prep of the skin performed in the normal fashion, the right shoulder is injected from the posterior approach using a 22 gauge needle with a combination of 2cc 1% lidocaine and 40 mg of kenalog. The patient is cautioned and immediate relief of pain is secondary to the local anesthetic and will be temporary.  After the anesthetic wears off there may be a increase in pain that may last for a few hours or a few days and they should use ice to help alleviate this flair up of pain.        Take your medication as prescribed.  Get plenty of rest and fluids.  Follow up with your primary care doctor.  Return to the ER with worsening symptoms or concerns.

## 2023-07-18 ENCOUNTER — PES CALL (OUTPATIENT)
Dept: ADMINISTRATIVE | Facility: CLINIC | Age: 73
End: 2023-07-18
Payer: MEDICARE

## 2023-07-26 DIAGNOSIS — E03.8 OTHER SPECIFIED HYPOTHYROIDISM: ICD-10-CM

## 2023-07-26 RX ORDER — NORTRIPTYLINE HYDROCHLORIDE 10 MG/1
10 CAPSULE ORAL DAILY
Qty: 90 CAPSULE | Refills: 3 | Status: SHIPPED | OUTPATIENT
Start: 2023-07-26 | End: 2023-10-05 | Stop reason: SDUPTHER

## 2023-07-26 RX ORDER — LEVOTHYROXINE SODIUM 88 UG/1
88 TABLET ORAL
Qty: 90 TABLET | Refills: 3 | Status: SHIPPED | OUTPATIENT
Start: 2023-07-26 | End: 2023-10-05 | Stop reason: SDUPTHER

## 2023-07-26 NOTE — TELEPHONE ENCOUNTER
----- Message from Ana Maria Hameed sent at 7/26/2023  2:11 PM CDT -----  Regarding: refill  Contact: [phone]  Type:  RX Refill Request    Who Called:  pt   Refill or New Rx: refill  RX Name and Strength: levothyroxine (SYNTHROID) 88 MCG tablet  How is the patient currently taking it? (ex. 1XDay):  Take 1 tablet (88 mcg total) by mouth before breakfast. - Oral  Is this a 30 day or 90 day RX: 90/3 refill    RX Name and Strength: nortriptyline (PAMELOR) 10 MG capsule  How is the patient currently taking it? (ex. 1XDay): TAKE 1 CAPSULE DAILY  Is this a 30 day or 90 day RX: 90/3 refills   Preferred Pharmacy with phone number:[id number] - [place], [place] - [address] AT [facility] [address]  Local or Mail Order: Mail Order f  Ordering Provider:   Would the patient rather a call back or a response via [facility]?  Call   Best Call Back Number: [phone]  Additional Information:

## 2023-08-04 NOTE — PROGRESS NOTES
Subjective:      Patient ID: Denise Berrios is a 73 y.o. female.  Chief Complaint: Follow-up (right shoulder )      HPI  Denise Berrios is a  73 y.o. female presenting today for follow up of right shoulder reverse arthroplasty (DOS: 8-).  She reports that she is now about 1 year postop, and continues to have shoulder pain during certain movements.  The shoulder is not painful at rest.    She describes the pain as beginning in her shoulder, but will radiate down the entirety of her arm into her hand.  She denies fevers, overlying skin changes to the shoulder, and or pain at rest.  She denies any new falls or trauma.      She is also been followed by pain management which is helping she is taking Lyrica.     Review of patient's allergies indicates:   Allergen Reactions    Phenobarbital Anaphylaxis    Penicillins Other (See Comments)    Phenobarbital sodium     Propylthiouracil     Thyroid      Note: PTU    Topamax [topiramate] Diarrhea     GI symptoms    Cefaclor Rash and Other (See Comments)    Clopidogrel Rash    Methimazole Rash and Other (See Comments)    Methimazole (bulk) Rash    Penicillin Rash    Sulfa (sulfonamide antibiotics) Rash and Other (See Comments)         Current Outpatient Medications   Medication Sig Dispense Refill    aspirin (ECOTRIN) 81 MG EC tablet once a day      atorvastatin (LIPITOR) 80 MG tablet TAKE 1 TABLET DAILY 90 tablet 2    butalbital-acetaminophen-caffeine -40 mg (FIORICET, ESGIC) -40 mg per tablet Take 1 tablet by mouth nightly as needed for Headaches. 30 tablet 5    clobetasol 0.05% (TEMOVATE) 0.05 % Oint APPLY TOPICALLY TWICE A DAY 45 g 1    diclofenac sodium (VOLTAREN) 1 % Gel Apply 2 g topically 3 (three) times daily. 200 g 1    diclofenac sodium (VOLTAREN) 1 % Gel Apply 2 g topically 3 (three) times daily. 100 g 3    EScitalopram oxalate (LEXAPRO) 20 MG tablet Take 1 tablet (20 mg total) by mouth once daily. 90 tablet 1    ibuprofen (ADVIL,MOTRIN) 800 MG  tablet Take 800 mg by mouth every 6 (six) hours as needed for Pain.      levothyroxine (SYNTHROID) 88 MCG tablet Take 1 tablet (88 mcg total) by mouth before breakfast. 90 tablet 3    losartan (COZAAR) 50 MG tablet Take 1 tablet (50 mg total) by mouth once daily. 90 tablet 3    metoprolol tartrate (LOPRESSOR) 50 MG tablet Take 1 tablet (50 mg total) by mouth 2 (two) times daily. 180 tablet 3    naloxone (NARCAN) 4 mg/actuation Spry 4mg by nasal route as needed for opioid overdose; may repeat every 2-3 minutes in alternating nostrils until medical help arrives. Call 911 1 each 11    nortriptyline (PAMELOR) 10 MG capsule Take 1 capsule (10 mg total) by mouth once daily. 90 capsule 3    omeprazole (PRILOSEC) 40 MG capsule TAKE 1 CAPSULE(40 MG) BY MOUTH EVERY DAY 90 capsule 3    pregabalin (LYRICA) 75 MG capsule TAKE 1 CAPSULE BY MOUTH TWICE DAILY FOR NEUROPATHIC PAIN      promethazine (PHENERGAN) 12.5 MG Tab Take 1 tablet (12.5 mg total) by mouth every 6 (six) hours as needed (nausea and or vomiting). 10 tablet 0    solifenacin (VESICARE) 10 MG tablet TAKE 1 TABLET DAILY 90 tablet 3    traMADoL (ULTRAM) 50 mg tablet Take 50 mg by mouth every 6 (six) hours as needed for Pain.      VITAMIN D2 1,250 mcg (50,000 unit) capsule TAKE 1 CAPSULE EVERY 7 DAYS 12 capsule 3     No current facility-administered medications for this visit.       Past Medical History:   Diagnosis Date    Acid reflux     Graves disease     diffuse Toxic Goiter    Hypertension     IBS (irritable bowel syndrome)     Lichen sclerosus     Migraine     Stroke        Past Surgical History:   Procedure Laterality Date    EYE SURGERY      HYSTERECTOMY      CINDI for ?? cervical cancer, had normal paps    LEFT HEART CATHETERIZATION N/A 10/20/2020    Procedure: Left heart cath;  Surgeon: Bony Schultz MD;  Location: [facility] CATH LAB/EP;  Service: Cardiology;  Laterality: N/A;    REVERSE TOTAL SHOULDER ARTHROPLASTY Right 8/19/2022    Procedure: ARTHROPLASTY,  "SHOULDER, TOTAL, REVERSE;  Surgeon: Bradley Finnegan Jr., MD;  Location: [facility];  Service: Orthopedics;  Laterality: Right;  SONIA maddi CONFIRMED/8/18/Shaista Finnegan- hemosorb AT        OBJECTIVE:   PHYSICAL EXAM:  Height: 5' 1" (154.9 cm) Weight: 69.4 kg (153 lb)  Vitals:    08/07/23 1359   BP: 126/62   Pulse: 67   Weight: 69.4 kg (153 lb)   Height: 5' 1" (1.549 m)   PainSc:   6       Ortho/SPM Exam  Examination right shoulder  Incision is well headed with appropriate scar formation  NTTP throughout  Range of motion excellent   Strength improving   Neurologic exam intact    RADIOGRAPHS:  AP lateral x-ray right shoulder shows no evidence of loosening good position  Comments: I have personally reviewed the imaging and I agree with the above radiologist's report.    ASSESSMENT/PLAN:     IMPRESSION:  Status post reverse right shoulder arthroplasty    PLAN:    The patient has excellent range of motion and no pain at rest.  Her x-rays were negative for any signs of loosening or malalignment at her previous appointment.    At this point, her upper extremity pain may be coming from cervical spine pathology.  She is currently being seen by Pain Mgmt for spine pain.  However, we can certainly order an MRI cervical spine at her follow-up if we need to evaluate further.    Activity as tolerated    Continue prescribed ibuprofen and Lyrica for ongoing pain    FOLLOW UP:  3 mos for re-evaluation  "

## 2023-08-07 ENCOUNTER — OFFICE VISIT (OUTPATIENT)
Dept: ORTHOPEDICS | Facility: CLINIC | Age: 73
End: 2023-08-07
Payer: MEDICARE

## 2023-08-07 VITALS
HEART RATE: 67 BPM | DIASTOLIC BLOOD PRESSURE: 62 MMHG | SYSTOLIC BLOOD PRESSURE: 126 MMHG | BODY MASS INDEX: 28.89 KG/M2 | WEIGHT: 153 LBS | HEIGHT: 61 IN

## 2023-08-07 DIAGNOSIS — Z96.611 STATUS POST TOTAL SHOULDER ARTHROPLASTY, RIGHT: Primary | ICD-10-CM

## 2023-08-07 PROBLEM — N39.0 UTI (URINARY TRACT INFECTION): Status: RESOLVED | Noted: 2023-05-06 | Resolved: 2023-08-07

## 2023-08-07 PROCEDURE — 99213 OFFICE O/P EST LOW 20 MIN: CPT | Mod: S$PBB,,, | Performed by: PHYSICIAN ASSISTANT

## 2023-08-07 PROCEDURE — 99213 PR OFFICE/OUTPT VISIT, EST, LEVL III, 20-29 MIN: ICD-10-PCS | Mod: S$PBB,,, | Performed by: PHYSICIAN ASSISTANT

## 2023-08-07 PROCEDURE — 99214 OFFICE O/P EST MOD 30 MIN: CPT | Mod: PBBFAC,PN | Performed by: PHYSICIAN ASSISTANT

## 2023-08-07 PROCEDURE — 99999 PR PBB SHADOW E&M-EST. PATIENT-LVL IV: ICD-10-PCS | Mod: PBBFAC,,, | Performed by: PHYSICIAN ASSISTANT

## 2023-08-07 PROCEDURE — 99999 PR PBB SHADOW E&M-EST. PATIENT-LVL IV: CPT | Mod: PBBFAC,,, | Performed by: PHYSICIAN ASSISTANT

## 2023-08-09 NOTE — TELEPHONE ENCOUNTER
No new care gaps identified.  Powered by [employer]. Reference number: [id number].   4/11/2022 10:41:29 AM CDT   Present (15 x15 bpm)

## 2023-09-04 DIAGNOSIS — F41.9 ANXIETY: ICD-10-CM

## 2023-09-04 NOTE — TELEPHONE ENCOUNTER
Care Due:                  Date            Visit Type   Department     Provider  --------------------------------------------------------------------------------                                EP -                              PRIMARY  Last Visit: 04-      CARE ([facility])   None Found     Aleksandra Carey                              EP -                              PRIMARY  Next Visit: 10-      CARE ([facility])   None Found     Aleksandra Carey                                                            Last  Test          Frequency    Reason                     Performed    Due Date  --------------------------------------------------------------------------------    Lipid Panel.  12 months..  atorvastatin.............  10-   09-    Health [facility] Embedded Care Due Messages. Reference number: [id number].   9/04/2023 2:17:50 AM CDT

## 2023-09-05 RX ORDER — ESCITALOPRAM OXALATE 20 MG/1
20 TABLET ORAL
Qty: 90 TABLET | Refills: 3 | Status: SHIPPED | OUTPATIENT
Start: 2023-09-05 | End: 2023-10-05 | Stop reason: SDUPTHER

## 2023-09-23 DIAGNOSIS — K21.9 GASTROESOPHAGEAL REFLUX DISEASE WITHOUT ESOPHAGITIS: ICD-10-CM

## 2023-09-23 NOTE — TELEPHONE ENCOUNTER
No care due was identified.  Health [facility] Embedded Care Due Messages. Reference number: [id number].   9/23/2023 6:09:21 PM CDT

## 2023-09-25 RX ORDER — OMEPRAZOLE 40 MG/1
CAPSULE, DELAYED RELEASE ORAL
Qty: 90 CAPSULE | Refills: 3 | Status: SHIPPED | OUTPATIENT
Start: 2023-09-25 | End: 2023-10-05 | Stop reason: SDUPTHER

## 2023-10-03 ENCOUNTER — LAB VISIT (OUTPATIENT)
Dept: LAB | Facility: HOSPITAL | Age: 73
End: 2023-10-03
Payer: MEDICARE

## 2023-10-03 DIAGNOSIS — E78.5 DYSLIPIDEMIA: ICD-10-CM

## 2023-10-03 DIAGNOSIS — Z79.899 ENCOUNTER FOR LONG-TERM (CURRENT) USE OF MEDICATIONS: ICD-10-CM

## 2023-10-03 DIAGNOSIS — E03.8 OTHER SPECIFIED HYPOTHYROIDISM: ICD-10-CM

## 2023-10-03 LAB
ALBUMIN SERPL BCP-MCNC: 3.6 G/DL (ref 3.5–5.2)
ALP SERPL-CCNC: 36 U/L (ref 55–135)
ALT SERPL W/O P-5'-P-CCNC: 16 U/L (ref 10–44)
ANION GAP SERPL CALC-SCNC: 10 MMOL/L (ref 8–16)
AST SERPL-CCNC: 22 U/L (ref 10–40)
BASOPHILS # BLD AUTO: 0.06 K/UL (ref 0–0.2)
BASOPHILS NFR BLD: 0.9 % (ref 0–1.9)
BILIRUB SERPL-MCNC: 0.6 MG/DL (ref 0.1–1)
BUN SERPL-MCNC: 13 MG/DL (ref 8–23)
CALCIUM SERPL-MCNC: 8.6 MG/DL (ref 8.7–10.5)
CHLORIDE SERPL-SCNC: 108 MMOL/L (ref 95–110)
CHOLEST SERPL-MCNC: 158 MG/DL (ref 120–199)
CHOLEST/HDLC SERPL: 2.7 {RATIO} (ref 2–5)
CO2 SERPL-SCNC: 22 MMOL/L (ref 23–29)
CREAT SERPL-MCNC: 0.9 MG/DL (ref 0.5–1.4)
DIFFERENTIAL METHOD: NORMAL
EOSINOPHIL # BLD AUTO: 0.2 K/UL (ref 0–0.5)
EOSINOPHIL NFR BLD: 3 % (ref 0–8)
ERYTHROCYTE [DISTWIDTH] IN BLOOD BY AUTOMATED COUNT: 13.1 % (ref 11.5–14.5)
EST. GFR  (NO RACE VARIABLE): >60 ML/MIN/1.73 M^2
ESTIMATED AVG GLUCOSE: 108 MG/DL (ref 68–131)
GLUCOSE SERPL-MCNC: 90 MG/DL (ref 70–110)
HBA1C MFR BLD: 5.4 % (ref 4–5.6)
HCT VFR BLD AUTO: 38.7 % (ref 37–48.5)
HDLC SERPL-MCNC: 58 MG/DL (ref 40–75)
HDLC SERPL: 36.7 % (ref 20–50)
HGB BLD-MCNC: 12.9 G/DL (ref 12–16)
IMM GRANULOCYTES # BLD AUTO: 0.01 K/UL (ref 0–0.04)
IMM GRANULOCYTES NFR BLD AUTO: 0.2 % (ref 0–0.5)
LDLC SERPL CALC-MCNC: 83.8 MG/DL (ref 63–159)
LYMPHOCYTES # BLD AUTO: 2.1 K/UL (ref 1–4.8)
LYMPHOCYTES NFR BLD: 32.3 % (ref 18–48)
MCH RBC QN AUTO: 31 PG (ref 27–31)
MCHC RBC AUTO-ENTMCNC: 33.3 G/DL (ref 32–36)
MCV RBC AUTO: 93 FL (ref 82–98)
MONOCYTES # BLD AUTO: 0.7 K/UL (ref 0.3–1)
MONOCYTES NFR BLD: 10.6 % (ref 4–15)
NEUTROPHILS # BLD AUTO: 3.5 K/UL (ref 1.8–7.7)
NEUTROPHILS NFR BLD: 53 % (ref 38–73)
NONHDLC SERPL-MCNC: 100 MG/DL
NRBC BLD-RTO: 0 /100 WBC
PLATELET # BLD AUTO: 189 K/UL (ref 150–450)
PMV BLD AUTO: 11.1 FL (ref 9.2–12.9)
POTASSIUM SERPL-SCNC: 4.2 MMOL/L (ref 3.5–5.1)
PROT SERPL-MCNC: 7.2 G/DL (ref 6–8.4)
RBC # BLD AUTO: 4.16 M/UL (ref 4–5.4)
SODIUM SERPL-SCNC: 140 MMOL/L (ref 136–145)
T4 FREE SERPL-MCNC: 1.08 NG/DL (ref 0.71–1.51)
TRIGL SERPL-MCNC: 81 MG/DL (ref 30–150)
TSH SERPL DL<=0.005 MIU/L-ACNC: 1.25 UIU/ML (ref 0.4–4)
WBC # BLD AUTO: 6.6 K/UL (ref 3.9–12.7)

## 2023-10-03 PROCEDURE — 84443 ASSAY THYROID STIM HORMONE: CPT | Performed by: INTERNAL MEDICINE

## 2023-10-03 PROCEDURE — 83036 HEMOGLOBIN GLYCOSYLATED A1C: CPT | Performed by: INTERNAL MEDICINE

## 2023-10-03 PROCEDURE — 36415 COLL VENOUS BLD VENIPUNCTURE: CPT | Performed by: INTERNAL MEDICINE

## 2023-10-03 PROCEDURE — 80061 LIPID PANEL: CPT | Performed by: INTERNAL MEDICINE

## 2023-10-03 PROCEDURE — 84439 ASSAY OF FREE THYROXINE: CPT | Performed by: INTERNAL MEDICINE

## 2023-10-03 PROCEDURE — 85025 COMPLETE CBC W/AUTO DIFF WBC: CPT | Performed by: INTERNAL MEDICINE

## 2023-10-03 PROCEDURE — 80053 COMPREHEN METABOLIC PANEL: CPT | Performed by: INTERNAL MEDICINE

## 2023-10-05 ENCOUNTER — OFFICE VISIT (OUTPATIENT)
Dept: PRIMARY CARE CLINIC | Facility: CLINIC | Age: 73
End: 2023-10-05
Payer: MEDICARE

## 2023-10-05 VITALS
OXYGEN SATURATION: 95 % | HEIGHT: 61 IN | SYSTOLIC BLOOD PRESSURE: 118 MMHG | DIASTOLIC BLOOD PRESSURE: 76 MMHG | WEIGHT: 154.75 LBS | BODY MASS INDEX: 29.22 KG/M2 | HEART RATE: 74 BPM

## 2023-10-05 DIAGNOSIS — I70.0 AORTIC ATHEROSCLEROSIS: ICD-10-CM

## 2023-10-05 DIAGNOSIS — S42.91XD CLOSED FRACTURE OF RIGHT SHOULDER WITH ROUTINE HEALING, SUBSEQUENT ENCOUNTER: ICD-10-CM

## 2023-10-05 DIAGNOSIS — K21.9 GASTROESOPHAGEAL REFLUX DISEASE WITHOUT ESOPHAGITIS: ICD-10-CM

## 2023-10-05 DIAGNOSIS — N90.4 LICHEN SCLEROSUS ET ATROPHICUS OF THE VULVA: ICD-10-CM

## 2023-10-05 DIAGNOSIS — E03.8 OTHER SPECIFIED HYPOTHYROIDISM: ICD-10-CM

## 2023-10-05 DIAGNOSIS — Z78.0 ASYMPTOMATIC POSTMENOPAUSAL STATE: ICD-10-CM

## 2023-10-05 DIAGNOSIS — F41.9 ANXIETY: Primary | ICD-10-CM

## 2023-10-05 DIAGNOSIS — N32.81 OVERACTIVE BLADDER: ICD-10-CM

## 2023-10-05 DIAGNOSIS — I10 ESSENTIAL HYPERTENSION: ICD-10-CM

## 2023-10-05 DIAGNOSIS — M80.00XD AGE-RELATED OSTEOPOROSIS WITH CURRENT PATHOLOGICAL FRACTURE WITH ROUTINE HEALING: ICD-10-CM

## 2023-10-05 DIAGNOSIS — Z23 NEED FOR INFLUENZA VACCINATION: ICD-10-CM

## 2023-10-05 DIAGNOSIS — I49.3 PVC (PREMATURE VENTRICULAR CONTRACTION): ICD-10-CM

## 2023-10-05 DIAGNOSIS — G43.809 OTHER MIGRAINE WITHOUT STATUS MIGRAINOSUS, NOT INTRACTABLE: ICD-10-CM

## 2023-10-05 DIAGNOSIS — R00.2 PALPITATIONS: ICD-10-CM

## 2023-10-05 DIAGNOSIS — E66.3 OVERWEIGHT: ICD-10-CM

## 2023-10-05 DIAGNOSIS — E78.5 DYSLIPIDEMIA: ICD-10-CM

## 2023-10-05 PROBLEM — R79.89 ELEVATED TROPONIN: Status: RESOLVED | Noted: 2023-05-04 | Resolved: 2023-10-05

## 2023-10-05 PROBLEM — R11.0 NAUSEA: Chronic | Status: RESOLVED | Noted: 2023-05-04 | Resolved: 2023-10-05

## 2023-10-05 PROBLEM — M79.601 PAIN OF RIGHT UPPER EXTREMITY: Status: RESOLVED | Noted: 2022-11-07 | Resolved: 2023-10-05

## 2023-10-05 PROBLEM — M19.019 SHOULDER ARTHRITIS: Status: RESOLVED | Noted: 2022-08-19 | Resolved: 2023-10-05

## 2023-10-05 PROBLEM — E55.9 VITAMIN D DEFICIENCY: Status: RESOLVED | Noted: 2019-08-28 | Resolved: 2023-10-05

## 2023-10-05 PROCEDURE — 99999 PR PBB SHADOW E&M-EST. PATIENT-LVL IV: ICD-10-PCS | Mod: PBBFAC,,, | Performed by: INTERNAL MEDICINE

## 2023-10-05 PROCEDURE — 99214 PR OFFICE/OUTPT VISIT, EST, LEVL IV, 30-39 MIN: ICD-10-PCS | Mod: S$PBB,,, | Performed by: INTERNAL MEDICINE

## 2023-10-05 PROCEDURE — 99999 PR PBB SHADOW E&M-EST. PATIENT-LVL IV: CPT | Mod: PBBFAC,,, | Performed by: INTERNAL MEDICINE

## 2023-10-05 PROCEDURE — 99214 OFFICE O/P EST MOD 30 MIN: CPT | Mod: S$PBB,,, | Performed by: INTERNAL MEDICINE

## 2023-10-05 PROCEDURE — 99999PBSHW FLU VACCINE - QUADRIVALENT - ADJUVANTED: ICD-10-PCS | Mod: PBBFAC,,,

## 2023-10-05 PROCEDURE — 99999PBSHW FLU VACCINE - QUADRIVALENT - ADJUVANTED: Mod: PBBFAC,,,

## 2023-10-05 PROCEDURE — G0008 ADMIN INFLUENZA VIRUS VAC: HCPCS | Mod: PBBFAC

## 2023-10-05 PROCEDURE — 99214 OFFICE O/P EST MOD 30 MIN: CPT | Mod: PBBFAC | Performed by: INTERNAL MEDICINE

## 2023-10-05 RX ORDER — LEVOTHYROXINE SODIUM 88 UG/1
88 TABLET ORAL
Qty: 90 TABLET | Refills: 3 | Status: SHIPPED | OUTPATIENT
Start: 2023-10-05

## 2023-10-05 RX ORDER — SOLIFENACIN SUCCINATE 10 MG/1
10 TABLET, FILM COATED ORAL DAILY
Qty: 90 TABLET | Refills: 3 | Status: SHIPPED | OUTPATIENT
Start: 2023-10-05

## 2023-10-05 RX ORDER — NORTRIPTYLINE HYDROCHLORIDE 10 MG/1
10 CAPSULE ORAL DAILY
Qty: 90 CAPSULE | Refills: 3 | Status: SHIPPED | OUTPATIENT
Start: 2023-10-05

## 2023-10-05 RX ORDER — OMEPRAZOLE 40 MG/1
40 CAPSULE, DELAYED RELEASE ORAL DAILY
Qty: 90 CAPSULE | Refills: 3 | Status: SHIPPED | OUTPATIENT
Start: 2023-10-05

## 2023-10-05 RX ORDER — ESCITALOPRAM OXALATE 20 MG/1
20 TABLET ORAL DAILY
Qty: 90 TABLET | Refills: 3 | Status: SHIPPED | OUTPATIENT
Start: 2023-10-05

## 2023-10-05 RX ORDER — LOSARTAN POTASSIUM 50 MG/1
50 TABLET ORAL DAILY
Qty: 90 TABLET | Refills: 3 | Status: SHIPPED | OUTPATIENT
Start: 2023-10-05

## 2023-10-05 RX ORDER — ATORVASTATIN CALCIUM 80 MG/1
80 TABLET, FILM COATED ORAL DAILY
Qty: 90 TABLET | Refills: 3 | Status: SHIPPED | OUTPATIENT
Start: 2023-10-05

## 2023-10-05 RX ORDER — CLOBETASOL PROPIONATE 0.5 MG/G
OINTMENT TOPICAL
Qty: 45 G | Refills: 1 | Status: SHIPPED | OUTPATIENT
Start: 2023-10-05

## 2023-10-05 RX ORDER — ERGOCALCIFEROL 1.25 MG/1
50000 CAPSULE ORAL WEEKLY
Qty: 12 CAPSULE | Refills: 3 | Status: SHIPPED | OUTPATIENT
Start: 2023-10-05

## 2023-10-05 RX ORDER — METOPROLOL TARTRATE 50 MG/1
50 TABLET ORAL 2 TIMES DAILY
Qty: 180 TABLET | Refills: 3 | Status: SHIPPED | OUTPATIENT
Start: 2023-10-05

## 2023-10-05 NOTE — ASSESSMENT & PLAN NOTE
Stable on lipitor 80 mg daily, no myalgias.  The 10-year ASCVD risk score (Jacob WEST, et al., 2019) is: 20.1%    Values used to calculate the score:      Age: 73 years      Sex: Female      Is Non- : No      Diabetic: No      Tobacco smoker: No      Systolic Blood Pressure: 126 mmHg      Is BP treated: Yes      HDL Cholesterol: 58 mg/dL      Total Cholesterol: 158 mg/dL

## 2023-10-05 NOTE — ASSESSMENT & PLAN NOTE
Stopped PT 2/2 pain.  Sees Dr. Finnegan/ANDREW Flores, s/p reverse total shoulder replacement in 8/2022.Still taking Tramadol but now also on Lyrica with some relief.  Planning for nerve stimulator trial to see if this helps.

## 2023-10-05 NOTE — PROGRESS NOTES
[facility] Note    Chief Complaint      Chief Complaint   Patient presents with    Follow-up     History of Present Illness      Denise Berrios is a 73 y.o. female who presents today for follow up of HTN.  Patient comes to appointment with spouse, John Berrios.  Cards: Santoshf, Neuro: Andrew, GI: Sukhdeep    Problem List Items Addressed This Visit       Anxiety - Primary    Current Assessment & Plan     Stable on Lexapro 20 mg, doing ok on this. No SI/HI/panic attacks.         Relevant Medications    EScitalopram oxalate (LEXAPRO) 20 MG tablet    Other specified hypothyroidism    Overview     post radioactive treatment for grave's         Current Assessment & Plan     Stable on synthroid 88 mcg daily.  No S/S of hypo/hyperthyroidism.           Relevant Medications    levothyroxine (SYNTHROID) 88 MCG tablet    Dyslipidemia    Current Assessment & Plan     Stable on lipitor 80 mg daily, no myalgias.  The 10-year ASCVD risk score (Jacob WEST, et al., 2019) is: 20.1%    Values used to calculate the score:      Age: 73 years      Sex: Female      Is Non- : No      Diabetic: No      Tobacco smoker: No      Systolic Blood Pressure: 126 mmHg      Is BP treated: Yes      HDL Cholesterol: 58 mg/dL      Total Cholesterol: 158 mg/dL           Relevant Medications    atorvastatin (LIPITOR) 80 MG tablet    Migraine headache    Current Assessment & Plan     Sees Dr. Morales, last MRI in media.  Taking nortriptyline, didn't do well with higher doses in past.  This regimen has been working well for her.         Overactive bladder    Current Assessment & Plan     Controlled on vesicare.  No leakage/incontinence at present.         Relevant Medications    solifenacin (VESICARE) 10 MG tablet    Gastroesophageal reflux disease without esophagitis    Current Assessment & Plan     Stable on prilosec. No nausea/belching.  Sees Dr. Tarango.         Relevant Medications    omeprazole (PRILOSEC) 40 MG  capsule    PVC (premature ventricular contraction)    Current Assessment & Plan     Stable on Lopressor BID, no issues at present. Has brief episode of palpitations on occasion.         Essential hypertension    Current Assessment & Plan     BP controlled on losartan 50 and lopressor 50 mg BID.  No CP/SOB/HA.           Closed fracture of right shoulder    Current Assessment & Plan     Stopped PT 2/2 pain.  Sees Dr. Finnegan/ANDREW Flores, s/p reverse total shoulder replacement in 8/2022.Still taking Tramadol but now also on Lyrica with some relief.  Planning for nerve stimulator trial to see if this helps.         Age-related osteoporosis with current pathological fracture with routine healing    Current Assessment & Plan     Stable on Prolia. Doesn't tolerate fosamax, last DEXA 8/2020.         Aortic atherosclerosis    Current Assessment & Plan     On previous imaging, stable.         Overweight    Current Assessment & Plan     Gained 6 pounds from last visit, has not been exercising. Appetite came back.          Other Visit Diagnoses       Need for influenza vaccination        Relevant Orders    Influenza (FLUAD) - Quadrivalent (Adjuvanted) *Preferred* (65+) (PF)    Asymptomatic postmenopausal state        Relevant Orders    DXA Bone Density Appendicular Skeleton    Palpitations        Relevant Medications    metoprolol tartrate (LOPRESSOR) 50 MG tablet    Lichen sclerosus et atrophicus of the vulva        Relevant Medications    clobetasol 0.05% (TEMOVATE) 0.05 % Oint              Health Maintenance   Topic Date Due    DEXA Scan  08/28/2023    High Dose Statin  10/05/2024    Colorectal Cancer Screening  04/10/2027    Lipid Panel  10/03/2028    TETANUS VACCINE  08/28/2029    Hepatitis C Screening  Completed    Shingles Vaccine  Completed       Past Medical History:   Diagnosis Date    Acid reflux     Graves disease     diffuse Toxic Goiter    Hypertension     IBS (irritable bowel syndrome)     Lichen sclerosus      Migraine     Stroke        Past Surgical History:   Procedure Laterality Date    EYE SURGERY      HYSTERECTOMY      CINDI for ?? cervical cancer, had normal paps    LEFT HEART CATHETERIZATION N/A 10/20/2020    Procedure: Left heart cath;  Surgeon: Bony Schultz MD;  Location: [facility] CATH LAB/EP;  Service: Cardiology;  Laterality: N/A;    REVERSE TOTAL SHOULDER ARTHROPLASTY Right 8/19/2022    Procedure: ARTHROPLASTY, SHOULDER, TOTAL, REVERSE;  Surgeon: Bradley Finnegan Jr., MD;  Location: [facility] OR;  Service: Orthopedics;  Laterality: Right;  MARLEY- maddi CONFIRMED/8/18/Shaista Finnegan- hemosorb AT        family history includes Hypertension in her father; Kidney failure in her father; Stomach cancer in her mother.    Social History     Tobacco Use    Smoking status: Never     Passive exposure: Never    Smokeless tobacco: Never   Substance Use Topics    Alcohol use: Yes    Drug use: No       Review of Systems   Constitutional:  Negative for chills and fever.   HENT:  Negative for sore throat.    Respiratory:  Negative for cough and shortness of breath.    Cardiovascular:  Negative for chest pain and palpitations.   Gastrointestinal:  Negative for constipation, diarrhea, nausea and vomiting.   Genitourinary:  Negative for dysuria and hematuria.   Musculoskeletal:  Negative for falls.   Neurological:  Negative for headaches.        Outpatient Encounter Medications as of 10/5/2023   Medication Sig Dispense Refill    aspirin (ECOTRIN) 81 MG EC tablet once a day      butalbital-acetaminophen-caffeine -40 mg (FIORICET, ESGIC) -40 mg per tablet Take 1 tablet by mouth nightly as needed for Headaches. 30 tablet 5    diclofenac sodium (VOLTAREN) 1 % Gel Apply 2 g topically 3 (three) times daily. 200 g 1    diclofenac sodium (VOLTAREN) 1 % Gel Apply 2 g topically 3 (three) times daily. 100 g 3    ibuprofen (ADVIL,MOTRIN) 800 MG tablet Take 800 mg by mouth every 6 (six) hours as needed for Pain.       naloxone (NARCAN) 4 mg/actuation Spry 4mg by nasal route as needed for opioid overdose; may repeat every 2-3 minutes in alternating nostrils until medical help arrives. Call 911 1 each 11    pregabalin (LYRICA) 75 MG capsule TAKE 1 CAPSULE BY MOUTH TWICE DAILY FOR NEUROPATHIC PAIN      promethazine (PHENERGAN) 12.5 MG Tab Take 1 tablet (12.5 mg total) by mouth every 6 (six) hours as needed (nausea and or vomiting). 10 tablet 0    traMADoL (ULTRAM) 50 mg tablet Take 50 mg by mouth every 6 (six) hours as needed for Pain.      [DISCONTINUED] atorvastatin (LIPITOR) 80 MG tablet TAKE 1 TABLET DAILY 90 tablet 2    [DISCONTINUED] clobetasol 0.05% (TEMOVATE) 0.05 % Oint APPLY TOPICALLY TWICE A DAY 45 g 1    [DISCONTINUED] EScitalopram oxalate (LEXAPRO) 20 MG tablet TAKE 1 TABLET DAILY 90 tablet 3    [DISCONTINUED] levothyroxine (SYNTHROID) 88 MCG tablet Take 1 tablet (88 mcg total) by mouth before breakfast. 90 tablet 3    [DISCONTINUED] losartan (COZAAR) 50 MG tablet Take 1 tablet (50 mg total) by mouth once daily. 90 tablet 3    [DISCONTINUED] metoprolol tartrate (LOPRESSOR) 50 MG tablet Take 1 tablet (50 mg total) by mouth 2 (two) times daily. 180 tablet 3    [DISCONTINUED] nortriptyline (PAMELOR) 10 MG capsule Take 1 capsule (10 mg total) by mouth once daily. 90 capsule 3    [DISCONTINUED] omeprazole (PRILOSEC) 40 MG capsule TAKE 1 CAPSULE(40 MG) BY MOUTH EVERY DAY 90 capsule 3    [DISCONTINUED] solifenacin (VESICARE) 10 MG tablet TAKE 1 TABLET DAILY 90 tablet 3    [DISCONTINUED] VITAMIN D2 1,250 mcg (50,000 unit) capsule TAKE 1 CAPSULE EVERY 7 DAYS 12 capsule 3    atorvastatin (LIPITOR) 80 MG tablet Take 1 tablet (80 mg total) by mouth once daily. 90 tablet 3    clobetasol 0.05% (TEMOVATE) 0.05 % Oint APPLY TOPICALLY TWICE A DAY 45 g 1    ergocalciferol (VITAMIN D2) 50,000 unit Cap Take 1 capsule (50,000 Units total) by mouth once a week. 12 capsule 3    EScitalopram oxalate (LEXAPRO) 20 MG tablet Take 1 tablet (20 mg  "total) by mouth once daily. 90 tablet 3    levothyroxine (SYNTHROID) 88 MCG tablet Take 1 tablet (88 mcg total) by mouth before breakfast. 90 tablet 3    losartan (COZAAR) 50 MG tablet Take 1 tablet (50 mg total) by mouth once daily. 90 tablet 3    metoprolol tartrate (LOPRESSOR) 50 MG tablet Take 1 tablet (50 mg total) by mouth 2 (two) times daily. 180 tablet 3    nortriptyline (PAMELOR) 10 MG capsule Take 1 capsule (10 mg total) by mouth once daily. 90 capsule 3    omeprazole (PRILOSEC) 40 MG capsule Take 1 capsule (40 mg total) by mouth once daily. 90 capsule 3    solifenacin (VESICARE) 10 MG tablet Take 1 tablet (10 mg total) by mouth once daily. 90 tablet 3    [DISCONTINUED] omeprazole (PRILOSEC) 40 MG capsule TAKE 1 CAPSULE(40 MG) BY MOUTH EVERY DAY 90 capsule 3     No facility-administered encounter medications on file as of 10/5/2023.        Review of patient's allergies indicates:   Allergen Reactions    Phenobarbital Anaphylaxis    Penicillins Other (See Comments)    Phenobarbital sodium     Propylthiouracil     Thyroid      Note: PTU    Topamax [topiramate] Diarrhea     GI symptoms    Cefaclor Rash and Other (See Comments)    Clopidogrel Rash    Methimazole Rash and Other (See Comments)    Methimazole (bulk) Rash    Penicillin Rash    Sulfa (sulfonamide antibiotics) Rash and Other (See Comments)       Physical Exam      Vital Signs  Pulse: 74  SpO2: 95 %  BP: 118/76  Pain Score:   3  Pain Loc: Shoulder  Height and Weight  Height: 5' 1" (154.9 cm)  Weight: 70.2 kg (154 lb 12.2 oz)  BSA (Calculated - sq m): 1.74 sq meters  BMI (Calculated): 29.3  Weight in (lb) to have BMI = 25: 132]    Physical Exam  Constitutional:       Appearance: She is well-developed.   HENT:      Head: Normocephalic and atraumatic.      Right Ear: External ear normal.      Left Ear: External ear normal.   Eyes:      General:         Right eye: No discharge.         Left eye: No discharge.   Cardiovascular:      Rate and Rhythm: " "Normal rate and regular rhythm.      Heart sounds: Normal heart sounds. No murmur heard.  Pulmonary:      Effort: Pulmonary effort is normal. No respiratory distress.      Breath sounds: Normal breath sounds.   Abdominal:      General: There is no distension.      Palpations: Abdomen is soft.      Tenderness: There is no abdominal tenderness. There is no guarding.   Musculoskeletal:         General: Normal range of motion.      Cervical back: Normal range of motion.   Skin:     General: Skin is warm and dry.   Neurological:      Mental Status: She is alert and oriented to person, place, and time.   Psychiatric:         Behavior: Behavior normal.          Laboratory:  CBC:  Recent Labs   Lab Result Units 10/03/23  0905   WBC K/uL 6.60   RBC M/uL 4.16   Hemoglobin g/dL 12.9   Hematocrit % 38.7   Platelets K/uL 189   MCV fL 93   MCH pg 31.0   MCHC g/dL 33.3       CMP:  Recent Labs   Lab Result Units 10/03/23  0905   Glucose mg/dL 90   Calcium mg/dL 8.6*   Albumin g/dL 3.6   Total Protein g/dL 7.2   Sodium mmol/L 140   Potassium mmol/L 4.2   CO2 mmol/L 22*   Chloride mmol/L 108   BUN mg/dL 13   Alkaline Phosphatase U/L 36*   ALT U/L 16   AST U/L 22   Total Bilirubin mg/dL 0.6       URINALYSIS:  No results for input(s): "COLORU", "CLARITYU", "SPECGRAV", "PHUR", "PROTEINUA", "GLUCOSEU", "BILIRUBINCON", "BLOODU", "WBCU", "RBCU", "BACTERIA", "MUCUS", "NITRITE", "LEUKOCYTESUR", "UROBILINOGEN", "HYALINECASTS" in the last 2160 hours.     LIPIDS:  Recent Labs   Lab Result Units 10/03/23  0905   TSH uIU/mL 1.250   HDL mg/dL 58   Cholesterol mg/dL 158   Triglycerides mg/dL 81   LDL Cholesterol mg/dL 83.8   HDL/Cholesterol Ratio % 36.7   Non-HDL Cholesterol mg/dL 100   Total Cholesterol/HDL Ratio  2.7       TSH:  Recent Labs   Lab Result Units 10/03/23  0905   TSH uIU/mL 1.250       A1C:  Recent Labs   Lab Result Units 10/03/23  0905   Hemoglobin A1C % 5.4       Radiology:  No results found in the last 30 days.     Assessment/Plan "     Denise Berrios is a 73 y.o.female with:    1. Anxiety  - EScitalopram oxalate (LEXAPRO) 20 MG tablet; Take 1 tablet (20 mg total) by mouth once daily.  Dispense: 90 tablet; Refill: 3    2. Dyslipidemia  - atorvastatin (LIPITOR) 80 MG tablet; Take 1 tablet (80 mg total) by mouth once daily.  Dispense: 90 tablet; Refill: 3    3. Essential hypertension    4. Aortic atherosclerosis    5. Overactive bladder  - solifenacin (VESICARE) 10 MG tablet; Take 1 tablet (10 mg total) by mouth once daily.  Dispense: 90 tablet; Refill: 3    6. PVC (premature ventricular contraction)    7. Other specified hypothyroidism  - levothyroxine (SYNTHROID) 88 MCG tablet; Take 1 tablet (88 mcg total) by mouth before breakfast.  Dispense: 90 tablet; Refill: 3    8. Gastroesophageal reflux disease without esophagitis  - omeprazole (PRILOSEC) 40 MG capsule; Take 1 capsule (40 mg total) by mouth once daily.  Dispense: 90 capsule; Refill: 3    9. Age-related osteoporosis with current pathological fracture with routine healing    10. Overweight    11. Closed fracture of right shoulder with routine healing, subsequent encounter    12. Other migraine without status migrainosus, not intractable    13. Need for influenza vaccination  - Influenza (FLUAD) - Quadrivalent (Adjuvanted) *Preferred* (65+) (PF)    14. Asymptomatic postmenopausal state  - DXA Bone Density Appendicular Skeleton; Future  - DXA Bone Density Appendicular Skeleton    15. Palpitations  - metoprolol tartrate (LOPRESSOR) 50 MG tablet; Take 1 tablet (50 mg total) by mouth 2 (two) times daily.  Dispense: 180 tablet; Refill: 3    16. Lichen sclerosus et atrophicus of the vulva  - clobetasol 0.05% (TEMOVATE) 0.05 % Oint; APPLY TOPICALLY TWICE A DAY  Dispense: 45 g; Refill: 1         -plan for DEXA to evaluate Prolia effectiveness  -Continue current medications and maintain follow up with specialists.    -Follow up in about 6 months (around 4/5/2024) for follow up of medical problems.        Aleksandra Carey MD  [facility]

## 2023-10-24 ENCOUNTER — TELEPHONE (OUTPATIENT)
Dept: PRIMARY CARE CLINIC | Facility: CLINIC | Age: 73
End: 2023-10-24
Payer: MEDICARE

## 2023-10-24 NOTE — TELEPHONE ENCOUNTER
----- Message from Leslie Fitzgerald sent at 10/24/2023 12:12 PM CDT -----  Contact: [email or web address] [phone]  Pharmacy is calling to clarify an RX.  RX name:  clobetasol 0.05% (TEMOVATE) 0.05 % Oint  What do they need to clarify:    Comments:  Kim Bain is calling from [facility] to get a refill on the above medication  Pharmacy: [facility] - [address]   Phone:  [phone]    Ref# [id number] please call back within 2 business days

## 2023-11-09 ENCOUNTER — OFFICE VISIT (OUTPATIENT)
Dept: ORTHOPEDICS | Facility: CLINIC | Age: 73
End: 2023-11-09
Payer: MEDICARE

## 2023-11-09 ENCOUNTER — HOSPITAL ENCOUNTER (OUTPATIENT)
Dept: RADIOLOGY | Facility: HOSPITAL | Age: 73
Discharge: HOME | End: 2023-11-09
Attending: ORTHOPAEDIC SURGERY
Payer: MEDICARE

## 2023-11-09 VITALS — BODY MASS INDEX: 29.24 KG/M2 | HEIGHT: 61 IN

## 2023-11-09 DIAGNOSIS — M25.511 RIGHT SHOULDER PAIN, UNSPECIFIED CHRONICITY: Primary | ICD-10-CM

## 2023-11-09 DIAGNOSIS — Z96.611 S/P REVERSE TOTAL SHOULDER ARTHROPLASTY, RIGHT: ICD-10-CM

## 2023-11-09 DIAGNOSIS — M25.511 RIGHT SHOULDER PAIN, UNSPECIFIED CHRONICITY: ICD-10-CM

## 2023-11-09 PROBLEM — S42.91XA CLOSED FRACTURE OF RIGHT SHOULDER: Status: RESOLVED | Noted: 2021-03-18 | Resolved: 2023-11-09

## 2023-11-09 PROCEDURE — 99999 PR PBB SHADOW E&M-EST. PATIENT-LVL III: CPT | Mod: PBBFAC,,, | Performed by: ORTHOPAEDIC SURGERY

## 2023-11-09 PROCEDURE — 73030 XR SHOULDER COMPLETE 2 OR MORE VIEWS RIGHT: ICD-10-PCS | Mod: 26,RT,, | Performed by: RADIOLOGY

## 2023-11-09 PROCEDURE — 73030 X-RAY EXAM OF SHOULDER: CPT | Mod: TC,PN,RT

## 2023-11-09 PROCEDURE — 73030 X-RAY EXAM OF SHOULDER: CPT | Mod: 26,RT,, | Performed by: RADIOLOGY

## 2023-11-09 PROCEDURE — 99213 OFFICE O/P EST LOW 20 MIN: CPT | Mod: S$PBB,,, | Performed by: ORTHOPAEDIC SURGERY

## 2023-11-09 PROCEDURE — 99213 OFFICE O/P EST LOW 20 MIN: CPT | Mod: PBBFAC,PN | Performed by: ORTHOPAEDIC SURGERY

## 2023-11-09 PROCEDURE — 99999 PR PBB SHADOW E&M-EST. PATIENT-LVL III: ICD-10-PCS | Mod: PBBFAC,,, | Performed by: ORTHOPAEDIC SURGERY

## 2023-11-09 PROCEDURE — 99213 PR OFFICE/OUTPT VISIT, EST, LEVL III, 20-29 MIN: ICD-10-PCS | Mod: S$PBB,,, | Performed by: ORTHOPAEDIC SURGERY

## 2023-11-09 NOTE — PROGRESS NOTES
Subjective:      Patient ID: Denise Berrios is a 73 y.o. female.  Chief Complaint: Follow-up of the Right Shoulder      HPI  Denise Berrios is a  73 y.o. female presenting today for follow up of right shoulder and arm pain.  She reports that she is now little over year out from right shoulder reverse arthroplasty   She really does not have any pain in the shoulder but she does have pain which radiates down the arm all the way into the right thumb   She does not really describe any numbness or tingling however  She is managed by pain management.    Review of patient's allergies indicates:   Allergen Reactions    Phenobarbital Anaphylaxis    Penicillins Other (See Comments)    Phenobarbital sodium     Propylthiouracil     Thyroid      Note: PTU    Topamax [topiramate] Diarrhea     GI symptoms    Cefaclor Rash and Other (See Comments)    Clopidogrel Rash    Methimazole Rash and Other (See Comments)    Methimazole (bulk) Rash    Penicillin Rash    Sulfa (sulfonamide antibiotics) Rash and Other (See Comments)         Current Outpatient Medications   Medication Sig Dispense Refill    aspirin (ECOTRIN) 81 MG EC tablet once a day      atorvastatin (LIPITOR) 80 MG tablet Take 1 tablet (80 mg total) by mouth once daily. 90 tablet 3    butalbital-acetaminophen-caffeine -40 mg (FIORICET, ESGIC) -40 mg per tablet Take 1 tablet by mouth nightly as needed for Headaches. 30 tablet 5    clobetasol 0.05% (TEMOVATE) 0.05 % Oint APPLY TOPICALLY TWICE A DAY 45 g 1    diclofenac sodium (VOLTAREN) 1 % Gel Apply 2 g topically 3 (three) times daily. 100 g 3    ergocalciferol (VITAMIN D2) 50,000 unit Cap Take 1 capsule (50,000 Units total) by mouth once a week. 12 capsule 3    EScitalopram oxalate (LEXAPRO) 20 MG tablet Take 1 tablet (20 mg total) by mouth once daily. 90 tablet 3    ibuprofen (ADVIL,MOTRIN) 800 MG tablet Take 800 mg by mouth every 6 (six) hours as needed for Pain.      levothyroxine (SYNTHROID) 88 MCG tablet  Take 1 tablet (88 mcg total) by mouth before breakfast. 90 tablet 3    losartan (COZAAR) 50 MG tablet Take 1 tablet (50 mg total) by mouth once daily. 90 tablet 3    metoprolol tartrate (LOPRESSOR) 50 MG tablet Take 1 tablet (50 mg total) by mouth 2 (two) times daily. 180 tablet 3    naloxone (NARCAN) 4 mg/actuation Spry 4mg by nasal route as needed for opioid overdose; may repeat every 2-3 minutes in alternating nostrils until medical help arrives. Call 911 1 each 11    nortriptyline (PAMELOR) 10 MG capsule Take 1 capsule (10 mg total) by mouth once daily. 90 capsule 3    omeprazole (PRILOSEC) 40 MG capsule Take 1 capsule (40 mg total) by mouth once daily. 90 capsule 3    pregabalin (LYRICA) 75 MG capsule TAKE 1 CAPSULE BY MOUTH TWICE DAILY FOR NEUROPATHIC PAIN      promethazine (PHENERGAN) 12.5 MG Tab Take 1 tablet (12.5 mg total) by mouth every 6 (six) hours as needed (nausea and or vomiting). 10 tablet 0    solifenacin (VESICARE) 10 MG tablet Take 1 tablet (10 mg total) by mouth once daily. 90 tablet 3    traMADoL (ULTRAM) 50 mg tablet Take 50 mg by mouth every 6 (six) hours as needed for Pain.      diclofenac sodium (VOLTAREN) 1 % Gel Apply 2 g topically 3 (three) times daily. (Patient not taking: Reported on 11/9/2023) 200 g 1     No current facility-administered medications for this visit.       Past Medical History:   Diagnosis Date    Acid reflux     Graves disease     diffuse Toxic Goiter    Hypertension     IBS (irritable bowel syndrome)     Lichen sclerosus     Migraine     Stroke        Past Surgical History:   Procedure Laterality Date    EYE SURGERY      HYSTERECTOMY      CINDI for ?? cervical cancer, had normal paps    LEFT HEART CATHETERIZATION N/A 10/20/2020    Procedure: Left heart cath;  Surgeon: Bony Schultz MD;  Location: [facility] CATH LAB/EP;  Service: Cardiology;  Laterality: N/A;    REVERSE TOTAL SHOULDER ARTHROPLASTY Right 8/19/2022    Procedure: ARTHROPLASTY, SHOULDER, TOTAL, REVERSE;   "Surgeon: Bradley Finnegan Jr., MD;  Location: [facility];  Service: Orthopedics;  Laterality: Right;  MARLEY- maddi CONFIRMED/8/18/Shaista Finnegan- hemosorb AT        OBJECTIVE:   PHYSICAL EXAM:  Height: 5' 1" (154.9 cm)    Vitals:    11/09/23 1406   Height: 5' 1" (1.549 m)   PainSc:   5   PainLoc: Shoulder     Ortho/SPM Exam  Examination right arm the incision well healed about the right shoulder no evidence of infection   Range of motion right shoulder is excellent without pain or instability   Pretty good strength with elevation   The right elbow is nontender  Right forearm has no swelling full range of motion right forearm wrist and fingers   Finkelstein test negative at the base of the right thumb    No tenderness       RADIOGRAPHS:  AP lateral x-ray right shoulder shows well-positioned prosthesis no evidence of loosening or displacement  Comments: I have personally reviewed the imaging and I agree with the above radiologist's report.    ASSESSMENT/PLAN:     IMPRESSION:  1. Status post reverse shoulder arthroplasty right shoulder.      2. Right arm pain probably unrelated to shoulder    PLAN:  I explained to the patient that I believe her right arm pain is probably unrelated to her shoulder replacement   Most likely she is having pain coming from her neck or possibly a neurologic problem   I recommended that she continue Motrin by mouth   Add a compounding cream for topical use on the arm and shoulder as needed   Follow-up with pain management    FOLLOW UP:  3-4 months    Disclaimer: This note has been generated using voice-recognition software. There may be typographical errors that have been missed during proof-reading.     "

## 2024-01-11 DIAGNOSIS — Z00.00 ENCOUNTER FOR MEDICARE ANNUAL WELLNESS EXAM: ICD-10-CM

## 2024-02-15 NOTE — PLAN OF CARE
Pt's spouse will transport her home.  Rounds completed on pt.  All questions addressed.  Bedside nurse to discuss d/c medications.  Discussed importance to attend all f/u appts and take medications as prescribed.  Verbalized understanding.    Future Appointments   Date Time Provider Department Center   10/23/2020 11:00 AM [facility], HOLTER MONITORS [facility] CARD [facility]   10/28/2020 10:30 AM Aleksandra Carey MD [facility] [place]   10/29/2020  9:40 AM Bony Schultz MD [facility] CARDIO [facility]   10/30/2020  2:00 PM Aleksandra Carey MD [facility] [place]   11/3/2020  3:20 PM Bony Schultz MD [facility] CARDIO [facility]   2/15/2021  9:00 AM APPOINTMENT LAB, NAYELI CHANG [facility] LAB [facility]   2/17/2021 10:00 AM Aleksandra Carey MD [facility] [place]        10/20/20 1604   Final Note   Assessment Type Final Discharge Note   Anticipated Discharge Disposition Home   Hospital Follow Up  Appt(s) scheduled? Yes   Discharge plans and expectations educations in teach back method with documentation complete? Yes   Right Care Referral Info   Post Acute Recommendation No Care     Walter Pearl RN,   [phone]     69 yo Male s/p attempted SPT placement on 2/5/24 complicated by bowel injury s/p colostomy placement and urethral mcconnell placement.   UCx with >100,000 CFU/ml Pseudomonas aeruginosa  Pt POD 1 s/p  IR guided suprapubic tube placement and removal of urethral mcconnell.  Hematuria in SPT- discussed with IR- hematuria is expected and should improve in a few days, flush mcconnell PRN  H/H stable.   Recommend  - Continue SPT, Flush with sterile water PRN as per IR  - Trend Creat and H/H  - Continue Meropenem/antibiotics as per ID      Case discussed with Dr. Xiao   69 yo Male s/p attempted SPT placement on 2/5/24 complicated by bowel injury s/p colostomy placement and urethral mcconnell placement.   UCx with >100,000 CFU/ml Pseudomonas aeruginosa  Pt POD 1 s/p  IR guided suprapubic tube placement and removal of urethral mcconnell.  Hematuria in SPT- discussed with IR- hematuria is expected and should improve in a few days, flush mcconnell PRN  H/H stable.   Recommend  - Continue SPT, Flush with sterile water PRN as per IR  - Trend Creat and H/H  - Continue antibiotics as per ID      Case discussed with Dr. Xiao   69 yo Male s/p attempted SPT placement on 2/5/24 complicated by bowel injury s/p colostomy placement and urethral mcconnell placement.   UCx with >100,000 CFU/ml Pseudomonas aeruginosa  Pt POD 1 s/p  IR guided suprapubic tube placement and removal of urethral mcconnell.  Hematuria in SPT- discussed with IR- hematuria is expected and should improve in a few days, flush mcconnell as per IR recs  H/H stable.   Recommend  - Continue SPT   - monitor urine output  - Trend Creat and H/H  - Continue antibiotics as per ID      Case discussed with Dr. Xiao

## 2024-04-01 ENCOUNTER — TELEPHONE (OUTPATIENT)
Dept: PRIMARY CARE CLINIC | Facility: CLINIC | Age: 74
End: 2024-04-01
Payer: MEDICARE

## 2024-04-01 DIAGNOSIS — E03.8 OTHER SPECIFIED HYPOTHYROIDISM: Primary | ICD-10-CM

## 2024-04-01 NOTE — TELEPHONE ENCOUNTER
----- Message from Meri Chilel sent at 4/1/2024  2:27 PM CDT -----  Type:  Needs Medical Advice    Who Called:  Pt  John    Would the patient rather a call back or a response via [facility]?  call  Best Call Back Number:   [phone]  Additional Information:  Pt has an appt scheduled for 4/4/24 and would like to know whether she needs blood work before appt or not.  Pt would like a call back.

## 2024-04-02 ENCOUNTER — LAB VISIT (OUTPATIENT)
Dept: LAB | Facility: HOSPITAL | Age: 74
End: 2024-04-02
Payer: MEDICARE

## 2024-04-02 DIAGNOSIS — E03.8 OTHER SPECIFIED HYPOTHYROIDISM: ICD-10-CM

## 2024-04-02 LAB
T4 FREE SERPL-MCNC: 1.21 NG/DL (ref 0.71–1.51)
TSH SERPL DL<=0.005 MIU/L-ACNC: 7.15 UIU/ML (ref 0.4–4)

## 2024-04-02 PROCEDURE — 84439 ASSAY OF FREE THYROXINE: CPT | Performed by: INTERNAL MEDICINE

## 2024-04-02 PROCEDURE — 36415 COLL VENOUS BLD VENIPUNCTURE: CPT | Performed by: INTERNAL MEDICINE

## 2024-04-02 PROCEDURE — 84443 ASSAY THYROID STIM HORMONE: CPT | Performed by: INTERNAL MEDICINE

## 2024-04-04 ENCOUNTER — OFFICE VISIT (OUTPATIENT)
Dept: PRIMARY CARE CLINIC | Facility: CLINIC | Age: 74
End: 2024-04-04
Payer: MEDICARE

## 2024-04-04 ENCOUNTER — HOSPITAL ENCOUNTER (OUTPATIENT)
Dept: RADIOLOGY | Facility: HOSPITAL | Age: 74
Discharge: HOME | End: 2024-04-04
Attending: INTERNAL MEDICINE
Payer: MEDICARE

## 2024-04-04 ENCOUNTER — TELEPHONE (OUTPATIENT)
Dept: PRIMARY CARE CLINIC | Facility: CLINIC | Age: 74
End: 2024-04-04

## 2024-04-04 VITALS
SYSTOLIC BLOOD PRESSURE: 122 MMHG | WEIGHT: 154.13 LBS | OXYGEN SATURATION: 96 % | DIASTOLIC BLOOD PRESSURE: 70 MMHG | HEIGHT: 61 IN | HEART RATE: 73 BPM | BODY MASS INDEX: 29.1 KG/M2

## 2024-04-04 DIAGNOSIS — I63.89 OTHER CEREBRAL INFARCTION: ICD-10-CM

## 2024-04-04 DIAGNOSIS — G43.809 OTHER MIGRAINE WITHOUT STATUS MIGRAINOSUS, NOT INTRACTABLE: ICD-10-CM

## 2024-04-04 DIAGNOSIS — M80.00XD AGE-RELATED OSTEOPOROSIS WITH CURRENT PATHOLOGICAL FRACTURE WITH ROUTINE HEALING: ICD-10-CM

## 2024-04-04 DIAGNOSIS — Z86.73 HISTORY OF CEREBRAL INFARCTION: Primary | ICD-10-CM

## 2024-04-04 DIAGNOSIS — N32.81 OVERACTIVE BLADDER: ICD-10-CM

## 2024-04-04 DIAGNOSIS — E78.5 DYSLIPIDEMIA: ICD-10-CM

## 2024-04-04 DIAGNOSIS — I10 ESSENTIAL HYPERTENSION: ICD-10-CM

## 2024-04-04 DIAGNOSIS — F41.9 ANXIETY: ICD-10-CM

## 2024-04-04 DIAGNOSIS — Z79.899 ENCOUNTER FOR LONG-TERM (CURRENT) USE OF MEDICATIONS: ICD-10-CM

## 2024-04-04 DIAGNOSIS — I70.0 AORTIC ATHEROSCLEROSIS: ICD-10-CM

## 2024-04-04 DIAGNOSIS — E66.3 OVERWEIGHT: ICD-10-CM

## 2024-04-04 DIAGNOSIS — E03.8 OTHER SPECIFIED HYPOTHYROIDISM: ICD-10-CM

## 2024-04-04 DIAGNOSIS — R47.81 SLURRED SPEECH: ICD-10-CM

## 2024-04-04 PROCEDURE — 70450 CT HEAD/BRAIN W/O DYE: CPT | Mod: 26,,, | Performed by: RADIOLOGY

## 2024-04-04 PROCEDURE — 99214 OFFICE O/P EST MOD 30 MIN: CPT | Mod: PBBFAC,25 | Performed by: INTERNAL MEDICINE

## 2024-04-04 PROCEDURE — 99214 OFFICE O/P EST MOD 30 MIN: CPT | Mod: S$PBB,,, | Performed by: INTERNAL MEDICINE

## 2024-04-04 PROCEDURE — 70450 CT HEAD/BRAIN W/O DYE: CPT | Mod: TC

## 2024-04-04 PROCEDURE — 99999 PR PBB SHADOW E&M-EST. PATIENT-LVL IV: CPT | Mod: PBBFAC,,, | Performed by: INTERNAL MEDICINE

## 2024-04-04 RX ORDER — OXYBUTYNIN CHLORIDE 10 MG/1
10 TABLET, EXTENDED RELEASE ORAL DAILY
Qty: 90 TABLET | Refills: 3 | Status: SHIPPED | OUTPATIENT
Start: 2024-04-04 | End: 2025-04-04

## 2024-04-04 RX ORDER — CLOPIDOGREL BISULFATE 75 MG/1
75 TABLET ORAL DAILY
Qty: 30 TABLET | Refills: 11 | Status: ON HOLD | OUTPATIENT
Start: 2024-04-04 | End: 2024-04-22

## 2024-04-04 RX ORDER — BUTALBITAL, ACETAMINOPHEN AND CAFFEINE 50; 325; 40 MG/1; MG/1; MG/1
1 TABLET ORAL NIGHTLY PRN
Qty: 30 TABLET | Refills: 5 | Status: ON HOLD | OUTPATIENT
Start: 2024-04-04 | End: 2024-04-28 | Stop reason: HOSPADM
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
## 2024-04-04 NOTE — TELEPHONE ENCOUNTER
Spoke with  John and advised of remarks and meds. He already LM with neuro and is awaiting CB to schedule

## 2024-04-04 NOTE — PROGRESS NOTES
Discussed results with patient's , patient was asleep.  Already on ASA and statin. Forwarded results to her neuro, will defer need for MRI to him. May need to consider plavix.
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
## 2024-04-04 NOTE — TELEPHONE ENCOUNTER
----- Message from Kuldeep Morales MD sent at 4/4/2024  3:32 PM CDT -----  Thanks for the update.  I am sorry to hear that. Agree,  let's add Plavix 75 mg daily x 4 weeks. She will need to get a platelet response test in blood for ASA and Plavix to ensure that she is a responder.  Will be happy to see her back in the office if she wishes.  Thanks,  OC  ----- Message -----  From: Aleksandra Carey MD  Sent: 4/4/2024   3:21 PM CDT  To: Kuldeep Morales MD    [place] patient with slurred speech since Tuesday, new CVA. I have already referred her to .  She is on max dose lipitor and ASA already for secondary prevention, do you want to do Plavix?  I told them to reach out to you today when I saw her for her appointment. Symptoms started several days ago and she refused to go to ER.

## 2024-04-04 NOTE — TELEPHONE ENCOUNTER
Please let pt know (she is asleep, may need to call ) that neuro wants to add Plavix for additional stroke prevention.  Needs to schedule an appt with them as well

## 2024-04-05 ENCOUNTER — TELEPHONE (OUTPATIENT)
Dept: NEUROLOGY | Facility: CLINIC | Age: 74
End: 2024-04-05
Payer: MEDICARE

## 2024-04-05 NOTE — TELEPHONE ENCOUNTER
----- Message from Salvador Vo sent at 4/5/2024 10:38 AM CDT -----  Regarding: Appointment  Contact: John/ [phone] (cell)  Patient's  John calling to schedule appointment due to stroke follow up. He said this is his second time calling and also she had a CT scan on yesterday and it showed she had a stroke of the left side of her brain.  Please contact patient as soon as possible.

## 2024-04-10 ENCOUNTER — HOSPITAL ENCOUNTER (INPATIENT)
Facility: HOSPITAL | Age: 74
LOS: 1 days | Discharge: HOME | DRG: 066 | End: 2024-04-11
Attending: EMERGENCY MEDICINE | Admitting: STUDENT IN AN ORGANIZED HEALTH CARE EDUCATION/TRAINING PROGRAM
Payer: MEDICARE

## 2024-04-10 DIAGNOSIS — I63.9 STROKE: ICD-10-CM

## 2024-04-10 DIAGNOSIS — R29.818 ACUTE FOCAL NEUROLOGICAL DEFICIT: ICD-10-CM

## 2024-04-10 DIAGNOSIS — I63.9 CEREBROVASCULAR ACCIDENT (CVA), UNSPECIFIED MECHANISM: Primary | ICD-10-CM

## 2024-04-10 PROBLEM — I63.422 EMBOLIC STROKE INVOLVING LEFT ANTERIOR CEREBRAL ARTERY: Status: ACTIVE | Noted: 2024-04-10

## 2024-04-10 LAB
ABO + RH BLD: NORMAL
ALBUMIN SERPL BCP-MCNC: 3.5 G/DL (ref 3.5–5.2)
ALP SERPL-CCNC: 74 U/L (ref 55–135)
ALT SERPL W/O P-5'-P-CCNC: 18 U/L (ref 10–44)
ANION GAP SERPL CALC-SCNC: 12 MMOL/L (ref 8–16)
AST SERPL-CCNC: 24 U/L (ref 10–40)
BASOPHILS # BLD AUTO: 0.05 K/UL (ref 0–0.2)
BASOPHILS NFR BLD: 0.6 % (ref 0–1.9)
BILIRUB SERPL-MCNC: 0.5 MG/DL (ref 0.1–1)
BLD GP AB SCN CELLS X3 SERPL QL: NORMAL
BUN SERPL-MCNC: 22 MG/DL (ref 8–23)
CALCIUM SERPL-MCNC: 9.8 MG/DL (ref 8.7–10.5)
CHLORIDE SERPL-SCNC: 107 MMOL/L (ref 95–110)
CHOLEST SERPL-MCNC: 151 MG/DL (ref 120–199)
CHOLEST/HDLC SERPL: 3.5 {RATIO} (ref 2–5)
CO2 SERPL-SCNC: 21 MMOL/L (ref 23–29)
CREAT SERPL-MCNC: 1.5 MG/DL (ref 0.5–1.4)
CREAT SERPL-MCNC: 1.7 MG/DL (ref 0.5–1.4)
DIFFERENTIAL METHOD BLD: ABNORMAL
EOSINOPHIL # BLD AUTO: 0.8 K/UL (ref 0–0.5)
EOSINOPHIL NFR BLD: 10 % (ref 0–8)
ERYTHROCYTE [DISTWIDTH] IN BLOOD BY AUTOMATED COUNT: 14.1 % (ref 11.5–14.5)
EST. GFR  (NO RACE VARIABLE): 35.9 ML/MIN/1.73 M^2
ESTIMATED AVG GLUCOSE: 114 MG/DL (ref 68–131)
GLUCOSE SERPL-MCNC: 98 MG/DL (ref 70–110)
HBA1C MFR BLD: 5.6 % (ref 4–5.6)
HCT VFR BLD AUTO: 41.2 % (ref 37–48.5)
HDLC SERPL-MCNC: 43 MG/DL (ref 40–75)
HDLC SERPL: 28.5 % (ref 20–50)
HGB BLD-MCNC: 14 G/DL (ref 12–16)
IMM GRANULOCYTES # BLD AUTO: 0.13 K/UL (ref 0–0.04)
IMM GRANULOCYTES NFR BLD AUTO: 1.5 % (ref 0–0.5)
INR PPP: 1.2 (ref 0.8–1.2)
LDLC SERPL CALC-MCNC: 85.2 MG/DL (ref 63–159)
LYMPHOCYTES # BLD AUTO: 1.2 K/UL (ref 1–4.8)
LYMPHOCYTES NFR BLD: 14.6 % (ref 18–48)
MCH RBC QN AUTO: 32.2 PG (ref 27–31)
MCHC RBC AUTO-ENTMCNC: 34 G/DL (ref 32–36)
MCV RBC AUTO: 95 FL (ref 82–98)
MONOCYTES # BLD AUTO: 1.1 K/UL (ref 0.3–1)
MONOCYTES NFR BLD: 13.3 % (ref 4–15)
NEUTROPHILS # BLD AUTO: 5.1 K/UL (ref 1.8–7.7)
NEUTROPHILS NFR BLD: 60 % (ref 38–73)
NONHDLC SERPL-MCNC: 108 MG/DL
NRBC BLD-RTO: 0 /100 WBC
PLATELET # BLD AUTO: 223 K/UL (ref 150–450)
PMV BLD AUTO: 10.6 FL (ref 9.2–12.9)
POC PTINR: 1.3 (ref 0.9–1.2)
POC PTWBT: 15.3 SEC (ref 9.7–14.3)
POCT GLUCOSE: 104 MG/DL (ref 70–110)
POTASSIUM SERPL-SCNC: 4.3 MMOL/L (ref 3.5–5.1)
PROT SERPL-MCNC: 7.5 G/DL (ref 6–8.4)
PROTHROMBIN TIME: 12.5 SEC (ref 9–12.5)
RBC # BLD AUTO: 4.35 M/UL (ref 4–5.4)
SAMPLE: ABNORMAL
SAMPLE: ABNORMAL
SODIUM SERPL-SCNC: 140 MMOL/L (ref 136–145)
SPECIMEN OUTDATE: NORMAL
T4 FREE SERPL-MCNC: 1.19 NG/DL (ref 0.71–1.51)
TRIGL SERPL-MCNC: 114 MG/DL (ref 30–150)
TSH SERPL DL<=0.005 MIU/L-ACNC: 8.1 UIU/ML (ref 0.4–4)
WBC # BLD AUTO: 8.43 K/UL (ref 3.9–12.7)

## 2024-04-10 PROCEDURE — 80053 COMPREHEN METABOLIC PANEL: CPT | Performed by: EMERGENCY MEDICINE

## 2024-04-10 PROCEDURE — 86901 BLOOD TYPING SEROLOGIC RH(D): CPT | Performed by: EMERGENCY MEDICINE

## 2024-04-10 PROCEDURE — 85610 PROTHROMBIN TIME: CPT

## 2024-04-10 PROCEDURE — 25500020 PHARM REV CODE 255: Performed by: EMERGENCY MEDICINE

## 2024-04-10 PROCEDURE — 85610 PROTHROMBIN TIME: CPT | Performed by: EMERGENCY MEDICINE

## 2024-04-10 PROCEDURE — 99900035 HC TECH TIME PER 15 MIN (STAT)

## 2024-04-10 PROCEDURE — 93005 ELECTROCARDIOGRAM TRACING: CPT

## 2024-04-10 PROCEDURE — 84443 ASSAY THYROID STIM HORMONE: CPT | Performed by: EMERGENCY MEDICINE

## 2024-04-10 PROCEDURE — 93010 ELECTROCARDIOGRAM REPORT: CPT | Mod: ,,, | Performed by: INTERNAL MEDICINE

## 2024-04-10 PROCEDURE — 83036 HEMOGLOBIN GLYCOSYLATED A1C: CPT | Performed by: EMERGENCY MEDICINE

## 2024-04-10 PROCEDURE — 99291 CRITICAL CARE FIRST HOUR: CPT

## 2024-04-10 PROCEDURE — 82962 GLUCOSE BLOOD TEST: CPT

## 2024-04-10 PROCEDURE — 85025 COMPLETE CBC W/AUTO DIFF WBC: CPT | Performed by: EMERGENCY MEDICINE

## 2024-04-10 PROCEDURE — 84439 ASSAY OF FREE THYROXINE: CPT | Performed by: EMERGENCY MEDICINE

## 2024-04-10 PROCEDURE — 80061 LIPID PANEL: CPT | Performed by: EMERGENCY MEDICINE

## 2024-04-10 PROCEDURE — 12000002 HC ACUTE/MED SURGE SEMI-PRIVATE ROOM

## 2024-04-10 PROCEDURE — 63600175 PHARM REV CODE 636 W HCPCS: Performed by: NURSE PRACTITIONER

## 2024-04-10 PROCEDURE — 82565 ASSAY OF CREATININE: CPT

## 2024-04-10 PROCEDURE — 99223 1ST HOSP IP/OBS HIGH 75: CPT | Mod: AI,,, | Performed by: STUDENT IN AN ORGANIZED HEALTH CARE EDUCATION/TRAINING PROGRAM

## 2024-04-10 RX ORDER — SODIUM CHLORIDE 0.9 % (FLUSH) 0.9 %
10 SYRINGE (ML) INJECTION
Status: DISCONTINUED | OUTPATIENT
Start: 2024-04-10 | End: 2024-04-11 | Stop reason: HOSPADM

## 2024-04-10 RX ORDER — ATORVASTATIN CALCIUM 40 MG/1
40 TABLET, FILM COATED ORAL DAILY
Status: DISCONTINUED | OUTPATIENT
Start: 2024-04-11 | End: 2024-04-11 | Stop reason: HOSPADM

## 2024-04-10 RX ORDER — BISACODYL 10 MG/1
10 SUPPOSITORY RECTAL DAILY PRN
Status: DISCONTINUED | OUTPATIENT
Start: 2024-04-10 | End: 2024-04-11 | Stop reason: HOSPADM

## 2024-04-10 RX ORDER — ASPIRIN 81 MG/1
81 TABLET ORAL DAILY
Status: DISCONTINUED | OUTPATIENT
Start: 2024-04-11 | End: 2024-04-11 | Stop reason: HOSPADM

## 2024-04-10 RX ORDER — HEPARIN SODIUM 5000 [USP'U]/ML
5000 INJECTION, SOLUTION INTRAVENOUS; SUBCUTANEOUS EVERY 8 HOURS
Status: DISCONTINUED | OUTPATIENT
Start: 2024-04-10 | End: 2024-04-11 | Stop reason: HOSPADM

## 2024-04-10 RX ADMIN — IOHEXOL 100 ML: 350 INJECTION, SOLUTION INTRAVENOUS at 05:04

## 2024-04-10 RX ADMIN — HEPARIN SODIUM 5000 UNITS: 5000 INJECTION INTRAVENOUS; SUBCUTANEOUS at 10:04

## 2024-04-10 NOTE — ASSESSMENT & PLAN NOTE
Morbid Obesity  - Patient's Body mass index is 29.1 kg/m². on admit.   - Patient counseled on need for weight loss.   - Patient counseled on risks of increased mortality related to obesity and increased risk of diabetes, hypertension, pulmonary and breathing problems, arthritis from degeneration of joints, skin ulcers and infections and heart disease if does not have significant weight loss.   - Discussed with patient need for outpatient follow-up with primary care physician to assess best strategy for patient to assist in weight loss.

## 2024-04-10 NOTE — ED TRIAGE NOTES
Pt reports started having s/s stroke x1 week ago, sought care from PCP who discovred left sided stroke. Presents today with worsening symptoms, worsening dysphagia, difficulty walking, left sided facial droop, delayed motor responses

## 2024-04-10 NOTE — HPI
Denise Berrios is a 74 y.o. female with PMHx hypertension  who is being evaluated by the Vascular Neurology service after developing worsening aphasia/dysarthria, gait and difficulty swallowing. Pt was LKN at approximately 2pm today     On 4/2, patient with unsteady gait and speech changed, while shopping with . Syptoms improved, but persisted. She presented to PCP on 4/4, CT head was ordered. CT head with new ovoid focus of hypoattenuation in the left corona radiata. Patient was prescribed DAPT and statin. Today patient experienced worsening symptoms and new right facial droop prompting her to present to ED for eval. Stroke code was called on arrival     Pt denies personal hx blood clots and denies hx cardiac arrhythmia.

## 2024-04-10 NOTE — ED PROVIDER NOTES
Encounter Date: 4/10/2024       History     Chief Complaint   Patient presents with    Cerebrovascular Accident     CVA last week. Speech and gait is worse today since 1400.      74-year-old female with a history of hypertension presents with worsening aphasia, swallowing, and gait.  She had a stroke last week.  Found on CT several days later.  Started on Plavix by PCP.  Has not seem stroke team.  Her deficits are aphasia and gait disturbance.  Symptoms got worse this afternoon.  She was unable to chew a hamburger.  Brought to the ED for evaluation.  Code stroke activated on arrival.  Unable to get review of systems secondary to aphasia.  Collateral history provided by .  The patients available PMH, PSH, Social History, medications, allergies, and triage vital signs were reviewed just prior to their medical evaluation.         Review of patient's allergies indicates:   Allergen Reactions    Phenobarbital Anaphylaxis    Penicillins Other (See Comments)    Phenobarbital sodium     Propylthiouracil     Thyroid      Note: PTU    Topamax [topiramate] Diarrhea     GI symptoms    Cefaclor Rash and Other (See Comments)    Clopidogrel Rash    Methimazole Rash and Other (See Comments)    Methimazole (bulk) Rash    Penicillin Rash    Sulfa (sulfonamide antibiotics) Rash and Other (See Comments)     Past Medical History:   Diagnosis Date    Acid reflux     Graves disease     diffuse Toxic Goiter    Hypertension     IBS (irritable bowel syndrome)     Lichen sclerosus     Migraine     Stroke      Past Surgical History:   Procedure Laterality Date    EYE SURGERY      HYSTERECTOMY      CINDI for ?? cervical cancer, had normal paps    LEFT HEART CATHETERIZATION N/A 10/20/2020    Procedure: Left heart cath;  Surgeon: Bony Schultz MD;  Location: [facility] CATH LAB/EP;  Service: Cardiology;  Laterality: N/A;    REVERSE TOTAL SHOULDER ARTHROPLASTY Right 8/19/2022    Procedure: ARTHROPLASTY, SHOULDER, TOTAL, REVERSE;  Surgeon:  Bradley Finnegan Jr., MD;  Location: [facility] OR;  Service: Orthopedics;  Laterality: Right;  MARLEY- maddi CONFIRMED/8/18/Shaista Finnegan- hemosorb AT      Family History   Problem Relation Age of Onset    Kidney failure Father     Hypertension Father     Stomach cancer Mother     Breast cancer Neg Hx      Social History     Tobacco Use    Smoking status: Never     Passive exposure: Never    Smokeless tobacco: Never   Substance Use Topics    Alcohol use: Yes    Drug use: No     Review of Systems   Unable to perform ROS: Acuity of condition       Physical Exam     Initial Vitals [04/10/24 1728]   BP Pulse Resp Temp SpO2   (!) 101/55 71 16 97.9 °F (36.6 °C) (!) 94 %      MAP       --         Physical Exam    Nursing note and vitals reviewed.  Constitutional: She appears well-developed and well-nourished. She is not diaphoretic. No distress.   HENT:   Head: Normocephalic and atraumatic.   Nose: Nose normal.   Eyes: Conjunctivae are normal. Right eye exhibits no discharge. Left eye exhibits no discharge.   Neck: Neck supple.   Normal range of motion.  Cardiovascular:  Normal rate, regular rhythm and normal heart sounds.     Exam reveals no gallop and no friction rub.       No murmur heard.  Pulmonary/Chest: Breath sounds normal. No respiratory distress. She has no wheezes. She has no rhonchi. She has no rales.   Abdominal: Abdomen is soft. She exhibits no distension. There is no abdominal tenderness. There is no rebound and no guarding.   Musculoskeletal:         General: No tenderness or edema. Normal range of motion.      Cervical back: Normal range of motion and neck supple.     Neurological: She is alert and oriented to person, place, and time. She has normal strength. GCS score is 15. GCS eye subscore is 4. GCS verbal subscore is 5. GCS motor subscore is 6.   Slow speech, broca's aphasia?  No slurred speech/AMS/facial droop/extremity weakness.  Gait not tested on initial MD evaluation.      Skin: Skin  is warm and dry. No rash noted. No erythema.   Psychiatric: She has a normal mood and affect. Her behavior is normal. Judgment and thought content normal.         ED Course   Procedures  Labs Reviewed   CBC W/ AUTO DIFFERENTIAL - Abnormal; Notable for the following components:       Result Value    MCH 32.2 (*)     Immature Granulocytes 1.5 (*)     Immature Grans (Abs) 0.13 (*)     Mono # 1.1 (*)     Eos # 0.8 (*)     Lymph % 14.6 (*)     Eosinophil % 10.0 (*)     All other components within normal limits   COMPREHENSIVE METABOLIC PANEL - Abnormal; Notable for the following components:    CO2 21 (*)     Creatinine 1.5 (*)     eGFR 35.9 (*)     All other components within normal limits    Narrative:     add on lipid panel per Lauro Gomes RN/Alcides Lowry MD   order# [id number] 04/10/2024 @ 18:02    TSH - Abnormal; Notable for the following components:    TSH 8.104 (*)     All other components within normal limits    Narrative:     add on lipid panel per Lauro Gomes RN/Alcides Lowry MD   order# [id number] 04/10/2024 @ 18:02    ISTAT PROCEDURE - Abnormal; Notable for the following components:    POC PTWBT 15.3 (*)     POC PTINR 1.3 (*)     All other components within normal limits   ISTAT CREATININE - Abnormal; Notable for the following components:    POC Creatinine 1.7 (*)     All other components within normal limits   PROTIME-INR    Narrative:     add on lipid panel per Lauro Gomes RN/Alcides Lowry MD   order# [id number] 04/10/2024 @ 18:02    LIPID PANEL   LIPID PANEL    Narrative:     add on lipid panel per Lauro Gomes RN/Alcides Lowry MD   order# [id number] 04/10/2024 @ 18:02    T4, FREE    Narrative:     add on lipid panel per Lauro Gomes RN/Alcides Lowry MD   order# [id number] 04/10/2024 @ 18:02    HEMOGLOBIN A1C   POCT GLUCOSE, HAND-HELD DEVICE   TYPE & SCREEN   POCT GLUCOSE     EKG Readings: (Independently Interpreted)   Initial Reading: No STEMI.  Rhythm: Normal Sinus Rhythm. Heart Rate: 72. Ectopy: No Ectopy. Conduction: Normal.   T wave changes inf/lat       Imaging Results              MRI Brain Without Contrast (In process)                       CTA STROKE MULTI-PHASE (Final result)  Result time 04/10/24 18:56:36      Final result by Jabari Yang MD (04/10/24 18:56:36)                   Impression:      No evidence of acute infarct or hemorrhage. Hypoattenuating area within the left corona radiata is more conspicuous compared to prior, suggesting evolving infarct of indeterminate chronicity. Further evaluation with MRI of the brain may be obtained, as clinically warranted.    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  No additional large vessel occlusion in the intracranial circulation.    No hemodynamically significant stenosis in the neck vessels.    Multiple pulmonary nodules with the largest measuring up to 1.3 cm.  For multiple solid nodules with any 6 mm or greater, Fleischner Society guidelines recommend follow up with non-contrast chest CT at 3-6 months.    This report was flagged in Epic as abnormal.    Electronically signed by resident: Tavares Bain  Date:    04/10/2024  Time:    18:00    Electronically signed by: Jabari Yang MD  Date:    04/10/2024  Time:    18:56               Narrative:    EXAMINATION:  CTA STROKE MULTI-PHASE    CLINICAL HISTORY:  Neuro deficit, acute, stroke suspected;    TECHNIQUE:  Axial CT images obtained throughout the region of the head before the administration of intravenous contrast.    CT angiogram was performed through the cervical and intracranial vasculature during the IV bolus administration of 100mL of Omnipaque 350.  Two additional phases through the intracranial vasculature via multiphase technique.    Multiplanar MPR and MIP reformats were performed.    CT source data was analyzed using artificial intelligence software for detection of a large vessel occlusions (LVO) in order to enable  computer assisted triage notification and aid clinical stroke decision making.    COMPARISON:  CT head 04/04/2024, 09/30/2022, MRI brain 11/17/2022    FINDINGS:  The ventricles are normal in size without evidence of hydrocephalus.    No new loss of corticomedullary distinction to suggest acute major vascular distribution infarct.  No intraparenchymal hemorrhage.  No mass effect or edema.  No midline shift.    Evolving hypodensity in the left corona radiata, more conspicuous compared to CT head 04/04/2024 likely representing evolving area of infarct.  Stable larger areas of encephalomalacia and gliosis, most prominent in the left parieto-occipital region and right occipital lobe.  Stable scattered small remote infarcts involving the bilateral basal ganglia and cerebellum.      No extra-axial blood or fluid collections.    The cranium appears intact. Mastoid air cells and paranasal sinuses are essentially clear.      CTA:    The aortic arch demonstrates calcific plaque at the origin of the left subclavian artery.  No significant stenosis at the major branch vessel origins.    The right common carotid artery is normal in caliber.  No significant stenosis at the carotid bifurcation.The right internal carotid artery is normal in caliber.    The left common carotid artery is normal in caliber. No significant stenosis at the carotid bifurcation.The left internal carotid artery is normal in caliber.    The right vertebral artery is normal in caliber.    The left vertebral artery is normal in caliber.    Basilar artery is within normal limits without focal abnormality.    The anterior cerebral arteries and anterior communicating artery complex are within normal limits.  The middle cerebral arteries are unremarkable.  There is chronic appearing occlusion of the left posterior cerebral artery with multiple collateral vessels within its territory.    Additional findings:    There is mosaic attenuation of the lung fields.  There  is a 1.3 cm pulmonary nodule in the right upper lobe.  There is an 8 mm pulmonary nodule in the right upper lobe.  There are additional smaller nodules.    There are degenerative changes in the cervical spine.  There is no evidence of a fracture.  There are postoperative changes in the right shoulder.                                       Medications   sodium chloride 0.9% flush 10 mL (has no administration in time range)   sodium chloride 0.9% bolus 500 mL 500 mL (has no administration in time range)   bisacodyL suppository 10 mg (has no administration in time range)   heparin (porcine) injection 5,000 Units (has no administration in time range)   aspirin EC tablet 81 mg (has no administration in time range)   atorvastatin tablet 40 mg (has no administration in time range)   iohexoL (OMNIPAQUE 350) injection 100 mL (100 mLs Intravenous Given 4/10/24 6777)     Medical Decision Making  74-year-old female presents with worsening speech and gait after recent CVA.  Vitals unremarkable.  Physical exam as above.  Code stroke activated on arrival.  CT shows old stroke.  MRI shows acute stroke next to old stroke.  Seen by stroke team who will admit.  Labs unremarkable.  Did bedside teaching.  All questions answered.  Patient acknowledges understanding.     Amount and/or Complexity of Data Reviewed  Independent Historian: spouse  External Data Reviewed: radiology and notes.  Labs: ordered. Decision-making details documented in ED Course.  Radiology: ordered. Decision-making details documented in ED Course.  ECG/medicine tests: ordered and independent interpretation performed. Decision-making details documented in ED Course.    Risk  Prescription drug management.  Decision regarding hospitalization.    Critical Care  Total time providing critical care: 35 minutes                     Critical Care:  Date: 04/10/2024  Performed by: Dr. Alcides Lowry   Authorized by: Dr. Alcides Lowry  Total critical care time (exclusive of  procedural time) : 35 minutes  Critical care was necessary to treat or prevent imminent or life-threatening deterioration of the following conditions:  stroke                  Clinical Impression:  Final diagnoses:  [R29.818] Acute focal neurological deficit  [I63.9] Cerebrovascular accident (CVA), unspecified mechanism (Primary)          ED Disposition Condition    Admit Stable                Alcides Lowry MD  04/10/24 2020

## 2024-04-11 VITALS
HEART RATE: 88 BPM | TEMPERATURE: 98 F | SYSTOLIC BLOOD PRESSURE: 136 MMHG | RESPIRATION RATE: 16 BRPM | DIASTOLIC BLOOD PRESSURE: 68 MMHG | OXYGEN SATURATION: 99 % | WEIGHT: 152.13 LBS | BODY MASS INDEX: 28.72 KG/M2 | HEIGHT: 61 IN

## 2024-04-11 LAB
ALBUMIN SERPL BCP-MCNC: 3.4 G/DL (ref 3.5–5.2)
ALP SERPL-CCNC: 72 U/L (ref 55–135)
ALT SERPL W/O P-5'-P-CCNC: 15 U/L (ref 10–44)
ANION GAP SERPL CALC-SCNC: 11 MMOL/L (ref 8–16)
APTT PPP: 25.1 SEC (ref 21–32)
ASCENDING AORTA: 3.4 CM
AST SERPL-CCNC: 24 U/L (ref 10–40)
AV INDEX (PROSTH): 0.78
AV MEAN GRADIENT: 4 MMHG
AV PEAK GRADIENT: 7 MMHG
AV VALVE AREA BY VELOCITY RATIO: 2.71 CM²
AV VALVE AREA: 2.55 CM²
AV VELOCITY RATIO: 0.83
BASOPHILS # BLD AUTO: 0.02 K/UL (ref 0–0.2)
BASOPHILS NFR BLD: 0.2 % (ref 0–1.9)
BILIRUB SERPL-MCNC: 0.5 MG/DL (ref 0.1–1)
BSA FOR ECHO PROCEDURE: 1.72 M2
BUN SERPL-MCNC: 19 MG/DL (ref 8–23)
CALCIUM SERPL-MCNC: 9.8 MG/DL (ref 8.7–10.5)
CHLORIDE SERPL-SCNC: 107 MMOL/L (ref 95–110)
CO2 SERPL-SCNC: 21 MMOL/L (ref 23–29)
CREAT SERPL-MCNC: 1.3 MG/DL (ref 0.5–1.4)
CV ECHO LV RWT: 0.43 CM
DIFFERENTIAL METHOD BLD: ABNORMAL
DOP CALC AO PEAK VEL: 1.28 M/S
DOP CALC AO VTI: 19.94 CM
DOP CALC LVOT AREA: 3.3 CM2
DOP CALC LVOT DIAMETER: 2.04 CM
DOP CALC LVOT PEAK VEL: 1.06 M/S
DOP CALC LVOT STROKE VOLUME: 50.86 CM3
DOP CALCLVOT PEAK VEL VTI: 15.57 CM
E WAVE DECELERATION TIME: 131.74 MSEC
E/A RATIO: 0.64
E/E' RATIO: 17.33 M/S
ECHO LV POSTERIOR WALL: 0.99 CM (ref 0.6–1.1)
EOSINOPHIL # BLD AUTO: 0.6 K/UL (ref 0–0.5)
EOSINOPHIL NFR BLD: 6.1 % (ref 0–8)
ERYTHROCYTE [DISTWIDTH] IN BLOOD BY AUTOMATED COUNT: 13.9 % (ref 11.5–14.5)
EST. GFR  (NO RACE VARIABLE): 42.6 ML/MIN/1.73 M^2
FRACTIONAL SHORTENING: 26 % (ref 28–44)
GLUCOSE SERPL-MCNC: 98 MG/DL (ref 70–110)
HCT VFR BLD AUTO: 41.1 % (ref 37–48.5)
HGB BLD-MCNC: 13.6 G/DL (ref 12–16)
IMM GRANULOCYTES # BLD AUTO: 0.09 K/UL (ref 0–0.04)
IMM GRANULOCYTES NFR BLD AUTO: 1 % (ref 0–0.5)
INR PPP: 1 (ref 0.8–1.2)
INTERVENTRICULAR SEPTUM: 1.05 CM (ref 0.6–1.1)
LA MAJOR: 4.27 CM
LA MINOR: 4.64 CM
LA WIDTH: 3.16 CM
LEFT ATRIUM SIZE: 3.52 CM
LEFT ATRIUM VOLUME INDEX MOD: 16.1 ML/M2
LEFT ATRIUM VOLUME INDEX: 25 ML/M2
LEFT ATRIUM VOLUME MOD: 27.08 CM3
LEFT ATRIUM VOLUME: 42.05 CM3
LEFT INTERNAL DIMENSION IN SYSTOLE: 3.45 CM (ref 2.1–4)
LEFT VENTRICLE DIASTOLIC VOLUME INDEX: 59.27 ML/M2
LEFT VENTRICLE DIASTOLIC VOLUME: 99.57 ML
LEFT VENTRICLE MASS INDEX: 99 G/M2
LEFT VENTRICLE SYSTOLIC VOLUME INDEX: 29.1 ML/M2
LEFT VENTRICLE SYSTOLIC VOLUME: 48.97 ML
LEFT VENTRICULAR INTERNAL DIMENSION IN DIASTOLE: 4.64 CM (ref 3.5–6)
LEFT VENTRICULAR MASS: 165.49 G
LV LATERAL E/E' RATIO: 26 M/S
LV SEPTAL E/E' RATIO: 13 M/S
LYMPHOCYTES # BLD AUTO: 0.6 K/UL (ref 1–4.8)
LYMPHOCYTES NFR BLD: 6.4 % (ref 18–48)
MAGNESIUM SERPL-MCNC: 1.7 MG/DL (ref 1.6–2.6)
MCH RBC QN AUTO: 31.3 PG (ref 27–31)
MCHC RBC AUTO-ENTMCNC: 33.1 G/DL (ref 32–36)
MCV RBC AUTO: 95 FL (ref 82–98)
MONOCYTES # BLD AUTO: 1 K/UL (ref 0.3–1)
MONOCYTES NFR BLD: 11 % (ref 4–15)
MV A" WAVE DURATION": 9.51 MSEC
MV PEAK A VEL: 1.21 M/S
MV PEAK E VEL: 0.78 M/S
MV STENOSIS PRESSURE HALF TIME: 38.2 MS
MV VALVE AREA P 1/2 METHOD: 5.76 CM2
NEUTROPHILS # BLD AUTO: 7.1 K/UL (ref 1.8–7.7)
NEUTROPHILS NFR BLD: 75.3 % (ref 38–73)
NRBC BLD-RTO: 0 /100 WBC
OHS QRS DURATION: 78 MS
OHS QRS DURATION: 82 MS
OHS QTC CALCULATION: 472 MS
OHS QTC CALCULATION: 479 MS
PHOSPHATE SERPL-MCNC: 3.5 MG/DL (ref 2.7–4.5)
PISA TR MAX VEL: 1.71 M/S
PLATELET # BLD AUTO: 222 K/UL (ref 150–450)
PMV BLD AUTO: 10.3 FL (ref 9.2–12.9)
POTASSIUM SERPL-SCNC: 4.2 MMOL/L (ref 3.5–5.1)
PROT SERPL-MCNC: 7.4 G/DL (ref 6–8.4)
PROTHROMBIN TIME: 10.8 SEC (ref 9–12.5)
PULM VEIN S/D RATIO: 1.09
PV PEAK D VEL: 0.43 M/S
PV PEAK S VEL: 0.47 M/S
RA MAJOR: 4.03 CM
RA PRESSURE ESTIMATED: 3 MMHG
RA WIDTH: 2.48 CM
RBC # BLD AUTO: 4.35 M/UL (ref 4–5.4)
RIGHT VENTRICULAR END-DIASTOLIC DIMENSION: 2.7 CM
RV TB RVSP: 5 MMHG
SINUS: 3.39 CM
SODIUM SERPL-SCNC: 139 MMOL/L (ref 136–145)
STJ: 2.83 CM
TDI LATERAL: 0.03 M/S
TDI SEPTAL: 0.06 M/S
TDI: 0.05 M/S
TR MAX PG: 12 MMHG
TRICUSPID ANNULAR PLANE SYSTOLIC EXCURSION: 1.73 CM
TROPONIN I SERPL DL<=0.01 NG/ML-MCNC: 0.02 NG/ML (ref 0–0.03)
TV REST PULMONARY ARTERY PRESSURE: 15 MMHG
WBC # BLD AUTO: 9.42 K/UL (ref 3.9–12.7)
Z-SCORE OF LEFT VENTRICULAR DIMENSION IN END DIASTOLE: -0.1
Z-SCORE OF LEFT VENTRICULAR DIMENSION IN END SYSTOLE: 1.36

## 2024-04-11 PROCEDURE — 99238 HOSP IP/OBS DSCHRG MGMT 30/<: CPT | Mod: FS,,, | Performed by: STUDENT IN AN ORGANIZED HEALTH CARE EDUCATION/TRAINING PROGRAM

## 2024-04-11 PROCEDURE — 85730 THROMBOPLASTIN TIME PARTIAL: CPT | Performed by: NURSE PRACTITIONER

## 2024-04-11 PROCEDURE — 92610 EVALUATE SWALLOWING FUNCTION: CPT

## 2024-04-11 PROCEDURE — 94761 N-INVAS EAR/PLS OXIMETRY MLT: CPT

## 2024-04-11 PROCEDURE — 25000003 PHARM REV CODE 250: Performed by: NURSE PRACTITIONER

## 2024-04-11 PROCEDURE — 63600175 PHARM REV CODE 636 W HCPCS: Performed by: NURSE PRACTITIONER

## 2024-04-11 PROCEDURE — 84100 ASSAY OF PHOSPHORUS: CPT | Performed by: NURSE PRACTITIONER

## 2024-04-11 PROCEDURE — 83735 ASSAY OF MAGNESIUM: CPT | Performed by: NURSE PRACTITIONER

## 2024-04-11 PROCEDURE — 80053 COMPREHEN METABOLIC PANEL: CPT | Performed by: NURSE PRACTITIONER

## 2024-04-11 PROCEDURE — 97535 SELF CARE MNGMENT TRAINING: CPT

## 2024-04-11 PROCEDURE — 92523 SPEECH SOUND LANG COMPREHEN: CPT

## 2024-04-11 PROCEDURE — 84484 ASSAY OF TROPONIN QUANT: CPT | Performed by: NURSE PRACTITIONER

## 2024-04-11 PROCEDURE — 85025 COMPLETE CBC W/AUTO DIFF WBC: CPT | Performed by: NURSE PRACTITIONER

## 2024-04-11 PROCEDURE — 85610 PROTHROMBIN TIME: CPT | Performed by: NURSE PRACTITIONER

## 2024-04-11 PROCEDURE — 97116 GAIT TRAINING THERAPY: CPT

## 2024-04-11 PROCEDURE — 97161 PT EVAL LOW COMPLEX 20 MIN: CPT

## 2024-04-11 PROCEDURE — 97530 THERAPEUTIC ACTIVITIES: CPT

## 2024-04-11 PROCEDURE — 97165 OT EVAL LOW COMPLEX 30 MIN: CPT

## 2024-04-11 RX ORDER — ATORVASTATIN CALCIUM 40 MG/1
40 TABLET, FILM COATED ORAL DAILY
Qty: 90 TABLET | Refills: 0 | Status: ON HOLD | OUTPATIENT
Start: 2024-04-12 | End: 2024-04-28

## 2024-04-11 RX ORDER — ATORVASTATIN CALCIUM 40 MG/1
40 TABLET, FILM COATED ORAL DAILY
Qty: 90 TABLET | Refills: 0 | Status: SHIPPED | OUTPATIENT
Start: 2024-04-12 | End: 2024-04-11

## 2024-04-11 RX ADMIN — ATORVASTATIN CALCIUM 40 MG: 40 TABLET, FILM COATED ORAL at 09:04

## 2024-04-11 RX ADMIN — HEPARIN SODIUM 5000 UNITS: 5000 INJECTION INTRAVENOUS; SUBCUTANEOUS at 06:04

## 2024-04-11 RX ADMIN — ASPIRIN 81 MG: 81 TABLET, COATED ORAL at 09:04

## 2024-04-11 RX ADMIN — HEPARIN SODIUM 5000 UNITS: 5000 INJECTION INTRAVENOUS; SUBCUTANEOUS at 02:04

## 2024-04-11 NOTE — SUBJECTIVE & OBJECTIVE
Past Medical History:   Diagnosis Date    Acid reflux     Graves disease     diffuse Toxic Goiter    Hypertension     IBS (irritable bowel syndrome)     Lichen sclerosus     Migraine     Stroke      Past Surgical History:   Procedure Laterality Date    EYE SURGERY      HYSTERECTOMY      CINDI for ?? cervical cancer, had normal paps    LEFT HEART CATHETERIZATION N/A 10/20/2020    Procedure: Left heart cath;  Surgeon: Bony Schultz MD;  Location: [facility] CATH LAB/EP;  Service: Cardiology;  Laterality: N/A;    REVERSE TOTAL SHOULDER ARTHROPLASTY Right 8/19/2022    Procedure: ARTHROPLASTY, SHOULDER, TOTAL, REVERSE;  Surgeon: Bradley Finnegan Jr., MD;  Location: [facility] OR;  Service: Orthopedics;  Laterality: Right;  MARLEY- maddi CONFIRMED/8/18/Shaista Finnegan- hemosorb AT      Social History     Tobacco Use    Smoking status: Never     Passive exposure: Never    Smokeless tobacco: Never   Substance Use Topics    Alcohol use: Yes    Drug use: No     Review of patient's allergies indicates:   Allergen Reactions    Phenobarbital Anaphylaxis    Penicillins Other (See Comments)    Phenobarbital sodium     Propylthiouracil     Thyroid      Note: PTU    Topamax [topiramate] Diarrhea     GI symptoms    Cefaclor Rash and Other (See Comments)    Clopidogrel Rash    Methimazole Rash and Other (See Comments)    Methimazole (bulk) Rash    Penicillin Rash    Sulfa (sulfonamide antibiotics) Rash and Other (See Comments)       Medications: I have reviewed the current medication administration record.    (Not in a hospital admission)      Review of Systems   Constitutional:  Positive for activity change.   HENT:  Positive for trouble swallowing.    Eyes:  Negative for photophobia.   Respiratory:  Negative for shortness of breath and wheezing.    Cardiovascular:  Negative for chest pain and palpitations.   Gastrointestinal:  Negative for nausea and vomiting.   Musculoskeletal:  Positive for gait problem.   Skin:   Negative for color change.   Neurological:  Positive for facial asymmetry, speech difficulty and weakness.     Objective:     Vital Signs (Most Recent):  Temp: 97.9 °F (36.6 °C) (04/10/24 1728)  Pulse: 63 (04/10/24 1900)  Resp: 20 (04/10/24 1900)  BP: 133/60 (04/10/24 1900)  SpO2: 98 % (04/10/24 1900)    Vital Signs Range (Last 24H):  Temp:  [97.9 °F (36.6 °C)]   Pulse:  [63-72]   Resp:  [16-21]   BP: (101-133)/(55-60)   SpO2:  [94 %-98 %]        Physical Exam  HENT:      Right Ear: External ear normal.      Left Ear: External ear normal.      Nose: Nose normal.   Eyes:      Pupils: Pupils are equal, round, and reactive to light.   Cardiovascular:      Rate and Rhythm: Bradycardia present.   Pulmonary:      Effort: Pulmonary effort is normal.   Abdominal:      General: Abdomen is flat.   Skin:     General: Skin is warm and dry.   Neurological:      Mental Status: She is alert.      Gait: Gait abnormal.              Neurological Exam:   LOC: alert  Attention Span: Good   Language: Expressive aphasia  Articulation: Dysarthria  Visual Fields: Full  EOM (CN III, IV, VI): Full/intact  Pupils (CN II, III): PERRL  Facial Movement (CN VII): Lower facial weakness on the Right  Motor: Arm left  Normal 5/5  Leg left  Normal 5/5  Arm right  Normal 5/5  Leg right Normal 5/5  Cerebellum: No evidence of appendicular or axial ataxia  Sensation: Intact to light touch, temperature and vibration  Tone: Normal tone throughout      Laboratory:  CMP:   Recent Labs   Lab 04/10/24  1746   CALCIUM 9.8   ALBUMIN 3.5   PROT 7.5      K 4.3   CO2 21*      BUN 22   CREATININE 1.5*   ALKPHOS 74   ALT 18   AST 24   BILITOT 0.5     CBC:   Recent Labs   Lab 04/10/24  1746   WBC 8.43   RBC 4.35   HGB 14.0   HCT 41.2      MCV 95   MCH 32.2*   MCHC 34.0     Lipid Panel:   Recent Labs   Lab 04/10/24  1746   CHOL 151   LDLCALC 85.2   HDL 43   TRIG 114     Coagulation:   Recent Labs   Lab 04/10/24  1750   INR 1.3*     Hgb A1C: No  "results for input(s): "HGBA1C" in the last 168 hours.  TSH:   Recent Labs   Lab 04/10/24  1746   TSH 8.104*       Diagnostic Results:      Brain/ Vessel Imaging:    CTA stroke MP 4/10/24    No evidence of acute infarct or hemorrhage. Hypoattenuating area within the left corona radiata is more conspicuous compared to prior, suggesting evolving infarct of indeterminate chronicity. Further evaluation with MRI of the brain may be obtained, as clinically warranted.     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  No additional large vessel occlusion in the intracranial circulation.     No hemodynamically significant stenosis in the neck vessels.    MRI pending     Cardiac Evaluation:     TTE  Pending     "

## 2024-04-11 NOTE — ASSESSMENT & PLAN NOTE
74 y.o. yo female with PMHx hypertension who [place] [facility] [place] worsening aphasia/dysarthria, gait and difficulty swallowing. Pt was LKN at approximately 2pm today. tPA not given, as patient has seen for acute stroke last week. 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  No additional large vessel occlusion in the intracranial circulation. Dr. Woods reviewed images as acquired. No LVO. Patient not a candidate for IR intervention. Admitted to Vascular Neurology for acute stroke workup.         No acute events overnight. Neuro exam is stable. Stroke workup complete. Etiology ESUS. Patient to discharge with 30-day cardiac monitor. Patient is stable for discharge.    Antithrombotics for secondary stroke prevention: Antiplatelets: Aspirin: 81 mg daily    Statins for secondary stroke prevention and hyperlipidemia, if present:   Statins: Atorvastatin- 40 mg daily    Aggressive risk factor modification: HTN, Obesity     Rehab efforts: The patient has been evaluated by a stroke team provider and the therapy needs have been fully considered based off the presenting complaints and exam findings. The following therapy evaluations are needed: PT evaluate and treat, OT evaluate and treat, SLP evaluate and treat, PM&R evaluate for appropriate placement    Diagnostics ordered/pending: HgbA1C to assess blood glucose levels, Lipid Profile to assess cholesterol levels, MRI head without contrast to assess brain parenchyma, TTE to assess cardiac function/status , TSH to assess thyroid function    VTE prophylaxis: Heparin 5000 units SQ every 8 hours    BP parameters: Infarct: No intervention, SBP <220

## 2024-04-11 NOTE — PT/OT/SLP EVAL
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  Plan of Care Expires:  05/11/24  Plan of Care Reviewed with: patient    Assessment:     Denise Berrios is a 74 y.o. 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 Pt found with HOB elevated agreeable to therapy session. Pt alert and AAOx4. Pt with some speech difficulty requiring increased time to answer questions and slight difficulty with finding certain words, but able to answer all history questions during evaluation. Pt presents with intact BLE sensation and coordination, minor LE weakness bilaterally. Pt requiring CGA-SBA with level surface gait at this time with minor instability at times but no overt LOB. Patient's  home with patient at all times and is able to physically assist her. Pt's bedroom on second floor but able to live on first floor if needed with BR and bathroom available. Patient would benefit from skilled therapy services to maximize safety and independence, increase activity tolerance, decrease fall risk, decrease caregiver burden, improve QOL, improve patient's functional mobility, and decrease risk of contractures and pressure sores. Patient currently demonstrates a need for low intensity therapy  "on a scheduled basis secondary to a decline in functional status due to illness    Problem List: weakness, impaired endurance, impaired self care skills, impaired functional mobility, gait instability, impaired balance, decreased lower extremity function, decreased safety awareness.  Rehab Prognosis: Good; patient would benefit from acute skilled PT services to address these deficits and reach maximum level of function.      Goals:   Multidisciplinary Problems       Physical Therapy Goals          Problem: Physical Therapy    Goal Priority Disciplines Outcome Goal Variances Interventions   Physical Therapy Goal     PT, PT/OT Ongoing, Progressing     Description: Goals to be met by: 24     Patient will increase functional independence with mobility by performin. Supine to sit with Modified [place]  2. Sit to stand transfer with Modified [place]  3. Bed to chair transfer with Supervision using LRAD  4. Gait  x 200 feet with Supervision using LRAD.   5. Ascend/descend 1 flight of stair with right Handrails Contact Guard Assistance using LRAD.   6. Stand for 10 minutes with Supervision using LRAD  7. Lower extremity exercise program x15 reps per handout, with independence                         Subjective     RN notified prior to session. No one present upon PT entrance into room. Patient agreeable to PT evaluation.    Chief Complaint: "I just have been feeling a little off balance"  Patient/Family Comments/goals: return to PLOF  Pain/Comfort:  Pain Rating 1: 0/10  Pain Rating Post-Intervention 1: 0/10    Social History:  Residence: lives with their spouse 2-story house with 1 JELLY. Pt also has 1 step to go from her den into her kitchen in her home. Her bedroom is on the 2nd floor with 1 flight of stairs to enter, but can live on first floor if needed as there is a bed and bathroom available. Pt's bathroom has a tub.  Support available: family  Equipment Owned (not using): crutches, walker, " "rolling  Equipment Used: None  Prior level of function: independent with mobility; A required for ADLs since shoulder injury/surgery ~1/5 years ago      Patient reports they will have assistance from  upon discharge.    Objective:     Additional staff present: OT; OT for co-evaluation due to suspected patient need for two skilled therapists to appropriately assess patient's functional deficits as well as ensure patient safety, accommodate for limited activity tolerance, and provide appropriate, skilled assistance to maximize functional potential during evaluation.    Patient found HOB elevated with: blood pressure cuff, telemetry, pulse ox (continuous)     General Precautions: Standard, Cardiac fall   Orthopedic Precautions:N/A   Braces: N/A   Body mass index is 28.74 kg/m².  Oxygen Device: Room Air  Vitals: BP (!) 162/75 (BP Location: Left arm, Patient Position: Lying)   Pulse 96   Temp 98.3 °F (36.8 °C) (Oral)   Resp 20   Ht 5' 1" (1.549 m)   Wt 69 kg (152 lb 1.9 oz)   SpO2 95%   BMI 28.74 kg/m²       Exams:    Cognition:  Oriented X 4, Alert, and Cooperative  Command following: Follows multistep verbal commands  Communication: expressive and receptive language deficits    Sensation:   Light touch sensation: Intact BLEs    Gross Motor Coordination: No deficits noted during functional mobility tasks     Edema/Skin Integrity: None noted; Bruising of L forearm near elbow    Postural examination/scapula alignment: Rounded shoulder    Lower Extremity Range of Motion:  Right Lower Extremity: WFL  Left Lower Extremity: WFL    Lower Extremity Strength:  Right Lower Extremity: WFL except hip flexors 4/5  Left Lower Extremity: WFL except hip flexors 4/5      Functional Mobility:    Bed Mobility:  Scooting with stand by assistance  Supine > Sit with minimum assistance  Likely due to stretcher   Sit > Supine with stand by assistance    Transfers:   Sit <> Stand Transfer:  Minimal Assistance x 1 trials from edge " of stretcher with no AD   Likely due to increased height of stretcher  Stand by assistance x1 trial from toilet with no AD             Gait:  Distance: 16' + 15' to toilet + 15' back to bed  Assistance level:  CGA progressing to SBA  Assistive Device: none  Gait Deviation(s): occasional unsteady gait, decreased step length, narrow base of support, flexed posture, decreased charu, and decreased arm swing  all lines remained intact throughout ambulation trial  Comments: Pt with periods of minor instability requiring CGA for balance assist but no overt LOB. Pt with decreased arm swing bilaterally requiring cues for arm swing and relaxed shoulders.     Stairs:  Not performed 2/2 pt in ED, able to live on first floor    Balance:  Sitting Balance  Static: stand by assistance  Dynamic: stand by assistance  Standing:  Static: stand by assistance  Eyes closed: 20 sec with no LOB  Feet together: 20 sec with no LOB  Dynamic: stand by assistance and contact guard assistance    Outcome Measures:  AM-PAC 6 CLICK MOBILITY  Turning over in bed (including adjusting bedclothes, sheets and blankets)?: 3  Sitting down on and standing up from a chair with arms (e.g., wheelchair, bedside commode, etc.): 3  Moving from lying on back to sitting on the side of the bed?: 3  Moving to and from a bed to a chair (including a wheelchair)?: 3  Need to walk in hospital room?: 3  Climbing 3-5 steps with a railing?: 2  Basic Mobility Total Score: 17       Education:  Time provided for education, counseling and discussion of health disposition in regards to patient's current status  All questions answered within PT scope of practice and to patient's satisfaction  PT role in POC to address current functional deficits  Call nursing/pct to transfer to chair/use bathroom. Pt stated understanding.  Educated on RW use for increased balance support as needed  Educated on benefits of OP PT for higher level balance and gait training to assist with return to  PLOF    Patient left HOB elevated with all lines intact, call button in reach, and RN notified.      History:     Past Medical History:   Diagnosis Date    Acid reflux     Graves disease     diffuse Toxic Goiter    Hypertension     IBS (irritable bowel syndrome)     Lichen sclerosus     Migraine     Stroke        Past Surgical History:   Procedure Laterality Date    EYE SURGERY      HYSTERECTOMY      CINDI for ?? cervical cancer, had normal paps    LEFT HEART CATHETERIZATION N/A 10/20/2020    Procedure: Left heart cath;  Surgeon: Bony Schultz MD;  Location: [facility] CATH LAB/EP;  Service: Cardiology;  Laterality: N/A;    REVERSE TOTAL SHOULDER ARTHROPLASTY Right 8/19/2022    Procedure: ARTHROPLASTY, SHOULDER, TOTAL, REVERSE;  Surgeon: Bradley Finnegan Jr., MD;  Location: [facility] OR;  Service: Orthopedics;  Laterality: Right;  SONIA linda CONFIRMED/8/18/Shaista Finnegan- hemosorb AT        Family History   Problem Relation Age of Onset    Kidney failure Father     Hypertension Father     Stomach cancer Mother     Breast cancer Neg Hx        Social History     Socioeconomic History    Marital status:    Tobacco Use    Smoking status: Never     Passive exposure: Never    Smokeless tobacco: Never   Substance and Sexual Activity    Alcohol use: Yes    Drug use: No    Sexual activity: Yes     Partners: Male     Birth control/protection: See Surgical Hx       Time Tracking:     PT Received On: 04/11/24  PT Start Time: 0923     PT Stop Time: 0956  PT Total Time (min): 33 min     Billable Minutes: Evaluation 10 minutes, Gait Training 10 minutes, and Therapeutic Activity 13 minutes    4/11/2024

## 2024-04-11 NOTE — CONSULTS
[facility] - Emergency Dept  Vascular Neurology  Comprehensive Stroke Center  Consult Note    Inpatient consult to Vascular (Stroke) Neurology  Consult performed by: Pratik Eric, NP  Consult ordered by: Alcides Lowry MD        Assessment/Plan:     Patient is a 74 y.o. 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 yo female with PMHx hypertension who presented [place] [facility] [place] with worsening aphasia/dysarthria, gait and difficulty swallowing. Pt was LKN at approximately 2pm today. tPA not given, as patient has seen for acute stroke last week. 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  No additional large vessel occlusion in the intracranial circulation. Dr. Woods reviewed images as acquired. No LVO. Patient not a candidate for IR intervention. Admitted to Vascular Neurology for acute stroke workup.     Antithrombotics for secondary stroke prevention: Antiplatelets: Aspirin: 81 mg daily    Statins for secondary stroke prevention and hyperlipidemia, if present:   Statins: Atorvastatin- 40 mg daily    Aggressive risk factor modification: HTN, Obesity     Rehab efforts: The patient has been evaluated by a stroke team provider and the therapy needs have been fully considered based off the presenting complaints and exam findings. The following therapy evaluations are needed: PT evaluate and treat, OT evaluate and treat, SLP evaluate and treat, PM&R evaluate for appropriate placement    Diagnostics ordered/pending: HgbA1C to assess blood glucose levels, Lipid Profile to assess cholesterol levels, MRI head without contrast to assess brain parenchyma, TTE to assess cardiac function/status , TSH to assess thyroid function    VTE prophylaxis: Heparin 5000 units SQ every 8 hours    BP parameters: Infarct: No intervention, SBP <220        Overweight  Morbid Obesity  - Patient's Body mass index is 29.1 kg/m². on admit.   - Patient counseled on  need for weight loss.   - Patient counseled on risks of increased mortality related to obesity and increased risk of diabetes, hypertension, pulmonary and breathing problems, arthritis from degeneration of joints, skin ulcers and infections and heart disease if does not have significant weight loss.   - Discussed with patient need for outpatient follow-up with primary care physician to assess best strategy for patient to assist in weight loss.      Aortic atherosclerosis  -stroke risk factor  -continue ASA and statin     Essential hypertension  -stroke risk factor   -keep SBP <220 until acute stroke ruled out     Dyslipidemia  -stroke risk factor   -LDL   -atorvastatin 40 mg     History of cerebral infarction  -continue ASA and statin        STROKE DOCUMENTATION     Acute Stroke Times   Last Known Normal Date: 04/10/24  Last Known Normal Time: 1400  Symptom Onset Date: 04/10/24  Symptom Onset Time: 1400  Stroke Team Called Date: 04/10/24  Stroke Team Called Time: 1730  Stroke Team Arrival Date: 04/10/24  Stroke Team Arrival Time: 1738    NIH Scale:  1a. Level of Consciousness: 0-->Alert, keenly responsive  1b. LOC Questions: 1-->Answers one question correctly  1c. LOC Commands: 0-->Performs both tasks correctly  2. Best Gaze: 0-->Normal  3. Visual: 0-->No visual loss  4. Facial Palsy: 2-->Partial paralysis (total or near-total paralysis of lower face)  5a. Motor Arm, Left: 0-->No drift, limb holds 90 (or 45) degrees for full 10 secs  5b. Motor Arm, Right: 0-->No drift, limb holds 90 (or 45) degrees for full 10 secs  6a. Motor Leg, Left: 0-->No drift, leg holds 30 degree position for full 5 secs  6b. Motor Leg, Right: 0-->No drift, leg holds 30 degree position for full 5 secs  7. Limb Ataxia: 0-->Absent  8. Sensory: 0-->Normal, no sensory loss  9. Best Language: 1-->Mild-to-moderate aphasia, some obvious loss of fluency or facility of comprehension, without significant limitation on ideas expressed or form of  expression. Reduction of speech and/or comprehension, however, makes conversation. . . (see row details)  10. Dysarthria: 1-->Mild-to-moderate dysarthria, patient slurs at least some words and, at worst, can be understood with some difficulty  11. Extinction and Inattention (formerly Neglect): 0-->No abnormality  Total (NIH Stroke Scale): 5    Modified Nicola Score: 2  Inocencia Coma Scale:    ABCD2 Score:    [id number] Score:   HAS -BLED Score:   ICH Score:   Hunt & Velazquez Classification:       Thrombolysis Candidate? No, Out of window - Symptom onset > 4.5 hours    Delays to Thrombolysis?  Not Applicable    Interventional Revascularization Candidate?   Is the patient eligible for mechanical endovascular reperfusion (KULDEEP)?  No; No large vessel occlusion identified on imaging     Delays to Thrombectomy? Not Applicable    Hemorrhagic change of an Ischemic Stroke: Does this patient have an ischemic stroke with hemorrhagic changes? No     Subjective:     History of Present Illness:  Denise Berrios is a 74 y.o. female with PMHx hypertension  who is being evaluated by the Vascular Neurology service after developing worsening aphasia/dysarthria, gait and difficulty swallowing. Pt was LKN at approximately 2pm today     On 4/2, patient with unsteady gait and speech changed, while shopping with . Syptoms improved, but persisted. She presented to PCP on 4/4, CT head was ordered. CT head with new ovoid focus of hypoattenuation in the left corona radiata. Patient was prescribed DAPT and statin. Today patient experienced worsening symptoms and new right facial droop prompting her to present to ED for eval. Stroke code was called on arrival     Pt denies personal hx blood clots and denies hx cardiac arrhythmia.               Past Medical History:   Diagnosis Date    Acid reflux     Graves disease     diffuse Toxic Goiter    Hypertension     IBS (irritable bowel syndrome)     Lichen sclerosus     Migraine     Stroke       Past Surgical History:   Procedure Laterality Date    EYE SURGERY      HYSTERECTOMY      CINDI for ?? cervical cancer, had normal paps    LEFT HEART CATHETERIZATION N/A 10/20/2020    Procedure: Left heart cath;  Surgeon: Bony Schultz MD;  Location: [facility] CATH LAB/EP;  Service: Cardiology;  Laterality: N/A;    REVERSE TOTAL SHOULDER ARTHROPLASTY Right 8/19/2022    Procedure: ARTHROPLASTY, SHOULDER, TOTAL, REVERSE;  Surgeon: Bradley Finnegan Jr., MD;  Location: [facility] OR;  Service: Orthopedics;  Laterality: Right;  MARLEY- maddi CONFIRMED/8/18/Shaista Finnegan- hemosorb AT      Social History     Tobacco Use    Smoking status: Never     Passive exposure: Never    Smokeless tobacco: Never   Substance Use Topics    Alcohol use: Yes    Drug use: No     Review of patient's allergies indicates:   Allergen Reactions    Phenobarbital Anaphylaxis    Penicillins Other (See Comments)    Phenobarbital sodium     Propylthiouracil     Thyroid      Note: PTU    Topamax [topiramate] Diarrhea     GI symptoms    Cefaclor Rash and Other (See Comments)    Clopidogrel Rash    Methimazole Rash and Other (See Comments)    Methimazole (bulk) Rash    Penicillin Rash    Sulfa (sulfonamide antibiotics) Rash and Other (See Comments)       Medications: I have reviewed the current medication administration record.    (Not in a hospital admission)      Review of Systems   Constitutional:  Positive for activity change.   HENT:  Positive for trouble swallowing.    Eyes:  Negative for photophobia.   Respiratory:  Negative for shortness of breath and wheezing.    Cardiovascular:  Negative for chest pain and palpitations.   Gastrointestinal:  Negative for nausea and vomiting.   Musculoskeletal:  Positive for gait problem.   Skin:  Negative for color change.   Neurological:  Positive for facial asymmetry, speech difficulty and weakness.     Objective:     Vital Signs (Most Recent):  Temp: 97.9 °F (36.6 °C) (04/10/24 1728)  Pulse: 63  "(04/10/24 1900)  Resp: 20 (04/10/24 1900)  BP: 133/60 (04/10/24 1900)  SpO2: 98 % (04/10/24 1900)    Vital Signs Range (Last 24H):  Temp:  [97.9 °F (36.6 °C)]   Pulse:  [63-72]   Resp:  [16-21]   BP: (101-133)/(55-60)   SpO2:  [94 %-98 %]        Physical Exam  HENT:      Right Ear: External ear normal.      Left Ear: External ear normal.      Nose: Nose normal.   Eyes:      Pupils: Pupils are equal, round, and reactive to light.   Cardiovascular:      Rate and Rhythm: Bradycardia present.   Pulmonary:      Effort: Pulmonary effort is normal.   Abdominal:      General: Abdomen is flat.   Skin:     General: Skin is warm and dry.   Neurological:      Mental Status: She is alert.      Gait: Gait abnormal.              Neurological Exam:   LOC: alert  Attention Span: Good   Language: Expressive aphasia  Articulation: Dysarthria  Visual Fields: Full  EOM (CN III, IV, VI): Full/intact  Pupils (CN II, III): PERRL  Facial Movement (CN VII): Lower facial weakness on the Right  Motor: Arm left  Normal 5/5  Leg left  Normal 5/5  Arm right  Normal 5/5  Leg right Normal 5/5  Cerebellum: No evidence of appendicular or axial ataxia  Sensation: Intact to light touch, temperature and vibration  Tone: Normal tone throughout      Laboratory:  CMP:   Recent Labs   Lab 04/10/24  1746   CALCIUM 9.8   ALBUMIN 3.5   PROT 7.5      K 4.3   CO2 21*      BUN 22   CREATININE 1.5*   ALKPHOS 74   ALT 18   AST 24   BILITOT 0.5     CBC:   Recent Labs   Lab 04/10/24  1746   WBC 8.43   RBC 4.35   HGB 14.0   HCT 41.2      MCV 95   MCH 32.2*   MCHC 34.0     Lipid Panel:   Recent Labs   Lab 04/10/24  1746   CHOL 151   LDLCALC 85.2   HDL 43   TRIG 114     Coagulation:   Recent Labs   Lab 04/10/24  1750   INR 1.3*     Hgb A1C: No results for input(s): "HGBA1C" in the last 168 hours.  TSH:   Recent Labs   Lab 04/10/24  1746   TSH 8.104*       Diagnostic Results:      Brain/ Vessel Imaging:    CTA stroke MP 4/10/24    No evidence of acute " infarct or hemorrhage. Hypoattenuating area within the left corona radiata is more conspicuous compared to prior, suggesting evolving infarct of indeterminate chronicity. Further evaluation with MRI of the brain may be obtained, as clinically warranted.     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  No additional large vessel occlusion in the intracranial circulation.     No hemodynamically significant stenosis in the neck vessels.    MRI pending     Cardiac Evaluation:     TTE  Pending       Pratik Eric NP  [facility]  Department of Vascular Neurology   [facility] - Emergency Dept

## 2024-04-11 NOTE — ASSESSMENT & PLAN NOTE
Morbid Obesity  - Patient's Body mass index is 28.74 kg/m². on admit.   - Patient counseled on need for weight loss.   - Patient counseled on risks of increased mortality related to obesity and increased risk of diabetes, hypertension, pulmonary and breathing problems, arthritis from degeneration of joints, skin ulcers and infections and heart disease if does not have significant weight loss.   - Discussed with patient need for outpatient follow-up with primary care physician to assess best strategy for patient to assist in weight loss.

## 2024-04-11 NOTE — PLAN OF CARE
Problem: Occupational Therapy  Goal: Occupational Therapy Goal  Description: Goals to be met by: 5/11/24 (1 month)     Patient will increase functional independence with ADLs by performing:    UE Dressing with [place].  LE Dressing with [place].  Grooming while standing at sink with [place].  Toileting from toilet with [place] for hygiene and clothing management.   Rolling to Bilateral with [place].   Supine to sit with [place].  Step transfer with [place]  Toilet transfer to toilet with [place].    Evaluated pt and established OT POC. Continue OT as tolerated.  Shaista Lares OT  4/11/2024    Outcome: Ongoing, Progressing

## 2024-04-11 NOTE — HOSPITAL COURSE
Denise Berrios is a 74 y.o. female with PMHx hypertension, blood clots, and recent stroke (4/2024) who is being evaluated by the Vascular Neurology service after developing worsening aphasia/dysarthria, gait and difficulty swallowing. Pt was LKN at approximately 2pm 4/10.  On 4/2, patient with unsteady gait and speech changed, while shopping with . Syptoms improved, but persisted. She presented to PCP on 4/4, CT head was ordered. CT head with new ovoid focus of hypoattenuation in the left corona radiata. Patient was prescribed DAPT and statin. Today patient experienced worsening symptoms and new right facial droop prompting her to present to ED for eval. Stroke code was called on arrival.        Patient did not receive TNK because of a recent history of acute stroke, and was not a candidate for IR secondary to no LVO on imaging. MRI Brain w/o contrast confirmed prior infarct of L corona radiata, and also showed additional areas of diffusion restriction in the L cerebral hemisphere. ECHO shows 55-60% EF, no wall motion abnormalities, and no atrial enlargement. Etiology of infarcts presumed to be ESUS at this time. Patient will require 30-day cardiac event monitor upon discharge for further evaluation of cardiac arrhythmias. Patient will discharge on 81mg aspirin and 75mg plavix for 21x days, and then 81mg aspirin indefinitely.

## 2024-04-11 NOTE — PT/OT/SLP EVAL
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  She presents with impaired ADL and mobility performance deficits. Pt found upright in bed and agreeable for therapy. Session focused on mobility in ED in room and into restroom for toileting. Pt with mild aphasia (word finding/slurring) however tolerated session fairly with no LOB. Patient currently demonstrates a need for low intensity therapy on a scheduled basis secondary to a decline in functional status due to illness   Performance deficits affecting function: weakness, impaired functional mobility, impaired endurance, gait instability, impaired self care skills, decreased lower extremity function, decreased upper extremity function, impaired balance, decreased safety awareness.      Rehab Prognosis: Good; patient would benefit from acute skilled OT services to address these deficits and reach maximum level of function.       Plan:     Patient to be seen 4 x/week to address the above listed problems via self-care/home management, therapeutic exercises, therapeutic activities  Plan of Care Expires:    Plan of Care Reviewed with: patient    Subjective     Chief Complaint: word finding/weakness after CVA   Patient/Family Comments/goals: return home     Occupational Profile:  Living Environment: Pt lives with spouse in a two story house with bed/bath on second level. Pt can stay on first floor. Pt has 1 step to enter home, 1 step to enter into den from kitchen.  Previous level of function: A from  recently for  ADLs/mobility s/p R shoulder fx ~1.5yr ago. Recent CVA with mild weakness.   Roles and Routines: Pt enjoys gardening  Equipment Used at Home: crutches, walker, rolling  Assistance upon Discharge: spouse    Pain/Comfort:  Pain Rating 1: 0/10  Pain Rating Post-Intervention 1: 0/10  Pain Rating Post-Intervention 2: 0/10    Patients cultural, spiritual, Mormon conflicts given the current situation: no    Objective:     Communicated with: RN prior to session.  Patient found HOB elevated with blood pressure cuff, telemetry, pulse ox (continuous) upon OT entry to room.    General Precautions: Standard, fall  Orthopedic Precautions: N/A  Braces: N/A  Respiratory Status: Room air    Occupational Performance:    Bed Mobility:    Patient completed Rolling/Turning to Left with  minimum assistance  Patient completed Scooting/Bridging with minimum assistance  Patient completed Supine to Sit with minimum assistance  Patient completed Sit to Supine with stand by assistance    Functional Mobility/Transfers:  Patient completed Sit <> Stand Transfer with contact guard assistance  with  hand-held assist   Patient completed Toilet Transfer Step Transfer technique with contact guard assistance with  no AD  Functional Mobility: Pt stood and mobilized in room 1 lap. Pt then requesting to use restroom and into restroom with [facility].    Activities of Daily Living:  Grooming: stand by assistance washing hands at sink  Upper Body Dressing: minimum assistance donning gown   Toileting: stand by assistance for urination/BM    Cognitive/Visual Perceptual:  Cognitive/Psychosocial Skills:     -       Oriented to: Person, Place, Time, and Situation   -       Follows Commands/attention:Follows multistep  commands  -       Communication: clear/fluent  -       Memory: No Deficits noted  -       Safety awareness/insight to disability: intact   -       Mood/Affect/Coping skills/emotional control: Appropriate to situation    Physical Exam:  Balance:     -       demo good sitting and standing balance using no AD   Upper Extremity Range of Motion:     -       Right Upper Extremity: WFL  -       Left Upper Extremity: WFL  Upper Extremity Strength:    -       Right Upper Extremity: WFL  -       Left Upper Extremity: WFL   Strength:    -       Right Upper Extremity: WFL  -       Left Upper Extremity: WFL    AMPAC 6 Click ADL:  AMPAC Total Score: 21    Treatment & Education:  Pt educated on role of occupational therapy, POC, and safety during ADLs and functional mobility. Pt and OT discussed importance of safe, continued mobility to optimize daily living skills. Pt verbalized understanding.     White board updated during session. Pt given instruction to call for medical staff/nurse for assistance.       Patient left HOB elevated with all lines intact, call button in reach, and RN notified    GOALS:   Multidisciplinary Problems       Occupational Therapy Goals          Problem: Occupational Therapy    Goal Priority Disciplines Outcome Interventions   Occupational Therapy Goal     OT, PT/OT Ongoing, Progressing    Description: Goals to be met by: 5/11/24 (1 month)     Patient will increase functional independence with ADLs by performing:    UE Dressing with [place].  LE Dressing with [place].  Grooming while standing at sink with [place].  Toileting from toilet with [place] for hygiene and clothing management.   Rolling to Bilateral with [place].   Supine to sit with [place].  Step transfer with [place]  Toilet transfer to toilet with [place].                         History:     Past Medical History:   Diagnosis Date    Acid reflux     Graves disease     diffuse Toxic Goiter    Hypertension     IBS (irritable bowel syndrome)     Lichen sclerosus     Migraine     Stroke          Past Surgical History:   Procedure Laterality Date    EYE SURGERY      HYSTERECTOMY      CINDI for ?? cervical cancer, had normal paps    LEFT HEART  CATHETERIZATION N/A 10/20/2020    Procedure: Left heart cath;  Surgeon: Bony Schultz MD;  Location: [facility] CATH LAB/EP;  Service: Cardiology;  Laterality: N/A;    REVERSE TOTAL SHOULDER ARTHROPLASTY Right 8/19/2022    Procedure: ARTHROPLASTY, SHOULDER, TOTAL, REVERSE;  Surgeon: Bradley Finnegan Jr., MD;  Location: [facility] OR;  Service: Orthopedics;  Laterality: Right;  SONIA linda CONFIRMED/8/18/Shaista Finnegan- hemosorb AT        Time Tracking:     OT Date of Treatment: 04/11/24  OT Start Time: 0923  OT Stop Time: 0956  OT Total Time (min): 33 min    Billable Minutes:Evaluation 10 min  Self Care/Home Management 23 min    4/11/2024

## 2024-04-11 NOTE — PROGRESS NOTES
[facility] - Emergency Dept  Vascular Neurology  [facility] Stroke Center  Progress Note    Assessment/Plan:     * Embolic stroke involving left anterior cerebral artery  74 y.o. yo female with PMHx hypertension who presented to [facility] ED with worsening aphasia/dysarthria, gait and difficulty swallowing. Pt was LKN at approximately 2pm today. tPA not given, as patient has seen for acute stroke last week. 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  No additional large vessel occlusion in the intracranial circulation. Dr. Woods reviewed images as acquired. No LVO. Patient not a candidate for IR intervention. Admitted to Vascular Neurology for acute stroke workup.         No acute events overnight. Neuro exam is stable. Stroke workup complete. Etiology ESUS. Patient to discharge with 30-day cardiac monitor. Patient is stable for discharge.    Antithrombotics for secondary stroke prevention: Antiplatelets: Aspirin: 81 mg daily    Statins for secondary stroke prevention and hyperlipidemia, if present:   Statins: Atorvastatin- 40 mg daily    Aggressive risk factor modification: HTN, Obesity     Rehab efforts: The patient has been evaluated by a stroke team provider and the therapy needs have been fully considered based off the presenting complaints and exam findings. The following therapy evaluations are needed: PT evaluate and treat, OT evaluate and treat, SLP evaluate and treat, PM&R evaluate for appropriate placement    Diagnostics ordered/pending: HgbA1C to assess blood glucose levels, Lipid Profile to assess cholesterol levels, MRI head without contrast to assess brain parenchyma, TTE to assess cardiac function/status , TSH to assess thyroid function    VTE prophylaxis: Heparin 5000 units SQ every 8 hours    BP parameters: Infarct: No intervention, SBP <220        Overweight  Morbid Obesity  - Patient's Body mass index is 28.74 kg/m². on admit.   - Patient counseled on need for weight  loss.   - Patient counseled on risks of increased mortality related to obesity and increased risk of diabetes, hypertension, pulmonary and breathing problems, arthritis from degeneration of joints, skin ulcers and infections and heart disease if does not have significant weight loss.   - Discussed with patient need for outpatient follow-up with primary care physician to assess best strategy for patient to assist in weight loss.      Aortic atherosclerosis  -stroke risk factor  -continue ASA and statin     Essential hypertension  -stroke risk factor   -keep SBP <220 until acute stroke ruled out     Dyslipidemia  -stroke risk factor   -LDL   -atorvastatin 40 mg     History of cerebral infarction  -continue ASA and statin         04/11/2024: No acute events overnight. Neuro exam is stable. Stroke workup complete. Etiology ESUS. Patient to discharge with 30-day cardiac monitor.    STROKE DOCUMENTATION   Acute Stroke Times   Last Known Normal Date: 04/10/24  Last Known Normal Time: 1400  Symptom Onset Date: 04/10/24  Symptom Onset Time: 1400  Stroke Team Called Date: 04/10/24  Stroke Team Called Time: 1730  Stroke Team Arrival Date: 04/10/24  Stroke Team Arrival Time: 1738    NIH Scale:  1a. Level of Consciousness: 0-->Alert, keenly responsive  1b. LOC Questions: 0-->Answers both questions correctly  1c. LOC Commands: 0-->Performs both tasks correctly  2. Best Gaze: 0-->Normal  3. Visual: 0-->No visual loss  4. Facial Palsy: 1-->Minor paralysis (flattened nasolabial fold, asymmetry on smiling)  5a. Motor Arm, Left: 0-->No drift, limb holds 90 (or 45) degrees for full 10 secs  5b. Motor Arm, Right: 0-->No drift, limb holds 90 (or 45) degrees for full 10 secs  6a. Motor Leg, Left: 0-->No drift, leg holds 30 degree position for full 5 secs  6b. Motor Leg, Right: 0-->No drift, leg holds 30 degree position for full 5 secs  7. Limb Ataxia: 0-->Absent  8. Sensory: 0-->Normal, no sensory loss  9. Best Language:  1-->Mild-to-moderate aphasia, some obvious loss of fluency or facility of comprehension, without significant limitation on ideas expressed or form of expression. Reduction of speech and/or comprehension, however, makes conversation. . . (see row details)  10. Dysarthria: 1-->Mild-to-moderate dysarthria, patient slurs at least some words and, at worst, can be understood with some difficulty  11. Extinction and Inattention (formerly Neglect): 0-->No abnormality  Total (NIH Stroke Scale): 3       Modified Nicola Score: 2  Inocencia Coma Scale:15   ABCD2 Score:    [id number] Score:   HAS -BLED Score:   ICH Score:   Hunt & Velazquez Classification:      Hemorrhagic change of an Ischemic Stroke: Does this patient have an ischemic stroke with hemorrhagic changes? No     Neurologic Chief Complaint: aphasia/dysarthria, gait and difficulty swallowing     Subjective:     Interval History: Patient is seen for follow-up neurological assessment and treatment recommendations: No acute events overnight. Neuro exam is stable. Stroke workup complete. Etiology ESUS. Patient to discharge with 30-day cardiac monitor.    HPI, Past Medical, Family, and Social History remains the same as documented in the initial encounter.     Review of Systems   Respiratory: Negative.     Cardiovascular: Negative.    Gastrointestinal: Negative.    Genitourinary: Negative.    Neurological:  Positive for speech difficulty and weakness.     Scheduled Meds:   aspirin  81 mg Oral Daily    atorvastatin  40 mg Oral Daily    heparin (porcine)  5,000 Units Subcutaneous Q8H     Continuous Infusions:   sodium chloride 0.9%       PRN Meds:bisacodyL, sodium chloride 0.9%, sodium chloride 0.9%    Objective:     Vital Signs (Most Recent):  Temp: 98.5 °F (36.9 °C) (04/11/24 1028)  Pulse: 93 (04/11/24 1208)  Resp: 20 (04/11/24 1208)  BP: 133/60 (04/11/24 1208)  SpO2: 97 % (04/11/24 1208)  BP Location: Right arm    Vital Signs Range (Last 24H):  Temp:  [97.7 °F (36.5 °C)-98.5  "°F (36.9 °C)]   Pulse:  [63-98]   Resp:  [16-21]   BP: (101-175)/(55-75)   SpO2:  [92 %-98 %]   BP Location: Right arm       Physical Exam  Vitals and nursing note reviewed.   Constitutional:       Appearance: Normal appearance.   HENT:      Head: Normocephalic and atraumatic.      Mouth/Throat:      Mouth: Mucous membranes are dry.   Eyes:      Extraocular Movements: Extraocular movements intact.   Cardiovascular:      Rate and Rhythm: Normal rate.      Pulses: Normal pulses.   Pulmonary:      Effort: Pulmonary effort is normal.   Skin:     General: Skin is warm.   Neurological:      Mental Status: She is alert and oriented to person, place, and time.              Neurological Exam:   LOC: alert  Attention Span: Good   Language: Expressive aphasia  Articulation: Dysarthria  Orientation: Person, Place, Time   Facial Sensation (CN V): Normal  Cerebellum: No evidence of appendicular or axial ataxia  Sensation: intact to touch bilaterally  Tone: Normal tone throughout    Laboratory:  CMP:   Recent Labs   Lab 04/11/24  0321   CALCIUM 9.8   ALBUMIN 3.4*   PROT 7.4      K 4.2   CO2 21*      BUN 19   CREATININE 1.3   ALKPHOS 72   ALT 15   AST 24   BILITOT 0.5     BMP:   Recent Labs   Lab 04/11/24  0321      K 4.2      CO2 21*   BUN 19   CREATININE 1.3   CALCIUM 9.8     CBC:   Recent Labs   Lab 04/11/24  0321   WBC 9.42   RBC 4.35   HGB 13.6   HCT 41.1      MCV 95   MCH 31.3*   MCHC 33.1     Lipid Panel:   Recent Labs   Lab 04/10/24  1746   CHOL 151   LDLCALC 85.2   HDL 43   TRIG 114     Coagulation:   Recent Labs   Lab 04/11/24  0321   INR 1.0   APTT 25.1     Platelet Aggregation Study: No results for input(s): "PLTAGG", "PLTAGINTERP", "PLTAGREGLACO", "ADPPLTAGGREG" in the last 168 hours.  Hgb A1C:   Recent Labs   Lab 04/10/24  1746   HGBA1C 5.6     TSH:   Recent Labs   Lab 04/10/24  1746   TSH 8.104*       Diagnostic Results     Brain/Vessel Imaging:  MRI Brain w/o Contrast " 4/11/24  Impression:  Diffusion restriction in the left corona radiata consistent with known infarct.  Additional areas of diffusion restriction in the, left parietal lobe cortex, and left insular cortex concerning for recent infarct.  Minimal associated edema with no significant mass effect or midline shift.  Stable remote infarcts as above.      CTA stroke MP 4/10/24  No evidence of acute infarct or hemorrhage. Hypoattenuating area within the left corona radiata is more conspicuous compared to prior, suggesting evolving infarct of indeterminate chronicity. Further evaluation with MRI of the brain may be obtained, as clinically warranted.  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  No additional large vessel occlusion in the intracranial circulation.  No hemodynamically significant stenosis in the neck vessels.      Cardiac Imaging:  ECHO 4/11/24  Left Ventricle The left ventricle is normal in size. Normal wall thickness. There is mild concentric hypertrophy. Normal wall motion. There is normal systolic function with a visually estimated ejection fraction of 55 - 60%. There is indeterminate diastolic function.   Right Ventricle Normal right ventricular cavity size. Wall thickness is normal. Right ventricle wall motion  is normal. Systolic function is normal.   Left Atrium Normal left atrial size.   Right Atrium Normal right atrial size.   Aortic Valve The aortic valve is a trileaflet valve. There is normal leaflet mobility. Aortic valve peak velocity is 1.28 m/s. Mean gradient is 4 mmHg.   Mitral Valve The mitral valve is structurally normal. There is normal leaflet mobility.   Tricuspid Valve The tricuspid valve is structurally normal. There is normal leaflet mobility.   Pulmonic Valve The pulmonic valve is structurally normal. There is normal leaflet mobility.   IVC/SVC Normal venous pressure at 3 mmHg.   Ascending Aorta Aortic root is normal in size measuring 3.39 cm. Ascending aorta is normal  measuring 3.40 cm.   Pericardium and Other Findings There is no pericardial effusion.   Pulmonary Artery The estimated pulmonary artery systolic pressure is 15 mmHg.       Giulia Beltran NP  [facility] St[facility]  Department of Vascular Neurology   [facility] - Emergency Dept

## 2024-04-11 NOTE — DISCHARGE SUMMARY
[facility] - Emergency Dept  Vascular Neurology  [facility]  Discharge Summary     Summary:     Admit Date: 4/10/2024  5:28 PM    Discharge Date and Time:  04/11/2024 5:59 PM    Attending Physician: Cindy Woods MD     Discharge Provider: Giulia Beltran NP    History of Present Illness: Denise Berrios is a 74 y.o. female with PMHx hypertension  who is being evaluated by the Vascular Neurology service after developing worsening aphasia/dysarthria, gait and difficulty swallowing. Pt was LKN at approximately 2pm today     On 4/2, patient with unsteady gait and speech changed, while shopping with . Syptoms improved, but persisted. She presented to PCP on 4/4, CT head was ordered. CT head with new ovoid focus of hypoattenuation in the left corona radiata. Patient was prescribed DAPT and statin. Today patient experienced worsening symptoms and new right facial droop prompting her to present to ED for eval. Stroke code was called on arrival     Pt denies personal hx blood clots and denies hx cardiac arrhythmia.         Hospital Course (synopsis of major diagnoses, care, treatment, and services provided during the course of the hospital stay): Denise Berrios is a 74 y.o. female with PMHx hypertension, blood clots, and recent stroke (4/2024) who is being evaluated by the Vascular Neurology service after developing worsening aphasia/dysarthria, gait and difficulty swallowing. Pt was LKN at approximately 2pm 4/10.  On 4/2, patient with unsteady gait and speech changed, while shopping with . Syptoms improved, but persisted. She presented to PCP on 4/4, CT head was ordered. CT head with new ovoid focus of hypoattenuation in the left corona radiata. Patient was prescribed DAPT and statin. Today patient experienced worsening symptoms and new right facial droop prompting her to present to ED for eval. Stroke code was called on arrival.        Patient did not receive TNK because of a recent history of  acute stroke, and was not a candidate for IR secondary to no LVO on imaging. MRI Brain w/o contrast confirmed prior infarct of L corona radiata, and also showed additional areas of diffusion restriction in the L cerebral hemisphere. ECHO shows 55-60% EF, no wall motion abnormalities, and no atrial enlargement. Etiology of infarcts presumed to be ESUS at this time. Patient will require 30-day cardiac event monitor upon discharge for further evaluation of cardiac arrhythmias. Patient will discharge on 81mg aspirin and 75mg plavix for 21x days, and then 81mg aspirin indefinitely.                Goals of Care Treatment Preferences:  Code Status: Full Code      Stroke Etiology: Ischemic Undetermined Cause Cryptogenic Embolism / ESUS (Embolic Stroke of Undetermined Source)    STROKE DOCUMENTATION   Acute Stroke Times   Last Known Normal Date: 04/10/24  Last Known Normal Time: 1400  Symptom Onset Date: 04/10/24  Symptom Onset Time: 1400  Stroke Team Called Date: 04/10/24  Stroke Team Called Time: 1730  Stroke Team Arrival Date: 04/10/24  Stroke Team Arrival Time: 1738     NIH Scale:  1a. Level of Consciousness: 0-->Alert, keenly responsive  1b. LOC Questions: 0-->Answers both questions correctly  1c. LOC Commands: 0-->Performs both tasks correctly  2. Best Gaze: 0-->Normal  3. Visual: 0-->No visual loss  4. Facial Palsy: 1-->Minor paralysis (flattened nasolabial fold, asymmetry on smiling)  5a. Motor Arm, Left: 0-->No drift, limb holds 90 (or 45) degrees for full 10 secs  5b. Motor Arm, Right: 0-->No drift, limb holds 90 (or 45) degrees for full 10 secs  6a. Motor Leg, Left: 0-->No drift, leg holds 30 degree position for full 5 secs  6b. Motor Leg, Right: 0-->No drift, leg holds 30 degree position for full 5 secs  7. Limb Ataxia: 0-->Absent  8. Sensory: 0-->Normal, no sensory loss  9. Best Language: 1-->Mild-to-moderate aphasia, some obvious loss of fluency or facility of comprehension, without significant limitation on  ideas expressed or form of expression. Reduction of speech and/or comprehension, however, makes conversation. . . (see row details)  10. Dysarthria: 1-->Mild-to-moderate dysarthria, patient slurs at least some words and, at worst, can be understood with some difficulty  11. Extinction and Inattention (formerly Neglect): 0-->No abnormality  Total (NIH Stroke Scale): 3        Modified [place] Score: 2  [place] Coma Scale:15   ABCD2 Score:    [id number] Score:   HAS -BLED Score:   ICH Score:   Hunt & Velazquez Classification:       Assessment/Plan:     Diagnostic Results:      Brain/Vessel Imaging:  MRI Brain w/o Contrast 4/11/24  Impression:  Diffusion restriction in the left corona radiata consistent with known infarct.  Additional areas of diffusion restriction in the, left parietal lobe cortex, and left insular cortex concerning for recent infarct.  Minimal associated edema with no significant mass effect or midline shift.  Stable remote infarcts as above.        CTA stroke MP 4/10/24  No evidence of acute infarct or hemorrhage. Hypoattenuating area within the left corona radiata is more conspicuous compared to prior, suggesting evolving infarct of indeterminate chronicity. Further evaluation with MRI of the brain may be obtained, as clinically warranted.  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  No additional large vessel occlusion in the intracranial circulation.  No hemodynamically significant stenosis in the neck vessels.        Cardiac Imaging:  ECHO 4/11/24       Left Ventricle The left ventricle is normal in size. Normal wall thickness. There is mild concentric hypertrophy. Normal wall motion. There is normal systolic function with a visually estimated ejection fraction of 55 - 60%. There is indeterminate diastolic function.   Right Ventricle Normal right ventricular cavity size. Wall thickness is normal. Right ventricle wall motion  is normal. Systolic function is normal.   Left Atrium Normal  left atrial size.   Right Atrium Normal right atrial size.   Aortic Valve The aortic valve is a trileaflet valve. There is normal leaflet mobility. Aortic valve peak velocity is 1.28 m/s. Mean gradient is 4 mmHg.   Mitral Valve The mitral valve is structurally normal. There is normal leaflet mobility.   Tricuspid Valve The tricuspid valve is structurally normal. There is normal leaflet mobility.   Pulmonic Valve The pulmonic valve is structurally normal. There is normal leaflet mobility.   IVC/SVC Normal venous pressure at 3 mmHg.   Ascending Aorta Aortic root is normal in size measuring 3.39 cm. Ascending aorta is normal measuring 3.40 cm.   Pericardium and Other Findings There is no pericardial effusion.   Pulmonary Artery The estimated pulmonary artery systolic pressure is 15 mmHg.        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  Service: Vascular Neurology      Recommendations:     Post-discharge complication risks: Falls, Pneumonia    Stroke Education given to: patient and family    Follow-up in Stroke Clinic in 4-6 weeks.     Discharge Plan:  Antithrombotics: Aspirin 81mg, Clopidogrel 75mg  Statin: Atorvastatin 40mg  Cardiac Event Monitor  PT/OT referrals    Follow Up:   Follow-up Information       Aleksandra Carey MD Follow up in 1 week(s).    Specialty: Internal Medicine  Why: Please call to set up hospital follow-up  "appointment.  Contact information:  [address]  [facility] [address]  [phone]               [facility] VASCULAR NEUROLOGY Follow up in 4 week(s).    Specialty: Vascular Neurology  Why: A Vascular Neurology Clinic representative will reach out to you to set up a follow-up appointment in stroke clinic.  Contact information:  [facility]  [facility] [id number]  [phone]                           Patient Instructions:      TRANSFER TUB BENCH FOR HOME USE   Order Comments: Inform patient and family: Not covered by insurance. Out of pocket cost.     Order Specific Question Answer Comments   Type of Transfer Tub Bench: Unpadded    Height: 5' 1" (1.549 m)    Weight: 69 kg (152 lb 1.9 oz)    Does patient have medical equipment at home? crutches    Does patient have medical equipment at home? walker, rolling    Length of need (1-99 months): 99    Patient notified - Not covered by insurance considered a convenience item No    Discussed financial responsibility with responsible party No      Ambulatory referral/consult to Vascular Neurology   Standing Status: Future   Referral Priority: Routine Referral Type: Consultation   Referral Reason: Specialty Services Required   Requested Specialty: Vascular Neurology   Number of Visits Requested: 1     Ambulatory referral/consult to Speech Therapy   Standing Status: Future   Referral Priority: Routine Referral Type: Speech Therapy   Referral Reason: Specialty Services Required   Requested Specialty: Speech Pathology   Number of Visits Requested: 1     Ambulatory referral/consult to Physical Medicine Rehab   Standing Status: Future   Referral Priority: Routine Referral Type: Rehabilitation   Referral Reason: Specialty Services Required   Requested Specialty: Physical Medicine and Rehabilitation   Number of Visits Requested: 1     Ambulatory referral/consult to Physical/Occupational Therapy   Standing Status: Future   Referral Priority: " Routine Referral Type: Physical Medicine   Referral Reason: Specialty Services Required   Number of Visits Requested: 1     Diet Cardiac   Order Comments: See Stroke Patient Education Guide Booklet for details.     Call 911 for any of the following:   Order Comments: Call 911  right away if any of the following warning signs come on suddenly, even if the symptoms only last for a few minutes. With stroke, timing is very important.   - Warning Signs of Stroke:  - Weakness: You may feel a sudden weakness, tingling or loss of feeling on one side of your face or body.  - Vision Problems: You may have sudden double vision or trouble seeing in one or both eyes.  - Speech Problems: You may have sudden trouble talking, slured speech, or problems understanding others.  - Headache: You may have sudden, severe headache.  - Movement Problems: You may experience dizziness, a feeling of spinning, a loss of balance, a feeling of falling or blackouts.     Activity as tolerated     Cardiac event monitor   Standing Status: Future Standing Exp. Date: 04/11/25     Order Specific Question Answer Comments   Cardiac Event Monitor Auto Trigger    Release to patient Immediate        Medications:  Reconciled Home Medications:      Medication List        CHANGE how you take these medications      atorvastatin 40 MG tablet  Commonly known as: LIPITOR  Take 1 tablet (40 mg total) by mouth once daily.  Start taking on: April 12, 2024  What changed:   medication strength  how much to take            CONTINUE taking these medications      aspirin 81 MG EC tablet  Commonly known as: ECOTRIN  once a day     butalbital-acetaminophen-caffeine -40 mg -40 mg per tablet  Commonly known as: FIORICET, ESGIC  Take 1 tablet by mouth nightly as needed for Headaches.     clobetasol 0.05% 0.05 % Oint  Commonly known as: TEMOVATE  APPLY TOPICALLY TWICE A DAY     clopidogreL 75 mg tablet  Commonly known as: PLAVIX  Take 1 tablet (75 mg total) by mouth  once daily.     diclofenac sodium 1 % Gel  Commonly known as: VOLTAREN  Apply 2 g topically 3 (three) times daily.     ergocalciferol 50,000 unit Cap  Commonly known as: VITAMIN D2  Take 1 capsule (50,000 Units total) by mouth once a week.     EScitalopram oxalate 20 MG tablet  Commonly known as: LEXAPRO  Take 1 tablet (20 mg total) by mouth once daily.     levothyroxine 88 MCG tablet  Commonly known as: SYNTHROID  Take 1 tablet (88 mcg total) by mouth before breakfast.     losartan 50 MG tablet  Commonly known as: COZAAR  Take 1 tablet (50 mg total) by mouth once daily.     metoprolol tartrate 50 MG tablet  Commonly known as: LOPRESSOR  Take 1 tablet (50 mg total) by mouth 2 (two) times daily.     naloxone 4 mg/actuation Spry  Commonly known as: NARCAN  4mg by nasal route as needed for opioid overdose; may repeat every 2-3 minutes in alternating nostrils until medical help arrives. Call 911     nortriptyline 10 MG capsule  Commonly known as: PAMELOR  Take 1 capsule (10 mg total) by mouth once daily.     omeprazole 40 MG capsule  Commonly known as: PRILOSEC  Take 1 capsule (40 mg total) by mouth once daily.     oxybutynin 10 MG 24 hr tablet  Commonly known as: DITROPAN-XL  Take 1 tablet (10 mg total) by mouth once daily.     pregabalin 75 MG capsule  Commonly known as: LYRICA  TAKE 1 CAPSULE BY MOUTH TWICE DAILY FOR NEUROPATHIC PAIN     promethazine 12.5 MG Tab  Commonly known as: PHENERGAN  Take 1 tablet (12.5 mg total) by mouth every 6 (six) hours as needed (nausea and or vomiting).     traMADoL 50 mg tablet  Commonly known as: ULTRAM  Take 50 mg by mouth every 6 (six) hours as needed for Pain.              Giulia Beltran NP  [facility]  Department of Vascular Neurology   [facility] - Emergency Dept

## 2024-04-11 NOTE — ED NOTES
Assumed care for pt after recieving report from nightshift RN. Pt. resting in bed in NAD, RR e/u. Vital signs stable and within desired limits at this time of assessment. Pt. offered bathroom assistance and denies need at this time. Explanation of care/wait provided. Pt verbalizes no needs at this time. Bed in low, locked position with rails up and call bell in reach. Pt's white board updated with today's care team and plan.     Patient identifiers for Denise VIVAS Cousins 74 y.o. female checked and correct.  Chief Complaint   Patient presents with    Cerebrovascular Accident     CVA last week. Speech and gait is worse today since 1400.      Past Medical History:   Diagnosis Date    Acid reflux     Graves disease     diffuse Toxic Goiter    Hypertension     IBS (irritable bowel syndrome)     Lichen sclerosus     Migraine     Stroke      Allergies reported:   Review of patient's allergies indicates:   Allergen Reactions    Phenobarbital Anaphylaxis    Penicillins Other (See Comments)    Phenobarbital sodium     Propylthiouracil     Thyroid      Note: PTU    Topamax [topiramate] Diarrhea     GI symptoms    Cefaclor Rash and Other (See Comments)    Clopidogrel Rash    Methimazole Rash and Other (See Comments)    Methimazole (bulk) Rash    Penicillin Rash    Sulfa (sulfonamide antibiotics) Rash and Other (See Comments)         LOC: Patient is awake, alert, and aware of environment with an appropriate affect. Patient is oriented x 4 and speaking appropriately.  APPEARANCE: Patient resting comfortably and in no acute distress. Patient is clean and well groomed, patient's clothing is properly fastened.  HEENT: WDL  SKIN: The skin is warm and dry. Patient has normal skin turgor and moist mucus membranes.   MUSCULOSKELETAL: Patient is moving all extremities well, no obvious deformities noted. Pulses intact.   RESPIRATORY: Airway is open and patent. Respirations are spontaneous and non-labored with normal effort and  rate.  CARDIAC: Patient has a normal rate and rhythm. 81 on cardiac monitor. No peripheral edema noted. Denies chest pain and SOB at at time of assessment.   ABDOMEN: No distention noted. Soft and non-tender upon palpation.  NEUROLOGICAL: pupils 3mm, PERRL. Facial expression is symmetrical. Hand grasps are equal bilaterally. Normal sensation in all extremities when touched with finger.

## 2024-04-11 NOTE — PLAN OF CARE
PT evaluation completed- see note for details. PT POC and goals established.    Problem: Physical Therapy  Goal: Physical Therapy Goal  Description: Goals to be met by: 24     Patient will increase functional independence with mobility by performin. Supine to sit with Modified [place]  2. Sit to stand transfer with Modified [place]  3. Bed to chair transfer with Supervision using LRAD  4. Gait  x 200 feet with Supervision using LRAD.   5. Ascend/descend 1 flight of stair with right Handrails Contact Guard Assistance using LRAD.   6. Stand for 10 minutes with Supervision using LRAD  7. Lower extremity exercise program x15 reps per handout, with independence    Outcome: Ongoing, Progressing

## 2024-04-11 NOTE — ASSESSMENT & PLAN NOTE
74 y.o. yo female with PMHx hypertension who presented to [facility] ED with worsening aphasia/dysarthria, gait and difficulty swallowing. Pt was LKN at approximately 2pm today. tPA not given, as patient has seen for acute stroke last week. 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  No additional large vessel occlusion in the intracranial circulation. Dr. Woods reviewed images as acquired. No LVO. Patient not a candidate for IR intervention. Admitted to Vascular Neurology for acute stroke workup.     Antithrombotics for secondary stroke prevention: Antiplatelets: Aspirin: 81 mg daily    Statins for secondary stroke prevention and hyperlipidemia, if present:   Statins: Atorvastatin- 40 mg daily    Aggressive risk factor modification: HTN, Obesity     Rehab efforts: The patient has been evaluated by a stroke team provider and the therapy needs have been fully considered based off the presenting complaints and exam findings. The following therapy evaluations are needed: PT evaluate and treat, OT evaluate and treat, SLP evaluate and treat, PM&R evaluate for appropriate placement    Diagnostics ordered/pending: HgbA1C to assess blood glucose levels, Lipid Profile to assess cholesterol levels, MRI head without contrast to assess brain parenchyma, TTE to assess cardiac function/status , TSH to assess thyroid function    VTE prophylaxis: Heparin 5000 units SQ every 8 hours    BP parameters: Infarct: No intervention, SBP <220

## 2024-04-11 NOTE — SUBJECTIVE & OBJECTIVE
Neurologic Chief Complaint: aphasia/dysarthria, gait and difficulty swallowing     Subjective:     Interval History: Patient is seen for follow-up neurological assessment and treatment recommendations: No acute events overnight. Neuro exam is stable. Stroke workup complete. Etiology ESUS. Patient to discharge with 30-day cardiac monitor.    HPI, Past Medical, Family, and Social History remains the same as documented in the initial encounter.     Review of Systems   Respiratory: Negative.     Cardiovascular: Negative.    Gastrointestinal: Negative.    Genitourinary: Negative.    Neurological:  Positive for speech difficulty and weakness.     Scheduled Meds:   aspirin  81 mg Oral Daily    atorvastatin  40 mg Oral Daily    heparin (porcine)  5,000 Units Subcutaneous Q8H     Continuous Infusions:   sodium chloride 0.9%       PRN Meds:bisacodyL, sodium chloride 0.9%, sodium chloride 0.9%    Objective:     Vital Signs (Most Recent):  Temp: 98.5 °F (36.9 °C) (04/11/24 1028)  Pulse: 93 (04/11/24 1208)  Resp: 20 (04/11/24 1208)  BP: 133/60 (04/11/24 1208)  SpO2: 97 % (04/11/24 1208)  BP Location: Right arm    Vital Signs Range (Last 24H):  Temp:  [97.7 °F (36.5 °C)-98.5 °F (36.9 °C)]   Pulse:  [63-98]   Resp:  [16-21]   BP: (101-175)/(55-75)   SpO2:  [92 %-98 %]   BP Location: Right arm       Physical Exam  Vitals and nursing note reviewed.   Constitutional:       Appearance: Normal appearance.   HENT:      Head: Normocephalic and atraumatic.      Mouth/Throat:      Mouth: Mucous membranes are dry.   Eyes:      Extraocular Movements: Extraocular movements intact.   Cardiovascular:      Rate and Rhythm: Normal rate.      Pulses: Normal pulses.   Pulmonary:      Effort: Pulmonary effort is normal.   Skin:     General: Skin is warm.   Neurological:      Mental Status: She is alert and oriented to person, place, and time.              Neurological Exam:   LOC: alert  Attention Span: Good   Language: Expressive  "aphasia  Articulation: Dysarthria  Orientation: Person, Place, Time   Facial Sensation (CN V): Normal  Cerebellum: No evidence of appendicular or axial ataxia  Sensation: intact to touch bilaterally  Tone: Normal tone throughout    Laboratory:  CMP:   Recent Labs   Lab 04/11/24  0321   CALCIUM 9.8   ALBUMIN 3.4*   PROT 7.4      K 4.2   CO2 21*      BUN 19   CREATININE 1.3   ALKPHOS 72   ALT 15   AST 24   BILITOT 0.5     BMP:   Recent Labs   Lab 04/11/24  0321      K 4.2      CO2 21*   BUN 19   CREATININE 1.3   CALCIUM 9.8     CBC:   Recent Labs   Lab 04/11/24  0321   WBC 9.42   RBC 4.35   HGB 13.6   HCT 41.1      MCV 95   MCH 31.3*   MCHC 33.1     Lipid Panel:   Recent Labs   Lab 04/10/24  1746   CHOL 151   LDLCALC 85.2   HDL 43   TRIG 114     Coagulation:   Recent Labs   Lab 04/11/24 0321   INR 1.0   APTT 25.1     Platelet Aggregation Study: No results for input(s): "PLTAGG", "PLTAGINTERP", "PLTAGREGLACO", "ADPPLTAGGREG" in the last 168 hours.  Hgb A1C:   Recent Labs   Lab 04/10/24  1746   HGBA1C 5.6     TSH:   Recent Labs   Lab 04/10/24  1746   TSH 8.104*       Diagnostic Results     Brain/Vessel Imaging:  MRI Brain w/o Contrast 4/11/24  Impression:  Diffusion restriction in the left corona radiata consistent with known infarct.  Additional areas of diffusion restriction in the, left parietal lobe cortex, and left insular cortex concerning for recent infarct.  Minimal associated edema with no significant mass effect or midline shift.  Stable remote infarcts as above.      CTA stroke MP 4/10/24  No evidence of acute infarct or hemorrhage. Hypoattenuating area within the left corona radiata is more conspicuous compared to prior, suggesting evolving infarct of indeterminate chronicity. Further evaluation with MRI of the brain may be obtained, as clinically warranted.  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  No additional large vessel occlusion in the intracranial " circulation.  No hemodynamically significant stenosis in the neck vessels.      Cardiac Imaging:  ECHO 4/11/24  Left Ventricle The left ventricle is normal in size. Normal wall thickness. There is mild concentric hypertrophy. Normal wall motion. There is normal systolic function with a visually estimated ejection fraction of 55 - 60%. There is indeterminate diastolic function.   Right Ventricle Normal right ventricular cavity size. Wall thickness is normal. Right ventricle wall motion  is normal. Systolic function is normal.   Left Atrium Normal left atrial size.   Right Atrium Normal right atrial size.   Aortic Valve The aortic valve is a trileaflet valve. There is normal leaflet mobility. Aortic valve peak velocity is 1.28 m/s. Mean gradient is 4 mmHg.   Mitral Valve The mitral valve is structurally normal. There is normal leaflet mobility.   Tricuspid Valve The tricuspid valve is structurally normal. There is normal leaflet mobility.   Pulmonic Valve The pulmonic valve is structurally normal. There is normal leaflet mobility.   IVC/SVC Normal venous pressure at 3 mmHg.   Ascending Aorta Aortic root is normal in size measuring 3.39 cm. Ascending aorta is normal measuring 3.40 cm.   Pericardium and Other Findings There is no pericardial effusion.   Pulmonary Artery The estimated pulmonary artery systolic pressure is 15 mmHg.

## 2024-04-11 NOTE — CONSULTS
Inpatient consult to Physical Medicine Rehab  Consult performed by: Vanessa Mederos NP  Consult ordered by: Pratik Eric NP  Reason for consult: Rehab      Consult received.     BARBI Lutz, FNP-C  Physical Medicine & Rehabilitation   04/11/2024

## 2024-04-12 ENCOUNTER — TELEPHONE (OUTPATIENT)
Dept: PRIMARY CARE CLINIC | Facility: CLINIC | Age: 74
End: 2024-04-12
Payer: MEDICARE

## 2024-04-12 ENCOUNTER — PATIENT OUTREACH (OUTPATIENT)
Dept: ADMINISTRATIVE | Facility: CLINIC | Age: 74
End: 2024-04-12
Payer: MEDICARE

## 2024-04-12 NOTE — TELEPHONE ENCOUNTER
----- Message from Carol Cardenas sent at 4/12/2024 11:32 AM CDT -----  Contact: [phone]  Would like to receive medical advice.  Pt  called and stated that pt need to be seen for a hospital follow up with in seven days. Pt was discharged on yesterday. Please call pt  to advise.    Would they like a call back or a response via [facility]:  call    Additional information:   stated that pt will be available on  04.22-04.26. Please call to advise.            I will STOP taking the medications listed below when I get home from the hospital:  None

## 2024-04-12 NOTE — PROGRESS NOTES
C3 nurse attempted to contact Denise Berrios for a TCC post hospital discharge follow up call. No answer. Left voicemail with callback information. The patient does not have a scheduled HOSFU appointment. Message sent to PCP staff for assistance with scheduling visit with patient.

## 2024-04-13 ENCOUNTER — NURSE TRIAGE (OUTPATIENT)
Dept: ADMINISTRATIVE | Facility: CLINIC | Age: 74
End: 2024-04-13
Payer: MEDICARE

## 2024-04-13 NOTE — TELEPHONE ENCOUNTER
Patient's  states patient was seen in the ED and discharged on Thursday, 4/11/24.  states patient was diagnosed with a CVA/Left Brain and prescribed Plavix. Patient's  states patient is having a possible reaction to Plavix and states patient currently has a widespread, raised red rash noted.     Care Advice given per Rash - Widespread on Drugs - Adult Guideline. Patient's  advised to bring patient to an Urgent Care Center/ED for evaluation/treatment. [facility] offered and declined. Patient's  also advised to contact the [facility] Triage Service for any worsening symptoms. Patient's  states understanding of care advice.     Reason for Disposition   [1] Purple or blood-colored rash (spots or dots) AND [2] no fever AND [3] sounds well to triager    Additional Information   Negative: [1] Life-threatening reaction (anaphylaxis) in the past to the same drug AND [2] < 2 hours since exposure   Negative: Difficulty breathing or wheezing   Negative: [1] Hoarseness or cough AND [2] started soon after 1st dose of drug   Negative: [1] Swollen tongue AND [2] started soon after 1st dose of drug   Negative: [1] Purple or blood-colored rash (spots or dots) AND [2] fever   Negative: Sounds like a life-threatening emergency to the triager   Negative: Rash is only on 1 part of the body (localized)   Negative: Taking new non-prescription (OTC) antihistamine, decongestant, ear drops, eye drops, or other OTC cough/cold medicine   Negative: Taking new prescription antihistamine, allergy medicine, asthma medicine, eye drops, ear drops or nose drops   Negative: Rash started more than 3 days after stopping new prescription medicine   Negative: Swollen tongue   Negative: [1] Widespread hives AND [2] onset < 2 hours of exposure to 1st dose of drug   Negative: Fever   Negative: Patient sounds very sick or weak to the triager    Protocols used: Rash - Widespread On Drugs-A-

## 2024-04-15 ENCOUNTER — PATIENT MESSAGE (OUTPATIENT)
Dept: ADMINISTRATIVE | Facility: CLINIC | Age: 74
End: 2024-04-15
Payer: MEDICARE

## 2024-04-15 ENCOUNTER — TELEPHONE (OUTPATIENT)
Dept: PRIMARY CARE CLINIC | Facility: CLINIC | Age: 74
End: 2024-04-15
Payer: MEDICARE

## 2024-04-15 DIAGNOSIS — R47.81 SLURRED SPEECH: ICD-10-CM

## 2024-04-15 DIAGNOSIS — I63.89 OTHER CEREBRAL INFARCTION: Primary | ICD-10-CM

## 2024-04-15 NOTE — PROGRESS NOTES
C3 nurse spoke with Denise Berrios (Spouse, John) for a TCC post hospital discharge follow up call. The patient has a scheduled HOSFU appointment with Rosa Malave on 04/23/2024 @ 1100.

## 2024-04-15 NOTE — TELEPHONE ENCOUNTER
----- Message from Meagan Pineda sent at 4/15/2024  8:32 AM CDT -----  Type:  Needs Medical Advice    Who Called: Caio/  Would the patient rather a call back or a response via [facility]? call  Best Call Back Number: [phone]  Additional Information: discharged from the hospital on Thursday after having stroke on left side of brain; he needs to get her set of for home health, physical therapy, and a rx for a new blood thinner since she is allergic to plavix.

## 2024-04-15 NOTE — TELEPHONE ENCOUNTER
I mistyped on previous message.  Needs to discuss alternatives to Plavix with Neurology.  Orders placed for home health and PT/OT/ST

## 2024-04-15 NOTE — PROGRESS NOTES
C3 nurse attempted to contact Denise Thackermeenu for a TCC post hospital discharge follow up call. No answer. Left voicemail with callback information. The patient has a scheduled HOSFU appointment with Rosa Malave on 04/23/2024 @ 1100.

## 2024-04-16 ENCOUNTER — PATIENT MESSAGE (OUTPATIENT)
Dept: PRIMARY CARE CLINIC | Facility: CLINIC | Age: 74
End: 2024-04-16
Payer: MEDICARE

## 2024-04-16 PROCEDURE — G0180 MD CERTIFICATION HHA PATIENT: HCPCS | Mod: ,,, | Performed by: INTERNAL MEDICINE

## 2024-04-17 ENCOUNTER — PATIENT MESSAGE (OUTPATIENT)
Dept: NEUROLOGY | Facility: HOSPITAL | Age: 74
End: 2024-04-17
Payer: MEDICARE

## 2024-04-18 ENCOUNTER — OFFICE VISIT (OUTPATIENT)
Dept: NEUROLOGY | Facility: CLINIC | Age: 74
End: 2024-04-18
Payer: MEDICARE

## 2024-04-18 VITALS
WEIGHT: 152.13 LBS | SYSTOLIC BLOOD PRESSURE: 108 MMHG | HEIGHT: 61 IN | BODY MASS INDEX: 28.72 KG/M2 | HEART RATE: 61 BPM | DIASTOLIC BLOOD PRESSURE: 67 MMHG

## 2024-04-18 DIAGNOSIS — I63.9 CEREBROVASCULAR ACCIDENT (CVA), UNSPECIFIED MECHANISM: Primary | ICD-10-CM

## 2024-04-18 PROCEDURE — 99999 PR PBB SHADOW E&M-EST. PATIENT-LVL III: CPT | Mod: PBBFAC,,, | Performed by: PSYCHIATRY & NEUROLOGY

## 2024-04-18 PROCEDURE — 99214 OFFICE O/P EST MOD 30 MIN: CPT | Mod: S$PBB,,, | Performed by: PSYCHIATRY & NEUROLOGY

## 2024-04-18 PROCEDURE — 99213 OFFICE O/P EST LOW 20 MIN: CPT | Mod: PBBFAC | Performed by: PSYCHIATRY & NEUROLOGY

## 2024-04-18 NOTE — PROGRESS NOTES
"  Denise Berrios is a 74 y.o. year old female that  presents for stroke follow up.     HPI:  Mrs Berrios returns for a follow up visit. She was last seen on 2/22.  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  As per review of her medical record, she presented to the ED on 4/10 with " worsening aphasia/dysarthria, gait and difficulty swallowing ".  Stroke work up at the time of her admission to our stroke service on 4/10 as follows:   MRI brain: Diffusion restriction in the left corona radiata consistent with known infarct. Additional areas of diffusion restriction in the, left parietal lobe cortex, and left insular cortex concerning for recent infarct. Minimal associated edema with no significant mass effect or midline shift.   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  No additional large vessel occlusion in the intracranial circulation.No hemodynamically significant stenosis in the neck vessels.  TTE: EF 55-60%, no wall motion abnormality, LA normal size.  She was discharged on DAPT but indicated that she had a bad allergic reaction to Plavix and she stopped it.  Denies recurrence of stroke like symptoms and said that she is able to function well and perform all her ADL independently.  Does PT and speech therapy at home and  reports a big improvement in her speech, walking, and swallowing in the past couple of days.   Some trouble with short term memory and finding words out.  No HA, vertigo, double vision, tremors, falls, or visual disturbances.  A 30 day event monitor was ordered at the time of hospital discharge but still pending.        Past Medical History:   Diagnosis Date    Acid reflux     Graves disease     diffuse Toxic Goiter    Hypertension     IBS (irritable bowel syndrome)     Lichen " sclerosus     Migraine     Stroke      Social History     Socioeconomic History    Marital status:    Tobacco Use    Smoking status: Never     Passive exposure: Never    Smokeless tobacco: Never   Substance and Sexual Activity    Alcohol use: Yes    Drug use: No    Sexual activity: Yes     Partners: Male     Birth control/protection: See Surgical Hx     Past Surgical History:   Procedure Laterality Date    EYE SURGERY      HYSTERECTOMY      CINDI for ?? cervical cancer, had normal paps    LEFT HEART CATHETERIZATION N/A 10/20/2020    Procedure: Left heart cath;  Surgeon: Bony Schultz MD;  Location: [facility] CATH LAB/EP;  Service: Cardiology;  Laterality: N/A;    REVERSE TOTAL SHOULDER ARTHROPLASTY Right 8/19/2022    Procedure: ARTHROPLASTY, SHOULDER, TOTAL, REVERSE;  Surgeon: Bradley Finnegan Jr., MD;  Location: [facility] OR;  Service: Orthopedics;  Laterality: Right;  SONIA linda CONFIRMED/8/18/Shaista Finnegan- hemosorb AT      Family History   Problem Relation Name Age of Onset    Kidney failure Father      Hypertension Father      Stomach cancer Mother      Breast cancer Neg Hx             Review of Systems  General ROS: negative for chills, fever or weight loss  Psychological ROS: negative for hallucination, depression or suicidal ideation  Ophthalmic ROS: negative for blurry vision, photophobia or eye pain  ENT ROS: negative for epistaxis, sore throat or rhinorrhea  Respiratory ROS: no cough, shortness of breath, or wheezing  Cardiovascular ROS: no chest pain or dyspnea on exertion  Gastrointestinal ROS: no abdominal pain, change in bowel habits, or black/ bloody stools  Genito-Urinary ROS: no dysuria, trouble voiding, or hematuria  Musculoskeletal ROS: positive for gait disturbance, no muscular weakness  Neurological ROS: no syncope or seizures; no ataxia  Dermatological ROS: negative for pruritis, rash and jaundice      Physical Exam:  There were no vitals taken for this visit.  General  appearance: alert, cooperative, no distress  Constitutional:Oriented to person, place, and time.appears well-developed and well-nourished.   HEENT: Normocephalic, atraumatic, neck symmetrical, no nasal discharge   Eyes: conjunctivae/corneas clear, PERRL, EOM's intact  Lungs: clear to auscultation bilaterally, no dullness to percussion bilaterally  Heart: regular rate and rhythm without rub; no displacement of the PMI   Abdomen: soft, non-tender; bowel sounds normoactive; no organomegaly  Extremities: extremities symmetric; no clubbing, cyanosis, or edema  Integument: Skin color, texture, turgor normal; no rashes; hair distrubution normal  Neurologic:   Mental status: alert and awake, oriented x 4, comprehension, naming, and repetition intact. No right to left confusion. Performs serial 7's without difficulty. Mild dysarthria.  CN 2-12: pupils 4 mm bilaterally, reactive to light. Fundi without papilledema. Visual fields full to confrontation. EOM full without nystagmus. Face sensation normal in all distributions. Face asymmetric due to subtle R lower face weakness. Hearing grossly intact. Palate elevates well. Tongue midline without atrophy or fasciculations.  Motor: 5/5 all over  Sensory: intact in all modalities.  DTR's: 2+ all over.  Plantars: no tested.  Coordination: finger to nose and heel-knee-shin intact.  Gait: cautious gait but no ataxia or bradykinesia     LABS:    Complete Blood Count  Lab Results   Component Value Date    RBC 4.35 04/11/2024    HGB 13.6 04/11/2024    HCT 41.1 04/11/2024    MCV 95 04/11/2024    MCH 31.3 (H) 04/11/2024    MCHC 33.1 04/11/2024    RDW 13.9 04/11/2024     04/11/2024    MPV 10.3 04/11/2024    GRAN 7.1 04/11/2024    GRAN 75.3 (H) 04/11/2024    LYMPH 0.6 (L) 04/11/2024    LYMPH 6.4 (L) 04/11/2024    MONO 1.0 04/11/2024    MONO 11.0 04/11/2024    EOS 0.6 (H) 04/11/2024    BASO 0.02 04/11/2024    EOSINOPHIL 6.1 04/11/2024    BASOPHIL 0.2 04/11/2024    DIFFMETHOD Automated  04/11/2024       Comprehensive Metabolic Panel  Lab Results   Component Value Date    GLU 98 04/11/2024    BUN 19 04/11/2024    CREATININE 1.3 04/11/2024     04/11/2024    K 4.2 04/11/2024     04/11/2024    PROT 7.4 04/11/2024    ALBUMIN 3.4 (L) 04/11/2024    BILITOT 0.5 04/11/2024    AST 24 04/11/2024    ALKPHOS 72 04/11/2024    CO2 21 (L) 04/11/2024    ALT 15 04/11/2024    ANIONGAP 11 04/11/2024    EGFRNONAA >60 03/22/2022    ESTGFRAFRICA >60 03/22/2022       TSH  Lab Results   Component Value Date    TSH 8.104 (H) 04/10/2024         Assessment: pleasant 73 y/o with HTN, episodic migraine with aura, Graves disease, L occipital infarct (2021) and corona radiata-left parietal lobe cortex- left insular cortex infarcts on 4/24, comes in for stroke follow up.  Stroke etiology deemed to be ESUS so far.  Doing well. Modify Nicola score 2.  Plavix reportedly caused significant skin reaction, thus will recommend completing DPAT with ASA + Ticagrelor for 3 more weeks.  Re ordered 30 days event monitor but if negative will recommend loop recorder.          No diagnosis found.  There were no encounter diagnoses.      Plan:  1) L occipital infarct (2021) and corona radiata-left parietal lobe cortex- left insular cortex infarcts on 4/24  2) HTN  3) Episodic migraine with aura  4) Graves disease            No orders of the defined types were placed in this encounter.          Kuldeep Morales MD

## 2024-04-19 ENCOUNTER — TELEPHONE (OUTPATIENT)
Dept: NEUROLOGY | Facility: CLINIC | Age: 74
End: 2024-04-19
Payer: MEDICARE

## 2024-04-19 NOTE — TELEPHONE ENCOUNTER
----- Message from Kayce James sent at 4/19/2024 11:03 AM CDT -----  Type:  Call Back     Who Called:Pt      Does the patient know what this is regarding?:prescription   Would the patient rather a call back or a response via [facility]? Call   Best Call Back Number:  Additional Information:     Pt  is requesting a call back at the number listed above

## 2024-04-22 ENCOUNTER — ANESTHESIA (OUTPATIENT)
Dept: ENDOSCOPY | Facility: HOSPITAL | Age: 74
DRG: 023 | End: 2024-04-22
Payer: MEDICARE

## 2024-04-22 ENCOUNTER — TELEPHONE (OUTPATIENT)
Dept: CARDIOLOGY | Facility: HOSPITAL | Age: 74
End: 2024-04-22
Payer: MEDICARE

## 2024-04-22 ENCOUNTER — CLINICAL SUPPORT (OUTPATIENT)
Dept: CARDIOLOGY | Facility: HOSPITAL | Age: 74
End: 2024-04-22
Attending: PSYCHIATRY & NEUROLOGY
Payer: MEDICARE

## 2024-04-22 ENCOUNTER — HOSPITAL ENCOUNTER (INPATIENT)
Facility: HOSPITAL | Age: 74
LOS: 6 days | Discharge: HOME HEALTH | DRG: 023 | End: 2024-04-28
Attending: EMERGENCY MEDICINE | Admitting: PSYCHIATRY & NEUROLOGY
Payer: MEDICARE

## 2024-04-22 ENCOUNTER — ANESTHESIA EVENT (OUTPATIENT)
Dept: ENDOSCOPY | Facility: HOSPITAL | Age: 74
DRG: 023 | End: 2024-04-22
Payer: MEDICARE

## 2024-04-22 VITALS — HEART RATE: 64 BPM

## 2024-04-22 DIAGNOSIS — R47.01 APHASIA DUE TO ACUTE STROKE: ICD-10-CM

## 2024-04-22 DIAGNOSIS — I63.411 EMBOLIC STROKE INVOLVING RIGHT MIDDLE CEREBRAL ARTERY: ICD-10-CM

## 2024-04-22 DIAGNOSIS — I63.9 CEREBROVASCULAR ACCIDENT (CVA), UNSPECIFIED MECHANISM: ICD-10-CM

## 2024-04-22 DIAGNOSIS — I63.9: Primary | ICD-10-CM

## 2024-04-22 DIAGNOSIS — I63.511 ACUTE ISCHEMIC RIGHT MCA STROKE: ICD-10-CM

## 2024-04-22 DIAGNOSIS — E83.42 HYPOMAGNESEMIA: ICD-10-CM

## 2024-04-22 DIAGNOSIS — I63.9 APHASIA DUE TO ACUTE STROKE: ICD-10-CM

## 2024-04-22 DIAGNOSIS — R29.818 ACUTE FOCAL NEUROLOGICAL DEFICIT: ICD-10-CM

## 2024-04-22 PROBLEM — G81.04 FLACCID HEMIPLEGIA AFFECTING LEFT NONDOMINANT SIDE: Status: ACTIVE | Noted: 2024-04-22

## 2024-04-22 PROBLEM — I63.412 EMBOLIC STROKE INVOLVING LEFT MIDDLE CEREBRAL ARTERY: Status: ACTIVE | Noted: 2024-04-22

## 2024-04-22 PROBLEM — I63.422 EMBOLIC STROKE INVOLVING LEFT ANTERIOR CEREBRAL ARTERY: Status: RESOLVED | Noted: 2024-04-10 | Resolved: 2024-04-22

## 2024-04-22 PROBLEM — G93.6 CYTOTOXIC BRAIN EDEMA: Status: ACTIVE | Noted: 2024-04-22

## 2024-04-22 LAB
ABO + RH BLD: NORMAL
ALBUMIN SERPL BCP-MCNC: 3 G/DL (ref 3.5–5.2)
ALLENS TEST: ABNORMAL
ALP SERPL-CCNC: 66 U/L (ref 55–135)
ALT SERPL W/O P-5'-P-CCNC: 10 U/L (ref 10–44)
ANION GAP SERPL CALC-SCNC: 7 MMOL/L (ref 8–16)
AST SERPL-CCNC: 17 U/L (ref 10–40)
BASOPHILS # BLD AUTO: 0.13 K/UL (ref 0–0.2)
BASOPHILS NFR BLD: 1.4 % (ref 0–1.9)
BILIRUB SERPL-MCNC: 0.4 MG/DL (ref 0.1–1)
BLD GP AB SCN CELLS X3 SERPL QL: NORMAL
BUN SERPL-MCNC: 12 MG/DL (ref 6–30)
BUN SERPL-MCNC: 12 MG/DL (ref 8–23)
CALCIUM SERPL-MCNC: 9.2 MG/DL (ref 8.7–10.5)
CHLORIDE SERPL-SCNC: 106 MMOL/L (ref 95–110)
CHLORIDE SERPL-SCNC: 107 MMOL/L (ref 95–110)
CHOLEST SERPL-MCNC: 141 MG/DL (ref 120–199)
CHOLEST/HDLC SERPL: 3.5 {RATIO} (ref 2–5)
CO2 SERPL-SCNC: 22 MMOL/L (ref 23–29)
CREAT SERPL-MCNC: 1 MG/DL (ref 0.5–1.4)
DELSYS: ABNORMAL
DIFFERENTIAL METHOD BLD: ABNORMAL
EOSINOPHIL # BLD AUTO: 1.4 K/UL (ref 0–0.5)
EOSINOPHIL NFR BLD: 14.6 % (ref 0–8)
ERYTHROCYTE [DISTWIDTH] IN BLOOD BY AUTOMATED COUNT: 14 % (ref 11.5–14.5)
ERYTHROCYTE [SEDIMENTATION RATE] IN BLOOD BY WESTERGREN METHOD: 12 MM/H
EST. GFR  (NO RACE VARIABLE): 58.4 ML/MIN/1.73 M^2
FIO2: 80
GLUCOSE SERPL-MCNC: 93 MG/DL (ref 70–110)
GLUCOSE SERPL-MCNC: 94 MG/DL (ref 70–110)
GLUCOSE SERPL-MCNC: 94 MG/DL (ref 70–110)
HCO3 UR-SCNC: 21.2 MMOL/L (ref 24–28)
HCT VFR BLD AUTO: 36.6 % (ref 37–48.5)
HCT VFR BLD CALC: 35 %PCV (ref 36–54)
HDLC SERPL-MCNC: 40 MG/DL (ref 40–75)
HDLC SERPL: 28.4 % (ref 20–50)
HGB BLD-MCNC: 12 G/DL (ref 12–16)
IMM GRANULOCYTES # BLD AUTO: 0.03 K/UL (ref 0–0.04)
IMM GRANULOCYTES NFR BLD AUTO: 0.3 % (ref 0–0.5)
INR PPP: 1 (ref 0.8–1.2)
LDLC SERPL CALC-MCNC: 77 MG/DL (ref 63–159)
LYMPHOCYTES # BLD AUTO: 1.9 K/UL (ref 1–4.8)
LYMPHOCYTES NFR BLD: 20 % (ref 18–48)
MAGNESIUM SERPL-MCNC: 1.4 MG/DL (ref 1.6–2.6)
MCH RBC QN AUTO: 31.7 PG (ref 27–31)
MCHC RBC AUTO-ENTMCNC: 32.8 G/DL (ref 32–36)
MCV RBC AUTO: 97 FL (ref 82–98)
MODE: ABNORMAL
MONOCYTES # BLD AUTO: 1 K/UL (ref 0.3–1)
MONOCYTES NFR BLD: 10.4 % (ref 4–15)
NEUTROPHILS # BLD AUTO: 4.9 K/UL (ref 1.8–7.7)
NEUTROPHILS NFR BLD: 53.3 % (ref 38–73)
NONHDLC SERPL-MCNC: 101 MG/DL
NRBC BLD-RTO: 0 /100 WBC
OHS QRS DURATION: 82 MS
OHS QRS DURATION: 82 MS
OHS QTC CALCULATION: 455 MS
OHS QTC CALCULATION: 465 MS
PCO2 BLDA: 39.2 MMHG (ref 35–45)
PEEP: 5
PH SMN: 7.34 [PH] (ref 7.35–7.45)
PLATELET # BLD AUTO: 200 K/UL (ref 150–450)
PMV BLD AUTO: 11.2 FL (ref 9.2–12.9)
PO2 BLDA: 187 MMHG (ref 80–100)
POC BE: -5 MMOL/L
POC IONIZED CALCIUM: 1.21 MMOL/L (ref 1.06–1.42)
POC PTINR: 1.2 (ref 0.9–1.2)
POC PTWBT: 14.3 SEC (ref 9.7–14.3)
POC SATURATED O2: 100 % (ref 95–100)
POC TCO2 (MEASURED): 24 MMOL/L (ref 23–29)
POC TCO2: 22 MMOL/L (ref 23–27)
POCT GLUCOSE: 91 MG/DL (ref 70–110)
POCT GLUCOSE: 99 MG/DL (ref 70–110)
POTASSIUM BLD-SCNC: 3.8 MMOL/L (ref 3.5–5.1)
POTASSIUM SERPL-SCNC: 3.7 MMOL/L (ref 3.5–5.1)
PROT SERPL-MCNC: 6.6 G/DL (ref 6–8.4)
PROTHROMBIN TIME: 10.8 SEC (ref 9–12.5)
RBC # BLD AUTO: 3.79 M/UL (ref 4–5.4)
SAMPLE: ABNORMAL
SAMPLE: ABNORMAL
SAMPLE: NORMAL
SAMPLE: NORMAL
SITE: ABNORMAL
SODIUM BLD-SCNC: 139 MMOL/L (ref 136–145)
SODIUM SERPL-SCNC: 136 MMOL/L (ref 136–145)
SPECIMEN OUTDATE: NORMAL
T4 FREE SERPL-MCNC: 1.14 NG/DL (ref 0.71–1.51)
TRIGL SERPL-MCNC: 120 MG/DL (ref 30–150)
TSH SERPL DL<=0.005 MIU/L-ACNC: 4.13 UIU/ML (ref 0.4–4)
VT: 420
WBC # BLD AUTO: 9.29 K/UL (ref 3.9–12.7)

## 2024-04-22 PROCEDURE — 94002 VENT MGMT INPAT INIT DAY: CPT

## 2024-04-22 PROCEDURE — 93272 ECG/REVIEW INTERPRET ONLY: CPT | Mod: ,,, | Performed by: INTERNAL MEDICINE

## 2024-04-22 PROCEDURE — 99291 CRITICAL CARE FIRST HOUR: CPT

## 2024-04-22 PROCEDURE — B318YZZ FLUOROSCOPY OF BILATERAL INTERNAL CAROTID ARTERIES USING OTHER CONTRAST: ICD-10-PCS | Performed by: RADIOLOGY

## 2024-04-22 PROCEDURE — 93010 ELECTROCARDIOGRAM REPORT: CPT | Mod: ,,, | Performed by: INTERNAL MEDICINE

## 2024-04-22 PROCEDURE — 99900035 HC TECH TIME PER 15 MIN (STAT)

## 2024-04-22 PROCEDURE — 85610 PROTHROMBIN TIME: CPT | Performed by: STUDENT IN AN ORGANIZED HEALTH CARE EDUCATION/TRAINING PROGRAM

## 2024-04-22 PROCEDURE — 94150 VITAL CAPACITY TEST: CPT

## 2024-04-22 PROCEDURE — 99900026 HC AIRWAY MAINTENANCE (STAT)

## 2024-04-22 PROCEDURE — 99291 CRITICAL CARE FIRST HOUR: CPT | Mod: ,,,

## 2024-04-22 PROCEDURE — 86850 RBC ANTIBODY SCREEN: CPT

## 2024-04-22 PROCEDURE — 93005 ELECTROCARDIOGRAM TRACING: CPT

## 2024-04-22 PROCEDURE — 80053 COMPREHEN METABOLIC PANEL: CPT | Performed by: STUDENT IN AN ORGANIZED HEALTH CARE EDUCATION/TRAINING PROGRAM

## 2024-04-22 PROCEDURE — 27100171 HC OXYGEN HIGH FLOW UP TO 24 HOURS

## 2024-04-22 PROCEDURE — 03CG3ZZ EXTIRPATION OF MATTER FROM INTRACRANIAL ARTERY, PERCUTANEOUS APPROACH: ICD-10-PCS | Performed by: RADIOLOGY

## 2024-04-22 PROCEDURE — 82962 GLUCOSE BLOOD TEST: CPT

## 2024-04-22 PROCEDURE — 25000003 PHARM REV CODE 250: Performed by: ANESTHESIOLOGY

## 2024-04-22 PROCEDURE — 99223 1ST HOSP IP/OBS HIGH 75: CPT | Mod: ,,, | Performed by: PSYCHIATRY & NEUROLOGY

## 2024-04-22 PROCEDURE — 93270 REMOTE 30 DAY ECG REV/REPORT: CPT

## 2024-04-22 PROCEDURE — D9220A PRA ANESTHESIA: Mod: CRNA,,, | Performed by: NURSE ANESTHETIST, CERTIFIED REGISTERED

## 2024-04-22 PROCEDURE — 63600175 PHARM REV CODE 636 W HCPCS: Performed by: ANESTHESIOLOGY

## 2024-04-22 PROCEDURE — 25500020 PHARM REV CODE 255: Performed by: PSYCHIATRY & NEUROLOGY

## 2024-04-22 PROCEDURE — 94010 BREATHING CAPACITY TEST: CPT

## 2024-04-22 PROCEDURE — 20000000 HC ICU ROOM

## 2024-04-22 PROCEDURE — 80047 BASIC METABLC PNL IONIZED CA: CPT

## 2024-04-22 PROCEDURE — B31FYZZ FLUOROSCOPY OF LEFT VERTEBRAL ARTERY USING OTHER CONTRAST: ICD-10-PCS | Performed by: RADIOLOGY

## 2024-04-22 PROCEDURE — 84443 ASSAY THYROID STIM HORMONE: CPT | Performed by: STUDENT IN AN ORGANIZED HEALTH CARE EDUCATION/TRAINING PROGRAM

## 2024-04-22 PROCEDURE — 94761 N-INVAS EAR/PLS OXIMETRY MLT: CPT

## 2024-04-22 PROCEDURE — 83735 ASSAY OF MAGNESIUM: CPT | Performed by: STUDENT IN AN ORGANIZED HEALTH CARE EDUCATION/TRAINING PROGRAM

## 2024-04-22 PROCEDURE — 25000003 PHARM REV CODE 250

## 2024-04-22 PROCEDURE — 84439 ASSAY OF FREE THYROXINE: CPT | Performed by: STUDENT IN AN ORGANIZED HEALTH CARE EDUCATION/TRAINING PROGRAM

## 2024-04-22 PROCEDURE — 37000009 HC ANESTHESIA EA ADD 15 MINS

## 2024-04-22 PROCEDURE — 25500020 PHARM REV CODE 255: Performed by: EMERGENCY MEDICINE

## 2024-04-22 PROCEDURE — 82803 BLOOD GASES ANY COMBINATION: CPT

## 2024-04-22 PROCEDURE — 85610 PROTHROMBIN TIME: CPT

## 2024-04-22 PROCEDURE — 63600175 PHARM REV CODE 636 W HCPCS

## 2024-04-22 PROCEDURE — D9220A PRA ANESTHESIA: Mod: ANES,,, | Performed by: ANESTHESIOLOGY

## 2024-04-22 PROCEDURE — 80061 LIPID PANEL: CPT | Performed by: STUDENT IN AN ORGANIZED HEALTH CARE EDUCATION/TRAINING PROGRAM

## 2024-04-22 PROCEDURE — 36600 WITHDRAWAL OF ARTERIAL BLOOD: CPT

## 2024-04-22 PROCEDURE — 37000008 HC ANESTHESIA 1ST 15 MINUTES

## 2024-04-22 PROCEDURE — 85025 COMPLETE CBC W/AUTO DIFF WBC: CPT | Performed by: STUDENT IN AN ORGANIZED HEALTH CARE EDUCATION/TRAINING PROGRAM

## 2024-04-22 PROCEDURE — B315YZZ FLUOROSCOPY OF BILATERAL COMMON CAROTID ARTERIES USING OTHER CONTRAST: ICD-10-PCS | Performed by: RADIOLOGY

## 2024-04-22 PROCEDURE — 25000003 PHARM REV CODE 250: Performed by: NURSE ANESTHETIST, CERTIFIED REGISTERED

## 2024-04-22 RX ORDER — ROCURONIUM BROMIDE 10 MG/ML
INJECTION, SOLUTION INTRAVENOUS
Status: DISCONTINUED | OUTPATIENT
Start: 2024-04-22 | End: 2024-04-22

## 2024-04-22 RX ORDER — SODIUM CHLORIDE 0.9 % (FLUSH) 0.9 %
10 SYRINGE (ML) INJECTION
Status: DISCONTINUED | OUTPATIENT
Start: 2024-04-22 | End: 2024-04-28 | Stop reason: HOSPADM

## 2024-04-22 RX ORDER — BISACODYL 10 MG/1
10 SUPPOSITORY RECTAL DAILY PRN
Status: DISCONTINUED | OUTPATIENT
Start: 2024-04-22 | End: 2024-04-28 | Stop reason: HOSPADM

## 2024-04-22 RX ORDER — HYDRALAZINE HYDROCHLORIDE 20 MG/ML
10 INJECTION INTRAMUSCULAR; INTRAVENOUS EVERY 4 HOURS PRN
Status: DISCONTINUED | OUTPATIENT
Start: 2024-04-22 | End: 2024-04-23

## 2024-04-22 RX ORDER — LIDOCAINE HYDROCHLORIDE 20 MG/ML
INJECTION INTRAVENOUS
Status: DISCONTINUED | OUTPATIENT
Start: 2024-04-22 | End: 2024-04-22

## 2024-04-22 RX ORDER — NICARDIPINE HYDROCHLORIDE 0.2 MG/ML
0-15 INJECTION INTRAVENOUS CONTINUOUS
Status: DISCONTINUED | OUTPATIENT
Start: 2024-04-22 | End: 2024-04-22

## 2024-04-22 RX ORDER — ACETAMINOPHEN 325 MG/1
650 TABLET ORAL EVERY 6 HOURS PRN
Status: DISCONTINUED | OUTPATIENT
Start: 2024-04-22 | End: 2024-04-23

## 2024-04-22 RX ORDER — MAGNESIUM SULFATE HEPTAHYDRATE 40 MG/ML
2 INJECTION, SOLUTION INTRAVENOUS ONCE
Status: COMPLETED | OUTPATIENT
Start: 2024-04-22 | End: 2024-04-22

## 2024-04-22 RX ORDER — LEVOTHYROXINE SODIUM 88 UG/1
88 TABLET ORAL
Status: DISCONTINUED | OUTPATIENT
Start: 2024-04-22 | End: 2024-04-23

## 2024-04-22 RX ORDER — ONDANSETRON HYDROCHLORIDE 2 MG/ML
4 INJECTION, SOLUTION INTRAVENOUS EVERY 8 HOURS PRN
Status: DISCONTINUED | OUTPATIENT
Start: 2024-04-22 | End: 2024-04-28 | Stop reason: HOSPADM

## 2024-04-22 RX ORDER — ATORVASTATIN CALCIUM 40 MG/1
80 TABLET, FILM COATED ORAL DAILY
Status: DISCONTINUED | OUTPATIENT
Start: 2024-04-22 | End: 2024-04-23

## 2024-04-22 RX ORDER — HEPARIN SODIUM 5000 [USP'U]/ML
5000 INJECTION, SOLUTION INTRAVENOUS; SUBCUTANEOUS EVERY 8 HOURS
Status: DISCONTINUED | OUTPATIENT
Start: 2024-04-22 | End: 2024-04-28 | Stop reason: HOSPADM

## 2024-04-22 RX ORDER — FENTANYL CITRATE 50 UG/ML
25 INJECTION, SOLUTION INTRAMUSCULAR; INTRAVENOUS
Status: DISCONTINUED | OUTPATIENT
Start: 2024-04-22 | End: 2024-04-22

## 2024-04-22 RX ORDER — POTASSIUM CHLORIDE 7.45 MG/ML
80 INJECTION INTRAVENOUS
Status: DISCONTINUED | OUTPATIENT
Start: 2024-04-22 | End: 2024-04-24

## 2024-04-22 RX ORDER — IPRATROPIUM BROMIDE AND ALBUTEROL SULFATE 2.5; .5 MG/3ML; MG/3ML
3 SOLUTION RESPIRATORY (INHALATION) EVERY 6 HOURS PRN
Status: DISCONTINUED | OUTPATIENT
Start: 2024-04-22 | End: 2024-04-28 | Stop reason: HOSPADM

## 2024-04-22 RX ORDER — POTASSIUM CHLORIDE 7.45 MG/ML
60 INJECTION INTRAVENOUS
Status: DISCONTINUED | OUTPATIENT
Start: 2024-04-22 | End: 2024-04-24

## 2024-04-22 RX ORDER — CALCIUM GLUCONATE 20 MG/ML
2 INJECTION, SOLUTION INTRAVENOUS
Status: DISCONTINUED | OUTPATIENT
Start: 2024-04-22 | End: 2024-04-24

## 2024-04-22 RX ORDER — ASPIRIN 81 MG/1
81 TABLET ORAL DAILY
Status: DISCONTINUED | OUTPATIENT
Start: 2024-04-22 | End: 2024-04-22

## 2024-04-22 RX ORDER — FENTANYL CITRATE 50 UG/ML
INJECTION, SOLUTION INTRAMUSCULAR; INTRAVENOUS
Status: DISCONTINUED | OUTPATIENT
Start: 2024-04-22 | End: 2024-04-22

## 2024-04-22 RX ORDER — DEXAMETHASONE SODIUM PHOSPHATE 4 MG/ML
6 INJECTION, SOLUTION INTRA-ARTICULAR; INTRALESIONAL; INTRAMUSCULAR; INTRAVENOUS; SOFT TISSUE ONCE
Status: COMPLETED | OUTPATIENT
Start: 2024-04-22 | End: 2024-04-22

## 2024-04-22 RX ORDER — POTASSIUM CHLORIDE 7.45 MG/ML
40 INJECTION INTRAVENOUS
Status: DISCONTINUED | OUTPATIENT
Start: 2024-04-22 | End: 2024-04-24

## 2024-04-22 RX ORDER — MAGNESIUM SULFATE HEPTAHYDRATE 40 MG/ML
4 INJECTION, SOLUTION INTRAVENOUS
Status: DISCONTINUED | OUTPATIENT
Start: 2024-04-22 | End: 2024-04-24

## 2024-04-22 RX ORDER — AMOXICILLIN 250 MG
1 CAPSULE ORAL 2 TIMES DAILY
Status: DISCONTINUED | OUTPATIENT
Start: 2024-04-22 | End: 2024-04-23

## 2024-04-22 RX ORDER — CALCIUM GLUCONATE 20 MG/ML
1 INJECTION, SOLUTION INTRAVENOUS
Status: DISCONTINUED | OUTPATIENT
Start: 2024-04-22 | End: 2024-04-24

## 2024-04-22 RX ORDER — MAGNESIUM SULFATE HEPTAHYDRATE 40 MG/ML
2 INJECTION, SOLUTION INTRAVENOUS
Status: DISCONTINUED | OUTPATIENT
Start: 2024-04-22 | End: 2024-04-24

## 2024-04-22 RX ORDER — PHENYLEPHRINE HYDROCHLORIDE 10 MG/ML
INJECTION INTRAVENOUS
Status: DISCONTINUED | OUTPATIENT
Start: 2024-04-22 | End: 2024-04-22

## 2024-04-22 RX ORDER — HEPARIN SODIUM 5000 [USP'U]/ML
5000 INJECTION, SOLUTION INTRAVENOUS; SUBCUTANEOUS EVERY 8 HOURS
Status: DISCONTINUED | OUTPATIENT
Start: 2024-04-22 | End: 2024-04-22

## 2024-04-22 RX ORDER — ONDANSETRON HYDROCHLORIDE 2 MG/ML
INJECTION, SOLUTION INTRAVENOUS
Status: DISCONTINUED | OUTPATIENT
Start: 2024-04-22 | End: 2024-04-22

## 2024-04-22 RX ORDER — PROPOFOL 10 MG/ML
VIAL (ML) INTRAVENOUS
Status: DISCONTINUED | OUTPATIENT
Start: 2024-04-22 | End: 2024-04-22

## 2024-04-22 RX ORDER — SODIUM CHLORIDE 9 MG/ML
INJECTION, SOLUTION INTRAVENOUS CONTINUOUS
Status: DISCONTINUED | OUTPATIENT
Start: 2024-04-22 | End: 2024-04-24

## 2024-04-22 RX ORDER — ESCITALOPRAM OXALATE 10 MG/1
20 TABLET ORAL DAILY
Status: DISCONTINUED | OUTPATIENT
Start: 2024-04-23 | End: 2024-04-23

## 2024-04-22 RX ORDER — LABETALOL HCL 20 MG/4 ML
10 SYRINGE (ML) INTRAVENOUS EVERY 4 HOURS PRN
Status: DISCONTINUED | OUTPATIENT
Start: 2024-04-22 | End: 2024-04-23

## 2024-04-22 RX ORDER — NAPROXEN SODIUM 220 MG/1
81 TABLET, FILM COATED ORAL DAILY
Status: DISCONTINUED | OUTPATIENT
Start: 2024-04-22 | End: 2024-04-23

## 2024-04-22 RX ORDER — CALCIUM GLUCONATE 20 MG/ML
3 INJECTION, SOLUTION INTRAVENOUS
Status: DISCONTINUED | OUTPATIENT
Start: 2024-04-22 | End: 2024-04-24

## 2024-04-22 RX ADMIN — IOHEXOL 50 ML: 300 INJECTION, SOLUTION INTRAVENOUS at 05:04

## 2024-04-22 RX ADMIN — PROPOFOL 150 MG: 10 INJECTION, EMULSION INTRAVENOUS at 05:04

## 2024-04-22 RX ADMIN — SUGAMMADEX 200 MG: 100 INJECTION, SOLUTION INTRAVENOUS at 05:04

## 2024-04-22 RX ADMIN — SODIUM CHLORIDE 500 ML: 9 INJECTION, SOLUTION INTRAVENOUS at 04:04

## 2024-04-22 RX ADMIN — SODIUM CHLORIDE: 9 INJECTION, SOLUTION INTRAVENOUS at 04:04

## 2024-04-22 RX ADMIN — SODIUM CHLORIDE, SODIUM GLUCONATE, SODIUM ACETATE, POTASSIUM CHLORIDE, MAGNESIUM CHLORIDE, SODIUM PHOSPHATE, DIBASIC, AND POTASSIUM PHOSPHATE: .53; .5; .37; .037; .03; .012; .00082 INJECTION, SOLUTION INTRAVENOUS at 04:04

## 2024-04-22 RX ADMIN — HYDRALAZINE HYDROCHLORIDE 10 MG: 20 INJECTION, SOLUTION INTRAMUSCULAR; INTRAVENOUS at 06:04

## 2024-04-22 RX ADMIN — HEPARIN SODIUM 5000 UNITS: 5000 INJECTION INTRAVENOUS; SUBCUTANEOUS at 10:04

## 2024-04-22 RX ADMIN — PHENYLEPHRINE HYDROCHLORIDE 50 MCG: 10 INJECTION INTRAVENOUS at 05:04

## 2024-04-22 RX ADMIN — FENTANYL CITRATE 25 MCG: 50 INJECTION INTRAMUSCULAR; INTRAVENOUS at 07:04

## 2024-04-22 RX ADMIN — ONDANSETRON 4 MG: 2 INJECTION INTRAMUSCULAR; INTRAVENOUS at 05:04

## 2024-04-22 RX ADMIN — LABETALOL HYDROCHLORIDE 10 MG: 5 INJECTION, SOLUTION INTRAVENOUS at 05:04

## 2024-04-22 RX ADMIN — DEXAMETHASONE SODIUM PHOSPHATE 6 MG: 4 INJECTION INTRA-ARTICULAR; INTRALESIONAL; INTRAMUSCULAR; INTRAVENOUS; SOFT TISSUE at 01:04

## 2024-04-22 RX ADMIN — LIDOCAINE HYDROCHLORIDE 100 MG: 20 INJECTION INTRAVENOUS at 05:04

## 2024-04-22 RX ADMIN — HEPARIN SODIUM 5000 UNITS: 5000 INJECTION INTRAVENOUS; SUBCUTANEOUS at 02:04

## 2024-04-22 RX ADMIN — MAGNESIUM SULFATE HEPTAHYDRATE 2 G: 40 INJECTION, SOLUTION INTRAVENOUS at 12:04

## 2024-04-22 RX ADMIN — ROCURONIUM BROMIDE 100 MG: 10 INJECTION, SOLUTION INTRAVENOUS at 05:04

## 2024-04-22 RX ADMIN — IOHEXOL 75 ML: 350 INJECTION, SOLUTION INTRAVENOUS at 03:04

## 2024-04-22 RX ADMIN — FENTANYL CITRATE 100 MCG: 50 INJECTION, SOLUTION INTRAMUSCULAR; INTRAVENOUS at 05:04

## 2024-04-22 NOTE — ED PROVIDER NOTES
Encounter Date: 4/22/2024       History     Chief Complaint   Patient presents with    Extremity Weakness           Facial Droop     Pt arrives via EMS with left sided weakness and facial droop. LKN. 2000. Had stroke last week.     HPI    74-year-old female with significant medical history of hypertension, stroke, IBS, Graves disease presenting for stroke.      Patient was seen last normal at 8:30 p.m., she woke up and had worsened left facial weakness the worsened from her baseline.  Left arm and left leg weakness worse than her baseline adding new speech deficit and she is now completely aphasic with a fixed gaze deficit to the right, and left keara-neglect.      On chart review, patient was just admitted 04/10/2024 through 04/11/2024 for stroke.  MRI brain showed prior infarct of left corona radiata, additional areas of diffusion restriction in the left cerebral hemisphere.  She was discharged on 81 mg aspirin and 75 mg Plavix times 21 days then 81 mg aspirin indefinitely.  She was not a TNK candidate because of recent stroke, and not an IR candidate secondary to no large vessel occlusion seen on imaging.    Review of patient's allergies indicates:   Allergen Reactions    Phenobarbital Anaphylaxis    Penicillins Other (See Comments)    Phenobarbital sodium     Propylthiouracil     Thyroid      Note: PTU    Topamax [topiramate] Diarrhea     GI symptoms    Cefaclor Rash and Other (See Comments)    Clopidogrel Hives and Rash    Methimazole Rash and Other (See Comments)    Methimazole (bulk) Rash    Penicillin Rash    Sulfa (sulfonamide antibiotics) Rash and Other (See Comments)     Past Medical History:   Diagnosis Date    Acid reflux     Graves disease     diffuse Toxic Goiter    Hypertension     IBS (irritable bowel syndrome)     Lichen sclerosus     Migraine     Stroke      Past Surgical History:   Procedure Laterality Date    EYE SURGERY      HYSTERECTOMY      CINDI for ?? cervical cancer, had normal paps    LEFT  HEART CATHETERIZATION N/A 10/20/2020    Procedure: Left heart cath;  Surgeon: Bony Schultz MD;  Location: [facility] CATH LAB/EP;  Service: Cardiology;  Laterality: N/A;    REVERSE TOTAL SHOULDER ARTHROPLASTY Right 8/19/2022    Procedure: ARTHROPLASTY, SHOULDER, TOTAL, REVERSE;  Surgeon: Bradley Finnegan Jr., MD;  Location: [facility] OR;  Service: Orthopedics;  Laterality: Right;  MARLEY- maddi CONFIRMED/8/18/Shaista Finnegan- hemosorb AT      Family History   Problem Relation Name Age of Onset    Kidney failure Father      Hypertension Father      Stomach cancer Mother      Breast cancer Neg Hx       Social History     Tobacco Use    Smoking status: Never     Passive exposure: Never    Smokeless tobacco: Never   Substance Use Topics    Alcohol use: Yes    Drug use: No     Review of Systems  See HPI for pertinent ROS.     Physical Exam     Initial Vitals   BP Pulse Resp Temp SpO2   04/22/24 0314 04/22/24 0314 04/22/24 0314 04/22/24 0420 04/22/24 0342   (!) 189/69 61 14 97.7 °F (36.5 °C) 97 %      MAP       --                Physical Exam    Constitutional: She appears well-developed and well-nourished.   HENT:   Head: Normocephalic and atraumatic.   Eyes: Conjunctivae are normal. No scleral icterus.   Neck: No JVD present.   Cardiovascular:  Normal rate, regular rhythm and normal heart sounds.     Exam reveals no gallop and no friction rub.       No murmur heard.  Pulmonary/Chest: Breath sounds normal. No stridor. She has no wheezes. She has no rhonchi. She has no rales.   Abdominal: Abdomen is soft. There is no abdominal tenderness. There is no rebound and no guarding.   Musculoskeletal:         General: No tenderness or edema.     Neurological: She is alert.   Pupils are 3 mm and reactive to light, she has a fixed rightward gaze of both eyes, left keara-neglect, drift in the left upper extremity and left lower extremity.  She is aphasic and unable to speak.     Skin: Skin is warm. Capillary refill takes  less than 2 seconds.   Psychiatric: She has a normal mood and affect.         ED Course   Procedures  Labs Reviewed   CBC W/ AUTO DIFFERENTIAL - Abnormal; Notable for the following components:       Result Value    RBC 3.79 (*)     Hematocrit 36.6 (*)     MCH 31.7 (*)     Eos # 1.4 (*)     Eosinophil % 14.6 (*)     All other components within normal limits   COMPREHENSIVE METABOLIC PANEL - Abnormal; Notable for the following components:    CO2 22 (*)     Albumin 3.0 (*)     eGFR 58.4 (*)     Anion Gap 7 (*)     All other components within normal limits   MAGNESIUM - Abnormal; Notable for the following components:    Magnesium 1.4 (*)     All other components within normal limits   ISTAT PROCEDURE - Abnormal; Notable for the following components:    POC Hematocrit 35 (*)     All other components within normal limits   PROTIME-INR   LIPID PANEL   TSH   URINALYSIS, REFLEX TO URINE CULTURE   POCT GLUCOSE, HAND-HELD DEVICE   TYPE & SCREEN   ISTAT PROCEDURE   ISTAT CREATININE   POCT GLUCOSE MONITORING CONTINUOUS          Imaging Results              CTA STROKE MULTI-PHASE (In process)  Result time 04/22/24 04:43:35                     Medications   sodium chloride 0.9% bolus 500 mL 500 mL (500 mLs Intravenous New Bag 4/22/24 0427)   0.9%  NaCl infusion ( Intravenous New Bag 4/22/24 0428)   sodium chloride 0.9% flush 10 mL (has no administration in time range)   bisacodyL suppository 10 mg (has no administration in time range)   potassium chloride 10 mEq in 100 mL IVPB (has no administration in time range)     And   potassium chloride 10 mEq in 100 mL IVPB (has no administration in time range)     And   potassium chloride 10 mEq in 100 mL IVPB (has no administration in time range)   magnesium sulfate 2g in water 50mL IVPB (premix) (has no administration in time range)   magnesium sulfate 2g in water 50mL IVPB (premix) (has no administration in time range)   calcium gluconate 1 g in NS IVPB (premixed) (has no administration  in time range)   calcium gluconate 1 g in NS IVPB (premixed) (has no administration in time range)   calcium gluconate 1 g in NS IVPB (premixed) (has no administration in time range)   senna-docusate 8.6-50 mg per tablet 1 tablet (has no administration in time range)   ondansetron injection 4 mg (has no administration in time range)   atorvastatin tablet 80 mg (has no administration in time range)   acetaminophen tablet 650 mg (has no administration in time range)   levothyroxine tablet 88 mcg (has no administration in time range)   iohexoL (OMNIPAQUE 350) injection 75 mL (75 mLs Intravenous Given 4/22/24 0313)     Medical Decision Making  Amount and/or Complexity of Data Reviewed  Labs: ordered. Decision-making details documented in ED Course.  Radiology: ordered.    Risk  Prescription drug management.  Decision regarding hospitalization.              Attending Attestation:   Physician Attestation Statement for Resident:  As the supervising MD   Physician Attestation Statement: I have personally seen and examined this patient.   I agree with the above history.  -:   As the supervising MD I agree with the above PE.     As the supervising MD I agree with the above treatment, course, plan, and disposition.    I have reviewed and agree with the residents interpretation of the following: lab data.  I have reviewed the following: old records at this facility.        Attending Critical Care:   Critical Care Times:   Direct Patient Care (initial evaluation, reassessments, and time considering the case)................................................................15 minutes.   Additional History from reviewing old medical records or taking additional history from the family, EMS, PCP, etc.......................10 minutes.   Ordering, Reviewing, and Interpreting Diagnostic Studies...............................................................................................................5 minutes.    Documentation..................................................................................................................................................................................5 minutes.   Consultation with other Physicians. .................................................................................................................................................10 minutes.   ==============================================================  Total Critical Care Time - exclusive of procedural time: 45 minutes.  ==============================================================  Critical care was necessary to treat or prevent imminent or life-threatening deterioration of the following conditions: stroke.   Critical care was time spent personally by me on the following activities: obtaining history from patient or relative, examination of patient, review of old charts, ordering lab, x-rays, and/or EKG, development of treatment plan with patient or relative, ordering and performing treatments and interventions, evaluation of patient's response to treatment, discussion with consultants and re-evaluation of patient's conition.   Critical Care Condition: life-threatening           ED Course as of 04/22/24 0451   Mon Apr 22, 2024   0448 CBC W/ AUTO DIFFERENTIAL(!)  No evidence of leukocytosis, acute anemia requiring transfusion or thrombocytopenia.   [KB]   0449 Magnesium(!)  Replacing Mag  [KB]   0449 ISTAT PROCEDURE(!) [KB]   0449 Comprehensive metabolic panel(!)  No KYLAH, no significant electrolyte abnormality,  no evidence of acute hepatobiliary obstruction.   [KB]      ED Course User Index  [KB] Destini Prieto MD                         EKG:  Normal sinus rhythm, rate 68, normal axis deviation, no QTC prolongation or ST segment elevation.     74-year-old female with recent CVA presenting for repeat stroke with worsened left-sided weakness of upper extremity lower extremity and facial droop and new  rightward gaze, left-sided keara neglect and new aphasia.  On presentation, vital signs are stable she was mildly hypertensive.  Code stroke called, patient out of window for TNK given wake-up stroke with last known well of 8:30 p.m. prior to arrival.  CTA head and neck concerning for R M2 MCA occlusion.  This was also verbally relayed with radiology.  Given findings and presentation within 24 hours, patient was an IR candidate.  She was admitted to the stroke service with plan to proceed to Interventional Radiology for IR management.      Differential diagnosis included ICH, ischemic stroke, electrolyte abnormality, hypokalemia, hypomagnesemia, hypoglycemia          Clinical Impression:  Final diagnoses:  [R29.818] Acute focal neurological deficit  [I63.9] Large vessel stroke (Primary)  [E83.42] Hypomagnesemia          ED Disposition Condition    Admit Stable                Destini Prieto MD  Resident  04/22/24 0451       Prema Vargas MD  04/22/24 0454

## 2024-04-22 NOTE — TELEPHONE ENCOUNTER
I have contacted this patient by phone to inform that today's appointment is only scheduled for tracking purposes only and that the monitor was submitted to be mailed out. Patient acknowledged information provided and will not attend appointment scheduled for today.  Address verified. Patient verbalized understanding.

## 2024-04-22 NOTE — ASSESSMENT & PLAN NOTE
SBP goal 160-220, concern that exam is pressure dependent. Will need to reevaluate and lower BP pending IR.

## 2024-04-22 NOTE — PLAN OF CARE
[facility] - Neuro Critical Care  Initial Discharge Assessment       Primary Care Provider: Aleksandra Carey MD    Admission Diagnosis: Hypomagnesemia [E83.42]  Large vessel stroke [I63.9]  Acute ischemic right MCA stroke [I63.511]  Acute focal neurological deficit [R29.818]    Admission Date: 4/22/2024  Expected Discharge Date:     Transition of Care Barriers: None    Payor: MEDICARE / Plan: MEDICARE PART A & B / Product Type: Government /     Extended Emergency Contact Information  Primary Emergency Contact: John Berrios  Address: [address] [place] [address] [place] [address] [place]  Mobile Phone: [phone]  Relation: Spouse    Discharge Plan A: Rehab  Discharge Plan B: Home with family, Home Health      [id number] - [place] - [address] [facility] [address] [facility] [address]  [address] [place] [address]  Phone: [phone] Fax: [phone]    [facility] [address] [address]  Phone: [phone] Fax: [phone]    [facility] - [place] - [address] [facility] [address]  [address] [facility] [facility] [address]  [place] [address]  Phone: [phone] Fax: [phone]      Transferred from:     Past Medical History:   Diagnosis Date    Acid reflux     Graves disease     diffuse Toxic Goiter    Hypertension     IBS (irritable bowel syndrome)     Lichen sclerosus     Migraine     Stroke          CM met with patient and John Berrios () [phone]  in room for Discharge Planning Assessment.  Patient is intubated and unable to answer questions but can nod.  Per , the patient lives with  in a 2 story house with 1 step(s) to enter and the bed and bath on the 2nd floor.  Per , there is a bedroom on the first floor if needed.   Per , the patient was independent with ADLS and used no dme for ambulation.  Patient will have assistance from her   upon discharge.   Discharge Planning Booklet given to patient/family and discussed.  All questions addressed.  CM will follow for needs.    Per , the patient does have home health with [facility].  Patient is not on dialysis and does not take Coumadin,.  Patient been hospitalized in the past 30 days.           Initial Assessment (most recent)       Adult Discharge Assessment - 04/22/24 1317          Discharge Assessment    Assessment Type Discharge Planning Assessment     Confirmed/corrected address, phone number and insurance Yes     Confirmed Demographics Correct on Facesheet     Source of Information unable to respond     If unable to respond/provide information was family/caregiver contacted? Yes     Contact Name/Number John Berrios () [phone]     When was your last doctors appointment? 04/15/24     Communicated BROOK with patient/caregiver Date not available/Unable to determine     Reason For Admission [facility] Stroke     People in Home spouse     Facility Arrived From: home     Do you expect to return to your current living situation? Yes     Do you have help at home or someone to help you manage your care at home? Yes     Who are your caregiver(s) and their phone number(s)? John Berrios () [phone]     Prior to hospitilization cognitive status: Alert/Oriented     Current cognitive status: Unable to Assess     Walking or Climbing Stairs Difficulty no     Dressing/Bathing Difficulty no     Home Accessibility stairs to enter home     Number of Stairs, Main Entrance one     Stairs Comment, Main Entrance one     Home Layout Bathroom on 2nd floor;Bedroom on 2nd floor;Able to live on 1st floor     Equipment Currently Used at Home walker, rolling     Readmission within 30 days? Yes     Patient currently being followed by outpatient case management? No     Do you currently have service(s) that help you manage your care at home? Yes     Name and Contact number of agency [facility]     Is the pt/caregiver preference to resume services with current agency Yes     Do you take prescription medications? Yes     Do you have prescription coverage? Yes     Coverage Medicare/ for Life     Do you have any problems affording any of your prescribed medications? No     Is the patient taking medications as prescribed? yes     Who is going to help you get home at discharge? John Berrios () [phone]     How do you get to doctors appointments? family or friend will provide     Are you on dialysis? No     Do you take coumadin? No     Discharge Plan A Rehab     Discharge Plan B Home with family;Home Health     DME Needed Upon Discharge  other (see comments)   tbd    Discharge Plan discussed with: Spouse/sig other;Patient     Name(s) and Number(s) John Berrios () [phone]     Transition of Care Barriers None        Physical Activity    On average, how many days per week do you engage in moderate to strenuous exercise (like a brisk walk)? 0 days     On average, how many minutes do you engage in exercise at this level? 0 min        Financial Resource Strain    How hard is it for you to pay for the very basics like food, housing, medical care, and heating? Not hard at all        Housing Stability    In the last 12 months, was there a time when you were not able to pay the mortgage or rent on time? No     In the past 12 months, how many times have you moved where you were living? 1     At any time in the past 12 months, were you homeless or living in a shelter (including now)? No        Transportation Needs    In the past 12 months, has lack of transportation kept you from medical appointments or from getting medications? No     In the past 12 months, has lack of transportation kept you from meetings, work, or from getting things needed for daily living? No        Food Insecurity    Within the past 12 months, you worried that your food would run out before you got the money to buy  more. Never true     Within the past 12 months, the food you bought just didn't last and you didn't have money to get more. Never true        Stress    Do you feel stress - tense, restless, nervous, or anxious, or unable to sleep at night because your mind is troubled all the time - these days? Patient unable to answer        Social Connections    In a typical week, how many times do you talk on the phone with family, friends, or neighbors? More than three times a week     How often do you get together with friends or relatives? Never     How often do you attend Islam or Mu-ism services? 1 to 4 times per year     Do you belong to any clubs or organizations such as Islam groups, unions, fraternal or athletic groups, or school groups? Yes     How often do you attend meetings of the clubs or organizations you belong to? Never     Are you , , , , never , or living with a partner?         Alcohol Use    Q1: How often do you have a drink containing alcohol? 2-4 times a month     Q2: How many drinks containing alcohol do you have on a typical day when you are drinking? 1 or 2     Q3: How often do you have six or more drinks on one occasion? Never        OTHER    Name(s) of People in Home John Berrios () [phone]                      04/22/24 1352   Readmission   Why were you hospitalized in the last 30 days? stroke   Why were you readmitted? Alarmed about signs/symptoms   When you left the hospital how did you feel? ok   When you left the hospital where did you go? Home with Home Health   Did patient/caregiver refused recommended DC plan? No   Tell me about what happened between when you left the hospital and the day you returned. Per , patient hasd another stroke   When did you start not feeling well? 04/21/2024   Did you try to manage your symptoms your self? No   Did you call anyone? Yes   Who did you call? Other (comments)  (ems)   Did you try to see  or did see a doctor or nurse before you came? No   Did you have  a follow-up appointment on discharge? Yes   Did you go? Yes   Was this a planned readmission? No          Discharge Plan A and Plan B have been determined by review of patient's clinical status, future medical and therapeutic needs, and coverage/benefits for post-acute care in coordination with multidisciplinary team members.      Cristina Phan RN, CCRN-K, [facility]  Neuro-Critical Care   [phone]

## 2024-04-22 NOTE — ASSESSMENT & PLAN NOTE
Denise Berrios is a 74 y.o. female with a significant medical history of recent L MCA stroke (4/10/2024), HTN, HLD, Graves' disease,IBS, migraine HAs  who presents to the hospital for evaluation of acute onset LSW, neglect, and aphasia.     -R M1 occlusion noted on CTA. The patient will be taken to IR for EVT. She will be admitted to [facility] post procedure.    Antithrombotics for secondary stroke prevention: Antiplatelets: Aspirin: 81 mg daily; Brilinta     Statins for secondary stroke prevention and hyperlipidemia, if present:   Statins: Atorvastatin- 40 mg daily    Aggressive risk factor modification: HTN, HLD, AAD, Graves' Disease     Rehab efforts: The patient has been evaluated by a stroke team provider and the therapy needs have been fully considered based off the presenting complaints and exam findings. The following therapy evaluations are needed: PT evaluate and treat, OT evaluate and treat, SLP evaluate and treat    Diagnostics ordered/pending: MRI head without contrast to assess brain parenchyma    VTE prophylaxis: Heparin 5000 units SQ every 8 hours  Mechanical prophylaxis: Place SCDs    BP parameters: Infarct: No intervention, SBP <220, patient may need higher pressures as her exam seems to be pressure dependent at this time. SBP> 160 pending IR. Avoid hypoperfusion.

## 2024-04-22 NOTE — RESPIRATORY THERAPY
Weaning parameter performed  Vital capacity 538mL/kg   NIF= -43   RSBI=60  RT did not hear a cuff leak.

## 2024-04-22 NOTE — SUBJECTIVE & OBJECTIVE
Past Medical History:   Diagnosis Date    Acid reflux     Graves disease     diffuse Toxic Goiter    Hypertension     IBS (irritable bowel syndrome)     Lichen sclerosus     Migraine     Stroke      Past Surgical History:   Procedure Laterality Date    EYE SURGERY      HYSTERECTOMY      CINDI for ?? cervical cancer, had normal paps    LEFT HEART CATHETERIZATION N/A 10/20/2020    Procedure: Left heart cath;  Surgeon: Bony Schultz MD;  Location: [facility] CATH LAB/EP;  Service: Cardiology;  Laterality: N/A;    REVERSE TOTAL SHOULDER ARTHROPLASTY Right 8/19/2022    Procedure: ARTHROPLASTY, SHOULDER, TOTAL, REVERSE;  Surgeon: Bradley Finnegan Jr., MD;  Location: [facility] OR;  Service: Orthopedics;  Laterality: Right;  SONIA linda CONFIRMED/8/18/Shaista Finnegan- hemosorb AT       Current Facility-Administered Medications on File Prior to Encounter   Medication Dose Route Frequency Provider Last Rate Last Admin    ticagrelor tablet 90 mg  90 mg Oral BID          Current Outpatient Medications on File Prior to Encounter   Medication Sig Dispense Refill    aspirin (ECOTRIN) 81 MG EC tablet once a day      atorvastatin (LIPITOR) 40 MG tablet Take 1 tablet (40 mg total) by mouth once daily. 90 tablet 0    butalbital-acetaminophen-caffeine -40 mg (FIORICET, ESGIC) -40 mg per tablet Take 1 tablet by mouth nightly as needed for Headaches. 30 tablet 5    clobetasol 0.05% (TEMOVATE) 0.05 % Oint APPLY TOPICALLY TWICE A DAY 45 g 1    clopidogreL (PLAVIX) 75 mg tablet Take 1 tablet (75 mg total) by mouth once daily. (Patient not taking: Reported on 4/15/2024) 30 tablet 11    diclofenac sodium (VOLTAREN) 1 % Gel Apply 2 g topically 3 (three) times daily. 100 g 3    ergocalciferol (VITAMIN D2) 50,000 unit Cap Take 1 capsule (50,000 Units total) by mouth once a week. 12 capsule 3    EScitalopram oxalate (LEXAPRO) 20 MG tablet Take 1 tablet (20 mg total) by mouth once daily. 90 tablet 3    levothyroxine  (SYNTHROID) 88 MCG tablet Take 1 tablet (88 mcg total) by mouth before breakfast. 90 tablet 3    losartan (COZAAR) 50 MG tablet Take 1 tablet (50 mg total) by mouth once daily. 90 tablet 3    metoprolol tartrate (LOPRESSOR) 50 MG tablet Take 1 tablet (50 mg total) by mouth 2 (two) times daily. 180 tablet 3    naloxone (NARCAN) 4 mg/actuation Spry 4mg by nasal route as needed for opioid overdose; may repeat every 2-3 minutes in alternating nostrils until medical help arrives. Call 911 (Patient not taking: Reported on 4/15/2024) 1 each 11    nortriptyline (PAMELOR) 10 MG capsule Take 1 capsule (10 mg total) by mouth once daily. 90 capsule 3    omeprazole (PRILOSEC) 40 MG capsule Take 1 capsule (40 mg total) by mouth once daily. 90 capsule 3    oxybutynin (DITROPAN-XL) 10 MG 24 hr tablet Take 1 tablet (10 mg total) by mouth once daily. 90 tablet 3    pregabalin (LYRICA) 75 MG capsule TAKE 1 CAPSULE BY MOUTH TWICE DAILY FOR NEUROPATHIC PAIN      promethazine (PHENERGAN) 12.5 MG Tab Take 1 tablet (12.5 mg total) by mouth every 6 (six) hours as needed (nausea and or vomiting). 10 tablet 0    traMADoL (ULTRAM) 50 mg tablet Take 50 mg by mouth every 6 (six) hours as needed for Pain.        Allergies: Phenobarbital, Penicillins, Phenobarbital sodium, Propylthiouracil, Thyroid, Topamax [topiramate], Cefaclor, Clopidogrel, Methimazole, Methimazole (bulk), Penicillin, and Sulfa (sulfonamide antibiotics)  Family History   Problem Relation Name Age of Onset    Kidney failure Father      Hypertension Father      Stomach cancer Mother      Breast cancer Neg Hx       Social History     Tobacco Use    Smoking status: Never     Passive exposure: Never    Smokeless tobacco: Never   Substance Use Topics    Alcohol use: Yes    Drug use: No     Review of Systems   Unable to perform ROS: Mental status change     Objective:     Vitals:    Temp: 97.7 °F (36.5 °C)  Pulse: 66  BP: (!) 145/64  MAP (mmHg): 92  Resp: 17  SpO2: 95 %    Temp  Min:  97.7 °F (36.5 °C)  Max: 97.7 °F (36.5 °C)  Pulse  Min: 61  Max: 66  BP  Min: 145/64  Max: 189/69  MAP (mmHg)  Min: 92  Max: 92  Resp  Min: 14  Max: 17  SpO2  Min: 95 %  Max: 97 %    04/21 0701 - 04/22 0700  In: 400   Out: -             Physical Exam  Constitutional:       Appearance: She is well-developed.   HENT:      Head: Normocephalic and atraumatic.   Cardiovascular:      Rate and Rhythm: Normal rate and regular rhythm.   Pulmonary:      Effort: Pulmonary effort is normal.   Skin:     General: Skin is warm and dry.   Neurological:      Mental Status: She is alert.      Comments: E4V1M6. Will intermittently nod appropriately to questions.   PERRL. Right gaze preference. Does not cross midline.  Left facial droop.  Follows commands in all extremities.   RUE 5/5  LUE 4/5  RLE 5/5  LLE 4/5       NIH Stroke Scale   1a. Level of Consciousness 0   1b. LOC Questions 2   1c. LOC Commands 0   2. Best Gaze 2   3. Visual 0    4. Facial Palsy 3   5a. Motor Arm, Left 2    5b. Motor Arm, Right 0    6a. Motor Leg, Left 0   6b. Motor Leg, Right 0   7. Limb Ataxia 0   8. Sensory 0   9. Best Language 3   10. Dysarthria 2   11. Extinction and Inattention (formerly Neglect) 0   Total (NIH Stroke Scale) 14         Today I personally reviewed pertinent medications, lines/drains/airways, imaging, cardiology results, laboratory results, notably: CMP, CBC, PT/INR, TSH, Lipid panel, Mag, EKG, CTA.

## 2024-04-22 NOTE — ANESTHESIA PREPROCEDURE EVALUATION
[facility]  Anesthesia Pre-Operative Evaluation     Patient Name: Denise Berrios  YOB: 1950  MRN: [id number]  [facility]: [id number]       Admit Date: 4/22/2024   Admit Team: Networked reference to record PCT   Hospital Day: 1  Date of Procedure: 4/22/2024  Anesthesia: General Procedure: Procedure(s) (LRB):  ANGIOGRAM-CEREBRAL; stroke class A (N/A)  Pre-Operative Diagnosis: Acute focal neurological deficit [R29.818]  Proceduralist:Surgeons and Role:     * Surgeon, Sharon - Primary  Code Status: Prior   Advanced Directive: <no information>  Isolation Precautions: No active isolations  Capacity: Full capacity       SUBJECTIVE:     Pre-operative evaluation for Procedure(s) (LRB):  ANGIOGRAM-CEREBRAL; stroke class A (N/A)  04/22/2024  Hospital LOS: 0 days  ICU LOS: Patient does not have an ICU stay during this admission.    Denise Berrios is a 74 y.o. female w/ a significant PMHx of HTN, CAD, CVA who presents from home with  after a fall from bed. Pt at 0200 was found to have confusion, L sided weakness, and L sided facial droop. Concerns for R MCA. Pt was found to have a L corona  radiata CVA on 4/11 and was discharged home with plavix. Plavix was stopped two Fridays ago due to hives. NPO since 0600. Pt is on an ARB. Last stroke was thought to be possible embolic, pending holter monitor. Consented .           Patient now presents for the above procedure(s).    TTE:  Results for orders placed or performed during the hospital encounter of 04/10/24   Echo   Result Value Ref Range    BSA 1.72 m2    LVOT stroke volume 50.86 cm3    LVIDd 4.64 3.5 - 6.0 cm    LV Systolic Volume 48.97 mL    LV Systolic Volume Index 29.1 mL/m2    LVIDs 3.45 2.1 - 4.0 cm    LV Diastolic Volume 99.57 mL    LV Diastolic Volume Index 59.27 mL/m2    IVS 1.05 0.6 - 1.1 cm    LVOT diameter 2.04 cm    LVOT area 3.3 cm2    FS 26 (A) 28 - 44 %    Left Ventricle Relative Wall Thickness 0.43 cm    Posterior Wall 0.99  "0.6 - 1.1 cm    LV mass 165.49 g    LV Mass Index 99 g/m2    MV Peak E Toni 0.78 m/s    TDI LATERAL 0.03 m/s    TDI SEPTAL 0.06 m/s    E/E' ratio 17.33 m/s    MV Peak A Toni 1.21 m/s    TR Max Toni 1.71 m/s    E/A ratio 0.64     E wave deceleration time 131.74 msec    MV "A" wave duration 9.51 msec    LV SEPTAL E/E' RATIO 13.00 m/s    LA Volume Index 25.0 mL/m2    LV LATERAL E/E' RATIO 26.00 m/s    LA volume 42.05 cm3    PV Peak S Toni 0.47 m/s    PV Peak D Toni 0.43 m/s    Pulm vein S/D ratio 1.09     LVOT peak toni 1.06 m/s    RVDD 2.70 cm    TAPSE 1.73 cm    LA size 3.52 cm    Left Atrium Minor Axis 4.64 cm    Left Atrium Major Axis 4.27 cm    LA volume (mod) 27.08 cm3    LA WIDTH 3.16 cm    LA Volume Index (Mod) 16.1 mL/m2    RA Major Axis 4.03 cm    RA Width 2.48 cm    AV mean gradient 4 mmHg    AV peak gradient 7 mmHg    Ao peak toni 1.28 m/s    Ao VTI 19.94 cm    LVOT peak VTI 15.57 cm    AV valve area 2.55 cm²    AV Velocity Ratio 0.83     AV index (prosthetic) 0.78     NIKOLE by Velocity Ratio 2.71 cm²    MV stenosis pressure 1/2 time 38.20 ms    MV valve area p 1/2 method 5.76 cm2    Triscuspid Valve Regurgitation Peak Gradient 12 mmHg    Sinus 3.39 cm    STJ 2.83 cm    Ascending aorta 3.40 cm    Mean e' 0.05 m/s    ZLVIDS 1.36     ZLVIDD -0.10     TV resting pulmonary artery pressure 15 mmHg    RV TB RVSP 5 mmHg    Est. RA pres 3 mmHg    Narrative      Left Ventricle: The left ventricle is normal in size. Normal wall   thickness. There is mild concentric hypertrophy. Normal wall motion. There   is normal systolic function with a visually estimated ejection fraction of   55 - 60%.    Right Ventricle: Normal right ventricular cavity size. Wall thickness   is normal. Right ventricle wall motion  is normal. Systolic function is   normal.    Pulmonary Artery: The estimated pulmonary artery systolic pressure is   15 mmHg.    IVC/SVC: Normal venous pressure at 3 mmHg.       No results found for this or any previous " "visit.  HILDA:  No results found for this or any previous visit.  Stress Test:  No results found for this or any previous visit.     Cardiac Catheterization:  Results for orders placed during the hospital encounter of 10/18/20    Cardiac catheterization    Conclusion  · The pre-procedure left ventricular end diastolic pressure was 7.  · The ejection fraction was greater than 55% by visual estimate.  · The coronary arteries were normal..    - Continue medical therapy  - Lopressor for PVCs  - Abnormal stress echo is false +ve dur to the PVCs.  - Holter monitor as outpatient    Procedure Log documented by Documenter: RT Mario and verified by Bony Schultz MD.    Date: 10/20/2020  Time: 12:32 PM     PFT:  No results found for: "FEV1", "FVC", "IVP3VEY", "TLC", "DLCO"     NPO Status:     LDA: N       Peripheral IV - Single Lumen 04/22/24 0329 20 G Anterior;Right Wrist (Active)   Site Assessment Dry;Intact;Clean 04/22/24 0329   Line Status Blood return noted 04/22/24 0329   Dressing Status Dry;Intact;Clean 04/22/24 0329   Number of days: 0            Peripheral IV - Single Lumen 04/22/24 0344 22 G Anterior;Left Wrist (Active)   Site Assessment Clean;Dry;Intact 04/22/24 0344   Line Status Blood return noted 04/22/24 0344   Dressing Status Clean;Dry;Intact 04/22/24 0344   Number of days: 0            Peripheral IV - Single Lumen 04/22/24 0359 22 G Anterior;Right Forearm (Active)   Site Assessment Clean;Dry;Intact 04/22/24 0359   Extremity Assessment Distal to IV No abnormal discoloration;No redness;No swelling 04/22/24 0359   Dressing Status Clean;Dry;Intact 04/22/24 0359   Dressing Intervention Integrity maintained 04/22/24 0359   Number of days: 0         Current Facility-Administered Medications   Medication Dose Route Frequency Last Rate Last Admin    sodium chloride 0.9%   Intravenous Continuous           Previous Airway: Induction:  Intravenous    Intubated:  Postinduction    Mask Ventilation:  Easy mask    " Attempts:  1    Attempted By:  Resident anesthesiologist    Method of Intubation:  Video laryngoscopy    Blade:  Rivas 3    Laryngeal View Grade: Grade I - full view of cords      Difficult Airway Encountered?: No      Complications:  None    Airway Device:  Oral endotracheal tube    Airway Device Size:  7.0    Style/Cuff Inflation:  Cuffed (inflated to minimal occlusive pressure)    Tube secured:  23    Secured at:  The teeth    Placement Verified By:  Capnometry and Revisualization with laryngoscopy    Complicating Factors:  None    Findings Post-Intubation:  BS equal bilateral  Allergies:  Review of patient's allergies indicates:   Allergen Reactions    Phenobarbital Anaphylaxis    Penicillins Other (See Comments)    Phenobarbital sodium     Propylthiouracil     Thyroid      Note: PTU    Topamax [topiramate] Diarrhea     GI symptoms    Cefaclor Rash and Other (See Comments)    Clopidogrel Hives and Rash    Methimazole Rash and Other (See Comments)    Methimazole (bulk) Rash    Penicillin Rash    Sulfa (sulfonamide antibiotics) Rash and Other (See Comments)       Medications:     Current Outpatient Medications   Medication Instructions    aspirin (ECOTRIN) 81 MG EC tablet once a day    atorvastatin (LIPITOR) 40 mg, Oral, Daily    butalbital-acetaminophen-caffeine -40 mg (FIORICET, ESGIC) -40 mg per tablet 1 tablet, Oral, Nightly PRN    clobetasol 0.05% (TEMOVATE) 0.05 % Oint APPLY TOPICALLY TWICE A DAY    clopidogreL (PLAVIX) 75 mg, Oral, Daily    diclofenac sodium (VOLTAREN) 2 g, Topical (Top), 3 times daily    ergocalciferol (VITAMIN D2) 50,000 Units, Oral, Weekly    EScitalopram oxalate (LEXAPRO) 20 mg, Oral, Daily    levothyroxine (SYNTHROID) 88 mcg, Oral, Before breakfast    losartan (COZAAR) 50 mg, Oral, Daily    metoprolol tartrate (LOPRESSOR) 50 mg, Oral, 2 times daily    naloxone (NARCAN) 4 mg/actuation Spry 4mg by nasal route as needed for opioid overdose; may repeat every 2-3 minutes in  alternating nostrils until medical help arrives. Call 911    nortriptyline (PAMELOR) 10 mg, Oral, Daily    omeprazole (PRILOSEC) 40 mg, Oral, Daily    oxybutynin (DITROPAN-XL) 10 mg, Oral, Daily    pregabalin (LYRICA) 75 MG capsule TAKE 1 CAPSULE BY MOUTH TWICE DAILY FOR NEUROPATHIC PAIN    promethazine (PHENERGAN) 12.5 mg, Oral, Every 6 hours PRN    traMADoL (ULTRAM) 50 mg, Oral, Every 6 hours PRN     Inpatient Medications:  Current Facility-Administered Medications   Medication Dose Route Frequency    sodium chloride 0.9%  500 mL Intravenous Once    ticagrelor  90 mg Oral BID     Antibiotics (From admission, onward)      None          VTE Risk Mitigation (From admission, onward)      None            History:     Active Hospital Problems    Diagnosis  POA    Embolic stroke involving right middle cerebral artery [I63.411]  Yes      Resolved Hospital Problems   No resolved problems to display.     Medical History:  Past Medical History:   Diagnosis Date    Acid reflux     Graves disease     diffuse Toxic Goiter    Hypertension     IBS (irritable bowel syndrome)     Lichen sclerosus     Migraine     Stroke      Surgical History:    has a past surgical history that includes Hysterectomy; Eye surgery; Left heart catheterization (N/A, 10/20/2020); and Reverse total shoulder arthroplasty (Right, 8/19/2022).   Social History:    reports being sexually active and has had partner(s) who are male. She reports using the following method of birth control/protection: See Surgical Hx.  reports that she has never smoked. She has never been exposed to tobacco smoke. She has never used smokeless tobacco. She reports current alcohol use. She reports that she does not use drugs.    OBJECTIVE:   Vital Signs Range (Last 24H):  Temp:  [36.5 °C (97.7 °F)]   Pulse:  [61-66]   Resp:  [14-17]   BP: (145-189)/(64-69)   SpO2:  [97 %]   Lab Results   Component Value Date    WBC 9.42 04/11/2024    HGB 13.6 04/11/2024    HCT 35 (L) 04/22/2024     " 04/11/2024     04/11/2024    K 4.2 04/11/2024     04/11/2024    CREATININE 1.3 04/11/2024    BUN 19 04/11/2024    CO2 21 (L) 04/11/2024    GLU 98 04/11/2024    CALCIUM 9.8 04/11/2024    MG 1.7 04/11/2024    PHOS 3.5 04/11/2024    ALKPHOS 72 04/11/2024    ALT 15 04/11/2024    AST 24 04/11/2024    ALBUMIN 3.4 (L) 04/11/2024    INR 1.2 04/22/2024    APTT 25.1 04/11/2024    HGBA1C 5.6 04/10/2024    TROPONINI 0.022 04/11/2024    BNP 89 10/18/2020     Recent Labs     04/22/24  0402 04/22/24 0404   HCT  --  35*   INR 1.2  --      No results for input(s): "PH", "PCO2", "PO2", "HCO3", "POCSATURATED", "BE" in the last 72 hours.   No LMP recorded. Patient has had a hysterectomy.    Please see Results Review for additional labs & imaging.     EKG:   Results for orders placed or performed during the hospital encounter of 04/10/24   ECG 12 lead    Collection Time: 04/10/24  6:00 PM   Result Value Ref Range    QRS Duration 82 ms    [facility] QTC Calculation 479 ms    Narrative    Test Reason : R29.818,    Vent. Rate : 072 BPM     Atrial Rate : 072 BPM     P-R Int : 202 ms          QRS Dur : 082 ms      QT Int : 438 ms       P-R-T Axes : 064 024 127 degrees     QTc Int : 479 ms    Normal sinus rhythm  Nonspecific T wave abnormality  Abnormal ECG  When compared with ECG of 04-MAY-2023 15:19,  Nonspecific T wave abnormality, improved in Inferior leads  Confirmed by CAROLINA TARANGO MD ([id number]) on 4/11/2024 12:24:09 PM    Referred By: [email or web address]   SELF           Confirmed By:CAROLINA TARANGO MD       ECHO:  See subjective, if available.    ASSESSMENT/PLAN:         Pre-op Assessment    I have reviewed the Patient Summary Reports.     I have reviewed the Nursing Notes. I have reviewed the NPO Status.   I have reviewed the Medications.     Review of Systems  Anesthesia Hx:             Denies Family Hx of Anesthesia complications.    Denies Personal Hx of Anesthesia complications.                    Cardiovascular:     " Hypertension                                        Hepatic/GI:     GERD             Neurological:   CVA   Headaches                                 Endocrine:   Hypothyroidism              Physical Exam  General: Alert, Cooperative and Well nourished    Airway:  Mallampati: unable to assess     Dental:  Intact, Periodontal disease        Anesthesia Plan  Type of Anesthesia, risks & benefits discussed:    Anesthesia Type: Gen ETT  Intra-op Monitoring Plan: Standard ASA Monitors and Art Line  Post Op Pain Control Plan: multimodal analgesia and IV/PO Opioids PRN  Induction:  IV  Airway Plan: Direct and Video  Informed Consent: Informed consent signed with the Patient and all parties understand the risks and agree with anesthesia plan.  All questions answered. Patient consented to blood products? Yes  ASA Score: 4 Emergent  Day of Surgery Review of History & Physical: H&P Update referred to the surgeon/provider.    Ready For Surgery From Anesthesia Perspective.     .

## 2024-04-22 NOTE — PT/OT/SLP PROGRESS
Occupational Therapy      Patient Name:  Denise VIVAS Cousins   MRN:  [id number]    Patient not seen today secondary to  (Pt with angiogram this date.). Will follow-up as appropriate for OT evaluation.    Shaista Lares OT  4/22/2024

## 2024-04-22 NOTE — PT/OT/SLP PROGRESS
Speech Language Pathology      Denise Berrios  MRN: [id number]  [id number]    Patient not seen today secondary to patient intubated. Please reconsult upon extubation and when medically appropriate.

## 2024-04-22 NOTE — ASSESSMENT & PLAN NOTE
-Stroke risk factor.  SBP< 220.  -At this time the patient's exam seems to be pressure dependant.  -HOB flat, IVFs  -Keep SBP>160, avoid hypotension  -Re-evaluate plan after IR procedure

## 2024-04-22 NOTE — ANESTHESIA PROCEDURE NOTES
Intubation    Date/Time: 4/22/2024 5:02 AM    Performed by: Rosa Sweeney MD  Authorized by: Rosa Sweeney MD    Intubation:     Induction:  Rapid sequence induction    Intubated:  Postinduction    Mask Ventilation:  Not attempted    Attempts:  1    Attempted By:  Resident anesthesiologist    Method of Intubation:  Video laryngoscopy    Blade:  Rivas 3    Laryngeal View Grade: Grade I - full view of cords      Difficult Airway Encountered?: No      Complications:  None    Airway Device:  Oral endotracheal tube    Airway Device Size:  7.5    Style/Cuff Inflation:  Cuffed    Tube secured:  22    Secured at:  The lips    Placement Verified By:  Capnometry    Complicating Factors:  None    Findings Post-Intubation:  BS equal bilateral

## 2024-04-22 NOTE — TELEPHONE ENCOUNTER
----- Message from Tonia Masters RN sent at 4/22/2024  8:47 AM CDT -----  Contact:  John [phone]  Please see below. Patient had appt. For event monitor to be mailed.  ----- Message -----  From: Prema Pineda  Sent: 4/22/2024   8:41 AM CDT  To: [facility] Arrhythmia Clinical Support Staff    Would like to receive medical advice.    Would they like a call back or a response via [facility]:  call back if needed    Additional information:   states pt will not make appt today after having a stroke. Pt is currently in hosp.

## 2024-04-22 NOTE — ANESTHESIA POSTPROCEDURE EVALUATION
Anesthesia Post Evaluation    Patient: Denise Berrios    Procedure(s) Performed: Procedure(s) (LRB):  ANGIOGRAM-CEREBRAL; stroke class A (N/A)    Final Anesthesia Type: general      Patient location during evaluation: ICU  Patient participation: No - Unable to Participate, Intubation  Level of consciousness: responds to stimulation  Post-procedure vital signs: reviewed and stable  Pain management: adequate  Airway patency: patent    PONV status at discharge: No PONV  Anesthetic complications: no      Cardiovascular status: stable  Respiratory status: ETT  Hydration status: euvolemic  Follow-up not needed.              Vitals Value Taken Time   /66 04/22/24 0402   Temp 36.5 °C (97.7 °F) 04/22/24 0420   Pulse 64 04/22/24 0449   Resp 27 04/22/24 0449   SpO2 100 % 04/22/24 0609   Vitals shown include unfiled device data.      No case tracking events are documented in the log.      Pain/Haroon Score: No data recorded

## 2024-04-22 NOTE — PLAN OF CARE
"[facility] Care Plan    POC reviewed with Denise VIVSA Cousins and family at 1400. Pt unable to verbalize understanding. Questions and concerns addressed. No acute events today. Pt progressing toward goals. Assessments ongoing. See below and flowsheets for full assessment and VS info.   - Mg replaced  - extubated  - MRI aquired          Is this a stroke patient? yes- Stroke booklet reviewed with patient and family, risk factors identified for patient and stroke booklet remains at bedside for ongoing education.     Care individualization: Check swallow    Neuro:  [place] Coma Scale  Best Eye Response: 4-->(E4) spontaneous  Best Motor Response: 6-->(M6) obeys commands  Best Verbal Response: 2-->(V2) incomprehensible speech  [place] Coma Scale Score: 12  Assessment Qualifiers: patient intubated  Pupil PERRLA: yes     24 hr Temp:  [97.6 °F (36.4 °C)-97.8 °F (36.6 °C)]     CV:   Rhythm: normal sinus rhythm  BP goals:   SBP < 140  MAP > 65    Resp:      Vent Mode: Spont  Set Rate: 12 BPM  Oxygen Concentration (%): 40  Vt Set: 420 mL  PEEP/CPAP: 5 cmH20  Pressure Support: 5 cmH20    Plan: N/A    GI/:     Diet/Nutrition Received: NPO  Last Bowel Movement: 04/22/24  Voiding Characteristics: external catheter    Intake/Output Summary (Last 24 hours) at 4/22/2024 1715  Last data filed at 4/22/2024 0539  Gross per 24 hour   Intake 600 ml   Output --   Net 600 ml     Unmeasured Output  Urine Occurrence: 1  Stool Occurrence: 1    Labs/Accuchecks:  Recent Labs   Lab 04/22/24  0358 04/22/24  0404   WBC 9.29  --    RBC 3.79*  --    HGB 12.0  --    HCT 36.6* 35*     --       Recent Labs   Lab 04/22/24  0358      K 3.7   CO2 22*      BUN 12   CREATININE 1.0   ALKPHOS 66   ALT 10   AST 17   BILITOT 0.4      Recent Labs   Lab 04/22/24  0402   INR 1.2    No results for input(s): "CPK", "CPKMB", "TROPONINI", "MB" in the last 168 hours.    Electrolytes: Electrolytes replaced  Accuchecks: Q6H    Gtts:  Current " Facility-Administered Medications   Medication Dose Route Frequency Last Rate Last Admin    sodium chloride 0.9%   Intravenous Continuous 75 mL/hr at 24 New Bag at 24       LDA/Wounds:      Nurses Note -- 4 Eyes      Is there altered skin present? no       Prevention Measures Documented    Second RN/Staff Member:  SUSANNAH Mims      Restraints:   Restraint Order  Length of Order: Order good for next 24 hours or when removed.  Date that the current order will : 24  Time that the current order will : 747  Order Upon Application: Yes

## 2024-04-22 NOTE — CONSULTS
[facility] - Emergency Dept  Vascular Neurology  [facility]  Consult Note    Inpatient consult to Vascular (Stroke) Neurology  Consult performed by: Ayse Flores DNP  Consult ordered by: Destini Prieto MD      Inpatient consult to Vascular (Stroke) Neurology  Consult performed by: Ayse Flores DNP  Consult ordered by: Maude Sweeney NP  Reason for consult: Right gaze, LSW, dysarthria        Assessment/Plan:     Flaccid hemiplegia affecting left nondominant side  Aphasia due to acute stroke   -Stroke symptoms. Therapy evals pending.    Embolic stroke involving right middle cerebral artery  Denise Berrios is a 74 y.o. female with a significant medical history of recent L MCA stroke (4/10/2024), HTN, HLD, Graves' disease,IBS, migraine HAs  who presents to the hospital for evaluation of acute onset LSW, neglect, and aphasia.     -R M1 occlusion noted on CTA. The patient will be taken to IR for EVT. She will be admitted to [facility] post procedure.    Antithrombotics for secondary stroke prevention: Antiplatelets: Aspirin: 81 mg daily; Brilinta     Statins for secondary stroke prevention and hyperlipidemia, if present:   Statins: Atorvastatin- 40 mg daily    Aggressive risk factor modification: HTN, HLD, AAD, Graves' Disease     Rehab efforts: The patient has been evaluated by a stroke team provider and the therapy needs have been fully considered based off the presenting complaints and exam findings. The following therapy evaluations are needed: PT evaluate and treat, OT evaluate and treat, SLP evaluate and treat    Diagnostics ordered/pending: MRI head without contrast to assess brain parenchyma    VTE prophylaxis: Heparin 5000 units SQ every 8 hours  Mechanical prophylaxis: Place SCDs    BP parameters: Infarct: No intervention, SBP <220, patient may need higher pressures as her exam seems to be pressure dependent at this time. SBP> 160 pending IR. Avoid hypoperfusion.        Essential  hypertension  -Stroke risk factor.  SBP< 220.  -At this time the patient's exam seems to be pressure dependant.  -HOB flat, IVFs  -Keep SBP>160, avoid hypotension  -Re-evaluate plan after IR procedure     Dyslipidemia  -Stroke risk factor. Last LDL 85.2 on 4/10/2024.  -Continue atorvastatin 40 mg daily.    Other specified hypothyroidism  -TSH 4.129  -Continue home synthroid        STROKE DOCUMENTATION     Acute Stroke Times   Last Known Normal Date: 04/21/24  Last Known Normal Time: 2000  Unknown Symptom Onset Date: Unknown Date  Unknown Symptom Onset Time: Unknown Time  Stroke Team Called Date: 04/22/24  Stroke Team Called Time: 0309  Stroke Team Arrival Date: 04/22/24  Stroke Team Arrival Time: 0314  CT Interpretation Time: 0319  Thrombolytic Therapy Recommended: No  CTA Interpretation Time: 0330 (delay due to issues w/IV and CT injector.)  Thrombectomy Recommended: Yes  Decision to Treat Time for IR: 0351    NIH Scale:  Interval: baseline  1a. Level of Consciousness: 0-->Alert, keenly responsive  1b. LOC Questions: 2-->Answers neither question correctly  1c. LOC Commands: 0-->Performs both tasks correctly  2. Best Gaze: 1-->Partial gaze palsy, gaze is abnormal in one or both eyes, but forced deviation or total gaze paresis is not present  3. Visual: 0-->No visual loss  4. Facial Palsy: 2-->Partial paralysis (total or near-total paralysis of lower face)  5a. Motor Arm, Left: 3-->No effort against gravity, limb falls  5b. Motor Arm, Right: 0-->No drift, limb holds 90 (or 45) degrees for full 10 secs  6a. Motor Leg, Left: 1-->Drift, leg falls by the end of the 5-sec period but does not hit bed  6b. Motor Leg, Right: 0-->No drift, leg holds 30 degree position for full 5 secs  7. Limb Ataxia: 0-->Absent  8. Sensory: 1-->Mild-to-moderate sensory loss, patient feels pinprick is less sharp or is dull on the affected side, or there is a loss of superficial pain with pinprick, but patient is aware of being touched  9. Best  Language: 3-->Mute, global aphasia, no usable speech or auditory comprehension  10. Dysarthria: 2-->Severe dysarthria, patients speech is so slurred as to be unintelligible in the absence of or out of proportion to any dysphasia, or is mute/anarthric  11. Extinction and Inattention (formerly Neglect): 2-->Profound keara-inattention/extinction more than 1 modality  Total (NIH Stroke Scale): 17    Modified [place] Score: 2  [place] Coma Scale:12   ABCD2 Score:    [id number] Score:   HAS -BLED Score:   ICH Score:   Hunt & Velazquez Classification:       Thrombolysis Candidate? No, Recent mild stroke ( <3 months) (mild stroke = NIHSS <5 and 1/3 or territory)     Delays to Thrombolysis?  Not Applicable    Interventional Revascularization Candidate?   Is the patient eligible for mechanical endovascular reperfusion (KULDEEP)?  Yes    Delays to Thrombectomy? No    Hemorrhagic change of an Ischemic Stroke: Does this patient have an ischemic stroke with hemorrhagic changes? No     Subjective:     History of Present Illness:  Denise Berrios is a 74 y.o. female with a significant medical history of recent L MCA stroke (4/10/2024), HTN, HLD, Graves' disease,IBS, migraine HAs  who presents to the hospital for evaluation of acute onset LSW, neglect, and aphasia. HPI information gathered from the patient's  due to the patient having aphasia. The patient was in her usual state of health and went to bed around 8pm. Around 30 min prior to arrival in the ED, her  states he heard a noise. He found the patient on the floor, aphasic, with right gaze. EMS was activated and called a stroke code in the field.    The patient was discharged from this service on 4/11 after a L corona radiata stroke with ESUS etiology. The plan was for DAPT for 21 days, ASA indefinitely. Her  states she had a reaction to Plavix.  The patient saw Dr. Morales on 4/18 with the plan to start Brilinta for 3 weeks. Her  states they had not been  able to obtain the prescription.               Past Medical History:   Diagnosis Date    Acid reflux     Graves disease     diffuse Toxic Goiter    Hypertension     IBS (irritable bowel syndrome)     Lichen sclerosus     Migraine     Stroke      Past Surgical History:   Procedure Laterality Date    EYE SURGERY      HYSTERECTOMY      CINDI for ?? cervical cancer, had normal paps    LEFT HEART CATHETERIZATION N/A 10/20/2020    Procedure: Left heart cath;  Surgeon: Bony Schultz MD;  Location: [facility] CATH LAB/EP;  Service: Cardiology;  Laterality: N/A;    REVERSE TOTAL SHOULDER ARTHROPLASTY Right 8/19/2022    Procedure: ARTHROPLASTY, SHOULDER, TOTAL, REVERSE;  Surgeon: Bradley Finnegan Jr., MD;  Location: [facility] OR;  Service: Orthopedics;  Laterality: Right;  SONIA linda CONFIRMED/8/18/Shaista Finnegan- hemosorb AT      Social History     Tobacco Use    Smoking status: Never     Passive exposure: Never    Smokeless tobacco: Never   Substance Use Topics    Alcohol use: Yes    Drug use: No     Review of patient's allergies indicates:   Allergen Reactions    Phenobarbital Anaphylaxis    Penicillins Other (See Comments)    Phenobarbital sodium     Propylthiouracil     Thyroid      Note: PTU    Topamax [topiramate] Diarrhea     GI symptoms    Cefaclor Rash and Other (See Comments)    Clopidogrel Hives and Rash    Methimazole Rash and Other (See Comments)    Methimazole (bulk) Rash    Penicillin Rash    Sulfa (sulfonamide antibiotics) Rash and Other (See Comments)       Medications: I have reviewed the current medication administration record.    Current Outpatient Medications   Medication Instructions    aspirin (ECOTRIN) 81 MG EC tablet once a day    atorvastatin (LIPITOR) 40 mg, Oral, Daily    butalbital-acetaminophen-caffeine -40 mg (FIORICET, ESGIC) -40 mg per tablet 1 tablet, Oral, Nightly PRN    clobetasol 0.05% (TEMOVATE) 0.05 % Oint APPLY TOPICALLY TWICE A DAY    clopidogreL (PLAVIX) 75  mg, Oral, Daily    diclofenac sodium (VOLTAREN) 2 g, Topical (Top), 3 times daily    ergocalciferol (VITAMIN D2) 50,000 Units, Oral, Weekly    EScitalopram oxalate (LEXAPRO) 20 mg, Oral, Daily    levothyroxine (SYNTHROID) 88 mcg, Oral, Before breakfast    losartan (COZAAR) 50 mg, Oral, Daily    metoprolol tartrate (LOPRESSOR) 50 mg, Oral, 2 times daily    naloxone (NARCAN) 4 mg/actuation Spry 4mg by nasal route as needed for opioid overdose; may repeat every 2-3 minutes in alternating nostrils until medical help arrives. Call 911    nortriptyline (PAMELOR) 10 mg, Oral, Daily    omeprazole (PRILOSEC) 40 mg, Oral, Daily    oxybutynin (DITROPAN-XL) 10 mg, Oral, Daily    pregabalin (LYRICA) 75 MG capsule TAKE 1 CAPSULE BY MOUTH TWICE DAILY FOR NEUROPATHIC PAIN    promethazine (PHENERGAN) 12.5 mg, Oral, Every 6 hours PRN    traMADoL (ULTRAM) 50 mg, Oral, Every 6 hours PRN         Review of Systems   Unable to perform ROS: Patient nonverbal     Objective:     Vital Signs (Most Recent):  Temp: 97.7 °F (36.5 °C) (04/22/24 0420)  Pulse: 66 (04/22/24 0342)  Resp: 17 (04/22/24 0342)  BP: (!) 145/64 (04/22/24 0342)  SpO2: 95 % (04/22/24 0430)    Vital Signs Range (Last 24H):  Temp:  [97.7 °F (36.5 °C)]   Pulse:  [61-66]   Resp:  [14-17]   BP: (145-189)/(64-69)   SpO2:  [95 %-97 %]        Physical Exam  Vitals and nursing note reviewed.   Eyes:      General: No scleral icterus.        Right eye: No discharge.         Left eye: No discharge.      Extraocular Movements:      Right eye: Abnormal extraocular motion present.      Left eye: Abnormal extraocular motion present.   Cardiovascular:      Rate and Rhythm: Normal rate.   Pulmonary:      Effort: Pulmonary effort is normal. No respiratory distress.   Abdominal:      General: There is no distension.   Musculoskeletal:      Cervical back: Normal range of motion and neck supple.   Skin:     General: Skin is warm and dry.   Neurological:      Mental Status: She is alert.       "Cranial Nerves: Cranial nerve deficit present.      Sensory: Sensory deficit present.      Motor: Weakness present.              Neurological Exam:   LOC: alert  Language: Expressive aphasia  Articulation: Dysarthria  EOM (CN III, IV, VI): Gaze preference  right  Facial Movement (CN VII): Lower facial weakness on the Left  Motor: Arm left  Paresis: 2/5  Leg left  Paresis: 3/5  Arm right  Normal 5/5  Leg right Normal 5/5  Sensation: Isaac-anesthesia left      Laboratory:  CMP:   Recent Labs   Lab 04/22/24 0358   CALCIUM 9.2   ALBUMIN 3.0*   PROT 6.6      K 3.7   CO2 22*      BUN 12   CREATININE 1.0   ALKPHOS 66   ALT 10   AST 17   BILITOT 0.4     CBC:   Recent Labs   Lab 04/22/24 0358 04/22/24 0404   WBC 9.29  --    RBC 3.79*  --    HGB 12.0  --    HCT 36.6* 35*     --    MCV 97  --    MCH 31.7*  --    MCHC 32.8  --      Lipid Panel:   Recent Labs   Lab 04/22/24 0358   CHOL 141   LDLCALC 77.0   HDL 40   TRIG 120     Coagulation:   Recent Labs   Lab 04/22/24 0402   INR 1.2     Hgb A1C: No results for input(s): "HGBA1C" in the last 168 hours.  TSH:   Recent Labs   Lab 04/22/24 0358   TSH 4.129*       Diagnostic Results:      Brain imaging/Vessel Imaging:  CTA Stroke MP 4/22/2024 Imaging reviewed by me. CTH is negative for acute findings. CTA shows a occlusion of the M1 branch of the R MCA.    Cardiac Evaluation:   Last TTE 4/11/2024    Summary    Left Ventricle: The left ventricle is normal in size. Normal wall thickness. There is mild concentric hypertrophy. Normal wall motion. There is normal systolic function with a visually estimated ejection fraction of 55 - 60%.    Right Ventricle: Normal right ventricular cavity size. Wall thickness is normal. Right ventricle wall motion  is normal. Systolic function is normal.    Pulmonary Artery: The estimated pulmonary artery systolic pressure is 15 mmHg.    IVC/SVC: Normal venous pressure at 3 mmHg.      Ayse Flores DNP  Comprehensive Stroke " [place]  Department of Vascular Neurology   [facility] - Emergency Dept     This medical record was prepared using voice recognition software and may contain phonetic and/or or grammatical errors.  Garbled syntax, mangled pronounciations, and other bizarre constructions may be attributed to that system.

## 2024-04-22 NOTE — ASSESSMENT & PLAN NOTE
- Patient able to follow some simple commands on left side. Able to lift arm and leg off of bed, wiggle toes.   - PT/OT

## 2024-04-22 NOTE — BRIEF OP NOTE
Procedural Date:  04/22/24    Attending:  Juan Elkins MD    Fellow(s):  George Loo MD    Preoperative Diagnoses:   R M2 MeVO     Postoperative Diagnosis:  Same; s/p direct aspiration thrombectomy (TICI 3 revascularization)     Procedure:   4-vessel /cerebral angiogram with R M2 MeVO aspiration thrombectomy     Written Informed Consent Obtained:   Yes; obtained from patient     Specimen Removed:   None     Estimated Blood Loss:  Minimal     Brief Procedure report:   A 8 Mongolian sheath was placed into the right common femoral artery and a 5 Mongolian bales diagnostic catheter via the 0.88 Zoom Guide catheter was advanced into the aortic arch.  The R common carotid,  R internal carotid, L common carotid, L internal carotid & L vertebral arteries were subselected and angiography of the brain was performed. Prior to the diagnostic angiogram, a R distal M1 occlusion was appreciated at a distal bifurcation of the M1and 0.55/0.35 Zoom microcatheter system was advanced for direct aspiration thrombectomy with 1-pass TICI 3 revascularization. There is no other evidence of aneurysm, fistula, malformation, or significant stenoocclusive disease.  Left CCA, ICA and Left vertebral angio were negative for other occlusions or high grade stenoses.  A right common femoral artery angiogram was performed, the sheath removed and hemostasis achieved via 8 Mongolian Angioseal closure device. No hematoma was present at the time of hemostasis.   The patient tolerated the procedure well.     Preliminary Interpretation:   1. Right Distal M1 occlusion treated successfully with direct aspiration thrombectomy (TICI 3 revascularization)  2. Otherwise normal cerebral angiogram.     (Please see Imaging report for full details)    Disposition:  [facility]

## 2024-04-22 NOTE — NURSING
Called down to MRI for STAT MRI. Tech stated that table was not available. Stated that they will send up MRI check list and try for 1430. Assessments ongoing.

## 2024-04-22 NOTE — HPI
Denise Berrios is a 74 y.o. female with a significant medical history of recent L MCA stroke (4/10/2024), HTN, HLD, Graves' disease,IBS, migraine HAs  who presents to the hospital for evaluation of acute onset LSW, neglect, and aphasia. HPI information gathered from the patient's  due to the patient having aphasia. The patient was in her usual state of health and went to bed around 8pm. Around 30 min prior to arrival in the ED, her  states he heard a noise. He found the patient on the floor, aphasic, with right gaze. EMS was activated and called a stroke code in the field.    The patient was discharged from this service on 4/11 after a L corona radiata stroke with ESUS etiology. The plan was for DAPT for 21 days, ASA indefinitely. Her  states she had a reaction to Plavix.  The patient saw Dr. Morales on 4/18 with the plan to start Brilinta for 3 weeks. Her  states they had not been able to obtain the prescription.

## 2024-04-22 NOTE — SUBJECTIVE & OBJECTIVE
Past Medical History:   Diagnosis Date    Acid reflux     Graves disease     diffuse Toxic Goiter    Hypertension     IBS (irritable bowel syndrome)     Lichen sclerosus     Migraine     Stroke      Past Surgical History:   Procedure Laterality Date    EYE SURGERY      HYSTERECTOMY      CINDI for ?? cervical cancer, had normal paps    LEFT HEART CATHETERIZATION N/A 10/20/2020    Procedure: Left heart cath;  Surgeon: Bony Schultz MD;  Location: [facility] CATH LAB/EP;  Service: Cardiology;  Laterality: N/A;    REVERSE TOTAL SHOULDER ARTHROPLASTY Right 8/19/2022    Procedure: ARTHROPLASTY, SHOULDER, TOTAL, REVERSE;  Surgeon: Bradley Finnegan Jr., MD;  Location: [facility] OR;  Service: Orthopedics;  Laterality: Right;  MARLEY- maddi CONFIRMED/8/18/Shaista Finnegan- hemosorb AT      Social History     Tobacco Use    Smoking status: Never     Passive exposure: Never    Smokeless tobacco: Never   Substance Use Topics    Alcohol use: Yes    Drug use: No     Review of patient's allergies indicates:   Allergen Reactions    Phenobarbital Anaphylaxis    Penicillins Other (See Comments)    Phenobarbital sodium     Propylthiouracil     Thyroid      Note: PTU    Topamax [topiramate] Diarrhea     GI symptoms    Cefaclor Rash and Other (See Comments)    Clopidogrel Hives and Rash    Methimazole Rash and Other (See Comments)    Methimazole (bulk) Rash    Penicillin Rash    Sulfa (sulfonamide antibiotics) Rash and Other (See Comments)       Medications: I have reviewed the current medication administration record.    Current Outpatient Medications   Medication Instructions    aspirin (ECOTRIN) 81 MG EC tablet once a day    atorvastatin (LIPITOR) 40 mg, Oral, Daily    butalbital-acetaminophen-caffeine -40 mg (FIORICET, ESGIC) -40 mg per tablet 1 tablet, Oral, Nightly PRN    clobetasol 0.05% (TEMOVATE) 0.05 % Oint APPLY TOPICALLY TWICE A DAY    clopidogreL (PLAVIX) 75 mg, Oral, Daily    diclofenac sodium  (VOLTAREN) 2 g, Topical (Top), 3 times daily    ergocalciferol (VITAMIN D2) 50,000 Units, Oral, Weekly    EScitalopram oxalate (LEXAPRO) 20 mg, Oral, Daily    levothyroxine (SYNTHROID) 88 mcg, Oral, Before breakfast    losartan (COZAAR) 50 mg, Oral, Daily    metoprolol tartrate (LOPRESSOR) 50 mg, Oral, 2 times daily    naloxone (NARCAN) 4 mg/actuation Spry 4mg by nasal route as needed for opioid overdose; may repeat every 2-3 minutes in alternating nostrils until medical help arrives. Call 911    nortriptyline (PAMELOR) 10 mg, Oral, Daily    omeprazole (PRILOSEC) 40 mg, Oral, Daily    oxybutynin (DITROPAN-XL) 10 mg, Oral, Daily    pregabalin (LYRICA) 75 MG capsule TAKE 1 CAPSULE BY MOUTH TWICE DAILY FOR NEUROPATHIC PAIN    promethazine (PHENERGAN) 12.5 mg, Oral, Every 6 hours PRN    traMADoL (ULTRAM) 50 mg, Oral, Every 6 hours PRN         Review of Systems   Unable to perform ROS: Patient nonverbal     Objective:     Vital Signs (Most Recent):  Temp: 97.7 °F (36.5 °C) (04/22/24 0420)  Pulse: 66 (04/22/24 0342)  Resp: 17 (04/22/24 0342)  BP: (!) 145/64 (04/22/24 0342)  SpO2: 95 % (04/22/24 0430)    Vital Signs Range (Last 24H):  Temp:  [97.7 °F (36.5 °C)]   Pulse:  [61-66]   Resp:  [14-17]   BP: (145-189)/(64-69)   SpO2:  [95 %-97 %]        Physical Exam  Vitals and nursing note reviewed.   Eyes:      General: No scleral icterus.        Right eye: No discharge.         Left eye: No discharge.      Extraocular Movements:      Right eye: Abnormal extraocular motion present.      Left eye: Abnormal extraocular motion present.   Cardiovascular:      Rate and Rhythm: Normal rate.   Pulmonary:      Effort: Pulmonary effort is normal. No respiratory distress.   Abdominal:      General: There is no distension.   Musculoskeletal:      Cervical back: Normal range of motion and neck supple.   Skin:     General: Skin is warm and dry.   Neurological:      Mental Status: She is alert.      Cranial Nerves: Cranial nerve deficit  "present.      Sensory: Sensory deficit present.      Motor: Weakness present.              Neurological Exam:   LOC: alert  Language: Expressive aphasia  Articulation: Dysarthria  EOM (CN III, IV, VI): Gaze preference  right  Facial Movement (CN VII): Lower facial weakness on the Left  Motor: Arm left  Paresis: 2/5  Leg left  Paresis: 3/5  Arm right  Normal 5/5  Leg right Normal 5/5  Sensation: Isaac-anesthesia left      Laboratory:  CMP:   Recent Labs   Lab 04/22/24  0358   CALCIUM 9.2   ALBUMIN 3.0*   PROT 6.6      K 3.7   CO2 22*      BUN 12   CREATININE 1.0   ALKPHOS 66   ALT 10   AST 17   BILITOT 0.4     CBC:   Recent Labs   Lab 04/22/24 0358 04/22/24 0404   WBC 9.29  --    RBC 3.79*  --    HGB 12.0  --    HCT 36.6* 35*     --    MCV 97  --    MCH 31.7*  --    MCHC 32.8  --      Lipid Panel:   Recent Labs   Lab 04/22/24 0358   CHOL 141   LDLCALC 77.0   HDL 40   TRIG 120     Coagulation:   Recent Labs   Lab 04/22/24 0402   INR 1.2     Hgb A1C: No results for input(s): "HGBA1C" in the last 168 hours.  TSH:   Recent Labs   Lab 04/22/24 0358   TSH 4.129*       Diagnostic Results:      Brain imaging/Vessel Imaging:  CTA Stroke MP 4/22/2024 Imaging reviewed by me. CTH is negative for acute findings. CTA shows a occlusion of the M1 branch of the R MCA.    Cardiac Evaluation:   Last TTE 4/11/2024    Summary    Left Ventricle: The left ventricle is normal in size. Normal wall thickness. There is mild concentric hypertrophy. Normal wall motion. There is normal systolic function with a visually estimated ejection fraction of 55 - 60%.    Right Ventricle: Normal right ventricular cavity size. Wall thickness is normal. Right ventricle wall motion  is normal. Systolic function is normal.    Pulmonary Artery: The estimated pulmonary artery systolic pressure is 15 mmHg.    IVC/SVC: Normal venous pressure at 3 mmHg.    "

## 2024-04-22 NOTE — HPI
74 year old female with PMHx of HTN, CAD, left sided CVA who presents from home with  after a fall from bed. LKN approximately 830pm. At 0200,  heard a noise and found patient had rolled out of bed. Patient was found to have confusion, L sided weakness, and L sided facial droop. Brought to ER, CTA shows right M2 occlusion. Evaluated by IR. Taken for thrombectomy. Will admit to [facility] post thrombectomy.       Of note, pt was found to have a L corona  radiata CVA on 4/11 and was discharged home with plavix. Plavix was stopped two Fridays ago due to hives. Pending holter monitor.

## 2024-04-22 NOTE — ASSESSMENT & PLAN NOTE
74 year old female with PMHx of HTN, CAD, left sided CVA who presents from home with  after a fall from bed. LKN approximately 830pm. At 0200,  heard a noise and found patient had rolled out of bed. Patient was found to have confusion, L sided weakness, and L sided facial droop.     Antithrombotics for secondary stroke prevention: Antiplatelets: Aspirin: 81 mg daily    Statins for secondary stroke prevention and hyperlipidemia, if present:   Statins: Atorvastatin- 80 mg daily    Aggressive risk factor modification: HTN, CAD     Rehab efforts: The patient has been evaluated by a stroke team provider and the therapy needs have been fully considered based off the presenting complaints and exam findings. The following therapy evaluations are needed: PT evaluate and treat, OT evaluate and treat, SLP evaluate and treat, PM&R evaluate for appropriate placement    Diagnostics ordered/pending: HgbA1C to assess blood glucose levels, Lipid Profile to assess cholesterol levels, MRI head without contrast to assess brain parenchyma, TTE to assess cardiac function/status , TSH to assess thyroid function    VTE prophylaxis: Heparin 5000 units SQ every 8 hours    BP parameters: Infarct: SBP goal 160-220, concern for pressure dependent exam, HOB flat, NS bolus, NS gtt.     - Admit to [facility]  - Vital signs and neuro checks q1h  - MRI brain 12N  - Limited echo pending  - Stroke risk workup  - IR consulted, to go for thrombectomy   - Vascular Neurology consult

## 2024-04-22 NOTE — CONSULTS
HPI:  74 y.o. female w/ a significant PMHx of HTN, CAD, CVA who presents from home with  after a fall from bed w/ R M2 MeVO. Pt at 0200 was found to have confusion, L sided weakness, and L sided facial droop. Concerns for R MCA. Pt was found to have a L corona  radiata CVA on 4/11 and was discharged home with plavix. Plavix was stopped two Fridays ago due to hives. NPO since 0600. Pt is on an ARB. Last stroke was thought to be possible embolic, pending holter monitor. NIHSS now 17 in ED. No TNK given as recent stroke. Consented .       ROS:  10-point ROS unable to be obtained d/t aphasia      Exam:  E4V2M5, Aphasic  CNi grossly intact, R gaze, PERRL, L neglect to threat  RUE/RLE AG, LUE/LLE paretic, does not follow commands    General: Aphasic  Head: Non-traumatic, normocephalic  Eyes: Pupils equal  Neck: Supple  CVS: Normal rate and rhythm, distal pulses present  Pulm: Symmetric expansion, no respiratory distress  GI: Abdomen nondistended  MSK: Non-traumatic  Skin: Dry, intact  Psych: Aphasic      A&P:  74 y.o. female w/ a significant PMHx of HTN, CAD, CVA who presents from home with  after a fall from bed w/ R M2 MeVO:    --Patient evaluated at bedside by NSGY  --All labs and diagnostics reviewed  --CTA w/ LVO of the R M2 artery  --Patient to undergo emergent IR angio for possible intervention with general anesthesia  --Further reccomendations to follow procedure    Dispo: Per stroke/NCC teams

## 2024-04-22 NOTE — PT/OT/SLP PROGRESS
Physical Therapy      Patient Name:  Denise VIVAS Coudoryss   MRN:  [id number]    Patient not seen today secondary to Other (Comment) (pt not extubated until late in PM and requires MRI prior to initiation of PT eval for pt safety). Will follow-up as appropriate.  Bell Ball, PT

## 2024-04-22 NOTE — SEDATION DOCUMENTATION
Pt arrived to IR Room 4 for Thrombectomy. Pt oriented to unit and staff, Pt safely transferred from stretcher to procedural table. Fall risk reviewed and comfort measures utilized with interventions. Safety strap applied, position pillows to minimize pressure points. Blankets applied. Pt prepped and draped utilizing standard sterile technique. Patient placed on continuous monitoring, as required by sedation policy. Timeouts implemented utilizing standard universal time-out per department and facility policy. CRNA to remain at bedside with continuous monitoring. Pt resting comfortably. Denies pain/discomfort. Will continue to monitor. See flow sheets for monitoring, medication administration, and updates. patient verbalizes understanding.

## 2024-04-22 NOTE — H&P
[facility] - Emergency Dept  Neurocritical Care  History & Physical    Admit Date: 4/22/2024  Service Date: 04/22/2024  Length of Stay: 0    Subjective:     Chief Complaint: Embolic stroke involving right middle cerebral artery    History of Present Illness: 74 year old female with PMHx of HTN, CAD, left sided CVA who presents from home with  after a fall from bed. LKN approximately 830pm. At 0200,  heard a noise and found patient had rolled out of bed. Patient was found to have confusion, L sided weakness, and L sided facial droop. Brought to ER, CTA shows right M2 occlusion. Evaluated by IR. Taken for thrombectomy. Will admit to [facility] post thrombectomy.       Of note, pt was found to have a L corona  radiata CVA on 4/11 and was discharged home with plavix. Plavix was stopped two Fridays ago due to hives. Pending holter monitor.      Past Medical History:   Diagnosis Date    Acid reflux     Graves disease     diffuse Toxic Goiter    Hypertension     IBS (irritable bowel syndrome)     Lichen sclerosus     Migraine     Stroke      Past Surgical History:   Procedure Laterality Date    EYE SURGERY      HYSTERECTOMY      CINDI for ?? cervical cancer, had normal paps    LEFT HEART CATHETERIZATION N/A 10/20/2020    Procedure: Left heart cath;  Surgeon: Bony Schultz MD;  Location: [facility] CATH LAB/EP;  Service: Cardiology;  Laterality: N/A;    REVERSE TOTAL SHOULDER ARTHROPLASTY Right 8/19/2022    Procedure: ARTHROPLASTY, SHOULDER, TOTAL, REVERSE;  Surgeon: Bradley Finnegan Jr., MD;  Location: [facility] OR;  Service: Orthopedics;  Laterality: Right;  SONIA linda CONFIRMED/8/18/Shaista Finnegan- hemosorb AT       Current Facility-Administered Medications on File Prior to Encounter   Medication Dose Route Frequency Provider Last Rate Last Admin    ticagrelor tablet 90 mg  90 mg Oral BID          Current Outpatient Medications on File Prior to Encounter   Medication Sig Dispense Refill    aspirin  (ECOTRIN) 81 MG EC tablet once a day      atorvastatin (LIPITOR) 40 MG tablet Take 1 tablet (40 mg total) by mouth once daily. 90 tablet 0    butalbital-acetaminophen-caffeine -40 mg (FIORICET, ESGIC) -40 mg per tablet Take 1 tablet by mouth nightly as needed for Headaches. 30 tablet 5    clobetasol 0.05% (TEMOVATE) 0.05 % Oint APPLY TOPICALLY TWICE A DAY 45 g 1    clopidogreL (PLAVIX) 75 mg tablet Take 1 tablet (75 mg total) by mouth once daily. (Patient not taking: Reported on 4/15/2024) 30 tablet 11    diclofenac sodium (VOLTAREN) 1 % Gel Apply 2 g topically 3 (three) times daily. 100 g 3    ergocalciferol (VITAMIN D2) 50,000 unit Cap Take 1 capsule (50,000 Units total) by mouth once a week. 12 capsule 3    EScitalopram oxalate (LEXAPRO) 20 MG tablet Take 1 tablet (20 mg total) by mouth once daily. 90 tablet 3    levothyroxine (SYNTHROID) 88 MCG tablet Take 1 tablet (88 mcg total) by mouth before breakfast. 90 tablet 3    losartan (COZAAR) 50 MG tablet Take 1 tablet (50 mg total) by mouth once daily. 90 tablet 3    metoprolol tartrate (LOPRESSOR) 50 MG tablet Take 1 tablet (50 mg total) by mouth 2 (two) times daily. 180 tablet 3    naloxone (NARCAN) 4 mg/actuation Spry 4mg by nasal route as needed for opioid overdose; may repeat every 2-3 minutes in alternating nostrils until medical help arrives. Call 911 (Patient not taking: Reported on 4/15/2024) 1 each 11    nortriptyline (PAMELOR) 10 MG capsule Take 1 capsule (10 mg total) by mouth once daily. 90 capsule 3    omeprazole (PRILOSEC) 40 MG capsule Take 1 capsule (40 mg total) by mouth once daily. 90 capsule 3    oxybutynin (DITROPAN-XL) 10 MG 24 hr tablet Take 1 tablet (10 mg total) by mouth once daily. 90 tablet 3    pregabalin (LYRICA) 75 MG capsule TAKE 1 CAPSULE BY MOUTH TWICE DAILY FOR NEUROPATHIC PAIN      promethazine (PHENERGAN) 12.5 MG Tab Take 1 tablet (12.5 mg total) by mouth every 6 (six) hours as needed (nausea and or vomiting). 10  tablet 0    traMADoL (ULTRAM) 50 mg tablet Take 50 mg by mouth every 6 (six) hours as needed for Pain.        Allergies: Phenobarbital, Penicillins, Phenobarbital sodium, Propylthiouracil, Thyroid, Topamax [topiramate], Cefaclor, Clopidogrel, Methimazole, Methimazole (bulk), Penicillin, and Sulfa (sulfonamide antibiotics)  Family History   Problem Relation Name Age of Onset    Kidney failure Father      Hypertension Father      Stomach cancer Mother      Breast cancer Neg Hx       Social History     Tobacco Use    Smoking status: Never     Passive exposure: Never    Smokeless tobacco: Never   Substance Use Topics    Alcohol use: Yes    Drug use: No     Review of Systems   Unable to perform ROS: Mental status change     Objective:     Vitals:    Temp: 97.7 °F (36.5 °C)  Pulse: 66  BP: (!) 145/64  MAP (mmHg): 92  Resp: 17  SpO2: 95 %    Temp  Min: 97.7 °F (36.5 °C)  Max: 97.7 °F (36.5 °C)  Pulse  Min: 61  Max: 66  BP  Min: 145/64  Max: 189/69  MAP (mmHg)  Min: 92  Max: 92  Resp  Min: 14  Max: 17  SpO2  Min: 95 %  Max: 97 %    04/21 0701 - 04/22 0700  In: 400   Out: -             Physical Exam  Constitutional:       Appearance: She is well-developed.   HENT:      Head: Normocephalic and atraumatic.   Cardiovascular:      Rate and Rhythm: Normal rate and regular rhythm.   Pulmonary:      Effort: Pulmonary effort is normal.   Skin:     General: Skin is warm and dry.   Neurological:      Mental Status: She is alert.      Comments: E4V1M6. Will intermittently nod appropriately to questions.   PERRL. Right gaze preference. Does not cross midline.  Left facial droop.  Follows commands in all extremities.   RUE 5/5  LUE 4/5  RLE 5/5  LLE 4/5       NIH Stroke Scale   1a. Level of Consciousness 0   1b. LOC Questions 2   1c. LOC Commands 0   2. Best Gaze 2   3. Visual 0    4. Facial Palsy 3   5a. Motor Arm, Left 2    5b. Motor Arm, Right 0    6a. Motor Leg, Left 0   6b. Motor Leg, Right 0   7. Limb Ataxia 0   8. Sensory 0   9.  Best Language 3   10. Dysarthria 2   11. Extinction and Inattention (formerly Neglect) 0   Total (NIH Stroke Scale) 14         Today I personally reviewed pertinent medications, lines/drains/airways, imaging, cardiology results, laboratory results, notably: CMP, CBC, PT/INR, TSH, Lipid panel, Mag, EKG, CTA.         Assessment/Plan:     Neuro  * Embolic stroke involving right middle cerebral artery  74 year old female with PMHx of HTN, CAD, left sided CVA who presents from home with  after a fall from bed. LKN approximately 830pm. At 0200,  heard a noise and found patient had rolled out of bed. Patient was found to have confusion, L sided weakness, and L sided facial droop.     Antithrombotics for secondary stroke prevention: Antiplatelets: Aspirin: 81 mg daily    Statins for secondary stroke prevention and hyperlipidemia, if present:   Statins: Atorvastatin- 80 mg daily    Aggressive risk factor modification: HTN, CAD     Rehab efforts: The patient has been evaluated by a stroke team provider and the therapy needs have been fully considered based off the presenting complaints and exam findings. The following therapy evaluations are needed: PT evaluate and treat, OT evaluate and treat, SLP evaluate and treat, PM&R evaluate for appropriate placement    Diagnostics ordered/pending: HgbA1C to assess blood glucose levels, Lipid Profile to assess cholesterol levels, MRI head without contrast to assess brain parenchyma, TTE to assess cardiac function/status , TSH to assess thyroid function    VTE prophylaxis: Heparin 5000 units SQ every 8 hours    BP parameters: Infarct: SBP goal 160-220, concern for pressure dependent exam, HOB flat, NS bolus, NS gtt.     - Admit to [facility]  - Vital signs and neuro checks q1h  - MRI brain 12N  - Limited echo pending  - Stroke risk workup  - IR consulted, to go for thrombectomy   - Vascular Neurology consult        Cytotoxic brain edema  - Hourly neuro checks    Flaccid  hemiplegia affecting left nondominant side  - Patient able to follow some simple commands on left side. Able to lift arm and leg off of bed, wiggle toes.   - PT/OT    Aphasia due to acute stroke  - SLP ordered        Cardiac/Vascular  Essential hypertension  SBP goal 160-220, concern that exam is pressure dependent. Will need to reevaluate and lower BP pending IR.    Dyslipidemia  - Lipid Panel pending  - Atorvastatin 80mg     Endocrine  Other specified hypothyroidism  - TSH pending  - Resume home synthroid          The patient is being Prophylaxed for:  Venous Thromboembolism with: Mechanical or Chemical  Stress Ulcer with: H2B  Ventilator Pneumonia with: chlorhexidine oral care    Activity Orders            Progressive Mobility Protocol (mobilize patient to their highest level of functioning at least twice daily) starting at 04/22 0800    Turn patient starting at 04/22 0600    Elevate HOB Collagen closure or PERCLOSE plug/device - Elevate HOB 30 degrees with limb immobilized for 2 hours after procedure. starting at 04/22 0536    Diet NPO: NPO starting at 04/22 0426          Full Code    Critical condition in that Patient has a condition that poses threat to life and bodily function:  R MCA, s/p thrombectomy     40 minutes of Critical care time was spent personally by me on the following activities: development of treatment plan with patient or surrogate and bedside caregivers, discussions with consultants, evaluation of patient's response to treatment, examination of patient, ordering and performing treatments and interventions, ordering and review of laboratory studies, ordering and review of radiographic studies, pulse oximetry, antibiotic titration if applicable, vasopressor titration if applicable, re-evaluation of patient's condition. This critical care time did not overlap with that of any other provider or involve time for any procedures. There is high probability for acute neurological change leading to  clinical and possibly life-threatening deterioration requiring highest level of provider preparedness for urgent intervention.      Maude Sweeney NP  Neurocritical Care  [facility] - Emergency Dept

## 2024-04-22 NOTE — CHAPLAIN
04/22/24 0932   Clinical Encounter Type   Visit Type Initial Visit   Visit Category Critical Care   Visited With Patient and family together   Number of Family Visited 1   Length of Visit 15 Minutes   Patient Spiritual Encounters   Care Provided Compassionate presence   Patient Coping Non-verbal   Comments - Patient Pt awake but on vent. She nodded in response to me during my visit.   Family Spiritual Encounters   Care Provided Compassionate presence;Reflective listening   Family Coping Accepting;Open/discussion;Living with uncertainty;Active aissatou;Internal strength;Family/ friends resources   Comments - Family Visited w/ at bedside and spoke to pt who was awake and able to nod in response to me. She has had several strokes. 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 Spouse was an ICU nurse for decades.  is optimistic that pt will be extubated today. Father Stan walked in as I was visiting. I left pt and spouse in his care. Educated pt and spouse as to the services offered by the Spiritual Care dept and reminded them that chaplains are available, in-house 24/7 to offer support as needed. No needs expressed during my visit.

## 2024-04-22 NOTE — ED NOTES
I-STAT Chem-8+ Results:   Value Reference Range   Sodium 139 136-145 mmol/L   Potassium  3.8 3.5-5.1 mmol/L   Chloride 106  mmol/L   Ionized Calcium 1.21 1.06-1.42 mmol/L   CO2 (measured) 24 23-29 mmol/L   Glucose 94  mg/dL   BUN 12 6-30 mg/dL   Creatinine 1.0 0.5-1.4 mg/dL   Hematocrit 35 36-54%

## 2024-04-22 NOTE — CARE UPDATE
Care Update    75 y/o HTN< CAD, left occipital infarct 2021, left insular infarct 4/10 on ASA who presents after confusion AMS and left sided weakness. Found to have M2 MCA occlusion s/p IR thrombectomy. Symptoms improving NIH now 7. Currently intubated but following commands. Has mild right sided gaze preference.      Of note she saw Dr. Morales and was supposed to be on Ticagrelor for 3 weeks due to not tolerating Plavix for her stroke on 4/10. She was unable to have the Ticagrelor filled.      A/P    Embolic stroke involving right middle cerebral artery  Denise Berrios is a 74 y.o. female with a significant medical history of recent L MCA stroke (4/10/2024), HTN, HLD, Graves' disease,IBS, migraine HAs  who presents to the hospital for evaluation of acute onset LSW, neglect, and aphasia.      -S/P right sided M1 thrombectomy, admitted to [facility]     Antithrombotics for secondary stroke prevention: Antiplatelets: Aspirin: 81 mg daily; Brilinta daily      Statins for secondary stroke prevention and hyperlipidemia, if present:   Statins: Atorvastatin- 40 mg daily     Aggressive risk factor modification: HTN, HLD, AAD, Graves' Disease     Rehab efforts: The patient has been evaluated by a stroke team provider and the therapy needs have been fully considered based off the presenting complaints and exam findings. The following therapy evaluations are needed: PT evaluate and treat, OT evaluate and treat, SLP evaluate and treat     Diagnostics ordered/pending:   MRI head without contrast to assess brain parenchyma  Recommend repeat TTE  Consider loop recorder vs holter to evaluate for arrhythmia on discharge     VTE prophylaxis: Heparin 5000 units SQ every 8 hours  Mechanical prophylaxis: Place SCDs     BP parameters: Infarct: No intervention, SBP <220, patient may need higher pressures as her exam seems to be pressure dependent at this time. SBP> 160 pending IR. Avoid hypoperfusion.      Tammy Min D.O.  PGY-2  Internal  Medicine

## 2024-04-22 NOTE — ED TRIAGE NOTES
Denise VIVAS Cousins, a 74 y.o. female presents to the ED w/ complaint of L sided facial droop, L sided weakness, and aphasic  since 8pm last night. Had stroke 1 week ago. Pt is confused at this time but still attempting to follow commands     Triage note:  Chief Complaint   Patient presents with    Extremity Weakness           Facial Droop     Pt arrives via EMS with left sided weakness and facial droop. LKN. 2000. Had stroke last week.     Review of patient's allergies indicates:   Allergen Reactions    Phenobarbital Anaphylaxis    Penicillins Other (See Comments)    Phenobarbital sodium     Propylthiouracil     Thyroid      Note: PTU    Topamax [topiramate] Diarrhea     GI symptoms    Cefaclor Rash and Other (See Comments)    Clopidogrel Hives and Rash    Methimazole Rash and Other (See Comments)    Methimazole (bulk) Rash    Penicillin Rash    Sulfa (sulfonamide antibiotics) Rash and Other (See Comments)     Past Medical History:   Diagnosis Date    Acid reflux     Graves disease     diffuse Toxic Goiter    Hypertension     IBS (irritable bowel syndrome)     Lichen sclerosus     Migraine     Stroke

## 2024-04-22 NOTE — TRANSFER OF CARE
Anesthesia Transfer of Care Note    Patient: Denise Berrios    Procedure(s) Performed: Procedure(s) (LRB):  ANGIOGRAM-CEREBRAL; stroke class A (N/A)    Patient location: ICU    Anesthesia Type: general    Transport from OR: Transported from OR intubated on 100% O2 by AMBU with adequate controlled ventilation. Upon arrival to PACU/ICU, patient attached to ventilator and auscultated to confirm bilateral breath sounds and adequate TV. Continuous ECG monitoring in transport. Continuous SpO2 monitoring in transport    Post pain: adequate analgesia    Post assessment: no apparent anesthetic complications    Post vital signs: stable    Level of consciousness: sedated    Nausea/Vomiting: no nausea/vomiting    Complications: none    Transfer of care protocol was followed      Last vitals: Visit Vitals  BP (!) 145/64   Pulse 66   Temp 36.5 °C (97.7 °F) (Axillary)   Resp 17   SpO2 95%

## 2024-04-22 NOTE — CONSULTS
Inpatient consult to Physical Medicine Rehab  Consult performed by: Vanessa Mederos NP  Consult ordered by: Maude Sweeney NP  Reason for consult: Rehab      Consult received.     BARBI Lutz, FNP-C  Physical Medicine & Rehabilitation   04/22/2024

## 2024-04-23 LAB
ALBUMIN SERPL BCP-MCNC: 3 G/DL (ref 3.5–5.2)
ALP SERPL-CCNC: 73 U/L (ref 55–135)
ALT SERPL W/O P-5'-P-CCNC: 10 U/L (ref 10–44)
ANION GAP SERPL CALC-SCNC: 12 MMOL/L (ref 8–16)
ASCENDING AORTA: 2.93 CM
AST SERPL-CCNC: 15 U/L (ref 10–40)
AV INDEX (PROSTH): 0.95
AV MEAN GRADIENT: 4 MMHG
AV PEAK GRADIENT: 6 MMHG
AV VALVE AREA BY VELOCITY RATIO: 3.04 CM²
AV VALVE AREA: 3.03 CM²
AV VELOCITY RATIO: 0.96
BACTERIA #/AREA URNS AUTO: ABNORMAL /HPF
BASOPHILS # BLD AUTO: 0.03 K/UL (ref 0–0.2)
BASOPHILS NFR BLD: 0.2 % (ref 0–1.9)
BILIRUB SERPL-MCNC: 0.4 MG/DL (ref 0.1–1)
BILIRUB UR QL STRIP: NEGATIVE
BSA FOR ECHO PROCEDURE: 1.72 M2
BUN SERPL-MCNC: 15 MG/DL (ref 8–23)
CALCIUM SERPL-MCNC: 9.8 MG/DL (ref 8.7–10.5)
CHLORIDE SERPL-SCNC: 109 MMOL/L (ref 95–110)
CLARITY UR REFRACT.AUTO: ABNORMAL
CO2 SERPL-SCNC: 21 MMOL/L (ref 23–29)
COLOR UR AUTO: YELLOW
CREAT SERPL-MCNC: 1 MG/DL (ref 0.5–1.4)
CV ECHO LV RWT: 0.39 CM
DIFFERENTIAL METHOD BLD: ABNORMAL
DOP CALC AO PEAK VEL: 1.18 M/S
DOP CALC AO VTI: 20.24 CM
DOP CALC LVOT AREA: 3.2 CM2
DOP CALC LVOT DIAMETER: 2.01 CM
DOP CALC LVOT PEAK VEL: 1.13 M/S
DOP CALC LVOT STROKE VOLUME: 61.27 CM3
DOP CALCLVOT PEAK VEL VTI: 19.32 CM
E WAVE DECELERATION TIME: 225.49 MSEC
E/A RATIO: 0.84
E/E' RATIO: 20.5 M/S
ECHO LV POSTERIOR WALL: 0.8 CM (ref 0.6–1.1)
EOSINOPHIL # BLD AUTO: 0 K/UL (ref 0–0.5)
EOSINOPHIL NFR BLD: 0 % (ref 0–8)
ERYTHROCYTE [DISTWIDTH] IN BLOOD BY AUTOMATED COUNT: 14.2 % (ref 11.5–14.5)
EST. GFR  (NO RACE VARIABLE): 58.4 ML/MIN/1.73 M^2
FRACTIONAL SHORTENING: 32 % (ref 28–44)
GLUCOSE SERPL-MCNC: 118 MG/DL (ref 70–110)
GLUCOSE UR QL STRIP: NEGATIVE
HCT VFR BLD AUTO: 35.4 % (ref 37–48.5)
HGB BLD-MCNC: 11.6 G/DL (ref 12–16)
HGB UR QL STRIP: ABNORMAL
IMM GRANULOCYTES # BLD AUTO: 0.08 K/UL (ref 0–0.04)
IMM GRANULOCYTES NFR BLD AUTO: 0.5 % (ref 0–0.5)
INTERVENTRICULAR SEPTUM: 0.78 CM (ref 0.6–1.1)
KETONES UR QL STRIP: NEGATIVE
LA MAJOR: 4.8 CM
LA MINOR: 4.73 CM
LA WIDTH: 3.36 CM
LEFT ATRIUM SIZE: 3.9 CM
LEFT ATRIUM VOLUME INDEX: 31.6 ML/M2
LEFT ATRIUM VOLUME: 53.07 CM3
LEFT INTERNAL DIMENSION IN SYSTOLE: 2.8 CM (ref 2.1–4)
LEFT VENTRICLE DIASTOLIC VOLUME INDEX: 44.39 ML/M2
LEFT VENTRICLE DIASTOLIC VOLUME: 74.57 ML
LEFT VENTRICLE MASS INDEX: 57 G/M2
LEFT VENTRICLE SYSTOLIC VOLUME INDEX: 17.5 ML/M2
LEFT VENTRICLE SYSTOLIC VOLUME: 29.45 ML
LEFT VENTRICULAR INTERNAL DIMENSION IN DIASTOLE: 4.11 CM (ref 3.5–6)
LEFT VENTRICULAR MASS: 96.11 G
LEUKOCYTE ESTERASE UR QL STRIP: ABNORMAL
LV LATERAL E/E' RATIO: 24.6 M/S
LV SEPTAL E/E' RATIO: 17.57 M/S
LYMPHOCYTES # BLD AUTO: 1 K/UL (ref 1–4.8)
LYMPHOCYTES NFR BLD: 6.3 % (ref 18–48)
MAGNESIUM SERPL-MCNC: 1.9 MG/DL (ref 1.6–2.6)
MCH RBC QN AUTO: 31.7 PG (ref 27–31)
MCHC RBC AUTO-ENTMCNC: 32.8 G/DL (ref 32–36)
MCV RBC AUTO: 97 FL (ref 82–98)
MICROSCOPIC COMMENT: ABNORMAL
MONOCYTES # BLD AUTO: 0.3 K/UL (ref 0.3–1)
MONOCYTES NFR BLD: 2.1 % (ref 4–15)
MV PEAK A VEL: 1.46 M/S
MV PEAK E VEL: 1.23 M/S
MV STENOSIS PRESSURE HALF TIME: 65.39 MS
MV VALVE AREA P 1/2 METHOD: 3.36 CM2
NEUTROPHILS # BLD AUTO: 14 K/UL (ref 1.8–7.7)
NEUTROPHILS NFR BLD: 90.9 % (ref 38–73)
NITRITE UR QL STRIP: NEGATIVE
NRBC BLD-RTO: 0 /100 WBC
OHS CV RV/LV RATIO: 0.7 CM
PH UR STRIP: 6 [PH] (ref 5–8)
PHOSPHATE SERPL-MCNC: 3.5 MG/DL (ref 2.7–4.5)
PLATELET # BLD AUTO: 218 K/UL (ref 150–450)
PMV BLD AUTO: 11.8 FL (ref 9.2–12.9)
POCT GLUCOSE: 112 MG/DL (ref 70–110)
POCT GLUCOSE: 122 MG/DL (ref 70–110)
POCT GLUCOSE: 122 MG/DL (ref 70–110)
POCT GLUCOSE: 132 MG/DL (ref 70–110)
POCT GLUCOSE: 95 MG/DL (ref 70–110)
POCT GLUCOSE: 96 MG/DL (ref 70–110)
POTASSIUM SERPL-SCNC: 4.4 MMOL/L (ref 3.5–5.1)
PROT SERPL-MCNC: 6.9 G/DL (ref 6–8.4)
PROT UR QL STRIP: NEGATIVE
RA MAJOR: 4.19 CM
RA WIDTH: 2.59 CM
RBC # BLD AUTO: 3.66 M/UL (ref 4–5.4)
RBC #/AREA URNS AUTO: 6 /HPF (ref 0–4)
RIGHT VENTRICULAR END-DIASTOLIC DIMENSION: 2.89 CM
SINUS: 2.94 CM
SODIUM SERPL-SCNC: 142 MMOL/L (ref 136–145)
SP GR UR STRIP: 1.01 (ref 1–1.03)
STJ: 2.92 CM
TDI LATERAL: 0.05 M/S
TDI SEPTAL: 0.07 M/S
TDI: 0.06 M/S
TRICUSPID ANNULAR PLANE SYSTOLIC EXCURSION: 2.51 CM
URN SPEC COLLECT METH UR: ABNORMAL
WBC # BLD AUTO: 15.36 K/UL (ref 3.9–12.7)
WBC #/AREA URNS AUTO: 30 /HPF (ref 0–5)
Z-SCORE OF LEFT VENTRICULAR DIMENSION IN END DIASTOLE: -1.31
Z-SCORE OF LEFT VENTRICULAR DIMENSION IN END SYSTOLE: -0.29

## 2024-04-23 PROCEDURE — 97535 SELF CARE MNGMENT TRAINING: CPT

## 2024-04-23 PROCEDURE — 63600175 PHARM REV CODE 636 W HCPCS: Performed by: NURSE PRACTITIONER

## 2024-04-23 PROCEDURE — 25000003 PHARM REV CODE 250: Performed by: NURSE PRACTITIONER

## 2024-04-23 PROCEDURE — 81001 URINALYSIS AUTO W/SCOPE: CPT

## 2024-04-23 PROCEDURE — 87186 SC STD MICRODIL/AGAR DIL: CPT

## 2024-04-23 PROCEDURE — 87088 URINE BACTERIA CULTURE: CPT

## 2024-04-23 PROCEDURE — 84100 ASSAY OF PHOSPHORUS: CPT

## 2024-04-23 PROCEDURE — 20600001 HC STEP DOWN PRIVATE ROOM

## 2024-04-23 PROCEDURE — 51798 US URINE CAPACITY MEASURE: CPT

## 2024-04-23 PROCEDURE — 99233 SBSQ HOSP IP/OBS HIGH 50: CPT | Mod: ,,, | Performed by: NURSE PRACTITIONER

## 2024-04-23 PROCEDURE — 83735 ASSAY OF MAGNESIUM: CPT

## 2024-04-23 PROCEDURE — 80053 COMPREHEN METABOLIC PANEL: CPT

## 2024-04-23 PROCEDURE — 97530 THERAPEUTIC ACTIVITIES: CPT

## 2024-04-23 PROCEDURE — 63600175 PHARM REV CODE 636 W HCPCS: Performed by: INTERNAL MEDICINE

## 2024-04-23 PROCEDURE — 97162 PT EVAL MOD COMPLEX 30 MIN: CPT

## 2024-04-23 PROCEDURE — 63600175 PHARM REV CODE 636 W HCPCS

## 2024-04-23 PROCEDURE — 85025 COMPLETE CBC W/AUTO DIFF WBC: CPT

## 2024-04-23 PROCEDURE — 87086 URINE CULTURE/COLONY COUNT: CPT

## 2024-04-23 PROCEDURE — 99233 SBSQ HOSP IP/OBS HIGH 50: CPT | Mod: GC,,, | Performed by: PSYCHIATRY & NEUROLOGY

## 2024-04-23 PROCEDURE — 94761 N-INVAS EAR/PLS OXIMETRY MLT: CPT

## 2024-04-23 PROCEDURE — 92610 EVALUATE SWALLOWING FUNCTION: CPT

## 2024-04-23 PROCEDURE — 87077 CULTURE AEROBIC IDENTIFY: CPT

## 2024-04-23 PROCEDURE — 97166 OT EVAL MOD COMPLEX 45 MIN: CPT

## 2024-04-23 PROCEDURE — 97110 THERAPEUTIC EXERCISES: CPT

## 2024-04-23 RX ORDER — NAPROXEN SODIUM 220 MG/1
81 TABLET, FILM COATED ORAL DAILY
Status: DISCONTINUED | OUTPATIENT
Start: 2024-04-24 | End: 2024-04-23

## 2024-04-23 RX ORDER — ACETAMINOPHEN 325 MG/1
650 TABLET ORAL EVERY 4 HOURS PRN
Status: DISCONTINUED | OUTPATIENT
Start: 2024-04-23 | End: 2024-04-23

## 2024-04-23 RX ORDER — CARVEDILOL 6.25 MG/1
6.25 TABLET ORAL 2 TIMES DAILY
Status: DISCONTINUED | OUTPATIENT
Start: 2024-04-23 | End: 2024-04-24

## 2024-04-23 RX ORDER — ATORVASTATIN CALCIUM 40 MG/1
80 TABLET, FILM COATED ORAL DAILY
Status: DISCONTINUED | OUTPATIENT
Start: 2024-04-23 | End: 2024-04-28 | Stop reason: HOSPADM

## 2024-04-23 RX ORDER — HYDRALAZINE HYDROCHLORIDE 20 MG/ML
10 INJECTION INTRAMUSCULAR; INTRAVENOUS EVERY 4 HOURS PRN
Status: DISCONTINUED | OUTPATIENT
Start: 2024-04-23 | End: 2024-04-27

## 2024-04-23 RX ORDER — TALC
6 POWDER (GRAM) TOPICAL NIGHTLY
Status: DISCONTINUED | OUTPATIENT
Start: 2024-04-23 | End: 2024-04-28 | Stop reason: HOSPADM

## 2024-04-23 RX ORDER — ATORVASTATIN CALCIUM 40 MG/1
80 TABLET, FILM COATED ORAL DAILY
Status: DISCONTINUED | OUTPATIENT
Start: 2024-04-24 | End: 2024-04-23

## 2024-04-23 RX ORDER — AMOXICILLIN 250 MG
1 CAPSULE ORAL 2 TIMES DAILY
Status: DISCONTINUED | OUTPATIENT
Start: 2024-04-23 | End: 2024-04-28 | Stop reason: HOSPADM

## 2024-04-23 RX ORDER — ACETAMINOPHEN 325 MG/1
650 TABLET ORAL EVERY 4 HOURS PRN
Status: DISCONTINUED | OUTPATIENT
Start: 2024-04-23 | End: 2024-04-28 | Stop reason: HOSPADM

## 2024-04-23 RX ORDER — MIDAZOLAM HYDROCHLORIDE 1 MG/ML
1 INJECTION, SOLUTION INTRAMUSCULAR; INTRAVENOUS EVERY 5 MIN PRN
Status: DISCONTINUED | OUTPATIENT
Start: 2024-04-23 | End: 2024-04-23

## 2024-04-23 RX ORDER — FENTANYL CITRATE 50 UG/ML
12.5 INJECTION, SOLUTION INTRAMUSCULAR; INTRAVENOUS ONCE
Status: COMPLETED | OUTPATIENT
Start: 2024-04-23 | End: 2024-04-23

## 2024-04-23 RX ORDER — POLYETHYLENE GLYCOL 3350 17 G/17G
17 POWDER, FOR SOLUTION ORAL DAILY
Status: DISCONTINUED | OUTPATIENT
Start: 2024-04-23 | End: 2024-04-23

## 2024-04-23 RX ORDER — AMOXICILLIN 250 MG
1 CAPSULE ORAL 2 TIMES DAILY
Status: DISCONTINUED | OUTPATIENT
Start: 2024-04-23 | End: 2024-04-23

## 2024-04-23 RX ORDER — MIDAZOLAM HYDROCHLORIDE 1 MG/ML
1 INJECTION, SOLUTION INTRAMUSCULAR; INTRAVENOUS ONCE
Status: COMPLETED | OUTPATIENT
Start: 2024-04-23 | End: 2024-04-23

## 2024-04-23 RX ORDER — LOSARTAN POTASSIUM 25 MG/1
25 TABLET ORAL DAILY
Status: DISCONTINUED | OUTPATIENT
Start: 2024-04-23 | End: 2024-04-24

## 2024-04-23 RX ORDER — ESCITALOPRAM OXALATE 20 MG/1
20 TABLET ORAL DAILY
Status: DISCONTINUED | OUTPATIENT
Start: 2024-04-23 | End: 2024-04-28 | Stop reason: HOSPADM

## 2024-04-23 RX ORDER — METOPROLOL TARTRATE 25 MG/1
25 TABLET, FILM COATED ORAL ONCE
Status: COMPLETED | OUTPATIENT
Start: 2024-04-23 | End: 2024-04-23

## 2024-04-23 RX ORDER — DIPHENHYDRAMINE HCL 25 MG
25 CAPSULE ORAL ONCE
Status: COMPLETED | OUTPATIENT
Start: 2024-04-24 | End: 2024-04-24

## 2024-04-23 RX ORDER — LEVOTHYROXINE SODIUM 88 UG/1
88 TABLET ORAL
Status: DISCONTINUED | OUTPATIENT
Start: 2024-04-23 | End: 2024-04-28 | Stop reason: HOSPADM

## 2024-04-23 RX ORDER — METOPROLOL TARTRATE 25 MG/1
25 TABLET, FILM COATED ORAL 2 TIMES DAILY
Status: DISCONTINUED | OUTPATIENT
Start: 2024-04-23 | End: 2024-04-23

## 2024-04-23 RX ORDER — POLYETHYLENE GLYCOL 3350 17 G/17G
17 POWDER, FOR SOLUTION ORAL NIGHTLY
Status: DISCONTINUED | OUTPATIENT
Start: 2024-04-23 | End: 2024-04-28 | Stop reason: HOSPADM

## 2024-04-23 RX ORDER — METOPROLOL TARTRATE 50 MG/1
50 TABLET ORAL 2 TIMES DAILY
Status: DISCONTINUED | OUTPATIENT
Start: 2024-04-23 | End: 2024-04-23

## 2024-04-23 RX ORDER — NAPROXEN SODIUM 220 MG/1
81 TABLET, FILM COATED ORAL DAILY
Status: DISCONTINUED | OUTPATIENT
Start: 2024-04-23 | End: 2024-04-28 | Stop reason: HOSPADM

## 2024-04-23 RX ADMIN — HEPARIN SODIUM 5000 UNITS: 5000 INJECTION INTRAVENOUS; SUBCUTANEOUS at 06:04

## 2024-04-23 RX ADMIN — LOSARTAN POTASSIUM 25 MG: 25 TABLET, FILM COATED ORAL at 02:04

## 2024-04-23 RX ADMIN — ATORVASTATIN CALCIUM 80 MG: 40 TABLET, FILM COATED ORAL at 10:04

## 2024-04-23 RX ADMIN — FENTANYL CITRATE 12.5 MCG: 50 INJECTION INTRAMUSCULAR; INTRAVENOUS at 01:04

## 2024-04-23 RX ADMIN — MIDAZOLAM 1 MG: 1 INJECTION INTRAMUSCULAR; INTRAVENOUS at 02:04

## 2024-04-23 RX ADMIN — ESCITALOPRAM OXALATE 20 MG: 20 TABLET, FILM COATED ORAL at 10:04

## 2024-04-23 RX ADMIN — HYDRALAZINE HYDROCHLORIDE 10 MG: 20 INJECTION, SOLUTION INTRAMUSCULAR; INTRAVENOUS at 11:04

## 2024-04-23 RX ADMIN — LEVOTHYROXINE SODIUM 88 MCG: 88 TABLET ORAL at 10:04

## 2024-04-23 RX ADMIN — TICAGRELOR 90 MG: 90 TABLET ORAL at 08:04

## 2024-04-23 RX ADMIN — HEPARIN SODIUM 5000 UNITS: 5000 INJECTION INTRAVENOUS; SUBCUTANEOUS at 09:04

## 2024-04-23 RX ADMIN — Medication 6 MG: at 08:04

## 2024-04-23 RX ADMIN — HYDRALAZINE HYDROCHLORIDE 10 MG: 20 INJECTION, SOLUTION INTRAMUSCULAR; INTRAVENOUS at 05:04

## 2024-04-23 RX ADMIN — CARVEDILOL 6.25 MG: 6.25 TABLET, FILM COATED ORAL at 08:04

## 2024-04-23 RX ADMIN — METOPROLOL TARTRATE 25 MG: 25 TABLET, FILM COATED ORAL at 12:04

## 2024-04-23 RX ADMIN — HYDRALAZINE HYDROCHLORIDE 10 MG: 20 INJECTION, SOLUTION INTRAMUSCULAR; INTRAVENOUS at 12:04

## 2024-04-23 RX ADMIN — HEPARIN SODIUM 5000 UNITS: 5000 INJECTION INTRAVENOUS; SUBCUTANEOUS at 02:04

## 2024-04-23 RX ADMIN — ACETAMINOPHEN 650 MG: 325 TABLET ORAL at 12:04

## 2024-04-23 RX ADMIN — CARVEDILOL 6.25 MG: 6.25 TABLET, FILM COATED ORAL at 02:04

## 2024-04-23 RX ADMIN — DOCUSATE SODIUM AND SENNOSIDES 1 TABLET: 8.6; 5 TABLET, FILM COATED ORAL at 12:04

## 2024-04-23 RX ADMIN — TICAGRELOR 90 MG: 90 TABLET ORAL at 10:04

## 2024-04-23 RX ADMIN — METOPROLOL TARTRATE 25 MG: 25 TABLET, FILM COATED ORAL at 10:04

## 2024-04-23 RX ADMIN — ASPIRIN 81 MG CHEWABLE TABLET 81 MG: 81 TABLET CHEWABLE at 10:04

## 2024-04-23 NOTE — PLAN OF CARE
"[facility] Care Plan    POC reviewed with Denise VIVAS Cousins and family at 1400. Pt unable to verbalize understanding. Questions and concerns addressed. No acute events today. Pt progressing toward goals. Assessments ongoing. See below and flowsheets for full assessment and VS info.   - crush meds in applesauce  - PRN hydralazine given x 2              Is this a stroke patient? yes- Stroke booklet reviewed with patient and family, risk factors identified for patient and stroke booklet remains at bedside for ongoing education.     Care individualization: BP watch    Neuro:  Inocencia Coma Scale  Best Eye Response: 4-->(E4) spontaneous  Best Motor Response: 6-->(M6) obeys commands  Best Verbal Response: 5-->(V5) oriented  Chidester Coma Scale Score: 15  Assessment Qualifiers: patient intubated  Pupil PERRLA: yes     24 hr Temp:  [98.1 °F (36.7 °C)-98.8 °F (37.1 °C)]     CV:   Rhythm: normal sinus rhythm  BP goals:   SBP < 160  MAP > 65    Resp:      Vent Mode: Spont  Set Rate: 12 BPM  Oxygen Concentration (%): 40  Vt Set: 420 mL  PEEP/CPAP: 5 cmH20  Pressure Support: 5 cmH20    Plan: N/A    GI/:     Diet/Nutrition Received: NPO  Last Bowel Movement: 04/23/24  Voiding Characteristics: external catheter    Intake/Output Summary (Last 24 hours) at 4/23/2024 1822  Last data filed at 4/23/2024 1821  Gross per 24 hour   Intake --   Output 1350 ml   Net -1350 ml     Unmeasured Output  Urine Occurrence: 1  Stool Occurrence: 1    Labs/Accuchecks:  Recent Labs   Lab 04/23/24  0133   WBC 15.36*   RBC 3.66*   HGB 11.6*   HCT 35.4*         Recent Labs   Lab 04/23/24  0133      K 4.4   CO2 21*      BUN 15   CREATININE 1.0   ALKPHOS 73   ALT 10   AST 15   BILITOT 0.4      Recent Labs   Lab 04/22/24  0402   INR 1.2    No results for input(s): "CPK", "CPKMB", "TROPONINI", "MB" in the last 168 hours.    Electrolytes: N/A - electrolytes WDL  Accuchecks: Q6H    Gtts:  Current Facility-Administered Medications   Medication Dose " Complete Route Frequency Last Rate Last Admin    sodium chloride 0.9%   Intravenous Continuous 75 mL/hr at 24 New Bag at 24       LDA/Wounds:      Nurses Note -- 4 Eyes      Is there altered skin present? no       Prevention Measures Documented    Second RN/Staff Member:  SUSANNAH Verma      Restraints:   Restraint Order  Length of Order: Order good for next 24 hours or when removed.  Date that the current order will : 24  Time that the current order will : 747  Order Upon Application: Yes

## 2024-04-23 NOTE — SUBJECTIVE & OBJECTIVE
Past Medical History:   Diagnosis Date    Acid reflux     Graves disease     diffuse Toxic Goiter    Hypertension     IBS (irritable bowel syndrome)     Lichen sclerosus     Migraine     Stroke      Past Surgical History:   Procedure Laterality Date    CEREBRAL ANGIOGRAM N/A 4/22/2024    Procedure: ANGIOGRAM-CEREBRAL; stroke class A;  Surgeon: Sharon Welch;  Location: [facility];  Service: Anesthesiology;  Laterality: N/A;    EYE SURGERY      HYSTERECTOMY      CINDI for ?? cervical cancer, had normal paps    LEFT HEART CATHETERIZATION N/A 10/20/2020    Procedure: Left heart cath;  Surgeon: Bony Schultz MD;  Location: [facility] CATH LAB/EP;  Service: Cardiology;  Laterality: N/A;    REVERSE TOTAL SHOULDER ARTHROPLASTY Right 8/19/2022    Procedure: ARTHROPLASTY, SHOULDER, TOTAL, REVERSE;  Surgeon: Bradley Finnegan Jr., MD;  Location: [facility] OR;  Service: Orthopedics;  Laterality: Right;  SONIA linda CONFIRMED/8/18/Shaista Finnegan- hemosorb AT       No current facility-administered medications on file prior to encounter.     Current Outpatient Medications on File Prior to Encounter   Medication Sig Dispense Refill    aspirin (ECOTRIN) 81 MG EC tablet once a day      atorvastatin (LIPITOR) 40 MG tablet Take 1 tablet (40 mg total) by mouth once daily. 90 tablet 0    butalbital-acetaminophen-caffeine -40 mg (FIORICET, ESGIC) -40 mg per tablet Take 1 tablet by mouth nightly as needed for Headaches. 30 tablet 5    clobetasol 0.05% (TEMOVATE) 0.05 % Oint APPLY TOPICALLY TWICE A DAY 45 g 1    diclofenac sodium (VOLTAREN) 1 % Gel Apply 2 g topically 3 (three) times daily. 100 g 3    ergocalciferol (VITAMIN D2) 50,000 unit Cap Take 1 capsule (50,000 Units total) by mouth once a week. 12 capsule 3    EScitalopram oxalate (LEXAPRO) 20 MG tablet Take 1 tablet (20 mg total) by mouth once daily. 90 tablet 3    levothyroxine (SYNTHROID) 88 MCG tablet Take 1 tablet (88 mcg total) by mouth before breakfast.  90 tablet 3    losartan (COZAAR) 50 MG tablet Take 1 tablet (50 mg total) by mouth once daily. 90 tablet 3    metoprolol tartrate (LOPRESSOR) 50 MG tablet Take 1 tablet (50 mg total) by mouth 2 (two) times daily. 180 tablet 3    naloxone (NARCAN) 4 mg/actuation Spry 4mg by nasal route as needed for opioid overdose; may repeat every 2-3 minutes in alternating nostrils until medical help arrives. Call 911 (Patient not taking: Reported on 4/15/2024) 1 each 11    nortriptyline (PAMELOR) 10 MG capsule Take 1 capsule (10 mg total) by mouth once daily. 90 capsule 3    omeprazole (PRILOSEC) 40 MG capsule Take 1 capsule (40 mg total) by mouth once daily. 90 capsule 3    oxybutynin (DITROPAN-XL) 10 MG 24 hr tablet Take 1 tablet (10 mg total) by mouth once daily. 90 tablet 3    pregabalin (LYRICA) 75 MG capsule TAKE 1 CAPSULE BY MOUTH TWICE DAILY FOR NEUROPATHIC PAIN      promethazine (PHENERGAN) 12.5 MG Tab Take 1 tablet (12.5 mg total) by mouth every 6 (six) hours as needed (nausea and or vomiting). 10 tablet 0    traMADoL (ULTRAM) 50 mg tablet Take 50 mg by mouth every 6 (six) hours as needed for Pain.        Allergies: Phenobarbital, Penicillins, Phenobarbital sodium, Propylthiouracil, Thyroid, Topamax [topiramate], Cefaclor, Clopidogrel, Methimazole, Methimazole (bulk), Penicillin, and Sulfa (sulfonamide antibiotics)  Family History   Problem Relation Name Age of Onset    Kidney failure Father      Hypertension Father      Stomach cancer Mother      Breast cancer Neg Hx       Social History     Tobacco Use    Smoking status: Never     Passive exposure: Never    Smokeless tobacco: Never   Substance Use Topics    Alcohol use: Yes    Drug use: No     Review of Systems   Constitutional:  Negative for fever.   HENT:  Negative for ear pain and facial swelling.    Eyes:  Negative for discharge and itching.   Respiratory:  Negative for cough.    Cardiovascular:  Negative for chest pain.   Gastrointestinal:  Negative for  constipation.   Endocrine: Negative for cold intolerance.   Genitourinary:  Negative for dysuria.   Musculoskeletal:  Negative for back pain.   Allergic/Immunologic: Negative for immunocompromised state.   Neurological:  Positive for facial asymmetry, speech difficulty and weakness. Negative for dizziness, seizures and headaches.   Psychiatric/Behavioral:  The patient is nervous/anxious.      Objective:     Vitals:    Temp: 98.1 °F (36.7 °C)  Pulse: 85  Rhythm: normal sinus rhythm  BP: (!) 198/81  MAP (mmHg): 116  Resp: 20  SpO2: 100 %    Temp  Min: 97.8 °F (36.6 °C)  Max: 98.8 °F (37.1 °C)  Pulse  Min: 65  Max: 105  BP  Min: 105/51  Max: 198/81  MAP (mmHg)  Min: 73  Max: 117  Resp  Min: 14  Max: 36  SpO2  Min: 95 %  Max: 100 %  Oxygen Concentration (%)  Min: 40  Max: 40    04/22 0701 - 04/23 0700  In: 49.6 [I.V.:49.6]  Out: 1100 [Urine:1100]   Unmeasured Output  Urine Occurrence: 1  Stool Occurrence: 1        Physical Exam  Constitutional:       Appearance: She is well-developed.   HENT:      Head: Normocephalic and atraumatic.   Cardiovascular:      Rate and Rhythm: Normal rate and regular rhythm.   Pulmonary:      Effort: Pulmonary effort is normal.   Skin:     General: Skin is warm and dry.   Neurological:      Mental Status: She is alert.      Comments:   [id number]  PERRL. Right gaze preference  Left facial droop  Expressive aphasia   Follows commands in all extremities  RUE 4/5  LUE 4/5  RLE 4/5  LLE 4/5           Today I personally reviewed pertinent medications, lines/drains/airways, imaging, cardiology results, laboratory results, notably: CMP, CBC, PT/INR, TSH, Lipid panel, Mag, EKG, CTA.

## 2024-04-23 NOTE — HOSPITAL COURSE
4/23/2024 Transfer to stroke service, changed beta blocker to coreg 6.25 mg bid, added home losartan 25 mg daily, CTH stable on 4/22 4/24/2024 Transfer to Stroke, electrolytes replaced

## 2024-04-23 NOTE — PROGRESS NOTES
[facility]  Vascular Neurology  Comprehensive Stroke Center  Progress Note    Assessment/Plan:     * Embolic stroke involving right middle cerebral artery  Denise Berrios is a 74 y.o. female with a significant medical history of recent L MCA stroke (4/10/2024), HTN, HLD, Graves' disease,IBS, migraine HAs  who presents to the hospital for evaluation of acute onset LSW, neglect, and aphasia.     -R M1 occlusion noted on CTA. The patient will be taken to IR for EVT. She will be admitted to NCC post procedure.    Antithrombotics for secondary stroke prevention: Antiplatelets: Aspirin: 81 mg daily; Brilinta     Statins for secondary stroke prevention and hyperlipidemia, if present:   Statins: Atorvastatin- 40 mg daily    Aggressive risk factor modification: HTN, HLD, AAD, Graves' Disease     Rehab efforts: The patient has been evaluated by a stroke team provider and the therapy needs have been fully considered based off the presenting complaints and exam findings. The following therapy evaluations are needed: PT evaluate and treat, OT evaluate and treat, SLP evaluate and treat    Diagnostics ordered/pending: MRI head without contrast to assess brain parenchyma.     Would recommend coordinating with cardiology to have loop recorder placement scheduled.     VTE prophylaxis: Heparin 5000 units SQ every 8 hours  Mechanical prophylaxis: Place SCDs    BP parameters: Infarct: No intervention, SBP <220, patient may need higher pressures as her exam seems to be pressure dependent at this time. SBP> 160 pending IR. Avoid hypoperfusion.        Flaccid hemiplegia affecting left nondominant side  Aphasia due to acute stroke   -Stroke symptoms. Echo without thrombosis or other structural abnormality.     Essential hypertension  -Stroke risk factor.  SBP< 220.  -At this time the patient's exam seems to be pressure dependant.  -HOB flat, IVFs  -Keep SBP>160, avoid hypotension  -Re-evaluate plan after IR procedure      Dyslipidemia  -Stroke risk factor. Last LDL 85.2 on 4/10/2024.  -Continue atorvastatin 40 mg daily.    Other specified hypothyroidism  -TSH 4.129  -Continue home synthroid         74 year old female presented for R MCA embolic stroke status post thrombectomy. Extubated on 4/22, pending further work-up.    STROKE DOCUMENTATION   Acute Stroke Times   Last Known Normal Date: 04/21/24  Last Known Normal Time: 2000  Unknown Symptom Onset Date: Unknown Date  Unknown Symptom Onset Time: Unknown Time  Stroke Team Called Date: 04/22/24  Stroke Team Called Time: 0309  Stroke Team Arrival Date: 04/22/24  Stroke Team Arrival Time: 0314  CT Interpretation Time: 0319  Thrombolytic Therapy Recommended: No  CTA Interpretation Time: 0330 (delay due to issues w/IV and CT injector.)  Thrombectomy Recommended: Yes  Decision to Treat Time for IR: 0351    NIH Scale:  1a. Level of Consciousness: 0-->Alert, keenly responsive  1b. LOC Questions: 2-->Answers neither question correctly  1c. LOC Commands: 0-->Performs both tasks correctly  2. Best Gaze: 0-->Normal  3. Visual: 0-->No visual loss  4. Facial Palsy: 2-->Partial paralysis (total or near-total paralysis of lower face)  5a. Motor Arm, Left: 0-->No drift, limb holds 90 (or 45) degrees for full 10 secs  5b. Motor Arm, Right: 0-->No drift, limb holds 90 (or 45) degrees for full 10 secs  6a. Motor Leg, Left: 0-->No drift, leg holds 30 degree position for full 5 secs  6b. Motor Leg, Right: 0-->No drift, leg holds 30 degree position for full 5 secs  7. Limb Ataxia: 0-->Absent  8. Sensory: 0-->Normal, no sensory loss  9. Best Language: 2-->Severe aphasia, all communication is through fragmentary expression, great need for inference, questioning, and guessing by the listener. Range of information that can be exchanged is limited, listener carries burden of. . . (see row details)  10. Dysarthria: 2-->Severe dysarthria, patients speech is so slurred as to be unintelligible in the absence  of or out of proportion to any dysphasia, or is mute/anarthric  11. Extinction and Inattention (formerly Neglect): 0-->No abnormality  Total (NIH Stroke Scale): 8       Modified Nicola Score: 2  [place] Coma Scale:    ABCD2 Score:    [id number] Score:   HAS -BLED Score:   ICH Score:   Hunt & Velazquez Classification:      Hemorrhagic change of an Ischemic Stroke: Does this patient have an ischemic stroke with hemorrhagic changes? No     Neurologic Chief Complaint: Weakness, R MCA stroke    Subjective:     Interval History: Patient is seen for follow-up neurological assessment and treatment recommendations: Patient extubated yesterday, has difficulty speaking. Otherwise she is stable. Pending PT/OT and speech eval . Has a new leukocytosis of 15.36.      HPI, Past Medical, Family, and Social History remains the same as documented in the initial encounter.     Review of Systems   Gastrointestinal:  Positive for constipation.   Neurological:         Dysarthria   Psychiatric/Behavioral:  Negative for agitation and confusion.      Scheduled Meds:  Current Facility-Administered Medications   Medication Dose Route Frequency    [START ON 4/24/2024] aspirin  81 mg Oral Daily    [START ON 4/24/2024] atorvastatin  80 mg Oral Daily    EScitalopram oxalate  20 mg Oral Daily    heparin (porcine)  5,000 Units Subcutaneous Q8H    levothyroxine  88 mcg Oral Before breakfast    metoprolol tartrate  25 mg Oral BID    senna-docusate 8.6-50 mg  1 tablet Oral BID    ticagrelor  90 mg Oral BID     Continuous Infusions:  Current Facility-Administered Medications   Medication Dose Route Frequency Last Rate Last Admin    sodium chloride 0.9%   Intravenous Continuous 75 mL/hr at 04/22/24 0428 New Bag at 04/22/24 0428     PRN Meds:  Current Facility-Administered Medications:     acetaminophen, 650 mg, Per NG tube, Q6H PRN    albuterol-ipratropium, 3 mL, Nebulization, Q6H PRN    bisacodyL, 10 mg, Rectal, Daily PRN    calcium gluconate IVPB, 1 g,  Intravenous, PRN    calcium gluconate IVPB, 2 g, Intravenous, PRN    calcium gluconate IVPB, 3 g, Intravenous, PRN    magnesium sulfate IVPB, 2 g, Intravenous, PRN    magnesium sulfate IVPB, 4 g, Intravenous, PRN    ondansetron, 4 mg, Intravenous, Q8H PRN    potassium chloride, 40 mEq, Intravenous, PRN **AND** potassium chloride, 60 mEq, Intravenous, PRN **AND** potassium chloride, 80 mEq, Intravenous, PRN    racepinephrine, 0.5 mL, Nebulization, Q4H PRN    sodium chloride 0.9%, 10 mL, Intravenous, PRN    sodium chloride 0.9%, 10 mL, Intravenous, PRN    Objective:     Vital Signs (Most Recent):  Temp: 98.3 °F (36.8 °C) (04/23/24 0701)  Pulse: 99 (04/23/24 1001)  Resp: (!) 34 (04/23/24 1001)  BP: (!) 141/65 (04/23/24 1001)  SpO2: 96 % (04/23/24 1001)  BP Location: Left arm    Vital Signs Range (Last 24H):  Temp:  [97.8 °F (36.6 °C)-98.8 °F (37.1 °C)]   Pulse:  []   Resp:  [14-36]   BP: (105-153)/(51-84)   SpO2:  [95 %-100 %]   BP Location: Left arm       Physical Exam  HENT:      Head: Normocephalic.      Nose: Nose normal.   Cardiovascular:      Rate and Rhythm: Normal rate.   Pulmonary:      Effort: Pulmonary effort is normal.   Neurological:      Mental Status: She is alert.              Neurological Exam:   LOC: alert  Attention Span: Good   Language: Expressive aphasia  Articulation: Dysarthria  Orientation: Person, Place, Time , Not oriented to time  Visual Fields: Full  Pupils (CN II, III): PERRL  Motor: Arm left  Normal 5/5  Leg left  Normal 5/5  Arm right  Normal 5/5  Leg right Normal 5/5  Sensation: Intact to light touch, temperature and vibration  Tone: Normal tone throughout    Laboratory:  CMP:   Recent Labs   Lab 04/23/24  0133   CALCIUM 9.8   ALBUMIN 3.0*   PROT 6.9      K 4.4   CO2 21*      BUN 15   CREATININE 1.0   ALKPHOS 73   ALT 10   AST 15   BILITOT 0.4     CBC:   Recent Labs   Lab 04/23/24  0133   WBC 15.36*   RBC 3.66*   HGB 11.6*   HCT 35.4*      MCV 97   MCH 31.7*    [facility] 32.8       Diagnostic Results     Brain Imaging     EXAMINATION:  MRI BRAIN WITHOUT CONTRAST     CLINICAL HISTORY:  Stroke, follow up;     TECHNIQUE:  Multiplanar multisequence MR imaging of the brain was performed without contrast.     COMPARISON:  MRI 04/10/2024     FINDINGS:  On diffusion-weighted imaging there is a 5 mm small focus of restricted diffusion within the right putamen consistent with a recent small infarct.  Subtle restricted diffusion within the insular cortex and frontal operculum may reflect an element of ischemic injury perhaps for continued follow-up..     Evaluation of the remainder of the brain demonstrates expected evolution of the deep white matter infarct on the left.  Potentially subacute ischemic changes within the right temporal lobe are stable.     No intracranial hemorrhage midline shift or herniation.     Chronic bilateral thalamic and bilateral occipital infarcts are again identified.     Normal arterial flow voids are preserved at the skull base.     The visualized sinuses and mastoid air cells are clear     Impression:     5 mm acute infarct right basal ganglia.  Subtle cortical restricted diffusion right insular cortex and frontal operculum consistent with ischemic injury unless there is clinical suspicion of seizure activity.  This is potentially reversible, perhaps for continued follow-up.     Expected evolution of recent left deep white matter infarct and perhaps subacute right temporal lobe infarct similar in appearance from the prior.     No evidence of intracranial hemorrhage or mass effect.    EXAMINATION:  CTA STROKE MULTI-PHASE     CLINICAL HISTORY:  Neuro deficit, acute, stroke suspected;     TECHNIQUE:  Axial CT images obtained throughout the region of the head before the administration of intravenous contrast.     CT angiogram was performed through the cervical and intracranial vasculature during the IV bolus administration of 75mL of Omnipaque 350.  Two  additional phases through the intracranial vasculature via multiphase technique.     Multiplanar MPR and MIP reformats were performed.     CT source data was analyzed using artificial intelligence software for detection of a large vessel occlusion (LVO) or hemorrhage in order to enable computer assisted triage notification and aid clinical stroke decision making.     COMPARISON:  MRI brain 04/10/2024     CTA stroke multiphase 04/10/2024     Noncontrast head CT 04/04/2024     FINDINGS:  Generalized cerebral volume loss.  Ventricles stable in size without evidence of hydrocephalus.     Suspected small area of hypoattenuation/loss of gray-white differentiation in the temporal lobe (series 400, image 89).  Stable areas of encephalomalacia in right occipital lobe and at the left parieto-occipital junction, likely remote infarcts.  Small remote infarcts in the right parietal lobe, bilateral thalami, and bilateral cerebellar hemispheres.  Evolving area of hypoattenuation in the left corona radiata related to known recent infarct.     No new parenchymal mass or hemorrhage.     No extra-axial blood or fluid collections.     No acute displaced calvarial fracture.  Mastoid air cells and paranasal sinuses are essentially clear.  Bilateral pseudophakia.        CTA:     Left-sided aortic arch with common origin of the brachiocephalic and left common carotid arteries.  Mild calcific atherosclerosis.     The common and internal carotid arteries are normal in course and caliber.  No calcified plaque or significant stenosis at either carotid bifurcation.  Mild scattered calcified plaque in the intracranial bilateral ICAs..     The vertebral origins are patent. The cervical vertebral arteries are normal in course and caliber. Vertebrobasilar system is within normal limits without focal abnormality.     Occlusion M2 segment right MCA (series 5, image 375).  Relatively preserved collaterals over the bilateral cerebral hemispheres.   Narrowing at the P2/P3 junction of the right PCA (series 5, image 626).  Chronic occlusion left PCA (series 5, image 625).  The anterior and left middle cerebral arteries are within normal limits, without evidence of significant stenosis, focal occlusion, or intracranial aneurysm formation.     The visualized major dural venous sinuses are patent.     Miscellaneous: Mosaic attenuation pattern in the visualized upper lungs, a nonspecific finding which can be seen with edema, small airways disease, or related to breath hold, etc..  Few scattered pulmonary opacities measuring up to 1.1 cm similar to prior (series 5, image 20).  Left thyroid calcification.  Postoperative change right reverse total shoulder arthroplasty.  Parotid and submandibular glands unremarkable, except for a 3 mm calcification/calculus in the left submandibular gland.  4.5 mm calcification in the region of the left false vocal cord.     Impression:     Occlusion M2 segment right MCA.  Suspected early parenchymal change a cerebral infarction in the right temporal lobe.     Chronic occlusion left PCA.     Evolving area of hypoattenuation in the left corona radiata related to known recent infarct.  Additional multifocal remote infarcts as detailed in the body of the report.     Narrowing of the right PCA similar to prior.     Additional findings as detailed in the body of the report.     This report was flagged in Epic as abnormal.        Vessel Imaging   EXAMINATION:  CTA STROKE MULTI-PHASE     CLINICAL HISTORY:  Neuro deficit, acute, stroke suspected;     TECHNIQUE:  Axial CT images obtained throughout the region of the head before the administration of intravenous contrast.     CT angiogram was performed through the cervical and intracranial vasculature during the IV bolus administration of 75mL of Omnipaque 350.  Two additional phases through the intracranial vasculature via multiphase technique.     Multiplanar MPR and MIP reformats were  performed.     CT source data was analyzed using artificial intelligence software for detection of a large vessel occlusion (LVO) or hemorrhage in order to enable computer assisted triage notification and aid clinical stroke decision making.     COMPARISON:  MRI brain 04/10/2024     CTA stroke multiphase 04/10/2024     Noncontrast head CT 04/04/2024     FINDINGS:  Generalized cerebral volume loss.  Ventricles stable in size without evidence of hydrocephalus.     Suspected small area of hypoattenuation/loss of gray-white differentiation in the temporal lobe (series 400, image 89).  Stable areas of encephalomalacia in right occipital lobe and at the left parieto-occipital junction, likely remote infarcts.  Small remote infarcts in the right parietal lobe, bilateral thalami, and bilateral cerebellar hemispheres.  Evolving area of hypoattenuation in the left corona radiata related to known recent infarct.     No new parenchymal mass or hemorrhage.     No extra-axial blood or fluid collections.     No acute displaced calvarial fracture.  Mastoid air cells and paranasal sinuses are essentially clear.  Bilateral pseudophakia.        CTA:     Left-sided aortic arch with common origin of the brachiocephalic and left common carotid arteries.  Mild calcific atherosclerosis.     The common and internal carotid arteries are normal in course and caliber.  No calcified plaque or significant stenosis at either carotid bifurcation.  Mild scattered calcified plaque in the intracranial bilateral ICAs..     The vertebral origins are patent. The cervical vertebral arteries are normal in course and caliber. Vertebrobasilar system is within normal limits without focal abnormality.     Occlusion M2 segment right MCA (series 5, image 375).  Relatively preserved collaterals over the bilateral cerebral hemispheres.  Narrowing at the P2/P3 junction of the right PCA (series 5, image 626).  Chronic occlusion left PCA (series 5, image 625).  The  anterior and left middle cerebral arteries are within normal limits, without evidence of significant stenosis, focal occlusion, or intracranial aneurysm formation.     The visualized major dural venous sinuses are patent.     Miscellaneous: Mosaic attenuation pattern in the visualized upper lungs, a nonspecific finding which can be seen with edema, small airways disease, or related to breath hold, etc..  Few scattered pulmonary opacities measuring up to 1.1 cm similar to prior (series 5, image 20).  Left thyroid calcification.  Postoperative change right reverse total shoulder arthroplasty.  Parotid and submandibular glands unremarkable, except for a 3 mm calcification/calculus in the left submandibular gland.  4.5 mm calcification in the region of the left false vocal cord.     Impression:     Occlusion M2 segment right MCA.  Suspected early parenchymal change a cerebral infarction in the right temporal lobe.     Chronic occlusion left PCA.     Evolving area of hypoattenuation in the left corona radiata related to known recent infarct.  Additional multifocal remote infarcts as detailed in the body of the report.     Narrowing of the right PCA similar to prior.     Additional findings as detailed in the body of the report.     This report was flagged in Epic as abnormal.    Cardiac Imaging   Echo 4/23/24    Left Ventricle: The left ventricle is normal in size. Normal wall thickness. There is normal systolic function with a visually estimated ejection fraction of 60 - 65%. There is indeterminate diastolic function.    Right Ventricle: Normal right ventricular cavity size. Wall thickness is normal. Right ventricle wall motion  is normal. Systolic function is normal.    IVC/SVC: Low central venous pressure.    Tammy Min DO  [facility]  Department of Vascular Neurology   [facility]

## 2024-04-23 NOTE — ASSESSMENT & PLAN NOTE
74 year old female with PMHx of HTN, CAD, left sided CVA who presents from home with  after a fall from bed. LKN approximately 830pm. At 0200,  heard a noise and found patient had rolled out of bed. Patient was found to have confusion, L sided weakness, and L sided facial droop.   Antithrombotics for secondary stroke prevention: Antiplatelets: Aspirin: 81 mg daily  Statins for secondary stroke prevention and hyperlipidemia, if present:   Statins: Atorvastatin- 80 mg daily  Aggressive risk factor modification: HTN, CAD  Rehab efforts: The patient has been evaluated by a stroke team provider and the therapy needs have been fully considered based off the presenting complaints and exam findings. The following therapy evaluations are needed: PT evaluate and treat, OT evaluate and treat, SLP evaluate and treat, PM&R evaluate for appropriate placement  Diagnostics ordered/pending: HgbA1C to assess blood glucose levels, Lipid Profile to assess cholesterol levels, MRI head without contrast to assess brain parenchyma, TTE to assess cardiac function/status , TSH to assess thyroid function  VTE prophylaxis: Heparin 5000 units SQ every 8 hours  BP parameters: Infarct: SBP goal 160-220, concern for pressure dependent exam, HOB flat, NS bolus, NS gtt.   - Admit to [facility]  - Vital signs and neuro checks q1h  - MRI brain 12N  - Limited echo pending  - Stroke risk workup  - IR consulted, to go for thrombectomy   - Vascular Neurology consult  - 4/23/2024 Transfer to stroke service, changed beta blocker to coreg 6.25 mg bid, added home losartan 25 mg daily, dced lopressor, CTH stable on 4/22, melatonin 6 mg nightly

## 2024-04-23 NOTE — PT/OT/SLP EVAL
Speech Language Pathology Evaluation  Bedside Swallow    Patient Name:  Denise Berrios   MRN:  [id number]  Admitting Diagnosis: Embolic stroke involving right middle cerebral artery    Recommendations:                 General Recommendations:  Dysphagia therapy, Speech language evaluation, and Cognitive-linguistic evaluation  Diet recommendations:  Puree Diet - IDDSI Level 4, Mildly thick/Nectar thick liquids - IDDSI Level 2   Aspiration Precautions: 1 bite/sip at a time, Alternating bites/sips, Assistance with meals and Assistance with thickening liquids, Avoid talking while eating, Check for pocketing/oral residue, Eliminate distractions, Feed only when awake/alert, Frequent oral care, HOB to 90 degrees, Meds crushed in puree, Monitor for s/s of aspiration, Small bites/sips, and Strict aspiration precautions   General Precautions: Standard, aspiration, fall, pureed diet, nectar thick  Communication strategies:  provide increased time to answer and go to room if call light pushed    Assessment:     Denise Berrios is a 74 y.o. female with an SLP diagnosis of Dysphagia.  She also presents with significant dysarthria, but formal speech/language/cognitive evaluation not yet initiated.     History:     Past Medical History:   Diagnosis Date    Acid reflux     Graves disease     diffuse Toxic Goiter    Hypertension     IBS (irritable bowel syndrome)     Lichen sclerosus     Migraine     Stroke        Past Surgical History:   Procedure Laterality Date    CEREBRAL ANGIOGRAM N/A 4/22/2024    Procedure: ANGIOGRAM-CEREBRAL; stroke class A;  Surgeon: Sharon Welch;  Location: [facility];  Service: Anesthesiology;  Laterality: N/A;    EYE SURGERY      HYSTERECTOMY      CINDI for ?? cervical cancer, had normal paps    LEFT HEART CATHETERIZATION N/A 10/20/2020    Procedure: Left heart cath;  Surgeon: Bony Schultz MD;  Location: [facility] CATH LAB/EP;  Service: Cardiology;  Laterality: N/A;    REVERSE TOTAL SHOULDER ARTHROPLASTY  "Right 8/19/2022    Procedure: ARTHROPLASTY, SHOULDER, TOTAL, REVERSE;  Surgeon: Bradley Finengan Jr., MD;  Location: [facility];  Service: Orthopedics;  Laterality: Right;  SONIA linda CONFIRMED/8/18/Shaistalaxmi Pena Kain- hemosorb AT      Chief Complaint: Embolic stroke involving right middle cerebral artery     History of Present Illness: 74 year old female with PMHx of HTN, CAD, left sided CVA who presents from home with  after a fall from bed. LKN approximately 830pm. At 0200,  heard a noise and found patient had rolled out of bed. Patient was found to have confusion, L sided weakness, and L sided facial droop. Brought to ER, CTA shows right M2 occlusion. Evaluated by IR. Taken for thrombectomy. Will admit to [facility] post thrombectomy.     Of note, pt was found to have a L corona  radiata CVA on 4/11 and was discharged home with plavix. Plavix was stopped two Fridays ago due to hives. Pending holter monitor.    Prior Intubation HX:  4/22    Modified Barium Swallow: none on file    Chest X-Rays: 4/22/24: FINDINGS:  Cardiac size is normal endotracheal tube is in place of its tip proximally 25 mm above the kim.  Patchy fibrotic streaks and a can be seen in the right lung and loss of volume and a little consolidation is seen at the left lung base.  Right shoulder prosthesis is present.    Prior diet: currently NPO; failed Vidales; SLP recs after previous stroke were for regular solids/thin liquids      Subjective     "Thank you." Pt able to verbalize, but exhibiting significant dysarthria.  Verbal output was mostly 1-2 word responses. Pt was O x 4.  "This is how she was for the last stroke and it resolved in 4 days." Pt's  reported.    Pain/Comfort:  Pain Rating 1: 0/10    Respiratory Status: Room air    Objective:     Oral Musculature Evaluation  Oral Musculature: facial asymmetry present, left weakness  Dentition: present and adequate  Secretion Management: adequate  Mucosal Quality: " adequate  Mandibular Strength and Mobility: impaired  Oral Labial Strength and Mobility: impaired retraction, impaired pursing, impaired seal  Lingual Strength and Mobility: impaired strength  Buccal Strength and Mobility: impaired  Volitional Cough: fair  Volitional Swallow: elicited after delay  Voice Prior to PO Intake: tense quality, but dry and clear    Bedside Swallow Eval:   Consistencies Assessed:  Thin liquids ice chip x 1, 1/2 tsp x 1, full tsp x 1  Nectar thick liquids tsp x 1, cup sip x 1, straw sips x 3  Puree 1/2 tsp x 1, full tsp x 2  Solids deferred 2/2 oral motor weakness and decreased coordination during oral phase of PO management of liquids and purees      Oral Phase:   Slow oral transit time; decreased coordination of oral transit    Pharyngeal Phase:   coughing/choking for full tsp thin water,but no overt s/s of aspiration observed with nectar thick liquids or purees  delayed swallow initiation    Compensatory Strategies  None    Treatment: Education provided to pt and  regarding role of SLP, purpose of swallowing assessment, aspiration, overt s/s of aspiration, complications associated with aspiration, recommendations for pureed diet with nectar thick liquids at this time, importance of montoring for oral pocketing, aspiration precautions, plans for ongoing swallowing assessment, oral motor exercises, and plans to initiate formal speech/language/cognitive evaluation during next service dates. Understanding was demonstrated.     Goals:   Multidisciplinary Problems       SLP Goals          Problem: SLP    Goal Priority Disciplines Outcome   SLP Goal     SLP    Description: Speech Language Pathology Goals  Goals expected to be met by 4/30:  1. Pt will tolerate pureed diet with nectar thick liquids with good oral clearance and without s/s of aspiration.   2. Pt will participate in ongoing swallowing assessment to determine if safe for further diet advancement.   3. Pt will perform oral  motor exercises x 5-10 for to improve oral motor strength, ROM, and function.   4. Pt will participate in speech/language/cognitive evaluation to establish further intervention.                        Plan:     Patient to be seen:  4 x/week   Plan of Care expires:  05/22/24  Plan of Care reviewed with:  patient, spouse   SLP Follow-Up:  Yes       Discharge recommendations:   (cont SLP services)     Time Tracking:     SLP Treatment Date:   04/23/24  Speech Start Time:  1000  Speech Stop Time:  1020     Speech Total Time (min):  20 min    Billable Minutes: Eval Swallow and Oral Function 10 and Self Care/Home Management Training 10    04/23/2024

## 2024-04-23 NOTE — ASSESSMENT & PLAN NOTE
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  We have recommended radiation therapy treatment of the  Stomach.    Simulation Procedure:  The patient was taken to the CT-simulation suite and placed in the supine position.  A custom vacuum-loc mold immobilization device was constructed to ensure accurate daily reproducibility of treatment.  Preliminary markings were made by me to designate the appropriate area for the CT scan.  The radiation therapist using the laser alignment system made preliminary markings and measurements.  Prior to the scan, oral Gastrograffin was ingested to delineate the stomach.  A limited field, 4D-CT scan with compression of the abdomen was obtained for treatment planning purposes.    An arbitrary center was set within the target volume.  This was marked on the patient.    A virtual-reality simulation will be performed, and the patient will then return for simulation of the fields and blocks on the treatment table.    Due to use of complex blocking, this simulation procedure is complex. This technique and approach are necessary to target the tumor volume and minimize exposure of normal tissue.      Authenticated by {Object.Sanct_ID*PnP.NameFL}, {Object.Sanct_ID*PnP.Suffix} on {Object.Sanct_Date} at {Object.Sanct_Time}  DELMER BOWDEN M.D.     -Stroke risk factor.  SBP< 220.  -At this time the patient's exam seems to be pressure dependant.  -HOB flat, IVFs  -Keep SBP>160, avoid hypotension  -Re-evaluate plan after IR procedure

## 2024-04-23 NOTE — ASSESSMENT & PLAN NOTE
Aphasia due to acute stroke   -Stroke symptoms. Echo without thrombosis or other structural abnormality.

## 2024-04-23 NOTE — PT/OT/SLP EVAL
Occupational Therapy   Co-Evaluation with PT  Co-evaluation/treatment performed due to patient's multiple deficits requiring two skilled therapists to appropriately and safely assess patient's strength, endurance, functional mobility, and ADL performance while facilitating functional tasks in addition to accommodating for patient's activity tolerance and medical acuity.    Name: Denise Berrios  MRN: [id number]  Admitting Diagnosis: Embolic stroke involving right middle cerebral artery  Recent Surgery: Procedure(s) (LRB):  ANGIOGRAM-CEREBRAL; stroke class A (N/A) 1 Day Post-Op    Recommendations:     Discharge Recommendations: Low Intensity Therapy  Discharge Equipment Recommendations:  none  Barriers to discharge:  None    Assessment:     Denise Berrios is a 74 y.o. female with a medical diagnosis of Embolic stroke involving right middle cerebral artery.  She presents with the following performance deficits affecting function: impaired cardiopulmonary response to activity, gait instability, impaired functional mobility, impaired balance, impaired cognition.  Pt found with HOB elevated, agreeable to OT. Pt with high anxiety throughout session, requiring increased time to participate in self-regulation and calming techniques. Pt is currently mobilizing well and demonstrating good coordination. Patient currently demonstrates a need for low intensity therapy on a scheduled basis secondary to a decline in functional status due to injury      Rehab Prognosis: Good; patient would benefit from acute skilled OT services to address these deficits and reach maximum level of function.       Plan:     Patient to be seen 3 x/week to address the above listed problems via self-care/home management, therapeutic activities, therapeutic exercises, neuromuscular re-education, cognitive retraining  Plan of Care Expires: 05/23/24  Plan of Care Reviewed with: patient, spouse    Subjective     Chief Complaint: anxiety  Patient/Family  "Comments/goals: "I'm scared"    Occupational Profile:  Living Environment: Pt lives with spouse in a two story house with bed/bath on second level. Pt can stay on first floor. Pt has 1 step to enter home, 1 step to enter into den from kitchen.   Previous level of function: A from  recently for ADLs/mobility s/p R shoulder fx ~1.5yr ago. Recent CVA in August with mild weakness, and 2nd recent CVA ~2 weeks ago.   Roles and Routines: Pt enjoys gardening  Equipment Used at Home: crutches, walker, rolling  Assistance upon Discharge: spouse    Pain/Comfort:  Pain Rating 1: 0/10  Pain Rating Post-Intervention 1: 0/10    Patients cultural, spiritual, Anabaptism conflicts given the current situation: no    Objective:     Communicated with: RN prior to session.  Patient found HOB elevated with bed alarm, blood pressure cuff, telemetry, pulse ox (continuous), oxygen, peripheral IV, PureWick upon OT entry to room.    General Precautions: Standard, fall  Orthopedic Precautions: N/A  Braces: N/A  Respiratory Status: Nasal cannula, flow 1 L/min    Occupational Performance:    Bed Mobility:    Patient completed Scooting/Bridging with stand by assistance  Patient completed Supine to Sit with stand by assistance  Patient completed Sit to Supine with stand by assistance  Balance:   Sitting static: SBA  Sitting dynamic: SBA  Standing static: CGA  Standing dynamic: CGA    Functional Mobility/Transfers:  Patient completed Sit <> Stand Transfer with contact guard assistance  with  no assistive device   Functional Mobility: Pt engaging in functional mobility to simulate household/community distances approx 6' forward/back with CGA and utilizing HHA in order to maximize functional activity tolerance and standing balance required for engagement in occupations of choice.    Activities of Daily Living:  Grooming: stand by assistance to complete face hygiene seated EOB  Upper Body Dressing: stand by assistance to change gown in " supine  Lower Body Dressing: moderate assistance to change socks -  reported that he has been changing her socks since being home from previous hospital admission  Toileting: dependence incontinent of bowel    Cognitive/Visual Perceptual:  Cognitive/Psychosocial Skills:     -       Oriented to: Person, Place, Time, and Situation   -       Follows Commands/attention:Follows one-step commands  -       Communication: expressive aphasia  -       Safety awareness/insight to disability: impaired   -       Mood/Affect/Coping skills/emotional control: Tearful and Anxious  Visual/Perceptual:      -Intact      Physical Exam:  Sensation:    -       Intact  Dominant hand:    -       Right  Upper Extremity Range of Motion:     -       Right Upper Extremity: WNL  -       Left Upper Extremity: WNL  Upper Extremity Strength:    -       Right Upper Extremity: WNL  -       Left Upper Extremity: WNL   Strength:    -       Right Upper Extremity: WNL  -       Left Upper Extremity: WNL  Fine Motor Coordination:    -       Intact  Left hand thumb/finger opposition skills, Right hand thumb/finger opposition skills, Left hand, manipulation of objects, and Right hand, manipulation of objects    AMPAC 6 Click ADL:  AMPAC Total Score: 14    Treatment & Education:  Pt educated on the following:  - role of OT and OT POC  - use of call light to request for assistance with all functional mobility to ensure safety during hospital stay  - importance of continued mobilization  - Safe transfer techniques and proper body mechanics for fall prevention and improved independence with functional transfers   - Importance of OOB activities to increase endurance and tolerance for increased participation in daily ADLs.    - Various therex pt can perform outside of therapy session to increase functional endurance and strength for overall improved independence in occupations of choice.   - benefits of continued participation in therapy.   - Pt  educated on importance of calling for staff assist for functional mobility/transfers.  - All pt questions within OT scope of practice addressed, pt verbalized understanding.      Patient left HOB elevated with all lines intact, call button in reach, bed alarm on, NP Yogesh notified, and RN Rinku and  present    GOALS:   Multidisciplinary Problems       Occupational Therapy Goals          Problem: Occupational Therapy    Goal Priority Disciplines Outcome Interventions   Occupational Therapy Goal     OT, PT/OT Progressing    Description: Goals to be met by: 5/23/24     Patient will increase functional independence with ADLs by performing:    UE Dressing with [place].  LE Dressing with [place].  Grooming while standing at sink with [place].  Toileting from toilet with [place] for hygiene and clothing management.   Supine to sit with [place].  Step transfer with [place]  Toilet transfer to toilet with [place].                         History:     Past Medical History:   Diagnosis Date    Acid reflux     Graves disease     diffuse Toxic Goiter    Hypertension     IBS (irritable bowel syndrome)     Lichen sclerosus     Migraine     Stroke          Past Surgical History:   Procedure Laterality Date    CEREBRAL ANGIOGRAM N/A 4/22/2024    Procedure: ANGIOGRAM-CEREBRAL; stroke class A;  Surgeon: Sharon Welch;  [facility]: [facility];  Service: Anesthesiology;  Laterality: N/A;    EYE SURGERY      HYSTERECTOMY      CINDI for ?? cervical cancer, had normal paps    LEFT HEART CATHETERIZATION N/A 10/20/2020    Procedure: Left heart cath;  Surgeon: Bony Schultz MD;  Location: [facility] CATH LAB/EP;  Service: Cardiology;  Laterality: N/A;    REVERSE TOTAL SHOULDER ARTHROPLASTY Right 8/19/2022    Procedure: ARTHROPLASTY, SHOULDER, TOTAL, REVERSE;  Surgeon: Bradley Finnegan Jr., MD;  Location: [facility] OR;  Service: Orthopedics;  Laterality: Right;  SONIA linda CONFIRMED/8/18/Shaista Finnegan-  hemosorb AT        Time Tracking:     OT Date of Treatment: 04/23/24  OT Start Time: 1326  OT Stop Time: 1358  OT Total Time (min): 32 min    Billable Minutes:Evaluation 10  Self Care/Home Management 12  Therapeutic Activity 10    4/23/2024

## 2024-04-23 NOTE — NURSING
BP elevated.Pt is anxious.  Andras NP, notified. Ordered to give hydralazine, Fentanyl, 12.5 and 25 mg of metoprolol. Assessments ongoing.

## 2024-04-23 NOTE — PROGRESS NOTES
[facility]  Neurocritical Care  Progress Note    Admit Date: 4/22/2024  Service Date: 04/23/2024  Length of Stay: 1    Subjective:     Chief Complaint: Embolic stroke involving right middle cerebral artery    History of Present Illness: 74 year old female with PMHx of HTN, CAD, left sided CVA who presents from home with  after a fall from bed. LKN approximately 830pm. At 0200,  heard a noise and found patient had rolled out of bed. Patient was found to have confusion, L sided weakness, and L sided facial droop. Brought to ER, CTA shows right M2 occlusion. Evaluated by IR. Taken for thrombectomy. Will admit to [facility] post thrombectomy.       Of note, pt was found to have a L corona  radiata CVA on 4/11 and was discharged home with plavix. Plavix was stopped two Fridays ago due to hives. Pending holter monitor.    Hospital Course: 4/23/2024 Transfer to stroke service, changed beta blocker to coreg 6.25 mg bid, added home losartan 25 mg daily, CTH stable on 4/22      Past Medical History:   Diagnosis Date    Acid reflux     Graves disease     diffuse Toxic Goiter    Hypertension     IBS (irritable bowel syndrome)     Lichen sclerosus     Migraine     Stroke      Past Surgical History:   Procedure Laterality Date    CEREBRAL ANGIOGRAM N/A 4/22/2024    Procedure: ANGIOGRAM-CEREBRAL; stroke class A;  Surgeon: Sharon Welch;  Location: [facility];  Service: Anesthesiology;  Laterality: N/A;    EYE SURGERY      HYSTERECTOMY      CINDI for ?? cervical cancer, had normal paps    LEFT HEART CATHETERIZATION N/A 10/20/2020    Procedure: Left heart cath;  Surgeon: Bony Schultz MD;  Location: [facility] CATH LAB/EP;  Service: Cardiology;  Laterality: N/A;    REVERSE TOTAL SHOULDER ARTHROPLASTY Right 8/19/2022    Procedure: ARTHROPLASTY, SHOULDER, TOTAL, REVERSE;  Surgeon: Bradley Finnegan Jr., MD;  Location: [facility] OR;  Service: Orthopedics;  Laterality: Right;  SONIA linda CONFIRMED/8/18/Shaista Pena  French- hemosbo AT       No current facility-administered medications on file prior to encounter.     Current Outpatient Medications on File Prior to Encounter   Medication Sig Dispense Refill    aspirin (ECOTRIN) 81 MG EC tablet once a day      atorvastatin (LIPITOR) 40 MG tablet Take 1 tablet (40 mg total) by mouth once daily. 90 tablet 0    butalbital-acetaminophen-caffeine -40 mg (FIORICET, ESGIC) -40 mg per tablet Take 1 tablet by mouth nightly as needed for Headaches. 30 tablet 5    clobetasol 0.05% (TEMOVATE) 0.05 % Oint APPLY TOPICALLY TWICE A DAY 45 g 1    diclofenac sodium (VOLTAREN) 1 % Gel Apply 2 g topically 3 (three) times daily. 100 g 3    ergocalciferol (VITAMIN D2) 50,000 unit Cap Take 1 capsule (50,000 Units total) by mouth once a week. 12 capsule 3    EScitalopram oxalate (LEXAPRO) 20 MG tablet Take 1 tablet (20 mg total) by mouth once daily. 90 tablet 3    levothyroxine (SYNTHROID) 88 MCG tablet Take 1 tablet (88 mcg total) by mouth before breakfast. 90 tablet 3    losartan (COZAAR) 50 MG tablet Take 1 tablet (50 mg total) by mouth once daily. 90 tablet 3    metoprolol tartrate (LOPRESSOR) 50 MG tablet Take 1 tablet (50 mg total) by mouth 2 (two) times daily. 180 tablet 3    naloxone (NARCAN) 4 mg/actuation Spry 4mg by nasal route as needed for opioid overdose; may repeat every 2-3 minutes in alternating nostrils until medical help arrives. Call 911 (Patient not taking: Reported on 4/15/2024) 1 each 11    nortriptyline (PAMELOR) 10 MG capsule Take 1 capsule (10 mg total) by mouth once daily. 90 capsule 3    omeprazole (PRILOSEC) 40 MG capsule Take 1 capsule (40 mg total) by mouth once daily. 90 capsule 3    oxybutynin (DITROPAN-XL) 10 MG 24 hr tablet Take 1 tablet (10 mg total) by mouth once daily. 90 tablet 3    pregabalin (LYRICA) 75 MG capsule TAKE 1 CAPSULE BY MOUTH TWICE DAILY FOR NEUROPATHIC PAIN      promethazine (PHENERGAN) 12.5 MG Tab Take 1 tablet (12.5 mg total)  by mouth every 6 (six) hours as needed (nausea and or vomiting). 10 tablet 0    traMADoL (ULTRAM) 50 mg tablet Take 50 mg by mouth every 6 (six) hours as needed for Pain.        Allergies: Phenobarbital, Penicillins, Phenobarbital sodium, Propylthiouracil, Thyroid, Topamax [topiramate], Cefaclor, Clopidogrel, Methimazole, Methimazole (bulk), Penicillin, and Sulfa (sulfonamide antibiotics)  Family History   Problem Relation Name Age of Onset    Kidney failure Father      Hypertension Father      Stomach cancer Mother      Breast cancer Neg Hx       Social History     Tobacco Use    Smoking status: Never     Passive exposure: Never    Smokeless tobacco: Never   Substance Use Topics    Alcohol use: Yes    Drug use: No     Review of Systems   Constitutional:  Negative for fever.   HENT:  Negative for ear pain and facial swelling.    Eyes:  Negative for discharge and itching.   Respiratory:  Negative for cough.    Cardiovascular:  Negative for chest pain.   Gastrointestinal:  Negative for constipation.   Endocrine: Negative for cold intolerance.   Genitourinary:  Negative for dysuria.   Musculoskeletal:  Negative for back pain.   Allergic/Immunologic: Negative for immunocompromised state.   Neurological:  Positive for facial asymmetry, speech difficulty and weakness. Negative for dizziness, seizures and headaches.   Psychiatric/Behavioral:  The patient is nervous/anxious.      Objective:     Vitals:    Temp: 98.1 °F (36.7 °C)  Pulse: 85  Rhythm: normal sinus rhythm  BP: (!) 198/81  MAP (mmHg): 116  Resp: 20  SpO2: 100 %    Temp  Min: 97.8 °F (36.6 °C)  Max: 98.8 °F (37.1 °C)  Pulse  Min: 65  Max: 105  BP  Min: 105/51  Max: 198/81  MAP (mmHg)  Min: 73  Max: 117  Resp  Min: 14  Max: 36  SpO2  Min: 95 %  Max: 100 %  Oxygen Concentration (%)  Min: 40  Max: 40    04/22 0701 - 04/23 0700  In: 49.6 [I.V.:49.6]  Out: 1100 [Urine:1100]   Unmeasured Output  Urine Occurrence: 1  Stool Occurrence: 1        Physical  Exam  Constitutional:       Appearance: She is well-developed.   HENT:      Head: Normocephalic and atraumatic.   Cardiovascular:      Rate and Rhythm: Normal rate and regular rhythm.   Pulmonary:      Effort: Pulmonary effort is normal.   Skin:     General: Skin is warm and dry.   Neurological:      Mental Status: She is alert.      Comments:   [id number]  PERRL. Right gaze preference  Left facial droop  Expressive aphasia   Follows commands in all extremities  RUE 4/5  LUE 4/5  RLE 4/5  LLE 4/5           Today I personally reviewed pertinent medications, lines/drains/airways, imaging, cardiology results, laboratory results, notably: CMP, CBC, PT/INR, TSH, Lipid panel, Mag, EKG, CTA.         Assessment/Plan:     Neuro  * Embolic stroke involving right middle cerebral artery  74 year old female with PMHx of HTN, CAD, left sided CVA who presents from home with  after a fall from bed. LKN approximately 830pm. At 0200,  heard a noise and found patient had rolled out of bed. Patient was found to have confusion, L sided weakness, and L sided facial droop.   Antithrombotics for secondary stroke prevention: Antiplatelets: Aspirin: 81 mg daily  Statins for secondary stroke prevention and hyperlipidemia, if present:   Statins: Atorvastatin- 80 mg daily  Aggressive risk factor modification: HTN, CAD  Rehab efforts: The patient has been evaluated by a stroke team provider and the therapy needs have been fully considered based off the presenting complaints and exam findings. The following therapy evaluations are needed: PT evaluate and treat, OT evaluate and treat, SLP evaluate and treat, PM&R evaluate for appropriate placement  Diagnostics ordered/pending: HgbA1C to assess blood glucose levels, Lipid Profile to assess cholesterol levels, MRI head without contrast to assess brain parenchyma, TTE to assess cardiac function/status , TSH to assess thyroid function  VTE prophylaxis: Heparin 5000 units SQ every 8 hours  BP  parameters: Infarct: SBP goal 160-220, concern for pressure dependent exam, HOB flat, NS bolus, NS gtt.   - Admit to NCC  - Vital signs and neuro checks q1h  - MRI brain 12N  - Limited echo pending  - Stroke risk workup  - IR consulted, to go for thrombectomy   - Vascular Neurology consult  - 4/23/2024 Transfer to stroke service, changed beta blocker to coreg 6.25 mg bid, added home losartan 25 mg daily, dced lopressor, CTH stable on 4/22, melatonin 6 mg nightly        Cytotoxic brain edema  - Hourly neuro checks  - See primary dx     Flaccid hemiplegia affecting left nondominant side  - PT/OT    Aphasia due to acute stroke  - SLP following        Cardiac/Vascular  Essential hypertension  SBP goal < 160    Dyslipidemia  - Lipid Panel  - Atorvastatin 80mg     Endocrine  Other specified hypothyroidism  - TSH pending  - Continue home synthroid          Activity Orders            Progressive Mobility Protocol (mobilize patient to their highest level of functioning at least twice daily) starting at 04/22 0800    Turn patient starting at 04/22 0600    Elevate HOB Collagen closure or PERCLOSE plug/device - Elevate HOB 30 degrees with limb immobilized for 2 hours after procedure. starting at 04/22 0536    Diet NPO: NPO starting at 04/22 0426          Full Code    Yogesh Ramirez NP  Neurocritical Care  [facility] - Neuro Critical Care

## 2024-04-23 NOTE — PLAN OF CARE
"[facility] Care Plan    POC reviewed with Denise Berrios.  Pt progressing toward goals. See below and flowsheets for full assessment and VS info.     - Intermittent Straight Cath x 1 for urine retention   - Now 24 hours post revascularization procedure       Is this a stroke patient? Yes, stroke booklet at bedside     Neuro:  Inocencia Coma Scale  Best Eye Response: 4-->(E4) spontaneous  Best Motor Response: 6-->(M6) obeys commands  Best Verbal Response: 2-->(V2) incomprehensible speech  [place] Coma Scale Score: 12  Assessment Qualifiers: patient intubated  Pupil PERRLA: yes     24hr Temp:  [97.6 °F (36.4 °C)-98.8 °F (37.1 °C)]     CV:   Rhythm: normal sinus rhythm  BP goals:   SBP < 140  MAP > 65    Resp:      Vent Mode: Spont  Set Rate: 12 BPM  Oxygen Concentration (%): 40  Vt Set: 420 mL  PEEP/CPAP: 5 cmH20  Pressure Support: 5 cmH20    Plan: N/A    GI/:     Diet/Nutrition Received: NPO  Last Bowel Movement: 04/23/24  Voiding Characteristics: external catheter    Intake/Output Summary (Last 24 hours) at 4/23/2024 0630  Last data filed at 4/23/2024 0627  Gross per 24 hour   Intake --   Output 1100 ml   Net -1100 ml     Unmeasured Output  Urine Occurrence: 1  Stool Occurrence: 1    Labs/Accuchecks:  Recent Labs   Lab 04/23/24  0133   WBC 15.36*   RBC 3.66*   HGB 11.6*   HCT 35.4*         Recent Labs   Lab 04/23/24  0133      K 4.4   CO2 21*      BUN 15   CREATININE 1.0   ALKPHOS 73   ALT 10   AST 15   BILITOT 0.4      Recent Labs   Lab 04/22/24  0402   INR 1.2    No results for input(s): "CPK", "CPKMB", "TROPONINI", "MB" in the last 168 hours.    Electrolytes: N/A - electrolytes WDL  Accuchecks: Q6H    Gtts:  Current Facility-Administered Medications   Medication Dose Route Frequency Last Rate Last Admin    sodium chloride 0.9%   Intravenous Continuous 75 mL/hr at 04/22/24 0428 New Bag at 04/22/24 0428         "

## 2024-04-23 NOTE — PLAN OF CARE
POC established and functional mobility goals were created to help pt return to PLOF. Will be reassessed as appropriate to measure pt progress.;    Problem: Physical Therapy  Goal: Physical Therapy Goal  Description: Goals to be met by: 24     Patient will increase functional independence with mobility by performin. Supine to sit with [place]  2. Sit to supine with [place]  3. Sit to stand transfer with [place]  4. Bed to chair transfer with Modified [place] using LRAD as needed  5. Gait  x 150 feet with Modified [place] using LRAD as needed.   6. Ascend/descend 10 stair with unilateral Handrail with Supervision.   7. Lower extremity exercise program x15 reps per handout, with assistance as needed    Outcome: Progressing

## 2024-04-23 NOTE — PLAN OF CARE
OT liseth completed, appropriate POC established and initiated.   Problem: Occupational Therapy  Goal: Occupational Therapy Goal  Description: Goals to be met by: 5/23/24     Patient will increase functional independence with ADLs by performing:    UE Dressing with [place].  LE Dressing with [place].  Grooming while standing at sink with [place].  Toileting from toilet with [place] for hygiene and clothing management.   Supine to sit with [place].  Step transfer with [place]  Toilet transfer to toilet with [place].    Outcome: Progressing

## 2024-04-23 NOTE — HOSPITAL COURSE
74 year old female with recent L MCA stroke presented for R MCA embolic stroke status post thrombectomy. Extubated on 4/22, improving motor strength but with severe dysarthria and aphasia. Echo without abnormality, no arrhythmias captured on telemetry. MRI s/p thrombectomy with 5 mm acute right basal ganglia infarct with restricted diffusion right insular cortex and frontal operculum. Working w/ PT/OT/Speech, passed swallow eval. Patient to follow-up with cardiac EP outpatient for evaluation for loop recorder placement. Nurses reporting dark stools, however have been unable to view these stools and Hgb has been stable. Patient had persistent HTN after the window we'd expect for cerebral autoregulation; uptitrated antiHTN's slowly. Discharging with referrals to vascular neurology, cardiac EP, and PCP. Was informed by Bedside delivery service with our inpatient pharmacy that we were unable to fill her Brilinta through the patient's  insurance. Phoned in prescription to Calvin at [facility] with confirmation that they'd be able to fill it. Informed patient and  of this with instructions to call immediately with any difficulty filling the prescription. They expressed understanding and were in agreement with this plan. Home health was ordered prior to discharge as well. Patient is in possession of a 30 day cardiac event monitor and will place after discharge. Patient to continue brilinta for a total of 21 days with continuation of ASA.

## 2024-04-23 NOTE — ASSESSMENT & PLAN NOTE
Denise Berrios is a 74 y.o. female with a significant medical history of recent L MCA stroke (4/10/2024), HTN, HLD, Graves' disease,IBS, migraine HAs  who presents to the hospital for evaluation of acute onset LSW, neglect, and aphasia.     -R M1 occlusion noted on CTA. The patient will be taken to IR for EVT. She will be admitted to [facility] post procedure.    Antithrombotics for secondary stroke prevention: Antiplatelets: Aspirin: 81 mg daily; Brilinta     Statins for secondary stroke prevention and hyperlipidemia, if present:   Statins: Atorvastatin- 40 mg daily    Aggressive risk factor modification: HTN, HLD, AAD, Graves' Disease     Rehab efforts: The patient has been evaluated by a stroke team provider and the therapy needs have been fully considered based off the presenting complaints and exam findings. The following therapy evaluations are needed: PT evaluate and treat, OT evaluate and treat, SLP evaluate and treat    Diagnostics ordered/pending: MRI head without contrast to assess brain parenchyma.     Would recommend coordinating with cardiology to have loop recorder placement scheduled.     VTE prophylaxis: Heparin 5000 units SQ every 8 hours  Mechanical prophylaxis: Place SCDs    BP parameters: Infarct: No intervention, SBP <220, patient may need higher pressures as her exam seems to be pressure dependent at this time. SBP> 160 pending IR. Avoid hypoperfusion.

## 2024-04-23 NOTE — PT/OT/SLP EVAL
Physical Therapy Co-Evaluation and Co-Treatment with OT    Patient Name:  Denise Berrios   MRN:  [id number]    Recent Surgery: Procedure(s) (LRB):  ANGIOGRAM-CEREBRAL; stroke class A (N/A) 1 Day Post-Op    Patient required co-tx with OT secondary to need for multiple set of skilled hands to provide safest therapy and best outcomes.      Recommendations:     Discharge Recommendations:    Low intensity therapy  Discharge Equipment Recommendations: none   Barriers to discharge: None    Highest Level of Mobility: Gait 6' FW and BW  Assistance Required: CGA w/ HHAx2    Assessment:     Denise Berrios is a 74 y.o. female admitted with a medical diagnosis of Embolic stroke involving right middle cerebral artery. She presents with the following impairments/functional limitations:  impaired cardiopulmonary response to activity, gait instability, impaired balance, decreased safety awareness, impaired cognition, impaired endurance, impaired coordination, impaired functional mobility    Pt met with HOB elevated, spouse present and agreeable to PT session. Pt's spouse provides hx as pt presents with expressive aphasia. He reports pt has been ambulatory with independence and no AD. Per chart review, pt has a hx of L corona radiata CVA on 4/11/24. She discharged home and was ambulating well since then. Currently, pt is ambulatory with CGA for a short distance and HHAx2. She is limited by anxiety and hypertension throughout session and requires frequent rest breaks for deep breathing. She participates well and follows all commands appropriately. Mild RSW noted on exam, but pt's primary deficit appears to be expressive aphasia.     Pt would benefit from continued skilled acute PT 3x/wk to address above listed functional deficits, provide patient/caregiver education, reduce fall risk, and maximize (I) and safety with functional mobility.     Rehab Prognosis: Good; patient would benefit from acute skilled PT services to address these  "deficits and reach maximum level of function.      Plan:     During this hospitalization, patient to be seen 3 x/week to address the identified rehab impairments via gait training, therapeutic exercises, therapeutic activities, neuromuscular re-education and progress toward the following goals:    Plan of Care Expires:  05/23/24    This plan of care has been discussed with the patient/caregiver, who was included in its development and is in agreement with the identified goals and treatment plan.     Subjective     Communicated with RN prior to session.  Patient agreeable to participate.     Chief Complaint: R MCA CVA   Patient/Family Comments/goals: None stated    Pain/Comfort:  Pain Rating 1: 0/10  Pain Rating Post-Intervention 1: 0/10    Patients cultural, spiritual, Christianity conflicts given the current situation: no    Patient's living environment is as follows:  Living Environment: Pt lives with spouse in 2SH with 1 JELLY. Pt has another step from her den to kitchen. Her bedroom is upstairs, but she can reside downstairs if needed.   Prior Level of Function: independent with mobility and ADLs  DME used: walker, rolling  DME owned (not currently used): Crutches, axillary   Upon discharge, patient will have assistance from: Spouse    Objective:     Patient found HOB elevated with bed alarm, blood pressure cuff, telemetry, pulse ox (continuous), oxygen, peripheral IV, PureWick  upon PT entry to room.    General Precautions: Standard, fall   Orthopedic Precautions:N/A   Braces: N/A   BP (!) 174/73   Pulse 80   Temp 98.1 °F (36.7 °C)   Resp (!) 28   Ht 5' 1" (1.549 m)   Wt 68.9 kg (152 lb)   SpO2 100%   BMI 28.72 kg/m²   Oxygen Device: nasal cannula      Exams:    Cognition:  Patient is oriented to Person, Place, Time, Situation  Follows one-step commands  Insight to deficits/safety awareness: intact    Edema: None present    Postural examination/scapula alignment: Rounded shoulder    Lower Extremity Range of " Motion:  Right Lower Extremity: WNL  Left Lower Extremity: WNL    Lower Extremity Strength    Right LE  Left LE    Hip Flexion: 4+/5 Hip Flexion: 5/5   Knee Extension: 4+/5 Knee Extension: 5/5   Knee Flexion: 4+/5 Knee Flexion: 5/5   Ankle Dorsiflexion:  4+/5 Ankle Dorsiflexion: 5/5   Ankle Plantarflexion: 4+/5 Ankle Plantarflexion: 5/5        Sensation:   Light touch sensation: Intact BLEs    Functional Mobility:    Bed Mobility:  Supine to Sit: Stand-by Assistance on L side of bed  Increased time  Sit to Supine: Stand-by Assistance  Scooting anteriorly to EOB to plant feet on floor: Stand-by Assistance    Transfers:   Sit to Stand Transfer: Contact Guard Assistance  from EOB with HHAx2              Gait:  Patient received gait training 6 feet FW and BW with Contact Guard Assistance and HHAx2  Gait Assessment: decreased step length, narrow base of support, flexed posture, and decreased charu  Gait Pattern Observed: Step-to  Comments: All lines remained intact throughout ambulation trial, gait belt utilized. PT provided cues to widen REJI to improve balance. Pt expressed severe anxiety when ambulating.    Balance:  Static Sit:   Stand-By Assist at EOB   Static Stand:   Contact-Guard Assist with no AD  Dynamic Stand:  Contact-Guard Assist with no AD        Therapeutic Activities/Exercises     Patient assisted with functional mobility as noted above  Discussed at length benefits of PT as well as d/c recommendations. Pt agreeable  Patient educated on the importance of early mobility, OOB to prevent functional decline during hospital stay  Patient was instructed to utilize staff assistance for mobility/transfers.  Patient is appropriate to transfer with CGA and RN/PCT assist  Patient educated on PT POC and role of PT in acute care  White board updated to include patient's safest level of mobility with staff assistance, RN also updated    AM-PAC 6 CLICK MOBILITY  Turning over in bed (including adjusting bedclothes,  sheets and blankets)?: 4  Sitting down on and standing up from a chair with arms (e.g., wheelchair, bedside commode, etc.): 3  Moving from lying on back to sitting on the side of the bed?: 4  Moving to and from a bed to a chair (including a wheelchair)?: 3  Need to walk in hospital room?: 3  Climbing 3-5 steps with a railing?: 3  Basic Mobility Total Score: 20      Patient left HOB elevated with all lines intact, call button in reach, bed alarm on, rn notified, and spouse present.      History/Goals:     PAST MEDICAL HISTORY:  Past Medical History:   Diagnosis Date    Acid reflux     Graves disease     diffuse Toxic Goiter    Hypertension     IBS (irritable bowel syndrome)     Lichen sclerosus     Migraine     Stroke        Past Surgical History:   Procedure Laterality Date    CEREBRAL ANGIOGRAM N/A 2024    Procedure: ANGIOGRAM-CEREBRAL; stroke class A;  Surgeon: Sharon Welch;  Location: [facility];  Service: Anesthesiology;  Laterality: N/A;    EYE SURGERY      HYSTERECTOMY      CINDI for ?? cervical cancer, had normal paps    LEFT HEART CATHETERIZATION N/A 10/20/2020    Procedure: Left heart cath;  Surgeon: Bony Schultz MD;  Location: [facility] CATH LAB/EP;  Service: Cardiology;  Laterality: N/A;    REVERSE TOTAL SHOULDER ARTHROPLASTY Right 2022    Procedure: ARTHROPLASTY, SHOULDER, TOTAL, REVERSE;  Surgeon: Bradley Finnegan Jr., MD;  Location: [facility] OR;  Service: Orthopedics;  Laterality: Right;  SONIA linda CONFIRMED//Shaista Finnegan- hemosorb AT        GOALS:   Multidisciplinary Problems       Physical Therapy Goals          Problem: Physical Therapy    Goal Priority Disciplines Outcome Goal Variances Interventions   Physical Therapy Goal     PT, PT/OT Progressing     Description: Goals to be met by: 24     Patient will increase functional independence with mobility by performin. Supine to sit with [place]  2. Sit to supine with [place]  3. Sit to stand transfer  with [place]  4. Bed to chair transfer with Modified [place] using LRAD as needed  5. Gait  x 150 feet with Modified [place] using LRAD as needed.   6. Ascend/descend 10 stair with unilateral Handrail with Supervision.   7. Lower extremity exercise program x15 reps per handout, with assistance as needed                         Time Tracking:     PT Received On: 04/23/24  PT Start Time: 1326     PT Stop Time: 1358  PT Total Time (min): 32 min     Billable Minutes: Evaluation 15 and Gait Training 17      Khushi Montes De Oca, PT  04/23/2024  Pager# [phone]

## 2024-04-23 NOTE — SUBJECTIVE & OBJECTIVE
Neurologic Chief Complaint: Weakness, R MCA stroke    Subjective:     Interval History: Patient is seen for follow-up neurological assessment and treatment recommendations: Patient extubated yesterday, has difficulty speaking. Otherwise she is stable. Pending PT/OT and speech eval . Has a new leukocytosis of 15.36.      HPI, Past Medical, Family, and Social History remains the same as documented in the initial encounter.     Review of Systems   Gastrointestinal:  Positive for constipation.   Neurological:         Dysarthria   Psychiatric/Behavioral:  Negative for agitation and confusion.      Scheduled Meds:  Current Facility-Administered Medications   Medication Dose Route Frequency    [START ON 4/24/2024] aspirin  81 mg Oral Daily    [START ON 4/24/2024] atorvastatin  80 mg Oral Daily    EScitalopram oxalate  20 mg Oral Daily    heparin (porcine)  5,000 Units Subcutaneous Q8H    levothyroxine  88 mcg Oral Before breakfast    metoprolol tartrate  25 mg Oral BID    senna-docusate 8.6-50 mg  1 tablet Oral BID    ticagrelor  90 mg Oral BID     Continuous Infusions:  Current Facility-Administered Medications   Medication Dose Route Frequency Last Rate Last Admin    sodium chloride 0.9%   Intravenous Continuous 75 mL/hr at 04/22/24 0428 New Bag at 04/22/24 0428     PRN Meds:  Current Facility-Administered Medications:     acetaminophen, 650 mg, Per NG tube, Q6H PRN    albuterol-ipratropium, 3 mL, Nebulization, Q6H PRN    bisacodyL, 10 mg, Rectal, Daily PRN    calcium gluconate IVPB, 1 g, Intravenous, PRN    calcium gluconate IVPB, 2 g, Intravenous, PRN    calcium gluconate IVPB, 3 g, Intravenous, PRN    magnesium sulfate IVPB, 2 g, Intravenous, PRN    magnesium sulfate IVPB, 4 g, Intravenous, PRN    ondansetron, 4 mg, Intravenous, Q8H PRN    potassium chloride, 40 mEq, Intravenous, PRN **AND** potassium chloride, 60 mEq, Intravenous, PRN **AND** potassium chloride, 80 mEq, Intravenous, PRN    racepinephrine, 0.5 mL,  Nebulization, Q4H PRN    sodium chloride 0.9%, 10 mL, Intravenous, PRN    sodium chloride 0.9%, 10 mL, Intravenous, PRN    Objective:     Vital Signs (Most Recent):  Temp: 98.3 °F (36.8 °C) (04/23/24 0701)  Pulse: 99 (04/23/24 1001)  Resp: (!) 34 (04/23/24 1001)  BP: (!) 141/65 (04/23/24 1001)  SpO2: 96 % (04/23/24 1001)  BP Location: Left arm    Vital Signs Range (Last 24H):  Temp:  [97.8 °F (36.6 °C)-98.8 °F (37.1 °C)]   Pulse:  []   Resp:  [14-36]   BP: (105-153)/(51-84)   SpO2:  [95 %-100 %]   BP Location: Left arm       Physical Exam  HENT:      Head: Normocephalic.      Nose: Nose normal.   Cardiovascular:      Rate and Rhythm: Normal rate.   Pulmonary:      Effort: Pulmonary effort is normal.   Neurological:      Mental Status: She is alert.              Neurological Exam:   LOC: alert  Attention Span: Good   Language: Expressive aphasia  Articulation: Dysarthria  Orientation: Person, Place, Time , Not oriented to time  Visual Fields: Full  Pupils (CN II, III): PERRL  Motor: Arm left  Normal 5/5  Leg left  Normal 5/5  Arm right  Normal 5/5  Leg right Normal 5/5  Sensation: Intact to light touch, temperature and vibration  Tone: Normal tone throughout    Laboratory:  CMP:   Recent Labs   Lab 04/23/24  0133   CALCIUM 9.8   ALBUMIN 3.0*   PROT 6.9      K 4.4   CO2 21*      BUN 15   CREATININE 1.0   ALKPHOS 73   ALT 10   AST 15   BILITOT 0.4     CBC:   Recent Labs   Lab 04/23/24  0133   WBC 15.36*   RBC 3.66*   HGB 11.6*   HCT 35.4*      MCV 97   MCH 31.7*   MCHC 32.8       Diagnostic Results     Brain Imaging     EXAMINATION:  MRI BRAIN WITHOUT CONTRAST     CLINICAL HISTORY:  Stroke, follow up;     TECHNIQUE:  Multiplanar multisequence MR imaging of the brain was performed without contrast.     COMPARISON:  MRI 04/10/2024     FINDINGS:  On diffusion-weighted imaging there is a 5 mm small focus of restricted diffusion within the right putamen consistent with a recent small infarct.   Subtle restricted diffusion within the insular cortex and frontal operculum may reflect an element of ischemic injury perhaps for continued follow-up..     Evaluation of the remainder of the brain demonstrates expected evolution of the deep white matter infarct on the left.  Potentially subacute ischemic changes within the right temporal lobe are stable.     No intracranial hemorrhage midline shift or herniation.     Chronic bilateral thalamic and bilateral occipital infarcts are again identified.     Normal arterial flow voids are preserved at the skull base.     The visualized sinuses and mastoid air cells are clear     Impression:     5 mm acute infarct right basal ganglia.  Subtle cortical restricted diffusion right insular cortex and frontal operculum consistent with ischemic injury unless there is clinical suspicion of seizure activity.  This is potentially reversible, perhaps for continued follow-up.     Expected evolution of recent left deep white matter infarct and perhaps subacute right temporal lobe infarct similar in appearance from the prior.     No evidence of intracranial hemorrhage or mass effect.    EXAMINATION:  CTA STROKE MULTI-PHASE     CLINICAL HISTORY:  Neuro deficit, acute, stroke suspected;     TECHNIQUE:  Axial CT images obtained throughout the region of the head before the administration of intravenous contrast.     CT angiogram was performed through the cervical and intracranial vasculature during the IV bolus administration of 75mL of Omnipaque 350.  Two additional phases through the intracranial vasculature via multiphase technique.     Multiplanar MPR and MIP reformats were performed.     CT source data was analyzed using artificial intelligence software for detection of a large vessel occlusion (LVO) or hemorrhage in order to enable computer assisted triage notification and aid clinical stroke decision making.     COMPARISON:  MRI brain 04/10/2024     CTA stroke multiphase 04/10/2024      Noncontrast head CT 04/04/2024     FINDINGS:  Generalized cerebral volume loss.  Ventricles stable in size without evidence of hydrocephalus.     Suspected small area of hypoattenuation/loss of gray-white differentiation in the temporal lobe (series 400, image 89).  Stable areas of encephalomalacia in right occipital lobe and at the left parieto-occipital junction, likely remote infarcts.  Small remote infarcts in the right parietal lobe, bilateral thalami, and bilateral cerebellar hemispheres.  Evolving area of hypoattenuation in the left corona radiata related to known recent infarct.     No new parenchymal mass or hemorrhage.     No extra-axial blood or fluid collections.     No acute displaced calvarial fracture.  Mastoid air cells and paranasal sinuses are essentially clear.  Bilateral pseudophakia.        CTA:     Left-sided aortic arch with common origin of the brachiocephalic and left common carotid arteries.  Mild calcific atherosclerosis.     The common and internal carotid arteries are normal in course and caliber.  No calcified plaque or significant stenosis at either carotid bifurcation.  Mild scattered calcified plaque in the intracranial bilateral ICAs..     The vertebral origins are patent. The cervical vertebral arteries are normal in course and caliber. Vertebrobasilar system is within normal limits without focal abnormality.     Occlusion M2 segment right MCA (series 5, image 375).  Relatively preserved collaterals over the bilateral cerebral hemispheres.  Narrowing at the P2/P3 junction of the right PCA (series 5, image 626).  Chronic occlusion left PCA (series 5, image 625).  The anterior and left middle cerebral arteries are within normal limits, without evidence of significant stenosis, focal occlusion, or intracranial aneurysm formation.     The visualized major dural venous sinuses are patent.     Miscellaneous: Mosaic attenuation pattern in the visualized upper lungs, a nonspecific  finding which can be seen with edema, small airways disease, or related to breath hold, etc..  Few scattered pulmonary opacities measuring up to 1.1 cm similar to prior (series 5, image 20).  Left thyroid calcification.  Postoperative change right reverse total shoulder arthroplasty.  Parotid and submandibular glands unremarkable, except for a 3 mm calcification/calculus in the left submandibular gland.  4.5 mm calcification in the region of the left false vocal cord.     Impression:     Occlusion M2 segment right MCA.  Suspected early parenchymal change a cerebral infarction in the right temporal lobe.     Chronic occlusion left PCA.     Evolving area of hypoattenuation in the left corona radiata related to known recent infarct.  Additional multifocal remote infarcts as detailed in the body of the report.     Narrowing of the right PCA similar to prior.     Additional findings as detailed in the body of the report.     This report was flagged in Epic as abnormal.        Vessel Imaging   EXAMINATION:  CTA STROKE MULTI-PHASE     CLINICAL HISTORY:  Neuro deficit, acute, stroke suspected;     TECHNIQUE:  Axial CT images obtained throughout the region of the head before the administration of intravenous contrast.     CT angiogram was performed through the cervical and intracranial vasculature during the IV bolus administration of 75mL of Omnipaque 350.  Two additional phases through the intracranial vasculature via multiphase technique.     Multiplanar MPR and MIP reformats were performed.     CT source data was analyzed using artificial intelligence software for detection of a large vessel occlusion (LVO) or hemorrhage in order to enable computer assisted triage notification and aid clinical stroke decision making.     COMPARISON:  MRI brain 04/10/2024     CTA stroke multiphase 04/10/2024     Noncontrast head CT 04/04/2024     FINDINGS:  Generalized cerebral volume loss.  Ventricles stable in size without evidence of  hydrocephalus.     Suspected small area of hypoattenuation/loss of gray-white differentiation in the temporal lobe (series 400, image 89).  Stable areas of encephalomalacia in right occipital lobe and at the left parieto-occipital junction, likely remote infarcts.  Small remote infarcts in the right parietal lobe, bilateral thalami, and bilateral cerebellar hemispheres.  Evolving area of hypoattenuation in the left corona radiata related to known recent infarct.     No new parenchymal mass or hemorrhage.     No extra-axial blood or fluid collections.     No acute displaced calvarial fracture.  Mastoid air cells and paranasal sinuses are essentially clear.  Bilateral pseudophakia.        CTA:     Left-sided aortic arch with common origin of the brachiocephalic and left common carotid arteries.  Mild calcific atherosclerosis.     The common and internal carotid arteries are normal in course and caliber.  No calcified plaque or significant stenosis at either carotid bifurcation.  Mild scattered calcified plaque in the intracranial bilateral ICAs..     The vertebral origins are patent. The cervical vertebral arteries are normal in course and caliber. Vertebrobasilar system is within normal limits without focal abnormality.     Occlusion M2 segment right MCA (series 5, image 375).  Relatively preserved collaterals over the bilateral cerebral hemispheres.  Narrowing at the P2/P3 junction of the right PCA (series 5, image 626).  Chronic occlusion left PCA (series 5, image 625).  The anterior and left middle cerebral arteries are within normal limits, without evidence of significant stenosis, focal occlusion, or intracranial aneurysm formation.     The visualized major dural venous sinuses are patent.     Miscellaneous: Mosaic attenuation pattern in the visualized upper lungs, a nonspecific finding which can be seen with edema, small airways disease, or related to breath hold, etc..  Few scattered pulmonary opacities  measuring up to 1.1 cm similar to prior (series 5, image 20).  Left thyroid calcification.  Postoperative change right reverse total shoulder arthroplasty.  Parotid and submandibular glands unremarkable, except for a 3 mm calcification/calculus in the left submandibular gland.  4.5 mm calcification in the region of the left false vocal cord.     Impression:     Occlusion M2 segment right MCA.  Suspected early parenchymal change a cerebral infarction in the right temporal lobe.     Chronic occlusion left PCA.     Evolving area of hypoattenuation in the left corona radiata related to known recent infarct.  Additional multifocal remote infarcts as detailed in the body of the report.     Narrowing of the right PCA similar to prior.     Additional findings as detailed in the body of the report.     This report was flagged in Epic as abnormal.    Cardiac Imaging   Echo 4/23/24    Left Ventricle: The left ventricle is normal in size. Normal wall thickness. There is normal systolic function with a visually estimated ejection fraction of 60 - 65%. There is indeterminate diastolic function.    Right Ventricle: Normal right ventricular cavity size. Wall thickness is normal. Right ventricle wall motion  is normal. Systolic function is normal.    IVC/SVC: Low central venous pressure.

## 2024-04-24 ENCOUNTER — TELEPHONE (OUTPATIENT)
Dept: NEUROLOGY | Facility: CLINIC | Age: 74
End: 2024-04-24
Payer: MEDICARE

## 2024-04-24 PROBLEM — E83.42 HYPOMAGNESEMIA: Status: ACTIVE | Noted: 2024-04-24

## 2024-04-24 PROBLEM — K59.00 CONSTIPATION: Status: ACTIVE | Noted: 2024-04-24

## 2024-04-24 PROBLEM — E03.9 HYPOTHYROIDISM: Status: ACTIVE | Noted: 2024-04-24

## 2024-04-24 PROBLEM — G47.00 INSOMNIA: Status: ACTIVE | Noted: 2024-04-24

## 2024-04-24 PROBLEM — E83.39 HYPOPHOSPHATEMIA: Status: ACTIVE | Noted: 2024-04-24

## 2024-04-24 LAB
ALBUMIN SERPL BCP-MCNC: 2.9 G/DL (ref 3.5–5.2)
ALP SERPL-CCNC: 64 U/L (ref 55–135)
ALT SERPL W/O P-5'-P-CCNC: 10 U/L (ref 10–44)
ANION GAP SERPL CALC-SCNC: 10 MMOL/L (ref 8–16)
AST SERPL-CCNC: 20 U/L (ref 10–40)
BASOPHILS # BLD AUTO: 0.08 K/UL (ref 0–0.2)
BASOPHILS NFR BLD: 0.5 % (ref 0–1.9)
BILIRUB SERPL-MCNC: 0.7 MG/DL (ref 0.1–1)
BUN SERPL-MCNC: 19 MG/DL (ref 8–23)
CALCIUM SERPL-MCNC: 9.7 MG/DL (ref 8.7–10.5)
CHLORIDE SERPL-SCNC: 110 MMOL/L (ref 95–110)
CO2 SERPL-SCNC: 22 MMOL/L (ref 23–29)
CREAT SERPL-MCNC: 1 MG/DL (ref 0.5–1.4)
DIFFERENTIAL METHOD BLD: ABNORMAL
EOSINOPHIL # BLD AUTO: 0.1 K/UL (ref 0–0.5)
EOSINOPHIL NFR BLD: 0.6 % (ref 0–8)
ERYTHROCYTE [DISTWIDTH] IN BLOOD BY AUTOMATED COUNT: 14.5 % (ref 11.5–14.5)
EST. GFR  (NO RACE VARIABLE): 58.4 ML/MIN/1.73 M^2
GLUCOSE SERPL-MCNC: 119 MG/DL (ref 70–110)
HCT VFR BLD AUTO: 33.8 % (ref 37–48.5)
HGB BLD-MCNC: 11 G/DL (ref 12–16)
IMM GRANULOCYTES # BLD AUTO: 0.09 K/UL (ref 0–0.04)
IMM GRANULOCYTES NFR BLD AUTO: 0.6 % (ref 0–0.5)
LACTATE SERPL-SCNC: 1.2 MMOL/L (ref 0.5–2.2)
LYMPHOCYTES # BLD AUTO: 1.4 K/UL (ref 1–4.8)
LYMPHOCYTES NFR BLD: 9 % (ref 18–48)
MAGNESIUM SERPL-MCNC: 1.8 MG/DL (ref 1.6–2.6)
MCH RBC QN AUTO: 31.3 PG (ref 27–31)
MCHC RBC AUTO-ENTMCNC: 32.5 G/DL (ref 32–36)
MCV RBC AUTO: 96 FL (ref 82–98)
MONOCYTES # BLD AUTO: 1.4 K/UL (ref 0.3–1)
MONOCYTES NFR BLD: 9.1 % (ref 4–15)
NEUTROPHILS # BLD AUTO: 12.5 K/UL (ref 1.8–7.7)
NEUTROPHILS NFR BLD: 80.2 % (ref 38–73)
NRBC BLD-RTO: 0 /100 WBC
PHOSPHATE SERPL-MCNC: 2.3 MG/DL (ref 2.7–4.5)
PLATELET # BLD AUTO: 224 K/UL (ref 150–450)
PMV BLD AUTO: 10.9 FL (ref 9.2–12.9)
POCT GLUCOSE: 113 MG/DL (ref 70–110)
POCT GLUCOSE: 97 MG/DL (ref 70–110)
POTASSIUM SERPL-SCNC: 4 MMOL/L (ref 3.5–5.1)
PROCALCITONIN SERPL IA-MCNC: 0.08 NG/ML
PROT SERPL-MCNC: 6.5 G/DL (ref 6–8.4)
RBC # BLD AUTO: 3.52 M/UL (ref 4–5.4)
SODIUM SERPL-SCNC: 142 MMOL/L (ref 136–145)
WBC # BLD AUTO: 15.61 K/UL (ref 3.9–12.7)

## 2024-04-24 PROCEDURE — 63600175 PHARM REV CODE 636 W HCPCS

## 2024-04-24 PROCEDURE — 92526 ORAL FUNCTION THERAPY: CPT

## 2024-04-24 PROCEDURE — 92523 SPEECH SOUND LANG COMPREHEN: CPT

## 2024-04-24 PROCEDURE — 83605 ASSAY OF LACTIC ACID: CPT

## 2024-04-24 PROCEDURE — 63600175 PHARM REV CODE 636 W HCPCS: Performed by: NURSE PRACTITIONER

## 2024-04-24 PROCEDURE — 84145 PROCALCITONIN (PCT): CPT

## 2024-04-24 PROCEDURE — 99499 UNLISTED E&M SERVICE: CPT | Mod: 95,G0,, | Performed by: PSYCHIATRY & NEUROLOGY

## 2024-04-24 PROCEDURE — 11000001 HC ACUTE MED/SURG PRIVATE ROOM

## 2024-04-24 PROCEDURE — 80053 COMPREHEN METABOLIC PANEL: CPT

## 2024-04-24 PROCEDURE — 63600175 PHARM REV CODE 636 W HCPCS: Mod: JZ,JG

## 2024-04-24 PROCEDURE — 84100 ASSAY OF PHOSPHORUS: CPT

## 2024-04-24 PROCEDURE — 97535 SELF CARE MNGMENT TRAINING: CPT

## 2024-04-24 PROCEDURE — 25000003 PHARM REV CODE 250: Performed by: NURSE PRACTITIONER

## 2024-04-24 PROCEDURE — 83735 ASSAY OF MAGNESIUM: CPT

## 2024-04-24 PROCEDURE — 85025 COMPLETE CBC W/AUTO DIFF WBC: CPT

## 2024-04-24 PROCEDURE — 97530 THERAPEUTIC ACTIVITIES: CPT

## 2024-04-24 PROCEDURE — 94761 N-INVAS EAR/PLS OXIMETRY MLT: CPT

## 2024-04-24 PROCEDURE — 25000003 PHARM REV CODE 250

## 2024-04-24 PROCEDURE — 99233 SBSQ HOSP IP/OBS HIGH 50: CPT | Mod: ,,, | Performed by: NURSE PRACTITIONER

## 2024-04-24 RX ORDER — LANOLIN ALCOHOL/MO/W.PET/CERES
800 CREAM (GRAM) TOPICAL
Status: DISCONTINUED | OUTPATIENT
Start: 2024-04-24 | End: 2024-04-25

## 2024-04-24 RX ORDER — LOSARTAN POTASSIUM 50 MG/1
50 TABLET ORAL DAILY
Status: DISCONTINUED | OUTPATIENT
Start: 2024-04-24 | End: 2024-04-25

## 2024-04-24 RX ORDER — SODIUM,POTASSIUM PHOSPHATES 280-250MG
2 POWDER IN PACKET (EA) ORAL
Status: DISCONTINUED | OUTPATIENT
Start: 2024-04-24 | End: 2024-04-25

## 2024-04-24 RX ORDER — CARVEDILOL 12.5 MG/1
12.5 TABLET ORAL 2 TIMES DAILY
Status: DISCONTINUED | OUTPATIENT
Start: 2024-04-24 | End: 2024-04-24

## 2024-04-24 RX ORDER — LABETALOL HCL 20 MG/4 ML
10 SYRINGE (ML) INTRAVENOUS ONCE
Status: COMPLETED | OUTPATIENT
Start: 2024-04-24 | End: 2024-04-24

## 2024-04-24 RX ORDER — CARVEDILOL 6.25 MG/1
6.25 TABLET ORAL 2 TIMES DAILY
Status: DISCONTINUED | OUTPATIENT
Start: 2024-04-24 | End: 2024-04-26

## 2024-04-24 RX ADMIN — ESCITALOPRAM OXALATE 20 MG: 20 TABLET, FILM COATED ORAL at 08:04

## 2024-04-24 RX ADMIN — HEPARIN SODIUM 5000 UNITS: 5000 INJECTION INTRAVENOUS; SUBCUTANEOUS at 02:04

## 2024-04-24 RX ADMIN — ACETAMINOPHEN 650 MG: 325 TABLET ORAL at 04:04

## 2024-04-24 RX ADMIN — DIPHENHYDRAMINE HYDROCHLORIDE 25 MG: 25 CAPSULE ORAL at 12:04

## 2024-04-24 RX ADMIN — CARVEDILOL 6.25 MG: 6.25 TABLET, FILM COATED ORAL at 09:04

## 2024-04-24 RX ADMIN — POTASSIUM & SODIUM PHOSPHATES POWDER PACK 280-160-250 MG 2 PACKET: 280-160-250 PACK at 08:04

## 2024-04-24 RX ADMIN — TICAGRELOR 90 MG: 90 TABLET ORAL at 09:04

## 2024-04-24 RX ADMIN — HYDRALAZINE HYDROCHLORIDE 10 MG: 20 INJECTION, SOLUTION INTRAMUSCULAR; INTRAVENOUS at 05:04

## 2024-04-24 RX ADMIN — Medication 800 MG: at 08:04

## 2024-04-24 RX ADMIN — ATORVASTATIN CALCIUM 80 MG: 40 TABLET, FILM COATED ORAL at 08:04

## 2024-04-24 RX ADMIN — CARVEDILOL 12.5 MG: 12.5 TABLET, FILM COATED ORAL at 08:04

## 2024-04-24 RX ADMIN — HEPARIN SODIUM 5000 UNITS: 5000 INJECTION INTRAVENOUS; SUBCUTANEOUS at 09:04

## 2024-04-24 RX ADMIN — POTASSIUM & SODIUM PHOSPHATES POWDER PACK 280-160-250 MG 2 PACKET: 280-160-250 PACK at 04:04

## 2024-04-24 RX ADMIN — Medication 6 MG: at 09:04

## 2024-04-24 RX ADMIN — TICAGRELOR 90 MG: 90 TABLET ORAL at 08:04

## 2024-04-24 RX ADMIN — LEVOTHYROXINE SODIUM 88 MCG: 88 TABLET ORAL at 05:04

## 2024-04-24 RX ADMIN — LOSARTAN POTASSIUM 50 MG: 50 TABLET, FILM COATED ORAL at 08:04

## 2024-04-24 RX ADMIN — HEPARIN SODIUM 5000 UNITS: 5000 INJECTION INTRAVENOUS; SUBCUTANEOUS at 05:04

## 2024-04-24 RX ADMIN — ASPIRIN 81 MG CHEWABLE TABLET 81 MG: 81 TABLET CHEWABLE at 08:04

## 2024-04-24 RX ADMIN — DOCUSATE SODIUM AND SENNOSIDES 1 TABLET: 8.6; 5 TABLET, FILM COATED ORAL at 08:04

## 2024-04-24 RX ADMIN — LABETALOL HYDROCHLORIDE 10 MG: 5 INJECTION, SOLUTION INTRAVENOUS at 06:04

## 2024-04-24 RX ADMIN — DOCUSATE SODIUM AND SENNOSIDES 1 TABLET: 8.6; 5 TABLET, FILM COATED ORAL at 09:04

## 2024-04-24 RX ADMIN — Medication 800 MG: at 04:04

## 2024-04-24 NOTE — SUBJECTIVE & OBJECTIVE
Neurologic Chief Complaint: Left sided weakness with dysarthria. New R MCA stroke.    Subjective:     Interval History: Patient is seen for follow-up neurological assessment and treatment recommendations: no acute events overnight. Patient remains stable. PT/OT rec low intensity outpt. Cleared for Puree diet.    HPI, Past Medical, Family, and Social History remains the same as documented in the initial encounter.     Review of Systems   Neurological:  Positive for speech difficulty.     Scheduled Meds:  Current Facility-Administered Medications   Medication Dose Route Frequency    aspirin  81 mg Oral Daily    atorvastatin  80 mg Oral Daily    carvediloL  12.5 mg Oral BID    EScitalopram oxalate  20 mg Oral Daily    heparin (porcine)  5,000 Units Subcutaneous Q8H    levothyroxine  88 mcg Oral Before breakfast    losartan  50 mg Oral Daily    melatonin  6 mg Oral Nightly    polyethylene glycol  17 g Oral QHS    senna-docusate 8.6-50 mg  1 tablet Oral BID    ticagrelor  90 mg Oral BID     Continuous Infusions:  Current Facility-Administered Medications   Medication Dose Route Frequency Last Rate Last Admin     PRN Meds:  Current Facility-Administered Medications:     acetaminophen, 650 mg, Oral, Q4H PRN    albuterol-ipratropium, 3 mL, Nebulization, Q6H PRN    bisacodyL, 10 mg, Rectal, Daily PRN    hydrALAZINE, 10 mg, Intravenous, Q4H PRN    magnesium oxide, 800 mg, Oral, PRN    magnesium oxide, 800 mg, Oral, PRN    ondansetron, 4 mg, Intravenous, Q8H PRN    potassium bicarbonate, 35 mEq, Oral, PRN    potassium bicarbonate, 50 mEq, Oral, PRN    potassium bicarbonate, 60 mEq, Oral, PRN    potassium, sodium phosphates, 2 packet, Oral, PRN    potassium, sodium phosphates, 2 packet, Oral, PRN    potassium, sodium phosphates, 2 packet, Oral, PRN    racepinephrine, 0.5 mL, Nebulization, Q4H PRN    sodium chloride 0.9%, 10 mL, Intravenous, PRN    sodium chloride 0.9%, 10 mL, Intravenous, PRN    Objective:     Vital Signs (Most  Recent):  Temp: 97.7 °F (36.5 °C) (04/24/24 0701)  Pulse: 93 (04/24/24 0901)  Resp: 20 (04/24/24 0901)  BP: 137/64 (04/24/24 0901)  SpO2: 99 % (04/24/24 0901)  BP Location: Left arm    Vital Signs Range (Last 24H):  Temp:  [97.7 °F (36.5 °C)-98.7 °F (37.1 °C)]   Pulse:  []   Resp:  [17-40]   BP: (108-209)/(49-91)   SpO2:  [95 %-100 %]   BP Location: Left arm       Physical Exam  HENT:      Head: Normocephalic and atraumatic.      Nose: Nose normal.      Mouth/Throat:      Mouth: Mucous membranes are dry.   Skin:     General: Skin is warm.   Neurological:      Mental Status: She is alert.              Neurological Exam:   LOC: alert  Attention Span: Good   Language: Expressive aphasia  Articulation: Dysarthria  Orientation: Person, Place, Time   Visual Fields: Full  Motor: Arm left  Normal 5/5  Leg left  Normal 5/5  Arm right  Normal 5/5  Leg right Normal 5/5  Sensation: Intact to light touch, temperature and vibration  Tone: Normal tone throughout    Laboratory:  CMP:   Recent Labs   Lab 04/24/24  0156   CALCIUM 9.7   ALBUMIN 2.9*   PROT 6.5      K 4.0   CO2 22*      BUN 19   CREATININE 1.0   ALKPHOS 64   ALT 10   AST 20   BILITOT 0.7     CBC:   Recent Labs   Lab 04/24/24  0156   WBC 15.61*   RBC 3.52*   HGB 11.0*   HCT 33.8*      MCV 96   MCH 31.3*   MCHC 32.5       Diagnostic Results     Brain Imaging     MRI Brain wo contrast 4/22/24  5 mm acute infarct right basal ganglia.  Subtle cortical restricted diffusion right insular cortex and frontal operculum consistent with ischemic injury unless there is clinical suspicion of seizure activity.  This is potentially reversible, perhaps for continued follow-up.     Expected evolution of recent left deep white matter infarct and perhaps subacute right temporal lobe infarct similar in appearance from the prior.     No evidence of intracranial hemorrhage or mass effect.    Vessel Imaging     CTA Stroke 4/22/24  Occlusion M2 segment right MCA.   Suspected early parenchymal change a cerebral infarction in the right temporal lobe.     Chronic occlusion left PCA.     Evolving area of hypoattenuation in the left corona radiata related to known recent infarct.  Additional multifocal remote infarcts as detailed in the body of the report.     Narrowing of the right PCA similar to prior.       IR Angiogram 4/22/24  Right MCA angiogram demonstrated occlusion of the distal MCA beyond a prominent anterior temporal artery.     There was TICI 3 reperfusion using a single pass aspiration thrombectomy technique.  Patient tolerated the procedure well.     Left carotid and left vertebral angiography demonstrated no additional abnormality.    Cardiac Imaging     Echo 4/23/24    Left Ventricle: The left ventricle is normal in size. Normal wall thickness. There is normal systolic function with a visually estimated ejection fraction of 60 - 65%. There is indeterminate diastolic function.    Right Ventricle: Normal right ventricular cavity size. Wall thickness is normal. Right ventricle wall motion  is normal. Systolic function is normal.    IVC/SVC: Low central venous pressure.

## 2024-04-24 NOTE — PT/OT/SLP EVAL
Speech Language Pathology Evaluation  Cognitive Communication    Patient Name:  Denise Berrios   MRN:  [id number]  Admitting Diagnosis: Embolic stroke involving right middle cerebral artery    Recommendations:     Recommendations:                General Recommendations:  Dysphagia therapy, Speech/language therapy, and Cognitive-linguistic therapy  Diet recommendations:  Puree Diet - IDDSI Level 4, Mildly thick/Nectar thick liquids - IDDSI Level 2   Aspiration Precautions: 1 bite/sip at a time, Alternating bites/sips, Assistance with meals and Assistance with thickening liquids, Avoid talking while eating, Check for pocketing/oral residue, Eliminate distractions, Feed only when awake/alert, Frequent oral care, HOB to 90 degrees, Meds crushed in puree, Monitor for s/s of aspiration, Small bites/sips, and Strict aspiration precautions   General Precautions: Standard, aspiration, fall  Communication strategies:  provide increased time to answer and go to room if call light pushed    Assessment:     Denise Berrios is a 74 y.o. female with an SLP diagnosis of Dysphagia, Dysarthria, and Cognitive-Linguistic Impairment.      History:     Past Medical History:   Diagnosis Date    Acid reflux     Graves disease     diffuse Toxic Goiter    Hypertension     IBS (irritable bowel syndrome)     Lichen sclerosus     Migraine     Stroke        Past Surgical History:   Procedure Laterality Date    CEREBRAL ANGIOGRAM N/A 4/22/2024    Procedure: ANGIOGRAM-CEREBRAL; stroke class A;  Surgeon: Sharon Welch;  Location: [facility];  Service: Anesthesiology;  Laterality: N/A;    EYE SURGERY      HYSTERECTOMY      CINDI for ?? cervical cancer, had normal paps    LEFT HEART CATHETERIZATION N/A 10/20/2020    Procedure: Left heart cath;  Surgeon: Bony Schultz MD;  Location: [facility] CATH LAB/EP;  Service: Cardiology;  Laterality: N/A;    REVERSE TOTAL SHOULDER ARTHROPLASTY Right 8/19/2022    Procedure: ARTHROPLASTY, SHOULDER, TOTAL, REVERSE;   "Surgeon: Bradley Finnegan Jr., MD;  Location: [facility];  Service: Orthopedics;  Laterality: Right;  MARLEYFazal linda CONFIRMED/8/18/Shaista Finnegan- hemosorb AT      Prior Intubation HX:  4/22    Modified Barium Swallow: none this admission     Chest X-Rays: 4/22: "Cardiac size is normal endotracheal tube is in place of its tip proximally 25 mm above the kim. Patchy fibrotic streaks and a can be seen in the right lung and loss of volume and a little consolidation is seen at the left lung base. Right shoulder prosthesis is present."    Prior diet: regular/thin.    Subjective     SLP cleared by nurse for session.   "Full." Per pt.    Pain/Comfort:  Pain Rating 1: 0/10  Pain Rating Post-Intervention 1: 0/10    Respiratory Status: Room air    Objective:     Cognitive Status:    Arousal/Alertness Delayed response to stimuli intermittently   Attention Sustained attention deficit suspected  Orientation Oriented x4  Memory Immediate Recall 5/5 digits Delayed Recall 2/3 unassociated items after 2 minute delay  Problem Solving Solutions 1/1       Receptive Language:   Comprehension:    WFL  Questions Complex yes/no 3/3  Commands  multistep basic commands 3/3    Pragmatics:    L    Expressive Language:  Verbal:    Pt speaks mostly in 1-2 word utterances and sometimes utilizes gestures to supplement verbal output.   Naming Convergent 2/2 and Single word responsive naming 1/2, 2/2 given cues.   Nonverbal:   Gestures during responsive naming task.      Motor Speech:  Significant Dysarthria c/b articulatory imprecision and poor speech breath coordination resulting in low speech intelligibility.     Voice:   Vocal quality- strained,further assessment warranted  2/2 limited verbal output.    Visual-Spatial:  TBA    Reading:   TBA      Written Expression:   TBA    Treatment: Pt seen at the bedside; HOB raised prior to administering PO trials. Pt endorsing low appetite 2/2 recently receiving medicine from nurse " despite not eating breakfast yet this morning. Pt given 1/2 tsp sip of thin liquids x1, full tsp x1, cup sip x2, and straw sip x2. Pt with delayed swallow initiation and coughing on 1/2 straw sips. Pt reporting feeling nauseous with PO intake. Nurse notified.  Pt took one small straw sip of nectar thick liquids with no overt s/s of aspiration, but politely declining further trials 2/2 full feeling/nausea. SLP recommending staying on current puree/nectar thick diet at this time. Education provided re: diet recs, role of SLP, aspiration precautions, OMEs, and ongoing SLP plan of care. Pt expressed understanding. Education to be ongoing.     Goals:   Multidisciplinary Problems       SLP Goals          Problem: SLP    Goal Priority Disciplines Outcome   SLP Goal     SLP    Description: Speech Language Pathology Goals  Goals expected to be met by 4/30:  1. Pt will tolerate pureed diet with nectar thick liquids with good oral clearance and without s/s of aspiration.   2. Pt will participate in ongoing swallowing assessment to determine if safe for further diet advancement.   3. Pt will perform oral motor exercises x 5-10 for to improve oral motor strength, ROM, and function.   4. Pt will participate in speech/language/cognitive evaluation to establish further intervention.- Initiated 4/24  5. Pt will independently recall speech strategies to improve intelligibility.   6. Pt will utilize speech strategies at the single word level to improve intelligibility.   7. Pt will participate in evaluation of reading, writing, and visuospatial skills.                        Plan:   Patient to be seen:  4 x/week   Plan of Care expires:  05/22/24  Plan of Care reviewed with:  patient   SLP Follow-Up:  Yes       Discharge recommendations:  Therapy Intensity Recommendations at Discharge: High Intensity Therapy   Barriers to Discharge:  Level of Skilled Assistance Needed      Time Tracking:     SLP Treatment Date:   04/24/24  Speech Start  Time:  0853  Speech Stop Time:  0919     Speech Total Time (min):  26 min    Billable Minutes: Eval 9 , Treatment Swallowing Dysfunction 9, and Self Care/Home Management Training 8    04/24/2024

## 2024-04-24 NOTE — ASSESSMENT & PLAN NOTE
74 year old female with PMHx of HTN, CAD, left sided CVA who presents from home with  after a fall from bed. LKN approximately 830pm. At 0200,  heard a noise and found patient had rolled out of bed. Patient was found to have confusion, L sided weakness, and L sided facial droop.   Antithrombotics for secondary stroke prevention: Antiplatelets: Aspirin: 81 mg daily  Statins for secondary stroke prevention and hyperlipidemia, if present:   Statins: Atorvastatin- 80 mg daily  Aggressive risk factor modification: HTN, CAD  Rehab efforts: The patient has been evaluated by a stroke team provider and the therapy needs have been fully considered based off the presenting complaints and exam findings. The following therapy evaluations are needed: PT evaluate and treat, OT evaluate and treat, SLP evaluate and treat, PM&R evaluate for appropriate placement  Diagnostics ordered/pending: HgbA1C to assess blood glucose levels, Lipid Profile to assess cholesterol levels, MRI head without contrast to assess brain parenchyma, TTE to assess cardiac function/status , TSH to assess thyroid function  VTE prophylaxis: Heparin 5000 units SQ every 8 hours  BP parameters: Infarct: SBP goal 160-220, concern for pressure dependent exam, HOB flat, NS bolus, NS gtt.   - Admit to [facility]  - Vital signs and neuro checks q1h  - MRI brain 12N  - Limited echo pending  - Stroke risk workup  - IR consulted, to go for thrombectomy   - Vascular Neurology consult  - 4/23/2024 Transfer to stroke service, changed beta blocker to coreg 6.25 mg bid, added home losartan 25 mg daily, dced lopressor, CTH stable on 4/22, melatonin 6 mg nightly  - 4/24/2024 Transfer to Stroke, electrolytes replaced, increased losartan to 50 mg daily

## 2024-04-24 NOTE — PT/OT/SLP PROGRESS
"Occupational Therapy   Treatment    Name: Denise Berrios  MRN: [id number]  Admitting Diagnosis:  Embolic stroke involving right middle cerebral artery  2 Days Post-Op    Recommendations:     Discharge Recommendations: Low Intensity Therapy  Discharge Equipment Recommendations:  none  Barriers to discharge:  None    Assessment:     Denise Berrios is a 74 y.o. female with a medical diagnosis of Embolic stroke involving right middle cerebral artery.  She presents with the following performance deficits affecting function: decreased safety awareness, impaired balance, weakness, impaired self care skills, impaired functional mobility, gait instability, impaired cognition. Pt is mobilizing well - continues to demo anxiety, but able to calm with deep breathing and reassurance. Pt required SBA-CGA for all mobility today, and able to complete UBD and LBD with supervision today.  Patient continues to demonstrate the need for low intensity therapy on a scheduled basis exhibited by decreased independence with self-care and functional mobility       Rehab Prognosis:  Good; patient would benefit from acute skilled OT services to address these deficits and reach maximum level of function.       Plan:     Patient to be seen 3 x/week to address the above listed problems via self-care/home management, therapeutic activities, therapeutic exercises, neuromuscular re-education  Plan of Care Expires: 05/23/24  Plan of Care Reviewed with: patient, spouse    Subjective     Chief Complaint: none verbalized  Patient/Family Comments/goals: "She's having a harder time with her speech today"  Pain/Comfort:  Pain Rating 1: 0/10    Objective:     Communicated with: RN prior to session.  Patient found HOB elevated with bed alarm, telemetry, PureWick upon OT entry to room.    General Precautions: Standard, aspiration, fall    Orthopedic Precautions:N/A  Braces: N/A  Respiratory Status: Room air     Occupational Performance:     Bed Mobility:  "   Patient completed Scooting/Bridging with supervision  Patient completed Supine to Sit with supervision     Functional Mobility/Transfers:  Patient completed Sit <> Stand Transfer with stand by assistance  with  no assistive device   Patient completed Bed <> Chair Transfer using Step Transfer technique with stand by assistance with no assistive device  Patient completed Toilet Transfer Step Transfer technique with stand by assistance with  no AD  Functional Mobility: Pt engaging in functional mobility to simulate household/community distances approx 15'x2 with CGA-SBA and utilizing no AD in order to maximize functional activity tolerance and standing balance required for engagement in occupations of choice.    Activities of Daily Living:  Grooming: stand by assistance to complete hand hygiene in stance  Upper Body Dressing: supervision to don gown as robe  Lower Body Dressing: supervision and increased time to don socks  Toileting: stand by assistance to complete hyacinth hygiene following bowel movement in toilet      [facility] 6 Click ADL: 17    Treatment & Education:  Pt educated on the following:  - role of OT and OT POC  - use of call light to request for assistance with all functional mobility to ensure safety during hospital stay  - importance of continued mobilization  - Safe transfer techniques and proper body mechanics for fall prevention and improved independence with functional transfers   - Importance of OOB activities to increase endurance and tolerance for increased participation in daily ADLs.    - Various therex pt can perform outside of therapy session to increase functional endurance and strength for overall improved independence in occupations of choice.   - benefits of continued participation in therapy.   - Pt educated on importance of calling for staff assist for functional mobility/transfers.  - All pt questions within OT scope of practice addressed, pt verbalized understanding.      Patient left up  in chair with all lines intact, call button in reach, RN notified, and  present    GOALS:   Multidisciplinary Problems       Occupational Therapy Goals          Problem: Occupational Therapy    Goal Priority Disciplines Outcome Interventions   Occupational Therapy Goal     OT, PT/OT Progressing    Description: Goals to be met by: 5/23/24     Patient will increase functional independence with ADLs by performing:    UE Dressing with Swisher.  LE Dressing with Swisher.  Grooming while standing at sink with Swisher.  Toileting from toilet with Swisher for hygiene and clothing management.   Supine to sit with Swisher.  Step transfer with Swisher  Toilet transfer to toilet with Swisher.                         Time Tracking:     OT Date of Treatment: 04/24/24  OT Start Time: 1335  OT Stop Time: 1414  OT Total Time (min): 39 min    Billable Minutes:Self Care/Home Management 28  Therapeutic Activity 11    OT/JAY: OT          4/24/2024

## 2024-04-24 NOTE — ASSESSMENT & PLAN NOTE
Denise Berrios is a 74 y.o. female with a significant medical history of recent L MCA stroke (4/10/2024), HTN, HLD, Graves' disease,IBS, migraine HAs  who presents to the hospital for evaluation of acute onset LSW, neglect, and aphasia.     -R M1 occlusion noted on CTA. The patient will be taken to IR for EVT. She will be admitted to [facility] post procedure.    Antithrombotics for secondary stroke prevention: Antiplatelets: Aspirin: 81 mg daily; Brilinta     Statins for secondary stroke prevention and hyperlipidemia, if present:   Statins: Atorvastatin- 40 mg daily    Aggressive risk factor modification: HTN, HLD, AAD, Graves' Disease     Rehab efforts: The patient has been evaluated by a stroke team provider and the therapy needs have been fully considered based off the presenting complaints and exam findings. The following therapy evaluations are needed: PT evaluate and treat, OT evaluate and treat, SLP evaluate and treat    Diagnostics ordered/pending: Recommend coordinating with cardiology to have loop recorder placement scheduled.     VTE prophylaxis: Heparin 5000 units SQ every 8 hours  Mechanical prophylaxis: Place SCDs    BP parameters: Patient may need higher pressures as her exam seems to be pressure dependent at this time. Avoid hypoperfusion.

## 2024-04-24 NOTE — TELEPHONE ENCOUNTER
----- Message from Aki Carey Patient Care Assistant sent at 4/19/2024  5:14 PM CDT -----    ----- Message -----  From: Susanne Borden  Sent: 4/19/2024   3:58 PM CDT  To: Andrew Light Staff    Type:  Needs Medical Advice    Who Called: pt  Pharmacy name and phone #:  [id number] [place] - [address] AT [facility] [address]  Would the patient rather a call back or a response via [facility]? call  Best Call Back Number: [phone]  Additional Information: Blood thinner

## 2024-04-24 NOTE — CARE UPDATE
I have reviewed the chart of Denise VIVAS Cousins who is hospitalized for the following:    Active Hospital Problems    Diagnosis    *Embolic stroke involving right middle cerebral artery    Hypophosphatemia    Constipation     1. Moderate to large amount of stool throughout the colon may reflect constipation.  No high-grade obstruction.   On miralax and senna      Hypothyroidism     On synthroid      Insomnia     On melatonin      Hypomagnesemia     Replaced in ICU      Aphasia due to acute stroke    Flaccid hemiplegia affecting left nondominant side    Cytotoxic brain edema    Essential hypertension    Dyslipidemia    Other specified hypothyroidism     post radioactive treatment for grave's      Anxiety     On lexapro      Irritable bowel syndrome    Migraine headache        Norma Quiñones NP  [facility]

## 2024-04-24 NOTE — PLAN OF CARE
04/24/24 1219   Post-Acute Status   Post-Acute Authorization Home Health   Home Health Status Referrals Sent   Coverage Medicare/ for Life   Patient choice form signed by patient/caregiver   (Return to [facility])   Discharge Plan   Discharge Plan A Home Health   Discharge Plan B Home Health       Met with patient and  to review discharge recommendation of home health and is agreeable to plan.  Patient is current with [facility] and would like to resume.     Patient/family provided list of facilities in-network with patient's payor plan. Providers that are owned, operated, or affiliated with [facility] are included on the list.     Notified that referral sent to below listed facilities from in-network list based on proximity to home/family support:   [facility]  2.  3.  4.  5. (can send more than 5)    Patient/family instructed to identify preference.    Preferred Facility: (if more than 1, listed in order of descending preference)   [facility]    If an additional preferred facility not listed above is identified, additional referral to be sent. If above facilities unable to accept, will send additional referrals to in-network providers.       Discharge Plan A and Plan B have been determined by review of patient's clinical status, future medical and therapeutic needs, and coverage/benefits for post-acute care in coordination with multidisciplinary team members.      Cristina Phan RN, CCRN-K, [facility]  Neuro-Critical Care   [phone]

## 2024-04-24 NOTE — PROGRESS NOTES
[facility]  Vascular Neurology  Comprehensive Stroke Center  Progress Note    Assessment/Plan:     * Embolic stroke involving right middle cerebral artery  Denise Berrios is a 74 y.o. female with a significant medical history of recent L MCA stroke (4/10/2024), HTN, HLD, Graves' disease,IBS, migraine HAs  who presents to the hospital for evaluation of acute onset LSW, neglect, and aphasia.     -R M1 occlusion noted on CTA. The patient will be taken to IR for EVT. She will be admitted to [facility] post procedure.    Antithrombotics for secondary stroke prevention: Antiplatelets: Aspirin: 81 mg daily; Brilinta     Statins for secondary stroke prevention and hyperlipidemia, if present:   Statins: Atorvastatin- 40 mg daily    Aggressive risk factor modification: HTN, HLD, AAD, Graves' Disease     Rehab efforts: The patient has been evaluated by a stroke team provider and the therapy needs have been fully considered based off the presenting complaints and exam findings. The following therapy evaluations are needed: PT evaluate and treat, OT evaluate and treat, SLP evaluate and treat    Diagnostics ordered/pending: Recommend coordinating with cardiology to have loop recorder placement scheduled.     VTE prophylaxis: Heparin 5000 units SQ every 8 hours  Mechanical prophylaxis: Place SCDs    BP parameters: Patient may need higher pressures as her exam seems to be pressure dependent at this time. Avoid hypoperfusion.        Flaccid hemiplegia affecting left nondominant side  Aphasia due to acute stroke   -Stroke symptoms. Echo without thrombosis or other structural abnormality.     Essential hypertension  -Stroke risk factor.  SBP< 220.  -At this time the patient's exam seems to be pressure dependant.  -HOB flat, IVFs  -Keep SBP>160, avoid hypotension  -Re-evaluate plan after IR procedure     Dyslipidemia  -Stroke risk factor. Last LDL 85.2 on 4/10/2024.  -Continue atorvastatin 40 mg daily.    Other specified  hypothyroidism  -TSH 4.129  -Continue home synthroid         74 year old female with recent L MCA stroke presented for R MCA embolic stroke status post thrombectomy. Extubated on 4/22, improving motor strength but with severe dysarthria and aphasia. Echo without abnormality, no arrhythmias captured on telemetry. MRI s/p thrombectomy with 5 mm acute right basal ganglia infarct with restricted diffusion right insular cortex and frontal operculum. Working w/ PT/OT/Speech, passed swallow eval. Recommended primary team to schedule patient for loop recorder placement outpt prior to discharge.    STROKE DOCUMENTATION   Acute Stroke Times   Last Known Normal Date: 04/21/24  Last Known Normal Time: 2000  Unknown Symptom Onset Date: Unknown Date  Unknown Symptom Onset Time: Unknown Time  Stroke Team Called Date: 04/22/24  Stroke Team Called Time: 0309  Stroke Team Arrival Date: 04/22/24  Stroke Team Arrival Time: 0314  CT Interpretation Time: 0319  Thrombolytic Therapy Recommended: No  CTA Interpretation Time: 0330 (delay due to issues w/IV and CT injector.)  Thrombectomy Recommended: Yes  Decision to Treat Time for IR: 0351    NIH Scale:  1a. Level of Consciousness: 0-->Alert, keenly responsive  1b. LOC Questions: 1-->Answers one question correctly  1c. LOC Commands: 0-->Performs both tasks correctly  2. Best Gaze: 0-->Normal  3. Visual: 0-->No visual loss  4. Facial Palsy: 1-->Minor paralysis (flattened nasolabial fold, asymmetry on smiling)  5a. Motor Arm, Left: 0-->No drift, limb holds 90 (or 45) degrees for full 10 secs  5b. Motor Arm, Right: 0-->No drift, limb holds 90 (or 45) degrees for full 10 secs  6a. Motor Leg, Left: 0-->No drift, leg holds 30 degree position for full 5 secs  6b. Motor Leg, Right: 0-->No drift, leg holds 30 degree position for full 5 secs  7. Limb Ataxia: 0-->Absent  8. Sensory: 0-->Normal, no sensory loss  9. Best Language: 2-->Severe aphasia, all communication is through fragmentary expression,  great need for inference, questioning, and guessing by the listener. Range of information that can be exchanged is limited, listener carries burden of. . . (see row details)  10. Dysarthria: 2-->Severe dysarthria, patients speech is so slurred as to be unintelligible in the absence of or out of proportion to any dysphasia, or is mute/anarthric  11. Extinction and Inattention (formerly Neglect): 0-->No abnormality  Total (NIH Stroke Scale): 6       Modified Nicola Score: 2  Inocencia Coma Scale:    ABCD2 Score:    [id number] Score:   HAS -BLED Score:   ICH Score:   Hunt & Velazquez Classification:      Hemorrhagic change of an Ischemic Stroke: Does this patient have an ischemic stroke with hemorrhagic changes? No     Neurologic Chief Complaint: Left sided weakness with dysarthria. New R MCA stroke.    Subjective:     Interval History: Patient is seen for follow-up neurological assessment and treatment recommendations: no acute events overnight. Patient remains stable. PT/OT rec low intensity outpt. Cleared for Puree diet.    HPI, Past Medical, Family, and Social History remains the same as documented in the initial encounter.     Review of Systems   Neurological:  Positive for speech difficulty.     Scheduled Meds:  Current Facility-Administered Medications   Medication Dose Route Frequency    aspirin  81 mg Oral Daily    atorvastatin  80 mg Oral Daily    carvediloL  12.5 mg Oral BID    EScitalopram oxalate  20 mg Oral Daily    heparin (porcine)  5,000 Units Subcutaneous Q8H    levothyroxine  88 mcg Oral Before breakfast    losartan  50 mg Oral Daily    melatonin  6 mg Oral Nightly    polyethylene glycol  17 g Oral QHS    senna-docusate 8.6-50 mg  1 tablet Oral BID    ticagrelor  90 mg Oral BID     Continuous Infusions:  Current Facility-Administered Medications   Medication Dose Route Frequency Last Rate Last Admin     PRN Meds:  Current Facility-Administered Medications:     acetaminophen, 650 mg, Oral, Q4H PRN     albuterol-ipratropium, 3 mL, Nebulization, Q6H PRN    bisacodyL, 10 mg, Rectal, Daily PRN    hydrALAZINE, 10 mg, Intravenous, Q4H PRN    magnesium oxide, 800 mg, Oral, PRN    magnesium oxide, 800 mg, Oral, PRN    ondansetron, 4 mg, Intravenous, Q8H PRN    potassium bicarbonate, 35 mEq, Oral, PRN    potassium bicarbonate, 50 mEq, Oral, PRN    potassium bicarbonate, 60 mEq, Oral, PRN    potassium, sodium phosphates, 2 packet, Oral, PRN    potassium, sodium phosphates, 2 packet, Oral, PRN    potassium, sodium phosphates, 2 packet, Oral, PRN    racepinephrine, 0.5 mL, Nebulization, Q4H PRN    sodium chloride 0.9%, 10 mL, Intravenous, PRN    sodium chloride 0.9%, 10 mL, Intravenous, PRN    Objective:     Vital Signs (Most Recent):  Temp: 97.7 °F (36.5 °C) (04/24/24 0701)  Pulse: 93 (04/24/24 0901)  Resp: 20 (04/24/24 0901)  BP: 137/64 (04/24/24 0901)  SpO2: 99 % (04/24/24 0901)  BP Location: Left arm    Vital Signs Range (Last 24H):  Temp:  [97.7 °F (36.5 °C)-98.7 °F (37.1 °C)]   Pulse:  []   Resp:  [17-40]   BP: (108-209)/(49-91)   SpO2:  [95 %-100 %]   BP Location: Left arm       Physical Exam  HENT:      Head: Normocephalic and atraumatic.      Nose: Nose normal.      Mouth/Throat:      Mouth: Mucous membranes are dry.   Skin:     General: Skin is warm.   Neurological:      Mental Status: She is alert.              Neurological Exam:   LOC: alert  Attention Span: Good   Language: Expressive aphasia  Articulation: Dysarthria  Orientation: Person, Place, Time   Visual Fields: Full  Motor: Arm left  Normal 5/5  Leg left  Normal 5/5  Arm right  Normal 5/5  Leg right Normal 5/5  Sensation: Intact to light touch, temperature and vibration  Tone: Normal tone throughout    Laboratory:  CMP:   Recent Labs   Lab 04/24/24  0156   CALCIUM 9.7   ALBUMIN 2.9*   PROT 6.5      K 4.0   CO2 22*      BUN 19   CREATININE 1.0   ALKPHOS 64   ALT 10   AST 20   BILITOT 0.7     CBC:   Recent Labs   Lab 04/24/24  0156   WBC  15.61*   RBC 3.52*   HGB 11.0*   HCT 33.8*      MCV 96   MCH 31.3*   MCHC 32.5       Diagnostic Results     Brain Imaging     MRI Brain wo contrast 4/22/24  5 mm acute infarct right basal ganglia.  Subtle cortical restricted diffusion right insular cortex and frontal operculum consistent with ischemic injury unless there is clinical suspicion of seizure activity.  This is potentially reversible, perhaps for continued follow-up.     Expected evolution of recent left deep white matter infarct and perhaps subacute right temporal lobe infarct similar in appearance from the prior.     No evidence of intracranial hemorrhage or mass effect.    Vessel Imaging     CTA Stroke 4/22/24  Occlusion M2 segment right MCA.  Suspected early parenchymal change a cerebral infarction in the right temporal lobe.     Chronic occlusion left PCA.     Evolving area of hypoattenuation in the left corona radiata related to known recent infarct.  Additional multifocal remote infarcts as detailed in the body of the report.     Narrowing of the right PCA similar to prior.       IR Angiogram 4/22/24  Right MCA angiogram demonstrated occlusion of the distal MCA beyond a prominent anterior temporal artery.     There was TICI 3 reperfusion using a single pass aspiration thrombectomy technique.  Patient tolerated the procedure well.     Left carotid and left vertebral angiography demonstrated no additional abnormality.    Cardiac Imaging     Echo 4/23/24    Left Ventricle: The left ventricle is normal in size. Normal wall thickness. There is normal systolic function with a visually estimated ejection fraction of 60 - 65%. There is indeterminate diastolic function.    Right Ventricle: Normal right ventricular cavity size. Wall thickness is normal. Right ventricle wall motion  is normal. Systolic function is normal.    IVC/SVC: Low central venous pressure.       Tammy Min DO  [facility]  Department of Vascular Neurology    [facility]

## 2024-04-24 NOTE — NURSING
Nursing Transfer Note       Transfer To     Transfer via bed    Transfer with cardiac monitoring    Transported by SUSANNAH Reis    Medicines sent:  N/A    Chart sent with patient: Yes    Belongings sent with patient: (specify belongings)    Notified: spouse    Bedside Neuro assessment performed: Yes    Bedside Handoff given to: Moy    Upon arrival to floor: cardiac monitor applied, patient oriented to room, call bell in reach, and bed in lowest position

## 2024-04-24 NOTE — PLAN OF CARE
[facility] Care Plan    POC reviewed with Denise VIVAS Cousins and family at 0300. Pt verbalized understanding. Questions and concerns addressed. No acute events overnight. Pt progressing toward goals. Will continue to monitor. See below and flowsheets for full assessment and VS info.     NAEON. Hydralazien x1 for SBP>160, 25 mg benadryl for sleep and anxiety.   Pt aox4, severely dysarthric and denotes expressive aphasia. Strong and equal throughout all extremities. Denies numbness/tingling/vision changes.  Aspiration precautions in place.  Advanced to pureed diet with nectar thick liquids.   Sepsis watch initiated. Labs sent         Is this a stroke patient? yes- Stroke booklet reviewed with patient, risk factors identified for patient and stroke booklet remains at bedside for ongoing education.     Care individualization: monitor BP, monitor blood glucose, aspirations precautions    Neuro:  Inocencia Coma Scale  Best Eye Response: 4-->(E4) spontaneous  Best Motor Response: 6-->(M6) obeys commands  Best Verbal Response: 5-->(V5) oriented  Buxton Coma Scale Score: 15  Assessment Qualifiers: patient intubated  Pupil PERRLA: yes     24hr Temp:  [98 °F (36.7 °C)-98.7 °F (37.1 °C)]     CV:   Rhythm: normal sinus rhythm  BP goals:   SBP < 160  MAP > 65    Resp:      Vent Mode: Spont  Set Rate: 12 BPM  Oxygen Concentration (%): 40  Vt Set: 420 mL  PEEP/CPAP: 5 cmH20  Pressure Support: 5 cmH20    Plan: N/A    GI/:     Diet/Nutrition Received: NPO  Last Bowel Movement: 04/23/24  Voiding Characteristics: external catheter    Intake/Output Summary (Last 24 hours) at 4/24/2024 0624  Last data filed at 4/24/2024 0151  Gross per 24 hour   Intake 100 ml   Output 1100 ml   Net -1000 ml     Unmeasured Output  Urine Occurrence: 1  Stool Occurrence: 1  Pad Count: 1    Labs/Accuchecks:  Recent Labs   Lab 04/24/24  0156   WBC 15.61*   RBC 3.52*   HGB 11.0*   HCT 33.8*         Recent Labs   Lab 04/24/24  0156      K 4.0   CO2 22*  "     BUN 19   CREATININE 1.0   ALKPHOS 64   ALT 10   AST 20   BILITOT 0.7      Recent Labs   Lab 24  0402   INR 1.2    No results for input(s): "CPK", "CPKMB", "TROPONINI", "MB" in the last 168 hours.    Electrolytes: Electrolytes replaced  Accuchecks: Q6H    Gtts:  Current Facility-Administered Medications   Medication Dose Route Frequency Last Rate Last Admin       LDA/Wounds:    Nurses Note -- 4 Eyes    Is there altered skin present? no       Prevention Measures Documented    Second RN/Staff Member:      Restraints: n/a  Restraint Order  Length of Order: Order good for next 24 hours or when removed.  Date that the current order will : 24  Time that the current order will : 747  Order Upon Application: Yes    WC         Problem: Adjustment to Illness (Stroke, Ischemic/Transient Ischemic Attack)  Goal: Optimal Coping  Outcome: Progressing     Problem: Bowel Elimination Impaired (Stroke, Ischemic/Transient Ischemic Attack)  Goal: Effective Bowel Elimination  Outcome: Progressing     Problem: Cerebral Tissue Perfusion (Stroke, Ischemic/Transient Ischemic Attack)  Goal: Optimal Cerebral Tissue Perfusion  Outcome: Progressing     Problem: Cognitive Impairment (Stroke, Ischemic/Transient Ischemic Attack)  Goal: Optimal Cognitive Function  Outcome: Progressing     Problem: Communication Impairment (Stroke, Ischemic/Transient Ischemic Attack)  Goal: Improved Communication Skills  Outcome: Progressing     Problem: Functional Ability Impaired (Stroke, Ischemic/Transient Ischemic Attack)  Goal: Optimal Functional Ability  Outcome: Progressing     Problem: Respiratory Compromise (Stroke, Ischemic/Transient Ischemic Attack)  Goal: Effective Oxygenation and Ventilation  Outcome: Progressing     Problem: Sensorimotor Impairment (Stroke, Ischemic/Transient Ischemic Attack)  Goal: Improved Sensorimotor Function  Outcome: Progressing     Problem: Swallowing Impairment (Stroke, Ischemic/Transient " Ischemic Attack)  Goal: Optimal Eating and Swallowing without Aspiration  Outcome: Progressing     Problem: Urinary Elimination Impaired (Stroke, Ischemic/Transient Ischemic Attack)  Goal: Effective Urinary Elimination  Outcome: Progressing     Problem: Adult Inpatient Plan of Care  Goal: Plan of Care Review  Outcome: Progressing  Goal: Patient-Specific Goal (Individualized)  Outcome: Progressing  Goal: Absence of Hospital-Acquired Illness or Injury  Outcome: Progressing  Goal: Optimal Comfort and Wellbeing  Outcome: Progressing  Goal: Readiness for Transition of Care  Outcome: Progressing     Problem: Fall Injury Risk  Goal: Absence of Fall and Fall-Related Injury  Outcome: Progressing     Problem: Restraint, Nonbehavioral (Nonviolent)  Goal: Absence of Harm or Injury  Outcome: Progressing     Problem: Skin Injury Risk Increased  Goal: Skin Health and Integrity  Outcome: Progressing     Problem: Wound  Goal: Optimal Coping  Outcome: Progressing  Goal: Optimal Functional Ability  Outcome: Progressing  Goal: Absence of Infection Signs and Symptoms  Outcome: Progressing  Goal: Improved Oral Intake  Outcome: Progressing  Goal: Optimal Pain Control and Function  Outcome: Progressing  Goal: Skin Health and Integrity  Outcome: Progressing  Goal: Optimal Wound Healing  Outcome: Progressing

## 2024-04-24 NOTE — NURSING
. Yogesh, NP aware. PO medications adjusted and administered to patient. Will continue to monitor.

## 2024-04-24 NOTE — SUBJECTIVE & OBJECTIVE
Past Medical History:   Diagnosis Date    Acid reflux     Graves disease     diffuse Toxic Goiter    Hypertension     IBS (irritable bowel syndrome)     Lichen sclerosus     Migraine     Stroke      Past Surgical History:   Procedure Laterality Date    CEREBRAL ANGIOGRAM N/A 4/22/2024    Procedure: ANGIOGRAM-CEREBRAL; stroke class A;  Surgeon: Sharon Welch;  Location: [facility];  Service: Anesthesiology;  Laterality: N/A;    EYE SURGERY      HYSTERECTOMY      CINDI for ?? cervical cancer, had normal paps    LEFT HEART CATHETERIZATION N/A 10/20/2020    Procedure: Left heart cath;  Surgeon: Bony Schultz MD;  Location: [facility] CATH LAB/EP;  Service: Cardiology;  Laterality: N/A;    REVERSE TOTAL SHOULDER ARTHROPLASTY Right 8/19/2022    Procedure: ARTHROPLASTY, SHOULDER, TOTAL, REVERSE;  Surgeon: Bradley Finnegan Jr., MD;  Location: [facility] OR;  Service: Orthopedics;  Laterality: Right;  SONIA linda CONFIRMED/8/18/Shaista Finnegan- hemosorb AT       No current facility-administered medications on file prior to encounter.     Current Outpatient Medications on File Prior to Encounter   Medication Sig Dispense Refill    aspirin (ECOTRIN) 81 MG EC tablet once a day      atorvastatin (LIPITOR) 40 MG tablet Take 1 tablet (40 mg total) by mouth once daily. 90 tablet 0    butalbital-acetaminophen-caffeine -40 mg (FIORICET, ESGIC) -40 mg per tablet Take 1 tablet by mouth nightly as needed for Headaches. 30 tablet 5    clobetasol 0.05% (TEMOVATE) 0.05 % Oint APPLY TOPICALLY TWICE A DAY 45 g 1    diclofenac sodium (VOLTAREN) 1 % Gel Apply 2 g topically 3 (three) times daily. 100 g 3    ergocalciferol (VITAMIN D2) 50,000 unit Cap Take 1 capsule (50,000 Units total) by mouth once a week. 12 capsule 3    EScitalopram oxalate (LEXAPRO) 20 MG tablet Take 1 tablet (20 mg total) by mouth once daily. 90 tablet 3    levothyroxine (SYNTHROID) 88 MCG tablet Take 1 tablet (88 mcg total) by mouth before breakfast.  90 tablet 3    losartan (COZAAR) 50 MG tablet Take 1 tablet (50 mg total) by mouth once daily. 90 tablet 3    metoprolol tartrate (LOPRESSOR) 50 MG tablet Take 1 tablet (50 mg total) by mouth 2 (two) times daily. 180 tablet 3    naloxone (NARCAN) 4 mg/actuation Spry 4mg by nasal route as needed for opioid overdose; may repeat every 2-3 minutes in alternating nostrils until medical help arrives. Call 911 (Patient not taking: Reported on 4/15/2024) 1 each 11    nortriptyline (PAMELOR) 10 MG capsule Take 1 capsule (10 mg total) by mouth once daily. 90 capsule 3    omeprazole (PRILOSEC) 40 MG capsule Take 1 capsule (40 mg total) by mouth once daily. 90 capsule 3    oxybutynin (DITROPAN-XL) 10 MG 24 hr tablet Take 1 tablet (10 mg total) by mouth once daily. 90 tablet 3    pregabalin (LYRICA) 75 MG capsule TAKE 1 CAPSULE BY MOUTH TWICE DAILY FOR NEUROPATHIC PAIN      promethazine (PHENERGAN) 12.5 MG Tab Take 1 tablet (12.5 mg total) by mouth every 6 (six) hours as needed (nausea and or vomiting). 10 tablet 0    traMADoL (ULTRAM) 50 mg tablet Take 50 mg by mouth every 6 (six) hours as needed for Pain.        Allergies: Phenobarbital, Penicillins, Phenobarbital sodium, Propylthiouracil, Thyroid, Topamax [topiramate], Cefaclor, Clopidogrel, Methimazole, Methimazole (bulk), Penicillin, and Sulfa (sulfonamide antibiotics)  Family History   Problem Relation Name Age of Onset    Kidney failure Father      Hypertension Father      Stomach cancer Mother      Breast cancer Neg Hx       Social History     Tobacco Use    Smoking status: Never     Passive exposure: Never    Smokeless tobacco: Never   Substance Use Topics    Alcohol use: Yes    Drug use: No     Review of Systems   Constitutional:  Negative for fever.   HENT:  Negative for ear pain and facial swelling.    Eyes:  Negative for discharge and itching.   Respiratory:  Negative for cough.    Cardiovascular:  Negative for chest pain.   Gastrointestinal:  Negative for  constipation.   Endocrine: Negative for cold intolerance.   Genitourinary:  Negative for dysuria.   Musculoskeletal:  Negative for back pain.   Allergic/Immunologic: Negative for immunocompromised state.   Neurological:  Positive for facial asymmetry, speech difficulty and weakness. Negative for dizziness, seizures and headaches.   Psychiatric/Behavioral:  The patient is nervous/anxious.      Objective:     Vitals:    Temp: 98.1 °F (36.7 °C)  Pulse: 82  Rhythm: normal sinus rhythm  BP: 137/62  MAP (mmHg): 91  Resp: 18  SpO2: (!) 94 %    Temp  Min: 97.7 °F (36.5 °C)  Max: 98.7 °F (37.1 °C)  Pulse  Min: 73  Max: 104  BP  Min: 104/54  Max: 209/84  MAP (mmHg)  Min: 70  Max: 121  Resp  Min: 16  Max: 40  SpO2  Min: 94 %  Max: 100 %    04/23 0701 - 04/24 0700  In: 100 [P.O.:100]  Out: 750 [Urine:750]   Unmeasured Output  Urine Occurrence: 1  Stool Occurrence: 1  Pad Count: 1        Physical Exam  Constitutional:       Appearance: She is well-developed.   HENT:      Head: Normocephalic and atraumatic.   Cardiovascular:      Rate and Rhythm: Normal rate and regular rhythm.   Pulmonary:      Effort: Pulmonary effort is normal.   Skin:     General: Skin is warm and dry.   Neurological:      Mental Status: She is alert.      Comments:   [id number]  PERRL. Right gaze preference  Left facial droop  Expressive aphasia   Follows commands in all extremities  RUE 4/5  LUE 4/5  RLE 4/5  LLE 4/5           Today I personally reviewed pertinent medications, lines/drains/airways, imaging, cardiology results, laboratory results, notably: CMP, CBC, PT/INR, TSH, Lipid panel, Mag, EKG, CTA.

## 2024-04-24 NOTE — NURSING
Nurses Note -- 4 Eyes      4/24/2024   11:48 AM      Skin assessed during: Transfer      [x] No Altered Skin Integrity Present    [x]Prevention Measures Documented      [] Yes- Altered Skin Integrity Present or Discovered   [] LDA Added if Not in Epic (Describe Wound)   [] New Altered Skin Integrity was Present on Admit and Documented in LDA   [] Wound Image Taken    Wound Care Consulted? No    Attending Nurse:  SUSANNAH Winter    Second RN/Staff Member:   SUSANNAH Thompson

## 2024-04-24 NOTE — PLAN OF CARE
OT POC reviewed. Goals remain appropriate.    Problem: Occupational Therapy  Goal: Occupational Therapy Goal  Description: Goals to be met by: 5/23/24     Patient will increase functional independence with ADLs by performing:    UE Dressing with [place].  LE Dressing with [place].  Grooming while standing at sink with [place].  Toileting from toilet with [place] for hygiene and clothing management.   Supine to sit with [place].  Step transfer with [place]  Toilet transfer to toilet with [place].    Outcome: Progressing

## 2024-04-24 NOTE — PROGRESS NOTES
[facility] - Neurosurgery ([facility])  Neurocritical Care  Progress Note    Admit Date: 4/22/2024  Service Date: 04/24/2024  Length of Stay: 2    Subjective:     Chief Complaint: Embolic stroke involving right middle cerebral artery    History of Present Illness: 74 year old female with PMHx of HTN, CAD, left sided CVA who presents from home with  after a fall from bed. LKN approximately 830pm. At 0200,  heard a noise and found patient had rolled out of bed. Patient was found to have confusion, L sided weakness, and L sided facial droop. Brought to ER, CTA shows right M2 occlusion. Evaluated by IR. Taken for thrombectomy. Will admit to [facility] post thrombectomy.       Of note, pt was found to have a L corona  radiata CVA on 4/11 and was discharged home with plavix. Plavix was stopped two Fridays ago due to hives. Pending holter monitor.    Hospital Course: 4/23/2024 Transfer to stroke service, changed beta blocker to coreg 6.25 mg bid, added home losartan 25 mg daily, CTH stable on 4/22 4/24/2024 Transfer to Stroke, electrolytes replaced      Past Medical History:   Diagnosis Date    Acid reflux     Graves disease     diffuse Toxic Goiter    Hypertension     IBS (irritable bowel syndrome)     Lichen sclerosus     Migraine     Stroke      Past Surgical History:   Procedure Laterality Date    CEREBRAL ANGIOGRAM N/A 4/22/2024    Procedure: ANGIOGRAM-CEREBRAL; stroke class A;  Surgeon: Sharon Welch;  Location: [facility];  Service: Anesthesiology;  Laterality: N/A;    EYE SURGERY      HYSTERECTOMY      CINDI for ?? cervical cancer, had normal paps    LEFT HEART CATHETERIZATION N/A 10/20/2020    Procedure: Left heart cath;  Surgeon: Bony Schultz MD;  Location: [facility] CATH LAB/EP;  Service: Cardiology;  Laterality: N/A;    REVERSE TOTAL SHOULDER ARTHROPLASTY Right 8/19/2022    Procedure: ARTHROPLASTY, SHOULDER, TOTAL, REVERSE;  Surgeon: Bradley Finnegan Jr., MD;  Location: [facility] OR;  Service: Orthopedics;   Laterality: Right;  SONIA linda CONFIRMED/8/18/Shaista  Jean Finnegan- hemosorb AT       No current facility-administered medications on file prior to encounter.     Current Outpatient Medications on File Prior to Encounter   Medication Sig Dispense Refill    aspirin (ECOTRIN) 81 MG EC tablet once a day      atorvastatin (LIPITOR) 40 MG tablet Take 1 tablet (40 mg total) by mouth once daily. 90 tablet 0    butalbital-acetaminophen-caffeine -40 mg (FIORICET, ESGIC) -40 mg per tablet Take 1 tablet by mouth nightly as needed for Headaches. 30 tablet 5    clobetasol 0.05% (TEMOVATE) 0.05 % Oint APPLY TOPICALLY TWICE A DAY 45 g 1    diclofenac sodium (VOLTAREN) 1 % Gel Apply 2 g topically 3 (three) times daily. 100 g 3    ergocalciferol (VITAMIN D2) 50,000 unit Cap Take 1 capsule (50,000 Units total) by mouth once a week. 12 capsule 3    EScitalopram oxalate (LEXAPRO) 20 MG tablet Take 1 tablet (20 mg total) by mouth once daily. 90 tablet 3    levothyroxine (SYNTHROID) 88 MCG tablet Take 1 tablet (88 mcg total) by mouth before breakfast. 90 tablet 3    losartan (COZAAR) 50 MG tablet Take 1 tablet (50 mg total) by mouth once daily. 90 tablet 3    metoprolol tartrate (LOPRESSOR) 50 MG tablet Take 1 tablet (50 mg total) by mouth 2 (two) times daily. 180 tablet 3    naloxone (NARCAN) 4 mg/actuation Spry 4mg by nasal route as needed for opioid overdose; may repeat every 2-3 minutes in alternating nostrils until medical help arrives. Call 911 (Patient not taking: Reported on 4/15/2024) 1 each 11    nortriptyline (PAMELOR) 10 MG capsule Take 1 capsule (10 mg total) by mouth once daily. 90 capsule 3    omeprazole (PRILOSEC) 40 MG capsule Take 1 capsule (40 mg total) by mouth once daily. 90 capsule 3    oxybutynin (DITROPAN-XL) 10 MG 24 hr tablet Take 1 tablet (10 mg total) by mouth once daily. 90 tablet 3    pregabalin (LYRICA) 75 MG capsule TAKE 1 CAPSULE BY MOUTH TWICE DAILY FOR NEUROPATHIC PAIN       promethazine (PHENERGAN) 12.5 MG Tab Take 1 tablet (12.5 mg total) by mouth every 6 (six) hours as needed (nausea and or vomiting). 10 tablet 0    traMADoL (ULTRAM) 50 mg tablet Take 50 mg by mouth every 6 (six) hours as needed for Pain.        Allergies: Phenobarbital, Penicillins, Phenobarbital sodium, Propylthiouracil, Thyroid, Topamax [topiramate], Cefaclor, Clopidogrel, Methimazole, Methimazole (bulk), Penicillin, and Sulfa (sulfonamide antibiotics)  Family History   Problem Relation Name Age of Onset    Kidney failure Father      Hypertension Father      Stomach cancer Mother      Breast cancer Neg Hx       Social History     Tobacco Use    Smoking status: Never     Passive exposure: Never    Smokeless tobacco: Never   Substance Use Topics    Alcohol use: Yes    Drug use: No     Review of Systems   Constitutional:  Negative for fever.   HENT:  Negative for ear pain and facial swelling.    Eyes:  Negative for discharge and itching.   Respiratory:  Negative for cough.    Cardiovascular:  Negative for chest pain.   Gastrointestinal:  Negative for constipation.   Endocrine: Negative for cold intolerance.   Genitourinary:  Negative for dysuria.   Musculoskeletal:  Negative for back pain.   Allergic/Immunologic: Negative for immunocompromised state.   Neurological:  Positive for facial asymmetry, speech difficulty and weakness. Negative for dizziness, seizures and headaches.   Psychiatric/Behavioral:  The patient is nervous/anxious.      Objective:     Vitals:    Temp: 98.1 °F (36.7 °C)  Pulse: 82  Rhythm: normal sinus rhythm  BP: 137/62  MAP (mmHg): 91  Resp: 18  SpO2: (!) 94 %    Temp  Min: 97.7 °F (36.5 °C)  Max: 98.7 °F (37.1 °C)  Pulse  Min: 73  Max: 104  BP  Min: 104/54  Max: 209/84  MAP (mmHg)  Min: 70  Max: 121  Resp  Min: 16  Max: 40  SpO2  Min: 94 %  Max: 100 %    04/23 0701 - 04/24 0700  In: 100 [P.O.:100]  Out: 750 [Urine:750]   Unmeasured Output  Urine Occurrence: 1  Stool Occurrence: 1  Pad Count: 1         Physical Exam  Constitutional:       Appearance: She is well-developed.   HENT:      Head: Normocephalic and atraumatic.   Cardiovascular:      Rate and Rhythm: Normal rate and regular rhythm.   Pulmonary:      Effort: Pulmonary effort is normal.   Skin:     General: Skin is warm and dry.   Neurological:      Mental Status: She is alert.      Comments:   [id number]  PERRL. Right gaze preference  Left facial droop  Expressive aphasia   Follows commands in all extremities  RUE 4/5  LUE 4/5  RLE 4/5  LLE 4/5           Today I personally reviewed pertinent medications, lines/drains/airways, imaging, cardiology results, laboratory results, notably: CMP, CBC, PT/INR, TSH, Lipid panel, Mag, EKG, CTA.         Assessment/Plan:     Neuro  * Embolic stroke involving right middle cerebral artery  74 year old female with PMHx of HTN, CAD, left sided CVA who presents from home with  after a fall from bed. LKN approximately 830pm. At 0200,  heard a noise and found patient had rolled out of bed. Patient was found to have confusion, L sided weakness, and L sided facial droop.   Antithrombotics for secondary stroke prevention: Antiplatelets: Aspirin: 81 mg daily  Statins for secondary stroke prevention and hyperlipidemia, if present:   Statins: Atorvastatin- 80 mg daily  Aggressive risk factor modification: HTN, CAD  Rehab efforts: The patient has been evaluated by a stroke team provider and the therapy needs have been fully considered based off the presenting complaints and exam findings. The following therapy evaluations are needed: PT evaluate and treat, OT evaluate and treat, SLP evaluate and treat, PM&R evaluate for appropriate placement  Diagnostics ordered/pending: HgbA1C to assess blood glucose levels, Lipid Profile to assess cholesterol levels, MRI head without contrast to assess brain parenchyma, TTE to assess cardiac function/status , TSH to assess thyroid function  VTE prophylaxis: Heparin 5000 units SQ  every 8 hours  BP parameters: Infarct: SBP goal 160-220, concern for pressure dependent exam, HOB flat, NS bolus, NS gtt.   - Admit to NCC  - Vital signs and neuro checks q1h  - MRI brain 12N  - Limited echo pending  - Stroke risk workup  - IR consulted, to go for thrombectomy   - Vascular Neurology consult  - 4/23/2024 Transfer to stroke service, changed beta blocker to coreg 6.25 mg bid, added home losartan 25 mg daily, dced lopressor, CTH stable on 4/22, melatonin 6 mg nightly  - 4/24/2024 Transfer to Stroke, electrolytes replaced, increased losartan to 50 mg daily        Cytotoxic brain edema  - Hourly neuro checks  - See primary dx     Flaccid hemiplegia affecting left nondominant side  - PT/OT    Aphasia due to acute stroke  - SLP following        Cardiac/Vascular  Essential hypertension  SBP goal < 160  Losartan 50 mg daily   Coreg 6.25 mg bid    Dyslipidemia  - Lipid Panel  - Atorvastatin 80mg     Endocrine  Other specified hypothyroidism  - TSH  - Continue home synthroid            Activity Orders            Diet Pureed (IDDSI Level 4) Nectar Thick (Mildly Thick): Pureed starting at 04/23 2101    Progressive Mobility Protocol (mobilize patient to their highest level of functioning at least twice daily) starting at 04/22 0800    Turn patient starting at 04/22 0600          Full Code    Yogesh Ramirez NP  Neurocritical Care  [facility] - Neurosurgery ([facility])

## 2024-04-24 NOTE — NURSING
bedside called this RN into the room because pt was pocketing her lunch and some was spilling from her mouth. Pt does not like the food but states it is difficult for her to swallow. She was given 4 spoonfuls of thickened water and had no s/s of aspiration. She did not pocket but required encouragement to swallow.  bedside states she has not had much to drink in a few days and will help her with small spoonfuls of water. 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 Will hold PO intake if she coughs/gurgles/etc. SLP contacted to reassess.    Giulia CHOWDARY also notified.

## 2024-04-25 PROBLEM — R82.71 ASYMPTOMATIC BACTERIURIA: Status: ACTIVE | Noted: 2024-04-25

## 2024-04-25 LAB
ALBUMIN SERPL BCP-MCNC: 3 G/DL (ref 3.5–5.2)
ALP SERPL-CCNC: 66 U/L (ref 55–135)
ALT SERPL W/O P-5'-P-CCNC: 10 U/L (ref 10–44)
ANION GAP SERPL CALC-SCNC: 9 MMOL/L (ref 8–16)
AST SERPL-CCNC: 21 U/L (ref 10–40)
BASOPHILS # BLD AUTO: 0.08 K/UL (ref 0–0.2)
BASOPHILS NFR BLD: 0.8 % (ref 0–1.9)
BILIRUB SERPL-MCNC: 0.7 MG/DL (ref 0.1–1)
BUN SERPL-MCNC: 22 MG/DL (ref 8–23)
CALCIUM SERPL-MCNC: 9.9 MG/DL (ref 8.7–10.5)
CHLORIDE SERPL-SCNC: 112 MMOL/L (ref 95–110)
CO2 SERPL-SCNC: 22 MMOL/L (ref 23–29)
CREAT SERPL-MCNC: 1 MG/DL (ref 0.5–1.4)
DIFFERENTIAL METHOD BLD: ABNORMAL
EOSINOPHIL # BLD AUTO: 0.2 K/UL (ref 0–0.5)
EOSINOPHIL NFR BLD: 2.1 % (ref 0–8)
ERYTHROCYTE [DISTWIDTH] IN BLOOD BY AUTOMATED COUNT: 14.6 % (ref 11.5–14.5)
EST. GFR  (NO RACE VARIABLE): 58.4 ML/MIN/1.73 M^2
GLUCOSE SERPL-MCNC: 109 MG/DL (ref 70–110)
HCT VFR BLD AUTO: 33.5 % (ref 37–48.5)
HGB BLD-MCNC: 11.3 G/DL (ref 12–16)
IMM GRANULOCYTES # BLD AUTO: 0.06 K/UL (ref 0–0.04)
IMM GRANULOCYTES NFR BLD AUTO: 0.6 % (ref 0–0.5)
LYMPHOCYTES # BLD AUTO: 1.5 K/UL (ref 1–4.8)
LYMPHOCYTES NFR BLD: 13.9 % (ref 18–48)
MAGNESIUM SERPL-MCNC: 2 MG/DL (ref 1.6–2.6)
MCH RBC QN AUTO: 32.1 PG (ref 27–31)
MCHC RBC AUTO-ENTMCNC: 33.7 G/DL (ref 32–36)
MCV RBC AUTO: 95 FL (ref 82–98)
MONOCYTES # BLD AUTO: 1.3 K/UL (ref 0.3–1)
MONOCYTES NFR BLD: 11.9 % (ref 4–15)
NEUTROPHILS # BLD AUTO: 7.5 K/UL (ref 1.8–7.7)
NEUTROPHILS NFR BLD: 70.7 % (ref 38–73)
NRBC BLD-RTO: 0 /100 WBC
PHOSPHATE SERPL-MCNC: 3 MG/DL (ref 2.7–4.5)
PLATELET # BLD AUTO: 243 K/UL (ref 150–450)
PMV BLD AUTO: 10.9 FL (ref 9.2–12.9)
POTASSIUM SERPL-SCNC: 3.7 MMOL/L (ref 3.5–5.1)
PROT SERPL-MCNC: 6.8 G/DL (ref 6–8.4)
RBC # BLD AUTO: 3.52 M/UL (ref 4–5.4)
SODIUM SERPL-SCNC: 143 MMOL/L (ref 136–145)
WBC # BLD AUTO: 10.57 K/UL (ref 3.9–12.7)

## 2024-04-25 PROCEDURE — 36415 COLL VENOUS BLD VENIPUNCTURE: CPT | Performed by: NURSE PRACTITIONER

## 2024-04-25 PROCEDURE — 97530 THERAPEUTIC ACTIVITIES: CPT

## 2024-04-25 PROCEDURE — 25000003 PHARM REV CODE 250

## 2024-04-25 PROCEDURE — 11000001 HC ACUTE MED/SURG PRIVATE ROOM

## 2024-04-25 PROCEDURE — 25000003 PHARM REV CODE 250: Performed by: NURSE PRACTITIONER

## 2024-04-25 PROCEDURE — 97116 GAIT TRAINING THERAPY: CPT

## 2024-04-25 PROCEDURE — 85025 COMPLETE CBC W/AUTO DIFF WBC: CPT | Performed by: NURSE PRACTITIONER

## 2024-04-25 PROCEDURE — 84100 ASSAY OF PHOSPHORUS: CPT | Performed by: NURSE PRACTITIONER

## 2024-04-25 PROCEDURE — 63600175 PHARM REV CODE 636 W HCPCS: Performed by: NURSE PRACTITIONER

## 2024-04-25 PROCEDURE — 92526 ORAL FUNCTION THERAPY: CPT

## 2024-04-25 PROCEDURE — 99233 SBSQ HOSP IP/OBS HIGH 50: CPT | Mod: GC,,, | Performed by: PSYCHIATRY & NEUROLOGY

## 2024-04-25 PROCEDURE — 83735 ASSAY OF MAGNESIUM: CPT | Performed by: NURSE PRACTITIONER

## 2024-04-25 PROCEDURE — 97535 SELF CARE MNGMENT TRAINING: CPT

## 2024-04-25 PROCEDURE — 92507 TX SP LANG VOICE COMM INDIV: CPT

## 2024-04-25 PROCEDURE — 80053 COMPREHEN METABOLIC PANEL: CPT | Performed by: NURSE PRACTITIONER

## 2024-04-25 RX ADMIN — TICAGRELOR 90 MG: 90 TABLET ORAL at 09:04

## 2024-04-25 RX ADMIN — DOCUSATE SODIUM AND SENNOSIDES 1 TABLET: 8.6; 5 TABLET, FILM COATED ORAL at 10:04

## 2024-04-25 RX ADMIN — HEPARIN SODIUM 5000 UNITS: 5000 INJECTION INTRAVENOUS; SUBCUTANEOUS at 10:04

## 2024-04-25 RX ADMIN — CARVEDILOL 6.25 MG: 6.25 TABLET, FILM COATED ORAL at 09:04

## 2024-04-25 RX ADMIN — DOCUSATE SODIUM AND SENNOSIDES 1 TABLET: 8.6; 5 TABLET, FILM COATED ORAL at 09:04

## 2024-04-25 RX ADMIN — ASPIRIN 81 MG CHEWABLE TABLET 81 MG: 81 TABLET CHEWABLE at 09:04

## 2024-04-25 RX ADMIN — CARVEDILOL 6.25 MG: 6.25 TABLET, FILM COATED ORAL at 10:04

## 2024-04-25 RX ADMIN — TICAGRELOR 90 MG: 90 TABLET ORAL at 10:04

## 2024-04-25 RX ADMIN — LEVOTHYROXINE SODIUM 88 MCG: 88 TABLET ORAL at 05:04

## 2024-04-25 RX ADMIN — LOSARTAN POTASSIUM 75 MG: 50 TABLET, FILM COATED ORAL at 09:04

## 2024-04-25 RX ADMIN — ATORVASTATIN CALCIUM 80 MG: 40 TABLET, FILM COATED ORAL at 09:04

## 2024-04-25 RX ADMIN — HEPARIN SODIUM 5000 UNITS: 5000 INJECTION INTRAVENOUS; SUBCUTANEOUS at 02:04

## 2024-04-25 RX ADMIN — Medication 6 MG: at 10:04

## 2024-04-25 RX ADMIN — POLYETHYLENE GLYCOL 3350 17 G: 17 POWDER, FOR SOLUTION ORAL at 10:04

## 2024-04-25 RX ADMIN — HYDRALAZINE HYDROCHLORIDE 10 MG: 20 INJECTION, SOLUTION INTRAMUSCULAR; INTRAVENOUS at 05:04

## 2024-04-25 RX ADMIN — ESCITALOPRAM OXALATE 20 MG: 20 TABLET, FILM COATED ORAL at 09:04

## 2024-04-25 RX ADMIN — HEPARIN SODIUM 5000 UNITS: 5000 INJECTION INTRAVENOUS; SUBCUTANEOUS at 05:04

## 2024-04-25 NOTE — ASSESSMENT & PLAN NOTE
-Stroke risk factor.  SBP< 220.  -some concern for pressure dependent exam this hospitalization  -coreg 6.25 BID  -increasing losartan to 75mg QD

## 2024-04-25 NOTE — SUBJECTIVE & OBJECTIVE
Neurologic Chief Complaint: Left sided weakness with dysarthria. New R MCA stroke.    Subjective:     Interval History: Patient is seen for follow-up neurological assessment and treatment recommendations: Pocketing food yesterday afternoon; made NPO pending SLP re-eval. Some concern for dark stools today described as watery; no confounding meds such as tums or iron. Hgb stable, will continue to monitor; low concern for GIB.    HPI, Past Medical, Family, and Social History remains the same as documented in the initial encounter.     Review of Systems   Neurological:  Positive for speech difficulty.     Scheduled Meds:  Current Facility-Administered Medications   Medication Dose Route Frequency    aspirin  81 mg Oral Daily    atorvastatin  80 mg Oral Daily    carvediloL  6.25 mg Oral BID    EScitalopram oxalate  20 mg Oral Daily    heparin (porcine)  5,000 Units Subcutaneous Q8H    levothyroxine  88 mcg Oral Before breakfast    losartan  75 mg Oral Daily    melatonin  6 mg Oral Nightly    polyethylene glycol  17 g Oral QHS    senna-docusate 8.6-50 mg  1 tablet Oral BID    ticagrelor  90 mg Oral BID     Continuous Infusions:  Current Facility-Administered Medications   Medication Dose Route Frequency Last Rate Last Admin     PRN Meds:  Current Facility-Administered Medications:     acetaminophen, 650 mg, Oral, Q4H PRN    albuterol-ipratropium, 3 mL, Nebulization, Q6H PRN    bisacodyL, 10 mg, Rectal, Daily PRN    hydrALAZINE, 10 mg, Intravenous, Q4H PRN    ondansetron, 4 mg, Intravenous, Q8H PRN    racepinephrine, 0.5 mL, Nebulization, Q4H PRN    sodium chloride 0.9%, 10 mL, Intravenous, PRN    sodium chloride 0.9%, 10 mL, Intravenous, PRN    Objective:     Vital Signs (Most Recent):  Temp: 98 °F (36.7 °C) (04/25/24 0748)  Pulse: 103 (04/25/24 0748)  Resp: 20 (04/25/24 0748)  BP: (!) 185/75 (04/25/24 0748)  SpO2: 95 % (04/25/24 0748)  BP Location: Left arm    Vital Signs Range (Last 24H):  Temp:  [97.7 °F (36.5 °C)-98.5  °F (36.9 °C)]   Pulse:  []   Resp:  [16-20]   BP: (134-196)/(59-78)   SpO2:  [93 %-95 %]   BP Location: Left arm       Physical Exam  HENT:      Head: Normocephalic and atraumatic.      Nose: Nose normal.      Mouth/Throat:      Mouth: Mucous membranes are dry.   Skin:     General: Skin is warm.   Neurological:      Mental Status: She is alert.              Neurological Exam:   LOC: alert  Attention Span: Good   Language: Expressive aphasia  Articulation: Dysarthria  Orientation: Person, Place, Time   Visual Fields: Full  Motor: Arm left  Normal 5/5  Leg left  Normal 5/5  Arm right  Normal 5/5  Leg right Normal 5/5  Sensation: Intact to light touch, temperature and vibration  Tone: Normal tone throughout    Laboratory:  CMP:   Recent Labs   Lab 04/25/24  0440   CALCIUM 9.9   ALBUMIN 3.0*   PROT 6.8      K 3.7   CO2 22*   *   BUN 22   CREATININE 1.0   ALKPHOS 66   ALT 10   AST 21   BILITOT 0.7     CBC:   Recent Labs   Lab 04/25/24  0439   WBC 10.57   RBC 3.52*   HGB 11.3*   HCT 33.5*      MCV 95   MCH 32.1*   MCHC 33.7       Diagnostic Results     Brain Imaging     MRI Brain wo contrast 4/22/24  5 mm acute infarct right basal ganglia.  Subtle cortical restricted diffusion right insular cortex and frontal operculum consistent with ischemic injury unless there is clinical suspicion of seizure activity.  This is potentially reversible, perhaps for continued follow-up.     Expected evolution of recent left deep white matter infarct and perhaps subacute right temporal lobe infarct similar in appearance from the prior.     No evidence of intracranial hemorrhage or mass effect.    Vessel Imaging     CTA Stroke 4/22/24  Occlusion M2 segment right MCA.  Suspected early parenchymal change a cerebral infarction in the right temporal lobe.     Chronic occlusion left PCA.     Evolving area of hypoattenuation in the left corona radiata related to known recent infarct.  Additional multifocal remote infarcts  as detailed in the body of the report.     Narrowing of the right PCA similar to prior.       IR Angiogram 4/22/24  Right MCA angiogram demonstrated occlusion of the distal MCA beyond a prominent anterior temporal artery.     There was TICI 3 reperfusion using a single pass aspiration thrombectomy technique.  Patient tolerated the procedure well.     Left carotid and left vertebral angiography demonstrated no additional abnormality.    Cardiac Imaging     Echo 4/23/24    Left Ventricle: The left ventricle is normal in size. Normal wall thickness. There is normal systolic function with a visually estimated ejection fraction of 60 - 65%. There is indeterminate diastolic function.    Right Ventricle: Normal right ventricular cavity size. Wall thickness is normal. Right ventricle wall motion  is normal. Systolic function is normal.    IVC/SVC: Low central venous pressure.

## 2024-04-25 NOTE — CARE UPDATE
I have reviewed the chart of Denise VIVAS Cousins who is hospitalized for the following:    Active Hospital Problems    Diagnosis    *Embolic stroke involving right middle cerebral artery    Hypophosphatemia    Constipation     1. Moderate to large amount of stool throughout the colon may reflect constipation.  No high-grade obstruction.   On miralax and senna      Hypothyroidism     On synthroid      Insomnia     On melatonin      Hypomagnesemia     Replaced in ICU      Aphasia due to acute stroke    Flaccid hemiplegia affecting left nondominant side    Cytotoxic brain edema    UTI (urinary tract infection)     Urine culture with e coli      Essential hypertension    Dyslipidemia    Other specified hypothyroidism     post radioactive treatment for grave's      Anxiety     On lexapro      Irritable bowel syndrome    Migraine headache        Norma Quiñones NP  [facility]

## 2024-04-25 NOTE — ASSESSMENT & PLAN NOTE
Patient with leukocytosis to 16s this hospitalization with downtrend to 10s without intervention  Urine culture with E. Coli, but patient denies symptoms at this time  Low threshold to initiate antibiotic therapy if symptoms, leukocytosis, or fevers develop

## 2024-04-25 NOTE — PT/OT/SLP PROGRESS
"Speech Language Pathology Treatment    Patient Name:  Denise Berrios   MRN:  [id number]  Admitting Diagnosis: Embolic stroke involving right middle cerebral artery    Recommendations:                 General Recommendations:  Dysphagia therapy, Speech/language therapy, and Cognitive-linguistic therapy  Diet recommendations:  Puree Diet - IDDSI Level 4, Mildly thick/Nectar thick liquids - IDDSI Level 2   Aspiration Precautions: 1 bite/sip at a time, Alternating bites/sips, Assistance with meals and Assistance with thickening liquids, Avoid talking while eating, Check for pocketing/oral residue, Eliminate distractions, Feed only when awake/alert, Frequent oral care, HOB to 90 degrees, Meds crushed in puree, Monitor for s/s of aspiration, Small bites/sips, and Strict aspiration precautions   General Precautions: Standard, aspiration, fall  Communication strategies:  provide increased time to answer and go to room if call light pushed    Assessment:     Denise Berrios is a 74 y.o. female with an SLP diagnosis of Dysphagia, Dysarthria, and Cognitive-Linguistic Impairment.  Pt p/w increased facial clenching with what appears to be involuntary lingual protrusion and eye clenching. Decreased oral motor coordination with verbal cues required to initiation transit/swallow with multiple swallows and facial grimacing across all trials.  Pt unlikely to meet nutritional needs.     Subjective     "She hasn't eaten in four days."     Pain/Comfort:  Pain Rating 1: 0/10  Pain Rating Post-Intervention 1: 0/10    Respiratory Status: Room air    Objective:     Has the patient been evaluated by SLP for swallowing?   Yes  Keep patient NPO? No     Pt seen bedside, alert with spouse present.  Spouse reports progressively worsening speech and swallow function over last two days.  Pt with severe dysarthria with minimal attempts at spontaneous verbalizing.  [facility] with noted L facial droop with decreased labial retraction.  " Lingual protrusion WFL with fairly adequate strength bilaterally.  Pt with noted limited lingual movement during verbalization attempts. Speech strategies reviewed and demonstrated with some minimal carryover.  Pt indicated distaste with all PO.  Pt with increasing facial grimacing and lingual; protrusion over course of session.  Thin liquids presented via tsp x2 with immediate tongue thrust and anterior loss of both bolus.  Thin liquids presented via cup with oral hold, cued swallow intiaition and immediate cough response.  Puree presented via tsp and nectar thick liquids tolerated via tsp and cup with oral hold requiring verbal cues to initiate swallow.  Multiple swallows with grimace noted with no overt s/s aspiration. Mixed minced consistency and solid trials attempted though pt with limited oral manipulation with max cues to initiate swallow. Residue noted on lingual surface and in L buccal cavity.  Stasis removed via finger sweep by SLP.  Education provided re: diet recs, swallow precs, s/s aspiration, ice chips for pleasure between meals, importance of oral intake, speech strategies, and POC.  Spouse verbalized understanding.  Education to be ongoing.      Goals:   Multidisciplinary Problems       SLP Goals          Problem: SLP    Goal Priority Disciplines Outcome   SLP Goal     SLP    Description: Speech Language Pathology Goals  Goals expected to be met by 4/30:  1. Pt will tolerate pureed diet with nectar thick liquids with good oral clearance and without s/s of aspiration.   2. Pt will participate in ongoing swallowing assessment to determine if safe for further diet advancement.   3. Pt will perform oral motor exercises x 5-10 for to improve oral motor strength, ROM, and function.   4. Pt will participate in speech/language/cognitive evaluation to establish further intervention.- Initiated 4/24  5. Pt will independently recall speech strategies to improve intelligibility.   6. Pt will utilize speech  strategies at the single word level to improve intelligibility.   7. Pt will participate in evaluation of reading, writing, and visuospatial skills.                        Plan:     Patient to be seen:  4 x/week   Plan of Care expires:  05/22/24  Plan of Care reviewed with:  patient, spouse   SLP Follow-Up:  Yes       Discharge recommendations:  High Intensity Therapy   Barriers to Discharge:  Level of Skilled Assistance Needed      Time Tracking:     SLP Treatment Date:   04/25/24  Speech Start Time:  1110  Speech Stop Time:  1135     Speech Total Time (min):  25 min    Billable Minutes: Speech Therapy Individual 8, Treatment Swallowing Dysfunction 9, and Self Care/Home Management Training 8    04/25/2024

## 2024-04-25 NOTE — ASSESSMENT & PLAN NOTE
Noted history of IBS  Continue home SSRI  Continue bowel regimen  Patient without current complaint of abdominal pain/irregular bowel movements

## 2024-04-25 NOTE — ASSESSMENT & PLAN NOTE
Noted history of migraines on nortriptyline, tramadol at home  Will hold home meds given hospitalization for acute stroke  No complaint of headache at this time

## 2024-04-25 NOTE — PT/OT/SLP PROGRESS
Physical Therapy Treatment    Patient Name:  Denise Berrios   MRN:  [id number]    Recent Surgery: Procedure(s) (LRB):  ANGIOGRAM-CEREBRAL; stroke class A (N/A) 3 Days Post-Op    Recommendations:     Discharge Recommendations:    Low intensity therapy  Discharge Equipment Recommendations: none   Barriers to discharge: None    Highest Level of Mobility: Gait 90'  Assistance Required: CGA w/ HHAx1    Assessment:     Denise Brerios is a 74 y.o. female admitted with a medical diagnosis of Embolic stroke involving right middle cerebral artery.    Pt met with HOB elevated, spouse present. and agreeable to PT treatment. Today's PT treatment focus was on continued gait training to improve function. Pt able to complete transfers and ambulation with CGA on this date, no overt LOB. She made no attempts to vocalize today despite encouragement but followed all simple commands appropriately. She continues to be anxious throughout session and requires multiple cues for deep breathing. Pt had loose, dark stool during session and writing therapist assisted with pericare. RN notified of dark stool.    Pt is progressing towards acute PT goals appropriately and continues to benefit from acute PT sessions.    Rehab Prognosis: Good; patient would benefit from acute skilled PT services to address these deficits and reach maximum level of function.      Plan:     During this hospitalization, patient to be seen 3 x/week to address the identified rehab impairments via gait training, therapeutic activities, therapeutic exercises, neuromuscular re-education and progress toward the following goals:    Plan of Care Expires:  05/23/24    This plan of care has been discussed with the patient/caregiver, who was included in its development and is in agreement with the identified goals and treatment plan.     Subjective     Communicated with RN prior to session.  Patient agreeable to participate.     Pain/Comfort:  Pain Rating 1: 0/10  Pain Rating  Post-Intervention 1: 0/10    Chief Complaint: R MCA CVA   Patient/Family Comments/goals: None stated      Objective:     Patient found HOB elevated with bed alarm, telemetry, PureWick  upon PT entry to room.    General Precautions: Standard, fall   Orthopedic Precautions:N/A   Braces: N/A         Exams:    Cognition:  Patient is oriented to Person, Place, Time, Situation  Presented with options   Follows one-step commands   Insight to deficits/safety awareness: impaired    Functional Mobility:    Bed Mobility:  Supine to Sit: Contact Guard Assistance on L side of bed  Increased time  Loose dark stool noted in bed  Sit to Supine: Contact Guard Assistance  Scooting anteriorly to EOB to plant feet on floor: Contact Guard Assistance    Transfers:   Sit to Stand Transfer: Contact Guard Assistance  from EOB with HHAx1   Bed to Chair: Activity did not occur , pt politely declined   Toilet t/f: CGA with HHAx1  Pt had another BM on the toilet- was very dark. RN notified. PT assisted with pericare and gown change             Gait:  Patient received gait training in hallway 90 feet with Contact Guard Assistance and  HHAx1  Gait Assessment: decreased step length, narrow base of support, and flexed posture  Gait Pattern Observed: Step-through reciprocal gait  Comments: All lines remained intact throughout ambulation trial, gait belt utilized. Pt had increased anxiety when ambulating, but responded well to encouragement from therapist and cues for deep breathing. She required 1 standing rest break due to increased anxiety    Balance:  Static Sit:   Supervision at EOB   Static Stand:   Contact-Guard Assist with Hand-held assist x 1  Dynamic Stand:  Contact-Guard Assist with Hand-held assist x 1      Therapeutic Activities/Exercises     Patient assisted with functional mobility as noted above  Gait trial  Toilet t/f + pericare  Patient educated on the importance of early mobility to prevent functional decline during hospital  stay  Patient was instructed to utilize staff assistance for mobility/transfers.  Patient is appropriate to transfer with CGA and RN/PCT assist  Patient educated on PT POC and role of PT in acute care  White board updated regarding patient's safest level of mobility with staff assistance, RN also updated.     AM-PAC 6 CLICK MOBILITY  Turning over in bed (including adjusting bedclothes, sheets and blankets)?: 4  Sitting down on and standing up from a chair with arms (e.g., wheelchair, bedside commode, etc.): 3  Moving from lying on back to sitting on the side of the bed?: 4  Moving to and from a bed to a chair (including a wheelchair)?: 3  Need to walk in hospital room?: 3  Climbing 3-5 steps with a railing?: 3  Basic Mobility Total Score: 20     Patient left HOB elevated with all lines intact, call button in reach, bed alarm on, rn notified, and spouse present.        History/Goals:     PAST MEDICAL HISTORY:  Past Medical History:   Diagnosis Date    Acid reflux     Graves disease     diffuse Toxic Goiter    Hypertension     IBS (irritable bowel syndrome)     Lichen sclerosus     Migraine     Stroke        Past Surgical History:   Procedure Laterality Date    CEREBRAL ANGIOGRAM N/A 4/22/2024    Procedure: ANGIOGRAM-CEREBRAL; stroke class A;  Surgeon: Sharon Welch;  Location: [facility];  Service: Anesthesiology;  Laterality: N/A;    EYE SURGERY      HYSTERECTOMY      CINDI for ?? cervical cancer, had normal paps    LEFT HEART CATHETERIZATION N/A 10/20/2020    Procedure: Left heart cath;  Surgeon: Bony Schultz MD;  Location: [facility] CATH LAB/EP;  Service: Cardiology;  Laterality: N/A;    REVERSE TOTAL SHOULDER ARTHROPLASTY Right 8/19/2022    Procedure: ARTHROPLASTY, SHOULDER, TOTAL, REVERSE;  Surgeon: Bradley Finnegan Jr., MD;  Location: [facility] OR;  Service: Orthopedics;  Laterality: Right;  SONIA linda CONFIRMED/8/18/Shaista Finnegan- hemosorb AT        GOALS:   Multidisciplinary Problems        Physical Therapy Goals          Problem: Physical Therapy    Goal Priority Disciplines Outcome Goal Variances Interventions   Physical Therapy Goal     PT, PT/OT Progressing     Description: Goals to be met by: 24     Patient will increase functional independence with mobility by performin. Supine to sit with [place]  2. Sit to supine with [place]  3. Sit to stand transfer with [place]  4. Bed to chair transfer with Modified [place] using LRAD as needed  5. Gait  x 150 feet with Modified [place] using LRAD as needed.   6. Ascend/descend 10 stair with unilateral Handrail with Supervision.   7. Lower extremity exercise program x15 reps per handout, with assistance as needed                         Time Tracking:     PT Received On: 24  PT Start Time: 912     PT Stop Time: 947  PT Total Time (min): 35 min     Billable Minutes: Gait Training 20 and Therapeutic Activity 15      Khushi Montes De Oca, PT  2024  Pager# [phone]

## 2024-04-25 NOTE — PROGRESS NOTES
[facility] - Neurosurgery ([facility])  Vascular Neurology  Comprehensive Stroke Center  Progress Note    Assessment/Plan:     * Embolic stroke involving right middle cerebral artery  Denise Berrios is a 74 y.o. female with a significant medical history of recent L MCA stroke (4/10/2024), HTN, HLD, Graves' disease,IBS, migraine HAs  who presents to the hospital for evaluation of acute onset LSW, neglect, and aphasia.     -R M1 occlusion noted on CTA. The patient will be taken to IR for EVT. She will be admitted to NCC post procedure.    Antithrombotics for secondary stroke prevention: Antiplatelets: Aspirin: 81 mg daily; Brilinta     Statins for secondary stroke prevention and hyperlipidemia, if present:   Statins: Atorvastatin- 40 mg daily    Aggressive risk factor modification: HTN, HLD, AAD, Graves' Disease     Rehab efforts: The patient has been evaluated by a stroke team provider and the therapy needs have been fully considered based off the presenting complaints and exam findings. The following therapy evaluations are needed: PT evaluate and treat, OT evaluate and treat, SLP evaluate and treat    Diagnostics ordered/pending: Recommend coordinating with cardiology to have loop recorder placement scheduled.     VTE prophylaxis: Heparin 5000 units SQ every 8 hours  Mechanical prophylaxis: Place SCDs    BP parameters: Patient may need higher pressures as her exam seems to be pressure dependent at this time. Avoid hypoperfusion.        Asymptomatic bacteriuria  Patient with leukocytosis to 16s this hospitalization with downtrend to 10s without intervention  Urine culture with E. Coli, but patient denies symptoms at this time  Low threshold to initiate antibiotic therapy if symptoms, leukocytosis, or fevers develop    Flaccid hemiplegia affecting left nondominant side  Aphasia due to acute stroke   -Stroke symptoms. Echo without thrombosis or other structural abnormality.     Essential hypertension  -Stroke risk  factor.  SBP< 220.  -some concern for pressure dependent exam this hospitalization  -coreg 6.25 BID  -increasing losartan to 75mg QD      Migraine headache  Noted history of migraines on nortriptyline, tramadol at home  Will hold home meds given hospitalization for acute stroke  No complaint of headache at this time    Irritable bowel syndrome  Noted history of IBS  Continue home SSRI  Continue bowel regimen  Patient without current complaint of abdominal pain/irregular bowel movements    Dyslipidemia  -Stroke risk factor. Last LDL 85.2 on 4/10/2024.  -Continue atorvastatin 40 mg daily.    Other specified hypothyroidism  post radioactive treatment for grave's  -TSH 4.129  -Continue home synthroid    Anxiety  Continue lexapro         74 year old female with recent L MCA stroke presented for R MCA embolic stroke status post thrombectomy. Extubated on 4/22, improving motor strength but with severe dysarthria and aphasia. Echo without abnormality, no arrhythmias captured on telemetry. MRI s/p thrombectomy with 5 mm acute right basal ganglia infarct with restricted diffusion right insular cortex and frontal operculum. Working w/ PT/OT/Speech, passed swallow eval. Patient to follow-up with cardiac EP outpatient for evaluation for loop recorder placement.    STROKE DOCUMENTATION   Acute Stroke Times   Last Known Normal Date: 04/21/24  Last Known Normal Time: 2000  Unknown Symptom Onset Date: Unknown Date  Unknown Symptom Onset Time: Unknown Time  Stroke Team Called Date: 04/22/24  Stroke Team Called Time: 0309  Stroke Team Arrival Date: 04/22/24  Stroke Team Arrival Time: 0314  CT Interpretation Time: 0319  Thrombolytic Therapy Recommended: No  CTA Interpretation Time: 0330 (delay due to issues w/IV and CT injector.)  Thrombectomy Recommended: Yes  Decision to Treat Time for IR: 0351    NIH Scale:  1a. Level of Consciousness: 0-->Alert, keenly responsive  1b. LOC Questions: 1-->Answers one question correctly  1c. LOC  Commands: 0-->Performs both tasks correctly  2. Best Gaze: 0-->Normal  3. Visual: 0-->No visual loss  4. Facial Palsy: 1-->Minor paralysis (flattened nasolabial fold, asymmetry on smiling)  5a. Motor Arm, Left: 0-->No drift, limb holds 90 (or 45) degrees for full 10 secs  5b. Motor Arm, Right: 0-->No drift, limb holds 90 (or 45) degrees for full 10 secs  6a. Motor Leg, Left: 0-->No drift, leg holds 30 degree position for full 5 secs  6b. Motor Leg, Right: 0-->No drift, leg holds 30 degree position for full 5 secs  7. Limb Ataxia: 0-->Absent  8. Sensory: 0-->Normal, no sensory loss  9. Best Language: 2-->Severe aphasia, all communication is through fragmentary expression, great need for inference, questioning, and guessing by the listener. Range of information that can be exchanged is limited, listener carries burden of. . . (see row details)  10. Dysarthria: 2-->Severe dysarthria, patients speech is so slurred as to be unintelligible in the absence of or out of proportion to any dysphasia, or is mute/anarthric  11. Extinction and Inattention (formerly Neglect): 0-->No abnormality  Total (NIH Stroke Scale): 6       Modified Nicola Score: 2  [place] Coma Scale:    ABCD2 Score:    [id number] Score:   HAS -BLED Score:   ICH Score:   Hunt & Velazquez Classification:      Hemorrhagic change of an Ischemic Stroke: Does this patient have an ischemic stroke with hemorrhagic changes? No     Neurologic Chief Complaint: Left sided weakness with dysarthria. New R MCA stroke.    Subjective:     Interval History: Patient is seen for follow-up neurological assessment and treatment recommendations: Pocketing food yesterday afternoon; made NPO pending SLP re-eval. Some concern for dark stools today described as watery; no confounding meds such as tums or iron. Hgb stable, will continue to monitor; low concern for GIB.    HPI, Past Medical, Family, and Social History remains the same as documented in the initial encounter.     Review of  Systems   Neurological:  Positive for speech difficulty.     Scheduled Meds:  Current Facility-Administered Medications   Medication Dose Route Frequency    aspirin  81 mg Oral Daily    atorvastatin  80 mg Oral Daily    carvediloL  6.25 mg Oral BID    EScitalopram oxalate  20 mg Oral Daily    heparin (porcine)  5,000 Units Subcutaneous Q8H    levothyroxine  88 mcg Oral Before breakfast    losartan  75 mg Oral Daily    melatonin  6 mg Oral Nightly    polyethylene glycol  17 g Oral QHS    senna-docusate 8.6-50 mg  1 tablet Oral BID    ticagrelor  90 mg Oral BID     Continuous Infusions:  Current Facility-Administered Medications   Medication Dose Route Frequency Last Rate Last Admin     PRN Meds:  Current Facility-Administered Medications:     acetaminophen, 650 mg, Oral, Q4H PRN    albuterol-ipratropium, 3 mL, Nebulization, Q6H PRN    bisacodyL, 10 mg, Rectal, Daily PRN    hydrALAZINE, 10 mg, Intravenous, Q4H PRN    ondansetron, 4 mg, Intravenous, Q8H PRN    racepinephrine, 0.5 mL, Nebulization, Q4H PRN    sodium chloride 0.9%, 10 mL, Intravenous, PRN    sodium chloride 0.9%, 10 mL, Intravenous, PRN    Objective:     Vital Signs (Most Recent):  Temp: 98 °F (36.7 °C) (04/25/24 0748)  Pulse: 103 (04/25/24 0748)  Resp: 20 (04/25/24 0748)  BP: (!) 185/75 (04/25/24 0748)  SpO2: 95 % (04/25/24 0748)  BP Location: Left arm    Vital Signs Range (Last 24H):  Temp:  [97.7 °F (36.5 °C)-98.5 °F (36.9 °C)]   Pulse:  []   Resp:  [16-20]   BP: (134-196)/(59-78)   SpO2:  [93 %-95 %]   BP Location: Left arm       Physical Exam  HENT:      Head: Normocephalic and atraumatic.      Nose: Nose normal.      Mouth/Throat:      Mouth: Mucous membranes are dry.   Skin:     General: Skin is warm.   Neurological:      Mental Status: She is alert.              Neurological Exam:   LOC: alert  Attention Span: Good   Language: Expressive aphasia  Articulation: Dysarthria  Orientation: Person, Place, Time   Visual Fields: Full  Motor: Arm  left  Normal 5/5  Leg left  Normal 5/5  Arm right  Normal 5/5  Leg right Normal 5/5  Sensation: Intact to light touch, temperature and vibration  Tone: Normal tone throughout    Laboratory:  CMP:   Recent Labs   Lab 04/25/24  0440   CALCIUM 9.9   ALBUMIN 3.0*   PROT 6.8      K 3.7   CO2 22*   *   BUN 22   CREATININE 1.0   ALKPHOS 66   ALT 10   AST 21   BILITOT 0.7     CBC:   Recent Labs   Lab 04/25/24  0439   WBC 10.57   RBC 3.52*   HGB 11.3*   HCT 33.5*      MCV 95   MCH 32.1*   MCHC 33.7       Diagnostic Results     Brain Imaging     MRI Brain wo contrast 4/22/24  5 mm acute infarct right basal ganglia.  Subtle cortical restricted diffusion right insular cortex and frontal operculum consistent with ischemic injury unless there is clinical suspicion of seizure activity.  This is potentially reversible, perhaps for continued follow-up.     Expected evolution of recent left deep white matter infarct and perhaps subacute right temporal lobe infarct similar in appearance from the prior.     No evidence of intracranial hemorrhage or mass effect.    Vessel Imaging     CTA Stroke 4/22/24  Occlusion M2 segment right MCA.  Suspected early parenchymal change a cerebral infarction in the right temporal lobe.     Chronic occlusion left PCA.     Evolving area of hypoattenuation in the left corona radiata related to known recent infarct.  Additional multifocal remote infarcts as detailed in the body of the report.     Narrowing of the right PCA similar to prior.       IR Angiogram 4/22/24  Right MCA angiogram demonstrated occlusion of the distal MCA beyond a prominent anterior temporal artery.     There was TICI 3 reperfusion using a single pass aspiration thrombectomy technique.  Patient tolerated the procedure well.     Left carotid and left vertebral angiography demonstrated no additional abnormality.    Cardiac Imaging     Echo 4/23/24    Left Ventricle: The left ventricle is normal in size. Normal wall  thickness. There is normal systolic function with a visually estimated ejection fraction of 60 - 65%. There is indeterminate diastolic function.    Right Ventricle: Normal right ventricular cavity size. Wall thickness is normal. Right ventricle wall motion  is normal. Systolic function is normal.    IVC/SVC: Low central venous pressure.       Dre Mcghee MD  [facility] [[email or web address]] Stroke Center  Department of Vascular Neurology   [facility] Neurosurgery [facility])

## 2024-04-25 NOTE — PLAN OF CARE
Problem: Adjustment to Illness (Stroke, Ischemic/Transient Ischemic Attack)  Goal: Optimal Coping  Outcome: Progressing     Problem: Bowel Elimination Impaired (Stroke, Ischemic/Transient Ischemic Attack)  Goal: Effective Bowel Elimination  Outcome: Progressing     Problem: Cerebral Tissue Perfusion (Stroke, Ischemic/Transient Ischemic Attack)  Goal: Optimal Cerebral Tissue Perfusion  Outcome: Progressing     Problem: Cognitive Impairment (Stroke, Ischemic/Transient Ischemic Attack)  Goal: Optimal Cognitive Function  Outcome: Progressing     Problem: Communication Impairment (Stroke, Ischemic/Transient Ischemic Attack)  Goal: Improved Communication Skills  Outcome: Progressing     Problem: Functional Ability Impaired (Stroke, Ischemic/Transient Ischemic Attack)  Goal: Optimal Functional Ability  Outcome: Progressing     Problem: Respiratory Compromise (Stroke, Ischemic/Transient Ischemic Attack)  Goal: Effective Oxygenation and Ventilation  Outcome: Progressing     Problem: Sensorimotor Impairment (Stroke, Ischemic/Transient Ischemic Attack)  Goal: Improved Sensorimotor Function  Outcome: Progressing     Problem: Swallowing Impairment (Stroke, Ischemic/Transient Ischemic Attack)  Goal: Optimal Eating and Swallowing without Aspiration  Outcome: Progressing     Problem: Urinary Elimination Impaired (Stroke, Ischemic/Transient Ischemic Attack)  Goal: Effective Urinary Elimination  Outcome: Progressing     Problem: Adult Inpatient Plan of Care  Goal: Plan of Care Review  Outcome: Progressing  Goal: Patient-Specific Goal (Individualized)  Outcome: Progressing  Goal: Absence of Hospital-Acquired Illness or Injury  Outcome: Progressing  Goal: Optimal Comfort and Wellbeing  Outcome: Progressing  Goal: Readiness for Transition of Care  Outcome: Progressing     Problem: Fall Injury Risk  Goal: Absence of Fall and Fall-Related Injury  Outcome: Progressing     Problem: Skin Injury Risk Increased  Goal: Skin Health and  Integrity  Outcome: Progressing     Problem: Wound  Goal: Optimal Coping  Outcome: Progressing  Goal: Optimal Functional Ability  Outcome: Progressing  Goal: Absence of Infection Signs and Symptoms  Outcome: Progressing  Goal: Improved Oral Intake  Outcome: Progressing  Goal: Optimal Pain Control and Function  Outcome: Progressing  Goal: Skin Health and Integrity  Outcome: Progressing  Goal: Optimal Wound Healing  Outcome: Progressing

## 2024-04-25 NOTE — PLAN OF CARE
CHW met with patient/family at bedside. Patient experience rounding completed and reviewed the following.     Do you know your discharge plan? Yes or No,    If yes, what is the plan?  Yes Rehab     Have you discussed your needs and preferences with your SW/CM? Yes      If you are discharging home, do you have help at home? Yes   Patient has help at home.    Do you think you will need help additional at home at discharge?  No   Patient has help no need for additional assistance.    Do you currently have difficulty keeping up with bills, affording medicine or buying food? No  Patient family takes care of bills, food, and medicine.    Assigned SW/CM notified of any patient/family needs or concerns. Appropriate resources provided to address patient's needs.    Azeb CENTENO  Case Management  [phone]

## 2024-04-26 LAB
ALBUMIN SERPL BCP-MCNC: 3.1 G/DL (ref 3.5–5.2)
ALP SERPL-CCNC: 70 U/L (ref 55–135)
ALT SERPL W/O P-5'-P-CCNC: 10 U/L (ref 10–44)
ANION GAP SERPL CALC-SCNC: 12 MMOL/L (ref 8–16)
AST SERPL-CCNC: 23 U/L (ref 10–40)
BACTERIA UR CULT: ABNORMAL
BASOPHILS # BLD AUTO: 0.07 K/UL (ref 0–0.2)
BASOPHILS NFR BLD: 0.7 % (ref 0–1.9)
BILIRUB SERPL-MCNC: 0.7 MG/DL (ref 0.1–1)
BUN SERPL-MCNC: 23 MG/DL (ref 8–23)
CALCIUM SERPL-MCNC: 9.9 MG/DL (ref 8.7–10.5)
CHLORIDE SERPL-SCNC: 113 MMOL/L (ref 95–110)
CO2 SERPL-SCNC: 22 MMOL/L (ref 23–29)
CREAT SERPL-MCNC: 1.1 MG/DL (ref 0.5–1.4)
DIFFERENTIAL METHOD BLD: ABNORMAL
EOSINOPHIL # BLD AUTO: 0.3 K/UL (ref 0–0.5)
EOSINOPHIL NFR BLD: 3.2 % (ref 0–8)
ERYTHROCYTE [DISTWIDTH] IN BLOOD BY AUTOMATED COUNT: 14.5 % (ref 11.5–14.5)
EST. GFR  (NO RACE VARIABLE): 52.1 ML/MIN/1.73 M^2
GLUCOSE SERPL-MCNC: 95 MG/DL (ref 70–110)
HCT VFR BLD AUTO: 33.1 % (ref 37–48.5)
HGB BLD-MCNC: 11.3 G/DL (ref 12–16)
IMM GRANULOCYTES # BLD AUTO: 0.07 K/UL (ref 0–0.04)
IMM GRANULOCYTES NFR BLD AUTO: 0.7 % (ref 0–0.5)
LYMPHOCYTES # BLD AUTO: 1.4 K/UL (ref 1–4.8)
LYMPHOCYTES NFR BLD: 15.2 % (ref 18–48)
MAGNESIUM SERPL-MCNC: 2 MG/DL (ref 1.6–2.6)
MCH RBC QN AUTO: 32.1 PG (ref 27–31)
MCHC RBC AUTO-ENTMCNC: 34.1 G/DL (ref 32–36)
MCV RBC AUTO: 94 FL (ref 82–98)
MONOCYTES # BLD AUTO: 1.1 K/UL (ref 0.3–1)
MONOCYTES NFR BLD: 11.9 % (ref 4–15)
NEUTROPHILS # BLD AUTO: 6.5 K/UL (ref 1.8–7.7)
NEUTROPHILS NFR BLD: 68.3 % (ref 38–73)
NRBC BLD-RTO: 0 /100 WBC
PHOSPHATE SERPL-MCNC: 3.6 MG/DL (ref 2.7–4.5)
PLATELET # BLD AUTO: 271 K/UL (ref 150–450)
PMV BLD AUTO: 11 FL (ref 9.2–12.9)
POTASSIUM SERPL-SCNC: 3.3 MMOL/L (ref 3.5–5.1)
PROT SERPL-MCNC: 6.9 G/DL (ref 6–8.4)
RBC # BLD AUTO: 3.52 M/UL (ref 4–5.4)
SODIUM SERPL-SCNC: 147 MMOL/L (ref 136–145)
WBC # BLD AUTO: 9.46 K/UL (ref 3.9–12.7)

## 2024-04-26 PROCEDURE — 80053 COMPREHEN METABOLIC PANEL: CPT | Performed by: NURSE PRACTITIONER

## 2024-04-26 PROCEDURE — 84100 ASSAY OF PHOSPHORUS: CPT | Performed by: NURSE PRACTITIONER

## 2024-04-26 PROCEDURE — 92507 TX SP LANG VOICE COMM INDIV: CPT

## 2024-04-26 PROCEDURE — 11000001 HC ACUTE MED/SURG PRIVATE ROOM

## 2024-04-26 PROCEDURE — 97535 SELF CARE MNGMENT TRAINING: CPT

## 2024-04-26 PROCEDURE — 83735 ASSAY OF MAGNESIUM: CPT | Performed by: NURSE PRACTITIONER

## 2024-04-26 PROCEDURE — 25000003 PHARM REV CODE 250

## 2024-04-26 PROCEDURE — 99233 SBSQ HOSP IP/OBS HIGH 50: CPT | Mod: ,,, | Performed by: STUDENT IN AN ORGANIZED HEALTH CARE EDUCATION/TRAINING PROGRAM

## 2024-04-26 PROCEDURE — 63600175 PHARM REV CODE 636 W HCPCS

## 2024-04-26 PROCEDURE — 92526 ORAL FUNCTION THERAPY: CPT

## 2024-04-26 PROCEDURE — 85025 COMPLETE CBC W/AUTO DIFF WBC: CPT | Performed by: NURSE PRACTITIONER

## 2024-04-26 PROCEDURE — 25000003 PHARM REV CODE 250: Performed by: NURSE PRACTITIONER

## 2024-04-26 PROCEDURE — 36415 COLL VENOUS BLD VENIPUNCTURE: CPT | Performed by: NURSE PRACTITIONER

## 2024-04-26 PROCEDURE — 63600175 PHARM REV CODE 636 W HCPCS: Performed by: NURSE PRACTITIONER

## 2024-04-26 RX ORDER — DEXTROSE MONOHYDRATE, SODIUM CHLORIDE, AND POTASSIUM CHLORIDE 50; 1.49; 4.5 G/1000ML; G/1000ML; G/1000ML
INJECTION, SOLUTION INTRAVENOUS CONTINUOUS
Status: DISPENSED | OUTPATIENT
Start: 2024-04-26 | End: 2024-04-26

## 2024-04-26 RX ORDER — CARVEDILOL 12.5 MG/1
12.5 TABLET ORAL 2 TIMES DAILY
Status: DISCONTINUED | OUTPATIENT
Start: 2024-04-26 | End: 2024-04-27

## 2024-04-26 RX ORDER — LOSARTAN POTASSIUM 50 MG/1
100 TABLET ORAL DAILY
Status: DISCONTINUED | OUTPATIENT
Start: 2024-04-27 | End: 2024-04-28 | Stop reason: HOSPADM

## 2024-04-26 RX ORDER — LOSARTAN POTASSIUM 25 MG/1
25 TABLET ORAL DAILY
Qty: 1 TABLET | Refills: 0 | Status: COMPLETED | OUTPATIENT
Start: 2024-04-26 | End: 2024-04-26

## 2024-04-26 RX ADMIN — DOCUSATE SODIUM AND SENNOSIDES 1 TABLET: 8.6; 5 TABLET, FILM COATED ORAL at 08:04

## 2024-04-26 RX ADMIN — DOCUSATE SODIUM AND SENNOSIDES 1 TABLET: 8.6; 5 TABLET, FILM COATED ORAL at 09:04

## 2024-04-26 RX ADMIN — LEVOTHYROXINE SODIUM 88 MCG: 88 TABLET ORAL at 05:04

## 2024-04-26 RX ADMIN — ESCITALOPRAM OXALATE 20 MG: 20 TABLET, FILM COATED ORAL at 08:04

## 2024-04-26 RX ADMIN — TICAGRELOR 90 MG: 90 TABLET ORAL at 09:04

## 2024-04-26 RX ADMIN — ASPIRIN 81 MG CHEWABLE TABLET 81 MG: 81 TABLET CHEWABLE at 08:04

## 2024-04-26 RX ADMIN — LOSARTAN POTASSIUM 75 MG: 50 TABLET, FILM COATED ORAL at 08:04

## 2024-04-26 RX ADMIN — POTASSIUM BICARBONATE 40 MEQ: 391 TABLET, EFFERVESCENT ORAL at 12:04

## 2024-04-26 RX ADMIN — LOSARTAN POTASSIUM 25 MG: 25 TABLET, FILM COATED ORAL at 12:04

## 2024-04-26 RX ADMIN — HYDRALAZINE HYDROCHLORIDE 10 MG: 20 INJECTION, SOLUTION INTRAMUSCULAR; INTRAVENOUS at 06:04

## 2024-04-26 RX ADMIN — HYDRALAZINE HYDROCHLORIDE 10 MG: 20 INJECTION, SOLUTION INTRAMUSCULAR; INTRAVENOUS at 05:04

## 2024-04-26 RX ADMIN — CARVEDILOL 12.5 MG: 12.5 TABLET, FILM COATED ORAL at 09:04

## 2024-04-26 RX ADMIN — HEPARIN SODIUM 5000 UNITS: 5000 INJECTION INTRAVENOUS; SUBCUTANEOUS at 09:04

## 2024-04-26 RX ADMIN — HEPARIN SODIUM 5000 UNITS: 5000 INJECTION INTRAVENOUS; SUBCUTANEOUS at 05:04

## 2024-04-26 RX ADMIN — TICAGRELOR 90 MG: 90 TABLET ORAL at 08:04

## 2024-04-26 RX ADMIN — CARVEDILOL 12.5 MG: 12.5 TABLET, FILM COATED ORAL at 08:04

## 2024-04-26 RX ADMIN — ATORVASTATIN CALCIUM 80 MG: 40 TABLET, FILM COATED ORAL at 08:04

## 2024-04-26 RX ADMIN — POTASSIUM CHLORIDE, DEXTROSE MONOHYDRATE AND SODIUM CHLORIDE: 150; 5; 450 INJECTION, SOLUTION INTRAVENOUS at 12:04

## 2024-04-26 RX ADMIN — HEPARIN SODIUM 5000 UNITS: 5000 INJECTION INTRAVENOUS; SUBCUTANEOUS at 02:04

## 2024-04-26 RX ADMIN — Medication 6 MG: at 09:04

## 2024-04-26 NOTE — ASSESSMENT & PLAN NOTE
After admission, imaging noted moderate to large amount of stool throughout the colon may reflect constipation.  No high-grade obstruction.   On miralax and senna  Having daily bowel movements  Please place photo in chart of any dark tarry stools

## 2024-04-26 NOTE — SUBJECTIVE & OBJECTIVE
Neurologic Chief Complaint: Left sided weakness with dysarthria. New R MCA stroke.    Subjective:     Interval History: Patient is seen for follow-up neurological assessment and treatment recommendations: KATLYNEON. Nurse reports dark stool overnight; Hgb stable and HD stable. No abdominal pain/nausea/vomiting. Requesting photo of next stool in chart. Otherwise, continued difficulty with speech therapy. Increasing coreg and losartan today given SBPs into 190s yesterday. Will also give 1L IVF considering uptrending Na and concern for decreased po intake.    HPI, Past Medical, Family, and Social History remains the same as documented in the initial encounter.     Review of Systems   HENT:  Positive for trouble swallowing.    Respiratory:  Negative for shortness of breath.    Cardiovascular:  Negative for chest pain.   Gastrointestinal:  Negative for abdominal pain, nausea and vomiting.   Musculoskeletal:  Negative for gait problem.   Neurological:  Positive for speech difficulty.     Scheduled Meds:  Current Facility-Administered Medications   Medication Dose Route Frequency    aspirin  81 mg Oral Daily    atorvastatin  80 mg Oral Daily    carvediloL  12.5 mg Oral BID    EScitalopram oxalate  20 mg Oral Daily    heparin (porcine)  5,000 Units Subcutaneous Q8H    levothyroxine  88 mcg Oral Before breakfast    [START ON 4/27/2024] losartan  100 mg Oral Daily    melatonin  6 mg Oral Nightly    polyethylene glycol  17 g Oral QHS    senna-docusate 8.6-50 mg  1 tablet Oral BID    ticagrelor  90 mg Oral BID     Continuous Infusions:  Current Facility-Administered Medications   Medication Dose Route Frequency Last Rate Last Admin    dextrose 5 % and 0.45 % NaCl with KCl 20 mEq   Intravenous Continuous 125 mL/hr at 04/26/24 1230 New Bag at 04/26/24 1230     PRN Meds:  Current Facility-Administered Medications:     acetaminophen, 650 mg, Oral, Q4H PRN    albuterol-ipratropium, 3 mL, Nebulization, Q6H PRN    bisacodyL, 10 mg,  Rectal, Daily PRN    hydrALAZINE, 10 mg, Intravenous, Q4H PRN    ondansetron, 4 mg, Intravenous, Q8H PRN    racepinephrine, 0.5 mL, Nebulization, Q4H PRN    sodium chloride 0.9%, 10 mL, Intravenous, PRN    sodium chloride 0.9%, 10 mL, Intravenous, PRN    Objective:     Vital Signs (Most Recent):  Temp: 98 °F (36.7 °C) (04/26/24 1220)  Pulse: 81 (04/26/24 1220)  Resp: 16 (04/26/24 1220)  BP: (!) 169/72 (04/26/24 1220)  SpO2: (!) 94 % (04/26/24 1220)  BP Location: Left arm    Vital Signs Range (Last 24H):  Temp:  [97.9 °F (36.6 °C)-99 °F (37.2 °C)]   Pulse:  []   Resp:  [16-18]   BP: (169-194)/(70-81)   SpO2:  [92 %-96 %]   BP Location: Left arm       Physical Exam  Constitutional:       General: She is not in acute distress.  HENT:      Head: Normocephalic and atraumatic.      Nose: Nose normal.      Mouth/Throat:      Mouth: Mucous membranes are dry.   Cardiovascular:      Rate and Rhythm: Normal rate and regular rhythm.   Pulmonary:      Effort: Pulmonary effort is normal. No respiratory distress.      Breath sounds: Normal breath sounds.   Abdominal:      General: Abdomen is flat. There is no distension.      Palpations: Abdomen is soft.      Tenderness: There is no abdominal tenderness.   Musculoskeletal:      Right lower leg: No edema.      Left lower leg: No edema.   Skin:     General: Skin is warm.   Neurological:      Mental Status: She is alert.              Neurological Exam:   LOC: alert  Attention Span: Good   Language: Expressive aphasia  Articulation: Dysarthria  Orientation: Person, Place, Time   Visual Fields: Full  Motor: Arm left  Normal 5/5  Leg left  Normal 5/5  Arm right  Normal 5/5  Leg right Normal 5/5  Sensation: Intact to light touch, temperature and vibration  Tone: Normal tone throughout    Laboratory:  CMP:   Recent Labs   Lab 04/26/24  0438   CALCIUM 9.9   ALBUMIN 3.1*   PROT 6.9   *   K 3.3*   CO2 22*   *   BUN 23   CREATININE 1.1   ALKPHOS 70   ALT 10   AST 23    BILITOT 0.7     CBC:   Recent Labs   Lab 04/26/24  0438   WBC 9.46   RBC 3.52*   HGB 11.3*   HCT 33.1*      MCV 94   MCH 32.1*   MCHC 34.1       Diagnostic Results     Brain Imaging     MRI Brain wo contrast 4/22/24  5 mm acute infarct right basal ganglia.  Subtle cortical restricted diffusion right insular cortex and frontal operculum consistent with ischemic injury unless there is clinical suspicion of seizure activity.  This is potentially reversible, perhaps for continued follow-up.     Expected evolution of recent left deep white matter infarct and perhaps subacute right temporal lobe infarct similar in appearance from the prior.     No evidence of intracranial hemorrhage or mass effect.    Vessel Imaging     CTA Stroke 4/22/24  Occlusion M2 segment right MCA.  Suspected early parenchymal change a cerebral infarction in the right temporal lobe.     Chronic occlusion left PCA.     Evolving area of hypoattenuation in the left corona radiata related to known recent infarct.  Additional multifocal remote infarcts as detailed in the body of the report.     Narrowing of the right PCA similar to prior.       IR Angiogram 4/22/24  Right MCA angiogram demonstrated occlusion of the distal MCA beyond a prominent anterior temporal artery.     There was TICI 3 reperfusion using a single pass aspiration thrombectomy technique.  Patient tolerated the procedure well.     Left carotid and left vertebral angiography demonstrated no additional abnormality.    Cardiac Imaging     Echo 4/23/24    Left Ventricle: The left ventricle is normal in size. Normal wall thickness. There is normal systolic function with a visually estimated ejection fraction of 60 - 65%. There is indeterminate diastolic function.    Right Ventricle: Normal right ventricular cavity size. Wall thickness is normal. Right ventricle wall motion  is normal. Systolic function is normal.    IVC/SVC: Low central venous pressure.

## 2024-04-26 NOTE — PHYSICIAN QUERY
Question: Please clarify the significance of the cerebral edema after study:      Provider Query Response:  Clinically significant diagnosis that was monitored and/or treated as follows (please specify): R basal ganglia, R insular and R opercular cytotoxic edema requiring close, serial examinations.

## 2024-04-26 NOTE — ASSESSMENT & PLAN NOTE
-Stroke risk factor.  SBP< 220.  -some concern for pressure dependent exam this hospitalization  -Increasing coreg to 12.5 BID  -increasing losartan to 100mg QD

## 2024-04-26 NOTE — TELEPHONE ENCOUNTER
Patient currently admitted to stroke service.   Touched base with  - patient was given her blood thinner inpatient.

## 2024-04-26 NOTE — PLAN OF CARE
Patient  was at bedside earlier in the night however he left for home. Pt took medications crushed in pudding with minimal issues. Pt turned on side and noted incontinent bowel movement, pt cleaned, changed and repositioned. Purewick changed. Oral care completed. CLWR, BR upx3, bed low, locked in position, bed alarm engaged. Safety maintained, will continue to monitor.    Problem: Adjustment to Illness (Stroke, Ischemic/Transient Ischemic Attack)  Goal: Optimal Coping  Outcome: Progressing     Problem: Bowel Elimination Impaired (Stroke, Ischemic/Transient Ischemic Attack)  Goal: Effective Bowel Elimination  Outcome: Progressing     Problem: Cerebral Tissue Perfusion (Stroke, Ischemic/Transient Ischemic Attack)  Goal: Optimal Cerebral Tissue Perfusion  Outcome: Progressing     Problem: Communication Impairment (Stroke, Ischemic/Transient Ischemic Attack)  Goal: Improved Communication Skills  Outcome: Progressing     Problem: Swallowing Impairment (Stroke, Ischemic/Transient Ischemic Attack)  Goal: Optimal Eating and Swallowing without Aspiration  Outcome: Progressing

## 2024-04-26 NOTE — ASSESSMENT & PLAN NOTE
Likely related to prior L MCA stroke  SLP consulted, appreciate recs  Pureed diet with nectar thick liquids

## 2024-04-26 NOTE — ASSESSMENT & PLAN NOTE
Patient has Abnormal Phosphorus: hypophosphatemia. Will continue to monitor electrolytes closely. Will replace the affected electrolytes and repeat labs to be done after interventions completed. The patient's phosphorus results have been reviewed and are listed below.  Recent Labs   Lab 04/26/24  0438   PHOS 3.6

## 2024-04-26 NOTE — PLAN OF CARE
Follow-up appointment on 5/1/24 at 11:00 am.      Tomi Morgan [facility]  Case Management  [phone]

## 2024-04-26 NOTE — PLAN OF CARE
Cardiology appointment on 5/29/24 at 10:15 am and patient was placed on the waiting list.      Tomi Morgan [facility]  Case Management  [phone]

## 2024-04-26 NOTE — ASSESSMENT & PLAN NOTE
Patient has Abnormal Magnesium: hypomagnesemia. Will continue to monitor electrolytes closely. Will replace the affected electrolytes and repeat labs to be done after interventions completed. The patient's magnesium results have been reviewed and are listed below.  Recent Labs   Lab 04/26/24  0438   MG 2.0

## 2024-04-26 NOTE — PT/OT/SLP PROGRESS
"Speech Language Pathology Treatment    Patient Name:  Denise Berrios   MRN:  [id number]  Admitting Diagnosis: Embolic stroke involving right middle cerebral artery    Recommendations:                 General Recommendations:  Dysphagia therapy, Speech/language therapy, and Cognitive-linguistic therapy  Diet recommendations:  Puree Diet - IDDSI Level 4, Liquid Diet Level: Full liquids, Thin   Aspiration Precautions: 1 bite/sip at a time, Alternating bites/sips, Assistance with meals, Avoid talking while eating, Check for pocketing/oral residue, Eliminate distractions, Feed only when awake/alert, HOB to 90 degrees, Meds crushed in puree, No straws, Small bites/sips, and Strict aspiration precautions   General Precautions: Standard, aspiration, fall  Communication strategies:  provide increased time to answer and go to room if call light pushed    Assessment:     Denise Berrios is a 74 y.o. female with an SLP diagnosis of Dysphagia, Dysarthria, and Cognitive-Linguistic Impairment.      Subjective     "Okay"     Pain/Comfort:  Pain Rating 1: 0/10  Pain Rating Post-Intervention 1: 0/10    Respiratory Status: Room air    Objective:     Has the patient been evaluated by SLP for swallowing?   Yes  Keep patient NPO? No     Pt seen bedside, alert and cooperative.  Continued significant dysarthria with limited attempts at spontaneous verbalizations.  Speech strategies reviewed and demonstrated.  Despite max cues, pt with continued generalized voicing with limited active shaping of phonemes.  Tongue appeared somewhat anchored though adequate ROM with nonspeech oral motor prompts.  Counting task and repetition of CVC syllables with vowel distortion and omission of final constants.  Decreased breath support with decreased coordination of respiration and phonation observed.  Pacing attempts though unsuccessful.  Initial sound repetition and some gasping noted with verbalization attempts.  Frequent repetition needed.  She was " oriented to self and place.  Pt self feed cup sips of nectar thick and thin liquids with initial fairly timely initiation of swallow.  Increasing oral hold requiring verbal prompts noted with progression of trials.  Multiple swallows with increasing facial grimacing over course of trials.  Puree tolerated with mild dispersed stasis which cleared with multiple swallows.  Minced mixed consistency attempts though soft solids pieces remained on lingual surface despite liquid wash and multiple swallows.  Stasis removed by SLP via finer sweep and toothette.  Puree tray arrived to room and SLP assisted with set-up.  Pt holding spoon and able to self feed though taking minimal amounts of puree at a time (barely coated spoon) despite encouragement.  Pt unlikely to consume enough puree to meet nutritional needs.  Education provided re: diet change to full liquids, cont crushed meds, aspiration risk, s/s aspiration, importance of po intake, speech strategies, importance of verbalization attempts, and POC.  Pt attentive t/o though will benefit from reinforcement.     Goals:   Multidisciplinary Problems       SLP Goals          Problem: SLP    Goal Priority Disciplines Outcome   SLP Goal     SLP    Description: Speech Language Pathology Goals  Goals expected to be met by 4/30:  1. Pt will tolerate pureed diet with nectar thick liquids with good oral clearance and without s/s of aspiration.   2. Pt will participate in ongoing swallowing assessment to determine if safe for further diet advancement.   3. Pt will perform oral motor exercises x 5-10 for to improve oral motor strength, ROM, and function.   4. Pt will participate in speech/language/cognitive evaluation to establish further intervention.- Initiated 4/24  5. Pt will independently recall speech strategies to improve intelligibility.   6. Pt will utilize speech strategies at the single word level to improve intelligibility.   7. Pt will participate in evaluation of  reading, writing, and visuospatial skills.                        Plan:     Patient to be seen:  4 x/week   Plan of Care expires:  05/22/24  Plan of Care reviewed with:  patient   SLP Follow-Up:  Yes       Discharge recommendations:  High Intensity Therapy   Barriers to Discharge:  Level of Skilled Assistance Needed      Time Tracking:     SLP Treatment Date:   04/26/24  Speech Start Time:  0819  Speech Stop Time:  0850     Speech Total Time (min):  31 min    Billable Minutes: Speech Therapy Individual 10, Treatment Swallowing Dysfunction 11, and Self Care/Home Management Training 10    04/26/2024

## 2024-04-26 NOTE — PROGRESS NOTES
[facility] - Neurosurgery ([facility])  Vascular Neurology  Comprehensive Stroke Center  Progress Note    Assessment/Plan:     * Embolic stroke involving right middle cerebral artery  Denise Berrios is a 74 y.o. female with a significant medical history of recent L MCA stroke (4/10/2024), HTN, HLD, Graves' disease,IBS, migraine HAs  who presents to the hospital for evaluation of acute onset LSW, neglect, and aphasia. R M1 occlusion noted on CTA. S/p thrombectomy with TICI3 reperfusion on 4/22. She was admitted to [facility] post procedure. Patient was unable to fill a Brilinta prescription prior to current presentation. Considering recurrent, bihemispheric recent strokes, etiology is ESUS. Coordinating followup with EP clinic for evaluation for loop recorder placement.    Antithrombotics for secondary stroke prevention: Antiplatelets: Aspirin: 81 mg daily; Brilinta     Statins for secondary stroke prevention and hyperlipidemia, if present:   Statins: Atorvastatin- 40 mg daily    Aggressive risk factor modification: HTN, HLD, AAD, Graves' Disease     Rehab efforts: The patient has been evaluated by a stroke team provider and the therapy needs have been fully considered based off the presenting complaints and exam findings. The following therapy evaluations are needed: PT evaluate and treat, OT evaluate and treat, SLP evaluate and treat    Diagnostics ordered/pending:     VTE prophylaxis: Heparin 5000 units SQ every 8 hours  Mechanical prophylaxis: Place SCDs    BP parameters: Patient may need higher pressures as her exam seems to be pressure dependent at this time. Avoid hypoperfusion.        Asymptomatic bacteriuria  Patient with leukocytosis to 16s this hospitalization with downtrend to 10s without intervention  Urine culture with E. Coli, but patient denies symptoms at this time  Low threshold to initiate antibiotic therapy if symptoms, leukocytosis, or fevers develop    Hypomagnesemia  Patient has Abnormal Magnesium:  hypomagnesemia. Will continue to monitor electrolytes closely. Will replace the affected electrolytes and repeat labs to be done after interventions completed. The patient's magnesium results have been reviewed and are listed below.  Recent Labs   Lab 04/26/24  0438   MG 2.0         Insomnia  Providing melatonin PRN    Hypothyroidism  See Other specified hypothyroidism     Constipation  After admission, imaging noted moderate to large amount of stool throughout the colon may reflect constipation.  No high-grade obstruction.   On miralax and senna  Having daily bowel movements  Please place photo in chart of any dark tarry stools    Hypophosphatemia  Patient has Abnormal Phosphorus: hypophosphatemia. Will continue to monitor electrolytes closely. Will replace the affected electrolytes and repeat labs to be done after interventions completed. The patient's phosphorus results have been reviewed and are listed below.  Recent Labs   Lab 04/26/24  0438   PHOS 3.6         Flaccid hemiplegia affecting left nondominant side  Aphasia due to acute stroke   -Stroke symptoms. Echo without thrombosis or other structural abnormality.     Aphasia due to acute stroke  Likely related to prior L MCA stroke  SLP consulted, appreciate recs  Pureed diet with nectar thick liquids    Essential hypertension  -Stroke risk factor.  SBP< 220.  -some concern for pressure dependent exam this hospitalization  -Increasing coreg to 12.5 BID  -increasing losartan to 100mg QD      Migraine headache  Noted history of migraines on nortriptyline, tramadol at home  Will hold home meds given hospitalization for acute stroke  No complaint of headache at this time    Irritable bowel syndrome  Noted history of IBS  Continue home SSRI  Continue bowel regimen  Patient without current complaint of abdominal pain/irregular bowel movements    Dyslipidemia  -Stroke risk factor. Last LDL 85.2 on 4/10/2024.  -Continue atorvastatin 40 mg daily.    Other specified  hypothyroidism  post radioactive treatment for grave's  -TSH 4.129  -Continue home synthroid    Anxiety  Continue lexapro         74 year old female with recent L MCA stroke presented for R MCA embolic stroke status post thrombectomy. Extubated on 4/22, improving motor strength but with severe dysarthria and aphasia. Echo without abnormality, no arrhythmias captured on telemetry. MRI s/p thrombectomy with 5 mm acute right basal ganglia infarct with restricted diffusion right insular cortex and frontal operculum. Working w/ PT/OT/Speech, passed swallow eval. Patient to follow-up with cardiac EP outpatient for evaluation for loop recorder placement. Nurses reporting dark stools, however have been unable to view these stools and Hgb has been stable.    STROKE DOCUMENTATION   Acute Stroke Times   Last Known Normal Date: 04/21/24  Last Known Normal Time: 2000  Unknown Symptom Onset Date: Unknown Date  Unknown Symptom Onset Time: Unknown Time  Stroke Team Called Date: 04/22/24  Stroke Team Called Time: 0309  Stroke Team Arrival Date: 04/22/24  Stroke Team Arrival Time: 0314  CT Interpretation Time: 0319  Thrombolytic Therapy Recommended: No  CTA Interpretation Time: 0330 (delay due to issues w/IV and CT injector.)  Thrombectomy Recommended: Yes  Decision to Treat Time for IR: 0351    NIH Scale:  1a. Level of Consciousness: 0-->Alert, keenly responsive  1b. LOC Questions: 1-->Answers one question correctly  1c. LOC Commands: 0-->Performs both tasks correctly  2. Best Gaze: 0-->Normal  3. Visual: 0-->No visual loss  4. Facial Palsy: 1-->Minor paralysis (flattened nasolabial fold, asymmetry on smiling)  5a. Motor Arm, Left: 0-->No drift, limb holds 90 (or 45) degrees for full 10 secs  5b. Motor Arm, Right: 0-->No drift, limb holds 90 (or 45) degrees for full 10 secs  6a. Motor Leg, Left: 0-->No drift, leg holds 30 degree position for full 5 secs  6b. Motor Leg, Right: 0-->No drift, leg holds 30 degree position for full 5  secs  7. Limb Ataxia: 0-->Absent  8. Sensory: 0-->Normal, no sensory loss  9. Best Language: 2-->Severe aphasia, all communication is through fragmentary expression, great need for inference, questioning, and guessing by the listener. Range of information that can be exchanged is limited, listener carries burden of. . . (see row details)  10. Dysarthria: 2-->Severe dysarthria, patients speech is so slurred as to be unintelligible in the absence of or out of proportion to any dysphasia, or is mute/anarthric  11. Extinction and Inattention (formerly Neglect): 0-->No abnormality  Total (NIH Stroke Scale): 6       Modified Falcon Score: 2  Inocencia Coma Scale:    ABCD2 Score:    [id number] Score:   HAS -BLED Score:   ICH Score:   Hunt & Velazquez Classification:      Hemorrhagic change of an Ischemic Stroke: Does this patient have an ischemic stroke with hemorrhagic changes? No     Neurologic Chief Complaint: Left sided weakness with dysarthria. New R MCA stroke.    Subjective:     Interval History: Patient is seen for follow-up neurological assessment and treatment recommendations: NAEON. Nurse reports dark stool overnight; Hgb stable and HD stable. No abdominal pain/nausea/vomiting. Requesting photo of next stool in chart. Otherwise, continued difficulty with speech therapy. Increasing coreg and losartan today given SBPs into 190s yesterday. Will also give 1L IVF considering uptrending Na and concern for decreased po intake.    HPI, Past Medical, Family, and Social History remains the same as documented in the initial encounter.     Review of Systems   HENT:  Positive for trouble swallowing.    Respiratory:  Negative for shortness of breath.    Cardiovascular:  Negative for chest pain.   Gastrointestinal:  Negative for abdominal pain, nausea and vomiting.   Musculoskeletal:  Negative for gait problem.   Neurological:  Positive for speech difficulty.     Scheduled Meds:  Current Facility-Administered Medications    Medication Dose Route Frequency    aspirin  81 mg Oral Daily    atorvastatin  80 mg Oral Daily    carvediloL  12.5 mg Oral BID    EScitalopram oxalate  20 mg Oral Daily    heparin (porcine)  5,000 Units Subcutaneous Q8H    levothyroxine  88 mcg Oral Before breakfast    [START ON 4/27/2024] losartan  100 mg Oral Daily    melatonin  6 mg Oral Nightly    polyethylene glycol  17 g Oral QHS    senna-docusate 8.6-50 mg  1 tablet Oral BID    ticagrelor  90 mg Oral BID     Continuous Infusions:  Current Facility-Administered Medications   Medication Dose Route Frequency Last Rate Last Admin    dextrose 5 % and 0.45 % NaCl with KCl 20 mEq   Intravenous Continuous 125 mL/hr at 04/26/24 1230 New Bag at 04/26/24 1230     PRN Meds:  Current Facility-Administered Medications:     acetaminophen, 650 mg, Oral, Q4H PRN    albuterol-ipratropium, 3 mL, Nebulization, Q6H PRN    bisacodyL, 10 mg, Rectal, Daily PRN    hydrALAZINE, 10 mg, Intravenous, Q4H PRN    ondansetron, 4 mg, Intravenous, Q8H PRN    racepinephrine, 0.5 mL, Nebulization, Q4H PRN    sodium chloride 0.9%, 10 mL, Intravenous, PRN    sodium chloride 0.9%, 10 mL, Intravenous, PRN    Objective:     Vital Signs (Most Recent):  Temp: 98 °F (36.7 °C) (04/26/24 1220)  Pulse: 81 (04/26/24 1220)  Resp: 16 (04/26/24 1220)  BP: (!) 169/72 (04/26/24 1220)  SpO2: (!) 94 % (04/26/24 1220)  BP Location: Left arm    Vital Signs Range (Last 24H):  Temp:  [97.9 °F (36.6 °C)-99 °F (37.2 °C)]   Pulse:  []   Resp:  [16-18]   BP: (169-194)/(70-81)   SpO2:  [92 %-96 %]   BP Location: Left arm       Physical Exam  Constitutional:       General: She is not in acute distress.  HENT:      Head: Normocephalic and atraumatic.      Nose: Nose normal.      Mouth/Throat:      Mouth: Mucous membranes are dry.   Cardiovascular:      Rate and Rhythm: Normal rate and regular rhythm.   Pulmonary:      Effort: Pulmonary effort is normal. No respiratory distress.      Breath sounds: Normal breath  sounds.   Abdominal:      General: Abdomen is flat. There is no distension.      Palpations: Abdomen is soft.      Tenderness: There is no abdominal tenderness.   Musculoskeletal:      Right lower leg: No edema.      Left lower leg: No edema.   Skin:     General: Skin is warm.   Neurological:      Mental Status: She is alert.              Neurological Exam:   LOC: alert  Attention Span: Good   Language: Expressive aphasia  Articulation: Dysarthria  Orientation: Person, Place, Time   Visual Fields: Full  Motor: Arm left  Normal 5/5  Leg left  Normal 5/5  Arm right  Normal 5/5  Leg right Normal 5/5  Sensation: Intact to light touch, temperature and vibration  Tone: Normal tone throughout    Laboratory:  CMP:   Recent Labs   Lab 04/26/24  0438   CALCIUM 9.9   ALBUMIN 3.1*   PROT 6.9   *   K 3.3*   CO2 22*   *   BUN 23   CREATININE 1.1   ALKPHOS 70   ALT 10   AST 23   BILITOT 0.7     CBC:   Recent Labs   Lab 04/26/24  0438   WBC 9.46   RBC 3.52*   HGB 11.3*   HCT 33.1*      MCV 94   MCH 32.1*   MCHC 34.1       Diagnostic Results     Brain Imaging     MRI Brain wo contrast 4/22/24  5 mm acute infarct right basal ganglia.  Subtle cortical restricted diffusion right insular cortex and frontal operculum consistent with ischemic injury unless there is clinical suspicion of seizure activity.  This is potentially reversible, perhaps for continued follow-up.     Expected evolution of recent left deep white matter infarct and perhaps subacute right temporal lobe infarct similar in appearance from the prior.     No evidence of intracranial hemorrhage or mass effect.    Vessel Imaging     CTA Stroke 4/22/24  Occlusion M2 segment right MCA.  Suspected early parenchymal change a cerebral infarction in the right temporal lobe.     Chronic occlusion left PCA.     Evolving area of hypoattenuation in the left corona radiata related to known recent infarct.  Additional multifocal remote infarcts as detailed in the body  of the report.     Narrowing of the right PCA similar to prior.       IR Angiogram 4/22/24  Right MCA angiogram demonstrated occlusion of the distal MCA beyond a prominent anterior temporal artery.     There was TICI 3 reperfusion using a single pass aspiration thrombectomy technique.  Patient tolerated the procedure well.     Left carotid and left vertebral angiography demonstrated no additional abnormality.    Cardiac Imaging     Echo 4/23/24    Left Ventricle: The left ventricle is normal in size. Normal wall thickness. There is normal systolic function with a visually estimated ejection fraction of 60 - 65%. There is indeterminate diastolic function.    Right Ventricle: Normal right ventricular cavity size. Wall thickness is normal. Right ventricle wall motion  is normal. Systolic function is normal.    IVC/SVC: Low central venous pressure.       Dre Mcghee MD  Comprehensive Stroke Center  Department of Vascular Neurology   [facility])

## 2024-04-26 NOTE — ASSESSMENT & PLAN NOTE
Denise Berrios is a 74 y.o. female with a significant medical history of recent L MCA stroke (4/10/2024), HTN, HLD, Graves' disease,IBS, migraine HAs  who presents to the hospital for evaluation of acute onset LSW, neglect, and aphasia. R M1 occlusion noted on CTA. S/p thrombectomy with TICI3 reperfusion on 4/22. She was admitted to [facility] post procedure. Patient was unable to fill a Brilinta prescription prior to current presentation. Considering recurrent, bihemispheric recent strokes, etiology is ESUS. Coordinating followup with EP clinic for evaluation for loop recorder placement.    Antithrombotics for secondary stroke prevention: Antiplatelets: Aspirin: 81 mg daily; Brilinta     Statins for secondary stroke prevention and hyperlipidemia, if present:   Statins: Atorvastatin- 40 mg daily    Aggressive risk factor modification: HTN, HLD, AAD, Graves' Disease     Rehab efforts: The patient has been evaluated by a stroke team provider and the therapy needs have been fully considered based off the presenting complaints and exam findings. The following therapy evaluations are needed: PT evaluate and treat, OT evaluate and treat, SLP evaluate and treat    Diagnostics ordered/pending:     VTE prophylaxis: Heparin 5000 units SQ every 8 hours  Mechanical prophylaxis: Place SCDs    BP parameters: Patient may need higher pressures as her exam seems to be pressure dependent at this time. Avoid hypoperfusion.

## 2024-04-27 LAB
ALBUMIN SERPL BCP-MCNC: 3 G/DL (ref 3.5–5.2)
ALP SERPL-CCNC: 61 U/L (ref 55–135)
ALT SERPL W/O P-5'-P-CCNC: 11 U/L (ref 10–44)
ANION GAP SERPL CALC-SCNC: 10 MMOL/L (ref 8–16)
AST SERPL-CCNC: 23 U/L (ref 10–40)
BASOPHILS # BLD AUTO: 0.06 K/UL (ref 0–0.2)
BASOPHILS NFR BLD: 0.7 % (ref 0–1.9)
BILIRUB SERPL-MCNC: 0.8 MG/DL (ref 0.1–1)
BUN SERPL-MCNC: 19 MG/DL (ref 8–23)
CALCIUM SERPL-MCNC: 9.4 MG/DL (ref 8.7–10.5)
CHLORIDE SERPL-SCNC: 112 MMOL/L (ref 95–110)
CO2 SERPL-SCNC: 21 MMOL/L (ref 23–29)
CREAT SERPL-MCNC: 1 MG/DL (ref 0.5–1.4)
DIFFERENTIAL METHOD BLD: ABNORMAL
EOSINOPHIL # BLD AUTO: 0.4 K/UL (ref 0–0.5)
EOSINOPHIL NFR BLD: 4.5 % (ref 0–8)
ERYTHROCYTE [DISTWIDTH] IN BLOOD BY AUTOMATED COUNT: 14.2 % (ref 11.5–14.5)
EST. GFR  (NO RACE VARIABLE): 58.4 ML/MIN/1.73 M^2
GLUCOSE SERPL-MCNC: 110 MG/DL (ref 70–110)
HCT VFR BLD AUTO: 34.3 % (ref 37–48.5)
HGB BLD-MCNC: 11 G/DL (ref 12–16)
IMM GRANULOCYTES # BLD AUTO: 0.15 K/UL (ref 0–0.04)
IMM GRANULOCYTES NFR BLD AUTO: 1.6 % (ref 0–0.5)
LYMPHOCYTES # BLD AUTO: 1.6 K/UL (ref 1–4.8)
LYMPHOCYTES NFR BLD: 17.2 % (ref 18–48)
MAGNESIUM SERPL-MCNC: 1.7 MG/DL (ref 1.6–2.6)
MCH RBC QN AUTO: 30.9 PG (ref 27–31)
MCHC RBC AUTO-ENTMCNC: 32.1 G/DL (ref 32–36)
MCV RBC AUTO: 96 FL (ref 82–98)
MONOCYTES # BLD AUTO: 1 K/UL (ref 0.3–1)
MONOCYTES NFR BLD: 11.1 % (ref 4–15)
NEUTROPHILS # BLD AUTO: 5.9 K/UL (ref 1.8–7.7)
NEUTROPHILS NFR BLD: 64.9 % (ref 38–73)
NRBC BLD-RTO: 0 /100 WBC
PHOSPHATE SERPL-MCNC: 2.8 MG/DL (ref 2.7–4.5)
PLATELET # BLD AUTO: 286 K/UL (ref 150–450)
PMV BLD AUTO: 11 FL (ref 9.2–12.9)
POTASSIUM SERPL-SCNC: 3.3 MMOL/L (ref 3.5–5.1)
PROT SERPL-MCNC: 6.5 G/DL (ref 6–8.4)
RBC # BLD AUTO: 3.56 M/UL (ref 4–5.4)
SODIUM SERPL-SCNC: 143 MMOL/L (ref 136–145)
WBC # BLD AUTO: 9.12 K/UL (ref 3.9–12.7)

## 2024-04-27 PROCEDURE — 25000003 PHARM REV CODE 250: Performed by: STUDENT IN AN ORGANIZED HEALTH CARE EDUCATION/TRAINING PROGRAM

## 2024-04-27 PROCEDURE — 80053 COMPREHEN METABOLIC PANEL: CPT | Performed by: NURSE PRACTITIONER

## 2024-04-27 PROCEDURE — 11000001 HC ACUTE MED/SURG PRIVATE ROOM

## 2024-04-27 PROCEDURE — 36415 COLL VENOUS BLD VENIPUNCTURE: CPT | Performed by: NURSE PRACTITIONER

## 2024-04-27 PROCEDURE — 25000003 PHARM REV CODE 250: Performed by: NURSE PRACTITIONER

## 2024-04-27 PROCEDURE — 99233 SBSQ HOSP IP/OBS HIGH 50: CPT | Mod: ,,, | Performed by: STUDENT IN AN ORGANIZED HEALTH CARE EDUCATION/TRAINING PROGRAM

## 2024-04-27 PROCEDURE — 85025 COMPLETE CBC W/AUTO DIFF WBC: CPT | Performed by: NURSE PRACTITIONER

## 2024-04-27 PROCEDURE — 63600175 PHARM REV CODE 636 W HCPCS: Performed by: NURSE PRACTITIONER

## 2024-04-27 PROCEDURE — 63600175 PHARM REV CODE 636 W HCPCS

## 2024-04-27 PROCEDURE — 25000003 PHARM REV CODE 250

## 2024-04-27 PROCEDURE — 83735 ASSAY OF MAGNESIUM: CPT | Performed by: NURSE PRACTITIONER

## 2024-04-27 PROCEDURE — 84100 ASSAY OF PHOSPHORUS: CPT | Performed by: NURSE PRACTITIONER

## 2024-04-27 RX ORDER — CARVEDILOL 25 MG/1
25 TABLET ORAL 2 TIMES DAILY
Status: DISCONTINUED | OUTPATIENT
Start: 2024-04-27 | End: 2024-04-27

## 2024-04-27 RX ORDER — CARVEDILOL 12.5 MG/1
12.5 TABLET ORAL 2 TIMES DAILY
Status: DISCONTINUED | OUTPATIENT
Start: 2024-04-27 | End: 2024-04-28

## 2024-04-27 RX ORDER — NIFEDIPINE 30 MG/1
30 TABLET, EXTENDED RELEASE ORAL DAILY
Status: DISCONTINUED | OUTPATIENT
Start: 2024-04-27 | End: 2024-04-28

## 2024-04-27 RX ORDER — MAGNESIUM SULFATE HEPTAHYDRATE 40 MG/ML
2 INJECTION, SOLUTION INTRAVENOUS ONCE
Status: COMPLETED | OUTPATIENT
Start: 2024-04-27 | End: 2024-04-27

## 2024-04-27 RX ORDER — HYDRALAZINE HYDROCHLORIDE 25 MG/1
50 TABLET, FILM COATED ORAL EVERY 6 HOURS PRN
Status: DISCONTINUED | OUTPATIENT
Start: 2024-04-27 | End: 2024-04-28 | Stop reason: HOSPADM

## 2024-04-27 RX ADMIN — TICAGRELOR 90 MG: 90 TABLET ORAL at 09:04

## 2024-04-27 RX ADMIN — DOCUSATE SODIUM AND SENNOSIDES 1 TABLET: 8.6; 5 TABLET, FILM COATED ORAL at 09:04

## 2024-04-27 RX ADMIN — ESCITALOPRAM OXALATE 20 MG: 20 TABLET, FILM COATED ORAL at 09:04

## 2024-04-27 RX ADMIN — NIFEDIPINE 30 MG: 30 TABLET, FILM COATED, EXTENDED RELEASE ORAL at 09:04

## 2024-04-27 RX ADMIN — LEVOTHYROXINE SODIUM 88 MCG: 88 TABLET ORAL at 04:04

## 2024-04-27 RX ADMIN — HEPARIN SODIUM 5000 UNITS: 5000 INJECTION INTRAVENOUS; SUBCUTANEOUS at 09:04

## 2024-04-27 RX ADMIN — HYDRALAZINE HYDROCHLORIDE 50 MG: 25 TABLET ORAL at 09:04

## 2024-04-27 RX ADMIN — HEPARIN SODIUM 5000 UNITS: 5000 INJECTION INTRAVENOUS; SUBCUTANEOUS at 04:04

## 2024-04-27 RX ADMIN — CARVEDILOL 12.5 MG: 12.5 TABLET, FILM COATED ORAL at 09:04

## 2024-04-27 RX ADMIN — POTASSIUM BICARBONATE 40 MEQ: 391 TABLET, EFFERVESCENT ORAL at 12:04

## 2024-04-27 RX ADMIN — MAGNESIUM SULFATE HEPTAHYDRATE 2 G: 40 INJECTION, SOLUTION INTRAVENOUS at 09:04

## 2024-04-27 RX ADMIN — POTASSIUM BICARBONATE 40 MEQ: 391 TABLET, EFFERVESCENT ORAL at 10:04

## 2024-04-27 RX ADMIN — LOSARTAN POTASSIUM 100 MG: 50 TABLET, FILM COATED ORAL at 09:04

## 2024-04-27 RX ADMIN — HEPARIN SODIUM 5000 UNITS: 5000 INJECTION INTRAVENOUS; SUBCUTANEOUS at 01:04

## 2024-04-27 RX ADMIN — ATORVASTATIN CALCIUM 80 MG: 40 TABLET, FILM COATED ORAL at 09:04

## 2024-04-27 RX ADMIN — Medication 6 MG: at 09:04

## 2024-04-27 RX ADMIN — ASPIRIN 81 MG CHEWABLE TABLET 81 MG: 81 TABLET CHEWABLE at 09:04

## 2024-04-27 RX ADMIN — HYDRALAZINE HYDROCHLORIDE 10 MG: 20 INJECTION, SOLUTION INTRAMUSCULAR; INTRAVENOUS at 04:04

## 2024-04-27 NOTE — PLAN OF CARE
Problem: Cognitive Impairment (Stroke, Ischemic/Transient Ischemic Attack)  Goal: Optimal Cognitive Function  Outcome: Progressing     Problem: Cognitive Impairment (Stroke, Ischemic/Transient Ischemic Attack)  Goal: Optimal Cognitive Function  Outcome: Progressing     Problem: Communication Impairment (Stroke, Ischemic/Transient Ischemic Attack)  Goal: Improved Communication Skills  Outcome: Progressing     Problem: Fall Injury Risk  Goal: Absence of Fall and Fall-Related Injury  Outcome: Progressing

## 2024-04-27 NOTE — ASSESSMENT & PLAN NOTE
Denise Berrios is a 74 y.o. female with a significant medical history of recent L MCA stroke (4/10/2024), HTN, HLD, Graves' disease,IBS, migraine HAs  who presents to the hospital for evaluation of acute onset LSW, neglect, and aphasia. R M1 occlusion noted on CTA. S/p thrombectomy with TICI3 reperfusion on 4/22. She was admitted to [facility] post procedure. Patient was unable to fill a Brilinta prescription prior to current presentation. Considering recurrent, bihemispheric recent strokes, etiology is ESUS. Coordinating followup with EP clinic for evaluation for loop recorder placement; has holter monitor at home in interim..    Antithrombotics for secondary stroke prevention: Antiplatelets: Aspirin: 81 mg daily; Brilinta     Statins for secondary stroke prevention and hyperlipidemia, if present:   Statins: Atorvastatin- 40 mg daily    Aggressive risk factor modification: HTN, HLD, AAD, Graves' Disease     Rehab efforts: The patient has been evaluated by a stroke team provider and the therapy needs have been fully considered based off the presenting complaints and exam findings. The following therapy evaluations are needed: PT evaluate and treat, OT evaluate and treat, SLP evaluate and treat    Diagnostics ordered/pending:     VTE prophylaxis: Heparin 5000 units SQ every 8 hours  Mechanical prophylaxis: Place SCDs    BP parameters: Patient may need higher pressures as her exam seems to be pressure dependent at this time. Avoid hypoperfusion.

## 2024-04-27 NOTE — ASSESSMENT & PLAN NOTE
Patient has Abnormal Magnesium: hypomagnesemia. Will continue to monitor electrolytes closely. Will replace the affected electrolytes and repeat labs to be done after interventions completed. The patient's magnesium results have been reviewed and are listed below.  Recent Labs   Lab 04/27/24  0514   MG 1.7

## 2024-04-27 NOTE — SUBJECTIVE & OBJECTIVE
Neurologic Chief Complaint: Left sided weakness with dysarthria. New R MCA stroke.    Subjective:     Interval History: Patient is seen for follow-up neurological assessment and treatment recommendations:     NAEON. No further report of dark stools; Hgb still stable. Patient hypertensive last night into this morning; adding nifedipine and transitioning IV to PO hydralazine PRN. Will continue to monitor for adequate blood pressure control.    HPI, Past Medical, Family, and Social History remains the same as documented in the initial encounter.     Review of Systems   HENT:  Positive for trouble swallowing.    Respiratory:  Negative for shortness of breath.    Cardiovascular:  Negative for chest pain.   Gastrointestinal:  Negative for abdominal pain, nausea and vomiting.   Musculoskeletal:  Negative for gait problem.   Neurological:  Positive for speech difficulty.     Scheduled Meds:  Current Facility-Administered Medications   Medication Dose Route Frequency    aspirin  81 mg Oral Daily    atorvastatin  80 mg Oral Daily    carvediloL  12.5 mg Oral BID    EScitalopram oxalate  20 mg Oral Daily    heparin (porcine)  5,000 Units Subcutaneous Q8H    levothyroxine  88 mcg Oral Before breakfast    losartan  100 mg Oral Daily    magnesium sulfate IVPB  2 g Intravenous Once    melatonin  6 mg Oral Nightly    NIFEdipine  30 mg Oral Daily    polyethylene glycol  17 g Oral QHS    potassium bicarbonate  40 mEq Oral Q2H    senna-docusate 8.6-50 mg  1 tablet Oral BID    ticagrelor  90 mg Oral BID     Continuous Infusions:  Current Facility-Administered Medications   Medication Dose Route Frequency Last Rate Last Admin     PRN Meds:  Current Facility-Administered Medications:     acetaminophen, 650 mg, Oral, Q4H PRN    albuterol-ipratropium, 3 mL, Nebulization, Q6H PRN    bisacodyL, 10 mg, Rectal, Daily PRN    hydrALAZINE, 50 mg, Oral, Q6H PRN    ondansetron, 4 mg, Intravenous, Q8H PRN    racepinephrine, 0.5 mL, Nebulization, Q4H  PRN    sodium chloride 0.9%, 10 mL, Intravenous, PRN    sodium chloride 0.9%, 10 mL, Intravenous, PRN    Objective:     Vital Signs (Most Recent):  Temp: 97.7 °F (36.5 °C) (04/27/24 0941)  Pulse: 75 (04/27/24 0941)  Resp: 18 (04/27/24 0941)  BP: (!) 170/76 (04/27/24 0941)  SpO2: 95 % (04/27/24 0941)  BP Location: Left arm    Vital Signs Range (Last 24H):  Temp:  [97.7 °F (36.5 °C)-98.4 °F (36.9 °C)]   Pulse:  [66-86]   Resp:  [16-19]   BP: (151-196)/(69-84)   SpO2:  [92 %-95 %]   BP Location: Left arm       Physical Exam  Constitutional:       General: She is not in acute distress.  HENT:      Head: Normocephalic and atraumatic.      Nose: Nose normal.      Mouth/Throat:      Mouth: Mucous membranes are dry.   Cardiovascular:      Rate and Rhythm: Normal rate and regular rhythm.   Pulmonary:      Effort: Pulmonary effort is normal. No respiratory distress.      Breath sounds: Normal breath sounds.   Abdominal:      General: Abdomen is flat. There is no distension.      Palpations: Abdomen is soft.      Tenderness: There is no abdominal tenderness.   Musculoskeletal:      Right lower leg: No edema.      Left lower leg: No edema.   Skin:     General: Skin is warm.   Neurological:      Mental Status: She is alert.              Neurological Exam:   LOC: alert  Attention Span: Good   Language: Expressive aphasia  Articulation: Dysarthria  Orientation: Person, Place, Time   Visual Fields: Full  Motor: Arm left  Normal 5/5  Leg left  Normal 5/5  Arm right  Normal 5/5  Leg right Normal 5/5  Sensation: Intact to light touch, temperature and vibration  Tone: Normal tone throughout    Laboratory:  CMP:   Recent Labs   Lab 04/27/24  0514   CALCIUM 9.4   ALBUMIN 3.0*   PROT 6.5      K 3.3*   CO2 21*   *   BUN 19   CREATININE 1.0   ALKPHOS 61   ALT 11   AST 23   BILITOT 0.8     CBC:   Recent Labs   Lab 04/27/24  0514   WBC 9.12   RBC 3.56*   HGB 11.0*   HCT 34.3*      MCV 96   MCH 30.9   MCHC 32.1        Diagnostic Results     Brain Imaging     MRI Brain wo contrast 4/22/24  5 mm acute infarct right basal ganglia.  Subtle cortical restricted diffusion right insular cortex and frontal operculum consistent with ischemic injury unless there is clinical suspicion of seizure activity.  This is potentially reversible, perhaps for continued follow-up.     Expected evolution of recent left deep white matter infarct and perhaps subacute right temporal lobe infarct similar in appearance from the prior.     No evidence of intracranial hemorrhage or mass effect.    Vessel Imaging     CTA Stroke 4/22/24  Occlusion M2 segment right MCA.  Suspected early parenchymal change a cerebral infarction in the right temporal lobe.     Chronic occlusion left PCA.     Evolving area of hypoattenuation in the left corona radiata related to known recent infarct.  Additional multifocal remote infarcts as detailed in the body of the report.     Narrowing of the right PCA similar to prior.       IR Angiogram 4/22/24  Right MCA angiogram demonstrated occlusion of the distal MCA beyond a prominent anterior temporal artery.     There was TICI 3 reperfusion using a single pass aspiration thrombectomy technique.  Patient tolerated the procedure well.     Left carotid and left vertebral angiography demonstrated no additional abnormality.    Cardiac Imaging     Echo 4/23/24    Left Ventricle: The left ventricle is normal in size. Normal wall thickness. There is normal systolic function with a visually estimated ejection fraction of 60 - 65%. There is indeterminate diastolic function.    Right Ventricle: Normal right ventricular cavity size. Wall thickness is normal. Right ventricle wall motion  is normal. Systolic function is normal.    IVC/SVC: Low central venous pressure.

## 2024-04-27 NOTE — ASSESSMENT & PLAN NOTE
-Stroke risk factor.  SBP< 220.  -some concern for pressure dependent exam this hospitalization  -Continue coreg to 12.5 BID  -Continue losartan to 100mg QD  -Starting nifedipine 30mg QD  -PO hydralzine 50mg PRN for SBP>160

## 2024-04-27 NOTE — ASSESSMENT & PLAN NOTE
Patient has Abnormal Phosphorus: hypophosphatemia. Will continue to monitor electrolytes closely. Will replace the affected electrolytes and repeat labs to be done after interventions completed. The patient's phosphorus results have been reviewed and are listed below.  Recent Labs   Lab 04/27/24  0514   PHOS 2.8

## 2024-04-27 NOTE — PROGRESS NOTES
[facility] - Neurosurgery ([facility])  Vascular Neurology  Comprehensive Stroke Center  Progress Note    Assessment/Plan:     * Embolic stroke involving right middle cerebral artery  Denise Berrios is a 74 y.o. female with a significant medical history of recent L MCA stroke (4/10/2024), HTN, HLD, Graves' disease,IBS, migraine HAs  who presents to the hospital for evaluation of acute onset LSW, neglect, and aphasia. R M1 occlusion noted on CTA. S/p thrombectomy with TICI3 reperfusion on 4/22. She was admitted to [facility] post procedure. Patient was unable to fill a Brilinta prescription prior to current presentation. Considering recurrent, bihemispheric recent strokes, etiology is ESUS. Coordinating followup with EP clinic for evaluation for loop recorder placement; has holter monitor at home in interim..    Antithrombotics for secondary stroke prevention: Antiplatelets: Aspirin: 81 mg daily; Brilinta     Statins for secondary stroke prevention and hyperlipidemia, if present:   Statins: Atorvastatin- 40 mg daily    Aggressive risk factor modification: HTN, HLD, AAD, Graves' Disease     Rehab efforts: The patient has been evaluated by a stroke team provider and the therapy needs have been fully considered based off the presenting complaints and exam findings. The following therapy evaluations are needed: PT evaluate and treat, OT evaluate and treat, SLP evaluate and treat    Diagnostics ordered/pending:     VTE prophylaxis: Heparin 5000 units SQ every 8 hours  Mechanical prophylaxis: Place SCDs    BP parameters: Patient may need higher pressures as her exam seems to be pressure dependent at this time. Avoid hypoperfusion.        Asymptomatic bacteriuria  Patient with leukocytosis to 16s this hospitalization with downtrend to 10s without intervention  Urine culture with E. Coli, but patient denies symptoms at this time  Low threshold to initiate antibiotic therapy if symptoms, leukocytosis, or fevers  develop    Hypomagnesemia  Patient has Abnormal Magnesium: hypomagnesemia. Will continue to monitor electrolytes closely. Will replace the affected electrolytes and repeat labs to be done after interventions completed. The patient's magnesium results have been reviewed and are listed below.  Recent Labs   Lab 04/27/24  0514   MG 1.7           Insomnia  Providing melatonin PRN    Hypothyroidism  See Other specified hypothyroidism     Constipation  After admission, imaging noted moderate to large amount of stool throughout the colon may reflect constipation.  No high-grade obstruction.   On miralax and senna  Having daily bowel movements  Please place photo in chart of any dark tarry stools    Hypophosphatemia  Patient has Abnormal Phosphorus: hypophosphatemia. Will continue to monitor electrolytes closely. Will replace the affected electrolytes and repeat labs to be done after interventions completed. The patient's phosphorus results have been reviewed and are listed below.  Recent Labs   Lab 04/27/24  0514   PHOS 2.8           Flaccid hemiplegia affecting left nondominant side  Aphasia due to acute stroke   -Stroke symptoms. Echo without thrombosis or other structural abnormality.     Aphasia due to acute stroke  Likely related to prior L MCA stroke  SLP consulted, appreciate recs  Pureed diet with nectar thick liquids    Essential hypertension  -Stroke risk factor.  SBP< 220.  -some concern for pressure dependent exam this hospitalization  -Continue coreg to 12.5 BID  -Continue losartan to 100mg QD  -Starting nifedipine 30mg QD  -PO hydralzine 50mg PRN for SBP>160      Migraine headache  Noted history of migraines on nortriptyline, tramadol at home  Will hold home meds given hospitalization for acute stroke  No complaint of headache at this time    Irritable bowel syndrome  Noted history of IBS  Continue home SSRI  Continue bowel regimen  Patient without current complaint of abdominal pain/irregular bowel  movements    Dyslipidemia  -Stroke risk factor. Last LDL 85.2 on 4/10/2024.  -Continue atorvastatin 40 mg daily.    Other specified hypothyroidism  post radioactive treatment for grave's  -TSH 4.129  -Continue home synthroid    Anxiety  Continue lexapro         74 year old female with recent L MCA stroke presented for R MCA embolic stroke status post thrombectomy. Extubated on 4/22, improving motor strength but with severe dysarthria and aphasia. Echo without abnormality, no arrhythmias captured on telemetry. MRI s/p thrombectomy with 5 mm acute right basal ganglia infarct with restricted diffusion right insular cortex and frontal operculum. Working w/ PT/OT/Speech, passed swallow eval. Patient to follow-up with cardiac EP outpatient for evaluation for loop recorder placement. Nurses reporting dark stools, however have been unable to view these stools and Hgb has been stable. Patient had persistent HTN after the window we'd expect for cerebral autoregulation; uptitrated antiHTN's slowly.    STROKE DOCUMENTATION   Acute Stroke Times   Last Known Normal Date: 04/21/24  Last Known Normal Time: 2000  Unknown Symptom Onset Date: Unknown Date  Unknown Symptom Onset Time: Unknown Time  Stroke Team Called Date: 04/22/24  Stroke Team Called Time: 0309  Stroke Team Arrival Date: 04/22/24  Stroke Team Arrival Time: 0314  CT Interpretation Time: 0319  Thrombolytic Therapy Recommended: No  CTA Interpretation Time: 0330 (delay due to issues w/IV and CT injector.)  Thrombectomy Recommended: Yes  Decision to Treat Time for IR: 0351    NIH Scale:  1a. Level of Consciousness: 0-->Alert, keenly responsive  1b. LOC Questions: 0-->Answers both questions correctly  1c. LOC Commands: 0-->Performs both tasks correctly  2. Best Gaze: 0-->Normal  3. Visual: 0-->No visual loss  4. Facial Palsy: 1-->Minor paralysis (flattened nasolabial fold, asymmetry on smiling)  5a. Motor Arm, Left: 0-->No drift, limb holds 90 (or 45) degrees for full 10  secs  5b. Motor Arm, Right: 0-->No drift, limb holds 90 (or 45) degrees for full 10 secs  6a. Motor Leg, Left: 0-->No drift, leg holds 30 degree position for full 5 secs  6b. Motor Leg, Right: 0-->No drift, leg holds 30 degree position for full 5 secs  7. Limb Ataxia: 0-->Absent  8. Sensory: 0-->Normal, no sensory loss  9. Best Language: 2-->Severe aphasia, all communication is through fragmentary expression, great need for inference, questioning, and guessing by the listener. Range of information that can be exchanged is limited, listener carries burden of. . . (see row details)  10. Dysarthria: 2-->Severe dysarthria, patients speech is so slurred as to be unintelligible in the absence of or out of proportion to any dysphasia, or is mute/anarthric  11. Extinction and Inattention (formerly Neglect): 0-->No abnormality  Total (NIH Stroke Scale): 5       Modified Faribault Score: 2  Inocencia Coma Scale:    ABCD2 Score:    [id number] Score:   HAS -BLED Score:   ICH Score:   Hunt & Velazquez Classification:      Hemorrhagic change of an Ischemic Stroke: Does this patient have an ischemic stroke with hemorrhagic changes? No     Neurologic Chief Complaint: Left sided weakness with dysarthria. New R MCA stroke.    Subjective:     Interval History: Patient is seen for follow-up neurological assessment and treatment recommendations:     NAEON. No further report of dark stools; Hgb still stable. Patient hypertensive last night into this morning; adding nifedipine and transitioning IV to PO hydralazine PRN. Will continue to monitor for adequate blood pressure control.    HPI, Past Medical, Family, and Social History remains the same as documented in the initial encounter.     Review of Systems   HENT:  Positive for trouble swallowing.    Respiratory:  Negative for shortness of breath.    Cardiovascular:  Negative for chest pain.   Gastrointestinal:  Negative for abdominal pain, nausea and vomiting.   Musculoskeletal:  Negative for  gait problem.   Neurological:  Positive for speech difficulty.     Scheduled Meds:  Current Facility-Administered Medications   Medication Dose Route Frequency    aspirin  81 mg Oral Daily    atorvastatin  80 mg Oral Daily    carvediloL  12.5 mg Oral BID    EScitalopram oxalate  20 mg Oral Daily    heparin (porcine)  5,000 Units Subcutaneous Q8H    levothyroxine  88 mcg Oral Before breakfast    losartan  100 mg Oral Daily    magnesium sulfate IVPB  2 g Intravenous Once    melatonin  6 mg Oral Nightly    NIFEdipine  30 mg Oral Daily    polyethylene glycol  17 g Oral QHS    potassium bicarbonate  40 mEq Oral Q2H    senna-docusate 8.6-50 mg  1 tablet Oral BID    ticagrelor  90 mg Oral BID     Continuous Infusions:  Current Facility-Administered Medications   Medication Dose Route Frequency Last Rate Last Admin     PRN Meds:  Current Facility-Administered Medications:     acetaminophen, 650 mg, Oral, Q4H PRN    albuterol-ipratropium, 3 mL, Nebulization, Q6H PRN    bisacodyL, 10 mg, Rectal, Daily PRN    hydrALAZINE, 50 mg, Oral, Q6H PRN    ondansetron, 4 mg, Intravenous, Q8H PRN    racepinephrine, 0.5 mL, Nebulization, Q4H PRN    sodium chloride 0.9%, 10 mL, Intravenous, PRN    sodium chloride 0.9%, 10 mL, Intravenous, PRN    Objective:     Vital Signs (Most Recent):  Temp: 97.7 °F (36.5 °C) (04/27/24 0941)  Pulse: 75 (04/27/24 0941)  Resp: 18 (04/27/24 0941)  BP: (!) 170/76 (04/27/24 0941)  SpO2: 95 % (04/27/24 0941)  BP Location: Left arm    Vital Signs Range (Last 24H):  Temp:  [97.7 °F (36.5 °C)-98.4 °F (36.9 °C)]   Pulse:  [66-86]   Resp:  [16-19]   BP: (151-196)/(69-84)   SpO2:  [92 %-95 %]   BP Location: Left arm       Physical Exam  Constitutional:       General: She is not in acute distress.  HENT:      Head: Normocephalic and atraumatic.      Nose: Nose normal.      Mouth/Throat:      Mouth: Mucous membranes are dry.   Cardiovascular:      Rate and Rhythm: Normal rate and regular rhythm.   Pulmonary:       Effort: Pulmonary effort is normal. No respiratory distress.      Breath sounds: Normal breath sounds.   Abdominal:      General: Abdomen is flat. There is no distension.      Palpations: Abdomen is soft.      Tenderness: There is no abdominal tenderness.   Musculoskeletal:      Right lower leg: No edema.      Left lower leg: No edema.   Skin:     General: Skin is warm.   Neurological:      Mental Status: She is alert.              Neurological Exam:   LOC: alert  Attention Span: Good   Language: Expressive aphasia  Articulation: Dysarthria  Orientation: Person, Place, Time   Visual Fields: Full  Motor: Arm left  Normal 5/5  Leg left  Normal 5/5  Arm right  Normal 5/5  Leg right Normal 5/5  Sensation: Intact to light touch, temperature and vibration  Tone: Normal tone throughout    Laboratory:  CMP:   Recent Labs   Lab 04/27/24  0514   CALCIUM 9.4   ALBUMIN 3.0*   PROT 6.5      K 3.3*   CO2 21*   *   BUN 19   CREATININE 1.0   ALKPHOS 61   ALT 11   AST 23   BILITOT 0.8     CBC:   Recent Labs   Lab 04/27/24  0514   WBC 9.12   RBC 3.56*   HGB 11.0*   HCT 34.3*      MCV 96   MCH 30.9   MCHC 32.1       Diagnostic Results     Brain Imaging     MRI Brain wo contrast 4/22/24  5 mm acute infarct right basal ganglia.  Subtle cortical restricted diffusion right insular cortex and frontal operculum consistent with ischemic injury unless there is clinical suspicion of seizure activity.  This is potentially reversible, perhaps for continued follow-up.     Expected evolution of recent left deep white matter infarct and perhaps subacute right temporal lobe infarct similar in appearance from the prior.     No evidence of intracranial hemorrhage or mass effect.    Vessel Imaging     CTA Stroke 4/22/24  Occlusion M2 segment right MCA.  Suspected early parenchymal change a cerebral infarction in the right temporal lobe.     Chronic occlusion left PCA.     Evolving area of hypoattenuation in the left corona radiata  related to known recent infarct.  Additional multifocal remote infarcts as detailed in the body of the report.     Narrowing of the right PCA similar to prior.       IR Angiogram 4/22/24  Right MCA angiogram demonstrated occlusion of the distal MCA beyond a prominent anterior temporal artery.     There was TICI 3 reperfusion using a single pass aspiration thrombectomy technique.  Patient tolerated the procedure well.     Left carotid and left vertebral angiography demonstrated no additional abnormality.    Cardiac Imaging     Echo 4/23/24    Left Ventricle: The left ventricle is normal in size. Normal wall thickness. There is normal systolic function with a visually estimated ejection fraction of 60 - 65%. There is indeterminate diastolic function.    Right Ventricle: Normal right ventricular cavity size. Wall thickness is normal. Right ventricle wall motion  is normal. Systolic function is normal.    IVC/SVC: Low central venous pressure.       Dre Mcghee MD  Comprehensive Stroke Center  Department of Vascular Neurology   [facility] Neurosurgery [facility])

## 2024-04-28 VITALS
WEIGHT: 153 LBS | OXYGEN SATURATION: 94 % | HEART RATE: 66 BPM | DIASTOLIC BLOOD PRESSURE: 60 MMHG | RESPIRATION RATE: 18 BRPM | HEIGHT: 61 IN | BODY MASS INDEX: 28.89 KG/M2 | SYSTOLIC BLOOD PRESSURE: 130 MMHG | TEMPERATURE: 98 F

## 2024-04-28 LAB
ALBUMIN SERPL BCP-MCNC: 3.2 G/DL (ref 3.5–5.2)
ALP SERPL-CCNC: 70 U/L (ref 55–135)
ALT SERPL W/O P-5'-P-CCNC: 13 U/L (ref 10–44)
ANION GAP SERPL CALC-SCNC: 12 MMOL/L (ref 8–16)
AST SERPL-CCNC: 23 U/L (ref 10–40)
BASOPHILS # BLD AUTO: 0.08 K/UL (ref 0–0.2)
BASOPHILS NFR BLD: 0.8 % (ref 0–1.9)
BILIRUB SERPL-MCNC: 0.9 MG/DL (ref 0.1–1)
BUN SERPL-MCNC: 18 MG/DL (ref 8–23)
CALCIUM SERPL-MCNC: 9.8 MG/DL (ref 8.7–10.5)
CHLORIDE SERPL-SCNC: 112 MMOL/L (ref 95–110)
CO2 SERPL-SCNC: 20 MMOL/L (ref 23–29)
CREAT SERPL-MCNC: 1 MG/DL (ref 0.5–1.4)
DIFFERENTIAL METHOD BLD: ABNORMAL
EOSINOPHIL # BLD AUTO: 0.4 K/UL (ref 0–0.5)
EOSINOPHIL NFR BLD: 4 % (ref 0–8)
ERYTHROCYTE [DISTWIDTH] IN BLOOD BY AUTOMATED COUNT: 14.5 % (ref 11.5–14.5)
EST. GFR  (NO RACE VARIABLE): 58.4 ML/MIN/1.73 M^2
GLUCOSE SERPL-MCNC: 104 MG/DL (ref 70–110)
HCT VFR BLD AUTO: 36 % (ref 37–48.5)
HGB BLD-MCNC: 11.5 G/DL (ref 12–16)
IMM GRANULOCYTES # BLD AUTO: 0.19 K/UL (ref 0–0.04)
IMM GRANULOCYTES NFR BLD AUTO: 1.8 % (ref 0–0.5)
LYMPHOCYTES # BLD AUTO: 1.3 K/UL (ref 1–4.8)
LYMPHOCYTES NFR BLD: 12.5 % (ref 18–48)
MAGNESIUM SERPL-MCNC: 2.1 MG/DL (ref 1.6–2.6)
MCH RBC QN AUTO: 30.4 PG (ref 27–31)
MCHC RBC AUTO-ENTMCNC: 31.9 G/DL (ref 32–36)
MCV RBC AUTO: 95 FL (ref 82–98)
MONOCYTES # BLD AUTO: 1.1 K/UL (ref 0.3–1)
MONOCYTES NFR BLD: 10.8 % (ref 4–15)
NEUTROPHILS # BLD AUTO: 7.3 K/UL (ref 1.8–7.7)
NEUTROPHILS NFR BLD: 70.1 % (ref 38–73)
NRBC BLD-RTO: 0 /100 WBC
PHOSPHATE SERPL-MCNC: 3.2 MG/DL (ref 2.7–4.5)
PLATELET # BLD AUTO: 307 K/UL (ref 150–450)
PMV BLD AUTO: 10.8 FL (ref 9.2–12.9)
POTASSIUM SERPL-SCNC: 3.9 MMOL/L (ref 3.5–5.1)
PROT SERPL-MCNC: 6.9 G/DL (ref 6–8.4)
RBC # BLD AUTO: 3.78 M/UL (ref 4–5.4)
SODIUM SERPL-SCNC: 144 MMOL/L (ref 136–145)
WBC # BLD AUTO: 10.45 K/UL (ref 3.9–12.7)

## 2024-04-28 PROCEDURE — 85025 COMPLETE CBC W/AUTO DIFF WBC: CPT | Performed by: NURSE PRACTITIONER

## 2024-04-28 PROCEDURE — 83735 ASSAY OF MAGNESIUM: CPT | Performed by: NURSE PRACTITIONER

## 2024-04-28 PROCEDURE — 25000003 PHARM REV CODE 250: Performed by: STUDENT IN AN ORGANIZED HEALTH CARE EDUCATION/TRAINING PROGRAM

## 2024-04-28 PROCEDURE — 63600175 PHARM REV CODE 636 W HCPCS: Performed by: NURSE PRACTITIONER

## 2024-04-28 PROCEDURE — 80053 COMPREHEN METABOLIC PANEL: CPT | Performed by: NURSE PRACTITIONER

## 2024-04-28 PROCEDURE — 36415 COLL VENOUS BLD VENIPUNCTURE: CPT | Performed by: NURSE PRACTITIONER

## 2024-04-28 PROCEDURE — 25000003 PHARM REV CODE 250: Performed by: NURSE PRACTITIONER

## 2024-04-28 PROCEDURE — 84100 ASSAY OF PHOSPHORUS: CPT | Performed by: NURSE PRACTITIONER

## 2024-04-28 PROCEDURE — 25000003 PHARM REV CODE 250

## 2024-04-28 PROCEDURE — 99238 HOSP IP/OBS DSCHRG MGMT 30/<: CPT | Mod: ,,, | Performed by: STUDENT IN AN ORGANIZED HEALTH CARE EDUCATION/TRAINING PROGRAM

## 2024-04-28 RX ORDER — CARVEDILOL 12.5 MG/1
12.5 TABLET ORAL 2 TIMES DAILY
Status: DISCONTINUED | OUTPATIENT
Start: 2024-04-28 | End: 2024-04-28 | Stop reason: HOSPADM

## 2024-04-28 RX ORDER — NIFEDIPINE 30 MG/1
60 TABLET, EXTENDED RELEASE ORAL DAILY
Status: DISCONTINUED | OUTPATIENT
Start: 2024-04-28 | End: 2024-04-28

## 2024-04-28 RX ORDER — CARVEDILOL 25 MG/1
25 TABLET ORAL 2 TIMES DAILY
Status: DISCONTINUED | OUTPATIENT
Start: 2024-04-28 | End: 2024-04-28

## 2024-04-28 RX ORDER — LOSARTAN POTASSIUM 50 MG/1
100 TABLET ORAL DAILY
Qty: 90 TABLET | Refills: 3 | Status: SHIPPED | OUTPATIENT
Start: 2024-04-28

## 2024-04-28 RX ORDER — NIFEDIPINE 30 MG/1
30 TABLET, EXTENDED RELEASE ORAL DAILY
Status: DISCONTINUED | OUTPATIENT
Start: 2024-04-29 | End: 2024-04-28 | Stop reason: HOSPADM

## 2024-04-28 RX ORDER — PREGABALIN 75 MG/1
75 CAPSULE ORAL DAILY
Start: 2024-04-28

## 2024-04-28 RX ORDER — CARVEDILOL 12.5 MG/1
12.5 TABLET ORAL 2 TIMES DAILY
Qty: 180 TABLET | Refills: 3 | Status: SHIPPED | OUTPATIENT
Start: 2024-04-28 | End: 2025-04-28

## 2024-04-28 RX ORDER — NIFEDIPINE 30 MG/1
30 TABLET, EXTENDED RELEASE ORAL DAILY
Qty: 30 TABLET | Refills: 11 | Status: SHIPPED | OUTPATIENT
Start: 2024-04-29 | End: 2025-04-29

## 2024-04-28 RX ORDER — ATORVASTATIN CALCIUM 40 MG/1
80 TABLET, FILM COATED ORAL DAILY
Qty: 180 TABLET | Refills: 0 | Status: SHIPPED | OUTPATIENT
Start: 2024-04-28 | End: 2024-07-27

## 2024-04-28 RX ADMIN — ESCITALOPRAM OXALATE 20 MG: 20 TABLET, FILM COATED ORAL at 09:04

## 2024-04-28 RX ADMIN — ATORVASTATIN CALCIUM 80 MG: 40 TABLET, FILM COATED ORAL at 09:04

## 2024-04-28 RX ADMIN — LEVOTHYROXINE SODIUM 88 MCG: 88 TABLET ORAL at 04:04

## 2024-04-28 RX ADMIN — LOSARTAN POTASSIUM 100 MG: 50 TABLET, FILM COATED ORAL at 09:04

## 2024-04-28 RX ADMIN — TICAGRELOR 90 MG: 90 TABLET ORAL at 09:04

## 2024-04-28 RX ADMIN — NIFEDIPINE 60 MG: 30 TABLET, FILM COATED, EXTENDED RELEASE ORAL at 09:04

## 2024-04-28 RX ADMIN — HEPARIN SODIUM 5000 UNITS: 5000 INJECTION INTRAVENOUS; SUBCUTANEOUS at 04:04

## 2024-04-28 RX ADMIN — CARVEDILOL 12.5 MG: 12.5 TABLET, FILM COATED ORAL at 09:04

## 2024-04-28 RX ADMIN — ASPIRIN 81 MG CHEWABLE TABLET 81 MG: 81 TABLET CHEWABLE at 09:04

## 2024-04-28 NOTE — PLAN OF CARE
[facility] - Neurosurgery (Hospital)  Discharge Final Note    Primary Care Provider: Aleksandra Carey MD    Expected Discharge Date: 4/28/2024    Final Discharge Note (most recent)       Final Note - 04/28/24 1131          Final Note    Assessment Type Final Discharge Note     Anticipated Discharge Disposition Home-Health [facility]     What phone number can be called within the next 1-3 days to see how you are doing after discharge? [phone]     Hospital Resources/Appts/Education Provided Provided patient/caregiver with written discharge plan information        Post-Acute Status    Post-Acute Authorization Home Health     Home Health Status Set-up Complete/Auth obtained     Coverage Payor: MEDICARE - MEDICARE PART A & B -     Patient choice form signed by patient/caregiver List with quality metrics by geographic area provided     Discharge Delays None known at this time                     Important Message from Medicare  Important Message from Medicare regarding Discharge Appeal Rights: Given to patient/caregiver, Explained to patient/caregiver, Signed/date by patient/caregiver     Date IMM was signed: 04/25/24  Time IMM was signed: 1046    Contact Info       Amee Lizama NP   Specialty: Family Medicine    [address]  [address]  [place] [address]   Phone: [phone]       Next Steps: Follow up on 5/1/2024    Instructions: Follow-up appointment at 11:00 am.    [facility] - Cardiology   Specialty: Cardiology    [address] [facility] [address]   Phone: [phone]       Next Steps: Schedule an appointment as soon as possible for a visit in 1 week(s)    [facility] Speech Therapy ([place])   Specialty: Speech Therapy    [address]  [facility] [address]   Phone: [phone]       Next Steps: Schedule an appointment as soon as possible for a visit in 1 week(s)    [facility] VASCULAR NEUROLOGY   Specialty: Vascular Neurology    [address] [facility] [address]   Phone:  [phone]       Next Steps: Schedule an appointment as soon as possible for a visit in 1 week(s)          Patient to discharge to home today with [facility].     ELIZABETH Adam  [facility]  [phone]

## 2024-04-28 NOTE — ASSESSMENT & PLAN NOTE
Patient has Abnormal Phosphorus: hypophosphatemia. Will continue to monitor electrolytes closely. Will replace the affected electrolytes and repeat labs to be done after interventions completed. The patient's phosphorus results have been reviewed and are listed below.  Recent Labs   Lab 04/28/24  0441   PHOS 3.2

## 2024-04-28 NOTE — NURSING
Patient has received all d/c instructions and summary.  Attempted to apply loop recorder that the patient received in the mail at home without success; pt's  to charge device and all the provided number tomorrow and or to have Home Health RN assist.  Waiting on WC with transporter to leave at this time.

## 2024-04-28 NOTE — ASSESSMENT & PLAN NOTE
Denise Berrios is a 74 y.o. female with a significant medical history of recent L MCA stroke (4/10/2024), HTN, HLD, Graves' disease,IBS, migraine HAs  who presents to the hospital for evaluation of acute onset LSW, neglect, and aphasia. R M1 occlusion noted on CTA. S/p thrombectomy with TICI3 reperfusion on 4/22. She was admitted to [facility] post procedure. Patient was unable to fill a Brilinta prescription prior to current presentation. Considering recurrent, bihemispheric recent strokes, etiology is ESUS. Coordinating followup with EP clinic for evaluation for loop recorder placement; has holter monitor at home in interim..    Antithrombotics for secondary stroke prevention: Antiplatelets: Aspirin: 81 mg daily; Brilinta     Statins for secondary stroke prevention and hyperlipidemia, if present:   Statins: Atorvastatin- 40 mg daily    Aggressive risk factor modification: HTN, HLD, AAD, Graves' Disease     Rehab efforts: The patient has been evaluated by a stroke team provider and the therapy needs have been fully considered based off the presenting complaints and exam findings. The following therapy evaluations are needed: PT evaluate and treat, OT evaluate and treat, SLP evaluate and treat    Diagnostics ordered/pending:     VTE prophylaxis: Heparin 5000 units SQ every 8 hours  Mechanical prophylaxis: Place SCDs    BP parameters: Patient may need higher pressures as her exam seems to be pressure dependent at this time. Avoid hypoperfusion.

## 2024-04-28 NOTE — ASSESSMENT & PLAN NOTE
Patient has Abnormal Magnesium: hypomagnesemia. Will continue to monitor electrolytes closely. Will replace the affected electrolytes and repeat labs to be done after interventions completed. The patient's magnesium results have been reviewed and are listed below.  Recent Labs   Lab 04/28/24  0441   MG 2.1

## 2024-04-28 NOTE — PLAN OF CARE
Problem: Adjustment to Illness (Stroke, Ischemic/Transient Ischemic Attack)  Goal: Optimal Coping  4/27/2024 2340 by Chan Herrera RN  Outcome: Progressing  4/27/2024 2340 by Chan Herrera RN  Outcome: Not Progressing     Problem: Bowel Elimination Impaired (Stroke, Ischemic/Transient Ischemic Attack)  Goal: Effective Bowel Elimination  4/27/2024 2340 by Chan Herrera RN  Outcome: Progressing  4/27/2024 2340 by Chan Herrera RN  Outcome: Not Progressing     Problem: Cerebral Tissue Perfusion (Stroke, Ischemic/Transient Ischemic Attack)  Goal: Optimal Cerebral Tissue Perfusion  4/27/2024 2340 by Chan Herrera RN  Outcome: Progressing  4/27/2024 2340 by Chan Herrera RN  Outcome: Not Progressing     Problem: Cognitive Impairment (Stroke, Ischemic/Transient Ischemic Attack)  Goal: Optimal Cognitive Function  4/27/2024 2340 by Chan Herrera RN  Outcome: Progressing  4/27/2024 2340 by Chan Herrera RN  Outcome: Not Progressing     Problem: Communication Impairment (Stroke, Ischemic/Transient Ischemic Attack)  Goal: Improved Communication Skills  4/27/2024 2340 by Chan Herrera RN  Outcome: Progressing  4/27/2024 2340 by Chan Herrera RN  Outcome: Not Progressing     Problem: Functional Ability Impaired (Stroke, Ischemic/Transient Ischemic Attack)  Goal: Optimal Functional Ability  4/27/2024 2340 by Chan Herrera RN  Outcome: Progressing  4/27/2024 2340 by Chan Herrera RN  Outcome: Not Progressing     Problem: Respiratory Compromise (Stroke, Ischemic/Transient Ischemic Attack)  Goal: Effective Oxygenation and Ventilation  4/27/2024 2340 by Chan Herrera RN  Outcome: Progressing  4/27/2024 2340 by Chan Herrera RN  Outcome: Not Progressing     Problem: Sensorimotor Impairment (Stroke, Ischemic/Transient Ischemic Attack)  Goal: Improved Sensorimotor Function  4/27/2024 2340 by Chan Herrera RN  Outcome: Progressing  4/27/2024 2340 by Chan Herrera RN  Outcome: Not Progressing     Problem: Swallowing Impairment (Stroke,  Ischemic/Transient Ischemic Attack)  Goal: Optimal Eating and Swallowing without Aspiration  4/27/2024 2340 by Chan Herrera RN  Outcome: Progressing  4/27/2024 2340 by Chan Herrera RN  Outcome: Not Progressing     Problem: Urinary Elimination Impaired (Stroke, Ischemic/Transient Ischemic Attack)  Goal: Effective Urinary Elimination  4/27/2024 2340 by Chan Herrera RN  Outcome: Progressing  4/27/2024 2340 by Chan Herrera RN  Outcome: Not Progressing     Problem: Adult Inpatient Plan of Care  Goal: Plan of Care Review  4/27/2024 2340 by Chan Herrera RN  Outcome: Progressing  4/27/2024 2340 by Chan Herrera RN  Outcome: Not Progressing  Goal: Patient-Specific Goal (Individualized)  4/27/2024 2340 by Chan Herrera RN  Outcome: Progressing  4/27/2024 2340 by Chan Herrera RN  Outcome: Not Progressing  Goal: Absence of Hospital-Acquired Illness or Injury  4/27/2024 2340 by Chan Herrera RN  Outcome: Progressing  4/27/2024 2340 by Chan Herrera RN  Outcome: Not Progressing  Goal: Optimal Comfort and Wellbeing  4/27/2024 2340 by Chan Herrera RN  Outcome: Progressing  4/27/2024 2340 by Chan Herrera RN  Outcome: Not Progressing  Goal: Readiness for Transition of Care  4/27/2024 2340 by Chan Herrera RN  Outcome: Progressing  4/27/2024 2340 by Chan Herrera RN  Outcome: Not Progressing     Problem: Fall Injury Risk  Goal: Absence of Fall and Fall-Related Injury  4/27/2024 2340 by Chan Herrera RN  Outcome: Progressing  4/27/2024 2340 by Chan Herrera RN  Outcome: Not Progressing     Problem: Skin Injury Risk Increased  Goal: Skin Health and Integrity  4/27/2024 2340 by Chan Herrera RN  Outcome: Progressing  4/27/2024 2340 by Chan Herrera RN  Outcome: Not Progressing     Problem: Wound  Goal: Optimal Coping  4/27/2024 2340 by Chan Herrera RN  Outcome: Progressing  4/27/2024 2340 by Chan Herrera RN  Outcome: Not Progressing  Goal: Optimal Functional Ability  4/27/2024 2340 by Chan Herrera RN  Outcome:  Progressing  4/27/2024 2340 by Chan Herrera RN  Outcome: Not Progressing  Goal: Absence of Infection Signs and Symptoms  4/27/2024 2340 by Chan Herrera RN  Outcome: Progressing  4/27/2024 2340 by Chan Herrera RN  Outcome: Not Progressing  Goal: Improved Oral Intake  4/27/2024 2340 by Chan Herrera RN  Outcome: Progressing  4/27/2024 2340 by Chan Herrera RN  Outcome: Not Progressing  Goal: Optimal Pain Control and Function  4/27/2024 2340 by Chan Herrera RN  Outcome: Progressing  4/27/2024 2340 by Chan Herrera RN  Outcome: Not Progressing  Goal: Skin Health and Integrity  4/27/2024 2340 by Chan Herrera RN  Outcome: Progressing  4/27/2024 2340 by Chan Herrera RN  Outcome: Not Progressing  Goal: Optimal Wound Healing  4/27/2024 2340 by Chan Herrera RN  Outcome: Progressing  4/27/2024 2340 by Chan Herrera RN  Outcome: Not Progressing

## 2024-04-28 NOTE — PROGRESS NOTES
[facility] - Neurosurgery ([facility])  Vascular Neurology  Comprehensive Stroke Center  Progress Note    Assessment/Plan:     * Embolic stroke involving right middle cerebral artery  Denise Berrios is a 74 y.o. female with a significant medical history of recent L MCA stroke (4/10/2024), HTN, HLD, Graves' disease,IBS, migraine HAs  who presents to the hospital for evaluation of acute onset LSW, neglect, and aphasia. R M1 occlusion noted on CTA. S/p thrombectomy with TICI3 reperfusion on 4/22. She was admitted to [facility] post procedure. Patient was unable to fill a Brilinta prescription prior to current presentation. Considering recurrent, bihemispheric recent strokes, etiology is ESUS. Coordinating followup with EP clinic for evaluation for loop recorder placement; has holter monitor at home in interim..    Antithrombotics for secondary stroke prevention: Antiplatelets: Aspirin: 81 mg daily; Brilinta     Statins for secondary stroke prevention and hyperlipidemia, if present:   Statins: Atorvastatin- 40 mg daily    Aggressive risk factor modification: HTN, HLD, AAD, Graves' Disease     Rehab efforts: The patient has been evaluated by a stroke team provider and the therapy needs have been fully considered based off the presenting complaints and exam findings. The following therapy evaluations are needed: PT evaluate and treat, OT evaluate and treat, SLP evaluate and treat    Diagnostics ordered/pending:     VTE prophylaxis: Heparin 5000 units SQ every 8 hours  Mechanical prophylaxis: Place SCDs    BP parameters: Patient may need higher pressures as her exam seems to be pressure dependent at this time. Avoid hypoperfusion.        Asymptomatic bacteriuria  Patient with leukocytosis to 16s this hospitalization with downtrend to 10s without intervention  Urine culture with E. Coli, but patient denies symptoms at this time  Low threshold to initiate antibiotic therapy if symptoms, leukocytosis, or fevers  develop    Hypomagnesemia  Patient has Abnormal Magnesium: hypomagnesemia. Will continue to monitor electrolytes closely. Will replace the affected electrolytes and repeat labs to be done after interventions completed. The patient's magnesium results have been reviewed and are listed below.  Recent Labs   Lab 04/28/24  0441   MG 2.1           Insomnia  Providing melatonin PRN    Hypothyroidism  See Other specified hypothyroidism     Constipation  After admission, imaging noted moderate to large amount of stool throughout the colon may reflect constipation.  No high-grade obstruction.   On miralax and senna  Having daily bowel movements  Please place photo in chart of any dark tarry stools    Hypophosphatemia  Patient has Abnormal Phosphorus: hypophosphatemia. Will continue to monitor electrolytes closely. Will replace the affected electrolytes and repeat labs to be done after interventions completed. The patient's phosphorus results have been reviewed and are listed below.  Recent Labs   Lab 04/28/24  0441   PHOS 3.2           Flaccid hemiplegia affecting left nondominant side  Aphasia due to acute stroke   -Stroke symptoms. Echo without thrombosis or other structural abnormality.     Aphasia due to acute stroke  Likely related to prior L MCA stroke  SLP consulted, appreciate recs  Pureed diet with nectar thick liquids    Essential hypertension  -Stroke risk factor.  SBP< 220.  -some concern for pressure dependent exam this hospitalization  -Continue coreg to 12.5 BID  -Continue losartan to 100mg QD  -Continue nifedipine 30mg QD  -PO hydralzine 50mg PRN for SBP>160      Migraine headache  Noted history of migraines on nortriptyline, tramadol at home  Will hold home meds given hospitalization for acute stroke  No complaint of headache at this time    Irritable bowel syndrome  Noted history of IBS  Continue home SSRI  Continue bowel regimen  Patient without current complaint of abdominal pain/irregular bowel  movements    Dyslipidemia  -Stroke risk factor. Last LDL 85.2 on 4/10/2024.  -Continue atorvastatin 80 mg daily.    Other specified hypothyroidism  post radioactive treatment for grave's  -TSH 4.129  -Continue home synthroid    Anxiety  Continue lexapro         74 year old female with recent L MCA stroke presented for R MCA embolic stroke status post thrombectomy. Extubated on 4/22, improving motor strength but with severe dysarthria and aphasia. Echo without abnormality, no arrhythmias captured on telemetry. MRI s/p thrombectomy with 5 mm acute right basal ganglia infarct with restricted diffusion right insular cortex and frontal operculum. Working w/ PT/OT/Speech, passed swallow eval. Patient to follow-up with cardiac EP outpatient for evaluation for loop recorder placement. Nurses reporting dark stools, however have been unable to view these stools and Hgb has been stable. Patient had persistent HTN after the window we'd expect for cerebral autoregulation; uptitrated antiHTN's slowly. Discharging with referrals to vascular neurology, cardiac EP, and PCP. Was informed by [facility] delivery service with our inpatient pharmacy that we were unable to fill her Brilinta through the patient's  insurance. Phoned in prescription to [facility] with confirmation that they'd be able to fill it. Informed patient and  of this with instructions to call immediately with any difficulty filling the prescription. They expressed understanding and were in agreement with this plan. Home health was ordered prior to discharge as well. Patient is in possession of a 30 day cardiac event monitor and will place after discharge. Patient to continue brilinta for a total of 21 days with continuation of ASA.    STROKE DOCUMENTATION   Acute Stroke Times   Last Known Normal Date: 04/21/24  Last Known Normal Time: 2000  Unknown Symptom Onset Date: Unknown Date  Unknown Symptom Onset Time: Unknown Time  Stroke Team  Called Date: 04/22/24  Stroke Team Called Time: 0309  Stroke Team Arrival Date: 04/22/24  Stroke Team Arrival Time: 0314  CT Interpretation Time: 0319  Thrombolytic Therapy Recommended: No  CTA Interpretation Time: 0330 (delay due to issues w/IV and CT injector.)  Thrombectomy Recommended: Yes  Decision to Treat Time for IR: 0351    NIH Scale:  1a. Level of Consciousness: 0-->Alert, keenly responsive  1b. LOC Questions: 0-->Answers both questions correctly  1c. LOC Commands: 0-->Performs both tasks correctly  2. Best Gaze: 0-->Normal  3. Visual: 0-->No visual loss  4. Facial Palsy: 1-->Minor paralysis (flattened nasolabial fold, asymmetry on smiling)  5a. Motor Arm, Left: 0-->No drift, limb holds 90 (or 45) degrees for full 10 secs  5b. Motor Arm, Right: 0-->No drift, limb holds 90 (or 45) degrees for full 10 secs  6a. Motor Leg, Left: 0-->No drift, leg holds 30 degree position for full 5 secs  6b. Motor Leg, Right: 0-->No drift, leg holds 30 degree position for full 5 secs  7. Limb Ataxia: 0-->Absent  8. Sensory: 0-->Normal, no sensory loss  9. Best Language: 2-->Severe aphasia, all communication is through fragmentary expression, great need for inference, questioning, and guessing by the listener. Range of information that can be exchanged is limited, listener carries burden of. . . (see row details)  10. Dysarthria: 2-->Severe dysarthria, patients speech is so slurred as to be unintelligible in the absence of or out of proportion to any dysphasia, or is mute/anarthric  11. Extinction and Inattention (formerly Neglect): 0-->No abnormality  Total (NIH Stroke Scale): 5       Modified Nicola Score: 2  Inocencia Coma Scale:15   ABCD2 Score:    [id number] Score:   HAS -BLED Score:   ICH Score:   Hunt & Velazquez Classification:      Hemorrhagic change of an Ischemic Stroke: Does this patient have an ischemic stroke with hemorrhagic changes? No     Neurologic Chief Complaint: Left sided weakness with dysarthria. New R MCA  stroke.    Subjective:     Interval History: Patient is seen for follow-up neurological assessment and treatment recommendations:     NAEON. Hgb stable. Afebrile, HD stable. Neuro exam unchanged. Blood pressure better controlled yesterday. Will plan to discharge home with home health. Had to call in brilinta to [facility] pharmacist, as our pharmacy was unable to fill this prescription due to problems with patient's insurance. Patient and  made aware of this plan and will contact with any difficulty filling prescription.    HPI, Past Medical, Family, and Social History remains the same as documented in the initial encounter.     Review of Systems   HENT:  Positive for trouble swallowing.    Respiratory:  Negative for shortness of breath.    Cardiovascular:  Negative for chest pain.   Gastrointestinal:  Negative for abdominal pain, nausea and vomiting.   Musculoskeletal:  Negative for gait problem.   Neurological:  Positive for speech difficulty.     Scheduled Meds:  Current Facility-Administered Medications   Medication Dose Route Frequency    aspirin  81 mg Oral Daily    atorvastatin  80 mg Oral Daily    carvediloL  12.5 mg Oral BID    EScitalopram oxalate  20 mg Oral Daily    heparin (porcine)  5,000 Units Subcutaneous Q8H    levothyroxine  88 mcg Oral Before breakfast    losartan  100 mg Oral Daily    melatonin  6 mg Oral Nightly    [START ON 4/29/2024] NIFEdipine  30 mg Oral Daily    polyethylene glycol  17 g Oral QHS    senna-docusate 8.6-50 mg  1 tablet Oral BID    ticagrelor  90 mg Oral BID     Continuous Infusions:  Current Facility-Administered Medications   Medication Dose Route Frequency Last Rate Last Admin     PRN Meds:  Current Facility-Administered Medications:     acetaminophen, 650 mg, Oral, Q4H PRN    albuterol-ipratropium, 3 mL, Nebulization, Q6H PRN    bisacodyL, 10 mg, Rectal, Daily PRN    hydrALAZINE, 50 mg, Oral, Q6H PRN    ondansetron, 4 mg, Intravenous, Q8H PRN    racepinephrine, 0.5  mL, Nebulization, Q4H PRN    sodium chloride 0.9%, 10 mL, Intravenous, PRN    sodium chloride 0.9%, 10 mL, Intravenous, PRN    Objective:     Vital Signs (Most Recent):  Temp: 98.3 °F (36.8 °C) (04/28/24 0821)  Pulse: 68 (04/28/24 1208)  Resp: 16 (04/28/24 0821)  BP: (!) 166/73 (04/28/24 0821)  SpO2: (!) 93 % (04/28/24 0821)  BP Location: Left arm    Vital Signs Range (Last 24H):  Temp:  [97 °F (36.1 °C)-98.3 °F (36.8 °C)]   Pulse:  [67-80]   Resp:  [16-20]   BP: (113-188)/(55-84)   SpO2:  [92 %-95 %]   BP Location: Left arm       Physical Exam  Constitutional:       General: She is not in acute distress.  HENT:      Head: Normocephalic and atraumatic.      Nose: Nose normal.      Mouth/Throat:      Mouth: Mucous membranes are moist.   Cardiovascular:      Rate and Rhythm: Normal rate and regular rhythm.   Pulmonary:      Effort: Pulmonary effort is normal. No respiratory distress.      Breath sounds: Normal breath sounds.   Abdominal:      General: Abdomen is flat. There is no distension.      Palpations: Abdomen is soft.      Tenderness: There is no abdominal tenderness.   Musculoskeletal:      Right lower leg: No edema.      Left lower leg: No edema.   Skin:     General: Skin is warm.   Neurological:      Mental Status: She is alert.              Neurological Exam:   LOC: alert  Attention Span: Good   Language: Expressive aphasia  Articulation: Dysarthria  Orientation: Person, Place, Time   Visual Fields: Full  Motor: Arm left  Normal 5/5  Leg left  Normal 5/5  Arm right  Normal 5/5  Leg right Normal 5/5  Sensation: Intact to light touch, temperature and vibration  Tone: Normal tone throughout    Laboratory:  CMP:   Recent Labs   Lab 04/28/24  0441   CALCIUM 9.8   ALBUMIN 3.2*   PROT 6.9      K 3.9   CO2 20*   *   BUN 18   CREATININE 1.0   ALKPHOS 70   ALT 13   AST 23   BILITOT 0.9     CBC:   Recent Labs   Lab 04/28/24  0441   WBC 10.45   RBC 3.78*   HGB 11.5*   HCT 36.0*      MCV 95   MCH 30.4    [facility] 31.9*       Diagnostic Results     Brain Imaging     MRI Brain wo contrast 4/22/24  5 mm acute infarct right basal ganglia.  Subtle cortical restricted diffusion right insular cortex and frontal operculum consistent with ischemic injury unless there is clinical suspicion of seizure activity.  This is potentially reversible, perhaps for continued follow-up.     Expected evolution of recent left deep white matter infarct and perhaps subacute right temporal lobe infarct similar in appearance from the prior.     No evidence of intracranial hemorrhage or mass effect.    Vessel Imaging     CTA Stroke 4/22/24  Occlusion M2 segment right MCA.  Suspected early parenchymal change a cerebral infarction in the right temporal lobe.     Chronic occlusion left PCA.     Evolving area of hypoattenuation in the left corona radiata related to known recent infarct.  Additional multifocal remote infarcts as detailed in the body of the report.     Narrowing of the right PCA similar to prior.       IR Angiogram 4/22/24  Right MCA angiogram demonstrated occlusion of the distal MCA beyond a prominent anterior temporal artery.     There was TICI 3 reperfusion using a single pass aspiration thrombectomy technique.  Patient tolerated the procedure well.     Left carotid and left vertebral angiography demonstrated no additional abnormality.    Cardiac Imaging     Echo 4/23/24    Left Ventricle: The left ventricle is normal in size. Normal wall thickness. There is normal systolic function with a visually estimated ejection fraction of 60 - 65%. There is indeterminate diastolic function.    Right Ventricle: Normal right ventricular cavity size. Wall thickness is normal. Right ventricle wall motion  is normal. Systolic function is normal.    IVC/SVC: Low central venous pressure.       Dre Mcghee MD  Comprehensive Stroke Center  Department of Vascular Neurology   [facility])

## 2024-04-28 NOTE — SUBJECTIVE & OBJECTIVE
Neurologic Chief Complaint: Left sided weakness with dysarthria. New R MCA stroke.    Subjective:     Interval History: Patient is seen for follow-up neurological assessment and treatment recommendations:     NAEON. Hgb stable. Afebrile, HD stable. Neuro exam unchanged. Blood pressure better controlled yesterday. Will plan to discharge home with home health. Had to call in brilinta to [place] pharmacist, as our pharmacy was unable to fill this prescription due to problems with patient's insurance. Patient and  made aware of this plan and will contact with any difficulty filling prescription.    HPI, Past Medical, Family, and Social History remains the same as documented in the initial encounter.     Review of Systems   HENT:  Positive for trouble swallowing.    Respiratory:  Negative for shortness of breath.    Cardiovascular:  Negative for chest pain.   Gastrointestinal:  Negative for abdominal pain, nausea and vomiting.   Musculoskeletal:  Negative for gait problem.   Neurological:  Positive for speech difficulty.     Scheduled Meds:  Current Facility-Administered Medications   Medication Dose Route Frequency    aspirin  81 mg Oral Daily    atorvastatin  80 mg Oral Daily    carvediloL  12.5 mg Oral BID    EScitalopram oxalate  20 mg Oral Daily    heparin (porcine)  5,000 Units Subcutaneous Q8H    levothyroxine  88 mcg Oral Before breakfast    losartan  100 mg Oral Daily    melatonin  6 mg Oral Nightly    [START ON 4/29/2024] NIFEdipine  30 mg Oral Daily    polyethylene glycol  17 g Oral QHS    senna-docusate 8.6-50 mg  1 tablet Oral BID    ticagrelor  90 mg Oral BID     Continuous Infusions:  Current Facility-Administered Medications   Medication Dose Route Frequency Last Rate Last Admin     PRN Meds:  Current Facility-Administered Medications:     acetaminophen, 650 mg, Oral, Q4H PRN    albuterol-ipratropium, 3 mL, Nebulization, Q6H PRN    bisacodyL, 10 mg, Rectal, Daily PRN    hydrALAZINE, 50 mg, Oral,  Q6H PRN    ondansetron, 4 mg, Intravenous, Q8H PRN    racepinephrine, 0.5 mL, Nebulization, Q4H PRN    sodium chloride 0.9%, 10 mL, Intravenous, PRN    sodium chloride 0.9%, 10 mL, Intravenous, PRN    Objective:     Vital Signs (Most Recent):  Temp: 98.3 °F (36.8 °C) (04/28/24 0821)  Pulse: 68 (04/28/24 1208)  Resp: 16 (04/28/24 0821)  BP: (!) 166/73 (04/28/24 0821)  SpO2: (!) 93 % (04/28/24 0821)  BP Location: Left arm    Vital Signs Range (Last 24H):  Temp:  [97 °F (36.1 °C)-98.3 °F (36.8 °C)]   Pulse:  [67-80]   Resp:  [16-20]   BP: (113-188)/(55-84)   SpO2:  [92 %-95 %]   BP Location: Left arm       Physical Exam  Constitutional:       General: She is not in acute distress.  HENT:      Head: Normocephalic and atraumatic.      Nose: Nose normal.      Mouth/Throat:      Mouth: Mucous membranes are moist.   Cardiovascular:      Rate and Rhythm: Normal rate and regular rhythm.   Pulmonary:      Effort: Pulmonary effort is normal. No respiratory distress.      Breath sounds: Normal breath sounds.   Abdominal:      General: Abdomen is flat. There is no distension.      Palpations: Abdomen is soft.      Tenderness: There is no abdominal tenderness.   Musculoskeletal:      Right lower leg: No edema.      Left lower leg: No edema.   Skin:     General: Skin is warm.   Neurological:      Mental Status: She is alert.              Neurological Exam:   LOC: alert  Attention Span: Good   Language: Expressive aphasia  Articulation: Dysarthria  Orientation: Person, Place, Time   Visual Fields: Full  Motor: Arm left  Normal 5/5  Leg left  Normal 5/5  Arm right  Normal 5/5  Leg right Normal 5/5  Sensation: Intact to light touch, temperature and vibration  Tone: Normal tone throughout    Laboratory:  CMP:   Recent Labs   Lab 04/28/24  0441   CALCIUM 9.8   ALBUMIN 3.2*   PROT 6.9      K 3.9   CO2 20*   *   BUN 18   CREATININE 1.0   ALKPHOS 70   ALT 13   AST 23   BILITOT 0.9     CBC:   Recent Labs   Lab 04/28/24  5044    WBC 10.45   RBC 3.78*   HGB 11.5*   HCT 36.0*      MCV 95   MCH 30.4   MCHC 31.9*       Diagnostic Results     Brain Imaging     MRI Brain wo contrast 4/22/24  5 mm acute infarct right basal ganglia.  Subtle cortical restricted diffusion right insular cortex and frontal operculum consistent with ischemic injury unless there is clinical suspicion of seizure activity.  This is potentially reversible, perhaps for continued follow-up.     Expected evolution of recent left deep white matter infarct and perhaps subacute right temporal lobe infarct similar in appearance from the prior.     No evidence of intracranial hemorrhage or mass effect.    Vessel Imaging     CTA Stroke 4/22/24  Occlusion M2 segment right MCA.  Suspected early parenchymal change a cerebral infarction in the right temporal lobe.     Chronic occlusion left PCA.     Evolving area of hypoattenuation in the left corona radiata related to known recent infarct.  Additional multifocal remote infarcts as detailed in the body of the report.     Narrowing of the right PCA similar to prior.       IR Angiogram 4/22/24  Right MCA angiogram demonstrated occlusion of the distal MCA beyond a prominent anterior temporal artery.     There was TICI 3 reperfusion using a single pass aspiration thrombectomy technique.  Patient tolerated the procedure well.     Left carotid and left vertebral angiography demonstrated no additional abnormality.    Cardiac Imaging     Echo 4/23/24    Left Ventricle: The left ventricle is normal in size. Normal wall thickness. There is normal systolic function with a visually estimated ejection fraction of 60 - 65%. There is indeterminate diastolic function.    Right Ventricle: Normal right ventricular cavity size. Wall thickness is normal. Right ventricle wall motion  is normal. Systolic function is normal.    IVC/SVC: Low central venous pressure.

## 2024-04-28 NOTE — ASSESSMENT & PLAN NOTE
-Stroke risk factor.  SBP< 220.  -some concern for pressure dependent exam this hospitalization  -Continue coreg to 12.5 BID  -Continue losartan to 100mg QD  -Continue nifedipine 30mg QD  -PO hydralzine 50mg PRN for SBP>160

## 2024-04-28 NOTE — PLAN OF CARE
[facility] - Neurosurgery ([facility])      HOME HEALTH ORDERS  FACE TO FACE ENCOUNTER    Patient Name: Denise Thackersins  YOB: 1950    PCP: Aleksandra Carey MD   PCP Address: [address] / [facility] [address]  PCP Phone Number: [phone]  PCP Fax: [phone]    Encounter Date: 4/22/24    Admit to Home Health    Diagnoses:  Active Hospital Problems    Diagnosis  POA    *Embolic stroke involving right middle cerebral artery [I63.411]  Yes    Asymptomatic bacteriuria [R82.71]  Unknown    Hypophosphatemia [E83.39]  Yes    Constipation [K59.00]  Yes     1. Moderate to large amount of stool throughout the colon may reflect constipation.  No high-grade obstruction.   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       Follow Up Appointments:  Future Appointments   Date Time Provider Department Center   5/1/2024 11:00 AM Amee Lizama NP [facility] [place]   5/2/2024 11:00 AM APPOINTMENT [facility]   5/29/2024 10:15 AM Marisol Michaud MD [facility]   10/4/2024  9:30 AM Aleksandra Carey MD [facility] [place]       Allergies:  Review of patient's allergies indicates:   Allergen Reactions    Phenobarbital Anaphylaxis    Penicillins Other (See Comments)    Phenobarbital sodium     Propylthiouracil     Thyroid       Note: PTU    Topamax [topiramate] Diarrhea     GI symptoms    Cefaclor Rash and Other (See Comments)    Clopidogrel Hives and Rash    Methimazole Rash and Other (See Comments)    Methimazole (bulk) Rash    Penicillin Rash    Sulfa (sulfonamide antibiotics) Rash and Other (See Comments)       Medications: Review discharge medications with patient and family and provide education.    Current Facility-Administered Medications   Medication Dose Route Frequency Provider Last Rate Last Admin    acetaminophen tablet 650 mg  650 mg Oral Q4H PRN Andras, Yogesh P., NP   650 mg at 04/24/24 0409    albuterol-ipratropium 2.5 mg-0.5 mg/3 mL nebulizer solution 3 mL  3 mL Nebulization Q6H PRN AndrasPrabhjoty P., NP        aspirin chewable tablet 81 mg  81 mg Oral Daily AndrasPrabhjoty P., NP   81 mg at 04/27/24 0942    atorvastatin tablet 80 mg  80 mg Oral Daily Andras Yogesh P., NP   80 mg at 04/27/24 0942    bisacodyL suppository 10 mg  10 mg Rectal Daily PRN AndrasPrabhjoty P., NP        carvediloL tablet 12.5 mg  12.5 mg Oral BID Marito Barragan MD        EScitalopram oxalate tablet 20 mg  20 mg Oral Daily AndrasPrabhjoty P., NP   20 mg at 04/27/24 0942    heparin (porcine) injection 5,000 Units  5,000 Units Subcutaneous Q8H AndrasPrabhjoty P., NP   5,000 Units at 04/28/24 0446    hydrALAZINE tablet 50 mg  50 mg Oral Q6H PRN Marito Barragan MD   50 mg at 04/27/24 2128    levothyroxine tablet 88 mcg  88 mcg Oral Before breakfast AndrasPrabhjoty P., NP   88 mcg at 04/28/24 0446    losartan tablet 100 mg  100 mg Oral Daily Dre Mcghee MD   100 mg at 04/27/24 0946    melatonin tablet 6 mg  6 mg Oral Nightly AndrasPrabhjoty P., NP   6 mg at 04/27/24 2128    [START ON 4/29/2024] NIFEdipine 24 hr tablet 30 mg  30 mg Oral Daily Dre Mcghee MD        ondansetron injection 4 mg  4 mg Intravenous Q8H PRN AndPrabhjot nealy P., NP        polyethylene glycol packet 17 g  17 g Oral QHS AndYogesh neal, EPI   17 g at 04/25/24 1930     racepinephrine 2.25 % nebulizer solution 0.5 mL  0.5 mL Nebulization Q4H PRN Andras, Yogesh P., NP        senna-docusate 8.6-50 mg per tablet 1 tablet  1 tablet Oral BID Andras, Yogesh P., NP   1 tablet at 04/27/24 0942    sodium chloride 0.9% flush 10 mL  10 mL Intravenous PRN Andras, Yogesh P., NP        sodium chloride 0.9% flush 10 mL  10 mL Intravenous PRN Andras, Yogesh P., NP        ticagrelor tablet 90 mg  90 mg Oral BID Andras, Yogesh P., NP   90 mg at 04/27/24 2128     Current Discharge Medication List        START taking these medications    Details   carvediloL (COREG) 12.5 MG tablet Take 1 tablet (12.5 mg total) by mouth 2 (two) times daily.  Qty: 180 tablet, Refills: 3    Comments: .      NIFEdipine (PROCARDIA-XL) 30 MG (OSM) 24 hr tablet Take 1 tablet (30 mg total) by mouth once daily.  Qty: 30 tablet, Refills: 11    Comments: .           CONTINUE these medications which have CHANGED    Details   atorvastatin (LIPITOR) 40 MG tablet Take 2 tablets (80 mg total) by mouth once daily. PLEASE TAKE THIS INCREASED DOSE UNTIL FOLLOW UP WITH VASCULAR NEUROLOGY FOR FURTHER INSTRUCTION  Qty: 180 tablet, Refills: 0      losartan (COZAAR) 50 MG tablet Take 2 tablets (100 mg total) by mouth once daily. PLEASE TAKE THIS INCREASED DOSE UNTIL RE-EVALUATION WITH YOUR PRIMARY CARE PROVIDER FOR FURTHER DISCUSSION OF BLOOD PRESSURE CONTROL  Qty: 90 tablet, Refills: 3    Comments: .      pregabalin (LYRICA) 75 MG capsule Take 1 capsule (75 mg total) by mouth once daily. PLEASE HOLD UNTIL FOLLOW UP WITH YOUR PRIMARY CARE PROVIDER OR NEUROLOGY. IF WORSENING OF BURNING/TINGLING/NEUROPATHIC PAIN, PLEASE CONTACT YOUR PRIMARY CARE PROVIDER BEFORE YOUR APPOINTMENT TO DISCUSS RESTARTING.           CONTINUE these medications which have NOT CHANGED    Details   aspirin (ECOTRIN) 81 MG EC tablet once a day      clobetasol 0.05% (TEMOVATE) 0.05 % Oint APPLY TOPICALLY TWICE A DAY  Qty: 45 g, Refills: 1    Associated Diagnoses: Lichen  sclerosus et atrophicus of the vulva      diclofenac sodium (VOLTAREN) 1 % Gel Apply 2 g topically 3 (three) times daily.  Qty: 100 g, Refills: 3      ergocalciferol (VITAMIN D2) 50,000 unit Cap Take 1 capsule (50,000 Units total) by mouth once a week.  Qty: 12 capsule, Refills: 3      EScitalopram oxalate (LEXAPRO) 20 MG tablet Take 1 tablet (20 mg total) by mouth once daily.  Qty: 90 tablet, Refills: 3    Associated Diagnoses: Anxiety      levothyroxine (SYNTHROID) 88 MCG tablet Take 1 tablet (88 mcg total) by mouth before breakfast.  Qty: 90 tablet, Refills: 3    Associated Diagnoses: Other specified hypothyroidism      naloxone (NARCAN) 4 mg/actuation Spry 4mg by nasal route as needed for opioid overdose; may repeat every 2-3 minutes in alternating nostrils until medical help arrives. Call 911  Qty: 1 each, Refills: 11      nortriptyline (PAMELOR) 10 MG capsule Take 1 capsule (10 mg total) by mouth once daily.  Qty: 90 capsule, Refills: 3      omeprazole (PRILOSEC) 40 MG capsule Take 1 capsule (40 mg total) by mouth once daily.  Qty: 90 capsule, Refills: 3    Associated Diagnoses: Gastroesophageal reflux disease without esophagitis      oxybutynin (DITROPAN-XL) 10 MG 24 hr tablet Take 1 tablet (10 mg total) by mouth once daily.  Qty: 90 tablet, Refills: 3      promethazine (PHENERGAN) 12.5 MG Tab Take 1 tablet (12.5 mg total) by mouth every 6 (six) hours as needed (nausea and or vomiting).  Qty: 10 tablet, Refills: 0      traMADoL (ULTRAM) 50 mg tablet Take 50 mg by mouth every 6 (six) hours as needed for Pain.           STOP taking these medications       butalbital-acetaminophen-caffeine -40 mg (FIORICET, ESGIC) -40 mg per tablet Comments:   Reason for Stopping:         metoprolol tartrate (LOPRESSOR) 50 MG tablet Comments:   Reason for Stopping:                 I have seen and examined this patient within the last 30 days. My clinical findings that support the need for the home health skilled  services and home bound status are the following:no   Weakness/numbness causing balance and gait disturbance due to Stroke making it taxing to leave home.  Mental confusion making it unsafe for patient to leave home alone due to  stroke.     Diet:   pureed diet  nectar thick    Labs:  Per home health agency    Referrals/ Consults  Physical Therapy to evaluate and treat. Evaluate for home safety and equipment needs; Establish/upgrade home exercise program. Perform / instruct on therapeutic exercises, gait training, transfer training, and Range of Motion.  Occupational Therapy to evaluate and treat. Evaluate home environment for safety and equipment needs. Perform/Instruct on transfers, ADL training, ROM, and therapeutic exercises.  Speech Therapy  to evaluate and treat for  Language and Swallowing.  Aide to provide assistance with personal care, ADLs, and vital signs.    Activities:   activity as tolerated    Nursing:   Agency to admit patient within 24 hours of hospital discharge unless specified on physician order or at patient request    SN to complete comprehensive assessment including routine vital signs. Instruct on disease process and s/s of complications to report to MD. Review/verify medication list sent home with the patient at time of discharge  and instruct patient/caregiver as needed. Frequency may be adjusted depending on start of care date.     Skilled nurse to perform up to 3 visits PRN for symptoms related to diagnosis    Notify MD if SBP > 160 or < 90; DBP > 90 or < 50; HR > 120 or < 50; Temp > 101; O2 < 88%    Ok to schedule additional visits based on staff availability and patient request on consecutive days within the home health episode.    When multiple disciplines ordered:    Start of Care occurs on Sunday - Wednesday schedule remaining discipline evaluations as ordered on separate consecutive days following the start of care.    Thursday SOC -schedule subsequent evaluations Friday and Monday  the following week.     Friday - Saturday SOC - schedule subsequent discipline evaluations on consecutive days starting Monday of the following week.    For all post-discharge communication and subsequent orders please contact patient's primary care physician.     Miscellaneous   None    Home Health Aide:  Physical Therapy three times weekly, occupational therapy three times weekly, Speech Language Pathology Three times weekly and Home Health Aide Three times weekly    Wound Care Orders  no    I certify that this patient is confined to her home and needs intermittent skilled nursing care, physical therapy, speech therapy, and occupational therapy.

## 2024-04-28 NOTE — PLAN OF CARE
04/28/24 1130   Post-Acute Status   Post-Acute Authorization Home Health   Home Health Status Set-up Complete/Auth obtained   Coverage Payor: MEDICARE - MEDICARE PART A & B -   Hospital Resources/Appts/Education Provided Provided patient/caregiver with written discharge plan information   Patient choice form signed by patient/caregiver List with quality metrics by geographic area provided   Discharge Delays None known at this time   Discharge Plan   Discharge Plan A Home Health   Discharge Plan B Home with family     SW sent [facility] updated clinicals via care port. Patient is current with -.     ELIZABETH Adam  [facility]  [phone]

## 2024-04-29 ENCOUNTER — TELEPHONE (OUTPATIENT)
Dept: NEUROLOGY | Facility: CLINIC | Age: 74
End: 2024-04-29
Payer: MEDICARE

## 2024-04-29 NOTE — TELEPHONE ENCOUNTER
----- Message from Gina Dela Cruz sent at 4/29/2024 11:27 AM CDT -----  Regarding: Sooner Appt  Contact: Patient Spouse John  Type:  Sooner Apoointment Request    Caller is requesting a sooner appointment.  Caller declined first available appointment listed below.  Caller will not accept being placed on the waitlist and is requesting a message be sent to doctor.  Name of Caller:John (Spouse)  When is the first available appointment? No appts generated   Symptoms: stroke follow up   Would the patient rather a call back or a response via [facility]? Call back   Best Call Back Number:[phone]   Additional Information: Patient was advised to follow up with physician due to stroke while in the hospital please assist

## 2024-04-29 NOTE — DISCHARGE SUMMARY
[facility] Neuro[facility] ([facility])  Vascular Neurology  Comprehensive Stroke Center  Discharge Summary     Summary:     Admit Date: 4/22/2024  3:12 AM    Discharge Date and Time: 4/28/2024  5:33 PM    Attending Physician: Marito Barragan MD    Discharge Provider: Dre Mcghee MD    History of Present Illness: Denise Berrios is a 74 y.o. female with a significant medical history of recent L MCA stroke (4/10/2024), HTN, HLD, Graves' disease,IBS, migraine HAs  who presents to the hospital for evaluation of acute onset LSW, neglect, and aphasia. HPI information gathered from the patient's  due to the patient having aphasia. The patient was in her usual state of health and went to bed around 8pm. Around 30 min prior to arrival in the ED, her  states he heard a noise. He found the patient on the floor, aphasic, with right gaze. EMS was activated and called a stroke code in the field.    The patient was discharged from this service on 4/11 after a L corona radiata stroke with ESUS etiology. The plan was for DAPT for 21 days, ASA indefinitely. Her  states she had a reaction to Plavix.  The patient saw Dr. Morales on 4/18 with the plan to start Brilinta for 3 weeks. Her  states they had not been able to obtain the prescription.         Hospital Course (synopsis of major diagnoses, care, treatment, and services provided during the course of the hospital stay): 74 year old female with recent L MCA stroke presented for R MCA embolic stroke status post thrombectomy. Extubated on 4/22, improving motor strength but with severe dysarthria and aphasia. Echo without abnormality, no arrhythmias captured on telemetry. MRI s/p thrombectomy with 5 mm acute right basal ganglia infarct with restricted diffusion right insular cortex and frontal operculum. Working w/ PT/OT/Speech, passed swallow eval. Patient to follow-up with cardiac EP outpatient for evaluation for loop recorder placement. Nurses  reporting dark stools, however have been unable to view these stools and Hgb has been stable. Patient had persistent HTN after the window we'd expect for cerebral autoregulation; uptitrated antiHTN's slowly. Discharging with referrals to vascular neurology, cardiac EP, and PCP. Was informed by Bedside delivery service with our inpatient pharmacy that we were unable to fill her Brilinta through the patient's  insurance. Phoned in prescription to Calvin at [facility] with confirmation that they'd be able to fill it. Informed patient and  of this with instructions to call immediately with any difficulty filling the prescription. They expressed understanding and were in agreement with this plan. Home health was ordered prior to discharge as well. Patient is in possession of a 30 day cardiac event monitor and will place after discharge. Patient to continue brilinta for a total of 21 days with continuation of ASA.    Goals of Care Treatment Preferences:  Code Status: Full Code      Stroke Etiology: Ischemic Undetermined Cause Cryptogenic Embolism / ESUS (Embolic Stroke of Undetermined Source)    STROKE DOCUMENTATION   Acute Stroke Times   Last Known Normal Date: 04/21/24  Last Known Normal Time: 2000  Unknown Symptom Onset Date: Unknown Date  Unknown Symptom Onset Time: Unknown Time  Stroke Team Called Date: 04/22/24  Stroke Team Called Time: 0309  Stroke Team Arrival Date: 04/22/24  Stroke Team Arrival Time: 0314  CT Interpretation Time: 0319  Thrombolytic Therapy Recommended: No  CTA Interpretation Time: 0330 (delay due to issues w/IV and CT injector.)  Thrombectomy Recommended: Yes  Decision to Treat Time for IR: 0351     NIH Scale:           Modified Crook Score: 2  Inocencia Coma Scale:    ABCD2 Score:    [id number] Score:   HAS -BLED Score:   ICH Score:   Hunt & Velazquez Classification:       Assessment/Plan:     Diagnostic Results:      Brain Imaging      MRI Brain wo contrast 4/22/24  5 mm  acute infarct right basal ganglia.  Subtle cortical restricted diffusion right insular cortex and frontal operculum consistent with ischemic injury unless there is clinical suspicion of seizure activity.  This is potentially reversible, perhaps for continued follow-up.     Expected evolution of recent left deep white matter infarct and perhaps subacute right temporal lobe infarct similar in appearance from the prior.     No evidence of intracranial hemorrhage or mass effect.     Vessel Imaging      CTA Stroke 4/22/24  Occlusion M2 segment right MCA.  Suspected early parenchymal change a cerebral infarction in the right temporal lobe.     Chronic occlusion left PCA.     Evolving area of hypoattenuation in the left corona radiata related to known recent infarct.  Additional multifocal remote infarcts as detailed in the body of the report.     Narrowing of the right PCA similar to prior.        IR Angiogram 4/22/24  Right MCA angiogram demonstrated occlusion of the distal MCA beyond a prominent anterior temporal artery.     There was TICI 3 reperfusion using a single pass aspiration thrombectomy technique.  Patient tolerated the procedure well.     Left carotid and left vertebral angiography demonstrated no additional abnormality.     Cardiac Imaging      Echo 4/23/24    Left Ventricle: The left ventricle is normal in size. Normal wall thickness. There is normal systolic function with a visually estimated ejection fraction of 60 - 65%. There is indeterminate diastolic function.    Right Ventricle: Normal right ventricular cavity size. Wall thickness is normal. Right ventricle wall motion  is normal. Systolic function is normal.    IVC/SVC: Low central venous pressure.    Interventions: Thrombectomy    Complications: None    Disposition: Home-Health Care [facility]    Final Active Diagnoses:    Diagnosis Date Noted POA    PRINCIPAL PROBLEM:  Embolic stroke involving right middle cerebral artery [I63.411] 04/22/2024 Yes     Asymptomatic bacteriuria [R82.71] 04/25/2024 Unknown    Hypophosphatemia [E83.39] 04/24/2024 Yes    Constipation [K59.00] 04/24/2024 Yes    Hypothyroidism [E03.9] 04/24/2024 Yes    Insomnia [G47.00] 04/24/2024 Yes    Hypomagnesemia [E83.42] 04/24/2024 Yes    Aphasia due to acute stroke [I63.9, R47.01] 04/22/2024 Yes    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  Service: Vascular Neurology      Recommendations:     Post-discharge complication risks: Pneumonia, aspiration    Stroke Education given to: patient, family, and caregiver    Follow-up in Stroke Clinic in 4-6 weeks.     Discharge Plan:  Antithrombotics: Aspirin 81mg, brilinta 90mg BID  Statin: Atorvastatin 80mg  Aggresive risk factor modification:  Hypertension  High Cholesterol  Graves disease  Aortic atherosclerotic disease    Follow Up:   Follow-up Information       Amee Lizama NP Follow up on 5/1/2024.    Specialty: Family Medicine  Why: Follow-up appointment at 11:00 am.  Contact information:  [address]  [address]  [facility] [address] [phone]               [facility] - Cardiology. Schedule an appointment as soon as possible for a visit in 1 week(s).    Specialty: Cardiology  Contact information:  [address]  [facility] [address] [phone]             [facility] - Speech Therapy ([place]). Schedule an appointment as soon as possible for a visit in 1 week(s).    Specialty: Speech Therapy  Contact  information:  [address]  [facility] [address] [phone]             [facility] VASCULAR NEUROLOGY. Schedule an appointment as soon as possible for a visit in 1 week(s).    Specialty: Vascular Neurology  Contact information:  [address]  [facility] [address]  [phone]                           Patient Instructions:      Ambulatory referral/consult to Cardiac Electrophysiology   Standing Status: Future   Referral Priority: Routine Referral Type: Consultation   Referral Reason: Specialty Services Required   Requested Specialty: Cardiology   Number of Visits Requested: 1     Ambulatory referral/consult to Vascular Neurology   Standing Status: Future   Referral Priority: Routine Referral Type: Consultation   Referral Reason: Specialty Services Required   Requested Specialty: Vascular Neurology   Number of Visits Requested: 1     Ambulatory referral/consult to Speech Therapy   Standing Status: Future   Referral Priority: Urgent Referral Type: Speech Therapy   Referral Reason: Specialty Services Required   Requested Specialty: Speech Pathology   Number of Visits Requested: 1       Medications:  Reconciled Home Medications:      Medication List        START taking these medications      carvediloL 12.5 MG tablet  Commonly known as: COREG  Take 1 tablet (12.5 mg total) by mouth 2 (two) times daily.     NIFEdipine 30 MG (OSM) 24 hr tablet  Commonly known as: PROCARDIA-XL  Take 1 tablet (30 mg total) by mouth once daily.     ticagrelor 90 mg tablet  Commonly known as: BRILINTA  Take 1 tablet (90 mg total) by mouth 2 (two) times daily. for 31 doses            CHANGE how you take these medications      atorvastatin 40 MG tablet  Commonly known as: LIPITOR  Take 2 tablets (80 mg total) by mouth once daily. PLEASE TAKE THIS INCREASED DOSE UNTIL FOLLOW UP WITH VASCULAR NEUROLOGY FOR FURTHER INSTRUCTION  What changed:   how much to take  additional instructions     losartan 50 MG  tablet  Commonly known as: COZAAR  Take 2 tablets (100 mg total) by mouth once daily. PLEASE TAKE THIS INCREASED DOSE UNTIL RE-EVALUATION WITH YOUR PRIMARY CARE PROVIDER FOR FURTHER DISCUSSION OF BLOOD PRESSURE CONTROL  What changed:   how much to take  additional instructions     pregabalin 75 MG capsule  Commonly known as: LYRICA  Take 1 capsule (75 mg total) by mouth once daily. PLEASE HOLD UNTIL FOLLOW UP WITH YOUR PRIMARY CARE PROVIDER OR NEUROLOGY. IF WORSENING OF BURNING/TINGLING/NEUROPATHIC PAIN, PLEASE CONTACT YOUR PRIMARY CARE PROVIDER BEFORE YOUR APPOINTMENT TO DISCUSS RESTARTING.  What changed: See the new instructions.            CONTINUE taking these medications      aspirin 81 MG EC tablet  Commonly known as: ECOTRIN  once a day     clobetasol 0.05% 0.05 % Oint  Commonly known as: TEMOVATE  APPLY TOPICALLY TWICE A DAY     diclofenac sodium 1 % Gel  Commonly known as: VOLTAREN  Apply 2 g topically 3 (three) times daily.     ergocalciferol 50,000 unit Cap  Commonly known as: VITAMIN D2  Take 1 capsule (50,000 Units total) by mouth once a week.     EScitalopram oxalate 20 MG tablet  Commonly known as: LEXAPRO  Take 1 tablet (20 mg total) by mouth once daily.     levothyroxine 88 MCG tablet  Commonly known as: SYNTHROID  Take 1 tablet (88 mcg total) by mouth before breakfast.     nortriptyline 10 MG capsule  Commonly known as: PAMELOR  Take 1 capsule (10 mg total) by mouth once daily.     omeprazole 40 MG capsule  Commonly known as: PRILOSEC  Take 1 capsule (40 mg total) by mouth once daily.     oxybutynin 10 MG 24 hr tablet  Commonly known as: DITROPAN-XL  Take 1 tablet (10 mg total) by mouth once daily.     promethazine 12.5 MG Tab  Commonly known as: PHENERGAN  Take 1 tablet (12.5 mg total) by mouth every 6 (six) hours as needed (nausea and or vomiting).     traMADoL 50 mg tablet  Commonly known as: ULTRAM  Take 50 mg by mouth every 6 (six) hours as needed for Pain.            STOP taking these  medications      butalbital-acetaminophen-caffeine -40 mg -40 mg per tablet  Commonly known as: FIORICET, ESGIC     metoprolol tartrate 50 MG tablet  Commonly known as: LOPRESSOR            ASK your doctor about these medications      naloxone 4 mg/actuation Spry  Commonly known as: NARCAN  4mg by nasal route as needed for opioid overdose; may repeat every 2-3 minutes in alternating nostrils until medical help arrives. Call 911              Dre Mcghee MD  Comprehensive Stroke Center  Department of Vascular Neurology   [facility] ([facility])

## 2024-05-01 ENCOUNTER — TELEPHONE (OUTPATIENT)
Dept: NEUROLOGY | Facility: CLINIC | Age: 74
End: 2024-05-01
Payer: MEDICARE

## 2024-05-01 ENCOUNTER — OFFICE VISIT (OUTPATIENT)
Dept: PRIMARY CARE CLINIC | Facility: CLINIC | Age: 74
End: 2024-05-01
Payer: MEDICARE

## 2024-05-01 ENCOUNTER — TELEPHONE (OUTPATIENT)
Dept: PRIMARY CARE CLINIC | Facility: CLINIC | Age: 74
End: 2024-05-01

## 2024-05-01 ENCOUNTER — HOSPITAL ENCOUNTER (OUTPATIENT)
Dept: RADIOLOGY | Facility: HOSPITAL | Age: 74
Discharge: HOME | End: 2024-05-01
Attending: NURSE PRACTITIONER
Payer: MEDICARE

## 2024-05-01 VITALS
SYSTOLIC BLOOD PRESSURE: 128 MMHG | WEIGHT: 143.5 LBS | BODY MASS INDEX: 27.12 KG/M2 | RESPIRATION RATE: 16 BRPM | HEART RATE: 76 BPM | DIASTOLIC BLOOD PRESSURE: 74 MMHG | OXYGEN SATURATION: 98 %

## 2024-05-01 DIAGNOSIS — E78.5 DYSLIPIDEMIA: ICD-10-CM

## 2024-05-01 DIAGNOSIS — I70.0 AORTIC ATHEROSCLEROSIS: ICD-10-CM

## 2024-05-01 DIAGNOSIS — F41.9 ANXIETY: ICD-10-CM

## 2024-05-01 DIAGNOSIS — I63.411 EMBOLIC STROKE INVOLVING RIGHT MIDDLE CEREBRAL ARTERY: Primary | ICD-10-CM

## 2024-05-01 DIAGNOSIS — R53.81 DEBILITY: ICD-10-CM

## 2024-05-01 DIAGNOSIS — G81.04 FLACCID HEMIPLEGIA AFFECTING LEFT NONDOMINANT SIDE, UNSPECIFIED ETIOLOGY: ICD-10-CM

## 2024-05-01 DIAGNOSIS — Z86.73 HISTORY OF CEREBRAL INFARCTION: ICD-10-CM

## 2024-05-01 DIAGNOSIS — R47.01 APHASIA DUE TO ACUTE STROKE: ICD-10-CM

## 2024-05-01 DIAGNOSIS — R53.1 WEAKNESS: ICD-10-CM

## 2024-05-01 DIAGNOSIS — R06.02 SOB (SHORTNESS OF BREATH): ICD-10-CM

## 2024-05-01 DIAGNOSIS — I10 ESSENTIAL HYPERTENSION: ICD-10-CM

## 2024-05-01 DIAGNOSIS — R06.02 SOB (SHORTNESS OF BREATH): Primary | ICD-10-CM

## 2024-05-01 DIAGNOSIS — I63.9 APHASIA DUE TO ACUTE STROKE: ICD-10-CM

## 2024-05-01 DIAGNOSIS — E66.3 OVERWEIGHT: ICD-10-CM

## 2024-05-01 DIAGNOSIS — I63.413 CEREBROVASCULAR ACCIDENT (CVA) DUE TO BILATERAL EMBOLISM OF MIDDLE CEREBRAL ARTERIES: ICD-10-CM

## 2024-05-01 PROCEDURE — 99214 OFFICE O/P EST MOD 30 MIN: CPT | Mod: PBBFAC,25 | Performed by: NURSE PRACTITIONER

## 2024-05-01 PROCEDURE — 99999 PR PBB SHADOW E&M-EST. PATIENT-LVL IV: CPT | Mod: PBBFAC,,, | Performed by: NURSE PRACTITIONER

## 2024-05-01 PROCEDURE — 71046 X-RAY EXAM CHEST 2 VIEWS: CPT | Mod: 26,,, | Performed by: INTERNAL MEDICINE

## 2024-05-01 PROCEDURE — 99214 OFFICE O/P EST MOD 30 MIN: CPT | Mod: S$PBB,,, | Performed by: NURSE PRACTITIONER

## 2024-05-01 PROCEDURE — 71046 X-RAY EXAM CHEST 2 VIEWS: CPT | Mod: TC

## 2024-05-01 NOTE — TELEPHONE ENCOUNTER
----- Message from Gina Dela Cruz sent at 4/29/2024 11:27 AM CDT -----  Regarding: Sooner Appt  Contact: Patient Spouse John  Type:  Sooner Apoointment Request     Caller is requesting a sooner appointment.  Caller declined first available appointment listed below.  Caller will not accept being placed on the waitlist and is requesting a message be sent to doctor.  Name of Caller:John (Spouse)  When is the first available appointment? No appts generated   Symptoms: stroke follow up   Would the patient rather a call back or a response via [facility]? Call back   Best Call Back Number:[phone]   Additional Information: Patient was advised to follow up with physician due to stroke while in the hospital please assist

## 2024-05-01 NOTE — TELEPHONE ENCOUNTER
----- Message from Jak Michelle sent at 5/1/2024  3:30 PM CDT -----  1MEDICALADVICE     Patient is calling for Medical Advice regarding: PT  NEEDS A TRANSPORT WHEEL CHAIR PLEASE ADVISE     How long has patient had these symptoms:    Pharmacy name and phone#:    Would like response via [facility]:  CALL BACK [phone]  SAM   PHYSICAL THERAPIST WITH [facility]     Comments:

## 2024-05-01 NOTE — PROGRESS NOTES
Transitional Care Note  Subjective:       Patient ID: Denise Berrios is a 74 y.o. female.  Chief Complaint: Hospital Follow Up    Family and/or Caretaker present at visit?  Yes.  Diagnostic tests reviewed/disposition: No diagnosic tests pending after this hospitalization.  Disease/illness education: nutrition  Home health/community services discussion/referrals: Patient has home health established at [facility] .   Establishment or re-establishment of referral orders for community resources:  needing wheel chair .   Discussion with other health care providers: No discussion with other health care providers necessary.       HPI    This is a  patient of Dr. Wall who is presenting as a hosp f/u from 4/22/24- 4/28/24 for diagnosis of her second right mid cerebral artery stroke.  Flaccid hemiplegia affecting left nondominant side, aphasia, recovering from dysphagia, has speech therapy following up at home tomorrow.  No complaints of headache or right shoulder pain  Discussed all medications that have changed since hospitalization and what currently patient needs to be taking.  Patient just complains of being very tired      D/c note:  History of Present Illness: Denise Berrios is a 74 y.o. female with a significant medical history of recent L MCA stroke (4/10/2024), HTN, HLD, Graves' disease,IBS, migraine HAs  who presents to the hospital for evaluation of acute onset LSW, neglect, and aphasia. HPI information gathered from the patient's  due to the patient having aphasia. The patient was in her usual state of health and went to bed around 8pm. Around 30 min prior to arrival in the ED, her  states he heard a noise. He found the patient on the floor, aphasic, with right gaze. EMS was activated and called a stroke code in the field.     The patient was discharged from this service on 4/11 after a L corona radiata stroke with ESUS etiology. The plan was for DAPT for 21 days, ASA indefinitely. Her   states she had a reaction to Plavix.  The patient saw Dr. Morales on 4/18 with the plan to start Brilinta for 3 weeks. Her  states they had not been able to obtain the prescription.     Hospital Course (synopsis of major diagnoses, care, treatment, and services provided during the course of the hospital stay): 74 year old female with recent L MCA stroke presented for R MCA embolic stroke status post thrombectomy. Extubated on 4/22, improving motor strength but with severe dysarthria and aphasia. Echo without abnormality, no arrhythmias captured on telemetry. MRI s/p thrombectomy with 5 mm acute right basal ganglia infarct with restricted diffusion right insular cortex and frontal operculum. Working w/ PT/OT/Speech, passed swallow eval. Patient to follow-up with cardiac EP outpatient for evaluation for loop recorder placement. Nurses reporting dark stools, however have been unable to view these stools and Hgb has been stable. Patient had persistent HTN after the window we'd expect for cerebral autoregulation; uptitrated antiHTN's slowly. Discharging with referrals to vascular neurology, cardiac EP, and PCP. Was informed by Bedside delivery service with our inpatient pharmacy that we were unable to fill her Brilinta through the patient's  insurance. Phoned in prescription to Calvin at [facility] with confirmation that they'd be able to fill it. Informed patient and  of this with instructions to call immediately with any difficulty filling the prescription. They expressed understanding and were in agreement with this plan. Home health was ordered prior to discharge as well. Patient is in possession of a 30 day cardiac event monitor and will place after discharge. Patient to continue brilinta for a total of 21 days with continuation of ASA.     Stroke Etiology: Ischemic Undetermined Cause Cryptogenic Embolism / ESUS (Embolic Stroke of Undetermined Source)    MRI Brain wo contrast 4/22/24       No evidence of intracranial hemorrhage or mass effect.     Vessel Imaging      CTA Stroke 4/22/24  Occlusion M2 segment right MCA.  Suspected early parenchymal change a cerebral infarction in the right temporal lobe.     Chronic occlusion left PCA.     Evolving area of hypoattenuation in the left corona radiata related to known recent infarct.  Additional multifocal remote infarcts as detailed in the body of the report.     Narrowing of the right PCA similar to prior.        IR Angiogram 4/22/24  Right MCA angiogram demonstrated occlusion of the distal MCA beyond a prominent anterior temporal artery.     There was TICI 3 reperfusion using a single pass aspiration thrombectomy technique.  Patient tolerated the procedure well.     Left carotid and left vertebral angiography demonstrated no additional abnormality.     Cardiac Imaging      Echo 4/23/24    Left Ventricle: The left ventricle is normal in size. Normal wall thickness. There is normal systolic function with a visually estimated ejection fraction of 60 - 65%. There is indeterminate diastolic function.    Right Ventricle: Normal right ventricular cavity size. Wall thickness is normal. Right ventricle wall motion  is normal. Systolic function is normal.    IVC/SVC: Low central venous pressure.     Interventions: Thrombectomy         Review of Systems   Constitutional:  Positive for activity change and appetite change. Negative for chills, fatigue and fever.   HENT:  Negative for congestion, mouth sores, sinus pressure, sinus pain and trouble swallowing.    Eyes:  Negative for redness and visual disturbance.   Respiratory:  Positive for shortness of breath. Negative for cough, chest tightness and wheezing.    Cardiovascular:  Negative for chest pain, palpitations and leg swelling.   Gastrointestinal:  Negative for abdominal distention, abdominal pain, constipation, diarrhea and nausea.   Genitourinary:  Negative for difficulty urinating and dysuria.    Musculoskeletal:  Positive for gait problem. Negative for joint swelling and myalgias.   Skin:  Negative for color change, pallor and rash.   Neurological:  Positive for speech difficulty and weakness. Negative for dizziness, facial asymmetry, light-headedness, numbness and headaches.   Psychiatric/Behavioral:  Negative for agitation, behavioral problems, confusion, decreased concentration and dysphoric mood. The patient is not nervous/anxious.        Objective:      Physical Exam  Vitals and nursing note reviewed.   Constitutional:       General: She is not in acute distress.     Appearance: Normal appearance. She is well-developed. She is not ill-appearing.   HENT:      Head: Normocephalic and atraumatic.      Right Ear: External ear normal.      Left Ear: External ear normal.   Eyes:      Conjunctiva/sclera: Conjunctivae normal.      Pupils: Pupils are equal, round, and reactive to light.   Neck:      Thyroid: No thyromegaly.      Vascular: No JVD.      Trachea: No tracheal deviation.   Cardiovascular:      Rate and Rhythm: Normal rate and regular rhythm.      Heart sounds: Normal heart sounds. No murmur heard.  Pulmonary:      Effort: Pulmonary effort is normal.      Breath sounds: Normal breath sounds.   Chest:      Chest wall: No tenderness.   Abdominal:      General: Bowel sounds are normal.      Palpations: Abdomen is soft.      Tenderness: There is no abdominal tenderness. There is no guarding.   Musculoskeletal:         General: Normal range of motion.      Cervical back: Normal range of motion and neck supple.   Lymphadenopathy:      Cervical: No cervical adenopathy.   Skin:     General: Skin is warm and dry.   Neurological:      General: No focal deficit present.      Mental Status: She is alert and oriented to person, place, and time.   Psychiatric:         Mood and Affect: Mood normal.         Behavior: Behavior normal.         Thought Content: Thought content normal.         Judgment: Judgment  normal.         Assessment:       1. Embolic stroke involving right middle cerebral artery    2. Aphasia due to acute stroke    3. Essential hypertension    4. Dyslipidemia    5. Anxiety    6. Aortic atherosclerosis    7. SOB (shortness of breath)    8. Overweight    9. Cerebrovascular accident (CVA) due to bilateral embolism of middle cerebral arteries    10. Flaccid hemiplegia affecting left nondominant side, unspecified etiology        Plan:       We will need to order a wheelchair for ambulation in and out of the house.  PTOT and speech therapy will be come into the house for therapy        Follow-up in Stroke Clinic in 4-6 weeks.      Discharge Plan:  Antithrombotics: Aspirin 81mg, brilinta 90mg BID  Statin: Atorvastatin 80mg  Aggresive risk factor modification:  Hypertension  High Cholesterol  Graves disease  Aortic atherosclerotic disease    [facility] - Cardiology. Schedule an appointment as soon as possible for a visit in 1 week(s).    Specialty: Cardiology  Contact information:  [address]  [facility] [address] [phone]    [facility] - Speech Therapy ([place]). Schedule an appointment as soon as possible for a visit in 1 week(s).    Specialty: Speech Therapy  Contact information:  [address]  [facility] [address] [phone]    [facility] VASCULAR NEUROLOGY. Schedule an appointment as soon as possible for a visit in 1 week(s).    Specialty: Vascular Neurology  Contact information:  [address]  [facility] [address] [phone]      Ambulatory referral/consult to Cardiac Electrophysiology     Ambulatory referral/consult to Vascular Neurology     Ambulatory referral/consult to Speech Therapy       START taking these medications       carvediloL 12.5 MG tablet  Commonly known as: COREG  Take 1 tablet (12.5 mg total) by mouth 2 (two) times daily.      NIFEdipine 30 MG (OSM) 24 hr tablet  Commonly known as: PROCARDIA-XL  Take 1 tablet (30 mg total) by mouth  once daily.      ticagrelor 90 mg tablet  Commonly known as: BRILINTA  Take 1 tablet (90 mg total) by mouth 2 (two) times daily. for 31 doses         [facility] - Neurosurgery ([facility])  Vascular Neurology  [facility]  Discharge Summary      Summary:      Admit Date: 4/22/2024  3:12 AM     Discharge Date and Time: 4/28/2024  5:33 PM     Attending Physician: Marito Barragan MD     Discharge Provider: Dre Mcghee MD     History of Present Illness: Denise Berrios is a 74 y.o. female with a significant medical history of recent L MCA stroke (4/10/2024), HTN, HLD, Graves' disease,IBS, migraine HAs  who presents to the hospital for evaluation of acute onset LSW, neglect, and aphasia. HPI information gathered from the patient's  due to the patient having aphasia. The patient was in her usual state of health and went to bed around 8pm. Around 30 min prior to arrival in the ED, her  states he heard a noise. He found the patient on the floor, aphasic, with right gaze. EMS was activated and called a stroke code in the field.     The patient was discharged from this service on 4/11 after a L corona radiata stroke with ESUS etiology. The plan was for DAPT for 21 days, ASA indefinitely. Her  states she had a reaction to Plavix.  The patient saw Dr. Morales on 4/18 with the plan to start Brilinta for 3 weeks. Her  states they had not been able to obtain the prescription.            Hospital Course (synopsis of major diagnoses, care, treatment, and services provided during the course of the hospital stay): 74 year old female with recent L MCA stroke presented for R MCA embolic stroke status post thrombectomy. Extubated on 4/22, improving motor strength but with severe dysarthria and aphasia. Echo without abnormality, no arrhythmias captured on telemetry. MRI s/p thrombectomy with 5 mm acute right basal ganglia infarct with restricted diffusion right insular cortex and frontal  operculum. Working w/ PT/OT/Speech, passed swallow eval. Patient to follow-up with cardiac EP outpatient for evaluation for loop recorder placement. Nurses reporting dark stools, however have been unable to view these stools and Hgb has been stable. Patient had persistent HTN after the window we'd expect for cerebral autoregulation; uptitrated antiHTN's slowly. Discharging with referrals to vascular neurology, cardiac EP, and PCP. Was informed by Bedside delivery service with our inpatient pharmacy that we were unable to fill her Brilinta through the patient's [employer] insurance. Phoned in prescription to Calvin at [facility] with confirmation that they'd be able to fill it. Informed patient and  of this with instructions to call immediately with any difficulty filling the prescription. They expressed understanding and were in agreement with this plan. Home health was ordered prior to discharge as well. Patient is in possession of a 30 day cardiac event monitor and will place after discharge. Patient to continue brilinta for a total of 21 days with continuation of ASA.     Goals of Care Treatment Preferences:  Code Status: Full Code        Stroke Etiology: Ischemic Undetermined Cause Cryptogenic Embolism / ESUS (Embolic Stroke of Undetermined Source)     STROKE DOCUMENTATION   Acute Stroke Times   Last Known Normal Date: 04/21/24  Last Known Normal Time: 2000  Unknown Symptom Onset Date: Unknown Date  Unknown Symptom Onset Time: Unknown Time  Stroke Team Called Date: 04/22/24  Stroke Team Called Time: 0309  Stroke Team Arrival Date: 04/22/24  Stroke Team Arrival Time: 0314  CT Interpretation Time: 0319  Thrombolytic Therapy Recommended: No  CTA Interpretation Time: 0330 (delay due to issues w/IV and CT injector.)  Thrombectomy Recommended: Yes  Decision to Treat Time for IR: 0351      NIH Scale:           Modified Nicola Score: 2  Inocencia Coma Scale:    ABCD2 Score:    [id number] Score:   HAS -BLED  Score:   ICH Score:   Hunt & Velazquez Classification:         Assessment/Plan:      Diagnostic Results:        Brain Imaging      MRI Brain wo contrast 4/22/24  5 mm acute infarct right basal ganglia.  Subtle cortical restricted diffusion right insular cortex and frontal operculum consistent with ischemic injury unless there is clinical suspicion of seizure activity.  This is potentially reversible, perhaps for continued follow-up.     Expected evolution of recent left deep white matter infarct and perhaps subacute right temporal lobe infarct similar in appearance from the prior.     No evidence of intracranial hemorrhage or mass effect.     Vessel Imaging      CTA Stroke 4/22/24  Occlusion M2 segment right MCA.  Suspected early parenchymal change a cerebral infarction in the right temporal lobe.     Chronic occlusion left PCA.     Evolving area of hypoattenuation in the left corona radiata related to known recent infarct.  Additional multifocal remote infarcts as detailed in the body of the report.     Narrowing of the right PCA similar to prior.        IR Angiogram 4/22/24  Right MCA angiogram demonstrated occlusion of the distal MCA beyond a prominent anterior temporal artery.     There was TICI 3 reperfusion using a single pass aspiration thrombectomy technique.  Patient tolerated the procedure well.     Left carotid and left vertebral angiography demonstrated no additional abnormality.     Cardiac Imaging      Echo 4/23/24    Left Ventricle: The left ventricle is normal in size. Normal wall thickness. There is normal systolic function with a visually estimated ejection fraction of 60 - 65%. There is indeterminate diastolic function.    Right Ventricle: Normal right ventricular cavity size. Wall thickness is normal. Right ventricle wall motion  is normal. Systolic function is normal.    IVC/SVC: Low central venous pressure.     Interventions: Thrombectomy     Complications: None     Disposition: Home-Health Care  [facility] [place] [facility]           Final Active Diagnoses:     Diagnosis Date Noted POA    PRINCIPAL PROBLEM:  Embolic stroke involving right middle cerebral artery [I63.411] 04/22/2024 Yes    Asymptomatic bacteriuria [R82.71] 04/25/2024 Unknown    Hypophosphatemia [E83.39] 04/24/2024 Yes    Constipation [K59.00] 04/24/2024 Yes    Hypothyroidism [E03.9] 04/24/2024 Yes    Insomnia [G47.00] 04/24/2024 Yes    Hypomagnesemia [E83.42] 04/24/2024 Yes    Aphasia due to acute stroke [I63.9, R47.01] 04/22/2024 Yes    Flaccid hemiplegia affecting left nondominant side [G81.04] 04/22/2024 Yes    Cytotoxic brain edema [G93.6] 04/22/2024 Yes    Essential hypertension [I10] 10/19/2020 Yes    Dyslipidemia [E78.5] 08/28/2019 Yes    Other specified hypothyroidism [E03.8] 08/28/2019 Yes    Anxiety [F41.9] 08/28/2019 Yes    Irritable bowel syndrome [K58.9] 09/04/2014 Yes    Migraine headache [G43.909] 09/04/2014 Yes       Problems Resolved During this Admission:      No new Assessment & Plan notes have been filed under this hospital service since the last note was generated.  Service: Vascular Neurology        Recommendations:      Post-discharge complication risks: Pneumonia, aspiration     Stroke Education given to: patient, family, and caregiver     Follow-up in Stroke Clinic in 4-6 weeks.      Discharge Plan:  Antithrombotics: Aspirin 81mg, brilinta 90mg BID  Statin: Atorvastatin 80mg  Aggresive risk factor modification:  Hypertension  High Cholesterol  Graves disease  Aortic atherosclerotic disease     Follow Up:    Follow-up Information         Amee Lizama NP Follow up on 5/1/2024.    Specialty: Family Medicine  Why: Follow-up appointment at 11:00 am.  Contact information:  [address] [facility]  [address]  [place] [address] [phone]                    [facility] - Cardiology. Schedule an appointment as soon as possible for a visit in 1 week(s).    Specialty: Cardiology  Contact information:  [address]  [facility]  [id number] [phone]                 [facility] - Speech Therapy ([place]). Schedule an appointment as soon as possible for a visit in 1 week(s).    Specialty: Speech Therapy  Contact information:  [address]  [facility] [address] [phone]                 [facility] VASCULAR NEUROLOGY. Schedule an appointment as soon as possible for a visit in 1 week(s).    Specialty: Vascular Neurology  Contact information:  [address]  [facility] [address]  [phone]                                   Patient Instructions:             Ambulatory referral/consult to Cardiac Electrophysiology    Standing Status: Future   Referral Priority: Routine Referral Type: Consultation    Referral Reason: Specialty Services Required    Requested Specialty: Cardiology    Number of Visits Requested: 1            Ambulatory referral/consult to Vascular Neurology    Standing Status: Future   Referral Priority: Routine Referral Type: Consultation    Referral Reason: Specialty Services Required    Requested Specialty: Vascular Neurology    Number of Visits Requested: 1            Ambulatory referral/consult to Speech Therapy    Standing Status: Future   Referral Priority: Urgent Referral Type: Speech Therapy    Referral Reason: Specialty Services Required    Requested Specialty: Speech Pathology    Number of Visits Requested: 1          Medications:  Reconciled Home Medications:       Medication List          START taking these medications       carvediloL 12.5 MG tablet  Commonly known as: COREG  Take 1 tablet (12.5 mg total) by mouth 2 (two) times daily.      NIFEdipine 30 MG (OSM) 24 hr tablet  Commonly known as: PROCARDIA-XL  Take 1 tablet (30 mg total) by mouth once daily.      ticagrelor 90 mg tablet  Commonly known as: BRILINTA  Take 1 tablet (90 mg total) by mouth 2 (two) times daily. for 31 doses                CHANGE how you take these medications       atorvastatin 40 MG tablet  Commonly known as:  LIPITOR  Take 2 tablets (80 mg total) by mouth once daily. PLEASE TAKE THIS INCREASED DOSE UNTIL FOLLOW UP WITH VASCULAR NEUROLOGY FOR FURTHER INSTRUCTION  What changed:   how much to take  additional instructions      losartan 50 MG tablet  Commonly known as: COZAAR  Take 2 tablets (100 mg total) by mouth once daily. PLEASE TAKE THIS INCREASED DOSE UNTIL RE-EVALUATION WITH YOUR PRIMARY CARE PROVIDER FOR FURTHER DISCUSSION OF BLOOD PRESSURE CONTROL  What changed:   how much to take  additional instructions      pregabalin 75 MG capsule  Commonly known as: LYRICA  Take 1 capsule (75 mg total) by mouth once daily. PLEASE HOLD UNTIL FOLLOW UP WITH YOUR PRIMARY CARE PROVIDER OR NEUROLOGY. IF WORSENING OF BURNING/TINGLING/NEUROPATHIC PAIN, PLEASE CONTACT YOUR PRIMARY CARE PROVIDER BEFORE YOUR APPOINTMENT TO DISCUSS RESTARTING.  What changed: See the new instructions.                    STOP taking these medications       butalbital-acetaminophen-caffeine -40 mg -40 mg per tablet  Commonly known as: FIORICET, ESGIC      metoprolol tartrate 50 MG tablet  Commonly known as: LOPRESSOR

## 2024-05-02 ENCOUNTER — LAB VISIT (OUTPATIENT)
Dept: LAB | Facility: HOSPITAL | Age: 74
End: 2024-05-02
Attending: INTERNAL MEDICINE
Payer: MEDICARE

## 2024-05-02 DIAGNOSIS — E03.8 OTHER SPECIFIED HYPOTHYROIDISM: ICD-10-CM

## 2024-05-02 LAB
T4 FREE SERPL-MCNC: 1.52 NG/DL (ref 0.71–1.51)
TSH SERPL DL<=0.005 MIU/L-ACNC: 9.34 UIU/ML (ref 0.4–4)

## 2024-05-02 PROCEDURE — 84443 ASSAY THYROID STIM HORMONE: CPT | Performed by: INTERNAL MEDICINE

## 2024-05-02 PROCEDURE — 84439 ASSAY OF FREE THYROXINE: CPT | Performed by: INTERNAL MEDICINE

## 2024-05-02 PROCEDURE — 36415 COLL VENOUS BLD VENIPUNCTURE: CPT | Performed by: INTERNAL MEDICINE

## 2024-05-03 ENCOUNTER — TELEPHONE (OUTPATIENT)
Dept: PRIMARY CARE CLINIC | Facility: CLINIC | Age: 74
End: 2024-05-03
Payer: MEDICARE

## 2024-05-03 NOTE — TELEPHONE ENCOUNTER
----- Message from Jak Michelle sent at 5/3/2024  3:15 PM CDT -----  1MEDICALADVICE     Patient is calling for Medical Advice regarding: pt has been having shortness of breathe alor recently and bad anixerty  please advise     How long has patient had these symptoms:    Pharmacy name and phone#:    Would like response via [employer]: call back goes to [phone]  [phone]    Comments:

## 2024-05-06 ENCOUNTER — PATIENT MESSAGE (OUTPATIENT)
Dept: PRIMARY CARE CLINIC | Facility: CLINIC | Age: 74
End: 2024-05-06
Payer: MEDICARE

## 2024-05-06 ENCOUNTER — TELEPHONE (OUTPATIENT)
Dept: PRIMARY CARE CLINIC | Facility: CLINIC | Age: 74
End: 2024-05-06
Payer: MEDICARE

## 2024-05-06 DIAGNOSIS — Z86.73 HISTORY OF CEREBRAL INFARCTION: ICD-10-CM

## 2024-05-06 DIAGNOSIS — D64.9 ANEMIA, UNSPECIFIED TYPE: ICD-10-CM

## 2024-05-06 DIAGNOSIS — Z92.29 HX OF LONG TERM USE OF BLOOD THINNERS: ICD-10-CM

## 2024-05-06 DIAGNOSIS — R53.81 DEBILITY: ICD-10-CM

## 2024-05-06 DIAGNOSIS — I63.413 CEREBROVASCULAR ACCIDENT (CVA) DUE TO BILATERAL EMBOLISM OF MIDDLE CEREBRAL ARTERIES: ICD-10-CM

## 2024-05-06 DIAGNOSIS — R06.02 SOB (SHORTNESS OF BREATH): Primary | ICD-10-CM

## 2024-05-06 DIAGNOSIS — R53.1 WEAKNESS: ICD-10-CM

## 2024-05-06 NOTE — TELEPHONE ENCOUNTER
Take 1/2 dose of Losartan, this should help with BP.    See Amee's message from earlier today about SOB workup

## 2024-05-06 NOTE — TELEPHONE ENCOUNTER
----- Message from Justina Ramirez sent at 5/6/2024  3:52 PM CDT -----  Contact: John  Type:  Needs Medical Advice    Who Called: pt   Symptoms (please be specific): pt  needs a call back, since she has been discharged from hospital he pressure has been running low 100/50  112-49---she is on a lot of medication and could that be the reason that she is so out of breath just by moving, walking, doing her exercises, just going up stairs, please call right back  is worried    Would the patient rather a call back or a response via [facility]? call  Best Call Back Number: [phone]  Additional Information:

## 2024-05-06 NOTE — TELEPHONE ENCOUNTER
states since pt has been discharged from hospital her pressure has been running low 100/50 on top of still being SOB. UC/ED? Please advise

## 2024-05-08 ENCOUNTER — TELEPHONE (OUTPATIENT)
Dept: PRIMARY CARE CLINIC | Facility: CLINIC | Age: 74
End: 2024-05-08
Payer: MEDICARE

## 2024-05-08 NOTE — TELEPHONE ENCOUNTER
----- Message from Allison Rick sent at 5/8/2024  9:26 AM CDT -----  Regarding: call  Type:  Needs Medical Advice    Who Called: pt's    Would the patient rather a call back or a response via [facility]? Call  Best Call Back Number: [phone]  Additional Information: pt's  would like a call back in regards to what's going on with the lab work for his wife

## 2024-05-08 NOTE — TELEPHONE ENCOUNTER
----- Message from Meri Chilel sent at 5/8/2024  9:42 AM CDT -----    Type:  Patient Returning Call    Who Called: Pt  Denise  Who Left Message for Patient: RN  Does the patient know what this is regarding?: Pulmonary function test appt  Would the patient rather a call back or a response via [facility]?  call  Best Call Back Number:  [phone]  Additional Information:   states that pt needs to schedule a pulmonary function test appt.

## 2024-05-09 ENCOUNTER — OFFICE VISIT (OUTPATIENT)
Dept: NEUROLOGY | Facility: CLINIC | Age: 74
End: 2024-05-09
Payer: MEDICARE

## 2024-05-09 VITALS — DIASTOLIC BLOOD PRESSURE: 56 MMHG | HEART RATE: 84 BPM | SYSTOLIC BLOOD PRESSURE: 90 MMHG

## 2024-05-09 DIAGNOSIS — I63.9 CEREBROVASCULAR ACCIDENT (CVA), UNSPECIFIED MECHANISM: ICD-10-CM

## 2024-05-09 DIAGNOSIS — I63.411 EMBOLIC STROKE INVOLVING RIGHT MIDDLE CEREBRAL ARTERY: ICD-10-CM

## 2024-05-09 PROCEDURE — 99214 OFFICE O/P EST MOD 30 MIN: CPT | Mod: PBBFAC | Performed by: PSYCHIATRY & NEUROLOGY

## 2024-05-09 PROCEDURE — 99999 PR PBB SHADOW E&M-EST. PATIENT-LVL IV: CPT | Mod: PBBFAC,,, | Performed by: PSYCHIATRY & NEUROLOGY

## 2024-05-09 PROCEDURE — 99214 OFFICE O/P EST MOD 30 MIN: CPT | Mod: S$PBB,,, | Performed by: PSYCHIATRY & NEUROLOGY

## 2024-05-09 NOTE — PROGRESS NOTES
"  Denise Berrios is a 74 y.o. year old female that  presents for stroke follow up. She is accompanied by her .    HPI:  Mrs Berrios has a PMH that is relevant for HTN, episodic migraine with aura, Graves disease, L occipital infarct (2021) and corona radiata-left parietal lobe cortex- left insular cortex infarcts on 4/10/24, as well as R MCA infarct on 4/20/24. Stroke etiology deemed to be ESUS.  As per review of her hospital record, she " presents to the hospital for evaluation of acute onset LSW, neglect, and aphasia. HPI information gathered from the patient's  due to the patient having aphasia. The patient was in her usual state of health and went to bed around 8pm. Around 30 min prior to arrival in the ED, her  states he heard a noise. He found the patient on the floor, aphasic, with right gaze. EMS was activated and called a stroke code in the field ".   MP-CTA brain and neck showed occlusion M2 segment right MCA.and she underwent successful MT-TICI-3. Brain MRI confirmed 5 mm acute right basal ganglia infarct with restricted diffusion right insular cortex and frontal operculum.   said that unfortunately they were not able to fill out the Brillinta prescription at the time of her last office visit on 4/18 and then the stroke occurred.   said that the main issues at this time are slurred speech that is gradually getting better, weight loss as result of not being able to eat well, and new development of shortness of breath as soon as she tries to ambulate. Low BP required curtailing dose of Losartan.  Able to perform her ADL with minimal assistance.  Wearing a 30 day event monitor that will be turn in on 6/1.   Gets home PT/OT and speech.      Past Medical History:   Diagnosis Date    Acid reflux     Graves disease     diffuse Toxic Goiter    Hypertension     IBS (irritable bowel syndrome)     Lichen sclerosus     Migraine     Stroke      Social History     Socioeconomic " History    Marital status:    Tobacco Use    Smoking status: Never     Passive exposure: Never    Smokeless tobacco: Never   Substance and Sexual Activity    Alcohol use: Yes    Drug use: No    Sexual activity: Yes     Partners: Male     Birth control/protection: See Surgical Hx     Social Determinants of Health     Financial Resource Strain: Low Risk  (4/22/2024)    Overall Financial Resource Strain (CARDIA)     Difficulty of Paying Living Expenses: Not hard at all   Food Insecurity: No Food Insecurity (4/22/2024)    Hunger Vital Sign     Worried About Running Out of Food in the Last Year: Never true     Ran Out of Food in the Last Year: Never true   Transportation Needs: No Transportation Needs (4/22/2024)    PRAPARE - Transportation     Lack of Transportation (Medical): No     Lack of Transportation (Non-Medical): No   Physical Activity: Inactive (4/22/2024)    Exercise Vital Sign     Days of Exercise per Week: 0 days     Minutes of Exercise per Session: 0 min   Stress: Patient Unable To Answer (4/22/2024)    [employer] [place] [employer] - Occupational Stress Questionnaire     Feeling of Stress : Patient unable to answer   Housing Stability: Low Risk  (4/22/2024)    Housing Stability Vital Sign     Unable to Pay for Housing in the Last Year: No     Homeless in the Last Year: No     Past Surgical History:   Procedure Laterality Date    CEREBRAL ANGIOGRAM N/A 4/22/2024    Procedure: ANGIOGRAM-CEREBRAL; stroke class A;  Surgeon: Sharon Welch;  Location: [facility];  Service: Anesthesiology;  Laterality: N/A;    EYE SURGERY      HYSTERECTOMY      CINDI for ?? cervical cancer, had normal paps    LEFT HEART CATHETERIZATION N/A 10/20/2020    Procedure: Left heart cath;  Surgeon: Bony Schultz MD;  Location: [facility] CATH LAB/EP;  Service: Cardiology;  Laterality: N/A;    REVERSE TOTAL SHOULDER ARTHROPLASTY Right 8/19/2022    Procedure: ARTHROPLASTY, SHOULDER, TOTAL, REVERSE;  Surgeon: Bradley Finnegan  MD Kervin;  Location: [facility];  Service: Orthopedics;  Laterality: Right;  SONIA linda CONFIRMED/8/18/Shaista  Jean Finnegan- hemosorb AT      Family History   Problem Relation Name Age of Onset    Kidney failure Father      Hypertension Father      Stomach cancer Mother      Breast cancer Neg Hx             Review of Systems  General ROS: negative for chills, fever or weight loss  Psychological ROS: negative for hallucination, depression or suicidal ideation  Ophthalmic ROS: negative for blurry vision, photophobia or eye pain  ENT ROS: negative for epistaxis, sore throat or rhinorrhea  Respiratory ROS: no cough, shortness of breath, or wheezing  Cardiovascular ROS: no chest pain or dyspnea on exertion  Gastrointestinal ROS: no abdominal pain, change in bowel habits, or black/ bloody stools  Genito-Urinary ROS: no dysuria, trouble voiding, or hematuria  Musculoskeletal ROS: negative for gait disturbance or muscular weakness  Neurological ROS: no syncope or seizures; no ataxia  Dermatological ROS: negative for pruritis, rash and jaundice      Physical Exam:  BP (!) 90/56 (BP Location: Left arm, Patient Position: Sitting, BP Method: Large (Automatic))   Pulse 84   General appearance: alert, cooperative, no distress  Constitutional:Oriented to person, place, and time.appears well-developed and well-nourished.   HEENT: Normocephalic, atraumatic, neck symmetrical, no nasal discharge   Eyes: conjunctivae/corneas clear, PERRL, EOM's intact  Lungs: clear to auscultation bilaterally, no dullness to percussion bilaterally  Heart: regular rate and rhythm without rub; no displacement of the PMI   Abdomen: soft, non-tender; bowel sounds normoactive; no organomegaly  Extremities: extremities symmetric; no clubbing, cyanosis, or edema  Integument: Skin color, texture, turgor normal; no rashes; hair distrubution normal  Neurologic:   Mental status: alert and awake, oriented x 4, comprehension, naming, and repetition  intact. No right to left confusion. Performs serial 7's without difficulty. Marked dysarthria.  CN 2-12: pupils 4 mm bilaterally, reactive to light. Fundi without papilledema. Visual fields full to confrontation. EOM full without nystagmus. Face sensation normal in all distributions. Face asymmetric due to subtle R lower face weakness. Hearing grossly intact. Palate elevates well. Tongue midline without atrophy or fasciculations.  Motor: 5/5 all over  Sensory: intact in all modalities.  DTR's: 2+ all over.  Plantars: no tested.  Coordination: finger to nose and heel-knee-shin intact.  Gait: no tested           LABS:    Complete Blood Count  Lab Results   Component Value Date    RBC 3.78 (L) 04/28/2024    HGB 11.5 (L) 04/28/2024    HCT 36.0 (L) 04/28/2024    MCV 95 04/28/2024    MCH 30.4 04/28/2024    MCHC 31.9 (L) 04/28/2024    RDW 14.5 04/28/2024     04/28/2024    MPV 10.8 04/28/2024    GRAN 7.3 04/28/2024    GRAN 70.1 04/28/2024    LYMPH 1.3 04/28/2024    LYMPH 12.5 (L) 04/28/2024    MONO 1.1 (H) 04/28/2024    MONO 10.8 04/28/2024    EOS 0.4 04/28/2024    BASO 0.08 04/28/2024    EOSINOPHIL 4.0 04/28/2024    BASOPHIL 0.8 04/28/2024    DIFFMETHOD Automated 04/28/2024       Comprehensive Metabolic Panel  Lab Results   Component Value Date     04/28/2024    BUN 18 04/28/2024    CREATININE 1.0 04/28/2024     04/28/2024    K 3.9 04/28/2024     (H) 04/28/2024    PROT 6.9 04/28/2024    ALBUMIN 3.2 (L) 04/28/2024    BILITOT 0.9 04/28/2024    AST 23 04/28/2024    ALKPHOS 70 04/28/2024    CO2 20 (L) 04/28/2024    ALT 13 04/28/2024    ANIONGAP 12 04/28/2024    EGFRNONAA >60 03/22/2022    ESTGFRAFRICA >60 03/22/2022       TSH  Lab Results   Component Value Date    TSH 9.338 (H) 05/02/2024         Assessment:  pleasant 75 y/o with HTN, episodic migraine with aura, Graves disease, recurrent ischemic infarcts involving L occipital infarct (2021) and corona radiata-left parietal lobe cortex- left insular  cortex infarcts on 4/10/24, as well as R MCA infarct on 4/20/24 s/p MT-TICI-3 of R M2 occlusion. Stroke etiology deemed to be ESUS with strong suspicion for occult PAF.  Will keep on Ticagrelor + ASA pending results of 30 day event monitor and ultimately loop recorder placement.  Continue PT/OT/ speech therapy.  Will see her back in 4 weeks or before if needed.            ICD-10-CM ICD-9-CM    1. Cerebrovascular accident (CVA), unspecified mechanism  I63.9 434.91 Ambulatory referral/consult to Vascular Neurology      2. Embolic stroke involving right middle cerebral artery  I63.411 434.11 Ambulatory referral/consult to Vascular Neurology        Diagnoses of Cerebrovascular accident (CVA), unspecified mechanism and Embolic stroke involving right middle cerebral artery were pertinent to this visit.      Plan:  1) Recurrent bi cerebral ischemic infarcts: as above  2) HTN  3) Episodic migraine with aura  4) Graves disease    No orders of the defined types were placed in this encounter.          Kuldeep Morales MD

## 2024-05-10 ENCOUNTER — LAB VISIT (OUTPATIENT)
Dept: LAB | Facility: HOSPITAL | Age: 74
End: 2024-05-10
Payer: MEDICARE

## 2024-05-10 DIAGNOSIS — Z79.899 ENCOUNTER FOR LONG-TERM (CURRENT) USE OF MEDICATIONS: ICD-10-CM

## 2024-05-10 DIAGNOSIS — E78.5 DYSLIPIDEMIA: ICD-10-CM

## 2024-05-10 DIAGNOSIS — E03.8 OTHER SPECIFIED HYPOTHYROIDISM: ICD-10-CM

## 2024-05-10 LAB
ALBUMIN SERPL BCP-MCNC: 3.6 G/DL (ref 3.5–5.2)
ALP SERPL-CCNC: 69 U/L (ref 55–135)
ALT SERPL W/O P-5'-P-CCNC: 19 U/L (ref 10–44)
ANION GAP SERPL CALC-SCNC: 13 MMOL/L (ref 8–16)
AST SERPL-CCNC: 24 U/L (ref 10–40)
BASOPHILS # BLD AUTO: 0.08 K/UL (ref 0–0.2)
BASOPHILS NFR BLD: 0.9 % (ref 0–1.9)
BILIRUB SERPL-MCNC: 0.7 MG/DL (ref 0.1–1)
BUN SERPL-MCNC: 31 MG/DL (ref 8–23)
CALCIUM SERPL-MCNC: 10.5 MG/DL (ref 8.7–10.5)
CHLORIDE SERPL-SCNC: 110 MMOL/L (ref 95–110)
CHOLEST SERPL-MCNC: 135 MG/DL (ref 120–199)
CHOLEST/HDLC SERPL: 4.2 {RATIO} (ref 2–5)
CO2 SERPL-SCNC: 21 MMOL/L (ref 23–29)
CREAT SERPL-MCNC: 1.7 MG/DL (ref 0.5–1.4)
DIFFERENTIAL METHOD BLD: ABNORMAL
EOSINOPHIL # BLD AUTO: 0.5 K/UL (ref 0–0.5)
EOSINOPHIL NFR BLD: 6.4 % (ref 0–8)
ERYTHROCYTE [DISTWIDTH] IN BLOOD BY AUTOMATED COUNT: 15 % (ref 11.5–14.5)
EST. GFR  (NO RACE VARIABLE): 31 ML/MIN/1.73 M^2
ESTIMATED AVG GLUCOSE: 114 MG/DL (ref 68–131)
GLUCOSE SERPL-MCNC: 118 MG/DL (ref 70–110)
HBA1C MFR BLD: 5.6 % (ref 4–5.6)
HCT VFR BLD AUTO: 36.9 % (ref 37–48.5)
HDLC SERPL-MCNC: 32 MG/DL (ref 40–75)
HDLC SERPL: 23.7 % (ref 20–50)
HGB BLD-MCNC: 12.3 G/DL (ref 12–16)
IMM GRANULOCYTES # BLD AUTO: 0.07 K/UL (ref 0–0.04)
IMM GRANULOCYTES NFR BLD AUTO: 0.8 % (ref 0–0.5)
LDLC SERPL CALC-MCNC: 75 MG/DL (ref 63–159)
LYMPHOCYTES # BLD AUTO: 1.3 K/UL (ref 1–4.8)
LYMPHOCYTES NFR BLD: 15.4 % (ref 18–48)
MCH RBC QN AUTO: 31.9 PG (ref 27–31)
MCHC RBC AUTO-ENTMCNC: 33.3 G/DL (ref 32–36)
MCV RBC AUTO: 96 FL (ref 82–98)
MONOCYTES # BLD AUTO: 0.9 K/UL (ref 0.3–1)
MONOCYTES NFR BLD: 10.1 % (ref 4–15)
NEUTROPHILS # BLD AUTO: 5.6 K/UL (ref 1.8–7.7)
NEUTROPHILS NFR BLD: 66.4 % (ref 38–73)
NONHDLC SERPL-MCNC: 103 MG/DL
NRBC BLD-RTO: 0 /100 WBC
PLATELET # BLD AUTO: 272 K/UL (ref 150–450)
PMV BLD AUTO: 12.3 FL (ref 9.2–12.9)
POTASSIUM SERPL-SCNC: 3.9 MMOL/L (ref 3.5–5.1)
PROT SERPL-MCNC: 8 G/DL (ref 6–8.4)
RBC # BLD AUTO: 3.85 M/UL (ref 4–5.4)
SODIUM SERPL-SCNC: 144 MMOL/L (ref 136–145)
T4 FREE SERPL-MCNC: 1.57 NG/DL (ref 0.71–1.51)
TRIGL SERPL-MCNC: 140 MG/DL (ref 30–150)
TSH SERPL DL<=0.005 MIU/L-ACNC: 3 UIU/ML (ref 0.4–4)
WBC # BLD AUTO: 8.45 K/UL (ref 3.9–12.7)

## 2024-05-10 PROCEDURE — 80061 LIPID PANEL: CPT | Performed by: INTERNAL MEDICINE

## 2024-05-10 PROCEDURE — 80053 COMPREHEN METABOLIC PANEL: CPT | Performed by: INTERNAL MEDICINE

## 2024-05-10 PROCEDURE — 85025 COMPLETE CBC W/AUTO DIFF WBC: CPT | Performed by: INTERNAL MEDICINE

## 2024-05-10 PROCEDURE — 84443 ASSAY THYROID STIM HORMONE: CPT | Performed by: INTERNAL MEDICINE

## 2024-05-10 PROCEDURE — 83036 HEMOGLOBIN GLYCOSYLATED A1C: CPT | Performed by: INTERNAL MEDICINE

## 2024-05-10 PROCEDURE — 36415 COLL VENOUS BLD VENIPUNCTURE: CPT | Performed by: INTERNAL MEDICINE

## 2024-05-10 PROCEDURE — 84439 ASSAY OF FREE THYROXINE: CPT | Performed by: INTERNAL MEDICINE

## 2024-05-13 DIAGNOSIS — N17.9 ACUTE KIDNEY INJURY: Primary | ICD-10-CM

## 2024-05-13 NOTE — TELEPHONE ENCOUNTER
Care Due:                  Date            Visit Type   Department     Provider  --------------------------------------------------------------------------------                                EP -                              PRIMARY      [facility] PRIMARY  Last Visit: 04-      CARE ([facility])   CARE           Aleksandra Carey                              EP -                              PRIMARY      [facility] PRIMARY  Next Visit: 10-      CARE ([facility])   CARE           Aleksandra Carey                                                            Last  Test          Frequency    Reason                     Performed    Due Date  --------------------------------------------------------------------------------    Vitamin D...  12 months..  ergocalciferol...........  Not Found    Overdue    [employer] Embedded Care Due Messages. Reference number: [id number].   5/13/2024 11:19:11 AM CDT

## 2024-05-13 NOTE — TELEPHONE ENCOUNTER
----- Message from Madie Melgoza sent at 5/13/2024 11:08 AM CDT -----  Type:  RX Refill Request    Who Called: pt's    Refill or New Rx:refill   RX Name and Strength:ticagrelor (BRILINTA) 90 mg tablet  Preferred Pharmacy with phone number:[facility] #[id number] - [place] - [address] AT [facility] [address]  Local or Mail Order:local   Ordering Provider:radha   Would the patient rather a call back or a response via [facility]? Call   Best Call Back Number:  (John ) he would like to speak with someone   Additional Information: caller stated wife was recently discharged from hospital due to stroke she had and she and to do emergency surgery as well

## 2024-05-13 NOTE — TELEPHONE ENCOUNTER
No care due was identified.  [facility] Embedded Care Due Messages. Reference number: [id number].   5/13/2024 3:17:26 PM CDT

## 2024-05-13 NOTE — TELEPHONE ENCOUNTER
"Looks like the brilinta was set as "no print". Pended to be sent electronically. Also wanted to let us know he scheduled her labs.   "

## 2024-05-13 NOTE — TELEPHONE ENCOUNTER
----- Message from Triny Carreon sent at 5/13/2024  3:05 PM CDT -----  Type:  Needs Medical Advice    Who Called: Pt spouse Mr Berrios   Would the patient rather a call back or a response via [facility]? call  Best Call Back Number: [phone]  Additional Information: pt spouse would like a call from nurse in office regarding schedule labs please call     Type:  Pt spouse  Calling to Clarify an RX    Name of Caller:pt spouse   Pharmacy Name:[facility] #[id number] - [place] AT [facility] [address]   Phone: [phone]  Fax: [phone]  Prescription Name:ticagrelor (BRILINTA) 90 mg tablet  What do they need to clarify?:please send to above pharmacy

## 2024-05-14 ENCOUNTER — HOSPITAL ENCOUNTER (OUTPATIENT)
Dept: PULMONOLOGY | Facility: CLINIC | Age: 74
Discharge: HOME | End: 2024-05-14
Payer: MEDICARE

## 2024-05-14 DIAGNOSIS — R53.81 DEBILITY: ICD-10-CM

## 2024-05-14 DIAGNOSIS — Z92.29 HX OF LONG TERM USE OF BLOOD THINNERS: ICD-10-CM

## 2024-05-14 DIAGNOSIS — I63.413 CEREBROVASCULAR ACCIDENT (CVA) DUE TO BILATERAL EMBOLISM OF MIDDLE CEREBRAL ARTERIES: ICD-10-CM

## 2024-05-14 DIAGNOSIS — R53.1 WEAKNESS: ICD-10-CM

## 2024-05-14 DIAGNOSIS — D64.9 ANEMIA, UNSPECIFIED TYPE: ICD-10-CM

## 2024-05-14 DIAGNOSIS — R06.02 SOB (SHORTNESS OF BREATH): ICD-10-CM

## 2024-05-14 PROCEDURE — 94010 BREATHING CAPACITY TEST: CPT | Mod: 53,PBBFAC | Performed by: INTERNAL MEDICINE

## 2024-05-15 ENCOUNTER — TELEPHONE (OUTPATIENT)
Dept: PRIMARY CARE CLINIC | Facility: CLINIC | Age: 74
End: 2024-05-15
Payer: MEDICARE

## 2024-05-15 ENCOUNTER — HOSPITAL ENCOUNTER (EMERGENCY)
Facility: HOSPITAL | Age: 74
Discharge: HOME | End: 2024-05-15
Attending: EMERGENCY MEDICINE
Payer: MEDICARE

## 2024-05-15 VITALS
WEIGHT: 143.25 LBS | TEMPERATURE: 98 F | OXYGEN SATURATION: 97 % | RESPIRATION RATE: 20 BRPM | BODY MASS INDEX: 27.07 KG/M2 | HEART RATE: 79 BPM | SYSTOLIC BLOOD PRESSURE: 125 MMHG | DIASTOLIC BLOOD PRESSURE: 61 MMHG

## 2024-05-15 DIAGNOSIS — R53.1 WEAKNESS: ICD-10-CM

## 2024-05-15 DIAGNOSIS — R06.02 SHORTNESS OF BREATH: Primary | ICD-10-CM

## 2024-05-15 DIAGNOSIS — R53.83 FATIGUE, UNSPECIFIED TYPE: ICD-10-CM

## 2024-05-15 LAB
ALBUMIN SERPL BCP-MCNC: 3.4 G/DL (ref 3.5–5.2)
ALP SERPL-CCNC: 63 U/L (ref 55–135)
ALT SERPL W/O P-5'-P-CCNC: 19 U/L (ref 10–44)
ANION GAP SERPL CALC-SCNC: 10 MMOL/L (ref 8–16)
AST SERPL-CCNC: 25 U/L (ref 10–40)
BASOPHILS # BLD AUTO: 0.07 K/UL (ref 0–0.2)
BASOPHILS NFR BLD: 0.8 % (ref 0–1.9)
BILIRUB SERPL-MCNC: 0.7 MG/DL (ref 0.1–1)
BNP SERPL-MCNC: 54 PG/ML (ref 0–99)
BUN SERPL-MCNC: 24 MG/DL (ref 8–23)
CALCIUM SERPL-MCNC: 10.4 MG/DL (ref 8.7–10.5)
CHLORIDE SERPL-SCNC: 109 MMOL/L (ref 95–110)
CO2 SERPL-SCNC: 19 MMOL/L (ref 23–29)
CREAT SERPL-MCNC: 1.5 MG/DL (ref 0.5–1.4)
D DIMER PPP IA.FEU-MCNC: 1.23 MG/L FEU
DIFFERENTIAL METHOD BLD: ABNORMAL
EOSINOPHIL # BLD AUTO: 0.3 K/UL (ref 0–0.5)
EOSINOPHIL NFR BLD: 3.7 % (ref 0–8)
ERYTHROCYTE [DISTWIDTH] IN BLOOD BY AUTOMATED COUNT: 14.7 % (ref 11.5–14.5)
EST. GFR  (NO RACE VARIABLE): 35.9 ML/MIN/1.73 M^2
GLUCOSE SERPL-MCNC: 98 MG/DL (ref 70–110)
HCT VFR BLD AUTO: 35.2 % (ref 37–48.5)
HCV AB SERPL QL IA: NORMAL
HGB BLD-MCNC: 11.7 G/DL (ref 12–16)
HIV 1+2 AB+HIV1 P24 AG SERPL QL IA: NORMAL
IMM GRANULOCYTES # BLD AUTO: 0.09 K/UL (ref 0–0.04)
IMM GRANULOCYTES NFR BLD AUTO: 1 % (ref 0–0.5)
LYMPHOCYTES # BLD AUTO: 1.3 K/UL (ref 1–4.8)
LYMPHOCYTES NFR BLD: 13.8 % (ref 18–48)
MCH RBC QN AUTO: 31.9 PG (ref 27–31)
MCHC RBC AUTO-ENTMCNC: 33.2 G/DL (ref 32–36)
MCV RBC AUTO: 96 FL (ref 82–98)
MONOCYTES # BLD AUTO: 0.9 K/UL (ref 0.3–1)
MONOCYTES NFR BLD: 9.3 % (ref 4–15)
NEUTROPHILS # BLD AUTO: 6.6 K/UL (ref 1.8–7.7)
NEUTROPHILS NFR BLD: 71.4 % (ref 38–73)
NRBC BLD-RTO: 0 /100 WBC
OHS QRS DURATION: 84 MS
OHS QTC CALCULATION: 468 MS
PLATELET # BLD AUTO: 201 K/UL (ref 150–450)
PMV BLD AUTO: 11.8 FL (ref 9.2–12.9)
POTASSIUM SERPL-SCNC: 4.1 MMOL/L (ref 3.5–5.1)
PROT SERPL-MCNC: 7.3 G/DL (ref 6–8.4)
RBC # BLD AUTO: 3.67 M/UL (ref 4–5.4)
SODIUM SERPL-SCNC: 138 MMOL/L (ref 136–145)
TROPONIN I SERPL DL<=0.01 NG/ML-MCNC: 0.01 NG/ML (ref 0–0.03)
WBC # BLD AUTO: 9.22 K/UL (ref 3.9–12.7)

## 2024-05-15 PROCEDURE — 25500020 PHARM REV CODE 255: Performed by: EMERGENCY MEDICINE

## 2024-05-15 PROCEDURE — 84484 ASSAY OF TROPONIN QUANT: CPT | Performed by: PHYSICIAN ASSISTANT

## 2024-05-15 PROCEDURE — 87389 HIV-1 AG W/HIV-1&-2 AB AG IA: CPT | Performed by: EMERGENCY MEDICINE

## 2024-05-15 PROCEDURE — 80053 COMPREHEN METABOLIC PANEL: CPT | Performed by: PHYSICIAN ASSISTANT

## 2024-05-15 PROCEDURE — 85379 FIBRIN DEGRADATION QUANT: CPT | Performed by: PHYSICIAN ASSISTANT

## 2024-05-15 PROCEDURE — 86803 HEPATITIS C AB TEST: CPT | Performed by: EMERGENCY MEDICINE

## 2024-05-15 PROCEDURE — 83880 ASSAY OF NATRIURETIC PEPTIDE: CPT | Performed by: PHYSICIAN ASSISTANT

## 2024-05-15 PROCEDURE — 99285 EMERGENCY DEPT VISIT HI MDM: CPT | Mod: 25

## 2024-05-15 PROCEDURE — 96360 HYDRATION IV INFUSION INIT: CPT

## 2024-05-15 PROCEDURE — 25000003 PHARM REV CODE 250: Performed by: PHYSICIAN ASSISTANT

## 2024-05-15 PROCEDURE — 85025 COMPLETE CBC W/AUTO DIFF WBC: CPT | Performed by: PHYSICIAN ASSISTANT

## 2024-05-15 RX ADMIN — SODIUM CHLORIDE 500 ML: 0.9 INJECTION, SOLUTION INTRAVENOUS at 01:05

## 2024-05-15 RX ADMIN — IOHEXOL 75 ML: 350 INJECTION, SOLUTION INTRAVENOUS at 03:05

## 2024-05-15 NOTE — TELEPHONE ENCOUNTER
If SOB is worsening and she is not tolerating PO, needs to go back to ER. May need IVF's if dehydrated.    Echo was normal on 4/23, CXR was normal on 5/1 and just had PFT's done yesterday. May need urgent CT to rule out blood clot as this has not been ruled out yet as cause of SOB

## 2024-05-15 NOTE — TELEPHONE ENCOUNTER
Not eating, SOB, weakness. Do you want to work pt in with us/NP or have her go back to ER for eval?

## 2024-05-15 NOTE — ED PROVIDER NOTES
Encounter Date: 5/15/2024       History     Chief Complaint   Patient presents with    Failure To Thrive      states pt. Is not eating. Too weak to get up and go to bathroom. Recently d/c from hospital after having stroke    Abdominal Pain     74-year-old female with history of hypertension, Graves disease, migraine headache, CVA who presents to the emergency department with chief complaint of shortness of breath and fatigue. She was discharged home from the hospital after having a stroke about 12 days ago. She has been doing poorly at home. Her  provides most history due to her aphasia. He reports that she becomes significantly short of breath with minimal exertion. She has trouble making it to the bathroom. No associated chest pain or syncope. She has also not been eating well. She can tolerate pudding and yogurt but does not want to eat other pureed food. She is tolerating fluids well. Denies chest pain, abdominal pain, headache, back pain. Still urinating well. Denies other worsening or alleviating factors.       Review of patient's allergies indicates:   Allergen Reactions    Phenobarbital Anaphylaxis    Penicillins Other (See Comments)    Phenobarbital sodium     Propylthiouracil     Thyroid      Note: PTU    Topamax [topiramate] Diarrhea     GI symptoms    Cefaclor Rash and Other (See Comments)    Clopidogrel Hives and Rash    Methimazole Rash and Other (See Comments)    Methimazole (bulk) Rash    Penicillin Rash    Sulfa (sulfonamide antibiotics) Rash and Other (See Comments)     Past Medical History:   Diagnosis Date    Acid reflux     Graves disease     diffuse Toxic Goiter    Hypertension     IBS (irritable bowel syndrome)     Lichen sclerosus     Migraine     Stroke      Past Surgical History:   Procedure Laterality Date    CEREBRAL ANGIOGRAM N/A 4/22/2024    Procedure: ANGIOGRAM-CEREBRAL; stroke class A;  Surgeon: Sharon Welch;  Location: [facility];  Service: Anesthesiology;  Laterality:  N/A;    EYE SURGERY      HYSTERECTOMY      CINDI for ?? cervical cancer, had normal paps    LEFT HEART CATHETERIZATION N/A 10/20/2020    Procedure: Left heart cath;  Surgeon: Bony Schultz MD;  Location: [facility] CATH LAB/EP;  Service: Cardiology;  Laterality: N/A;    REVERSE TOTAL SHOULDER ARTHROPLASTY Right 8/19/2022    Procedure: ARTHROPLASTY, SHOULDER, TOTAL, REVERSE;  Surgeon: Bradley Finnegan Jr., MD;  Location: [facility] OR;  Service: Orthopedics;  Laterality: Right;  MARLEY- maddi CONFIRMED/8/18/Shaista Finnegan- hemosorb AT      Family History   Problem Relation Name Age of Onset    Kidney failure Father      Hypertension Father      Stomach cancer Mother      Breast cancer Neg Hx       Social History     Tobacco Use    Smoking status: Never     Passive exposure: Never    Smokeless tobacco: Never   Substance Use Topics    Alcohol use: Yes    Drug use: No     Review of Systems   Constitutional:  Positive for fatigue.   Respiratory:  Positive for shortness of breath.    Neurological:  Positive for weakness.       Physical Exam     Initial Vitals [05/15/24 1116]   BP Pulse Resp Temp SpO2   (!) 113/53 74 18 98 °F (36.7 °C) 97 %      MAP       --         Physical Exam    Vitals reviewed.  Constitutional: She appears well-developed and well-nourished. She is not diaphoretic. No distress.   HENT:   Head: Normocephalic and atraumatic.   Dry mucous membranes    Eyes: Conjunctivae and EOM are normal. Pupils are equal, round, and reactive to light.   Neck: Neck supple.   Normal range of motion.  Cardiovascular:  Normal rate, regular rhythm, normal heart sounds and intact distal pulses.     Exam reveals no gallop and no friction rub.       No murmur heard.  Pulmonary/Chest: Breath sounds normal. She has no wheezes. She has no rhonchi. She has no rales.   Abdominal: Abdomen is soft. Bowel sounds are normal. There is no abdominal tenderness.   Musculoskeletal:         General: Normal range of motion.       Cervical back: Normal range of motion and neck supple.     Neurological: She is alert. No cranial nerve deficit or sensory deficit. GCS eye subscore is 4. GCS verbal subscore is 5. GCS motor subscore is 6.   Aphasic. Decreased strength left upper and lower extremity compared to right. Present since recent stroke.    Skin: Skin is warm and dry. Capillary refill takes less than 2 seconds.   Psychiatric: She has a normal mood and affect. Her behavior is normal. Judgment and thought content normal.         ED Course   Procedures  Labs Reviewed   CBC W/ AUTO DIFFERENTIAL - Abnormal; Notable for the following components:       Result Value    RBC 3.67 (*)     Hemoglobin 11.7 (*)     Hematocrit 35.2 (*)     MCH 31.9 (*)     RDW 14.7 (*)     Immature Granulocytes 1.0 (*)     Immature Grans (Abs) 0.09 (*)     Lymph % 13.8 (*)     All other components within normal limits   COMPREHENSIVE METABOLIC PANEL - Abnormal; Notable for the following components:    CO2 19 (*)     BUN 24 (*)     Creatinine 1.5 (*)     Albumin 3.4 (*)     eGFR 35.9 (*)     All other components within normal limits   D DIMER, QUANTITATIVE - Abnormal; Notable for the following components:    D-Dimer 1.23 (*)     All other components within normal limits   HIV 1 / 2 ANTIBODY    Narrative:     Release to patient->Immediate   HEPATITIS C ANTIBODY    Narrative:     Release to patient->Immediate   TROPONIN I   B-TYPE NATRIURETIC PEPTIDE   URINALYSIS, REFLEX TO URINE CULTURE     EKG Readings: (Independently Interpreted)   Initial Reading: No STEMI. Rhythm: Normal Sinus Rhythm. Heart Rate: 72.       Imaging Results              X-Ray Chest AP Portable (Final result)  Result time 05/15/24 13:42:40      Final result by Yosvany Anne MD (05/15/24 13:42:40)                   Impression:      No convincing radiographic evidence of pneumonia or other source of cough/shortness of breath, noting that early/mild viral pneumonia or nonspecific bronchitis may be  radiographically occult.      Electronically signed by: Yosvany Anne MD  Date:    05/15/2024  Time:    13:42               Narrative:    EXAMINATION:  XR CHEST AP PORTABLE    CLINICAL HISTORY:  Shortness of breath    TECHNIQUE:  Single frontal view of the chest was performed.    COMPARISON:  Chest radiograph most recent 05/01/2024    FINDINGS:  Monitoring leads overlie the chest.  Patient is slightly rotated.  Bilateral lung apices partially obscured by overlying chin soft tissue and osseous structures.    Cardiomediastinal silhouette is midline and stable without evidence of failure.  Similar nonspecific elevation of the right hemidiaphragm.  Bibasilar mild platelike scarring versus atelectasis unchanged.  The lungs are otherwise well expanded without consolidation, pleural effusion or pneumothorax.  Pulmonary vasculature and hilar contours are within normal limits.  Remote operative change at the right shoulder partially imaged.  No acute osseous process seen.                                    X-Rays:   Independently Interpreted Readings:   Chest X-Ray: Normal heart size.  No infiltrates.  No acute abnormalities.     Medications   sodium chloride 0.9% bolus 500 mL 500 mL (0 mLs Intravenous Stopped 5/15/24 1415)     Medical Decision Making  Emergent evaluation of a 74 y.o. female presenting to the emergency department complaining of shortness of breath, fatigue, poor PO intake at home. Patient is afebrile, hemodynamically stable, and non toxic appearing.   Will order labs, imaging, UA, fluids.       Differential diagnosis includes but isn't limited to physical debility, PE, ACS, pneumonia, pneumothorax, KYLAH, metabolic derangement.    No leukocytosis.  H/H stable.  Creatinine 1.5.  Baseline around 1.0.  Troponin and BNP are within normal limits.  D-dimer 1.23.  Will proceed with CTA PE study.    CTA and UA pending. Due to shift change, will sign out to oncoming team who will continue to monitor until final  disposition reached.   The patient's history, physical exam, and plan of care was discussed with and agreed upon with my supervising physician.     Amount and/or Complexity of Data Reviewed  Labs: ordered. Decision-making details documented in ED Course.  Radiology: ordered.    Risk  Prescription drug management.              Attending Attestation:             Attending ED Notes:   For this encounter, I discussed the history and physical with my NP/PA and reviewed the their documentation, treatment plan and medical decision making. Face to face encounter provided by PA        ED Course as of 05/16/24 1054   Wed May 15, 2024   1304 WBC: 9.22 [JM]   1304 Hemoglobin(!): 11.7 [JM]   1304 Hematocrit(!): 35.2 [JM]   1304 Platelet Count: 201 [JM]   1304 D-Dimer(!): 1.23 [JM]   1753 Mrs. Berrios was received in sign-out from the day team, pending CTA, further discussion regarding disability.  All labs appear to be stable for the patient.  There is mild KYLAH at 1.5 but this appears to be improving from value 5 days ago.  She received IV hydration here also.  I had a long discussion with the patient's , John, who confirms that she is eating soft foods in the form of ice cream, frosties, some soup without solid components, boost.  Regarding the patient's reports of dyspnea on exertion, no acute concerning or life-threatening cause can be noted in the emergency department today associated with this.  I suspect a significant component of deconditioning but the patient is still able to go up and downstairs with some assistance and is engaging in home nursing, PT OT, rehabilitation as recommended.  I currently do not think the patient warrants admission, and they would like to be discharged home.  I think this is appropriate but they are asked to follow up with their doctors as recommended.  Asked to monitor symptoms closely at home and have her return to the emergency department immediately for any new, concerning, or  worsening symptoms.  Both agreeable and she will discharged in stable condition. [ST]      ED Course User Index  [JM] Kelsey Prieto PA-C  [ST] Tashi Carrillo MD                           Clinical Impression:  Final diagnoses:  [R53.1] Weakness  [R06.02] Shortness of breath (Primary)  [R53.83] Fatigue, unspecified type                 Kelsey Prieto PA-C  05/15/24 2604       Alondra Ni MD  05/16/24 1054

## 2024-05-15 NOTE — TELEPHONE ENCOUNTER
----- Message from Danni Resendez sent at 5/15/2024  9:34 AM CDT -----  Regarding: call back  Contact: [phone]  Type:  Needs Medical Advice    Who Called: PT   Symptoms (please be specific): patient has not eaten solid food for 16 days was recently in the hospital for a major stroke shortness of breath weakness   How long has patient had these symptoms:  April 22, 2024   Would the patient rather a call back or a response via [facility]? Call back   Best Call Back Number: [phone]  Additional Information:

## 2024-05-15 NOTE — ED TRIAGE NOTES
Pt presents to ED with c/o SOB, decreased appetite xseveral days. Pt was recently discharged from here after CVA with sx intervention with new swallowing and speech deficits. Pt family endorses not eating barely anything, but endorses good liquid intake.

## 2024-05-16 ENCOUNTER — PATIENT OUTREACH (OUTPATIENT)
Dept: EMERGENCY MEDICINE | Facility: HOSPITAL | Age: 74
End: 2024-05-16
Payer: MEDICARE

## 2024-05-20 ENCOUNTER — EXTERNAL HOME HEALTH (OUTPATIENT)
Dept: HOME HEALTH SERVICES | Facility: HOSPITAL | Age: 74
End: 2024-05-20
Payer: MEDICARE

## 2024-05-22 ENCOUNTER — LAB VISIT (OUTPATIENT)
Dept: LAB | Facility: HOSPITAL | Age: 74
End: 2024-05-22
Attending: INTERNAL MEDICINE
Payer: MEDICARE

## 2024-05-22 DIAGNOSIS — N17.9 ACUTE KIDNEY INJURY: ICD-10-CM

## 2024-05-22 LAB
ALBUMIN SERPL BCP-MCNC: 3.4 G/DL (ref 3.5–5.2)
ALP SERPL-CCNC: 61 U/L (ref 55–135)
ALT SERPL W/O P-5'-P-CCNC: 27 U/L (ref 10–44)
ANION GAP SERPL CALC-SCNC: 12 MMOL/L (ref 8–16)
AST SERPL-CCNC: 30 U/L (ref 10–40)
BILIRUB SERPL-MCNC: 0.6 MG/DL (ref 0.1–1)
BUN SERPL-MCNC: 16 MG/DL (ref 8–23)
CALCIUM SERPL-MCNC: 10.2 MG/DL (ref 8.7–10.5)
CHLORIDE SERPL-SCNC: 106 MMOL/L (ref 95–110)
CO2 SERPL-SCNC: 20 MMOL/L (ref 23–29)
CREAT SERPL-MCNC: 1.5 MG/DL (ref 0.5–1.4)
EST. GFR  (NO RACE VARIABLE): 36 ML/MIN/1.73 M^2
GLUCOSE SERPL-MCNC: 117 MG/DL (ref 70–110)
POTASSIUM SERPL-SCNC: 4.3 MMOL/L (ref 3.5–5.1)
PROT SERPL-MCNC: 7.3 G/DL (ref 6–8.4)
SODIUM SERPL-SCNC: 138 MMOL/L (ref 136–145)

## 2024-05-22 PROCEDURE — 80053 COMPREHEN METABOLIC PANEL: CPT | Performed by: INTERNAL MEDICINE

## 2024-05-22 PROCEDURE — 36415 COLL VENOUS BLD VENIPUNCTURE: CPT | Performed by: INTERNAL MEDICINE

## 2024-05-23 ENCOUNTER — DOCUMENT SCAN (OUTPATIENT)
Dept: HOME HEALTH SERVICES | Facility: HOSPITAL | Age: 74
End: 2024-05-23
Payer: MEDICARE

## 2024-05-25 NOTE — PROGRESS NOTES
[facility] 701     SUBJECTIVE:     History of Present Illness:  Patient is a 74 y.o. female presents after admitted for stroke 4/24. Seen by  2020 for PVC's     Primary Diagnosis:   Hypertension: positive  DM: none  Smoker: none  Family history of early CAD  Heart disease : palpitations with PVC's  CVA 4/24 with aspiration thrombectomy   30 day event monitor in place to finish tomorrow - to evaluate for atrial fibrillation   Graves disease   ROS  No chest pains  No shortness of breath at rest; did get short of breath with walking but slowing improving; no PND or orthopnea  No synocpe  No palpitations  Blood pressures were too low and dereased dose of medication  Activity: walking a little without a walker; walks up the stairs without issues   Review of patient's allergies indicates:   Allergen Reactions    Phenobarbital Anaphylaxis    Penicillins Other (See Comments)    Phenobarbital sodium     Propylthiouracil     Thyroid      Note: PTU    Topamax [topiramate] Diarrhea     GI symptoms    Cefaclor Rash and Other (See Comments)    Clopidogrel Hives and Rash    Methimazole Rash and Other (See Comments)    Methimazole (bulk) Rash    Penicillin Rash    Sulfa (sulfonamide antibiotics) Rash and Other (See Comments)       Past Medical History:   Diagnosis Date    Acid reflux     Graves disease     diffuse Toxic Goiter    Hypertension     IBS (irritable bowel syndrome)     Lichen sclerosus     Migraine     Stroke        Past Surgical History:   Procedure Laterality Date    CEREBRAL ANGIOGRAM N/A 4/22/2024    Procedure: ANGIOGRAM-CEREBRAL; stroke class A;  Surgeon: Sharon Welch;  Location: [facility];  Service: Anesthesiology;  Laterality: N/A;    EYE SURGERY      HYSTERECTOMY      CINDI for ?? cervical cancer, had normal paps    LEFT HEART CATHETERIZATION N/A 10/20/2020    Procedure: Left heart cath;  Surgeon: Bony Schultz MD;  Location: [facility] CATH LAB/EP;  Service: Cardiology;  Laterality: N/A;    REVERSE  "TOTAL SHOULDER ARTHROPLASTY Right 2022    Procedure: ARTHROPLASTY, SHOULDER, TOTAL, REVERSE;  Surgeon: Bradley Finnegan Jr., MD;  Location: [facility];  Service: Orthopedics;  Laterality: Right;  MARLEY- maddi CONFIRMED//Shaista Pena Kain- hemosorb AT            Past Hospitalization:   4/10/24: aphasia, right facial droop; sent home with monitor  24: acute onset of aphasia; right MI occlusion; had aspiration thrombectomy   : weakness; poor apetite; treated for dehydration     Cardiac meds:    ASA 81 mg  Atorvastatin 80 mg  Carvedilol 12.5 mg BID  Thyroid  Losartan 100 mg  Nifedipine 30 mg  Brilinta 90 mg BID      OBJECTIVE:     Vital Signs (Most Recent)  Vitals:    24 1009   BP: 139/76   Pulse: 85   Weight: 62.6 kg (138 lb 1.9 oz)   Height: 5' 1" (1.549 m)         Physical Exam:  In edson  Neck: normal carotids, no bruits; normal JVP  Lungs :clear  Heart: RR, normal S1,S2, no murmurs, no gallops  Abd: no masses; no bruits;   Exts: normal DP and PT pulses bilaterally, normal radials; no edema noted               Labs  : GFR 36; A1c normal ; LDL 75; TSH normal    Diagnostic Results:    1.EK/24: sinus   2.Echo: : normal EF/ diastolic dysfunction;   3. Stress test  4. cath  5. CTA 24: mild calcified plaque intracranial ICA bilaterally; occluded PCA chronic on left;   Chart review:        ASSESSMENT/PLAN:     CVA: with thrombus of questionable etiology. No history of atrial fibrillation  Hypertension: controlled  LDL : 75  CKD3  Neurology recommended loop recorder and since her care is in the main campus, will refer to EP    Plan: refer to EP for loop recorder insertion and followup  2. Followup with neurology for duration of brilinta  3. Return 6 months    Marisol Michaud MD    "

## 2024-05-28 ENCOUNTER — DOCUMENT SCAN (OUTPATIENT)
Dept: HOME HEALTH SERVICES | Facility: HOSPITAL | Age: 74
End: 2024-05-28
Payer: MEDICARE

## 2024-05-29 ENCOUNTER — TELEPHONE (OUTPATIENT)
Dept: NEUROLOGY | Facility: CLINIC | Age: 74
End: 2024-05-29
Payer: MEDICARE

## 2024-05-29 ENCOUNTER — DOCUMENT SCAN (OUTPATIENT)
Dept: HOME HEALTH SERVICES | Facility: HOSPITAL | Age: 74
End: 2024-05-29
Payer: MEDICARE

## 2024-05-29 ENCOUNTER — OFFICE VISIT (OUTPATIENT)
Dept: CARDIOLOGY | Facility: CLINIC | Age: 74
DRG: 087 | End: 2024-05-29
Payer: MEDICARE

## 2024-05-29 VITALS
SYSTOLIC BLOOD PRESSURE: 139 MMHG | BODY MASS INDEX: 26.08 KG/M2 | DIASTOLIC BLOOD PRESSURE: 76 MMHG | WEIGHT: 138.13 LBS | HEART RATE: 85 BPM | HEIGHT: 61 IN

## 2024-05-29 DIAGNOSIS — I10 PRIMARY HYPERTENSION: ICD-10-CM

## 2024-05-29 DIAGNOSIS — N18.30 STAGE 3 CHRONIC KIDNEY DISEASE, UNSPECIFIED WHETHER STAGE 3A OR 3B CKD: ICD-10-CM

## 2024-05-29 DIAGNOSIS — I63.9 CEREBROVASCULAR ACCIDENT (CVA), UNSPECIFIED MECHANISM: Primary | ICD-10-CM

## 2024-05-29 PROCEDURE — 99999 PR PBB SHADOW E&M-EST. PATIENT-LVL III: CPT | Mod: PBBFAC,,, | Performed by: INTERNAL MEDICINE

## 2024-05-29 PROCEDURE — 99213 OFFICE O/P EST LOW 20 MIN: CPT | Mod: PBBFAC,PN | Performed by: INTERNAL MEDICINE

## 2024-05-29 PROCEDURE — 99204 OFFICE O/P NEW MOD 45 MIN: CPT | Mod: S$PBB,,, | Performed by: INTERNAL MEDICINE

## 2024-05-29 NOTE — TELEPHONE ENCOUNTER
----- Message from Mimi Garrido sent at 5/29/2024  1:09 PM CDT -----  Type:  Needs Medical Advice/medication     Who Called: pt     Would the patient rather a call back or a response via [facility]? Call   Best Call Back Number: [phone]  Additional Information: pt requesting a call back to discuss brilinta should she continue taking medication of discontinue

## 2024-05-30 ENCOUNTER — TELEPHONE (OUTPATIENT)
Dept: PRIMARY CARE CLINIC | Facility: CLINIC | Age: 74
End: 2024-05-30
Payer: MEDICARE

## 2024-05-30 NOTE — TELEPHONE ENCOUNTER
----- Message from Danni Resendez sent at 5/30/2024  9:08 AM CDT -----  Regarding: call back  Contact: [phone]  Who Called: PT     Patient called in requesting to speak with you. Did not specify why. Please advise.

## 2024-05-30 NOTE — TELEPHONE ENCOUNTER
Spoe to . States when she left hospital they gave her Brilinta. States the discharge papers state to d/c the Brilinta on 5/14, we filled it on 5/13. Asking if she should continue taking?

## 2024-06-01 ENCOUNTER — HOSPITAL ENCOUNTER (INPATIENT)
Facility: HOSPITAL | Age: 74
LOS: 1 days | Discharge: HOME | DRG: 087 | End: 2024-06-02
Attending: EMERGENCY MEDICINE | Admitting: STUDENT IN AN ORGANIZED HEALTH CARE EDUCATION/TRAINING PROGRAM
Payer: MEDICARE

## 2024-06-01 DIAGNOSIS — S06.5XAA SDH (SUBDURAL HEMATOMA): Primary | ICD-10-CM
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
## 2024-06-01 DIAGNOSIS — S01.81XA FACIAL LACERATION, INITIAL ENCOUNTER: ICD-10-CM

## 2024-06-01 LAB
ABO + RH BLD: NORMAL
ALBUMIN SERPL BCP-MCNC: 3.4 G/DL (ref 3.5–5.2)
ALP SERPL-CCNC: 65 U/L (ref 55–135)
ALT SERPL W/O P-5'-P-CCNC: 23 U/L (ref 10–44)
ANION GAP SERPL CALC-SCNC: 10 MMOL/L (ref 8–16)
APTT PPP: 22.5 SEC (ref 21–32)
AST SERPL-CCNC: 29 U/L (ref 10–40)
BASOPHILS # BLD AUTO: 0.04 K/UL (ref 0–0.2)
BASOPHILS NFR BLD: 0.4 % (ref 0–1.9)
BILIRUB SERPL-MCNC: 0.5 MG/DL (ref 0.1–1)
BLD GP AB SCN CELLS X3 SERPL QL: NORMAL
BUN SERPL-MCNC: 15 MG/DL (ref 8–23)
CALCIUM SERPL-MCNC: 10.3 MG/DL (ref 8.7–10.5)
CHLORIDE SERPL-SCNC: 107 MMOL/L (ref 95–110)
CO2 SERPL-SCNC: 22 MMOL/L (ref 23–29)
CREAT SERPL-MCNC: 1.7 MG/DL (ref 0.5–1.4)
DIFFERENTIAL METHOD BLD: ABNORMAL
EOSINOPHIL # BLD AUTO: 0.2 K/UL (ref 0–0.5)
EOSINOPHIL NFR BLD: 2.7 % (ref 0–8)
ERYTHROCYTE [DISTWIDTH] IN BLOOD BY AUTOMATED COUNT: 15.4 % (ref 11.5–14.5)
EST. GFR  (NO RACE VARIABLE): 30.9 ML/MIN/1.73 M^2
GLUCOSE SERPL-MCNC: 109 MG/DL (ref 70–110)
HCT VFR BLD AUTO: 33 % (ref 37–48.5)
HGB BLD-MCNC: 11 G/DL (ref 12–16)
IMM GRANULOCYTES # BLD AUTO: 0.05 K/UL (ref 0–0.04)
IMM GRANULOCYTES NFR BLD AUTO: 0.6 % (ref 0–0.5)
INR PPP: 1 (ref 0.8–1.2)
LYMPHOCYTES # BLD AUTO: 1.2 K/UL (ref 1–4.8)
LYMPHOCYTES NFR BLD: 13.5 % (ref 18–48)
MCH RBC QN AUTO: 31.9 PG (ref 27–31)
MCHC RBC AUTO-ENTMCNC: 33.3 G/DL (ref 32–36)
MCV RBC AUTO: 96 FL (ref 82–98)
MONOCYTES # BLD AUTO: 0.9 K/UL (ref 0.3–1)
MONOCYTES NFR BLD: 9.6 % (ref 4–15)
NEUTROPHILS # BLD AUTO: 6.6 K/UL (ref 1.8–7.7)
NEUTROPHILS NFR BLD: 73.2 % (ref 38–73)
NRBC BLD-RTO: 0 /100 WBC
OHS QRS DURATION: 84 MS
OHS QTC CALCULATION: 445 MS
PLATELET # BLD AUTO: 264 K/UL (ref 150–450)
PMV BLD AUTO: 10.7 FL (ref 9.2–12.9)
POTASSIUM SERPL-SCNC: 4.6 MMOL/L (ref 3.5–5.1)
PROT SERPL-MCNC: 7.1 G/DL (ref 6–8.4)
PROTHROMBIN TIME: 11.2 SEC (ref 9–12.5)
RBC # BLD AUTO: 3.45 M/UL (ref 4–5.4)
SODIUM SERPL-SCNC: 139 MMOL/L (ref 136–145)
SPECIMEN OUTDATE: NORMAL
WBC # BLD AUTO: 8.99 K/UL (ref 3.9–12.7)

## 2024-06-01 PROCEDURE — 99285 EMERGENCY DEPT VISIT HI MDM: CPT | Mod: 25

## 2024-06-01 PROCEDURE — 11000001 HC ACUTE MED/SURG PRIVATE ROOM

## 2024-06-01 PROCEDURE — 25000003 PHARM REV CODE 250: Performed by: STUDENT IN AN ORGANIZED HEALTH CARE EDUCATION/TRAINING PROGRAM

## 2024-06-01 PROCEDURE — 86850 RBC ANTIBODY SCREEN: CPT

## 2024-06-01 PROCEDURE — 85610 PROTHROMBIN TIME: CPT

## 2024-06-01 PROCEDURE — 80053 COMPREHEN METABOLIC PANEL: CPT

## 2024-06-01 PROCEDURE — 85730 THROMBOPLASTIN TIME PARTIAL: CPT

## 2024-06-01 PROCEDURE — 90471 IMMUNIZATION ADMIN: CPT

## 2024-06-01 PROCEDURE — 63600175 PHARM REV CODE 636 W HCPCS

## 2024-06-01 PROCEDURE — 3E0234Z INTRODUCTION OF SERUM, TOXOID AND VACCINE INTO MUSCLE, PERCUTANEOUS APPROACH: ICD-10-PCS | Performed by: STUDENT IN AN ORGANIZED HEALTH CARE EDUCATION/TRAINING PROGRAM

## 2024-06-01 PROCEDURE — 85025 COMPLETE CBC W/AUTO DIFF WBC: CPT

## 2024-06-01 PROCEDURE — 99223 1ST HOSP IP/OBS HIGH 75: CPT | Mod: AI,GC,, | Performed by: STUDENT IN AN ORGANIZED HEALTH CARE EDUCATION/TRAINING PROGRAM

## 2024-06-01 PROCEDURE — 90715 TDAP VACCINE 7 YRS/> IM: CPT

## 2024-06-01 PROCEDURE — 25000003 PHARM REV CODE 250

## 2024-06-01 RX ORDER — LIDOCAINE HYDROCHLORIDE 10 MG/ML
5 INJECTION, SOLUTION EPIDURAL; INFILTRATION; INTRACAUDAL; PERINEURAL
Status: COMPLETED | OUTPATIENT
Start: 2024-06-01 | End: 2024-06-01

## 2024-06-01 RX ORDER — ALUMINUM HYDROXIDE, MAGNESIUM HYDROXIDE, AND SIMETHICONE 1200; 120; 1200 MG/30ML; MG/30ML; MG/30ML
30 SUSPENSION ORAL
Status: DISCONTINUED | OUTPATIENT
Start: 2024-06-01 | End: 2024-06-02 | Stop reason: HOSPADM

## 2024-06-01 RX ORDER — ACETAMINOPHEN 325 MG/1
650 TABLET ORAL EVERY 4 HOURS PRN
Status: DISCONTINUED | OUTPATIENT
Start: 2024-06-01 | End: 2024-06-02 | Stop reason: HOSPADM

## 2024-06-01 RX ORDER — LEVOTHYROXINE SODIUM 88 UG/1
88 TABLET ORAL
Status: DISCONTINUED | OUTPATIENT
Start: 2024-06-02 | End: 2024-06-02 | Stop reason: HOSPADM

## 2024-06-01 RX ORDER — IBUPROFEN 200 MG
16 TABLET ORAL
Status: DISCONTINUED | OUTPATIENT
Start: 2024-06-01 | End: 2024-06-02 | Stop reason: HOSPADM

## 2024-06-01 RX ORDER — SUCRALFATE 1 G/10ML
1 SUSPENSION ORAL EVERY 6 HOURS
Status: DISCONTINUED | OUTPATIENT
Start: 2024-06-01 | End: 2024-06-02 | Stop reason: HOSPADM

## 2024-06-01 RX ORDER — ATORVASTATIN CALCIUM 40 MG/1
80 TABLET, FILM COATED ORAL DAILY
Status: DISCONTINUED | OUTPATIENT
Start: 2024-06-01 | End: 2024-06-02 | Stop reason: HOSPADM

## 2024-06-01 RX ORDER — PROMETHAZINE HYDROCHLORIDE 12.5 MG/1
12.5 TABLET ORAL EVERY 6 HOURS PRN
Status: DISCONTINUED | OUTPATIENT
Start: 2024-06-01 | End: 2024-06-02 | Stop reason: HOSPADM

## 2024-06-01 RX ORDER — LOSARTAN POTASSIUM 50 MG/1
100 TABLET ORAL DAILY
Status: DISCONTINUED | OUTPATIENT
Start: 2024-06-01 | End: 2024-06-02 | Stop reason: HOSPADM

## 2024-06-01 RX ORDER — GLUCAGON 1 MG
1 KIT INJECTION
Status: DISCONTINUED | OUTPATIENT
Start: 2024-06-01 | End: 2024-06-02 | Stop reason: HOSPADM

## 2024-06-01 RX ORDER — CARVEDILOL 12.5 MG/1
12.5 TABLET ORAL 2 TIMES DAILY
Status: DISCONTINUED | OUTPATIENT
Start: 2024-06-01 | End: 2024-06-02 | Stop reason: HOSPADM

## 2024-06-01 RX ORDER — SODIUM CHLORIDE 9 MG/ML
INJECTION, SOLUTION INTRAVENOUS CONTINUOUS
Status: DISCONTINUED | OUTPATIENT
Start: 2024-06-01 | End: 2024-06-02 | Stop reason: HOSPADM

## 2024-06-01 RX ORDER — OXYBUTYNIN CHLORIDE 10 MG/1
10 TABLET, EXTENDED RELEASE ORAL DAILY
Status: DISCONTINUED | OUTPATIENT
Start: 2024-06-01 | End: 2024-06-02 | Stop reason: HOSPADM

## 2024-06-01 RX ORDER — IBUPROFEN 200 MG
24 TABLET ORAL
Status: DISCONTINUED | OUTPATIENT
Start: 2024-06-01 | End: 2024-06-02 | Stop reason: HOSPADM

## 2024-06-01 RX ORDER — PANTOPRAZOLE SODIUM 40 MG/1
40 TABLET, DELAYED RELEASE ORAL DAILY
Status: DISCONTINUED | OUTPATIENT
Start: 2024-06-01 | End: 2024-06-02 | Stop reason: HOSPADM

## 2024-06-01 RX ORDER — NORTRIPTYLINE HYDROCHLORIDE 10 MG/1
10 CAPSULE ORAL DAILY
Status: DISCONTINUED | OUTPATIENT
Start: 2024-06-01 | End: 2024-06-02 | Stop reason: HOSPADM

## 2024-06-01 RX ORDER — ESCITALOPRAM OXALATE 20 MG/1
20 TABLET ORAL DAILY
Status: DISCONTINUED | OUTPATIENT
Start: 2024-06-01 | End: 2024-06-02 | Stop reason: HOSPADM

## 2024-06-01 RX ORDER — NIFEDIPINE 30 MG/1
30 TABLET, EXTENDED RELEASE ORAL DAILY
Status: DISCONTINUED | OUTPATIENT
Start: 2024-06-01 | End: 2024-06-02 | Stop reason: HOSPADM

## 2024-06-01 RX ORDER — LEVETIRACETAM 500 MG/1
500 TABLET ORAL 2 TIMES DAILY
Status: DISCONTINUED | OUTPATIENT
Start: 2024-06-01 | End: 2024-06-02 | Stop reason: HOSPADM

## 2024-06-01 RX ORDER — PREGABALIN 75 MG/1
75 CAPSULE ORAL DAILY
Status: DISCONTINUED | OUTPATIENT
Start: 2024-06-01 | End: 2024-06-02 | Stop reason: HOSPADM

## 2024-06-01 RX ADMIN — PREGABALIN 75 MG: 75 CAPSULE ORAL at 10:06

## 2024-06-01 RX ADMIN — LEVETIRACETAM 500 MG: 500 TABLET, FILM COATED ORAL at 10:06

## 2024-06-01 RX ADMIN — SODIUM CHLORIDE: 9 INJECTION, SOLUTION INTRAVENOUS at 10:06

## 2024-06-01 RX ADMIN — SUCRALFATE 1 G: 1 SUSPENSION ORAL at 12:06

## 2024-06-01 RX ADMIN — LEVETIRACETAM 500 MG: 500 TABLET, FILM COATED ORAL at 08:06

## 2024-06-01 RX ADMIN — ALUMINUM HYDROXIDE, MAGNESIUM HYDROXIDE, AND SIMETHICONE 30 ML: 1200; 120; 1200 SUSPENSION ORAL at 08:06

## 2024-06-01 RX ADMIN — ALUMINUM HYDROXIDE, MAGNESIUM HYDROXIDE, AND SIMETHICONE 30 ML: 1200; 120; 1200 SUSPENSION ORAL at 05:06

## 2024-06-01 RX ADMIN — OXYBUTYNIN CHLORIDE 10 MG: 10 TABLET, EXTENDED RELEASE ORAL at 10:06

## 2024-06-01 RX ADMIN — NORTRIPTYLINE HYDROCHLORIDE 10 MG: 10 CAPSULE ORAL at 12:06

## 2024-06-01 RX ADMIN — NIFEDIPINE 30 MG: 30 TABLET, FILM COATED, EXTENDED RELEASE ORAL at 10:06

## 2024-06-01 RX ADMIN — TETANUS TOXOID, REDUCED DIPHTHERIA TOXOID AND ACELLULAR PERTUSSIS VACCINE, ADSORBED 0.5 ML: 5; 2.5; 8; 8; 2.5 SUSPENSION INTRAMUSCULAR at 04:06

## 2024-06-01 RX ADMIN — ALUMINUM HYDROXIDE, MAGNESIUM HYDROXIDE, AND SIMETHICONE 30 ML: 1200; 120; 1200 SUSPENSION ORAL at 10:06

## 2024-06-01 RX ADMIN — CARVEDILOL 12.5 MG: 12.5 TABLET, FILM COATED ORAL at 10:06

## 2024-06-01 RX ADMIN — LIDOCAINE HYDROCHLORIDE 50 MG: 10 INJECTION, SOLUTION EPIDURAL; INFILTRATION; INTRACAUDAL at 06:06

## 2024-06-01 RX ADMIN — CARVEDILOL 12.5 MG: 12.5 TABLET, FILM COATED ORAL at 08:06

## 2024-06-01 RX ADMIN — ESCITALOPRAM OXALATE 20 MG: 5 TABLET, FILM COATED ORAL at 10:06

## 2024-06-01 RX ADMIN — LOSARTAN POTASSIUM 100 MG: 50 TABLET, FILM COATED ORAL at 10:06

## 2024-06-01 RX ADMIN — Medication: at 05:06

## 2024-06-01 RX ADMIN — ATORVASTATIN CALCIUM 80 MG: 40 TABLET, FILM COATED ORAL at 10:06

## 2024-06-01 RX ADMIN — SUCRALFATE 1 G: 1 SUSPENSION ORAL at 05:06

## 2024-06-01 RX ADMIN — PANTOPRAZOLE SODIUM 40 MG: 40 TABLET, DELAYED RELEASE ORAL at 10:06

## 2024-06-01 NOTE — SUBJECTIVE & OBJECTIVE
(Not in a hospital admission)      Review of patient's allergies indicates:   Allergen Reactions    Phenobarbital Anaphylaxis    Penicillins Other (See Comments)    Phenobarbital sodium     Propylthiouracil     Thyroid      Note: PTU    Topamax [topiramate] Diarrhea     GI symptoms    Cefaclor Rash and Other (See Comments)    Clopidogrel Hives and Rash    Methimazole Rash and Other (See Comments)    Methimazole (bulk) Rash    Penicillin Rash    Sulfa (sulfonamide antibiotics) Rash and Other (See Comments)       Past Medical History:   Diagnosis Date    Acid reflux     Graves disease     diffuse Toxic Goiter    Hypertension     IBS (irritable bowel syndrome)     Lichen sclerosus     Migraine     Stroke      Past Surgical History:   Procedure Laterality Date    CEREBRAL ANGIOGRAM N/A 4/22/2024    Procedure: ANGIOGRAM-CEREBRAL; stroke class A;  Surgeon: Sharon Welch;  Location: [facility];  Service: Anesthesiology;  Laterality: N/A;    EYE SURGERY      HYSTERECTOMY      CINDI for ?? cervical cancer, had normal paps    LEFT HEART CATHETERIZATION N/A 10/20/2020    Procedure: Left heart cath;  Surgeon: Bony Schultz MD;  Location: [facility] CATH LAB/EP;  Service: Cardiology;  Laterality: N/A;    REVERSE TOTAL SHOULDER ARTHROPLASTY Right 8/19/2022    Procedure: ARTHROPLASTY, SHOULDER, TOTAL, REVERSE;  Surgeon: Bradley Finnegan Jr., MD;  Location: [facility] OR;  Service: Orthopedics;  Laterality: Right;  SONIA linda CONFIRMED/8/18/Shaista Finnegan- hemosorb AT      Family History       Problem Relation (Age of Onset)    Hypertension Father    Kidney failure Father    Stomach cancer Mother          Tobacco Use    Smoking status: Never     Passive exposure: Never    Smokeless tobacco: Never   Substance and Sexual Activity    Alcohol use: Yes    Drug use: No    Sexual activity: Yes     Partners: Male     Birth control/protection: See Surgical Hx     Review of Systems   Constitutional:  Negative for activity change,  appetite change, chills, diaphoresis, fatigue, fever and unexpected weight change.   HENT:  Negative for tinnitus, trouble swallowing and voice change.    Eyes:  Negative for photophobia, pain and visual disturbance.   Respiratory:  Negative for apnea, cough, chest tightness, shortness of breath, wheezing and stridor.    Cardiovascular:  Negative for chest pain, palpitations and leg swelling.   Gastrointestinal:  Negative for abdominal distention, abdominal pain, anal bleeding, constipation, diarrhea, nausea and vomiting.   Endocrine: Negative for cold intolerance, heat intolerance, polydipsia, polyphagia and polyuria.   Genitourinary:  Negative for difficulty urinating, dysuria, flank pain, frequency, hematuria and urgency.   Musculoskeletal:  Negative for back pain, gait problem, neck pain and neck stiffness.   Skin:  Positive for wound (R periorbital bruising w/ repaired lac). Negative for pallor and rash.   Neurological:  Positive for speech difficulty and weakness. Negative for dizziness, tremors, seizures, syncope, facial asymmetry, light-headedness, numbness and headaches.   Psychiatric/Behavioral:  Negative for agitation, behavioral problems and confusion.      Objective:     Weight: 62.6 kg (138 lb 0.1 oz)  Body mass index is 26.08 kg/m².  Vital Signs (Most Recent):  Temp: 97 °F (36.1 °C) (06/01/24 1032)  Pulse: 80 (06/01/24 1032)  Resp: 20 (06/01/24 1032)  BP: 137/61 (06/01/24 1032)  SpO2: 98 % (06/01/24 1032) Vital Signs (24h Range):  Temp:  [97 °F (36.1 °C)-98.4 °F (36.9 °C)] 97 °F (36.1 °C)  Pulse:  [69-82] 80  Resp:  [16-20] 20  SpO2:  [95 %-98 %] 98 %  BP: (112-137)/(52-61) 137/61          Physical Exam   Neurosurgery Physical Exam  E4V5M6, AOx3, mild dysarthria & expressive aphasia (baseline);  CNi except mild R FD (baseline), PERRL, mild R pronator drift;  FCx4, mild RUE weakness    General: Awake, Alert, Oriented, No acute distress  Head: Non-traumatic, normocephalic  Eyes: Pupils equal,  "EOMi  Neck: Supple, normal ROM, no tenderness to palpation  CVS: Normal rate and rhythm, distal pulses present  Pulm: Symmetric expansion, no respiratory distress  GI: Abdomen nondistended, nontender  MSK: Moves all extremities without restriction  Skin: Dry, intact  Psych: Normal thought content and cognition, mild expressive aphasia    Significant Labs:  Recent Labs   Lab 06/01/24  0902         K 4.6      CO2 22*   BUN 15   CREATININE 1.7*   CALCIUM 10.3     Recent Labs   Lab 06/01/24  0902   WBC 8.99   HGB 11.0*   HCT 33.0*        Recent Labs   Lab 06/01/24  0902   INR 1.0   APTT 22.5     Microbiology Results (last 7 days)       ** No results found for the last 168 hours. **          ABGs: No results for input(s): "PH", "PCO2", "PO2", "HCO3", "POCSATURATED", "BE" in the last 48 hours.  Cardiac markers: No results for input(s): "CKMB", "CPKMB", "TROPONINT", "TROPONINI", "MYOGLOBIN" in the last 48 hours.  CMP:   Recent Labs   Lab 06/01/24  0902      CALCIUM 10.3   ALBUMIN 3.4*   PROT 7.1      K 4.6   CO2 22*      BUN 15   CREATININE 1.7*   ALKPHOS 65   ALT 23   AST 29   BILITOT 0.5     CRP: No results for input(s): "CRP" in the last 48 hours.  ESR: No results for input(s): "POCESR", "ERYTHROCYTES" in the last 48 hours.  LFTs:   Recent Labs   Lab 06/01/24  0902   ALT 23   AST 29   ALKPHOS 65   BILITOT 0.5   PROT 7.1   ALBUMIN 3.4*     Procalcitonin: No results for input(s): "PROCAL" in the last 48 hours.    Significant Diagnostics:  I have reviewed all pertinent imaging results/findings within the past 24 hours.  "

## 2024-06-01 NOTE — HPI
74F w/ PMH HTN and prior CVA (on DAPT) who presents to [facility] ED s/p mechanical fall w/ acute thin L tentorial SDH. Per patient and  patient presents after having a fall while ambulating.  reports that the fall was unwitnessed but he heard her as soon as she hit the ground calling out for help. Patient reports that she recalls a series events. She was attempting to go to the bathroom in the dark when she felt a little unsteady and had a trip and fall and landed on the front of her head. Denies any loss of consciousness.  reports that he heard her immediately went to get to her and noticed significant amount of blood. He reports that patient was able to stand up afterwards and wait for EMS to arrive. Patient denies any other injuries or pain at this time. Patient reports that the fall was a mechanical trip and fall no concern for palpitations lightheadedness or prodrome prior to fall. Patient presented to [facility] ED where CTH showed thin acute L tentorial SDH. No reversal was given at the time for her DAPT.    Of note the patient was recently admitted for L MCA LVO s/p thrombectomy and has been making marked improvements from a functional standpoint at home with therapies.

## 2024-06-01 NOTE — ED PROVIDER NOTES
Dictation #1  MRN:[id number]  CSN:[id number] Encounter Date: 6/1/2024       History     Chief Complaint   Patient presents with    Fall     Fell forward, lac to forehead. Denies LOC, on thinners. Hx recent CVA, slurred speech and unsteady gait at baseline     Patient is a 74-year-old of CVA with known gait unsteadiness and left facial droop with dysarthria.  Patient is presenting to the emergency department via EMS after having a fall while ambulating.   reports that the fall was unwitnessed but he heard her as soon as she hit the ground calling out for help.  Patient reports that she recalls a series events.  She was attempting to go to the bathroom in the dark when she felt a little unsteady and had a trip and fall and landed on the front of her head.  Denies any loss of consciousness.   reports that he heard her immediately went to get to her and noticed significant amount of blood.  He reports that patient was able to stand up afterwards and wait for EMS to arrive.  Patient denies any other injuries or pain at this time.  Patient reports that the fall was a mechanical trip and fall no concern for palpitations lightheadedness or prodrome prior to fall.    The history is provided by the patient.     Review of patient's allergies indicates:   Allergen Reactions    Phenobarbital Anaphylaxis    Penicillins Other (See Comments)    Phenobarbital sodium     Propylthiouracil     Thyroid      Note: PTU    Topamax [topiramate] Diarrhea     GI symptoms    Cefaclor Rash and Other (See Comments)    Clopidogrel Hives and Rash    Methimazole Rash and Other (See Comments)    Methimazole (bulk) Rash    Penicillin Rash    Sulfa (sulfonamide antibiotics) Rash and Other (See Comments)     Past Medical History:   Diagnosis Date    Acid reflux     Graves disease     diffuse Toxic Goiter    Hypertension     IBS (irritable bowel syndrome)     Lichen sclerosus     Migraine     Stroke      Past Surgical History:   Procedure  Laterality Date    CEREBRAL ANGIOGRAM N/A 4/22/2024    Procedure: ANGIOGRAM-CEREBRAL; stroke class A;  Surgeon: Sharon Welch;  Location: [facility];  Service: Anesthesiology;  Laterality: N/A;    EYE SURGERY      HYSTERECTOMY      CINDI for ?? cervical cancer, had normal paps    LEFT HEART CATHETERIZATION N/A 10/20/2020    Procedure: Left heart cath;  Surgeon: Bony Schultz MD;  Location: [facility] CATH LAB/EP;  Service: Cardiology;  Laterality: N/A;    REVERSE TOTAL SHOULDER ARTHROPLASTY Right 8/19/2022    Procedure: ARTHROPLASTY, SHOULDER, TOTAL, REVERSE;  Surgeon: Bradley Finnegan Jr., MD;  Location: [facility] OR;  Service: Orthopedics;  Laterality: Right;  MARLEY- maddi CONFIRMED/8/18/Shaista Finnegan- hemosorb AT      Family History   Problem Relation Name Age of Onset    Kidney failure Father      Hypertension Father      Stomach cancer Mother      Breast cancer Neg Hx       Social History     Tobacco Use    Smoking status: Never     Passive exposure: Never    Smokeless tobacco: Never   Substance Use Topics    Alcohol use: Yes    Drug use: No         Physical Exam     Initial Vitals [06/01/24 0411]   BP Pulse Resp Temp SpO2   (!) 112/54 82 16 98.4 °F (36.9 °C) 98 %      MAP       --         Physical Exam    Nursing note and vitals reviewed.  Constitutional: She appears well-developed and well-nourished.   HENT:   Contusion noted to the right frontal scalp  3 cm laceration   Cardiovascular:  Normal rate and regular rhythm.           Pulmonary/Chest: Breath sounds normal.   Abdominal: Abdomen is soft. She exhibits no distension.   Musculoskeletal:         General: Normal range of motion.      Comments: No tenderness, deformities or step-offs noted to the seat/T/L-spine     Neurological: She is alert and oriented to person, place, and time.   Skin: Skin is warm.   3 cm laceration over  the right forehead               ED Course   Lac Repair    Date/Time: 6/1/2024 6:51 AM    Performed by: Masha Ocasio,  MD  Authorized by: Tashi Carrillo MD    Consent:     Consent obtained:  Verbal    Consent given by:  Patient    Risks, benefits, and alternatives were discussed: yes      Alternatives discussed:  No treatment  Universal protocol:     Patient identity confirmed:  Verbally with patient and arm band  Anesthesia:     Anesthesia method:  Topical application and local infiltration    Topical anesthetic:  LET    Local anesthetic:  Lidocaine 1% w/o epi  Laceration details:     Location:  Face    Face location:  R eyebrow    Length (cm):  3.5  Exploration:     Limited defect created (wound extended): no      Hemostasis achieved with:  LET  Treatment:     Amount of cleaning:  Standard    Irrigation solution:  Sterile saline    Irrigation volume:  500    Visualized foreign bodies/material removed: yes      Debridement:  None    Undermining:  None  Skin repair:     Repair method:  Sutures    Suture size:  5-0    Wound skin closure material used: vivryl rapide.    Suture technique:  Simple interrupted    Number of sutures:  8  Approximation:     Approximation:  Close  Repair type:     Repair type:  Simple  Post-procedure details:     Dressing:  Open (no dressing)    Procedure completion:  Tolerated    Labs Reviewed - No data to display       Imaging Results              CT Head Without Contrast (In process)                      CT Cervical Spine Without Contrast (In process)  Result time 06/01/24 05:31:49                     Medications   Tdap (BOOSTRIX) vaccine injection 0.5 mL (0.5 mLs Intramuscular Given 6/1/24 9215)   LETS (LIDOcaine-TETRAcaine-EPINEPHrine) gel solution ( Topical (Top) Given 6/1/24 0198)   LIDOcaine (PF) 10 mg/ml (1%) injection 50 mg (50 mg Infiltration Given 6/1/24 5568)     Medical Decision Making  Patient is a 74-year-old female presenting to the emergency department for evaluation after a fall.  At the time assessment patient is alert oriented in no acute distress.  Patient arrived in C-collar no  distracting injury or neck pain at this time.  Patient cleared C-collar however given mechanism CT head and C-spine ordered and pending.  While awaiting for CT head and C-spine repaired laceration.  See procedure note above.  Patient has not had a tetanus, tetanus updated.  At this time patient resting comfortably awaiting CT head and C-spine with the disposition discharge if imaging is negative.  Patient care handed over to oncoming care team pending CT head and C-spine.  See progress note for final plan results and disposition.    Amount and/or Complexity of Data Reviewed  Labs: ordered.  Radiology: ordered.    Risk  Prescription drug management.  Decision regarding hospitalization.                                      Clinical Impression:  Final diagnoses:  [W19.XXXA] Fall, initial encounter (Primary)  [S01.81XA] Facial laceration, initial encounter          ED Disposition Condition    Discharge Stable          ED Prescriptions    None       Follow-up Information       Follow up With Specialties Details Why Contact Info    Aleksandra Carey MD Internal Medicine Go in 1 week  [address]  [facility] [address]  [facility] [address]  [phone]      [facility] - Emergency Dept Emergency Medicine Go to  If symptoms worsen [address] [id number] [phone]             Masha Ocasio MD  Resident  06/01/24 0657       Tashi Carrillo MD  06/03/24 1042

## 2024-06-01 NOTE — H&P
[facility] - Emergency Dept  Neuorsurgery  History and Physical     Patient Name: Denise Berrios  MRN: [id number]  Admission Date: 6/1/2024  Attending Physician: Jessica Katz MD   Primary Care Physician: Aleksandra Carey MD    Patient information was obtained from patient and spouse/SO.     Subjective:     Chief Complaint/Reason for Admission: SDH     HPI:  74F w/ PMH HTN and prior CVA (on DAPT) who [place] to [facility] [place] s/p mechanical fall w/ acute thin L tentorial SDH. Per patient and  patient presents after having a fall while ambulating.  reports that the fall was unwitnessed but he heard her as soon as she hit the ground calling out for help. Patient reports that she recalls a series events. She was attempting to go to the bathroom in the dark when she felt a little unsteady and had a trip and fall and landed on the front of her head. Denies any loss of consciousness.  reports that he heard her immediately went to get to her and noticed significant amount of blood. He reports that patient was able to stand up afterwards and wait for EMS to arrive. Patient denies any other injuries or pain at this time. Patient reports that the fall was a mechanical trip and fall no concern for palpitations lightheadedness or prodrome prior to fall. Patient presented to [facility] ED where CTH showed thin acute L tentorial SDH. No reversal was given at the time for her DAPT.    Of note the patient was recently admitted for L MCA LVO s/p thrombectomy and has been making marked improvements from a functional standpoint at home with therapies.    (Not in a hospital admission)      Review of patient's allergies indicates:   Allergen Reactions    Phenobarbital Anaphylaxis    Penicillins Other (See Comments)    Phenobarbital sodium     Propylthiouracil     Thyroid      Note: PTU    Topamax [topiramate] Diarrhea     GI symptoms    Cefaclor Rash and Other (See Comments)    Clopidogrel Hives and Rash    Methimazole Rash and  Other (See Comments)    Methimazole (bulk) Rash    Penicillin Rash    Sulfa (sulfonamide antibiotics) Rash and Other (See Comments)       Past Medical History:   Diagnosis Date    Acid reflux     Graves disease     diffuse Toxic Goiter    Hypertension     IBS (irritable bowel syndrome)     Lichen sclerosus     Migraine     Stroke      Past Surgical History:   Procedure Laterality Date    CEREBRAL ANGIOGRAM N/A 4/22/2024    Procedure: ANGIOGRAM-CEREBRAL; stroke class A;  Surgeon: Sharon Welch;  Location: [facility];  Service: Anesthesiology;  Laterality: N/A;    EYE SURGERY      HYSTERECTOMY      CINDI for ?? cervical cancer, had normal paps    LEFT HEART CATHETERIZATION N/A 10/20/2020    Procedure: Left heart cath;  Surgeon: Bony Schultz MD;  Location: [facility] CATH LAB/EP;  Service: Cardiology;  Laterality: N/A;    REVERSE TOTAL SHOULDER ARTHROPLASTY Right 8/19/2022    Procedure: ARTHROPLASTY, SHOULDER, TOTAL, REVERSE;  Surgeon: Bradley Finnegan Jr., MD;  Location: [facility] OR;  Service: Orthopedics;  Laterality: Right;  SONIA linda CONFIRMED/8/18/Shaista Finnegan- hemosorb AT      Family History       Problem Relation (Age of Onset)    Hypertension Father    Kidney failure Father    Stomach cancer Mother          Tobacco Use    Smoking status: Never     Passive exposure: Never    Smokeless tobacco: Never   Substance and Sexual Activity    Alcohol use: Yes    Drug use: No    Sexual activity: Yes     Partners: Male     Birth control/protection: See Surgical Hx     Review of Systems   Constitutional:  Negative for activity change, appetite change, chills, diaphoresis, fatigue, fever and unexpected weight change.   HENT:  Negative for tinnitus, trouble swallowing and voice change.    Eyes:  Negative for photophobia, pain and visual disturbance.   Respiratory:  Negative for apnea, cough, chest tightness, shortness of breath, wheezing and stridor.    Cardiovascular:  Negative for chest pain, palpitations and  leg swelling.   Gastrointestinal:  Negative for abdominal distention, abdominal pain, anal bleeding, constipation, diarrhea, nausea and vomiting.   Endocrine: Negative for cold intolerance, heat intolerance, polydipsia, polyphagia and polyuria.   Genitourinary:  Negative for difficulty urinating, dysuria, flank pain, frequency, hematuria and urgency.   Musculoskeletal:  Negative for back pain, gait problem, neck pain and neck stiffness.   Skin:  Positive for wound (R periorbital bruising w/ repaired lac). Negative for pallor and rash.   Neurological:  Positive for speech difficulty and weakness. Negative for dizziness, tremors, seizures, syncope, facial asymmetry, light-headedness, numbness and headaches.   Psychiatric/Behavioral:  Negative for agitation, behavioral problems and confusion.      Objective:     Weight: 62.6 kg (138 lb 0.1 oz)  Body mass index is 26.08 kg/m².  Vital Signs (Most Recent):  Temp: 97 °F (36.1 °C) (06/01/24 1032)  Pulse: 80 (06/01/24 1032)  Resp: 20 (06/01/24 1032)  BP: 137/61 (06/01/24 1032)  SpO2: 98 % (06/01/24 1032) Vital Signs (24h Range):  Temp:  [97 °F (36.1 °C)-98.4 °F (36.9 °C)] 97 °F (36.1 °C)  Pulse:  [69-82] 80  Resp:  [16-20] 20  SpO2:  [95 %-98 %] 98 %  BP: (112-137)/(52-61) 137/61          Physical Exam   Neurosurgery Physical Exam  E4V5M6, AOx3, mild dysarthria & expressive aphasia (baseline);  CNi except mild R FD (baseline), PERRL, mild R pronator drift;  FCx4, mild RUE weakness    General: Awake, Alert, Oriented, No acute distress  Head: Non-traumatic, normocephalic  Eyes: Pupils equal, EOMi  Neck: Supple, normal ROM, no tenderness to palpation  CVS: Normal rate and rhythm, distal pulses present  Pulm: Symmetric expansion, no respiratory distress  GI: Abdomen nondistended, nontender  MSK: Moves all extremities without restriction  Skin: Dry, intact  Psych: Normal thought content and cognition, mild expressive aphasia    Significant Labs:  Recent Labs   Lab 06/01/24  0902  "        K 4.6      CO2 22*   BUN 15   CREATININE 1.7*   CALCIUM 10.3     Recent Labs   Lab 06/01/24  0902   WBC 8.99   HGB 11.0*   HCT 33.0*        Recent Labs   Lab 06/01/24  0902   INR 1.0   APTT 22.5     Microbiology Results (last 7 days)       ** No results found for the last 168 hours. **          ABGs: No results for input(s): "PH", "PCO2", "PO2", "HCO3", "POCSATURATED", "BE" in the last 48 hours.  Cardiac markers: No results for input(s): "CKMB", "CPKMB", "TROPONINT", "TROPONINI", "MYOGLOBIN" in the last 48 hours.  CMP:   Recent Labs   Lab 06/01/24  0902      CALCIUM 10.3   ALBUMIN 3.4*   PROT 7.1      K 4.6   CO2 22*      BUN 15   CREATININE 1.7*   ALKPHOS 65   ALT 23   AST 29   BILITOT 0.5     CRP: No results for input(s): "CRP" in the last 48 hours.  ESR: No results for input(s): "POCESR", "ERYTHROCYTES" in the last 48 hours.  LFTs:   Recent Labs   Lab 06/01/24  0902   ALT 23   AST 29   ALKPHOS 65   BILITOT 0.5   PROT 7.1   ALBUMIN 3.4*     Procalcitonin: No results for input(s): "PROCAL" in the last 48 hours.    Significant Diagnostics:  I have reviewed all pertinent imaging results/findings within the past 24 hours.  Assessment and Plan:     Subdural hematoma  74F w/ PMH HTN and prior CVA (on DAPT) who presents to [facility] [place] ED s/p mechanical fall w/ acute thin L tentorial SDH:    --Patient admitted to NSGY floor on telemetry  -q4h neurochecks on floor  --All labs and diagnostics reviewed  -CT 6/1 w/ thin acute L tentorial SDH  -CT rpt pending  --Hold anti-plt/coag medications; Coags reviewed & normal  --SBP <160  --Na >135  --Keppra 500 BID x7d  --HOB >30  --ADAT  --Continue to monitor clinically, notify NSGY immediately with any changes in neuro status    Dispo: Ongoing, anticipate home in AM         George Loo MD  Neurosurgery  [facility] - Emergency Dept      "

## 2024-06-01 NOTE — DISCHARGE INSTRUCTIONS
You currently have 8 Vicryl Rapide absorbable sutures which take a proximally 14 days to have door.  Follow up with primary care physician/urgent care or emergency department after 7 days for re-evaluation of wound for possible suture removal versus     Return to emergency department if you develop mental status changes, confusion, worsening headache, yellow-green drainage from wound or any other new or concerning symptoms.    --Patient stable for discharge to home    --Please take prescriptions as detailed in medication list    --All questions/concerns addressed and answered    --Please followup with neurosurgery clinic in 2 weeks with repeat CT imaging; to be arranged by Neurosurgery Clinic     --Continued therapy in outpatient setting for stroke recovery    --Please call immediately for persistent nausea/vomiting; or any new onset altered mentation, seizures, loss of consciousness, double vision, numbness/tingling/weakness, clear drainage from your ears or nose    Post-Injury Self-Care:  -You may continue your Aspirin 81mg daily; DO NOT take your Brilinta until follow-up in outpatient clinic  -We reccomend several days of cognitive rest with gradual recomencing of daily tasks over the next two weeks; avoid strenuous activities until approved in outpatient clinic  -Please DO NOT use alcohol or any ilicit substances until cleared in outpatient clinic  -Please DO NOT drive or operate heavy machinery until cleared in outpatient clinic; NEVER drive or operate heavy machinery while on narcotic pain medications  -Initially you may experience mild headaches, mild depression, difficulty concentrating, or dizziness

## 2024-06-01 NOTE — PROVIDER PROGRESS NOTES - EMERGENCY DEPT.
Encounter Date: 6/1/2024    ED Physician Progress Notes        Physician Note:   Received patient at sign-out  CT resulted with small subdural hemorrhage.  Neuro exam unchanged from baseline.  Blood pressure at goal.  She is on ticagrelor and aspirin.  Labs ordered.  Neurosurgery consulted and will admit the patient.  They do not feel she needs any reversal agents.

## 2024-06-01 NOTE — ED NOTES
Nurses Note -- 4 Eyes      6/1/2024   6:13 AM      Skin assessed during: Admit      [] No Altered Skin Integrity Present    []Prevention Measures Documented      [x] Yes- Altered Skin Integrity Present or Discovered   [] LDA Added if Not in Epic (Describe Wound)   [x] New Altered Skin Integrity was Present on Admit and Documented in LDA   [x] Wound Image Taken    Wound Care Consulted? No    Attending Nurse:  SUSANNAH Hickman    Second RN/Staff Member:   SUSANNAH Lloyd          40y male with PMHx of type 2 DM (on insulin) with diabetic retinopathy/neuropathy, CKD Stage IV (on transplant list), HTN, HLD, HFpEF admitted for acute hypoxic respiratory failure, GABI on CKD4, pulmonary edema and possible PNA. none 40yM pmhx T2DM (on insulin) with diabetic retinopathy/neuropathy, CKD Stage IV (on transplant list), HTN, HLD, HFpEF admitted for acute hypoxic respiratory failure 2/2 PNA vs pulmonary edema also with GABI on CKD4.

## 2024-06-01 NOTE — ED NOTES
LOC/ APPEARANCE: The patient is AAOx4. Pt is speaking appropriately, minimum slurred speech noted related to recent stroke. Pt in hospital gown. Continuous cardiac monitor, cont pulse ox, and auto BP on patient. Pt is clean and well groomed.  Pt reports pain level of 2. Pt updated on POC. Bed low and locked with side rails up x2, call bell in pt reach.  SKIN: Skin is warm dry and intact, and color is consistent with ethnicity. Laceration to top of right eye. Sutures in place with minimal bleeding noted.  Mucus membranes moist, acyanotic.  RESPIRATORY: Airway is open and patent. Respirations-spontaneous, unlabored, regular rate, equal bilaterally on inspiration and expiration. No accessory muscle use noted.   CARDIAC: Patient has regular heart rate.  No peripheral edema noted, and patient has no c/o chest pain. Peripheral pulses present equal and strong throughout.  ABDOMEN: Soft and non-tender to palpation with no distention noted. Pt has no complaints of abnormal bowel movements, denies blood in stool. Pt reports normal appetite.   NEUROLOGIC: Eyes open spontaneously and facial expression symmetrical. Pt behavior appropriate to situation, and pt follows commands. Pt reports sensation present in all extremities when touched with a finger, denies any numbness or tingling. PERRLA  MUSCULOSKELETAL: Spontaneous movement noted to all extremities. Hand  equal and leg strength strong  : No complaints of frequency, burning, urgency or blood in the urine. No complaints of incontinence.

## 2024-06-01 NOTE — ED TRIAGE NOTES
Denise VIVAS Cousins, a 74 y.o. female presents to the ED w/ complaint of fall. Fell forward, lac to right forehead eyebrow area. Denies LOC, on thinners. Hx recent CVA, slurred speech and unsteady gait at baseline     Triage note:  Chief Complaint   Patient presents with    Fall     Fell forward, lac to forehead. Denies LOC, on thinners. Hx recent CVA, slurred speech and unsteady gait at baseline     Review of patient's allergies indicates:   Allergen Reactions    Phenobarbital Anaphylaxis    Penicillins Other (See Comments)    Phenobarbital sodium     Propylthiouracil     Thyroid      Note: PTU    Topamax [topiramate] Diarrhea     GI symptoms    Cefaclor Rash and Other (See Comments)    Clopidogrel Hives and Rash    Methimazole Rash and Other (See Comments)    Methimazole (bulk) Rash    Penicillin Rash    Sulfa (sulfonamide antibiotics) Rash and Other (See Comments)     Past Medical History:   Diagnosis Date    Acid reflux     Graves disease     diffuse Toxic Goiter    Hypertension     IBS (irritable bowel syndrome)     Lichen sclerosus     Migraine     Stroke

## 2024-06-01 NOTE — ASSESSMENT & PLAN NOTE
74F w/ PMH HTN and prior CVA (on DAPT) who presents to [facility] [place] [facility] ED s/p mechanical fall w/ acute thin L tentorial SDH:    --Patient admitted to NSGY floor on telemetry  -q4h neurochecks on floor  --All labs and diagnostics reviewed  -CT 6/1 w/ thin acute L tentorial SDH  -CTH rpt pending  --Hold anti-plt/coag medications; Coags reviewed & normal  --SBP <160  --Na >135  --Keppra 500 BID x7d  --HOB >30  --ADAT  --Continue to monitor clinically, notify NSGY immediately with any changes in neuro status    Dispo: Ongoing, anticipate home in AM

## 2024-06-01 NOTE — PROVIDER PROGRESS NOTES - EMERGENCY DEPT.
ED Resident HAND-OFF NOTE:  6/1/2024  Denise Berrios is a 74 y.o. female  w/ a history of CVA w/ residual  left-sided deficits who presented to the ED on 6/1/2024  following a fall from standing w/ laceration  above right eyebrow.    I assumed care of patient from off-going ED physician team pending:    - CT Head: Left posterior subdural hematoma w/o mass effect    On my evaluation Denise Berrios, I appreciate:  BP (!) 133/57 (BP Location: Left arm, Patient Position: Lying)   Pulse 77   Temp 98.1 °F (36.7 °C) (Oral)   Resp 20   Wt 62.6 kg (138 lb 0.1 oz)   SpO2 95%   Breastfeeding No   BMI 26.08 kg/m²     On exam, patient neurointact and has had no changes in clinical status/mentation per patient and her  at bedside.    Neurosurgery consulted:  plan to admit patient and repeat head CT no need for reversal of Brilinta/ASA at this time.    Thus far, Denise Berrios has received:  Medications   Tdap (BOOSTRIX) vaccine injection 0.5 mL (0.5 mLs Intramuscular Given 6/1/24 4490)   LETS (LIDOcaine-TETRAcaine-EPINEPHrine) gel solution ( Topical (Top) Given 6/1/24 5168)   LIDOcaine (PF) 10 mg/ml (1%) injection 50 mg (50 mg Infiltration Given 6/1/24 8318)               Disposition:   Admitted  to neurosurgery  I have discussed and counseled Denise Berrios regarding exam, results, diagnosis, treatment, and plan.  ______________________    6/1/2024

## 2024-06-02 VITALS
BODY MASS INDEX: 27.09 KG/M2 | DIASTOLIC BLOOD PRESSURE: 53 MMHG | WEIGHT: 138 LBS | HEART RATE: 65 BPM | HEIGHT: 60 IN | RESPIRATION RATE: 18 BRPM | SYSTOLIC BLOOD PRESSURE: 107 MMHG | TEMPERATURE: 98 F | OXYGEN SATURATION: 95 %

## 2024-06-02 LAB
ANION GAP SERPL CALC-SCNC: 10 MMOL/L (ref 8–16)
BASOPHILS # BLD AUTO: 0.04 K/UL (ref 0–0.2)
BASOPHILS NFR BLD: 0.5 % (ref 0–1.9)
BUN SERPL-MCNC: 16 MG/DL (ref 8–23)
CALCIUM SERPL-MCNC: 9.1 MG/DL (ref 8.7–10.5)
CHLORIDE SERPL-SCNC: 113 MMOL/L (ref 95–110)
CO2 SERPL-SCNC: 18 MMOL/L (ref 23–29)
CREAT SERPL-MCNC: 2.1 MG/DL (ref 0.5–1.4)
DIFFERENTIAL METHOD BLD: ABNORMAL
EOSINOPHIL # BLD AUTO: 0.3 K/UL (ref 0–0.5)
EOSINOPHIL NFR BLD: 3.6 % (ref 0–8)
ERYTHROCYTE [DISTWIDTH] IN BLOOD BY AUTOMATED COUNT: 15.8 % (ref 11.5–14.5)
EST. GFR  (NO RACE VARIABLE): 24 ML/MIN/1.73 M^2
GLUCOSE SERPL-MCNC: 93 MG/DL (ref 70–110)
HCT VFR BLD AUTO: 31.2 % (ref 37–48.5)
HGB BLD-MCNC: 9.9 G/DL (ref 12–16)
IMM GRANULOCYTES # BLD AUTO: 0.08 K/UL (ref 0–0.04)
IMM GRANULOCYTES NFR BLD AUTO: 0.9 % (ref 0–0.5)
LYMPHOCYTES # BLD AUTO: 1.3 K/UL (ref 1–4.8)
LYMPHOCYTES NFR BLD: 15.4 % (ref 18–48)
MCH RBC QN AUTO: 31.5 PG (ref 27–31)
MCHC RBC AUTO-ENTMCNC: 31.7 G/DL (ref 32–36)
MCV RBC AUTO: 99 FL (ref 82–98)
MONOCYTES # BLD AUTO: 1.3 K/UL (ref 0.3–1)
MONOCYTES NFR BLD: 14.7 % (ref 4–15)
NEUTROPHILS # BLD AUTO: 5.5 K/UL (ref 1.8–7.7)
NEUTROPHILS NFR BLD: 64.9 % (ref 38–73)
NRBC BLD-RTO: 0 /100 WBC
PLATELET # BLD AUTO: 215 K/UL (ref 150–450)
PMV BLD AUTO: 11 FL (ref 9.2–12.9)
POTASSIUM SERPL-SCNC: 4.1 MMOL/L (ref 3.5–5.1)
RBC # BLD AUTO: 3.14 M/UL (ref 4–5.4)
SODIUM SERPL-SCNC: 141 MMOL/L (ref 136–145)
WBC # BLD AUTO: 8.51 K/UL (ref 3.9–12.7)

## 2024-06-02 PROCEDURE — 85025 COMPLETE CBC W/AUTO DIFF WBC: CPT | Performed by: STUDENT IN AN ORGANIZED HEALTH CARE EDUCATION/TRAINING PROGRAM

## 2024-06-02 PROCEDURE — 36415 COLL VENOUS BLD VENIPUNCTURE: CPT | Performed by: STUDENT IN AN ORGANIZED HEALTH CARE EDUCATION/TRAINING PROGRAM

## 2024-06-02 PROCEDURE — 80048 BASIC METABOLIC PNL TOTAL CA: CPT | Performed by: STUDENT IN AN ORGANIZED HEALTH CARE EDUCATION/TRAINING PROGRAM

## 2024-06-02 PROCEDURE — 25000003 PHARM REV CODE 250: Performed by: STUDENT IN AN ORGANIZED HEALTH CARE EDUCATION/TRAINING PROGRAM

## 2024-06-02 PROCEDURE — 99238 HOSP IP/OBS DSCHRG MGMT 30/<: CPT | Mod: GC,,, | Performed by: STUDENT IN AN ORGANIZED HEALTH CARE EDUCATION/TRAINING PROGRAM

## 2024-06-02 RX ADMIN — OXYBUTYNIN CHLORIDE 10 MG: 10 TABLET, EXTENDED RELEASE ORAL at 08:06

## 2024-06-02 RX ADMIN — NORTRIPTYLINE HYDROCHLORIDE 10 MG: 10 CAPSULE ORAL at 10:06

## 2024-06-02 RX ADMIN — ESCITALOPRAM OXALATE 20 MG: 5 TABLET, FILM COATED ORAL at 08:06

## 2024-06-02 RX ADMIN — SUCRALFATE 1 G: 1 SUSPENSION ORAL at 05:06

## 2024-06-02 RX ADMIN — SUCRALFATE 1 G: 1 SUSPENSION ORAL at 01:06

## 2024-06-02 RX ADMIN — PANTOPRAZOLE SODIUM 40 MG: 40 TABLET, DELAYED RELEASE ORAL at 08:06

## 2024-06-02 RX ADMIN — CARVEDILOL 12.5 MG: 12.5 TABLET, FILM COATED ORAL at 08:06

## 2024-06-02 RX ADMIN — LOSARTAN POTASSIUM 50 MG: 50 TABLET, FILM COATED ORAL at 08:06

## 2024-06-02 RX ADMIN — LEVOTHYROXINE SODIUM 88 MCG: 88 TABLET ORAL at 05:06

## 2024-06-02 RX ADMIN — NIFEDIPINE 30 MG: 30 TABLET, FILM COATED, EXTENDED RELEASE ORAL at 08:06

## 2024-06-02 RX ADMIN — ALUMINUM HYDROXIDE, MAGNESIUM HYDROXIDE, AND SIMETHICONE 30 ML: 1200; 120; 1200 SUSPENSION ORAL at 10:06

## 2024-06-02 RX ADMIN — LEVETIRACETAM 500 MG: 500 TABLET, FILM COATED ORAL at 08:06

## 2024-06-02 RX ADMIN — ATORVASTATIN CALCIUM 80 MG: 40 TABLET, FILM COATED ORAL at 08:06

## 2024-06-02 RX ADMIN — ALUMINUM HYDROXIDE, MAGNESIUM HYDROXIDE, AND SIMETHICONE 30 ML: 1200; 120; 1200 SUSPENSION ORAL at 05:06

## 2024-06-02 RX ADMIN — PREGABALIN 75 MG: 75 CAPSULE ORAL at 08:06

## 2024-06-02 NOTE — PLAN OF CARE
SW completed outpatient case management referral due to eligibility.     ELIZABETH Adam  [facility]  [phone]

## 2024-06-02 NOTE — PLAN OF CARE
[facility] - Neurosurgery (Hospital)  Discharge Final Note    Primary Care Provider: Aleksandra Carey MD    Expected Discharge Date: 6/2/2024    Final Discharge Note (most recent)       Final Note - 06/02/24 0856          Final Note    Assessment Type Final Discharge Note     Anticipated Discharge Disposition Home or Self Care     What phone number can be called within the next 1-3 days to see how you are doing after discharge? [phone]     Hospital Resources/Appts/Education Provided Provided patient/caregiver with written discharge plan information;Appointments scheduled and added to AVS        Post-Acute Status    Patient choice form signed by patient/caregiver List with quality metrics by geographic area provided     Discharge Delays None known at this time                     Important Message from Medicare             Contact Info       Aleksandra Carey MD   Specialty: Internal Medicine   Relationship: PCP - General    [address] [facility] [address]  [facility] [address]   Phone: [phone]       Next Steps: Go in 1 week(s)    [facility] - Emergency Dept   Specialty: Emergency Medicine    [address] [facility] [address] [facility] [address]   Phone: [phone]       Next Steps: Go to    Instructions: If symptoms worsen    Jessica Katz MD   Specialty: Neurosurgery, Pediatric Neurosurgery    [address]  Department of Neurosurgery - 7th Floor  [facility] [address]   Phone: [phone]       Next Steps: Follow up in 2 week(s)          Patient to discharge to home today; no needs.     ELIZABETH Adam  [facility]  [phone]

## 2024-06-02 NOTE — NURSING
.Nurses Note -- 4 Eyes      6/1/2024   11:37 PM      Skin assessed during: Q Shift Change      [x] No Altered Skin Integrity Present    [x]Prevention Measures Documented      [] Yes- Altered Skin Integrity Present or Discovered   [] LDA Added if Not in Epic (Describe Wound)   [] New Altered Skin Integrity was Present on Admit and Documented in LDA   [] Wound Image Taken    Wound Care Consulted? No    Attending Nurse:  Karan Thomason RN/Staff Member:  Ave

## 2024-06-02 NOTE — PLAN OF CARE
6/2: Patient seen at bedside this AM. RADHA AFVSS, Exam stable. SDH stable on repeat imaging. Will discharge and can resume ASA 81 daily but hold Brilinta. She will follow-up in 2 weeks with repeat CTH

## 2024-06-02 NOTE — DISCHARGE SUMMARY
[facility]akkar - Neurosurgery ([facility])  Neurosurgery  Discharge Summary      Patient Name: Denise Berrios  MRN: [id number]  Admission Date: 6/1/2024  Hospital Length of Stay: 1 days  Discharge Date and Time:  06/02/2024 8:03 AM  Attending Physician: Jessica Katz MD   Discharging Provider: George Loo MD  Primary Care Provider: Aleskandra Carey MD    HPI:   74F w/ PMH HTN and prior CVA (on DAPT) who presents to [facility] ED s/p mechanical fall w/ acute thin L tentorial SDH. Per patient and  patient presents after having a fall while ambulating.  reports that the fall was unwitnessed but he heard her as soon as she hit the ground calling out for help. Patient reports that she recalls a series events. She was attempting to go to the bathroom in the dark when she felt a little unsteady and had a trip and fall and landed on the front of her head. Denies any loss of consciousness.  reports that he heard her immediately went to get to her and noticed significant amount of blood. He reports that patient was able to stand up afterwards and wait for EMS to arrive. Patient denies any other injuries or pain at this time. Patient reports that the fall was a mechanical trip and fall no concern for palpitations lightheadedness or prodrome prior to fall. Patient presented to [facility] ED where CTH showed thin acute L tentorial SDH. No reversal was given at the time for her DAPT.    Of note the patient was recently admitted for L MCA LVO s/p thrombectomy and has been making marked improvements from a functional standpoint at home with therapies.    * No surgery found *     Hospital Course: Patient was admitted for small non-operative traumatic SDH following mechanical fall on 6/1/24, obtained repeat imaging with interval stability, and was monitored closely in the floor setting but did not require operative managment. The patient remained on appropriate DVT prophylaxis during the course of admission. At the time of  discharge, the patient was tolerating PO intake without N/V, dysphagia, denied bowel or bladder dysfunction, denied new neurological symptoms, and reported pain controlled with current regimen. Ther patient will follow up in clinic as indicated in discharge instructions. All questions were answered and continued treatment instructions were discussed in detail prior to discharge.     Goals of Care Treatment Preferences:  Code Status: Full Code      Consults:     Significant Diagnostic Studies: All significant diagnostics reviewed    Pending Diagnostic Studies:       None          Final Active Diagnoses:    Diagnosis Date Noted POA    Subdural hematoma [S06.5XAA] 06/01/2024 Unknown      Problems Resolved During this Admission:      Discharged Condition: good     Disposition: Home or Self Care    Follow Up:   Follow-up Information       Aleksandra Carey MD. Go in 1 week.    Specialty: Internal Medicine  Contact information:  [address]  [facility] [id number] [phone]               [facility] - Emergency Dept. Go to .    Specialty: Emergency Medicine  Why: If symptoms worsen  Contact information:  [address] [facility] [address] [phone]             Jessica Katz MD Follow up in 2 week(s).    Specialties: Neurosurgery, Pediatric Neurosurgery  Contact information:  [address] [facility] [address]  Department of Neurosurgery - 7th Floor  [facility] [id number] [phone]                           Patient Instructions:   No discharge procedures on file.  Medications:  Reconciled Home Medications:      Medication List        CONTINUE taking these medications      aspirin 81 MG EC tablet  Commonly known as: ECOTRIN  once a day     atorvastatin 40 MG tablet  Commonly known as: LIPITOR  Take 2 tablets (80 mg total) by mouth once daily. PLEASE TAKE THIS INCREASED DOSE UNTIL FOLLOW UP WITH VASCULAR NEUROLOGY FOR FURTHER INSTRUCTION     carvediloL 12.5 MG tablet  Commonly known  as: COREG  Take 1 tablet (12.5 mg total) by mouth 2 (two) times daily.     clobetasol 0.05% 0.05 % Oint  Commonly known as: TEMOVATE  APPLY TOPICALLY TWICE A DAY     diclofenac sodium 1 % Gel  Commonly known as: VOLTAREN  Apply 2 g topically 3 (three) times daily.     ergocalciferol 50,000 unit Cap  Commonly known as: VITAMIN D2  Take 1 capsule (50,000 Units total) by mouth once a week.     EScitalopram oxalate 20 MG tablet  Commonly known as: LEXAPRO  Take 1 tablet (20 mg total) by mouth once daily.     levothyroxine 88 MCG tablet  Commonly known as: SYNTHROID  Take 1 tablet (88 mcg total) by mouth before breakfast.     losartan 50 MG tablet  Commonly known as: COZAAR  Take 2 tablets (100 mg total) by mouth once daily. PLEASE TAKE THIS INCREASED DOSE UNTIL RE-EVALUATION WITH YOUR PRIMARY CARE PROVIDER FOR FURTHER DISCUSSION OF BLOOD PRESSURE CONTROL     naloxone 4 mg/actuation Spry  Commonly known as: NARCAN  4mg by nasal route as needed for opioid overdose; may repeat every 2-3 minutes in alternating nostrils until medical help arrives. Call 911     NIFEdipine 30 MG (OSM) 24 hr tablet  Commonly known as: PROCARDIA-XL  Take 1 tablet (30 mg total) by mouth once daily.     nortriptyline 10 MG capsule  Commonly known as: PAMELOR  Take 1 capsule (10 mg total) by mouth once daily.     omeprazole 40 MG capsule  Commonly known as: PRILOSEC  Take 1 capsule (40 mg total) by mouth once daily.     oxybutynin 10 MG 24 hr tablet  Commonly known as: DITROPAN-XL  Take 1 tablet (10 mg total) by mouth once daily.     pregabalin 75 MG capsule  Commonly known as: LYRICA  Take 1 capsule (75 mg total) by mouth once daily. PLEASE HOLD UNTIL FOLLOW UP WITH YOUR PRIMARY CARE PROVIDER OR NEUROLOGY. IF WORSENING OF BURNING/TINGLING/NEUROPATHIC PAIN, PLEASE CONTACT YOUR PRIMARY CARE PROVIDER BEFORE YOUR APPOINTMENT TO DISCUSS RESTARTING.     promethazine 12.5 MG Tab  Commonly known as: PHENERGAN  Take 1 tablet (12.5 mg total) by mouth every  6 (six) hours as needed (nausea and or vomiting).     traMADoL 50 mg tablet  Commonly known as: ULTRAM  Take 50 mg by mouth every 6 (six) hours as needed for Pain.            STOP taking these medications      ticagrelor 90 mg tablet  Commonly known as: JOSSY Loo MD  Neurosurgery  [facility] - Neurosurgery [facility])

## 2024-06-02 NOTE — NURSING
Discharge instructions printed and reviewed with patient and spouse at bedside. PIV removed. Patient discharging home.

## 2024-06-02 NOTE — HOSPITAL COURSE
Patient was admitted for small non-operative traumatic SDH following mechanical fall on 6/1/24, obtained repeat imaging with interval stability, and was monitored closely in the floor setting but did not require operative managment. The patient remained on appropriate DVT prophylaxis during the course of admission. At the time of discharge, the patient was tolerating PO intake without N/V, dysphagia, denied bowel or bladder dysfunction, denied new neurological symptoms, and reported pain controlled with current regimen. Ther patient will follow up in clinic as indicated in discharge instructions. All questions were answered and continued treatment instructions were discussed in detail prior to discharge.

## 2024-06-03 ENCOUNTER — TELEPHONE (OUTPATIENT)
Dept: PRIMARY CARE CLINIC | Facility: CLINIC | Age: 74
End: 2024-06-03
Payer: MEDICARE

## 2024-06-03 NOTE — TELEPHONE ENCOUNTER
----- Message from Zari Salas sent at 6/3/2024  9:29 AM CDT -----  Type:  Needs Medical Advice    Who Called: pt   Would the patient rather a call back or a response via [employer]? call  Best Call Back Number:   Additional Information: pt received stiches in ER and was told to get them removed in 14 days would like a call to see if office can do this or should they reach out to another provider would like call from nurse

## 2024-06-04 ENCOUNTER — PATIENT OUTREACH (OUTPATIENT)
Dept: ADMINISTRATIVE | Facility: CLINIC | Age: 74
End: 2024-06-04
Payer: MEDICARE

## 2024-06-04 NOTE — PROGRESS NOTES
C3 nurse spoke with Denise A Cousins's spouse, John for a TCC post hospital discharge follow up call. The patient has a scheduled HOSFU appointment with Amee Lizama NP on 06/14/24 @ 1100.

## 2024-06-05 ENCOUNTER — TELEPHONE (OUTPATIENT)
Dept: PRIMARY CARE CLINIC | Facility: CLINIC | Age: 74
End: 2024-06-05
Payer: MEDICARE

## 2024-06-05 NOTE — TELEPHONE ENCOUNTER
Okay to give verbal to extend OT/PT sessions?     Has appt scheduled on 6/14 w/ Amee for stitch removal

## 2024-06-05 NOTE — TELEPHONE ENCOUNTER
----- Message from Luly Pineda sent at 6/5/2024 10:55 AM CDT -----  Contact: Katherin/[facility] / [phone]  [email or web address]     Patient is calling for Medical Advice regarding: Katherin with [facility]  @ [phone] is calling for verbal orders for OT and PT  for re certification   and to extend OT and PT services, please call .    Patient also had a fall on 6/1/2024, patient went to ER  and she was told she had a subdural hematoma , and skin laceration that required stitches on her right eye brow.

## 2024-06-13 ENCOUNTER — OFFICE VISIT (OUTPATIENT)
Dept: NEUROLOGY | Facility: CLINIC | Age: 74
End: 2024-06-13
Payer: MEDICARE

## 2024-06-13 VITALS — HEART RATE: 83 BPM | SYSTOLIC BLOOD PRESSURE: 111 MMHG | DIASTOLIC BLOOD PRESSURE: 67 MMHG

## 2024-06-13 DIAGNOSIS — I63.411 EMBOLIC STROKE INVOLVING RIGHT MIDDLE CEREBRAL ARTERY: Primary | ICD-10-CM

## 2024-06-13 DIAGNOSIS — I63.412 EMBOLIC STROKE INVOLVING LEFT MIDDLE CEREBRAL ARTERY: ICD-10-CM

## 2024-06-13 PROCEDURE — 99213 OFFICE O/P EST LOW 20 MIN: CPT | Mod: PBBFAC | Performed by: PSYCHIATRY & NEUROLOGY

## 2024-06-13 PROCEDURE — 99999 PR PBB SHADOW E&M-EST. PATIENT-LVL III: CPT | Mod: PBBFAC,,, | Performed by: PSYCHIATRY & NEUROLOGY

## 2024-06-13 NOTE — PROGRESS NOTES
Denise Berrios is a 74 y.o. year old female that  presents to review results of 30 days event monitor and stroke follow up.    HPI:  Mrs Berrios has a PMH that is relevant for HTN, episodic migraine with aura, Graves disease, L occipital infarct (2021) and corona radiata-left parietal lobe cortex- left insular cortex infarcts on 4/10/24, as well as R MCA infarct on 4/20/24. Stroke etiology deemed to be ESUS.   She sustained a fall on 6/1/24 in which she suffered R face trauma requiring stitches to R forehead. Said that the fall occurred when she got up from bed to go to bathroom in the middle of the night and because confused and disoriented. Had CT head that showed small thin subdural hemorrhage along the left tentorium and posterior inter hemispheric fissure region. Ticagrelor stopped, now only ASA 81 mg.  No further falls. Slurred speech improving.    said that the main problem lately has been lack of appetite, no eating well. Now uses a walker all the time. No interval medical problems.  30 days event monitor was completed and showed no evidence of a fib/flutter. She is scheduled to undergo loop recorder placement by the end of August.  Declined CT chest/abdomen/pelvis searching for potential occult malignancy.  Otherwise, she offers no new neurological complains today.       Past Medical History:   Diagnosis Date    Acid reflux     Graves disease     diffuse Toxic Goiter    Hypertension     IBS (irritable bowel syndrome)     Lichen sclerosus     Migraine     Stroke      Social History     Socioeconomic History    Marital status:    Tobacco Use    Smoking status: Never     Passive exposure: Never    Smokeless tobacco: Never   Substance and Sexual Activity    Alcohol use: Yes    Drug use: No    Sexual activity: Yes     Partners: Male     Birth control/protection: See Surgical Hx     Social Determinants of Health     Financial Resource Strain: Low Risk  (4/22/2024)    Overall Financial Resource  Strain (CARDIA)     Difficulty of Paying Living Expenses: Not hard at all   Food Insecurity: No Food Insecurity (4/22/2024)    Hunger Vital Sign     Worried About Running Out of Food in the Last Year: Never true     Ran Out of Food in the Last Year: Never true   Transportation Needs: No Transportation Needs (4/22/2024)    PRAPARE - Transportation     Lack of Transportation (Medical): No     Lack of Transportation (Non-Medical): No   Physical Activity: Inactive (4/22/2024)    Exercise Vital Sign     Days of Exercise per Week: 0 days     Minutes of Exercise per Session: 0 min   Stress: Patient Unable To Answer (4/22/2024)    [employer] [place] [employer] - Occupational Stress Questionnaire     Feeling of Stress : Patient unable to answer   Housing Stability: Low Risk  (4/22/2024)    Housing Stability Vital Sign     Unable to Pay for Housing in the Last Year: No     Homeless in the Last Year: No     Past Surgical History:   Procedure Laterality Date    CEREBRAL ANGIOGRAM N/A 4/22/2024    Procedure: ANGIOGRAM-CEREBRAL; stroke class A;  Surgeon: Sharon Welch;  Location: [facility];  Service: Anesthesiology;  Laterality: N/A;    EYE SURGERY      HYSTERECTOMY      CINDI for ?? cervical cancer, had normal paps    LEFT HEART CATHETERIZATION N/A 10/20/2020    Procedure: Left heart cath;  Surgeon: Bony Schultz MD;  Location: [facility] CATH LAB/EP;  Service: Cardiology;  Laterality: N/A;    REVERSE TOTAL SHOULDER ARTHROPLASTY Right 8/19/2022    Procedure: ARTHROPLASTY, SHOULDER, TOTAL, REVERSE;  Surgeon: Bradley Finnegan Jr., MD;  Location: [facility] OR;  Service: Orthopedics;  Laterality: Right;  SONIA linda CONFIRMED/8/18/Shaista Finnegan- hemosorb AT      Family History   Problem Relation Name Age of Onset    Kidney failure Father      Hypertension Father      Stomach cancer Mother      Breast cancer Neg Hx             Review of Systems  General ROS: negative for chills, fever or weight  loss  Psychological ROS: negative for hallucination, depression or suicidal ideation  Ophthalmic ROS: negative for blurry vision, photophobia or eye pain  ENT ROS: negative for epistaxis, sore throat or rhinorrhea  Respiratory ROS: no cough, shortness of breath, or wheezing  Cardiovascular ROS: no chest pain or dyspnea on exertion  Gastrointestinal ROS: no abdominal pain, change in bowel habits, or black/ bloody stools  Genito-Urinary ROS: no dysuria, trouble voiding, or hematuria  Musculoskeletal ROS: positive for imbalance and lack of energy  Neurological ROS: no syncope or seizures; no ataxia  Dermatological ROS: negative for pruritis, rash and jaundice      Physical Exam:  /67 (BP Location: Right arm, Patient Position: Sitting, BP Method: Large (Automatic))   Pulse 83   General appearance: alert, cooperative, no distress  Constitutional:Oriented to person, place, and time.appears well-developed and well-nourished.   HEENT: Normocephalic, atraumatic, neck symmetrical, no nasal discharge   Eyes: conjunctivae/corneas clear, PERRL, EOM's intact  Lungs: clear to auscultation bilaterally, no dullness to percussion bilaterally  Heart: regular rate and rhythm without rub; no displacement of the PMI   Abdomen: soft, non-tender; bowel sounds normoactive; no organomegaly  Extremities: extremities symmetric; no clubbing, cyanosis, or edema  Integument: Skin color, texture, turgor normal; no rashes; hair distrubution normal  Neurologic:    Mental status: alert and awake, oriented x 4, comprehension, naming, and repetition intact. No right to left confusion. Performs serial 7's without difficulty. Moderate dysarthria.  CN 2-12: pupils 4 mm bilaterally, reactive to light. Fundi without papilledema. Visual fields full to confrontation. EOM full without nystagmus. Face sensation normal in all distributions. Face asymmetric due to subtle R lower face weakness. Hearing grossly intact. Palate elevates well. Tongue midline  without atrophy or fasciculations.  Motor: 5/5 all over  Sensory: intact in all modalities.  DTR's: 2+ all over.  Plantars: no tested.  Coordination: finger to nose and heel-knee-shin intact.  Gait: wc          LABS:    Complete Blood Count  Lab Results   Component Value Date    RBC 3.14 (L) 06/02/2024    HGB 9.9 (L) 06/02/2024    HCT 31.2 (L) 06/02/2024    MCV 99 (H) 06/02/2024    MCH 31.5 (H) 06/02/2024    MCHC 31.7 (L) 06/02/2024    RDW 15.8 (H) 06/02/2024     06/02/2024    MPV 11.0 06/02/2024    GRAN 5.5 06/02/2024    GRAN 64.9 06/02/2024    LYMPH 1.3 06/02/2024    LYMPH 15.4 (L) 06/02/2024    MONO 1.3 (H) 06/02/2024    MONO 14.7 06/02/2024    EOS 0.3 06/02/2024    BASO 0.04 06/02/2024    EOSINOPHIL 3.6 06/02/2024    BASOPHIL 0.5 06/02/2024    DIFFMETHOD Automated 06/02/2024       Comprehensive Metabolic Panel  Lab Results   Component Value Date    GLU 93 06/02/2024    BUN 16 06/02/2024    CREATININE 2.1 (H) 06/02/2024     06/02/2024    K 4.1 06/02/2024     (H) 06/02/2024    PROT 7.1 06/01/2024    ALBUMIN 3.4 (L) 06/01/2024    BILITOT 0.5 06/01/2024    AST 29 06/01/2024    ALKPHOS 65 06/01/2024    CO2 18 (L) 06/02/2024    ALT 23 06/01/2024    ANIONGAP 10 06/02/2024    EGFRNONAA >60 03/22/2022    ESTGFRAFRICA >60 03/22/2022       TSH  Lab Results   Component Value Date    TSH 2.998 05/10/2024         Assessment: pleasant 75 y/o with HTN, episodic migraine with aura, Graves disease, recurrent ischemic infarcts involving L occipital infarct (2021) and corona radiata-left parietal lobe cortex- left insular cortex infarcts on 4/10/24, as well as R MCA infarct on 4/20/24 s/p MT-TICI-3 of R M2 occlusion. Stroke etiology deemed to be ESUS with strong suspicion for occult PAF, thus plan for loop recorder placement 8/24.   Off ticagrelor due to recent postraumatic small left tentorium and posterior inter hemispheric fissure region.  Declined CT chest/abdomen/pelvis searching for potential occult  malignancy.  No diagnosis found.  There were no encounter diagnoses.      Plan:  1) Recurrent bi cerebral ischemic infarcts, etiology ESUS: as above  2) HTN  3) Episodic migraine with aura  4) Graves disease  5) Small post traumatic thin subdural hemorrhage along the left tentorium and posterior inter hemispheric fissure region: ticagrelor stopped. Cont ASA.        No orders of the defined types were placed in this encounter.          Kuldeep Morales MD

## 2024-06-14 ENCOUNTER — OFFICE VISIT (OUTPATIENT)
Dept: PRIMARY CARE CLINIC | Facility: CLINIC | Age: 74
End: 2024-06-14
Payer: MEDICARE

## 2024-06-14 VITALS
RESPIRATION RATE: 16 BRPM | HEART RATE: 82 BPM | OXYGEN SATURATION: 95 % | DIASTOLIC BLOOD PRESSURE: 70 MMHG | BODY MASS INDEX: 27.09 KG/M2 | HEIGHT: 60 IN | SYSTOLIC BLOOD PRESSURE: 112 MMHG | WEIGHT: 138 LBS

## 2024-06-14 DIAGNOSIS — I63.411 EMBOLIC STROKE INVOLVING RIGHT MIDDLE CEREBRAL ARTERY: ICD-10-CM

## 2024-06-14 DIAGNOSIS — I63.9 APHASIA DUE TO ACUTE STROKE: ICD-10-CM

## 2024-06-14 DIAGNOSIS — Z48.02 VISIT FOR SUTURE REMOVAL: ICD-10-CM

## 2024-06-14 DIAGNOSIS — I10 ESSENTIAL HYPERTENSION: ICD-10-CM

## 2024-06-14 DIAGNOSIS — I63.412 EMBOLIC STROKE INVOLVING LEFT MIDDLE CEREBRAL ARTERY: ICD-10-CM

## 2024-06-14 DIAGNOSIS — S06.5XAA SUBDURAL HEMATOMA: ICD-10-CM

## 2024-06-14 DIAGNOSIS — R47.01 APHASIA DUE TO ACUTE STROKE: ICD-10-CM

## 2024-06-14 DIAGNOSIS — Z86.73 HISTORY OF CEREBRAL INFARCTION: Primary | ICD-10-CM

## 2024-06-14 PROCEDURE — 99214 OFFICE O/P EST MOD 30 MIN: CPT | Mod: PBBFAC | Performed by: NURSE PRACTITIONER

## 2024-06-14 PROCEDURE — 99495 TRANSJ CARE MGMT MOD F2F 14D: CPT | Mod: S$PBB,,, | Performed by: NURSE PRACTITIONER

## 2024-06-14 PROCEDURE — 99999 PR PBB SHADOW E&M-EST. PATIENT-LVL IV: CPT | Mod: PBBFAC,,, | Performed by: NURSE PRACTITIONER

## 2024-06-14 NOTE — PROGRESS NOTES
Transitional Care Note  Subjective:       Patient ID: Denise VIVAS Cousins is a 74 y.o. female.  Chief Complaint: Hospital Follow Up and Suture / Staple Removal    Family and/or Caretaker present at visit?  Yes.  Diagnostic tests reviewed/disposition: No diagnosic tests pending after this hospitalization.  Disease/illness education:  Nutrition  Home health/community services discussion/referrals: Patient does not have home health established from hospital visit.  They do not need home health.  If needed, we will set up home health for the patient.   Establishment or re-establishment of referral orders for community resources: No other necessary community resources.   Discussion with other health care providers: No discussion with other health care providers necessary.         HPI  This is a 74-year-old patient of Dr. Carey who is presenting as a hospital follow-up from [id number] for a mechanical fall causing a subdural hematoma, patient is also here for a suture removal  Patient's  concerned that patient is not eating enough and enquiring about Periactin  Patient reports food taste bad to her  She is drinking enough fluids  She is lost 36 lb since her stroke, she feels like she has no appetite    HPI:   74F w/ PMH HTN and prior CVA (on DAPT) who presents to [facility] ED s/p mechanical fall w/ acute thin L tentorial SDH. Per patient and  patient presents after having a fall while ambulating.  reports that the fall was unwitnessed but he heard her as soon as she hit the ground calling out for help. Patient reports that she recalls a series events. She was attempting to go to the bathroom in the dark when she felt a little unsteady and had a trip and fall and landed on the front of her head. Denies any loss of consciousness.  reports that he heard her immediately went to get to her and noticed significant amount of blood. He reports that patient was able to stand up afterwards and wait for EMS to  arrive. Patient denies any other injuries or pain at this time. Patient reports that the fall was a mechanical trip and fall no concern for palpitations lightheadedness or prodrome prior to fall. Patient presented to [facility] ED where CTH showed thin acute L tentorial SDH. No reversal was given at the time for her DAPT.     Of note the patient was recently admitted for L MCA LVO s/p thrombectomy and has been making marked improvements from a functional standpoint at home with therapies.     * No surgery found *      Hospital Course: Patient was admitted for small non-operative traumatic SDH following mechanical fall on 6/1/24, obtained repeat imaging with interval stability, and was monitored closely in the floor setting but did not require operative managment. The patient remained on appropriate DVT prophylaxis during the course of admission. At the time of discharge, the patient was tolerating PO intake without N/V, dysphagia, denied bowel or bladder dysfunction, denied new neurological symptoms, and reported pain controlled with current regimen. Ther patient will follow up in clinic as indicated in discharge instructions. All questions were answered and continued treatment instructions were discussed in detail prior to discharge.       Review of Systems   Constitutional:  Positive for activity change, appetite change and fatigue.   Respiratory:  Negative for cough, choking, chest tightness and shortness of breath.    Cardiovascular:  Negative for chest pain and leg swelling.   Gastrointestinal:  Negative for diarrhea and nausea.   Genitourinary:  Negative for difficulty urinating and dysuria.   Musculoskeletal:  Positive for gait problem. Negative for arthralgias and back pain.   Neurological:  Positive for speech difficulty and weakness. Negative for dizziness, tremors, seizures, syncope, facial asymmetry, light-headedness, numbness and headaches.   Psychiatric/Behavioral:  Negative for agitation. The patient is not  nervous/anxious.                Objective:      Physical Exam  Vitals and nursing note reviewed.   Constitutional:       General: She is not in acute distress.     Appearance: Normal appearance. She is well-developed. She is not ill-appearing.   HENT:      Head: Normocephalic and atraumatic.      Right Ear: External ear normal.      Left Ear: External ear normal.   Eyes:      Conjunctiva/sclera: Conjunctivae normal.      Pupils: Pupils are equal, round, and reactive to light.   Neck:      Thyroid: No thyromegaly.      Vascular: No JVD.      Trachea: No tracheal deviation.   Cardiovascular:      Rate and Rhythm: Normal rate and regular rhythm.      Heart sounds: Normal heart sounds. No murmur heard.  Pulmonary:      Effort: Pulmonary effort is normal.      Breath sounds: Normal breath sounds.   Chest:      Chest wall: No tenderness.   Abdominal:      General: Bowel sounds are normal.      Palpations: Abdomen is soft.      Tenderness: There is no abdominal tenderness. There is no guarding.   Musculoskeletal:         General: Normal range of motion.      Cervical back: Normal range of motion and neck supple.   Lymphadenopathy:      Cervical: No cervical adenopathy.   Skin:     General: Skin is warm and dry.   Neurological:      General: No focal deficit present.      Mental Status: She is alert and oriented to person, place, and time.   Psychiatric:         Mood and Affect: Mood normal.         Behavior: Behavior normal.         Thought Content: Thought content normal.         Judgment: Judgment normal.                 Assessment:       1. History of cerebral infarction    2. Subdural hematoma    3. Embolic stroke involving right middle cerebral artery    4. Embolic stroke involving left middle cerebral artery    5. Aphasia due to acute stroke    6. Essential hypertension    7. Visit for suture removal        Plan:       Patient tolerated suture removal x8 interrupted sutures from right eyebrow forehead area, well  approximated, no drainage    Follow up in stroke clinic  Jessica Katz MD Follow up in 2 week(s).    Specialties: Neurosurgery, Pediatric Neurosurgery  Contact information:  [facility]  Department of Neurosurgery - 7th Floor  [facility] [id number] [phone]    STOP taking these medications       ticagrelor 90 mg tablet  Commonly known as: BRILINTA

## 2024-06-15 PROCEDURE — G0179 MD RECERTIFICATION HHA PT: HCPCS | Mod: ,,, | Performed by: INTERNAL MEDICINE

## 2024-06-24 ENCOUNTER — OUTPATIENT CASE MANAGEMENT (OUTPATIENT)
Dept: ADMINISTRATIVE | Facility: OTHER | Age: 74
End: 2024-06-24
Payer: MEDICARE

## 2024-06-24 NOTE — PROGRESS NOTES
SW received a referral on the above patient to assist with social needs. SW spoke with patient spouse. SW explained reason for referral to assist with resources.  Spouse reports HH  SN, occupational and PT is in the home providing services. Spouse reports needing assistance with increasing patient appetite and or recommendations. Spouse reports seeing NP of Dr. Carey on 06/14/2024 regarding recommendations. Spouse reports EPI ALLEN advised spouse she will follow-up with him regarding recommendations. SW sent a secure chat to EPI Lubin to follow-up with patinet spouse regarding his concerns.

## 2024-06-26 ENCOUNTER — TELEPHONE (OUTPATIENT)
Dept: PRIMARY CARE CLINIC | Facility: CLINIC | Age: 74
End: 2024-06-26
Payer: MEDICARE

## 2024-06-29 ENCOUNTER — DOCUMENT SCAN (OUTPATIENT)
Dept: HOME HEALTH SERVICES | Facility: HOSPITAL | Age: 74
End: 2024-06-29
Payer: MEDICARE

## 2024-07-01 ENCOUNTER — DOCUMENT SCAN (OUTPATIENT)
Dept: HOME HEALTH SERVICES | Facility: HOSPITAL | Age: 74
End: 2024-07-01
Payer: MEDICARE

## 2024-07-05 ENCOUNTER — EXTERNAL HOME HEALTH (OUTPATIENT)
Dept: HOME HEALTH SERVICES | Facility: HOSPITAL | Age: 74
End: 2024-07-05
Payer: MEDICARE

## 2024-07-15 ENCOUNTER — TELEPHONE (OUTPATIENT)
Dept: PRIMARY CARE CLINIC | Facility: CLINIC | Age: 74
End: 2024-07-15
Payer: MEDICARE

## 2024-07-15 NOTE — TELEPHONE ENCOUNTER
Pt had a stroke 2 month ago. He bp is low when setting 110/50 but  stated that when she stand up it drops to 100/25. Asking for her bp medication to be reviewed to see if something can be changed. She is on Nifedipine 30 mg 1 tab daily, losartan 50 mg 2 tabs daily and carvedilol 12.5 mg BID

## 2024-07-15 NOTE — TELEPHONE ENCOUNTER
----- Message from Xenia Araujo sent at 7/15/2024 10:33 AM CDT -----  Contact: spouse  Type:  Needs Medical Advice    Who Called: spouse   Symptoms (please be specific): BP    How long has patient had these symptoms:  month and a half   Would the patient rather a call back or a response via [facility]? call  Best Call Back Number: [phone]  Additional Information:    spouse requesting a call regarding pt's BP  Spouse stated BP low and get lower  when she stands ups   Spouse requesting provider to review pt's BP medication

## 2024-07-16 NOTE — TELEPHONE ENCOUNTER
Pt  stated that she is only taking 1 losartan daily. Do you want her to stop the losartan and continue the other two medications the same way?

## 2024-07-17 ENCOUNTER — TELEPHONE (OUTPATIENT)
Dept: PRIMARY CARE CLINIC | Facility: CLINIC | Age: 74
End: 2024-07-17
Payer: MEDICARE

## 2024-07-17 NOTE — TELEPHONE ENCOUNTER
----- Message from Soumya Tesfaye sent at 7/17/2024  2:09 PM CDT -----  .Type:  Needs Medical Advice    Who Called: pt  John    Would the patient rather a call back or a response via [facility]? Call back  Best Call Back Number:   Additional Information:     John stated he would like a call back today regarding pt blood pressure medication

## 2024-07-19 ENCOUNTER — TELEPHONE (OUTPATIENT)
Dept: PRIMARY CARE CLINIC | Facility: CLINIC | Age: 74
End: 2024-07-19
Payer: MEDICARE

## 2024-07-19 NOTE — TELEPHONE ENCOUNTER
----- Message from Meri Chilel sent at 7/19/2024  2:52 PM CDT -----  Type:  Needs Medical Advice    Who Called:  Pt  John    Would the patient rather a call back or a response via [facility]?  call  Best Call Back Number:  [phone]  Additional Information:  Pt  would like a call back from  regarding pt medication.

## 2024-07-22 PROBLEM — R47.01 APHASIA DUE TO ACUTE STROKE: Status: RESOLVED | Noted: 2024-04-22 | Resolved: 2024-07-22

## 2024-07-22 PROBLEM — I63.412 EMBOLIC STROKE INVOLVING LEFT MIDDLE CEREBRAL ARTERY: Status: RESOLVED | Noted: 2024-04-22 | Resolved: 2024-07-22

## 2024-07-22 PROBLEM — I63.9 APHASIA DUE TO ACUTE STROKE: Status: RESOLVED | Noted: 2024-04-22 | Resolved: 2024-07-22

## 2024-07-22 PROBLEM — I63.411 EMBOLIC STROKE INVOLVING RIGHT MIDDLE CEREBRAL ARTERY: Status: RESOLVED | Noted: 2024-04-22 | Resolved: 2024-07-22

## 2024-07-23 ENCOUNTER — TELEPHONE (OUTPATIENT)
Dept: PRIMARY CARE CLINIC | Facility: CLINIC | Age: 74
End: 2024-07-23
Payer: MEDICARE

## 2024-07-23 NOTE — TELEPHONE ENCOUNTER
----- Message from Aniya Ybarra sent at 7/23/2024 10:58 AM CDT -----  Contact: Santa [phone]  Santa calling from [facility] in regards to pt's nisedipine Er Extended release 30 mg tablets .   stopped the medication 3  days ago because her pressure was dropping.  Her readings 128/66 left arm sitting, standing 122/62.  Please call to discuss.

## 2024-07-24 ENCOUNTER — TELEPHONE (OUTPATIENT)
Dept: PRIMARY CARE CLINIC | Facility: CLINIC | Age: 74
End: 2024-07-24
Payer: MEDICARE

## 2024-07-24 NOTE — TELEPHONE ENCOUNTER
----- Message from Triny Carreon sent at 7/24/2024  1:56 PM CDT -----  Type:  Please  Returning Call    Who Called:pt Spouse   Would the patient rather a call back or a response via [facility]? call  Best Call Back Number:[phone]  Additional Information: Good Afternoon Pt spouse  requesting to speak to nurse in office regarding pt. Bp being low and states it urgent have reached out to office several times even home health nurses call  twice and still no one has called have some concerns   This is Day 5 of calling

## 2024-07-24 NOTE — TELEPHONE ENCOUNTER
----- Message from Zari Salas sent at 7/24/2024 11:01 AM CDT -----  Type:  Needs Medical Advice    Who Called: pt   Would the patient rather a call back or a response via [facility]? call  Best Call Back Number: [phone]  Additional Information: would like to speak with office regarding pt's condition after having stroke over 2 weeks ago and some medications , has concerns of pt's BP

## 2024-07-24 NOTE — TELEPHONE ENCOUNTER
Taking 3 BP meds.  Losartan 50 mg 1 po qd, Carvedilol 12.5mg bid, Nifedipine 30 mg 1 po qd. BP is running 115/60 while sitting, when stands it drops to 100/40      As of 4 days ago,  stopped giving the Nifedipine (on his own, not instructed by anyone). States she feels much better and BP was 120/60 - standing and sitting. Asking if he can continue to not give the Nifedipine and just continue with the Losartan and Carvedilol?

## 2024-07-25 RX ORDER — ATORVASTATIN CALCIUM 40 MG/1
80 TABLET, FILM COATED ORAL DAILY
Qty: 180 TABLET | Refills: 0 | Status: SHIPPED | OUTPATIENT
Start: 2024-07-25 | End: 2024-10-23

## 2024-07-25 RX ORDER — CARVEDILOL 12.5 MG/1
12.5 TABLET ORAL 2 TIMES DAILY
Qty: 180 TABLET | Refills: 0 | Status: SHIPPED | OUTPATIENT
Start: 2024-07-25 | End: 2025-07-25

## 2024-07-25 NOTE — TELEPHONE ENCOUNTER
[place]. Asking if we can  refill carvedilol and atorvastatin to mail order pharmacy. Pended refills if ok to send. States they were rx'd by hospital dr, needs refills.

## 2024-07-25 NOTE — TELEPHONE ENCOUNTER
Care Due:                  Date            Visit Type   Department     Provider  --------------------------------------------------------------------------------                                EP -                              PRIMARY      [facility]  Last Visit: 04-      CARE ([facility])   CARE           Aleksandra Carey                              EP -                              PRIMARY      [facility] PRIMARY  Next Visit: 10-      CARE ([facility])   CARE           Aleksandra Carey                                                            Last  Test          Frequency    Reason                     Performed    Due Date  --------------------------------------------------------------------------------    Vitamin D...  12 months..  ergocalciferol...........  Not Found    Overdue    [employer] Embedded Care Due Messages. Reference number: [id number].   7/25/2024 2:45:52 PM CDT

## 2024-08-05 ENCOUNTER — TELEPHONE (OUTPATIENT)
Dept: NEUROLOGY | Facility: HOSPITAL | Age: 74
End: 2024-08-05
Payer: MEDICARE

## 2024-08-19 ENCOUNTER — TELEPHONE (OUTPATIENT)
Dept: CARDIOLOGY | Facility: CLINIC | Age: 74
End: 2024-08-19
Payer: MEDICARE

## 2024-08-19 NOTE — TELEPHONE ENCOUNTER
Called and spoke with the patients  in regards to appointment with Dr Martino tomorrow. I explained that the referral that was placed from Dr Michaud was for electrophysiology and not interventional cardiology. Advised I would reach out to the EP staff to give her a call to schedule.

## 2024-08-21 ENCOUNTER — DOCUMENT SCAN (OUTPATIENT)
Dept: HOME HEALTH SERVICES | Facility: HOSPITAL | Age: 74
End: 2024-08-21
Payer: MEDICARE

## 2024-08-26 DIAGNOSIS — I49.3 PVC (PREMATURE VENTRICULAR CONTRACTION): Primary | ICD-10-CM

## 2024-09-06 ENCOUNTER — EXTERNAL HOME HEALTH (OUTPATIENT)
Dept: HOME HEALTH SERVICES | Facility: HOSPITAL | Age: 74
End: 2024-09-06

## 2024-09-13 ENCOUNTER — TELEPHONE (OUTPATIENT)
Dept: ELECTROPHYSIOLOGY | Facility: CLINIC | Age: 74
End: 2024-09-13
Payer: MEDICARE

## 2024-09-16 ENCOUNTER — OFFICE VISIT (OUTPATIENT)
Dept: ELECTROPHYSIOLOGY | Facility: CLINIC | Age: 74
End: 2024-09-16
Payer: MEDICARE

## 2024-09-16 ENCOUNTER — HOSPITAL ENCOUNTER (OUTPATIENT)
Dept: CARDIOLOGY | Facility: CLINIC | Age: 74
Discharge: HOME | End: 2024-09-16
Payer: MEDICARE

## 2024-09-16 VITALS
BODY MASS INDEX: 25.14 KG/M2 | HEIGHT: 60 IN | DIASTOLIC BLOOD PRESSURE: 52 MMHG | SYSTOLIC BLOOD PRESSURE: 112 MMHG | WEIGHT: 128.06 LBS | HEART RATE: 70 BPM

## 2024-09-16 DIAGNOSIS — K58.1 IRRITABLE BOWEL SYNDROME WITH CONSTIPATION: ICD-10-CM

## 2024-09-16 DIAGNOSIS — Z96.611 S/P REVERSE TOTAL SHOULDER ARTHROPLASTY, RIGHT: ICD-10-CM

## 2024-09-16 DIAGNOSIS — I63.411 EMBOLIC STROKE INVOLVING RIGHT MIDDLE CEREBRAL ARTERY: ICD-10-CM

## 2024-09-16 DIAGNOSIS — I63.9 CRYPTOGENIC STROKE: Primary | ICD-10-CM

## 2024-09-16 DIAGNOSIS — I10 PRIMARY HYPERTENSION: ICD-10-CM

## 2024-09-16 DIAGNOSIS — I49.3 PVC (PREMATURE VENTRICULAR CONTRACTION): ICD-10-CM

## 2024-09-16 DIAGNOSIS — F41.9 ANXIETY: ICD-10-CM

## 2024-09-16 DIAGNOSIS — E66.3 OVERWEIGHT (BMI 25.0-29.9): ICD-10-CM

## 2024-09-16 DIAGNOSIS — K21.9 GASTROESOPHAGEAL REFLUX DISEASE, UNSPECIFIED WHETHER ESOPHAGITIS PRESENT: ICD-10-CM

## 2024-09-16 DIAGNOSIS — I70.0 AORTIC ATHEROSCLEROSIS: ICD-10-CM

## 2024-09-16 DIAGNOSIS — M15.9 OSTEOARTHRITIS OF MULTIPLE JOINTS, UNSPECIFIED OSTEOARTHRITIS TYPE: ICD-10-CM

## 2024-09-16 DIAGNOSIS — Z91.81 AT RISK FOR FALLS: ICD-10-CM

## 2024-09-16 DIAGNOSIS — E05.00 GRAVES DISEASE: ICD-10-CM

## 2024-09-16 DIAGNOSIS — E03.9 HYPOTHYROIDISM, UNSPECIFIED TYPE: ICD-10-CM

## 2024-09-16 DIAGNOSIS — G43.809 OTHER MIGRAINE WITHOUT STATUS MIGRAINOSUS, NOT INTRACTABLE: ICD-10-CM

## 2024-09-16 DIAGNOSIS — E78.2 MIXED HYPERLIPIDEMIA: ICD-10-CM

## 2024-09-16 DIAGNOSIS — M81.0 AGE-RELATED OSTEOPOROSIS WITHOUT CURRENT PATHOLOGICAL FRACTURE: ICD-10-CM

## 2024-09-16 DIAGNOSIS — S06.5XAA SUBDURAL HEMATOMA: ICD-10-CM

## 2024-09-16 LAB
OHS QRS DURATION: 78 MS
OHS QTC CALCULATION: 453 MS

## 2024-09-16 PROCEDURE — 93010 ELECTROCARDIOGRAM REPORT: CPT | Mod: S$PBB,,, | Performed by: INTERNAL MEDICINE

## 2024-09-16 PROCEDURE — 99999 PR PBB SHADOW E&M-EST. PATIENT-LVL III: CPT | Mod: PBBFAC,,, | Performed by: STUDENT IN AN ORGANIZED HEALTH CARE EDUCATION/TRAINING PROGRAM

## 2024-09-16 PROCEDURE — 99205 OFFICE O/P NEW HI 60 MIN: CPT | Mod: S$PBB,,, | Performed by: STUDENT IN AN ORGANIZED HEALTH CARE EDUCATION/TRAINING PROGRAM

## 2024-09-16 PROCEDURE — 93005 ELECTROCARDIOGRAM TRACING: CPT | Mod: PBBFAC | Performed by: INTERNAL MEDICINE

## 2024-09-16 PROCEDURE — 99213 OFFICE O/P EST LOW 20 MIN: CPT | Mod: PBBFAC,25 | Performed by: STUDENT IN AN ORGANIZED HEALTH CARE EDUCATION/TRAINING PROGRAM

## 2024-09-16 NOTE — PROGRESS NOTES
Electrophysiology Clinic Note    Reason for new patient visit: Evaluation and recommendations with possible consideration for implantation of an implantable loop recorder in the setting of cryptogenic strokes.     PRESENTING HISTORY:     History of Present Illness:  Ms. Denise Berrios is a geovanny 74-year-old woman who presents today for evaluation and recommendations with possible consideration for implantation of an implantable loop recorder in the setting of cryptogenic strokes. She has a past medical history significant for prior cryptogenic strokes with a left occipital infarct in 2021 and a corona radiata, left parietal lobe cortex, and left insular cortex infarcts on 4/10/2024, and a right middle cerebral artery infarct on 4/20/2024, a subsequent fall with a small subdural hematoma on 6/1/2024, hypertension, hyperlipidemia, Graves disease with hypothyroidism, GERD, irritable bowel syndrome - constipation predominate, migraines, anxiety, osteoporosis, osteoarthritis, a right shoulder total arthroplasty, aortic atherosclerosis, and overweight BMI.     She is followed in Neurology with Dr. Morales and was recently seen in clinic on 6/13/2024. In review of her most recent strokes in April of 2024, her stroke etiology was felt to be most consistent with an embolic stroke of undetermined source (ESUS). She initially suffered a left MCA stroke on 4/10/2024, and represented on 4/22/2024 with  acute onset of left-sided weakness, neglect, and aphasia. HPI information gathered from the patient's  due to the patient having aphasia. She had been prescribed DAPT with aspirin and clopidogrel immediately following her initial stroke on 4/10/2024; however, her  states that she had a reaction to clopidogrel and was modified to ticagrelor per Dr. Morales on 4/18/2024 with the plan to continue ticagrelor/ASA for 3 weeks. She had not yet obtained this prescription prior to suffering her second stroke, a right MCA  "embolic stroke requiring thrombectomy. She underwent a comprehensive cardiac evaluation including an unremarkable resting echocardiogram revealing preserved systolic function, LVEF 60-65%, with no overt evidence of a PFO or ASD. She has no personal history of atrial fibrillation, and remained in sinus rhythm on all ECGs and telemetry while admitted. She has completed a 30-day ambulatory event monitor that revealed no evidence of any atrial or ventricular arrhythmias. She has continued to participate with PT/OT/speech therapy, and reports continued word searching and resolving speech deficits. She subsequently sustained a mechanical fall on 6/1/2024 in which she suffered right-sided facial trauma requiring stitches to her right forehead. She tells me that this fall occurred when she got up from bed to go to bathroom in the middle of the night, and "must have tripped". A head CT at that time showed a small thin subdural hemorrhage along the left tentorium and posterior inter hemispheric fissure region. Her ticagrelor was discontinued, with maintenance on aspirin monotherapy. She has not suffered any subsequent falls, and continues to use her rolling walker for ambulation.    In discussion with Ms. Berrios and her  today, she tells me that she is feeling overall "pretty good". She denies any episodes of dizziness, lightheadedness, syncope/presyncope, palpitations, chest pain or chest discomfort, nausea or vomiting, orthopnea, or PND. She reports baseline mild shortness of breath and dyspnea with exertion that he she feels has remained stable. She has not suffered any subsequent falls, and her  reports that her speech and mobility have improved following her continued rehabilitation after her recent stroke. She cannot yet climb stairs, and continues to use her rolling walker.    Review of Systems:  Review of Systems   Constitutional:  Negative for activity change.   HENT:  Negative for nosebleeds, " postnasal drip, rhinorrhea, sinus pressure/congestion, sneezing and sore throat.    Respiratory:  Positive for shortness of breath. Negative for apnea, cough, chest tightness and wheezing.    Cardiovascular:  Negative for chest pain, palpitations, leg swelling and claudication.   Gastrointestinal:  Negative for abdominal distention, abdominal pain, blood in stool, change in bowel habit, constipation, diarrhea, nausea and vomiting.   Genitourinary:  Negative for dysuria and hematuria.   Musculoskeletal:  Positive for arthralgias and back pain. Negative for gait problem.   Neurological:  Positive for speech difficulty. Negative for dizziness, seizures, syncope, weakness, light-headedness, headaches and coordination difficulties.        Continued word searching and speech latency following her recent stroke.         PAST HISTORY:     Past Medical History:   Diagnosis Date    Acid reflux     Graves disease     diffuse Toxic Goiter    Hypertension     IBS (irritable bowel syndrome)     Lichen sclerosus     Migraine     Stroke        Past Surgical History:   Procedure Laterality Date    CEREBRAL ANGIOGRAM N/A 4/22/2024    Procedure: ANGIOGRAM-CEREBRAL; stroke class A;  Surgeon: Sharon Welch;  Location: [facility];  Service: Anesthesiology;  Laterality: N/A;    EYE SURGERY      HYSTERECTOMY      CINDI for ?? cervical cancer, had normal paps    LEFT HEART CATHETERIZATION N/A 10/20/2020    Procedure: Left heart cath;  Surgeon: Bony Schultz MD;  Location: [facility] CATH LAB/EP;  Service: Cardiology;  Laterality: N/A;    REVERSE TOTAL SHOULDER ARTHROPLASTY Right 8/19/2022    Procedure: ARTHROPLASTY, SHOULDER, TOTAL, REVERSE;  Surgeon: Bradley Finnegan Jr., MD;  Location: [facility] OR;  Service: Orthopedics;  Laterality: Right;  SONIA linda CONFIRMED/8/18/Shaista Finnegan- hemosorb AT        Family History:  Family History   Problem Relation Name Age of Onset    Kidney failure Father      Hypertension Father       Stomach cancer Mother      Breast cancer Neg Hx         Social History:  She  reports that she has never smoked. She has never been exposed to tobacco smoke. She has never used smokeless tobacco. She reports current alcohol use. She reports that she does not use drugs.      MEDICATIONS & ALLERGIES:     Review of patient's allergies indicates:   Allergen Reactions    Phenobarbital Anaphylaxis    Penicillins Other (See Comments)    Phenobarbital sodium     Propylthiouracil     Thyroid      Note: PTU    Topamax [topiramate] Diarrhea     GI symptoms    Cefaclor Rash and Other (See Comments)    Clopidogrel Hives and Rash    Methimazole Rash and Other (See Comments)    Methimazole (bulk) Rash    Penicillin Rash    Sulfa (sulfonamide antibiotics) Rash and Other (See Comments)       Current Outpatient Medications on File Prior to Visit   Medication Sig Dispense Refill    aspirin (ECOTRIN) 81 MG EC tablet once a day      atorvastatin (LIPITOR) 40 MG tablet Take 2 tablets (80 mg total) by mouth once daily. 180 tablet 0    carvediloL (COREG) 12.5 MG tablet Take 1 tablet (12.5 mg total) by mouth 2 (two) times daily. 180 tablet 0    clobetasol 0.05% (TEMOVATE) 0.05 % Oint APPLY TOPICALLY TWICE A DAY (Patient not taking: Reported on 6/4/2024) 45 g 1    diclofenac sodium (VOLTAREN) 1 % Gel Apply 2 g topically 3 (three) times daily. (Patient not taking: Reported on 6/4/2024) 100 g 3    ergocalciferol (VITAMIN D2) 50,000 unit Cap Take 1 capsule (50,000 Units total) by mouth once a week. 12 capsule 3    EScitalopram oxalate (LEXAPRO) 20 MG tablet Take 1 tablet (20 mg total) by mouth once daily. 90 tablet 3    levothyroxine (SYNTHROID) 88 MCG tablet Take 1 tablet (88 mcg total) by mouth before breakfast. 90 tablet 3    losartan (COZAAR) 50 MG tablet Take 2 tablets (100 mg total) by mouth once daily. PLEASE TAKE THIS INCREASED DOSE UNTIL RE-EVALUATION WITH YOUR PRIMARY CARE PROVIDER FOR FURTHER DISCUSSION OF BLOOD PRESSURE CONTROL 90  tablet 3    naloxone (NARCAN) 4 mg/actuation Spry 4mg by nasal route as needed for opioid overdose; may repeat every 2-3 minutes in alternating nostrils until medical help arrives. Call 911 1 each 11    NIFEdipine (PROCARDIA-XL) 30 MG (OSM) 24 hr tablet Take 1 tablet (30 mg total) by mouth once daily. 30 tablet 11    nortriptyline (PAMELOR) 10 MG capsule Take 1 capsule (10 mg total) by mouth once daily. 90 capsule 3    omeprazole (PRILOSEC) 40 MG capsule Take 1 capsule (40 mg total) by mouth once daily. 90 capsule 3    oxybutynin (DITROPAN-XL) 10 MG 24 hr tablet Take 1 tablet (10 mg total) by mouth once daily. 90 tablet 3    pregabalin (LYRICA) 75 MG capsule Take 1 capsule (75 mg total) by mouth once daily. PLEASE HOLD UNTIL FOLLOW UP WITH YOUR PRIMARY CARE PROVIDER OR NEUROLOGY. IF WORSENING OF BURNING/TINGLING/NEUROPATHIC PAIN, PLEASE CONTACT YOUR PRIMARY CARE PROVIDER BEFORE YOUR APPOINTMENT TO DISCUSS RESTARTING. (Patient not taking: Reported on 6/4/2024)      promethazine (PHENERGAN) 12.5 MG Tab Take 1 tablet (12.5 mg total) by mouth every 6 (six) hours as needed (nausea and or vomiting). (Patient not taking: Reported on 6/4/2024) 10 tablet 0    traMADoL (ULTRAM) 50 mg tablet Take 50 mg by mouth every 6 (six) hours as needed for Pain. (Patient not taking: Reported on 6/4/2024)       No current facility-administered medications on file prior to visit.        OBJECTIVE:     Vital Signs:  BP (!) 112/52   Pulse 70   Ht 5' (1.524 m)   Wt 58.1 kg (128 lb 1.4 oz)   BMI 25.02 kg/m²     Physical Exam:  Physical Exam  Constitutional:       General: She is not in acute distress.     Appearance: Normal appearance. She is not ill-appearing or diaphoretic.      Comments: Well-appearing woman in NAD.   HENT:      Head: Normocephalic and atraumatic.      Nose: Nose normal.      Mouth/Throat:      Mouth: Mucous membranes are moist.      Pharynx: Oropharynx is clear.   Eyes:      Pupils: Pupils are equal, round, and reactive  to light.   Cardiovascular:      Rate and Rhythm: Normal rate and regular rhythm.      Pulses: Normal pulses.      Heart sounds: Normal heart sounds. No murmur heard.     No friction rub. No gallop.   Pulmonary:      Effort: Pulmonary effort is normal. No respiratory distress.      Breath sounds: Normal breath sounds. No wheezing, rhonchi or rales.   Chest:      Chest wall: No tenderness.   Abdominal:      General: There is no distension.      Palpations: Abdomen is soft.      Tenderness: There is no abdominal tenderness.   Musculoskeletal:         General: No swelling or tenderness.      Cervical back: Normal range of motion.      Right lower leg: No edema.      Left lower leg: No edema.   Skin:     General: Skin is warm and dry.      Findings: No erythema, lesion or rash.   Neurological:      General: No focal deficit present.      Mental Status: She is alert and oriented to person, place, and time. Mental status is at baseline.      Motor: No weakness.      Gait: Gait normal.      Comments: Reported word searching continues.   Psychiatric:         Mood and Affect: Mood normal.         Behavior: Behavior normal.        Laboratory Data:  Lab Results   Component Value Date    WBC 8.51 06/02/2024    HGB 9.9 (L) 06/02/2024    HCT 31.2 (L) 06/02/2024    MCV 99 (H) 06/02/2024     06/02/2024     Lab Results   Component Value Date    GLU 93 06/02/2024     06/02/2024    K 4.1 06/02/2024     (H) 06/02/2024    CO2 18 (L) 06/02/2024    BUN 16 06/02/2024    CREATININE 2.1 (H) 06/02/2024    CALCIUM 9.1 06/02/2024    MG 2.1 04/28/2024     Lab Results   Component Value Date    INR 1.0 06/01/2024    INR 1.2 04/22/2024    INR 1.0 04/22/2024       Pertinent Cardiac Data:  Personal interpretation of today's ECG: Normal sinus rhythm with rate of 70 bpm,  ms, QRS 78 ms, QT/QTc 420/453 ms.     Exercise Stress Echocardiogram - 10/19/2020:  The ECG portion of this study is positive for myocardial ischemia.  The  stress echo portion of this study is positive for myocardial ischemia.  There is left ventricular concentric hypertrophy.  The left ventricle is normal in size with normal systolic function. The estimated ejection fraction is 60%.  Indeterminate diastolic function.  Normal right ventricular systolic function.  Mild aortic regurgitation.  Mild pulmonic regurgitation.  Normal central venous pressure (3 mmHg).  The estimated PA systolic pressure is 31 mmHg.  The test was stopped because the patient experienced shortness of breath.  During stress, the following significant arrhythmias were observed: PVCs.    Coronary Angiogram - 10/20/2020:  The pre-procedure left ventricular end diastolic pressure was 7.  The ejection fraction was greater than 55% by visual estimate.  The coronary arteries were normal..    48-Hour Holter Monitor - 10/23/2020:  Sinus Rhythm during recording period  Sinus Bradycardia 42 bpm during sleep hours  Rare PACs, PVCs    Resting 2D Transthoracic Echocardiogram - 8/5/2021:  The left ventricle is normal in size with normal systolic function.  The estimated ejection fraction is 55%.  Mild mitral regurgitation.  Mild aortic regurgitation.  Normal left ventricular diastolic function.  Mild tricuspid regurgitation.  Mild pulmonic regurgitation.  Normal right ventricular size with normal right ventricular systolic function.  There is no pulmonary hypertension.    Resting 2D Transthoracic Echocardiogram - 4/11/2024:    Left Ventricle: The left ventricle is normal in size. Normal wall thickness. There is mild concentric hypertrophy. Normal wall motion. There is normal systolic function with a visually estimated ejection fraction of 55 - 60%.    Right Ventricle: Normal right ventricular cavity size. Wall thickness is normal. Right ventricle wall motion  is normal. Systolic function is normal.    Pulmonary Artery: The estimated pulmonary artery systolic pressure is 15 mmHg.    IVC/SVC: Normal venous  pressure at 3 mmHg.    Resting 2D Transthoracic Echocardiogram - 4/23/2024:    Left Ventricle: The left ventricle is normal in size. Normal wall thickness. There is normal systolic function with a visually estimated ejection fraction of 60 - 65%. There is indeterminate diastolic function.    Right Ventricle: Normal right ventricular cavity size. Wall thickness is normal. Right ventricle wall motion  is normal. Systolic function is normal.    IVC/SVC: Low central venous pressure.    30-Day Ambulatory Event Monitor - 5/23/2024:    Negative event monitor with no clinical arrhythmias.    Asymptomatic normal sinus rhythm.    No atrial fibrillation observed.      ASSESSMENT & PLAN:   Ms. Denise Berrios is a geovanny 74-year-old woman who presents today for evaluation and recommendations with possible consideration for implantation of an implantable loop recorder in the setting of cryptogenic strokes. She has a past medical history significant for prior cryptogenic strokes with a left occipital infarct in 2021 and a corona radiata, left parietal lobe cortex, and left insular cortex infarcts on 4/10/2024, and a right middle cerebral artery infarct on 4/20/2024, a subsequent fall with a small subdural hematoma on 6/1/2024, hypertension, hyperlipidemia, Graves disease with hypothyroidism, GERD, irritable bowel syndrome - constipation predominate, migraines, anxiety, osteoporosis, osteoarthritis, a right shoulder total arthroplasty, aortic atherosclerosis, and overweight BMI.     - We discussed the fact that some strokes may be secondary to an underlying arrhythmic etiology, specifically atrial fibrillation. While she has had no evidence of atrial fibrillation on telemetry while she was admitted, nor on serial ECGs, this does not definitively rule out brief paroxysms of atrial fibrillation.   - We reviewed the results of her recent resting echocardiogram revealing preserved systolic function, LVEF 60-65%, with no overt  evidence of a PFO or ASD. She has no personal history of atrial fibrillation. We reviewed the results of her recent 30-day ambulatory event monitor that revealed no evidence of any atrial or ventricular arrhythmias.   - Based on the most recent AHA/ACC/HRS atrial fibrillation guidelines, implantation of an implantable loop recorder or other device detection method of atrial fibrillation is recommended in patients with cryptogenic stroke (Class IIa, GOMEZ B-R recommendation). Furthermore, based on the CRYSTAL-AF study, short and intermediate-term cardiac monitoring may miss patients with paroxysmal atrial fibrillation (In this study, 88% of patients who had atrial fibrillation would have been missed if only monitored with a 30-day ambulatory monitor.)  - We discussed the indications for implantation of an implantable loop recorder, in addition to a description of the procedure, relative risks and benefits, and proposed follow-up after implantation, including setting up a remote monitor at home and returning for in-office interrogations. She voices understanding is would be amenable to undergoing this procedure. Informed consent was obtained.  - Should she evidence paroxysmal atrial fibrillation, her [id number] would be elevated at 6 (HTN, prior CVA, female gender, age greater than 75), portending an annual adjusted risk of CVA of 9.8%. We would advise initiation of oral anticoagulation should the diagnosis of pAF be determined. She is presently maintained on aspirin monotherapy. Of note, she has previously been at an increased risk of falls and had a small subdural hematoma sustained from a fall in 6/2024. In the event she requires oral anticoagulation and her risk of falls persists, we may need to consider implantation of a left atrial appendage occlusion device with a WATCHMAN. We briefly discussed this possibility.    - She will continue to follow with her PCP and her neurology team.       This patient will  present to the EP laboratory to undergo implantation of an ILR for further assessment of her cryptogenic CVAs. All questions and concerns were addressed at this encounter. Total time spent in this patient encounter: 61 minutes.     Signing Physician:       ORTEGA Nicolas MD  Electrophysiology Attending

## 2024-09-22 ENCOUNTER — DOCUMENTATION ONLY (OUTPATIENT)
Dept: NEUROLOGY | Facility: HOSPITAL | Age: 74
End: 2024-09-22
Payer: MEDICARE

## 2024-09-22 PROBLEM — E78.2 MIXED HYPERLIPIDEMIA: Status: ACTIVE | Noted: 2024-09-22

## 2024-09-22 PROBLEM — E05.00 GRAVES DISEASE: Status: ACTIVE | Noted: 2024-09-22

## 2024-09-22 PROBLEM — I49.3 PVC (PREMATURE VENTRICULAR CONTRACTION): Status: RESOLVED | Noted: 2020-10-16 | Resolved: 2024-09-22

## 2024-09-22 PROBLEM — E78.5 DYSLIPIDEMIA: Status: RESOLVED | Noted: 2019-08-28 | Resolved: 2024-09-22

## 2024-09-22 PROBLEM — I63.9 CRYPTOGENIC STROKE: Status: ACTIVE | Noted: 2024-09-22

## 2024-09-22 PROBLEM — Z91.81 AT RISK FOR FALLS: Status: ACTIVE | Noted: 2024-09-22

## 2024-09-24 ENCOUNTER — TELEPHONE (OUTPATIENT)
Dept: ELECTROPHYSIOLOGY | Facility: CLINIC | Age: 74
End: 2024-09-24
Payer: MEDICARE

## 2024-09-24 DIAGNOSIS — I63.9 CRYPTOGENIC STROKE: Primary | ICD-10-CM

## 2024-09-24 NOTE — TELEPHONE ENCOUNTER
Called pt to schedule ILR implant, no answer.  Left message requesting a call back at [phone].

## 2024-09-24 NOTE — TELEPHONE ENCOUNTER
Spoke with chula, John, and scheduled ILR implant on 10/23/24 with an arrival time of 9:00AM.  Will send pre-procedure instructions via the portal.

## 2024-10-04 NOTE — TELEPHONE ENCOUNTER
Care Due:                  Date            Visit Type   Department     Provider  --------------------------------------------------------------------------------                                EP -                              PRIMARY      [facility]  Last Visit: 04-      CARE ([facility])   CARE           Aleksandra Carey                              EP -                              PRIMARY      [facility] PRIMARY  Next Visit: 12-      CARE ([facility])   CARE           Aleksandra Carey                                                            Last  Test          Frequency    Reason                     Performed    Due Date  --------------------------------------------------------------------------------    Vitamin D...  12 months..  ergocalciferol...........  Not Found    Overdue    [employer] Embedded Care Due Messages. Reference number: [id number].   10/04/2024 11:47:05 AM CDT

## 2024-10-04 NOTE — TELEPHONE ENCOUNTER
----- Message from Susanne sent at 10/4/2024 11:41 AM CDT -----  Type:  RX Refill Request    Who Called:   Refill or New Rx:refill  RX Name and Strength:pregabalin (LYRICA) 75 MG capsule  Preferred Pharmacy with phone number:[facility] #[id number] - [place] - [address] AT [facility] [address]  Local or Mail Order:local  Ordering Provider:radha  Would the patient rather a call back or a response via [facility]? call  Best Call Back Number:[phone]  Additional Information: Pt would like a call.

## 2024-10-07 RX ORDER — PREGABALIN 75 MG/1
75 CAPSULE ORAL DAILY
Start: 2024-10-07 | End: 2024-10-08 | Stop reason: SDUPTHER

## 2024-10-08 DIAGNOSIS — K21.9 GASTROESOPHAGEAL REFLUX DISEASE WITHOUT ESOPHAGITIS: ICD-10-CM

## 2024-10-08 RX ORDER — PREGABALIN 75 MG/1
75 CAPSULE ORAL DAILY
Qty: 90 CAPSULE | Refills: 1 | Status: SHIPPED | OUTPATIENT
Start: 2024-10-08

## 2024-10-08 RX ORDER — CARVEDILOL 12.5 MG/1
12.5 TABLET ORAL 2 TIMES DAILY
Qty: 180 TABLET | Refills: 3 | Status: SHIPPED | OUTPATIENT
Start: 2024-10-08

## 2024-10-08 RX ORDER — OMEPRAZOLE 40 MG/1
40 CAPSULE, DELAYED RELEASE ORAL
Qty: 90 CAPSULE | Refills: 3 | Status: SHIPPED | OUTPATIENT
Start: 2024-10-08

## 2024-10-08 RX ORDER — ERGOCALCIFEROL 1.25 1/1
CAPSULE ORAL
Qty: 12 CAPSULE | Refills: 3 | Status: SHIPPED | OUTPATIENT
Start: 2024-10-08

## 2024-10-08 NOTE — TELEPHONE ENCOUNTER
No care due was identified.  [facility] Embedded Care Due Messages. Reference number: [id number].   10/08/2024 1:48:01 PM CDT

## 2024-10-08 NOTE — TELEPHONE ENCOUNTER
----- Message from Allison sent at 10/8/2024 10:24 AM CDT -----  Regarding: refill  Type:  RX Refill Request    Who Called: pt    Refill or New Rx:refill   RX Name and Strength:pregabalin (LYRICA) 75 MG capsule  Preferred Pharmacy with phone number:[facility] #[id number] - [place] - [address] AT [facility] [address]  [facility] [place] [address]  Phone: [phone] Fax: [phone]  Local or Mail Order:local   Ordering Provider:Aurelio   Would the patient rather a call back or a response via [facility]? Call   Best Call Back Number:[phone]  Additional Information: would like a call back from the nurse

## 2024-10-08 NOTE — TELEPHONE ENCOUNTER
No care due was identified.  [facility] Embedded Care Due Messages. Reference number: [id number].   10/08/2024 10:32:31 AM CDT

## 2024-10-09 RX ORDER — ATORVASTATIN CALCIUM 40 MG/1
80 TABLET, FILM COATED ORAL
Qty: 180 TABLET | Refills: 3 | Status: SHIPPED | OUTPATIENT
Start: 2024-10-09

## 2024-10-22 ENCOUNTER — TELEPHONE (OUTPATIENT)
Dept: ELECTROPHYSIOLOGY | Facility: CLINIC | Age: 74
End: 2024-10-22
Payer: MEDICARE

## 2024-10-22 NOTE — TELEPHONE ENCOUNTER
Spoke to pt.    CONFIRMED procedure arrival time of 9:30AM on 10/23/24 for Loop Recorder Implant.     Reiterated instructions including:  -Directions to check in desk  -Confirmed presence of implanted device/stimulator reviewed.  -Confirmed no fever, cough, or shortness of breath in the past 30 days  --Do not wear mascara day of procedure  -Bathe night prior and morning prior to procedure with Hibiclens solution or an antibacterial soap  -Pt will be accompanied by her .    Patient verbalized understanding of above and appreciated the call.

## 2024-10-23 ENCOUNTER — HOSPITAL ENCOUNTER (OUTPATIENT)
Facility: HOSPITAL | Age: 74
Discharge: HOME | End: 2024-10-23
Attending: STUDENT IN AN ORGANIZED HEALTH CARE EDUCATION/TRAINING PROGRAM | Admitting: STUDENT IN AN ORGANIZED HEALTH CARE EDUCATION/TRAINING PROGRAM
Payer: MEDICARE

## 2024-10-23 VITALS
BODY MASS INDEX: 25.13 KG/M2 | TEMPERATURE: 98 F | SYSTOLIC BLOOD PRESSURE: 157 MMHG | OXYGEN SATURATION: 97 % | DIASTOLIC BLOOD PRESSURE: 69 MMHG | RESPIRATION RATE: 18 BRPM | WEIGHT: 128 LBS | HEART RATE: 58 BPM | HEIGHT: 60 IN

## 2024-10-23 DIAGNOSIS — I63.9 CRYPTOGENIC STROKE: ICD-10-CM

## 2024-10-23 DIAGNOSIS — Z95.9 CARDIAC DEVICE IN SITU: ICD-10-CM

## 2024-10-23 LAB
OHS QRS DURATION: 80 MS
OHS QTC CALCULATION: 448 MS

## 2024-10-23 PROCEDURE — C1764 EVENT RECORDER, CARDIAC: HCPCS | Performed by: STUDENT IN AN ORGANIZED HEALTH CARE EDUCATION/TRAINING PROGRAM

## 2024-10-23 PROCEDURE — 93005 ELECTROCARDIOGRAM TRACING: CPT

## 2024-10-23 PROCEDURE — 93010 ELECTROCARDIOGRAM REPORT: CPT | Mod: ,,, | Performed by: INTERNAL MEDICINE

## 2024-10-23 PROCEDURE — 33285 INSJ SUBQ CAR RHYTHM MNTR: CPT | Mod: ,,, | Performed by: STUDENT IN AN ORGANIZED HEALTH CARE EDUCATION/TRAINING PROGRAM

## 2024-10-23 PROCEDURE — 33285 INSJ SUBQ CAR RHYTHM MNTR: CPT | Performed by: STUDENT IN AN ORGANIZED HEALTH CARE EDUCATION/TRAINING PROGRAM

## 2024-10-23 PROCEDURE — 63600175 PHARM REV CODE 636 W HCPCS: Performed by: STUDENT IN AN ORGANIZED HEALTH CARE EDUCATION/TRAINING PROGRAM

## 2024-10-23 DEVICE — INSERTABLE CARDIAC MONITOR
Type: IMPLANTABLE DEVICE | Site: CHEST | Status: FUNCTIONAL
Brand: LUX-DX II+™

## 2024-10-23 RX ORDER — LIDOCAINE HYDROCHLORIDE AND EPINEPHRINE 10; 10 MG/ML; UG/ML
INJECTION, SOLUTION INFILTRATION; PERINEURAL
Status: DISCONTINUED | OUTPATIENT
Start: 2024-10-23 | End: 2024-10-23 | Stop reason: HOSPADM

## 2024-10-23 RX ORDER — DOXYCYCLINE HYCLATE 100 MG
100 TABLET ORAL 2 TIMES DAILY
Qty: 6 TABLET | Refills: 0 | Status: SHIPPED | OUTPATIENT
Start: 2024-10-23 | End: 2024-10-26

## 2024-10-24 ENCOUNTER — TELEPHONE (OUTPATIENT)
Dept: ELECTROPHYSIOLOGY | Facility: CLINIC | Age: 74
End: 2024-10-24
Payer: MEDICARE

## 2024-10-24 DIAGNOSIS — I63.9 CRYPTOGENIC STROKE: Primary | ICD-10-CM

## 2024-10-25 ENCOUNTER — TELEPHONE (OUTPATIENT)
Dept: ELECTROPHYSIOLOGY | Facility: CLINIC | Age: 74
End: 2024-10-25
Payer: MEDICARE

## 2024-10-25 DIAGNOSIS — E03.8 OTHER SPECIFIED HYPOTHYROIDISM: ICD-10-CM

## 2024-10-25 RX ORDER — NORTRIPTYLINE HYDROCHLORIDE 10 MG/1
10 CAPSULE ORAL
Qty: 90 CAPSULE | Refills: 3 | Status: SHIPPED | OUTPATIENT
Start: 2024-10-25

## 2024-10-25 RX ORDER — LEVOTHYROXINE SODIUM 88 UG/1
88 TABLET ORAL
Qty: 90 TABLET | Refills: 3 | Status: SHIPPED | OUTPATIENT
Start: 2024-10-25

## 2024-11-04 ENCOUNTER — CLINICAL SUPPORT (OUTPATIENT)
Dept: CARDIOLOGY | Facility: HOSPITAL | Age: 74
End: 2024-11-04
Attending: STUDENT IN AN ORGANIZED HEALTH CARE EDUCATION/TRAINING PROGRAM
Payer: MEDICARE

## 2024-11-04 DIAGNOSIS — I63.9 CRYPTOGENIC STROKE: ICD-10-CM

## 2024-11-15 RX ORDER — LOSARTAN POTASSIUM 50 MG/1
50 TABLET ORAL
Qty: 90 TABLET | Refills: 3 | Status: SHIPPED | OUTPATIENT
Start: 2024-11-15

## 2024-11-15 NOTE — TELEPHONE ENCOUNTER
No care due was identified.  [facility] Embedded Care Due Messages. Reference number: [id number].   11/15/2024 1:01:25 AM CST

## 2024-11-18 ENCOUNTER — TELEPHONE (OUTPATIENT)
Dept: PRIMARY CARE CLINIC | Facility: CLINIC | Age: 74
End: 2024-11-18
Payer: MEDICARE

## 2024-11-18 DIAGNOSIS — G81.04 FLACCID HEMIPLEGIA AFFECTING LEFT NONDOMINANT SIDE, UNSPECIFIED ETIOLOGY: Primary | ICD-10-CM

## 2024-11-18 RX ORDER — PREGABALIN 75 MG/1
75 CAPSULE ORAL 2 TIMES DAILY
Qty: 180 CAPSULE | Refills: 3 | Status: SHIPPED | OUTPATIENT
Start: 2024-11-18

## 2024-11-18 NOTE — TELEPHONE ENCOUNTER
----- Message from Carrie sent at 11/18/2024 10:25 AM CST -----  Type:  RX Refill Request    Who Called:  Pt's    Refill or New Rx: Refill   RX Name and Strength:pregabalin (LYRICA) 75 MG capsule   Preferred Pharmacy with phone number:[facility] #[id number] - [place] - [address] AT [facility] [address]  Local or Mail Order: Local   Ordering Provider: Aurelio   Would the patient rather a call back or a response via [facility]? Call   Best Call Back Number:[phone]   Additional Information: pt would like to request to increase rx to twice a day

## 2024-11-18 NOTE — TELEPHONE ENCOUNTER
----- Message from Carrie sent at 11/18/2024 10:25 AM CST -----  Type:  RX Refill Request    Who Called:  Pt's    Refill or New Rx: Refill   RX Name and Strength:pregabalin (LYRICA) 75 MG capsule   Preferred Pharmacy with phone number:[facility] #[id number] - [place] - [address] AT [facility] [address]  Local or Mail Order: Local   Ordering Provider: Aurelio   Would the patient rather a call back or a response via [facility]? Call   Best Call Back Number:[phone]   Additional Information: pt would like to request to increase rx to twice a day

## 2024-11-18 NOTE — TELEPHONE ENCOUNTER
----- Message from Mckenna sent at 11/18/2024 10:10 AM CST -----  Type:  Needs Medical Advice    Who Called: John  Symptoms (please be specific): medication    Pharmacy name and phone #:  [facility] #[id number] - [place] [address] AT [facility] [address]   Phone: [phone]  Fax: [phone]  Would the patient rather a call back or a response via [facility]? Call back   Best Call Back Number: [phone]  Additional Information: pt is requesting to have a new script for pregabalin (LYRICA) 75 MG capsule     Pt is requesting to take 2x a day instead of 1x a day if Dr Carey is okay with that

## 2024-11-18 NOTE — ADDENDUM NOTE
Addended by: FAITH MARTÍNEZ on: 11/18/2024 11:11 AM     Modules accepted: Orders    
27-Mar-2020 14:12

## 2024-11-25 DIAGNOSIS — F41.9 ANXIETY: ICD-10-CM

## 2024-11-25 RX ORDER — ESCITALOPRAM OXALATE 20 MG/1
20 TABLET ORAL
Qty: 90 TABLET | Refills: 3 | Status: SHIPPED | OUTPATIENT
Start: 2024-11-25

## 2024-11-25 NOTE — TELEPHONE ENCOUNTER
No care due was identified.  [facility] Embedded Care Due Messages. Reference number: [id number].   11/25/2024 2:08:12 AM CST

## 2024-11-29 ENCOUNTER — CLINICAL SUPPORT (OUTPATIENT)
Dept: CARDIOLOGY | Facility: HOSPITAL | Age: 74
End: 2024-11-29
Attending: STUDENT IN AN ORGANIZED HEALTH CARE EDUCATION/TRAINING PROGRAM
Payer: MEDICARE

## 2024-11-29 DIAGNOSIS — I49.8 OTHER SPECIFIED CARDIAC ARRHYTHMIAS: ICD-10-CM

## 2024-11-29 PROCEDURE — 93298 REM INTERROG DEV EVAL SCRMS: CPT | Mod: 26,,, | Performed by: STUDENT IN AN ORGANIZED HEALTH CARE EDUCATION/TRAINING PROGRAM

## 2024-12-02 ENCOUNTER — LAB VISIT (OUTPATIENT)
Dept: LAB | Facility: HOSPITAL | Age: 74
End: 2024-12-02
Attending: INTERNAL MEDICINE
Payer: MEDICARE

## 2024-12-02 ENCOUNTER — TELEPHONE (OUTPATIENT)
Dept: PRIMARY CARE CLINIC | Facility: CLINIC | Age: 74
End: 2024-12-02
Payer: MEDICARE

## 2024-12-02 DIAGNOSIS — E78.2 MIXED HYPERLIPIDEMIA: ICD-10-CM

## 2024-12-02 DIAGNOSIS — E03.8 OTHER SPECIFIED HYPOTHYROIDISM: Primary | ICD-10-CM

## 2024-12-02 DIAGNOSIS — Z79.899 ENCOUNTER FOR LONG-TERM (CURRENT) USE OF MEDICATIONS: ICD-10-CM

## 2024-12-02 DIAGNOSIS — E03.8 OTHER SPECIFIED HYPOTHYROIDISM: ICD-10-CM

## 2024-12-02 LAB
ALBUMIN SERPL BCP-MCNC: 3.5 G/DL (ref 3.5–5.2)
ALP SERPL-CCNC: 75 U/L (ref 40–150)
ALT SERPL W/O P-5'-P-CCNC: 14 U/L (ref 10–44)
ANION GAP SERPL CALC-SCNC: 12 MMOL/L (ref 8–16)
AST SERPL-CCNC: 25 U/L (ref 10–40)
BASOPHILS # BLD AUTO: 0.05 K/UL (ref 0–0.2)
BASOPHILS NFR BLD: 0.8 % (ref 0–1.9)
BILIRUB SERPL-MCNC: 0.6 MG/DL (ref 0.1–1)
BUN SERPL-MCNC: 16 MG/DL (ref 8–23)
CALCIUM SERPL-MCNC: 9.9 MG/DL (ref 8.7–10.5)
CHLORIDE SERPL-SCNC: 111 MMOL/L (ref 95–110)
CHOLEST SERPL-MCNC: 196 MG/DL (ref 120–199)
CHOLEST/HDLC SERPL: 2.9 {RATIO} (ref 2–5)
CO2 SERPL-SCNC: 21 MMOL/L (ref 23–29)
CREAT SERPL-MCNC: 1.3 MG/DL (ref 0.5–1.4)
DIFFERENTIAL METHOD BLD: ABNORMAL
EOSINOPHIL # BLD AUTO: 0.3 K/UL (ref 0–0.5)
EOSINOPHIL NFR BLD: 4.2 % (ref 0–8)
ERYTHROCYTE [DISTWIDTH] IN BLOOD BY AUTOMATED COUNT: 14.7 % (ref 11.5–14.5)
EST. GFR  (NO RACE VARIABLE): 43 ML/MIN/1.73 M^2
ESTIMATED AVG GLUCOSE: 100 MG/DL (ref 68–131)
GLUCOSE SERPL-MCNC: 94 MG/DL (ref 70–110)
HBA1C MFR BLD: 5.1 % (ref 4–5.6)
HCT VFR BLD AUTO: 35.3 % (ref 37–48.5)
HDLC SERPL-MCNC: 68 MG/DL (ref 40–75)
HDLC SERPL: 34.7 % (ref 20–50)
HGB BLD-MCNC: 11.6 G/DL (ref 12–16)
IMM GRANULOCYTES # BLD AUTO: 0.02 K/UL (ref 0–0.04)
IMM GRANULOCYTES NFR BLD AUTO: 0.3 % (ref 0–0.5)
LDLC SERPL CALC-MCNC: 103.8 MG/DL (ref 63–159)
LYMPHOCYTES # BLD AUTO: 1.4 K/UL (ref 1–4.8)
LYMPHOCYTES NFR BLD: 23.9 % (ref 18–48)
MCH RBC QN AUTO: 30.9 PG (ref 27–31)
MCHC RBC AUTO-ENTMCNC: 32.9 G/DL (ref 32–36)
MCV RBC AUTO: 94 FL (ref 82–98)
MONOCYTES # BLD AUTO: 0.8 K/UL (ref 0.3–1)
MONOCYTES NFR BLD: 12.6 % (ref 4–15)
NEUTROPHILS # BLD AUTO: 3.5 K/UL (ref 1.8–7.7)
NEUTROPHILS NFR BLD: 58.2 % (ref 38–73)
NONHDLC SERPL-MCNC: 128 MG/DL
NRBC BLD-RTO: 0 /100 WBC
PLATELET # BLD AUTO: 204 K/UL (ref 150–450)
PMV BLD AUTO: 11.1 FL (ref 9.2–12.9)
POTASSIUM SERPL-SCNC: 4.4 MMOL/L (ref 3.5–5.1)
PROT SERPL-MCNC: 7.2 G/DL (ref 6–8.4)
RBC # BLD AUTO: 3.75 M/UL (ref 4–5.4)
SODIUM SERPL-SCNC: 144 MMOL/L (ref 136–145)
T4 FREE SERPL-MCNC: 1.1 NG/DL (ref 0.71–1.51)
TRIGL SERPL-MCNC: 121 MG/DL (ref 30–150)
TSH SERPL DL<=0.005 MIU/L-ACNC: 0.21 UIU/ML (ref 0.4–4)
WBC # BLD AUTO: 5.95 K/UL (ref 3.9–12.7)

## 2024-12-02 PROCEDURE — 84439 ASSAY OF FREE THYROXINE: CPT | Performed by: INTERNAL MEDICINE

## 2024-12-02 PROCEDURE — 80061 LIPID PANEL: CPT | Performed by: INTERNAL MEDICINE

## 2024-12-02 PROCEDURE — 83036 HEMOGLOBIN GLYCOSYLATED A1C: CPT | Performed by: INTERNAL MEDICINE

## 2024-12-02 PROCEDURE — 84443 ASSAY THYROID STIM HORMONE: CPT | Performed by: INTERNAL MEDICINE

## 2024-12-02 PROCEDURE — 80053 COMPREHEN METABOLIC PANEL: CPT | Performed by: INTERNAL MEDICINE

## 2024-12-02 PROCEDURE — 85025 COMPLETE CBC W/AUTO DIFF WBC: CPT | Performed by: INTERNAL MEDICINE

## 2024-12-02 NOTE — TELEPHONE ENCOUNTER
----- Message from Aniya sent at 12/2/2024  8:07 AM CST -----  Contact:   [phone]  Would like to receive medical advice.    Would they like a call back or a response via [facility]:  call back     Additional information:   is calling to find out if pt needs to have labs done today.  They have an appt tomorrow.  He is requesting a call back

## 2024-12-03 ENCOUNTER — OFFICE VISIT (OUTPATIENT)
Dept: PRIMARY CARE CLINIC | Facility: CLINIC | Age: 74
End: 2024-12-03
Payer: MEDICARE

## 2024-12-03 VITALS
HEIGHT: 60 IN | DIASTOLIC BLOOD PRESSURE: 72 MMHG | HEART RATE: 74 BPM | WEIGHT: 136.69 LBS | SYSTOLIC BLOOD PRESSURE: 130 MMHG | BODY MASS INDEX: 26.84 KG/M2 | OXYGEN SATURATION: 98 %

## 2024-12-03 DIAGNOSIS — E78.2 MIXED HYPERLIPIDEMIA: ICD-10-CM

## 2024-12-03 DIAGNOSIS — Z78.0 ASYMPTOMATIC POSTMENOPAUSAL STATE: ICD-10-CM

## 2024-12-03 DIAGNOSIS — F41.9 ANXIETY: ICD-10-CM

## 2024-12-03 DIAGNOSIS — Z96.611 S/P REVERSE TOTAL SHOULDER ARTHROPLASTY, RIGHT: ICD-10-CM

## 2024-12-03 DIAGNOSIS — G43.809 OTHER MIGRAINE WITHOUT STATUS MIGRAINOSUS, NOT INTRACTABLE: ICD-10-CM

## 2024-12-03 DIAGNOSIS — M80.00XD AGE-RELATED OSTEOPOROSIS WITH CURRENT PATHOLOGICAL FRACTURE WITH ROUTINE HEALING: ICD-10-CM

## 2024-12-03 DIAGNOSIS — I10 PRIMARY HYPERTENSION: ICD-10-CM

## 2024-12-03 DIAGNOSIS — I70.0 AORTIC ATHEROSCLEROSIS: ICD-10-CM

## 2024-12-03 DIAGNOSIS — N18.32 STAGE 3B CHRONIC KIDNEY DISEASE: ICD-10-CM

## 2024-12-03 DIAGNOSIS — I63.9 CRYPTOGENIC STROKE: Primary | ICD-10-CM

## 2024-12-03 DIAGNOSIS — N32.81 OVERACTIVE BLADDER: ICD-10-CM

## 2024-12-03 DIAGNOSIS — K21.9 GASTROESOPHAGEAL REFLUX DISEASE WITHOUT ESOPHAGITIS: ICD-10-CM

## 2024-12-03 DIAGNOSIS — E66.3 OVERWEIGHT: ICD-10-CM

## 2024-12-03 DIAGNOSIS — K58.9 IRRITABLE BOWEL SYNDROME, UNSPECIFIED TYPE: ICD-10-CM

## 2024-12-03 DIAGNOSIS — E03.9 HYPOTHYROIDISM, UNSPECIFIED TYPE: ICD-10-CM

## 2024-12-03 PROBLEM — E83.39 HYPOPHOSPHATEMIA: Status: RESOLVED | Noted: 2024-04-24 | Resolved: 2024-12-03

## 2024-12-03 PROBLEM — E03.8 OTHER SPECIFIED HYPOTHYROIDISM: Status: RESOLVED | Noted: 2019-08-28 | Resolved: 2024-12-03

## 2024-12-03 PROBLEM — S06.5XAA SUBDURAL HEMATOMA: Status: RESOLVED | Noted: 2024-06-01 | Resolved: 2024-12-03

## 2024-12-03 PROBLEM — I63.411 EMBOLIC STROKE INVOLVING RIGHT MIDDLE CEREBRAL ARTERY: Status: RESOLVED | Noted: 2024-04-22 | Resolved: 2024-12-03

## 2024-12-03 PROBLEM — G93.6 CYTOTOXIC BRAIN EDEMA: Status: RESOLVED | Noted: 2024-04-22 | Resolved: 2024-12-03

## 2024-12-03 PROBLEM — Z78.9 FALSE POSITIVE CARDIAC STRESS TEST: Status: RESOLVED | Noted: 2020-10-20 | Resolved: 2024-12-03

## 2024-12-03 PROBLEM — E83.42 HYPOMAGNESEMIA: Status: RESOLVED | Noted: 2024-04-24 | Resolved: 2024-12-03

## 2024-12-03 PROBLEM — R06.02 SOB (SHORTNESS OF BREATH): Status: RESOLVED | Noted: 2024-05-14 | Resolved: 2024-12-03

## 2024-12-03 PROBLEM — K59.00 CONSTIPATION: Status: RESOLVED | Noted: 2024-04-24 | Resolved: 2024-12-03

## 2024-12-03 PROBLEM — Z91.81 AT RISK FOR FALLS: Status: RESOLVED | Noted: 2024-09-22 | Resolved: 2024-12-03

## 2024-12-03 PROBLEM — R82.71 ASYMPTOMATIC BACTERIURIA: Status: RESOLVED | Noted: 2024-04-25 | Resolved: 2024-12-03

## 2024-12-03 PROCEDURE — 99999 PR PBB SHADOW E&M-EST. PATIENT-LVL IV: CPT | Mod: PBBFAC,,, | Performed by: INTERNAL MEDICINE

## 2024-12-03 PROCEDURE — 99214 OFFICE O/P EST MOD 30 MIN: CPT | Mod: PBBFAC | Performed by: INTERNAL MEDICINE

## 2024-12-03 NOTE — ASSESSMENT & PLAN NOTE
Sees Dr. Morales.  Taking nortriptyline, didn't do well with higher doses in past.  This regimen has been working well for her.

## 2024-12-03 NOTE — ASSESSMENT & PLAN NOTE
On lipitor 40 mg, no myalgias.  The ASCVD Risk score (Jacob WEST, et al., 2019) failed to calculate for the following reasons:    Risk score cannot be calculated because patient has a medical history suggesting prior/existing ASCVD

## 2024-12-03 NOTE — ADDENDUM NOTE
Addended by: FAITH MARTÍNEZ on: 12/3/2024 08:02 AM     Modules accepted: Orders    
No. CHASITY screening performed.  STOP BANG Legend: 0-2 = LOW Risk; 3-4 = INTERMEDIATE Risk; 5-8 = HIGH Risk

## 2024-12-03 NOTE — PROGRESS NOTES
[facility] Note    Chief Complaint      Chief Complaint   Patient presents with    Follow-up     History of Present Illness      Denise VIVAS Cousins is a 74 y.o. female who presents today for follow up of HTN.  Patient comes to appointment with spouse, John Thackersins.  Cards: Antoine, Neuro: Andrew, GI: Sukhdeep     Cryptogenic stroke 4/2024.  Fall 6/12/24 with SDH, no intervention required.    Problem List Items Addressed This Visit       Age-related osteoporosis with current pathological fracture with routine healing    Current Assessment & Plan     Last prolia 7/2023. Doesn't tolerate fosamax, last DEXA 8/2020.         Anxiety    Overview     On lexapro         Current Assessment & Plan     Stable on Lexapro 20 mg, doing ok on this. No SI/HI/panic attacks.          Aortic atherosclerosis    Current Assessment & Plan     On previous imaging, stable. On statin         Cryptogenic stroke - Primary    Current Assessment & Plan     Loop recorder with Dr. Altman 10/23/24 given hx of cryptogenic stroke.          Gastroesophageal reflux disease without esophagitis    Current Assessment & Plan     Stable on prilosec. No nausea/belching.  Sees Dr. Tarango.         Hypothyroidism    Overview     On synthroid         Current Assessment & Plan     Stable on synthroid 88 mcg daily.  No S/S of hypo/hyperthyroidism.           Irritable bowel syndrome    Current Assessment & Plan     No diarrhea/constipation right now, doing well.         Migraine headache    Current Assessment & Plan     Sees Dr. Morales.  Taking nortriptyline, didn't do well with higher doses in past.  This regimen has been working well for her.         Mixed hyperlipidemia    Current Assessment & Plan     On lipitor 40 mg, no myalgias.  The ASCVD Risk score (Jacob WEST, et al., 2019) failed to calculate for the following reasons:    Risk score cannot be calculated because patient has a medical history suggesting prior/existing ASCVD           Overactive  bladder    Current Assessment & Plan     Insurance no longer covering vesicare, now on ditropan which works ok. Stable.         Overweight    Current Assessment & Plan     Lost a lot of weight after stroke, gained 8 pounds since last visit.         Primary hypertension    Current Assessment & Plan     BP controlled on losartan 50 and coreg 12.5 mg BID.  No CP/SOB/HA.           S/P reverse total shoulder arthroplasty, right    Current Assessment & Plan     Declines further intervention, Lyrica does help.  Not interested in more pain mgmt procedures.         Stage 3b chronic kidney disease    Current Assessment & Plan     GFR 43, C 1.3 on 12/2024 labs.          Other Visit Diagnoses       Asymptomatic postmenopausal state        Relevant Orders    DXA Bone Density Axial Skeleton 1 or more sites                Health Maintenance   Topic Date Due    DEXA Scan  08/28/2023    Lipid Panel  12/02/2029    TETANUS VACCINE  06/01/2034    Hepatitis C Screening  Completed    Shingles Vaccine  Completed    Influenza Vaccine  Completed    COVID-19 Vaccine  Completed    RSV Vaccine (Age 60+ and Pregnant patients)  Completed    Pneumococcal Vaccines (Age 65+)  Completed       Past Medical History:   Diagnosis Date    Acid reflux     Graves disease     diffuse Toxic Goiter    Hypertension     IBS (irritable bowel syndrome)     Lichen sclerosus     Migraine     Stroke        Past Surgical History:   Procedure Laterality Date    CEREBRAL ANGIOGRAM N/A 4/22/2024    Procedure: ANGIOGRAM-CEREBRAL; stroke class A;  Surgeon: Sharon Welch;  Location: [facility];  Service: Anesthesiology;  Laterality: N/A;    EYE SURGERY      HYSTERECTOMY      CINDI for ?? cervical cancer, had normal paps    INSERTION OF IMPLANTABLE LOOP RECORDER N/A 10/23/2024    Procedure: Insertion, Implantable Loop Recorder;  Surgeon: ONEIDA Nicolas MD;  Location: [facility] EP LAB;  Service: Cardiology;  Laterality: N/A;  Cryptogenic Stroke, ILR,BSci, Local, , 3 Prep     LEFT HEART CATHETERIZATION N/A 10/20/2020    Procedure: Left heart cath;  Surgeon: Bony Schultz MD;  Location: [facility] CATH LAB/EP;  Service: Cardiology;  Laterality: N/A;    REVERSE TOTAL SHOULDER ARTHROPLASTY Right 8/19/2022    Procedure: ARTHROPLASTY, SHOULDER, TOTAL, REVERSE;  Surgeon: Bradley Finnegan Jr., MD;  Location: [facility] OR;  Service: Orthopedics;  Laterality: Right;  MARLEY- maddi CONFIRMED/8/18/Shaista Finnegan- hemosorb AT        family history includes Hypertension in her father; Kidney failure in her father; Stomach cancer in her mother.    Social History     Tobacco Use    Smoking status: Never     Passive exposure: Never    Smokeless tobacco: Never   Substance Use Topics    Alcohol use: Yes    Drug use: No       Review of Systems   Constitutional:  Negative for chills and fever.   HENT:  Negative for sore throat.    Respiratory:  Negative for cough and shortness of breath.    Cardiovascular:  Negative for chest pain and palpitations.   Gastrointestinal:  Negative for constipation, diarrhea, nausea and vomiting.   Genitourinary:  Negative for dysuria and hematuria.   Musculoskeletal:  Negative for falls.   Neurological:  Negative for headaches.        Outpatient Encounter Medications as of 12/3/2024   Medication Sig Note Dispense Refill    aspirin (ECOTRIN) 81 MG EC tablet once a day       atorvastatin (LIPITOR) 40 MG tablet TAKE 2 TABLETS ONCE DAILY  180 tablet 3    carvediloL (COREG) 12.5 MG tablet TAKE 1 TABLET TWICE A DAY  180 tablet 3    EScitalopram oxalate (LEXAPRO) 20 MG tablet TAKE 1 TABLET DAILY  90 tablet 3    levothyroxine (SYNTHROID) 88 MCG tablet TAKE 1 TABLET BEFORE BREAKFAST  90 tablet 3    losartan (COZAAR) 50 MG tablet TAKE 1 TABLET DAILY  90 tablet 3    NIFEdipine (PROCARDIA-XL) 30 MG (OSM) 24 hr tablet Take 1 tablet (30 mg total) by mouth once daily.  30 tablet 11    nortriptyline (PAMELOR) 10 MG capsule TAKE 1 CAPSULE DAILY  90 capsule 3    omeprazole (PRILOSEC)  40 MG capsule TAKE 1 CAPSULE DAILY  90 capsule 3    oxybutynin (DITROPAN-XL) 10 MG 24 hr tablet Take 1 tablet (10 mg total) by mouth once daily.  90 tablet 3    pregabalin (LYRICA) 75 MG capsule Take 1 capsule (75 mg total) by mouth 2 (two) times daily.  180 capsule 3    traMADoL (ULTRAM) 50 mg tablet Take 50 mg by mouth every 6 (six) hours as needed for Pain.       VITAMIN D2 1,250 mcg (50,000 unit) capsule TAKE 1 CAPSULE ONCE A WEEK  12 capsule 3    naloxone (NARCAN) 4 mg/actuation Spry 4mg by nasal route as needed for opioid overdose; may repeat every 2-3 minutes in alternating nostrils until medical help arrives. Call 911 (Patient not taking: Reported on 12/3/2024)  1 each 11    [DISCONTINUED] EScitalopram oxalate (LEXAPRO) 20 MG tablet Take 1 tablet (20 mg total) by mouth once daily.  90 tablet 3    [DISCONTINUED] losartan (COZAAR) 50 MG tablet Take 2 tablets (100 mg total) by mouth once daily. PLEASE TAKE THIS INCREASED DOSE UNTIL RE-EVALUATION WITH YOUR PRIMARY CARE PROVIDER FOR FURTHER DISCUSSION OF BLOOD PRESSURE CONTROL (Patient taking differently: Take 50 mg by mouth once daily. PLEASE TAKE THIS INCREASED DOSE UNTIL RE-EVALUATION WITH YOUR PRIMARY CARE PROVIDER FOR FURTHER DISCUSSION OF BLOOD PRESSURE CONTROL) 6/4/2024: 1 tablet once daily 90 tablet 3    [DISCONTINUED] pregabalin (LYRICA) 75 MG capsule Take 1 capsule (75 mg total) by mouth once daily.  90 capsule 1    [DISCONTINUED] promethazine (PHENERGAN) 12.5 MG Tab Take 1 tablet (12.5 mg total) by mouth every 6 (six) hours as needed (nausea and or vomiting). (Patient not taking: Reported on 12/3/2024)  10 tablet 0     No facility-administered encounter medications on file as of 12/3/2024.        Review of patient's allergies indicates:   Allergen Reactions    Phenobarbital Anaphylaxis    Penicillins Other (See Comments)    Phenobarbital sodium     Propylthiouracil     Thyroid      Note: PTU    Topamax [topiramate] Diarrhea     GI symptoms    Cefaclor  Rash and Other (See Comments)    Clopidogrel Hives and Rash    Methimazole Rash and Other (See Comments)    Methimazole (bulk) Rash    Penicillin Rash    Sulfa (sulfonamide antibiotics) Rash and Other (See Comments)       Physical Exam      Vital Signs  Pulse: 74  SpO2: 98 %  BP: 130/72  Pain Score:   5  Pain Loc: Shoulder  Height and Weight  Height: 5' (152.4 cm)  Weight: 62 kg (136 lb 11 oz)  BSA (Calculated - sq m): 1.62 sq meters  BMI (Calculated): 26.7  Weight in (lb) to have BMI = 25: 127.7]    Physical Exam  Constitutional:       Appearance: She is well-developed.   HENT:      Head: Normocephalic and atraumatic.      Right Ear: External ear normal.      Left Ear: External ear normal.   Eyes:      General:         Right eye: No discharge.         Left eye: No discharge.   Cardiovascular:      Rate and Rhythm: Normal rate and regular rhythm.      Heart sounds: Normal heart sounds. No murmur heard.  Pulmonary:      Effort: Pulmonary effort is normal. No respiratory distress.      Breath sounds: Normal breath sounds.   Abdominal:      General: There is no distension.      Palpations: Abdomen is soft.      Tenderness: There is no abdominal tenderness. There is no guarding.   Musculoskeletal:         General: Normal range of motion.      Cervical back: Normal range of motion.   Skin:     General: Skin is warm and dry.   Neurological:      Mental Status: She is alert and oriented to person, place, and time.   Psychiatric:         Behavior: Behavior normal.          Laboratory:  CBC:  Recent Labs   Lab Result Units 12/02/24  1147   WBC K/uL 5.95   RBC M/uL 3.75*   Hemoglobin g/dL 11.6*   Hematocrit % 35.3*   Platelets K/uL 204   MCV fL 94   MCH pg 30.9   MCHC g/dL 32.9         CMP:  Recent Labs   Lab Result Units 12/02/24  1147   Glucose mg/dL 94   Calcium mg/dL 9.9   Albumin g/dL 3.5   Total Protein g/dL 7.2   Sodium mmol/L 144   Potassium mmol/L 4.4   CO2 mmol/L 21*   Chloride mmol/L 111*   BUN mg/dL 16   Alkaline  "Phosphatase U/L 75   ALT U/L 14   AST U/L 25   Total Bilirubin mg/dL 0.6         URINALYSIS:  No results for input(s): "COLORU", "CLARITYU", "SPECGRAV", "PHUR", "PROTEINUA", "GLUCOSEU", "BILIRUBINCON", "BLOODU", "WBCU", "RBCU", "BACTERIA", "MUCUS", "NITRITE", "LEUKOCYTESUR", "UROBILINOGEN", "HYALINECASTS" in the last 2160 hours.     LIPIDS:  Recent Labs   Lab Result Units 12/02/24  1147   TSH uIU/mL 0.207*   HDL mg/dL 68   Cholesterol mg/dL 196   Triglycerides mg/dL 121   LDL Cholesterol mg/dL 103.8   HDL/Cholesterol Ratio % 34.7   Non-HDL Cholesterol mg/dL 128   Total Cholesterol/HDL Ratio  2.9       TSH:  Recent Labs   Lab Result Units 12/02/24  1147   TSH uIU/mL 0.207*       A1C:  Recent Labs   Lab Result Units 12/02/24  1147   Hemoglobin A1C % 5.1         Radiology:  No results found in the last 30 days.     Assessment/Plan     Denise Berrios is a 74 y.o.female with:    1. Cryptogenic stroke    2. Other migraine without status migrainosus, not intractable    3. Anxiety    4. Primary hypertension    5. Aortic atherosclerosis    6. Mixed hyperlipidemia    7. Overactive bladder    8. Hypothyroidism, unspecified type    9. Gastroesophageal reflux disease without esophagitis    10. Age-related osteoporosis with current pathological fracture with routine healing    11. Stage 3b chronic kidney disease    12. Asymptomatic postmenopausal state  - DXA Bone Density Axial Skeleton 1 or more sites; Future  - DXA Bone Density Axial Skeleton 1 or more sites    13. S/P reverse total shoulder arthroplasty, right    14. Irritable bowel syndrome, unspecified type    15. Overweight             -Continue current medications and maintain follow up with specialists.    -No follow-ups on file.       lAeksandra Carey MD  [facility]                          "

## 2024-12-04 LAB
OHS CV AF BURDEN PERCENT: < 1
OHS CV DC REMOTE DEVICE TYPE: NORMAL

## 2024-12-29 ENCOUNTER — HOSPITAL ENCOUNTER (INPATIENT)
Facility: HOSPITAL | Age: 74
LOS: 1 days | Discharge: HOME | DRG: 065 | End: 2024-12-31
Attending: STUDENT IN AN ORGANIZED HEALTH CARE EDUCATION/TRAINING PROGRAM | Admitting: PSYCHIATRY & NEUROLOGY
Payer: MEDICARE

## 2024-12-29 DIAGNOSIS — R29.898 LEFT LEG WEAKNESS: Primary | ICD-10-CM

## 2024-12-29 DIAGNOSIS — R29.818 ACUTE FOCAL NEUROLOGICAL DEFICIT: ICD-10-CM

## 2024-12-29 DIAGNOSIS — I63.9 STROKE: ICD-10-CM

## 2024-12-29 LAB
ALBUMIN SERPL BCP-MCNC: 3.6 G/DL (ref 3.5–5.2)
ALP SERPL-CCNC: 85 U/L (ref 40–150)
ALT SERPL W/O P-5'-P-CCNC: 23 U/L (ref 10–44)
ANION GAP SERPL CALC-SCNC: 13 MMOL/L (ref 8–16)
AST SERPL-CCNC: 29 U/L (ref 10–40)
BASOPHILS # BLD AUTO: 0.02 K/UL (ref 0–0.2)
BASOPHILS NFR BLD: 0.3 % (ref 0–1.9)
BILIRUB SERPL-MCNC: 0.3 MG/DL (ref 0.1–1)
BUN SERPL-MCNC: 22 MG/DL (ref 8–23)
CALCIUM SERPL-MCNC: 9.5 MG/DL (ref 8.7–10.5)
CHLORIDE SERPL-SCNC: 109 MMOL/L (ref 95–110)
CHOLEST SERPL-MCNC: 197 MG/DL (ref 120–199)
CHOLEST/HDLC SERPL: 3 {RATIO} (ref 2–5)
CO2 SERPL-SCNC: 22 MMOL/L (ref 23–29)
CREAT SERPL-MCNC: 1.3 MG/DL (ref 0.5–1.4)
DIFFERENTIAL METHOD BLD: ABNORMAL
EOSINOPHIL # BLD AUTO: 0.2 K/UL (ref 0–0.5)
EOSINOPHIL NFR BLD: 2.7 % (ref 0–8)
ERYTHROCYTE [DISTWIDTH] IN BLOOD BY AUTOMATED COUNT: 14.5 % (ref 11.5–14.5)
EST. GFR  (NO RACE VARIABLE): 42.6 ML/MIN/1.73 M^2
GLUCOSE SERPL-MCNC: 79 MG/DL (ref 70–110)
HCT VFR BLD AUTO: 38.6 % (ref 37–48.5)
HDLC SERPL-MCNC: 65 MG/DL (ref 40–75)
HDLC SERPL: 33 % (ref 20–50)
HGB BLD-MCNC: 12.5 G/DL (ref 12–16)
IMM GRANULOCYTES # BLD AUTO: 0.02 K/UL (ref 0–0.04)
IMM GRANULOCYTES NFR BLD AUTO: 0.3 % (ref 0–0.5)
INR PPP: 1 (ref 0.8–1.2)
LDLC SERPL CALC-MCNC: 94.6 MG/DL (ref 63–159)
LYMPHOCYTES # BLD AUTO: 2.4 K/UL (ref 1–4.8)
LYMPHOCYTES NFR BLD: 34.3 % (ref 18–48)
MCH RBC QN AUTO: 31.4 PG (ref 27–31)
MCHC RBC AUTO-ENTMCNC: 32.4 G/DL (ref 32–36)
MCV RBC AUTO: 97 FL (ref 82–98)
MONOCYTES # BLD AUTO: 0.7 K/UL (ref 0.3–1)
MONOCYTES NFR BLD: 10.2 % (ref 4–15)
NEUTROPHILS # BLD AUTO: 3.6 K/UL (ref 1.8–7.7)
NEUTROPHILS NFR BLD: 52.2 % (ref 38–73)
NONHDLC SERPL-MCNC: 132 MG/DL
NRBC BLD-RTO: 0 /100 WBC
PLATELET # BLD AUTO: 219 K/UL (ref 150–450)
PMV BLD AUTO: 10.9 FL (ref 9.2–12.9)
POTASSIUM SERPL-SCNC: 4 MMOL/L (ref 3.5–5.1)
PROT SERPL-MCNC: 7.5 G/DL (ref 6–8.4)
PROTHROMBIN TIME: 10.6 SEC (ref 9–12.5)
RBC # BLD AUTO: 3.98 M/UL (ref 4–5.4)
SARS-COV-2 RDRP RESP QL NAA+PROBE: NEGATIVE
SODIUM SERPL-SCNC: 144 MMOL/L (ref 136–145)
TRIGL SERPL-MCNC: 187 MG/DL (ref 30–150)
TSH SERPL DL<=0.005 MIU/L-ACNC: 0.46 UIU/ML (ref 0.4–4)
WBC # BLD AUTO: 6.93 K/UL (ref 3.9–12.7)

## 2024-12-29 PROCEDURE — 93005 ELECTROCARDIOGRAM TRACING: CPT

## 2024-12-29 PROCEDURE — 94761 N-INVAS EAR/PLS OXIMETRY MLT: CPT

## 2024-12-29 PROCEDURE — 84443 ASSAY THYROID STIM HORMONE: CPT

## 2024-12-29 PROCEDURE — 99285 EMERGENCY DEPT VISIT HI MDM: CPT | Mod: 25

## 2024-12-29 PROCEDURE — 80053 COMPREHEN METABOLIC PANEL: CPT

## 2024-12-29 PROCEDURE — 85025 COMPLETE CBC W/AUTO DIFF WBC: CPT

## 2024-12-29 PROCEDURE — 85610 PROTHROMBIN TIME: CPT

## 2024-12-29 PROCEDURE — 93010 ELECTROCARDIOGRAM REPORT: CPT | Mod: ,,, | Performed by: INTERNAL MEDICINE

## 2024-12-29 PROCEDURE — 87635 SARS-COV-2 COVID-19 AMP PRB: CPT | Performed by: STUDENT IN AN ORGANIZED HEALTH CARE EDUCATION/TRAINING PROGRAM

## 2024-12-29 PROCEDURE — 80061 LIPID PANEL: CPT

## 2024-12-30 LAB
ASCENDING AORTA: 2.88 CM
AV AREA BY CONTINUOUS VTI: 2.2 CM2
AV INDEX (PROSTH): 0.74
AV LVOT MEAN GRADIENT: 2 MMHG
AV LVOT PEAK GRADIENT: 4 MMHG
AV MEAN GRADIENT: 2.7 MMHG
AV PEAK GRADIENT: 4.8 MMHG
AV VALVE AREA BY VELOCITY RATIO: 2.6 CM²
AV VALVE AREA: 2.1 CM2
AV VELOCITY RATIO: 0.91
BSA FOR ECHO PROCEDURE: 1.7 M2
CV ECHO LV RWT: 0.47 CM
DOP CALC AO PEAK VEL: 1.1 M/S
DOP CALC AO VTI: 24.1 CM
DOP CALC LVOT AREA: 2.8 CM2
DOP CALC LVOT DIAMETER: 1.9 CM
DOP CALC LVOT PEAK VEL: 1 M/S
DOP CALC LVOT STROKE VOLUME: 50.7 CM3
DOP CALCLVOT PEAK VEL VTI: 17.9 CM
E WAVE DECELERATION TIME: 155.43 MS
E/A RATIO: 0.68
E/E' RATIO: 14.6 M/S
ECHO EF ESTIMATED: 43 %
ECHO LV POSTERIOR WALL: 1 CM (ref 0.6–1.1)
EJECTION FRACTION: 55 %
FRACTIONAL SHORTENING: 20.9 % (ref 28–44)
INTERVENTRICULAR SEPTUM: 0.9 CM (ref 0.6–1.1)
IVRT: 137.01 MS
LA MAJOR: 4.61 CM
LA MINOR: 4.89 CM
LA WIDTH: 3.63 CM
LEFT ATRIUM SIZE: 4 CM
LEFT ATRIUM VOLUME INDEX MOD: 37.8 ML/M2
LEFT ATRIUM VOLUME INDEX: 35.5 ML/M2
LEFT ATRIUM VOLUME MOD: 62.45 ML
LEFT ATRIUM VOLUME: 58.57 CM3
LEFT INTERNAL DIMENSION IN SYSTOLE: 3.4 CM (ref 2.1–4)
LEFT VENTRICLE DIASTOLIC VOLUME INDEX: 51.48 ML/M2
LEFT VENTRICLE DIASTOLIC VOLUME: 84.94 ML
LEFT VENTRICLE MASS INDEX: 80.4 G/M2
LEFT VENTRICLE SYSTOLIC VOLUME INDEX: 29.2 ML/M2
LEFT VENTRICLE SYSTOLIC VOLUME: 48.11 ML
LEFT VENTRICULAR INTERNAL DIMENSION IN DIASTOLE: 4.3 CM (ref 3.5–6)
LEFT VENTRICULAR MASS: 132.7 G
LV LATERAL E/E' RATIO: 14.6
LV SEPTAL E/E' RATIO: 14.6
MV PEAK A VEL: 1.07 M/S
MV PEAK E VEL: 0.73 M/S
OHS CV RV/LV RATIO: 0.74 CM
OHS QRS DURATION: 76 MS
OHS QTC CALCULATION: 475 MS
RA MAJOR: 4.01 CM
RA PRESSURE ESTIMATED: 3 MMHG
RA WIDTH: 3.18 CM
RIGHT VENTRICLE DIASTOLIC BASEL DIMENSION: 3.2 CM
RV TISSUE DOPPLER FREE WALL SYSTOLIC VELOCITY 1 (APICAL 4 CHAMBER VIEW): 15.04 CM/S
SINUS: 3.25 CM
STJ: 2.82 CM
TDI LATERAL: 0.05 M/S
TDI SEPTAL: 0.05 M/S
TDI: 0.05 M/S
TRICUSPID ANNULAR PLANE SYSTOLIC EXCURSION: 2.25 CM
Z-SCORE OF LEFT VENTRICULAR DIMENSION IN END DIASTOLE: -0.74
Z-SCORE OF LEFT VENTRICULAR DIMENSION IN END SYSTOLE: 1.34

## 2024-12-30 PROCEDURE — 25000003 PHARM REV CODE 250: Performed by: NURSE PRACTITIONER

## 2024-12-30 PROCEDURE — 99223 1ST HOSP IP/OBS HIGH 75: CPT | Mod: AI,,, | Performed by: PSYCHIATRY & NEUROLOGY

## 2024-12-30 PROCEDURE — 92523 SPEECH SOUND LANG COMPREHEN: CPT

## 2024-12-30 PROCEDURE — 11000001 HC ACUTE MED/SURG PRIVATE ROOM

## 2024-12-30 PROCEDURE — 97530 THERAPEUTIC ACTIVITIES: CPT

## 2024-12-30 PROCEDURE — 25000003 PHARM REV CODE 250

## 2024-12-30 PROCEDURE — 63600175 PHARM REV CODE 636 W HCPCS: Performed by: NURSE PRACTITIONER

## 2024-12-30 PROCEDURE — 92610 EVALUATE SWALLOWING FUNCTION: CPT

## 2024-12-30 PROCEDURE — 97165 OT EVAL LOW COMPLEX 30 MIN: CPT

## 2024-12-30 RX ORDER — LOSARTAN POTASSIUM 50 MG/1
50 TABLET ORAL DAILY
Status: DISCONTINUED | OUTPATIENT
Start: 2024-12-30 | End: 2024-12-31 | Stop reason: HOSPADM

## 2024-12-30 RX ORDER — ASPIRIN 81 MG/1
81 TABLET ORAL DAILY
Status: DISCONTINUED | OUTPATIENT
Start: 2024-12-30 | End: 2024-12-30

## 2024-12-30 RX ORDER — LEVOTHYROXINE SODIUM 88 UG/1
88 TABLET ORAL
Status: DISCONTINUED | OUTPATIENT
Start: 2024-12-31 | End: 2024-12-31 | Stop reason: HOSPADM

## 2024-12-30 RX ORDER — HEPARIN SODIUM 5000 [USP'U]/ML
5000 INJECTION, SOLUTION INTRAVENOUS; SUBCUTANEOUS EVERY 8 HOURS
Status: DISCONTINUED | OUTPATIENT
Start: 2024-12-30 | End: 2024-12-30

## 2024-12-30 RX ORDER — PREGABALIN 75 MG/1
75 CAPSULE ORAL 2 TIMES DAILY
Status: DISCONTINUED | OUTPATIENT
Start: 2024-12-30 | End: 2024-12-31 | Stop reason: HOSPADM

## 2024-12-30 RX ORDER — CARVEDILOL 12.5 MG/1
12.5 TABLET ORAL
Status: COMPLETED | OUTPATIENT
Start: 2024-12-30 | End: 2024-12-30

## 2024-12-30 RX ORDER — SODIUM CHLORIDE 0.9 % (FLUSH) 0.9 %
10 SYRINGE (ML) INJECTION
Status: DISCONTINUED | OUTPATIENT
Start: 2024-12-30 | End: 2024-12-31 | Stop reason: HOSPADM

## 2024-12-30 RX ORDER — OXYBUTYNIN CHLORIDE 10 MG/1
10 TABLET, EXTENDED RELEASE ORAL DAILY
Status: DISCONTINUED | OUTPATIENT
Start: 2024-12-31 | End: 2024-12-31 | Stop reason: HOSPADM

## 2024-12-30 RX ORDER — BISACODYL 10 MG/1
10 SUPPOSITORY RECTAL DAILY PRN
Status: DISCONTINUED | OUTPATIENT
Start: 2024-12-30 | End: 2024-12-31 | Stop reason: HOSPADM

## 2024-12-30 RX ORDER — ATORVASTATIN CALCIUM 40 MG/1
40 TABLET, FILM COATED ORAL DAILY
Status: DISCONTINUED | OUTPATIENT
Start: 2024-12-30 | End: 2024-12-31

## 2024-12-30 RX ORDER — NORTRIPTYLINE HYDROCHLORIDE 10 MG/1
10 CAPSULE ORAL NIGHTLY
Status: DISCONTINUED | OUTPATIENT
Start: 2024-12-30 | End: 2024-12-31 | Stop reason: HOSPADM

## 2024-12-30 RX ORDER — ESCITALOPRAM OXALATE 20 MG/1
20 TABLET ORAL DAILY
Status: DISCONTINUED | OUTPATIENT
Start: 2024-12-30 | End: 2024-12-31 | Stop reason: HOSPADM

## 2024-12-30 RX ADMIN — CARVEDILOL 12.5 MG: 12.5 TABLET, FILM COATED ORAL at 03:12

## 2024-12-30 RX ADMIN — ESCITALOPRAM OXALATE 20 MG: 20 TABLET ORAL at 05:12

## 2024-12-30 RX ADMIN — ASPIRIN 81 MG: 81 TABLET, COATED ORAL at 08:12

## 2024-12-30 RX ADMIN — HEPARIN SODIUM 5000 UNITS: 5000 INJECTION, SOLUTION INTRAVENOUS; SUBCUTANEOUS at 03:12

## 2024-12-30 RX ADMIN — LOSARTAN POTASSIUM 50 MG: 50 TABLET, FILM COATED ORAL at 08:12

## 2024-12-30 RX ADMIN — ATORVASTATIN CALCIUM 40 MG: 40 TABLET, FILM COATED ORAL at 08:12

## 2024-12-30 RX ADMIN — PREGABALIN 75 MG: 75 CAPSULE ORAL at 10:12

## 2024-12-30 RX ADMIN — NORTRIPTYLINE HYDROCHLORIDE 10 MG: 10 CAPSULE ORAL at 10:12

## 2024-12-30 RX ADMIN — APIXABAN 5 MG: 5 TABLET, FILM COATED ORAL at 10:12

## 2024-12-30 NOTE — PT/OT/SLP EVAL
"Occupational Therapy   Evaluation    Name: Denise Berrios  MRN: [id number]  Admitting Diagnosis: History of cerebral infarction  Recent Surgery: * No surgery found *      Recommendations:     Discharge Recommendations: No Therapy Indicated  Discharge Equipment Recommendations:  none  Barriers to discharge:  None    Assessment:     Denise Berrios is a 74 y.o. female with a medical diagnosis of History of cerebral infarction.  She presents with performance deficits affecting function: impaired self care skills, impaired functional mobility.      Rehab Prognosis: Good; patient would benefit from acute skilled OT services to address these deficits and reach maximum level of function.       Plan:     Patient to be seen 2 x/week to address the above listed problems via therapeutic exercises, self-care/home management  Plan of Care Expires: 01/28/25  Plan of Care Reviewed with: patient    Subjective     Patient:  "I hadn't been feeling myself; tired for 2 days and then my speech was difficult for him to understand and my left leg would get weak intermittently."    Occupational Profile:  Patient resides in [place] with her  in 2 story home with bed room on 2nd floor.  Patient is right handed.  PTA patient independent with ADLs, not driving.  Retired RN.  Hobbies: TV.  DME: walker, tub bench.  Reports sedentary lifestyle x 2 years since shoulder replacement.     Pain/Comfort:  Pain Rating 1: 0/10  Pain Rating Post-Intervention 1: 0/10    Patients cultural, spiritual, Religion conflicts given the current situation: no    Objective:     Communicated with: Nurse prior to session.  Patient found supine with bed alarm, peripheral IV, telemetry, SCD upon OT entry to room.    General Precautions: Standard, aspiration, fall  Orthopedic Precautions: N/A  Braces: N/A  Respiratory Status: Room air    Occupational Performance:    Bed Mobility:    Patient completed Rolling/Turning to Left with  supervision  Patient completed " Rolling/Turning to Right with supervision  Patient completed Supine to Sit with supervision  Patient completed Sit to Supine with supervision    Functional Mobility/Transfers:  Patient completed Sit <> Stand Transfer with supervision  with  no assistive device   Patient completed Bed <> Chair Transfer using Stand Pivot technique with supervision with no assistive device    Activities of Daily Living:  Grooming: supervision    Upper Body Dressing: supervision    Lower Body Dressing: supervision    Toileting: supervision      Cognitive/Visual Perceptual:  Cognitive/Psychosocial Skills:     -       Oriented to: Person, Place, Time, and Situation   -       Follows Commands/attention:Follows one-step commands  -       Communication: dysarthria    Physical Exam:  Postural examination/scapula alignment:    -       Rounded shoulders  Upper Extremity Range of Motion:     -       Right Upper Extremity: WNL  -       Left Upper Extremity: WNL  Upper Extremity Strength:    -       Right Upper Extremity: WNL  -       Left Upper Extremity: WNL    AMPAC 6 Click ADL:  AMPAC Total Score: 18    Treatment & Education:  Patient alert and oriented x 3; able to follow 4/4 one step commands.  Patient attentive and interactive throughout the session.      Modified Casey Scale:  1/6.    Patient left supine with all lines intact and call button in reach    GOALS:   Multidisciplinary Problems       Occupational Therapy Goals          Problem: Occupational Therapy    Goal Priority Disciplines Outcome Interventions   Occupational Therapy Goal     OT, PT/OT Progressing    Description: Goals set 12/30 with an expiration date, 1/28:  Patient will increase functional independence with ADLs by performing:  Patient will demonstrate lower body dressing with modified independence.   Not met  Patient and/or patient's family will verbalize understanding of stroke prevention guidelines, personal risk factors and stroke warning signs via teachback method.   Not met                                History:     Past Medical History:   Diagnosis Date    Acid reflux     Graves disease     diffuse Toxic Goiter    Hypertension     IBS (irritable bowel syndrome)     Lichen sclerosus     Migraine     Stroke          Past Surgical History:   Procedure Laterality Date    CEREBRAL ANGIOGRAM N/A 4/22/2024    Procedure: ANGIOGRAM-CEREBRAL; stroke class A;  Surgeon: Sharon Welch;  Location: [facility];  Service: Anesthesiology;  Laterality: N/A;    EYE SURGERY      HYSTERECTOMY      CINDI for ?? cervical cancer, had normal paps    INSERTION OF IMPLANTABLE LOOP RECORDER N/A 10/23/2024    Procedure: Insertion, Implantable Loop Recorder;  Surgeon: ONEIDA Nicolas MD;  Location: [facility] EP LAB;  Service: Cardiology;  Laterality: N/A;  Cryptogenic Stroke, ILR,BSci, Local, EH, 3 Prep    LEFT HEART CATHETERIZATION N/A 10/20/2020    Procedure: Left heart cath;  Surgeon: Bony Schultz MD;  Location: [facility] CATH LAB/EP;  Service: Cardiology;  Laterality: N/A;    REVERSE TOTAL SHOULDER ARTHROPLASTY Right 8/19/2022    Procedure: ARTHROPLASTY, SHOULDER, TOTAL, REVERSE;  Surgeon: Bradley Finnegan Jr., MD;  Location: [facility] OR;  Service: Orthopedics;  Laterality: Right;  SONIA linda CONFIRMED/8/18/Shaista Finnegan- hemosorb AT        Time Tracking:     OT Date of Treatment: 12/30/24  OT Start Time: 1114  OT Stop Time: 1130  OT Total Time (min): 16 min    Billable Minutes:Evaluation 8  Therapeutic Activity 8    12/30/2024

## 2024-12-30 NOTE — NURSING
Patient Transferred to [facility] Room 927 @11.00      Upon arrival to the floor, patient greeted and oriented to room. Complete head to toe assessment completed per protocol. VSS, see flowsheet for details. Neuro assessment completed. Primary team notified of patient's transfer to floor. All current and transfer orders reviewed/reconciled per protocol. All emergency equipment set up in patient's room. Fall/seizure precautions initiated per providers orders. 4 Eyes skin assessment performed, see below for details. Reviewed assessment and rounding frequency with patient and family. Questions were encouraged and addressed. Repositioned patient for comfort with bed locked in lowest position, side rails up x 3, bed alarm set, and call light within reach. Instructed patient to call staff for mobility/assistance, verbalized understanding. No acute signs of distress noted at this time.  NIHSS assessment completed on floor arrival. Patient's NIHSS score is 2 at this time. SCDs applied to patient per provider's orders. Allred bedside swallow screen completed per stroke protocol. Patient has passed allred bedside swallow screen, team notified of results. Stroke book individualized to patient and given to patient upon transfer. Reviewed stroke book with the patient at the bedside, see education flowsheets for details.

## 2024-12-30 NOTE — CONSULTS
Inpatient consult to Physical Medicine Rehab  Consult performed by: Vanessa Mederos NP  Consult ordered by: Pratik Eric NP  Reason for consult: Rehab      Consult received.     BARBI Lutz, FNP-C  Physical Medicine & Rehabilitation   12/30/2024

## 2024-12-30 NOTE — SUBJECTIVE & OBJECTIVE
Past Medical History:   Diagnosis Date    Acid reflux     Graves disease     diffuse Toxic Goiter    Hypertension     IBS (irritable bowel syndrome)     Lichen sclerosus     Migraine     Stroke      Past Surgical History:   Procedure Laterality Date    CEREBRAL ANGIOGRAM N/A 4/22/2024    Procedure: ANGIOGRAM-CEREBRAL; stroke class A;  Surgeon: Sharon Welch;  Location: [facility];  Service: Anesthesiology;  Laterality: N/A;    EYE SURGERY      HYSTERECTOMY      CINDI for ?? cervical cancer, had normal paps    INSERTION OF IMPLANTABLE LOOP RECORDER N/A 10/23/2024    Procedure: Insertion, Implantable Loop Recorder;  Surgeon: ONEIDA Nicolas MD;  Location: [facility] EP LAB;  Service: Cardiology;  Laterality: N/A;  Cryptogenic Stroke, ILR,BSci, Local, EH, 3 Prep    LEFT HEART CATHETERIZATION N/A 10/20/2020    Procedure: Left heart cath;  Surgeon: Bony Schultz MD;  Location: [facility] CATH LAB/EP;  Service: Cardiology;  Laterality: N/A;    REVERSE TOTAL SHOULDER ARTHROPLASTY Right 8/19/2022    Procedure: ARTHROPLASTY, SHOULDER, TOTAL, REVERSE;  Surgeon: Bradley Finnegan Jr., MD;  Location: [facility] OR;  Service: Orthopedics;  Laterality: Right;  SONIA wynnle CONFIRMED/8/18/Shaista Finnegan- hemosorb AT      Social History     Tobacco Use    Smoking status: Never     Passive exposure: Never    Smokeless tobacco: Never   Substance Use Topics    Alcohol use: Yes    Drug use: No     Review of patient's allergies indicates:   Allergen Reactions    Phenobarbital Anaphylaxis    Penicillins Other (See Comments)    Phenobarbital sodium     Propylthiouracil     Thyroid      Note: PTU    Topamax [topiramate] Diarrhea     GI symptoms    Cefaclor Rash and Other (See Comments)    Clopidogrel Hives and Rash    Methimazole Rash and Other (See Comments)    Methimazole (bulk) Rash    Penicillin Rash    Sulfa (sulfonamide antibiotics) Rash and Other (See Comments)       Medications: I have reviewed the current medication  administration record.    (Not in a hospital admission)      Review of Systems   Constitutional:  Positive for activity change.   Respiratory:  Negative for shortness of breath and wheezing.    Cardiovascular:  Negative for palpitations.   Gastrointestinal:  Negative for vomiting.   Musculoskeletal:  Positive for gait problem.   Neurological:  Positive for facial asymmetry and speech difficulty.   Psychiatric/Behavioral:  Positive for confusion and decreased concentration.      Objective:     Vital Signs (Most Recent):  Temp: 98.4 °F (36.9 °C) (12/30/24 0455)  Pulse: 68 (12/30/24 0630)  Resp: 18 (12/30/24 0630)  BP: (!) 194/79 (12/30/24 0600)  SpO2: 95 % (12/30/24 0630)    Vital Signs Range (Last 24H):  Temp:  [98.1 °F (36.7 °C)-99.2 °F (37.3 °C)]   Pulse:  [68-81]   Resp:  [16-20]   BP: (151-194)/(69-88)   SpO2:  [93 %-98 %]        Physical Exam  HENT:      Head: Normocephalic and atraumatic.      Nose: Nose normal.   Cardiovascular:      Rate and Rhythm: Normal rate.   Pulmonary:      Effort: Pulmonary effort is normal.   Abdominal:      General: Abdomen is flat.   Musculoskeletal:         General: Normal range of motion.      Cervical back: Normal range of motion.   Skin:     General: Skin is warm.   Neurological:      Mental Status: She is alert.              Neurological Exam:   LOC: alert  Attention Span: Good   Language: No aphasia  Articulation: Dysarthria  Orientation: Person, Place, Time   Visual Fields: Full  EOM (CN III, IV, VI): Full/intact  Pupils (CN II, III): PERRL  Facial Sensation (CN V): Normal  Facial Movement (CN VII): Lower facial weakness on the Left  Motor: Arm left  Normal 5/5  Leg left  Normal 5/5  Arm right  Normal 5/5  Leg right Normal 5/5  Cerebellum: No evidence of appendicular or axial ataxia  Sensation: Intact to light touch, temperature and vibration  Tone: Normal tone throughout      Laboratory:  CMP:   Recent Labs   Lab 12/29/24 2103   CALCIUM 9.5   ALBUMIN 3.6   PROT 7.5     "  K 4.0   CO2 22*      BUN 22   CREATININE 1.3   ALKPHOS 85   ALT 23   AST 29   BILITOT 0.3     CBC:   Recent Labs   Lab 12/29/24 2104   WBC 6.93   RBC 3.98*   HGB 12.5   HCT 38.6      MCV 97   MCH 31.4*   MCHC 32.4     Lipid Panel:   Recent Labs   Lab 12/29/24 2104   CHOL 197   LDLCALC 94.6   HDL 65   TRIG 187*     Coagulation:   Recent Labs   Lab 12/29/24 2104   INR 1.0     Hgb A1C: No results for input(s): "HGBA1C" in the last 168 hours.  TSH:   Recent Labs   Lab 12/29/24 2104   TSH 0.455       Diagnostic Results:      Brain imaging:    MRI brain 12/30/24    Small focus of cortical based restricted diffusion T2 weighted signal abnormality is noted in the parasagittal posterior right frontal lobe. Finding would be in keeping with small focus of acute to early subacute ischemia. Focal encephalitis would be considered less likely unless clinically suspected.       CT head 12/29/24    1. Redemonstration of multifocal regions of prior supratentorial infarct. No compelling CT evidence of acute intracranial abnormality. However, if there is high clinical concern for acute ischemia, further assessment with MRI is advised if there are no clinical contraindications.     Vessel Imaging:      Cardiac Evaluation:       "

## 2024-12-30 NOTE — PLAN OF CARE
OT evaluation completed.  Problem: Occupational Therapy  Goal: Occupational Therapy Goal  Description: Goals set 12/30 with an expiration date, 1/28:  Patient will increase functional independence with ADLs by performing:  Patient will demonstrate lower body dressing with modified independence.   Not met  Patient and/or patient's family will verbalize understanding of stroke prevention guidelines, personal risk factors and stroke warning signs via teachback method.  Not met           Outcome: Progressing

## 2024-12-30 NOTE — ED PROVIDER NOTES
Encounter Date: 12/29/2024       History     Chief Complaint   Patient presents with    Extremity Weakness     LLE weakness,  states slow to answer and gait instability x3 days. Hx stroke April with residual deficits.     PT is 74-year-old female with a past medical history of stroke residual left-sided facial droop and dysarthria, IBS, Graves disease, migraines that presents to the emergency department for left lower extremity weakness.  Last known normal 4 days prior.   has noticed the patient has a slower gait and favoring the left side.  She states that she is also having some word-finding difficulties.  She denies any recent traumas, infectious symptoms such as fevers, cough, congestion.        Review of patient's allergies indicates:   Allergen Reactions    Phenobarbital Anaphylaxis    Penicillins Other (See Comments)    Phenobarbital sodium     Propylthiouracil     Thyroid      Note: PTU    Topamax [topiramate] Diarrhea     GI symptoms    Cefaclor Rash and Other (See Comments)    Clopidogrel Hives and Rash    Methimazole Rash and Other (See Comments)    Methimazole (bulk) Rash    Penicillin Rash    Sulfa (sulfonamide antibiotics) Rash and Other (See Comments)     Past Medical History:   Diagnosis Date    Acid reflux     Graves disease     diffuse Toxic Goiter    Hypertension     IBS (irritable bowel syndrome)     Lichen sclerosus     Migraine     Stroke      Past Surgical History:   Procedure Laterality Date    CEREBRAL ANGIOGRAM N/A 4/22/2024    Procedure: ANGIOGRAM-CEREBRAL; stroke class A;  Surgeon: Sharon Welch;  Location: [facility];  Service: Anesthesiology;  Laterality: N/A;    EYE SURGERY      HYSTERECTOMY      CINDI for ?? cervical cancer, had normal paps    INSERTION OF IMPLANTABLE LOOP RECORDER N/A 10/23/2024    Procedure: Insertion, Implantable Loop Recorder;  Surgeon: ONEIDA Nicolas MD;  Location: [facility] EP LAB;  Service: Cardiology;  Laterality: N/A;  Cryptogenic Stroke, ILR,BSci,  Local, EH, 3 Prep    LEFT HEART CATHETERIZATION N/A 10/20/2020    Procedure: Left heart cath;  Surgeon: Bony Schultz MD;  Location: [facility] CATH LAB/EP;  Service: Cardiology;  Laterality: N/A;    REVERSE TOTAL SHOULDER ARTHROPLASTY Right 8/19/2022    Procedure: ARTHROPLASTY, SHOULDER, TOTAL, REVERSE;  Surgeon: Bradley Finnegan Jr., MD;  Location: [facility] OR;  Service: Orthopedics;  Laterality: Right;  MARLEY- maddi CONFIRMED/8/18/Shaista Finnegan- hemosorb AT      Family History   Problem Relation Name Age of Onset    Kidney failure Father      Hypertension Father      Stomach cancer Mother      Breast cancer Neg Hx       Social History     Tobacco Use    Smoking status: Never     Passive exposure: Never    Smokeless tobacco: Never   Substance Use Topics    Alcohol use: Yes    Drug use: No     Review of Systems    Physical Exam     Initial Vitals [12/29/24 2013]   BP Pulse Resp Temp SpO2   (!) 154/69 80 20 98.1 °F (36.7 °C) 95 %      MAP       --         Physical Exam    Constitutional: She appears well-developed and well-nourished. No distress.   HENT:   Head: Normocephalic and atraumatic.   Eyes: EOM are normal. Pupils are equal, round, and reactive to light.   Neck: Neck supple.   Normal range of motion.  Cardiovascular:  Normal rate, regular rhythm and intact distal pulses.           Pulmonary/Chest: Breath sounds normal. No respiratory distress. She has no wheezes. She has no rhonchi. She has no rales.   Abdominal: Abdomen is soft. She exhibits no distension. There is no abdominal tenderness. There is no rebound and no guarding.   Musculoskeletal:      Cervical back: Normal range of motion and neck supple.     Neurological: She is alert and oriented to person, place, and time. She has normal strength. No sensory deficit.   Strength is 5/5 bilateral lower extremities.  No sensation deficits.  Mild dysarthria but says that it is baseline for her.  Left-sided facial droop which is unchanged for her.    Skin: Skin is warm and dry. Capillary refill takes less than 2 seconds.         ED Course   Procedures  Labs Reviewed   CBC W/ AUTO DIFFERENTIAL - Abnormal       Result Value    WBC 6.93      RBC 3.98 (*)     Hemoglobin 12.5      Hematocrit 38.6      MCV 97      MCH 31.4 (*)     MCHC 32.4      RDW 14.5      Platelets 219      MPV 10.9      Immature Granulocytes 0.3      Gran # (ANC) 3.6      Immature Grans (Abs) 0.02      Lymph # 2.4      Mono # 0.7      Eos # 0.2      Baso # 0.02      nRBC 0      Gran % 52.2      Lymph % 34.3      Mono % 10.2      Eosinophil % 2.7      Basophil % 0.3      Differential Method Automated     COMPREHENSIVE METABOLIC PANEL - Abnormal    Sodium 144      Potassium 4.0      Chloride 109      CO2 22 (*)     Glucose 79      BUN 22      Creatinine 1.3      Calcium 9.5      Total Protein 7.5      Albumin 3.6      Total Bilirubin 0.3      Alkaline Phosphatase 85      AST 29      ALT 23      eGFR 42.6 (*)     Anion Gap 13     LIPID PANEL - Abnormal    Cholesterol 197      Triglycerides 187 (*)     HDL 65      LDL Cholesterol 94.6      HDL/Cholesterol Ratio 33.0      Total Cholesterol/HDL Ratio 3.0      Non-HDL Cholesterol 132     PROTIME-INR    Prothrombin Time 10.6      INR 1.0     TSH    TSH 0.455     SARS-COV-2 RNA AMPLIFICATION, QUAL    SARS-CoV-2 RNA, Amplification, Qual Negative            Imaging Results               MRI Brain Without Contrast (Final result)  Result time 12/30/24 06:18:04      Final result by Shankar Noyola MD (12/30/24 06:18:04)                   Impression:      Small focus of cortical based restricted diffusion T2 weighted signal abnormality is noted in the parasagittal posterior right frontal lobe.  Finding would be in keeping with small focus of acute to early subacute ischemia.  Focal encephalitis would be considered less likely unless clinically suspected.    Multifocal remote post ischemic sequelae again demonstrated.    This report was flagged in Epic as  abnormal.      Electronically signed by: Shankar Parisrosemary  Date:    12/30/2024  Time:    06:18               Narrative:    EXAMINATION:  MRI BRAIN WITHOUT CONTRAST    CLINICAL HISTORY:  Stroke, follow up;    TECHNIQUE:  Multiplanar multisequence MR imaging of the brain was performed without contrast.    COMPARISON:  CT head performed 12/29/2024.    FINDINGS:  Parenchyma: Small focus of cortical based restricted diffusion T2 weighted signal abnormality is noted in the parasagittal posterior right frontal lobe (series 6, image 46).No associated hemorrhage or mass effect.    Remote infarcts are noted in right temporal and occipital lobes left parietal and occipital lobes, and both cerebellar hemispheres.  Remote blood products associated with the posterior left cerebral infarct.    Elsewhere, there is a generalized pattern age-related parenchymal volume loss patent prominent perivascular spaces are noted.  Remote lacunar type infarcts are noted in the deep gray nuclei.  Additional nonspecific areas of T2/FLAIR hyperintense signal would be in keeping with sequela of chronic small vessel ischemic disease.    Additional comments: There is no midline shift, abnormal extra-axial fluid collection, or acute intracranial hemorrhage. The basal cisterns are patent.    Ventricles: Normal.    Flow voids: The normal major intracranial arterial flow voids are visualized.    Sinuses and mastoid air cells: Trace paranasal sinus mucosal thickening.    Orbits: Status post bilateral lens replacements.    Midline structures: The pituitary and craniocervical junction are normal.    Marrow: Normal                                         CT Head Without Contrast (Final result)  Result time 12/30/24 00:26:56      Final result by Fariba Franz MD (12/30/24 00:26:56)                   Impression:      1. Redemonstration of multifocal regions of prior supratentorial infarct.  No compelling CT evidence of acute intracranial abnormality.   However, if there is high clinical concern for acute ischemia, further assessment with MRI is advised if there are no clinical contraindications.  2. Generalized cerebral volume loss and findings of chronic microvascular ischemic change.      Electronically signed by: Fariba Franz MD  Date:    12/30/2024  Time:    00:26               Narrative:    EXAMINATION:  CT HEAD WITHOUT CONTRAST    CLINICAL HISTORY:  Neuro deficit, acute, stroke suspected;    TECHNIQUE:  Low dose axial images were obtained through the head.  Coronal and sagittal reformations were also performed. Contrast was not administered.    COMPARISON:  Head CT 06/01/2024    FINDINGS:  There is no acute intracranial hemorrhage, hydrocephalus, midline shift or mass effect.  There is generalized cerebral volume loss.  There are multifocal regions of remote infarct including the left parieto-occipital region, right occipital lobe, bilateral basal ganglia and left insular region.  There is hypoattenuation in the supratentorial white matter, nonspecific but likely reflecting sequela of chronic microvascular ischemic change.  The basal cisterns are patent. The visualized paranasal sinuses and mastoid air cells are clear of acute process.  The visualized bones of the calvarium demonstrate no acute osseous abnormality.                                       Medications   sodium chloride 0.9% flush 10 mL (has no administration in time range)   sodium chloride 0.9% bolus 500 mL 500 mL (has no administration in time range)   bisacodyL suppository 10 mg (has no administration in time range)   heparin (porcine) injection 5,000 Units (has no administration in time range)   aspirin EC tablet 81 mg (has no administration in time range)   atorvastatin tablet 40 mg (has no administration in time range)   losartan tablet 50 mg (has no administration in time range)   carvediloL tablet 12.5 mg (12.5 mg Oral Given 12/30/24 3347)     Medical Decision Making  Denise VIVAS Yash is  a 74 y.o. female presenting to the ED with subacute stroke. History and physical exam as above. Initial vital signs stable and non-actionable. Initial work-up to include: Labs (subacute stroke workup), Imaging (CT Head, MRI brain), EKG,    Differential diagnosis considered for this patient includes, but is not limited to:  Malnutrition, dehydration, electrolyte abnormalities,.  Other severe, more emergent diagnoses considered, but deemed much less likely, to include:  Intracranial hemorrhage.    We discussed the case with vascular neurology and they will admit the patient to their service.    Amount and/or Complexity of Data Reviewed  Labs: ordered. Decision-making details documented in ED Course.  Radiology: ordered.    Risk  Prescription drug management.  Decision regarding hospitalization.      Additional MDM:     NIH Stroke Scale:   Interval = baseline (upon arrival/admit)  Level of consciousness = 0 - alert  LOC questions = 0 - answers both correctly  LOC commands = 0 - performs both correctly  Best gaze = 0 - normal  Visual = 0 - no visual loss  Facial palsy = 1 - minor  Motor left arm =  0 - no drift  Motor right arm =  0 - no drift  Motor left leg = 1 - drift  Motor right leg =  0 - no drift  Limb ataxia = 0 - absent  Sensory = 0 - normal  Best language = 0 - no aphasia  Dysarthria = 1 - mild to moderate dysarthria  Extinction and inattention = 0 - no neglect  NIH Stroke Scale Total = 3              ED Course as of 12/30/24 0749   Sun Dec 29, 2024   2154 CBC W/ AUTO DIFFERENTIAL(!)  CBC unremarkable, no leukocytosis, thrombocytopenia, anemia [HJ]   2157 Comprehensive metabolic panel(!)  CMP largely unremarkable [HJ]   2250 SARS-CoV-2 RNA, Amplification, Qual: Negative [HJ]   Mon Dec 30, 2024   0638 We discussed the case with vascular neurology and they will evaluate the patient. [HJ]      ED Course User Index  [HJ] Nito Pineda MD                           Clinical Impression:  Final diagnoses:  [R29.818]  Acute focal neurological deficit  [R29.898] Left leg weakness (Primary)  [I63.9] Stroke          ED Disposition Condition    Admit                 Nito Pineda MD  Resident  12/30/24 0749

## 2024-12-30 NOTE — ASSESSMENT & PLAN NOTE
74 y.o. yo female with PMHx  [place] [facility] [place] of fluctuating dysarthria and LLE. Patient with previous stroke with left side weakness and dysarthria. Fluctuating dysarthria seemed to be improving, but LLE weakness caused buckling and near fall. Patient was brought into ED for eval by . MRI with Small focus of cortical based restricted diffusion T2 weighted signal abnormality is noted in the parasagittal posterior right frontal lobe. Patient seen in ED, NIH 2- residual dysarthria and facial droop from previous stroke. Patient reports compliance with secondary stroke prevention.

## 2024-12-30 NOTE — CONSULTS
[facility] - Emergency Dept  Vascular Neurology  [facility]  Consult Note    Inpatient consult to Vascular (Stroke) Neurology  Consult performed by: Pratik Eric, NP  Consult ordered by: Nito Pineda MD        Assessment/Plan:     Patient is a 74 y.o. year old female with:    * History of cerebral infarction  74 y.o. yo female with PMHx  [place] [facility] history of fluctuating dysarthria and LLE. Patient with previous stroke with left side weakness and dysarthria. Fluctuating dysarthria seemed to be improving, but LLE weakness caused buckling and near fall. Patient was brought into ED for eval by . MRI with Small focus of cortical based restricted diffusion T2 weighted signal abnormality is noted in the parasagittal posterior right frontal lobe. Patient seen in ED, NIH 2- residual dysarthria and facial droop from previous stroke. Patient reports compliance with secondary stroke prevention.     Mixed hyperlipidemia  -stroke risk factor  -continue statin     Primary hypertension  -stroke risk factor   -keep SBP <220 if acute stroke    Stage 3b chronic kidney disease  - continue to monitor renal function with scheduled labs   - avoid nephrotoxins  - renally dose medications when appropiate   - monitor events that may lead to decreased renal perfusion (hypovolemia, hypotension, sepsis)  - monitor urine output to assure that no obstruction precipitates worsening in GFR          STROKE DOCUMENTATION     Acute Stroke Times   Last Known Normal Date: 12/26/24  Unknown Normal Time: Unknown Time    NIH Scale:  1a. Level of Consciousness: 0-->Alert, keenly responsive  1b. LOC Questions: 0-->Answers both questions correctly  1c. LOC Commands: 0-->Performs both tasks correctly  2. Best Gaze: 0-->Normal  3. Visual: 0-->No visual loss  4. Facial Palsy: 1-->Minor paralysis (flattened nasolabial fold, asymmetry on smiling)  5a. Motor Arm, Left: 0-->No drift, limb holds 90 (or 45) degrees  for full 10 secs  5b. Motor Arm, Right: 0-->No drift, limb holds 90 (or 45) degrees for full 10 secs  6a. Motor Leg, Left: 0-->No drift, leg holds 30 degree position for full 5 secs  6b. Motor Leg, Right: 0-->No drift, leg holds 30 degree position for full 5 secs  7. Limb Ataxia: 0-->Absent  8. Sensory: 0-->Normal, no sensory loss  9. Best Language: 0-->No aphasia, normal  10. Dysarthria: 1-->Mild-to-moderate dysarthria, patient slurs at least some words and, at worst, can be understood with some difficulty  11. Extinction and Inattention (formerly Neglect): 0-->No abnormality  Total (NIH Stroke Scale): 2    Modified Nicola Score: 2  [place] Coma Scale:    ABCD2 Score:    [id number] Score:   HAS -BLED Score:   ICH Score:   Hunt & Velazquez Classification:       Thrombolysis Candidate? No, Out of window - Symptom onset > 4.5 hours    Delays to Thrombolysis?  Not Applicable    Interventional Revascularization Candidate?   Is the patient eligible for mechanical endovascular reperfusion (KULDEEP)?  No; at this time symptoms not suggestive of large vessel occlusion    Delays to Thrombectomy? Not Applicable    Hemorrhagic change of an Ischemic Stroke: Does this patient have an ischemic stroke with hemorrhagic changes? No     Subjective:     History of Present Illness:  Denise Berrios is a 74 y.o. 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 Symptoms began about 3 days ago. Patient states dysarthria has been fluctuating and LLE buckled yesterday. Patient was brought into ED today by  for evaluation. CT head completed in ED with redemonstration of multifocal regions of prior supratentorial infarct.  No compelling CT evidence of acute intracranial abnormality. MRI Small  focus of cortical based restricted diffusion T2 weighted signal abnormality is noted in the parasagittal posterior right frontal lobe. Stroke team consulted.     Patient takes ASA at home. She lives with others and performs all ADLs independently.             Past Medical History:   Diagnosis Date    Acid reflux     Graves disease     diffuse Toxic Goiter    Hypertension     IBS (irritable bowel syndrome)     Lichen sclerosus     Migraine     Stroke      Past Surgical History:   Procedure Laterality Date    CEREBRAL ANGIOGRAM N/A 4/22/2024    Procedure: ANGIOGRAM-CEREBRAL; stroke class A;  Surgeon: Sharon Welch;  [facility]: [facility];  Service: Anesthesiology;  Laterality: N/A;    EYE SURGERY      HYSTERECTOMY      CINDI for ?? cervical cancer, had normal paps    INSERTION OF IMPLANTABLE LOOP RECORDER N/A 10/23/2024    Procedure: Insertion, Implantable Loop Recorder;  Surgeon: ONEIDA Nicolas MD;  Location: [facility] EP LAB;  Service: Cardiology;  Laterality: N/A;  Cryptogenic Stroke, ILR,BSci, Local, EH, 3 Prep    LEFT HEART CATHETERIZATION N/A 10/20/2020    Procedure: Left heart cath;  Surgeon: Bony Schultz MD;  Location: [facility] CATH LAB/EP;  Service: Cardiology;  Laterality: N/A;    REVERSE TOTAL SHOULDER ARTHROPLASTY Right 8/19/2022    Procedure: ARTHROPLASTY, SHOULDER, TOTAL, REVERSE;  Surgeon: Bradley Finnegan Jr., MD;  Location: [facility] OR;  Service: Orthopedics;  Laterality: Right;  SONIA linda CONFIRMED/8/18/Shaista Finnegan- hemosorb AT      Social History     Tobacco Use    Smoking status: Never     Passive exposure: Never    Smokeless tobacco: Never   Substance Use Topics    Alcohol use: Yes    Drug use: No     Review of patient's allergies indicates:   Allergen Reactions    Phenobarbital Anaphylaxis    Penicillins Other (See Comments)    Phenobarbital sodium     Propylthiouracil     Thyroid      Note: PTU    Topamax [topiramate] Diarrhea     GI symptoms    Cefaclor Rash and Other (See  Comments)    Clopidogrel Hives and Rash    Methimazole Rash and Other (See Comments)    Methimazole (bulk) Rash    Penicillin Rash    Sulfa (sulfonamide antibiotics) Rash and Other (See Comments)       Medications: I have reviewed the current medication administration record.    (Not in a hospital admission)      Review of Systems   Constitutional:  Positive for activity change.   Respiratory:  Negative for shortness of breath and wheezing.    Cardiovascular:  Negative for palpitations.   Gastrointestinal:  Negative for vomiting.   Musculoskeletal:  Negative for gait problem.   Neurological:  Positive for facial asymmetry and speech difficulty.   Psychiatric/Behavioral:  Negative for confusion and decreased concentration.      Objective:     Vital Signs (Most Recent):  Temp: 97.8 °F (36.6 °C) (12/30/24 0715)  Pulse: 74 (12/30/24 0715)  Resp: 20 (12/30/24 0715)  BP: (!) 197/83 (12/30/24 0715)  SpO2: 96 % (12/30/24 0715)    Vital Signs Range (Last 24H):  Temp:  [97.8 °F (36.6 °C)-99.2 °F (37.3 °C)]   Pulse:  [68-81]   Resp:  [16-20]   BP: (151-197)/(69-88)   SpO2:  [93 %-98 %]        Physical Exam  HENT:      Head: Normocephalic and atraumatic.      Nose: Nose normal.   Cardiovascular:      Rate and Rhythm: Normal rate.   Pulmonary:      Effort: Pulmonary effort is normal.   Abdominal:      General: Abdomen is flat.   Musculoskeletal:         General: Normal range of motion.      Cervical back: Normal range of motion.   Skin:     General: Skin is warm.   Neurological:      Mental Status: She is alert.              Neurological Exam:   LOC: alert  Attention Span: Good   Language: No aphasia  Articulation: Dysarthria  Orientation: Person, Place, Time   Visual Fields: Full  EOM (CN III, IV, VI): Full/intact  Pupils (CN II, III): PERRL  Facial Sensation (CN V): Normal  Facial Movement (CN VII): Lower facial weakness on the Left  Motor: Arm left  Normal 5/5  Leg left  Normal 5/5  Arm right  Normal 5/5  Leg right Normal  "5/5  Cerebellum: No evidence of appendicular or axial ataxia  Sensation: Intact to light touch, temperature and vibration  Tone: Normal tone throughout      Laboratory:  CMP:   Recent Labs   Lab 12/29/24 2104   CALCIUM 9.5   ALBUMIN 3.6   PROT 7.5      K 4.0   CO2 22*      BUN 22   CREATININE 1.3   ALKPHOS 85   ALT 23   AST 29   BILITOT 0.3     CBC:   Recent Labs   Lab 12/29/24 2104   WBC 6.93   RBC 3.98*   HGB 12.5   HCT 38.6      MCV 97   MCH 31.4*   MCHC 32.4     Lipid Panel:   Recent Labs   Lab 12/29/24 2104   CHOL 197   LDLCALC 94.6   HDL 65   TRIG 187*     Coagulation:   Recent Labs   Lab 12/29/24 2104   INR 1.0     Hgb A1C: No results for input(s): "HGBA1C" in the last 168 hours.  TSH:   Recent Labs   Lab 12/29/24 2104   TSH 0.455       Diagnostic Results:      Brain imaging:    MRI brain 12/30/24    Small focus of cortical based restricted diffusion T2 weighted signal abnormality is noted in the parasagittal posterior right frontal lobe. Finding would be in keeping with small focus of acute to early subacute ischemia. Focal encephalitis would be considered less likely unless clinically suspected.       CT head 12/29/24    1. Redemonstration of multifocal regions of prior supratentorial infarct. No compelling CT evidence of acute intracranial abnormality. However, if there is high clinical concern for acute ischemia, further assessment with MRI is advised if there are no clinical contraindications.     Vessel Imaging:      Cardiac Evaluation:         Pratik Eric NP  [facility]  Department of Vascular Neurology   [facility] - Emergency Dept   "

## 2024-12-30 NOTE — ED TRIAGE NOTES
Patient reports that she has had stroke in the past, residual deficits of left sided facial droop and slurred speech. Patient reports that she feels at this time that she is saying words as clearly as she can, but has had intermittent trouble finding words. Patient reports that she is also having increased weakness in the LLE. Patient able to ambulate from wheelchair into bed in room 15 without difficulty.

## 2024-12-30 NOTE — PROVIDER PROGRESS NOTES - EMERGENCY DEPT.
Encounter Date: 12/29/2024    ED Physician Progress Notes        ED Resident HAND-OFF NOTE:  Denise Berrios is a 74 y.o. female who presented to the ED on 12/30/2024, patient C/O left lower extremity weakness. I assumed care of patient from off-going ED physician team patient pending evaluation by vascular neurology.    On my evaluation, Denise Berrios appears well, hemodynamically stable and in NAD. Thus far, Denise Berrios has received:  Medications   sodium chloride 0.9% flush 10 mL (has no administration in time range)   sodium chloride 0.9% bolus 500 mL 500 mL (has no administration in time range)   bisacodyL suppository 10 mg (has no administration in time range)   atorvastatin tablet 40 mg (40 mg Oral Given 12/30/24 0832)   losartan tablet 50 mg (50 mg Oral Given 12/30/24 0832)   apixaban tablet 5 mg (has no administration in time range)   EScitalopram oxalate tablet 20 mg (20 mg Oral Given 12/30/24 1726)   levothyroxine tablet 88 mcg (has no administration in time range)   nortriptyline capsule 10 mg (has no administration in time range)   oxybutynin 24 hr tablet 10 mg (has no administration in time range)   pregabalin capsule 75 mg (has no administration in time range)   carvediloL tablet 12.5 mg (12.5 mg Oral Given 12/30/24 0347)       BP (!) 208/91 (Patient Position: Lying)   Pulse 80   Temp 98.1 °F (36.7 °C) (Oral)   Resp 20   Ht 5' (1.524 m)   Wt 68 kg (150 lb)   SpO2 95%   BMI 29.29 kg/m²         Disposition: I anticipate patient will be admitted.  ______________________  Mando Gibson MD   Emergency Medicine Resident      UPDATE:   Vascular neurology admitted patient to their service. Patient stable at time of admission.      :  Acute focal neurological deficit  Left leg weakness (Primary)  Stroke

## 2024-12-30 NOTE — HPI
Denise VIVAS Cousins is a 74 y.o. 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 Symptoms began about 3 days ago. Patient states dysarthria has been fluctuating and LLE buckled yesterday. Patient was brought into ED today by  for evaluation. CT head completed in ED with redemonstration of multifocal regions of prior supratentorial infarct.  No compelling CT evidence of acute intracranial abnormality. MRI Small focus of cortical based restricted diffusion T2 weighted signal abnormality is noted in the parasagittal posterior right frontal lobe. Stroke team consulted.     Patient takes ASA at home. She lives with others and performs all ADLs independently.

## 2024-12-30 NOTE — ED NOTES
Assumed care of pt.     Pt AAOx4, resting comfortably in bed, NAD, respirations E/UL, updated on POC, wheels locked and in low position, call bell with in reach, Comfort positioning and restroom needs were addressed. Necessary items were placed with in reach and was advised when a reassessment would take place.     Pt denies any new complaints at this time.

## 2024-12-30 NOTE — PLAN OF CARE
[facility] - Emergency Dept  Initial Discharge Assessment       Primary Care Provider: Aleksandra Carey MD    Admission Diagnosis: Stroke [I63.9]    Admission Date: 12/29/2024  Expected Discharge Date:     Pt stated she is independent with her ambulation and ADL's and does not require assistance or equipment.     SW discussed post acute services with pt.  Pt stated she was recently d/c from home health.  Pt stated she is amenable to post acute services on d/c if required but would prefer to d/c home with no needs.  Pt would like to wait to send referrals pending her evaluations and testing    Pt post acute needs tbd pending further evaluation and testing.    Transition of Care Barriers: (P) None    Payor: MEDICARE / Plan: MEDICARE PART A & B / Product Type: Government /     Extended Emergency Contact Information  Primary Emergency Contact: John Berrios  Address: [address] [place] [address] [place] [address] [facility]  Mobile Phone: [phone]  Relation: Spouse    Discharge Plan A: (P) Home, Other  Discharge Plan B: (P) Home, Other      [id number] - [place] - [address] AT [facility] [address]  [address] [place] [address]  Phone: [phone] Fax: [phone]    [facility] - [address] [address]  Phone: [phone] Fax: [phone]    [facility] - [place] - [address]  [address] [facility] [address]  [place] [address]  Phone: [phone] Fax: [phone]      Initial Assessment (most recent)       Adult Discharge Assessment - 12/30/24 0850          Discharge Assessment    Assessment Type Discharge Planning Assessment (P)      Confirmed/corrected address, phone number and insurance Yes (P)      Confirmed Demographics Correct on Facesheet (P)      Source of Information patient (P)      Reason For Admission History of cerebral infarction (P)       People in Home spouse (P)      Facility Arrived From: home (P)      Do you expect to return to your current living situation? Yes (P)      Do you have help at home or someone to help you manage your care at home? No (P)      Prior to hospitilization cognitive status: Alert/Oriented;No Deficits (P)      Current cognitive status: No Deficits;Alert/Oriented (P)      Walking or Climbing Stairs Difficulty no (P)      Dressing/Bathing Difficulty no (P)      Home Accessibility not wheelchair accessible;stairs to enter home;stairs within home (P)      Number of Stairs, Within Home, Primary ten (P)      Number of Stairs, Main Entrance one (P)      Home Layout Able to live on 1st floor;Bathroom on 2nd floor;Bedroom on 2nd floor (P)      Equipment Currently Used at Home shower chair (P)      Patient currently being followed by outpatient case management? No (P)      Do you currently have service(s) that help you manage your care at home? No (P)      Do you have any problems affording any of your prescribed medications? No (P)      Is the patient taking medications as prescribed? yes (P)      Who is going to help you get home at discharge? family/friends (P)      How do you get to doctors appointments? family or friend will provide (P)      Are you on dialysis? No (P)      Do you take coumadin? No (P)      Discharge Plan A Home;Other (P)      Discharge Plan B Home;Other (P)      DME Needed Upon Discharge  none (P)      Discharge Plan discussed with: Patient (P)      Transition of Care Barriers None (P)         Physical Activity    On average, how many days per week do you engage in moderate to strenuous exercise (like a brisk walk)? 0 days (P)      On average, how many minutes do you engage in exercise at this level? 0 min (P)         Financial Resource Strain    How hard is it for you to pay for the very basics like food, housing, medical care, and heating? Hard (P)         Housing Stability    In the last 12 months, was  there a time when you were not able to pay the mortgage or rent on time? No (P)      At any time in the past 12 months, were you homeless or living in a shelter (including now)? No (P)         Transportation Needs    Has the lack of transportation kept you from medical appointments, meetings, work or from getting things needed for daily living? No (P)         Food Insecurity    Within the past 12 months, you worried that your food would run out before you got the money to buy more. Never true (P)      Within the past 12 months, the food you bought just didn't last and you didn't have money to get more. Never true (P)         Stress    Do you feel stress - tense, restless, nervous, or anxious, or unable to sleep at night because your mind is troubled all the time - these days? Only a little (P)         Social Isolation    How often do you feel lonely or isolated from those around you?  Never (P)         Alcohol Use    Q1: How often do you have a drink containing alcohol? Never (P)      Q2: How many drinks containing alcohol do you have on a typical day when you are drinking? Patient does not drink (P)      Q3: How often do you have six or more drinks on one occasion? Never (P)         Utilities    In the past 12 months has the electric, gas, oil, or water company threatened to shut off services in your home? No (P)         Health Literacy    How often do you need to have someone help you when you read instructions, pamphlets, or other written material from your doctor or pharmacy? Rarely (P)         OTHER    Name(s) of People in Home spouse John (P)                    Discharge Plan A and Plan B have been determined by review of patient's clinical status, future medical and therapeutic needs, and coverage/benefits for post-acute care in coordination with multidisciplinary team members.    Amee Denis CD, MSW, LMSW, RSW   Case Management  [facility]  Email: [email or web address]

## 2024-12-30 NOTE — PLAN OF CARE
Problem: SLP  Goal: SLP Goal  Description: Speech Pathology Goals  To be met by 1/13/25    1. Pt will exhibit a functional swallow for tolerance of a regular diet with thin liquids in order to maintain adequate hydration and nutrition  2. Pt will participate in ongoing assessment of reading, writing, and visuospatial skills for development of most approrpiate treatment plan    3. Pt will participate in ongoing assessment of cognitive communication skills to guide development of most appropriate treatment plan           Outcome: Progressing     Clinical swallow evaluation completed. Recommend a regular diet (IDDSI 7) with thin liquids (IDDSI 0) and meds whole. SLP to continue to follow.

## 2024-12-30 NOTE — ED NOTES
Telemetry Verification   Patient placed on Telemetry Box  Verified with War Room  Box # [id number]   Monitor Tech    Rate    Rhythm

## 2024-12-30 NOTE — PLAN OF CARE
Problem: Stroke, Ischemic (Includes Transient Ischemic Attack)  Goal: Optimal Coping  Outcome: Progressing  Goal: Optimal Cerebral Tissue Perfusion  Outcome: Progressing     Problem: Fall Injury Risk  Goal: Absence of Fall and Fall-Related Injury  Outcome: Progressing     Problem: Adult Inpatient Plan of Care  Goal: Plan of Care Review  Outcome: Progressing  Goal: Patient-Specific Goal (Individualized)  Outcome: Progressing  Goal: Optimal Comfort and Wellbeing  Outcome: Progressing

## 2024-12-30 NOTE — ASSESSMENT & PLAN NOTE
- continue to monitor renal function with scheduled labs   - avoid nephrotoxins  - renally dose medications when appropiate   - monitor events that may lead to decreased renal perfusion (hypovolemia, hypotension, sepsis)  - monitor urine output to assure that no obstruction precipitates worsening in GFR

## 2024-12-30 NOTE — PT/OT/SLP EVAL
Speech Language Pathology Evaluation  Cognitive/Bedside Swallow    Patient Name:  Denise Berrios   MRN:  [id number]  Admitting Diagnosis: History of cerebral infarction    Recommendations:                  General Recommendations:  Speech/language therapy  Diet recommendations:  Regular Diet - IDDSI Level 7, Thin liquids - IDDSI Level 0   Aspiration Precautions: Standard aspiration precautions   General Precautions: Standard, fall  Communication strategies:  none    Assessment:     Denise Berrios is a 74 y.o. female with an SLP diagnosis of baseline mild dysarthria. She presents with adequate speech/language and swallowing skills at this time. SLP to follow up to continue to assess higher level cognitive communication skills as well as reading/writing/visuospatial skills.     History:     Past Medical History:   Diagnosis Date    Acid reflux     Graves disease     diffuse Toxic Goiter    Hypertension     IBS (irritable bowel syndrome)     Lichen sclerosus     Migraine     Stroke        Past Surgical History:   Procedure Laterality Date    CEREBRAL ANGIOGRAM N/A 4/22/2024    Procedure: ANGIOGRAM-CEREBRAL; stroke class A;  Surgeon: Sharon Welch;  Location: [facility];  Service: Anesthesiology;  Laterality: N/A;    EYE SURGERY      HYSTERECTOMY      CINDI for ?? cervical cancer, had normal paps    INSERTION OF IMPLANTABLE LOOP RECORDER N/A 10/23/2024    Procedure: Insertion, Implantable Loop Recorder;  Surgeon: ONEIDA Nicolas MD;  Location: [facility] EP LAB;  Service: Cardiology;  Laterality: N/A;  Cryptogenic Stroke, ILR,BSci, Local, EH, 3 Prep    LEFT HEART CATHETERIZATION N/A 10/20/2020    Procedure: Left heart cath;  Surgeon: Bony Schultz MD;  Location: [facility] CATH LAB/EP;  Service: Cardiology;  Laterality: N/A;    REVERSE TOTAL SHOULDER ARTHROPLASTY Right 8/19/2022    Procedure: ARTHROPLASTY, SHOULDER, TOTAL, REVERSE;  Surgeon: Bradley Finnegan Jr., MD;  Location: [facility] OR;  Service: Orthopedics;  Laterality:  "Right;  SONIA linda CONFIRMED/8/18/Shaista Pena Kain- hemosorb AT        Social History: Patient lives with her spouse in their home    Prior Intubation HX:  none this admit    Modified Barium Swallow: none on file    Chest X-Rays: none this admit    Prior diet: Regular/thin    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 Pt found resting in bed with spouse at bedside upon SLP entry into room. Pt agreeable to participate in all aspects of session.      Patient goals: to go home     Pain/Comfort:  Pain Rating 1: 0/10    Respiratory Status: Room air    Objective:     Cognitive Status:    Arousal/Alertness - Appropriate response to stimuli  Attention - No obvious deficits observed    Memory - TBA  Problem Solving - TBA   Reasoning - TBA  Sequencing - TBA  Categorization - TBA    Pragmatics:    WFL     Expressive and Receptive Language  WFL- Pt without evidence of linguistic deficits during informal conversational exchanges about familiar and unfamiliar topics    Motor Speech:  Baseline mild dysarthria exhibited though pt essentially 100% intelligible the conversational level with unfamiliar communication partner- pt and spouse endorse that pt has "good days and bad days" though she reported consistently functional motor speech skills without changes from baseline     Voice:   Clear and strong    Visual-Spatial:  No right or left sided neglect or filed cut observed during informal observaiton    Reading:   WFL at the sentence level    Written Expression:   TBA      Oral Musculature Evaluation  Oral Musculature: WFL  Dentition: present and adequate  Secretion Management: adequate  Mucosal Quality: adequate  Oral Labial Strength and Mobility: functional seal, functional coordination  Lingual Strength and Mobility: functional protrusion, functional anterior elevation, functional lateral movement  Voice Prior to PO Intake: clear, adequate " volume  Oral Musculature Comments: Mild baseline dysarthria noted    Bedside Swallow Eval:   Consistencies Assessed:  Thin liquids: self-fed by the single and consecutive open cup and straw sips  Puree: self-fed by the tsp of pudding  Solid: self-fed yaniv cracker      Oral Phase:   WFL    Pharyngeal Phase:   no overt clinical signs/symptoms of aspiration  no overt clinical signs/symptoms of pharyngeal dysphagia    Compensatory Strategies  None    Treatment: SLP spoke with pt and spouse extensively regarding history of speech deficits following large CVA in April with pt and family endorsing pt with baseline speech and language skilled at this time. Provided education regarding overall impressions, regular diet with thin liquids, ongoing SLP POC, and continued testing of higher level cognitive communication tasks. Pt and spouse verbalized understanding and had no additional questions or concerns upon SLP exit. Pt verbalized understanding and had no additional questions or concerns upon SLP exit.        Goals:   Multidisciplinary Problems       SLP Goals          Problem: SLP    Goal Priority Disciplines Outcome   SLP Goal     SLP Progressing   Description: Speech Pathology Goals  To be met by 1/13/25    1. Pt will exhibit a functional swallow for tolerance of a regular diet with thin liquids in order to maintain adequate hydration and nutrition  2. Pt will participate in ongoing assessment of reading, writing, and visuospatial skills for development of most approrpiate treatment plan    3. Pt will participate in ongoing assessment of cognitive communication skills to guide development of most appropriate treatment plan                                Plan:     Patient to be seen:  4 x/week   Plan of Care expires:  01/29/25  Plan of Care reviewed with:  patient   SLP Follow-Up:  Yes       Discharge recommendations:  Therapy Intensity Recommendations at Discharge:  (TBD)   Barriers to Discharge:  None    Time  Tracking:     SLP Treatment Date:   12/30/24  Speech Start Time:  1145  Speech Stop Time:  1203     Speech Total Time (min):  18 min    Billable Minutes: Eval 13  and Eval Swallow and Oral Function 5      12/30/2024

## 2024-12-31 VITALS
OXYGEN SATURATION: 94 % | RESPIRATION RATE: 18 BRPM | TEMPERATURE: 98 F | BODY MASS INDEX: 29.45 KG/M2 | HEIGHT: 60 IN | HEART RATE: 95 BPM | WEIGHT: 150 LBS | DIASTOLIC BLOOD PRESSURE: 68 MMHG | SYSTOLIC BLOOD PRESSURE: 124 MMHG

## 2024-12-31 PROBLEM — I63.521 ACUTE ISCHEMIC RIGHT ACA STROKE: Status: ACTIVE | Noted: 2024-12-31

## 2024-12-31 LAB
ALBUMIN SERPL BCP-MCNC: 3.4 G/DL (ref 3.5–5.2)
ALP SERPL-CCNC: 81 U/L (ref 40–150)
ALT SERPL W/O P-5'-P-CCNC: 16 U/L (ref 10–44)
ANION GAP SERPL CALC-SCNC: 10 MMOL/L (ref 8–16)
APTT PPP: 29.6 SEC (ref 21–32)
AST SERPL-CCNC: 28 U/L (ref 10–40)
BASOPHILS # BLD AUTO: 0.04 K/UL (ref 0–0.2)
BASOPHILS NFR BLD: 0.6 % (ref 0–1.9)
BILIRUB SERPL-MCNC: 0.4 MG/DL (ref 0.1–1)
BUN SERPL-MCNC: 19 MG/DL (ref 8–23)
CALCIUM SERPL-MCNC: 9.9 MG/DL (ref 8.7–10.5)
CHLORIDE SERPL-SCNC: 111 MMOL/L (ref 95–110)
CO2 SERPL-SCNC: 21 MMOL/L (ref 23–29)
CREAT SERPL-MCNC: 1 MG/DL (ref 0.5–1.4)
DIFFERENTIAL METHOD BLD: ABNORMAL
EOSINOPHIL # BLD AUTO: 0.2 K/UL (ref 0–0.5)
EOSINOPHIL NFR BLD: 3.2 % (ref 0–8)
ERYTHROCYTE [DISTWIDTH] IN BLOOD BY AUTOMATED COUNT: 14.3 % (ref 11.5–14.5)
EST. GFR  (NO RACE VARIABLE): 58.4 ML/MIN/1.73 M^2
GLUCOSE SERPL-MCNC: 91 MG/DL (ref 70–110)
HCT VFR BLD AUTO: 39.1 % (ref 37–48.5)
HGB BLD-MCNC: 13.1 G/DL (ref 12–16)
IMM GRANULOCYTES # BLD AUTO: 0.02 K/UL (ref 0–0.04)
IMM GRANULOCYTES NFR BLD AUTO: 0.3 % (ref 0–0.5)
INR PPP: 1.1 (ref 0.8–1.2)
LYMPHOCYTES # BLD AUTO: 2.6 K/UL (ref 1–4.8)
LYMPHOCYTES NFR BLD: 36 % (ref 18–48)
MAGNESIUM SERPL-MCNC: 1.6 MG/DL (ref 1.6–2.6)
MCH RBC QN AUTO: 31.3 PG (ref 27–31)
MCHC RBC AUTO-ENTMCNC: 33.5 G/DL (ref 32–36)
MCV RBC AUTO: 94 FL (ref 82–98)
MONOCYTES # BLD AUTO: 0.8 K/UL (ref 0.3–1)
MONOCYTES NFR BLD: 11.6 % (ref 4–15)
NEUTROPHILS # BLD AUTO: 3.5 K/UL (ref 1.8–7.7)
NEUTROPHILS NFR BLD: 48.3 % (ref 38–73)
NRBC BLD-RTO: 0 /100 WBC
PHOSPHATE SERPL-MCNC: 3.8 MG/DL (ref 2.7–4.5)
PLATELET # BLD AUTO: 232 K/UL (ref 150–450)
PMV BLD AUTO: 10.9 FL (ref 9.2–12.9)
POTASSIUM SERPL-SCNC: 3.8 MMOL/L (ref 3.5–5.1)
PROT SERPL-MCNC: 7.3 G/DL (ref 6–8.4)
PROTHROMBIN TIME: 11.5 SEC (ref 9–12.5)
RBC # BLD AUTO: 4.18 M/UL (ref 4–5.4)
SODIUM SERPL-SCNC: 142 MMOL/L (ref 136–145)
TROPONIN I SERPL DL<=0.01 NG/ML-MCNC: 9 NG/L (ref 0–14)
WBC # BLD AUTO: 7.16 K/UL (ref 3.9–12.7)

## 2024-12-31 PROCEDURE — 85610 PROTHROMBIN TIME: CPT | Performed by: NURSE PRACTITIONER

## 2024-12-31 PROCEDURE — 84100 ASSAY OF PHOSPHORUS: CPT | Performed by: NURSE PRACTITIONER

## 2024-12-31 PROCEDURE — 97530 THERAPEUTIC ACTIVITIES: CPT

## 2024-12-31 PROCEDURE — 92526 ORAL FUNCTION THERAPY: CPT

## 2024-12-31 PROCEDURE — 36415 COLL VENOUS BLD VENIPUNCTURE: CPT | Performed by: NURSE PRACTITIONER

## 2024-12-31 PROCEDURE — 83735 ASSAY OF MAGNESIUM: CPT | Performed by: NURSE PRACTITIONER

## 2024-12-31 PROCEDURE — 97535 SELF CARE MNGMENT TRAINING: CPT

## 2024-12-31 PROCEDURE — 92507 TX SP LANG VOICE COMM INDIV: CPT

## 2024-12-31 PROCEDURE — 97161 PT EVAL LOW COMPLEX 20 MIN: CPT

## 2024-12-31 PROCEDURE — 80053 COMPREHEN METABOLIC PANEL: CPT | Performed by: NURSE PRACTITIONER

## 2024-12-31 PROCEDURE — 85730 THROMBOPLASTIN TIME PARTIAL: CPT | Performed by: NURSE PRACTITIONER

## 2024-12-31 PROCEDURE — 85025 COMPLETE CBC W/AUTO DIFF WBC: CPT | Performed by: NURSE PRACTITIONER

## 2024-12-31 PROCEDURE — 25000003 PHARM REV CODE 250

## 2024-12-31 PROCEDURE — 99238 HOSP IP/OBS DSCHRG MGMT 30/<: CPT | Mod: GC,ICN,, | Performed by: PSYCHIATRY & NEUROLOGY

## 2024-12-31 PROCEDURE — 25000003 PHARM REV CODE 250: Performed by: NURSE PRACTITIONER

## 2024-12-31 PROCEDURE — 84484 ASSAY OF TROPONIN QUANT: CPT | Performed by: NURSE PRACTITIONER

## 2024-12-31 RX ORDER — ATORVASTATIN CALCIUM 40 MG/1
80 TABLET, FILM COATED ORAL DAILY
Status: DISCONTINUED | OUTPATIENT
Start: 2024-12-31 | End: 2024-12-31 | Stop reason: HOSPADM

## 2024-12-31 RX ORDER — ATORVASTATIN CALCIUM 80 MG/1
80 TABLET, FILM COATED ORAL DAILY
Qty: 90 TABLET | Refills: 1 | Status: SHIPPED | OUTPATIENT
Start: 2025-01-01 | End: 2025-01-09 | Stop reason: SDUPTHER

## 2024-12-31 RX ADMIN — PREGABALIN 75 MG: 75 CAPSULE ORAL at 09:12

## 2024-12-31 RX ADMIN — LOSARTAN POTASSIUM 50 MG: 50 TABLET, FILM COATED ORAL at 09:12

## 2024-12-31 RX ADMIN — APIXABAN 5 MG: 5 TABLET, FILM COATED ORAL at 09:12

## 2024-12-31 RX ADMIN — OXYBUTYNIN CHLORIDE 10 MG: 10 TABLET, EXTENDED RELEASE ORAL at 09:12

## 2024-12-31 RX ADMIN — LEVOTHYROXINE SODIUM 88 MCG: 88 TABLET ORAL at 06:12

## 2024-12-31 RX ADMIN — ATORVASTATIN CALCIUM 80 MG: 40 TABLET, FILM COATED ORAL at 09:12

## 2024-12-31 RX ADMIN — ESCITALOPRAM OXALATE 20 MG: 20 TABLET ORAL at 09:12

## 2024-12-31 NOTE — DISCHARGE SUMMARY
[facility] - Neurosurgery ([facility])  Vascular Neurology  Comprehensive Stroke Center  Discharge Summary     Summary:     Admit Date: 12/29/2024  8:36 PM    Discharge Date and Time:  12/31/2024 12:24 PM    Attending Physician: Jose De Jesus Greenfield MD     Discharge Provider: Juan Curry MD    History of Present Illness: Denise Berrios is a 74 y.o. 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 Symptoms began about 3 days ago. Patient states dysarthria has been fluctuating and LLE buckled yesterday. Patient was brought into ED today by  for evaluation. CT head completed in ED with redemonstration of multifocal regions of prior supratentorial infarct.  No compelling CT evidence of acute intracranial abnormality. MRI Small focus of cortical based restricted diffusion T2 weighted signal abnormality is noted in the parasagittal posterior right frontal lobe. Stroke team consulted.     Patient takes ASA at home. She lives with others and performs all ADLs independently.       Hospital Course (synopsis of major diagnoses, care, treatment, and services provided during the course of the hospital stay): No notes on file    Goals of Care Treatment Preferences:  Code Status: Full Code      Stroke Etiology: Ischemic Undetermined Cause Cryptogenic Embolism / ESUS (Embolic Stroke of Undetermined Source)    STROKE DOCUMENTATION   Acute Stroke Times   Last Known Normal Date: 12/26/24  Unknown Normal Time: Unknown Time     NIH Scale:  1a. Level of Consciousness: 0-->Alert, keenly responsive  1b. LOC Questions: 0-->Answers both questions correctly  1c. LOC Commands: 0-->Performs both tasks correctly  2. Best Gaze: 0-->Normal  3. Visual: 0-->No visual loss  4. Facial Palsy:  0-->Normal symmetrical movements  5a. Motor Arm, Left: 0-->No drift, limb holds 90 (or 45) degrees for full 10 secs  5b. Motor Arm, Right: 0-->No drift, limb holds 90 (or 45) degrees for full 10 secs  6a. Motor Leg, Left: 0-->No drift, leg holds 30 degree position for full 5 secs  6b. Motor Leg, Right: 0-->No drift, leg holds 30 degree position for full 5 secs  7. Limb Ataxia: 0-->Absent  8. Sensory: 0-->Normal, no sensory loss  9. Best Language: 0-->No aphasia, normal  10. Dysarthria: 1-->Mild-to-moderate dysarthria, patient slurs at least some words and, at worst, can be understood with some difficulty  11. Extinction and Inattention (formerly Neglect): 0-->No abnormality  Total (NIH Stroke Scale): 1        Modified Nicola Score: 2  Inocencia Coma Scale:    ABCD2 Score:    [id number] Score:   HAS -BLED Score:   ICH Score:   Hunt & Velazquez Classification:       Assessment/Plan:     Diagnostic Results:      Brain/Vessel Imaging       Results for orders placed or performed during the hospital encounter of 12/29/24 (from the past 2160 hours)   MRA Neck without contrast     Impression     MRA head: Irregularity and question high-grade stenosis in the right mid P2 PCA.  In addition there is irregularity and abrupt termination of the left PCA at the P1 P2 junction concerning for high-grade stenosis versus occlusion.  Overall likely chronic without infarction seen on same day MRI.     Otherwise unremarkable MRA head.     MRA neck: No significant arterial stenosis throughout the neck.     Electronically signed by resident: Randy Garcia  Date:                                                12/30/2024  Time:                                                09:31     Electronically signed by:Ezra Loza DO  Date:                                                12/30/2024  Time:                                                10:53   MRI Brain Without Contrast     Impression     Small focus of cortical based restricted diffusion T2  weighted signal abnormality is noted in the parasagittal posterior right frontal lobe.  Finding would be in keeping with small focus of acute to early subacute ischemia.  Focal encephalitis would be considered less likely unless clinically suspected.     Multifocal remote post ischemic sequelae again demonstrated.     This report was flagged in Epic as abnormal.        Electronically signed by:Shankar Noyola  Date:                                                12/30/2024  Time:                                                06:18   MRA Brain     Impression     MRA head: Irregularity and question high-grade stenosis in the right mid P2 PCA.  In addition there is irregularity and abrupt termination of the left PCA at the P1 P2 junction concerning for high-grade stenosis versus occlusion.  Overall likely chronic without infarction seen on same day MRI.     Otherwise unremarkable MRA head.     MRA neck: No significant arterial stenosis throughout the neck.     Electronically signed by resident: Randy Garcia  Date:                                                12/30/2024  Time:                                                09:31     Electronically signed by:Ezra Loza DO  Date:                                                12/30/2024  Time:                                                10:53      Cardiac Imaging   Results for orders placed during the hospital encounter of 12/29/24     Echo     Interpretation Summary    Left Ventricle: The left ventricle is normal in size. Ventricular mass is normal. Normal wall thickness. There is concentric remodeling. Normal wall motion. There is normal systolic function. Ejection fraction is approximately 55%. Grade II diastolic dysfunction.    Right Ventricle: Normal right ventricular cavity size. Wall thickness is normal. Systolic function is normal.    Left Atrium: Left atrium is mildly dilated.    IVC/SVC: Normal venous pressure at 3 mmHg.    Interventions:  None    Complications: None    Disposition: Home or Self Care    Final Active Diagnoses:    Diagnosis Date Noted POA    PRINCIPAL PROBLEM:  Acute ischemic right STEPHANI stroke [I63.521] 12/31/2024 Yes    Mixed hyperlipidemia [E78.2] 09/22/2024 Yes    Primary hypertension [I10] 10/19/2020 Yes    Stage 3b chronic kidney disease [N18.32] 08/28/2019 Yes    History of cerebral infarction [Z86.73] 08/28/2019 Not Applicable      Problems Resolved During this Admission:     No new Assessment & Plan notes have been filed under this hospital service since the last note was generated.  Service: Vascular Neurology      Recommendations:     Post-discharge complication risks: None    Stroke Education given to: patient and family    Follow-up in Stroke Clinic in 4-6 weeks.     Discharge Plan:  Statin: Atorvastatin 80mg  Anticoagulant: Apixaban 5mg    Follow Up:   Follow-up Information       Aleksandra Carey MD. Schedule an appointment as soon as possible for a visit .    Specialty: Internal Medicine  Contact information:  [address] [facility] [id number] [phone]               Isaac Pinto MD. Schedule an appointment as soon as possible for a visit in 1 month(s).    Specialty: Neurology  Contact information:  [address] [facility] [address]  [phone]               Aleksandra Carey MD .    Specialty: Internal Medicine  Contact information:  [address] [facility] [id number] [phone]                             Patient Instructions:      Ambulatory referral/consult to Vascular Neurology   Standing Status: Future   Referral Priority: Routine Referral Type: Consultation   Referral Reason: Specialty Services Required   Referred to Provider: ISAAC PINTO Requested Specialty: Vascular Neurology   Number of Visits Requested: 1       Medications:  Reconciled Home Medications:      Medication List        START taking these medications      apixaban 5 mg  Tab  Commonly known as: ELIQUIS  Take 1 tablet (5 mg total) by mouth 2 (two) times daily.            CHANGE how you take these medications      atorvastatin 80 MG tablet  Commonly known as: LIPITOR  Take 1 tablet (80 mg total) by mouth once daily.  Start taking on: January 1, 2025  What changed:   medication strength  when to take this            CONTINUE taking these medications      carvediloL 12.5 MG tablet  Commonly known as: COREG  TAKE 1 TABLET TWICE A DAY     EScitalopram oxalate 20 MG tablet  Commonly known as: LEXAPRO  TAKE 1 TABLET DAILY     levothyroxine 88 MCG tablet  Commonly known as: SYNTHROID  TAKE 1 TABLET BEFORE BREAKFAST     losartan 50 MG tablet  Commonly known as: COZAAR  TAKE 1 TABLET DAILY     nortriptyline 10 MG capsule  Commonly known as: PAMELOR  TAKE 1 CAPSULE DAILY     oxybutynin 10 MG 24 hr tablet  Commonly known as: DITROPAN-XL  Take 1 tablet (10 mg total) by mouth once daily.     pregabalin 75 MG capsule  Commonly known as: LYRICA  Take 1 capsule (75 mg total) by mouth 2 (two) times daily.     VITAMIN D2 1,250 mcg (50,000 unit) capsule  Generic drug: ergocalciferol  TAKE 1 CAPSULE ONCE A WEEK            STOP taking these medications      aspirin 81 MG EC tablet  Commonly known as: ECOTRIN     naloxone 4 mg/actuation Spry  Commonly known as: NARCAN     NIFEdipine 30 MG (OSM) 24 hr tablet  Commonly known as: PROCARDIA-XL     omeprazole 40 MG capsule  Commonly known as: PRILOSEC     traMADoL 50 mg tablet  Commonly known as: ULTRAM              Juan Curry MD  Comprehensive Stroke Center  Department of Vascular Neurology   [facility]

## 2024-12-31 NOTE — ASSESSMENT & PLAN NOTE
Denise Berrios is a 74 y.o. female w/ PMH of HTN, migraine, graves disease, chronic R shoulder pain, anxiety, multiple prior infarcts (L occipital (2021), L corona radiata (4/2024), R BG and insula w/ R MCA occlusion s/p IR (4/2024), s/p ILR 10/2024 who [place] [facility] and LLE weakness. MRI brain demonstrated acute infarct in the R frontal lobe. Given her history of multifocal, repeated nature of cryptogenic embolic events, we will be escalating to apixaban / DOAC for secondary stroke prevention.    Antithrombotics for secondary stroke prevention: Anticoagulants: Apixaban 5 mg BID     Statins for secondary stroke prevention and hyperlipidemia, if present:   Statins: Atorvastatin- 80 mg daily    Aggressive risk factor modification: HTN, HLD     Rehab efforts: The patient has been evaluated by a stroke team provider and the therapy needs have been fully considered based off the presenting complaints and exam findings. The following therapy evaluations are needed: None    Diagnostics ordered/pending: None     VTE prophylaxis: None: Reason for No Pharmacological VTE Prophylaxis: Currently on anticoagulation    BP parameters: Infarct: No intervention, SBP <220

## 2024-12-31 NOTE — PT/OT/SLP PROGRESS
Speech Language Pathology Treatment    Patient Name:  Denise Berrios   MRN:  [id number]  Admitting Diagnosis: Acute ischemic right STEPHANI stroke    Recommendations:                 General Recommendations:  Follow-up not indicated  Diet recommendations:  Regular Diet - IDDSI Level 7, Liquid Diet Level: Thin liquids - IDDSI Level 0   Aspiration Precautions: Standard aspiration precautions   General Precautions: Standard, fall  Communication strategies:  none    Assessment:     Denise Berrios is a 74 y.o. female with an SLP diagnosis of baseline dysarthria. She presents with adequate speech and swallowing skills at this time. Pt states she is functioning at her baseline swallowing and cognitive-linguistic skill level.     Subjective     Spoke with RN prior to session    Pain/Comfort:  Pain Rating 1: 0/10    Respiratory Status: Room air    Objective:     Has the patient been evaluated by SLP for swallowing?   Yes  Keep patient NPO? No   Current Respiratory Status:    Room air     Pt seen for ongoing speech/language evaluation. Asessed higher level cognitive skills including functional math, reading and writing skills. Pt completed x6 functional money tasks with minimal verbal cues with 100% accuracy. Her writing was 100% clear and legible with no obvious spacing issues. She was able to read x2 sentences INDEP. Pt was with no expressive/receptive deficits noted. Pt stating she is tolerating her regular diet and thin liquids with no overt difficulties. Discussed slp impressions and discharging from acute SLP services. Pt in agreement with plan.     Goals:   Multidisciplinary Problems       SLP Goals       Not on file              Multidisciplinary Problems (Resolved)          Problem: SLP    Goal Priority Disciplines Outcome   SLP Goal   (Resolved)     SLP Met   Description: Speech Pathology Goals  To be met by 1/13/25    1. Pt will exhibit a functional swallow for tolerance of a regular diet with thin liquids in order to  maintain adequate hydration and nutrition  2. Pt will participate in ongoing assessment of reading, writing, and visuospatial skills for development of most approrpiate treatment plan    3. Pt will participate in ongoing assessment of cognitive communication skills to guide development of most appropriate treatment plan                                Plan:     Patient to be seen:  4 x/week   Plan of Care expires:  01/29/25  Plan of Care reviewed with:  patient   SLP Follow-Up:  No       Discharge recommendations:  No Therapy Indicated   Barriers to Discharge:  None    Time Tracking:     SLP Treatment Date:   12/31/24  Speech Start Time:  0927  Speech Stop Time:  0943     Speech Total Time (min):  16 min    Billable Minutes: Speech Therapy Individual 8 and Self Care/Home Management Training 8    12/31/2024

## 2024-12-31 NOTE — SUBJECTIVE & OBJECTIVE
Neurologic Chief Complaint: LLE weakness    Subjective:     Interval History: Patient is seen for follow-up neurological assessment and treatment recommendations: See hospital course    HPI, Past Medical, Family, and Social History remains the same as documented in the initial encounter.     Review of Systems   Neurological:  Positive for weakness and numbness.     Scheduled Meds:   apixaban  5 mg Oral BID    atorvastatin  80 mg Oral Daily    EScitalopram oxalate  20 mg Oral Daily    levothyroxine  88 mcg Oral Before breakfast    losartan  50 mg Oral Daily    nortriptyline  10 mg Oral QHS    oxybutynin  10 mg Oral Daily    pregabalin  75 mg Oral BID     Continuous Infusions:  PRN Meds:  Current Facility-Administered Medications:     bisacodyL, 10 mg, Rectal, Daily PRN    sodium chloride 0.9%, 500 mL, Intravenous, PRN    sodium chloride 0.9%, 10 mL, Intravenous, PRN    Objective:     Vital Signs (Most Recent):  Temp: 97.8 °F (36.6 °C) (12/31/24 1106)  Pulse: 95 (12/31/24 1106)  Resp: 18 (12/31/24 1106)  BP: 124/68 (12/31/24 1106)  SpO2: (!) 94 % (12/31/24 1106)  BP Location: Right arm    Vital Signs Range (Last 24H):  Temp:  [97.7 °F (36.5 °C)-98.6 °F (37 °C)]   Pulse:  [70-97]   Resp:  [18-20]   BP: (124-208)/(62-91)   SpO2:  [93 %-96 %]   BP Location: Right arm       Physical Exam  Vitals and nursing note reviewed.   Constitutional:       General: She is not in acute distress.     Appearance: Normal appearance.   HENT:      Head: Normocephalic and atraumatic.      Mouth/Throat:      Mouth: Mucous membranes are moist.   Eyes:      Extraocular Movements: Extraocular movements intact.   Pulmonary:      Effort: Pulmonary effort is normal. No respiratory distress.   Abdominal:      General: Abdomen is flat. There is no distension.   Musculoskeletal:         General: No swelling. Normal range of motion.      Cervical back: Normal range of motion.   Skin:     General: Skin is warm.   Neurological:      Mental Status: She  is alert and oriented to person, place, and time.   Psychiatric:         Mood and Affect: Mood normal.         Behavior: Behavior normal.              Neurological Exam:   LOC: alert  Attention Span: Good   Language: No aphasia  Articulation: Dysarthria  Orientation: Person, Place, Time   Facial Sensation (CN V): Normal  Facial Movement (CN VII): Symmetric facial expression    Motor: Arm left  Normal 5/5  Leg left  Normal 5/5  Arm right  Normal 5/5  Leg right Normal 5/5  Sensation: intact to light touch throughout  Tone: Normal tone throughout    Laboratory:  CMP:   Recent Labs   Lab 12/31/24  0401   CALCIUM 9.9   ALBUMIN 3.4*   PROT 7.3      K 3.8   CO2 21*   *   BUN 19   CREATININE 1.0   ALKPHOS 81   ALT 16   AST 28   BILITOT 0.4     CBC:   Recent Labs   Lab 12/31/24  0401   WBC 7.16   RBC 4.18   HGB 13.1   HCT 39.1      MCV 94   MCH 31.3*   MCHC 33.5       Diagnostic Results     Brain/Vessel Imaging   Results for orders placed or performed during the hospital encounter of 12/29/24 (from the past 2160 hours)   MRA Neck without contrast    Impression    MRA head: Irregularity and question high-grade stenosis in the right mid P2 PCA.  In addition there is irregularity and abrupt termination of the left PCA at the P1 P2 junction concerning for high-grade stenosis versus occlusion.  Overall likely chronic without infarction seen on same day MRI.    Otherwise unremarkable MRA head.    MRA neck: No significant arterial stenosis throughout the neck.    Electronically signed by resident: Randy Garcia  Date:    12/30/2024  Time:    09:31    Electronically signed by: Ezra Loza DO  Date:    12/30/2024  Time:    10:53   MRI Brain Without Contrast    Impression    Small focus of cortical based restricted diffusion T2 weighted signal abnormality is noted in the parasagittal posterior right frontal lobe.  Finding would be in keeping with small focus of acute to early subacute ischemia.  Focal encephalitis  would be considered less likely unless clinically suspected.    Multifocal remote post ischemic sequelae again demonstrated.    This report was flagged in Epic as abnormal.      Electronically signed by: Shankar Noyola  Date:    12/30/2024  Time:    06:18   MRA Brain    Impression    MRA head: Irregularity and question high-grade stenosis in the right mid P2 PCA.  In addition there is irregularity and abrupt termination of the left PCA at the P1 P2 junction concerning for high-grade stenosis versus occlusion.  Overall likely chronic without infarction seen on same day MRI.    Otherwise unremarkable MRA head.    MRA neck: No significant arterial stenosis throughout the neck.    Electronically signed by resident: Randy Garcia  Date:    12/30/2024  Time:    09:31    Electronically signed by: Ezra Loza DO  Date:    12/30/2024  Time:    10:53     Cardiac Imaging   Results for orders placed during the hospital encounter of 12/29/24    Echo    Interpretation Summary    Left Ventricle: The left ventricle is normal in size. Ventricular mass is normal. Normal wall thickness. There is concentric remodeling. Normal wall motion. There is normal systolic function. Ejection fraction is approximately 55%. Grade II diastolic dysfunction.    Right Ventricle: Normal right ventricular cavity size. Wall thickness is normal. Systolic function is normal.    Left Atrium: Left atrium is mildly dilated.    IVC/SVC: Normal venous pressure at 3 mmHg.

## 2024-12-31 NOTE — PLAN OF CARE
Problem: Stroke, Ischemic (Includes Transient Ischemic Attack)  Goal: Optimal Coping  Outcome: Progressing     Problem: Fall Injury Risk  Goal: Absence of Fall and Fall-Related Injury  Outcome: Progressing     Problem: Adult Inpatient Plan of Care  Goal: Plan of Care Review  Outcome: Progressing  Goal: Readiness for Transition of Care  Outcome: Progressing

## 2024-12-31 NOTE — PROGRESS NOTES
[facility] - Neurosurgery ([facility])  Vascular Neurology  Comprehensive Stroke Center  Progress Note    Assessment/Plan:     * Acute ischemic right STEPHANI stroke  Denise Berrios is a 74 y.o. female w/ PMH of HTN, migraine, graves disease, chronic R shoulder pain, anxiety, multiple prior infarcts (L occipital (2021), L corona radiata (4/2024), R BG and insula w/ R MCA occlusion s/p IR (4/2024), s/p ILR 10/2024 who presented to [facility] for subacute dysarthria and LLE weakness. MRI brain demonstrated acute infarct in the R frontal lobe. Given her history of multifocal, repeated nature of cryptogenic embolic events, we will be escalating to apixaban / DOAC for secondary stroke prevention.    Antithrombotics for secondary stroke prevention: Anticoagulants: Apixaban 5 mg BID     Statins for secondary stroke prevention and hyperlipidemia, if present:   Statins: Atorvastatin- 80 mg daily    Aggressive risk factor modification: HTN, HLD     Rehab efforts: The patient has been evaluated by a stroke team provider and the therapy needs have been fully considered based off the presenting complaints and exam findings. The following therapy evaluations are needed: None    Diagnostics ordered/pending: None     VTE prophylaxis: None: Reason for No Pharmacological VTE Prophylaxis: Currently on anticoagulation    BP parameters: Infarct: No intervention, SBP <220        Mixed hyperlipidemia  -stroke risk factor  -continue statin    Primary hypertension  -stroke risk factor   -losartan 50    Stage 3b chronic kidney disease  - continue to monitor renal function with scheduled labs   - avoid nephrotoxins  - renally dose medications when appropiate   - monitor events that may lead to decreased renal perfusion (hypovolemia, hypotension, sepsis)  - monitor urine output to assure that no obstruction precipitates worsening in GFR    History of cerebral infarction  Multiple prior strokes without obvious etiology. Has ILR since 10/204 but no reported  episodes of afib.         No notes on file    STROKE DOCUMENTATION   Acute Stroke Times   Last Known Normal Date: 12/26/24  Unknown Normal Time: Unknown Time    NIH Scale:  1a. Level of Consciousness: 0-->Alert, keenly responsive  1b. LOC Questions: 0-->Answers both questions correctly  1c. LOC Commands: 0-->Performs both tasks correctly  2. Best Gaze: 0-->Normal  3. Visual: 0-->No visual loss  4. Facial Palsy: 0-->Normal symmetrical movements  5a. Motor Arm, Left: 0-->No drift, limb holds 90 (or 45) degrees for full 10 secs  5b. Motor Arm, Right: 0-->No drift, limb holds 90 (or 45) degrees for full 10 secs  6a. Motor Leg, Left: 0-->No drift, leg holds 30 degree position for full 5 secs  6b. Motor Leg, Right: 0-->No drift, leg holds 30 degree position for full 5 secs  7. Limb Ataxia: 0-->Absent  8. Sensory: 0-->Normal, no sensory loss  9. Best Language: 0-->No aphasia, normal  10. Dysarthria: 1-->Mild-to-moderate dysarthria, patient slurs at least some words and, at worst, can be understood with some difficulty  11. Extinction and Inattention (formerly Neglect): 0-->No abnormality  Total (NIH Stroke Scale): 1       Modified Yell Score: 2  Inocencia Coma Scale:    ABCD2 Score:    [id number] Score:   HAS -BLED Score:   ICH Score:   Hunt & Velazquez Classification:      Hemorrhagic change of an Ischemic Stroke: Does this patient have an ischemic stroke with hemorrhagic changes? No     Neurologic Chief Complaint: LLE weakness    Subjective:     Interval History: Patient is seen for follow-up neurological assessment and treatment recommendations: See hospital course    HPI, Past Medical, Family, and Social History remains the same as documented in the initial encounter.     Review of Systems   Neurological:  Positive for weakness and numbness.     Scheduled Meds:   apixaban  5 mg Oral BID    atorvastatin  80 mg Oral Daily    EScitalopram oxalate  20 mg Oral Daily    levothyroxine  88 mcg Oral Before breakfast    losartan   50 mg Oral Daily    nortriptyline  10 mg Oral QHS    oxybutynin  10 mg Oral Daily    pregabalin  75 mg Oral BID     Continuous Infusions:  PRN Meds:  Current Facility-Administered Medications:     bisacodyL, 10 mg, Rectal, Daily PRN    sodium chloride 0.9%, 500 mL, Intravenous, PRN    sodium chloride 0.9%, 10 mL, Intravenous, PRN    Objective:     Vital Signs (Most Recent):  Temp: 97.8 °F (36.6 °C) (12/31/24 1106)  Pulse: 95 (12/31/24 1106)  Resp: 18 (12/31/24 1106)  BP: 124/68 (12/31/24 1106)  SpO2: (!) 94 % (12/31/24 1106)  BP Location: Right arm    Vital Signs Range (Last 24H):  Temp:  [97.7 °F (36.5 °C)-98.6 °F (37 °C)]   Pulse:  [70-97]   Resp:  [18-20]   BP: (124-208)/(62-91)   SpO2:  [93 %-96 %]   BP Location: Right arm       Physical Exam  Vitals and nursing note reviewed.   Constitutional:       General: She is not in acute distress.     Appearance: Normal appearance.   HENT:      Head: Normocephalic and atraumatic.      Mouth/Throat:      Mouth: Mucous membranes are moist.   Eyes:      Extraocular Movements: Extraocular movements intact.   Pulmonary:      Effort: Pulmonary effort is normal. No respiratory distress.   Abdominal:      General: Abdomen is flat. There is no distension.   Musculoskeletal:         General: No swelling. Normal range of motion.      Cervical back: Normal range of motion.   Skin:     General: Skin is warm.   Neurological:      Mental Status: She is alert and oriented to person, place, and time.   Psychiatric:         Mood and Affect: Mood normal.         Behavior: Behavior normal.              Neurological Exam:   LOC: alert  Attention Span: Good   Language: No aphasia  Articulation: Dysarthria  Orientation: Person, Place, Time   Facial Sensation (CN V): Normal  Facial Movement (CN VII): Symmetric facial expression    Motor: Arm left  Normal 5/5  Leg left  Normal 5/5  Arm right  Normal 5/5  Leg right Normal 5/5  Sensation: intact to light touch throughout  Tone: Normal tone  throughout    Laboratory:  CMP:   Recent Labs   Lab 12/31/24  0401   CALCIUM 9.9   ALBUMIN 3.4*   PROT 7.3      K 3.8   CO2 21*   *   BUN 19   CREATININE 1.0   ALKPHOS 81   ALT 16   AST 28   BILITOT 0.4     CBC:   Recent Labs   Lab 12/31/24  0401   WBC 7.16   RBC 4.18   HGB 13.1   HCT 39.1      MCV 94   MCH 31.3*   MCHC 33.5       Diagnostic Results     Brain/Vessel Imaging   Results for orders placed or performed during the hospital encounter of 12/29/24 (from the past 2160 hours)   MRA Neck without contrast    Impression    MRA head: Irregularity and question high-grade stenosis in the right mid P2 PCA.  In addition there is irregularity and abrupt termination of the left PCA at the P1 P2 junction concerning for high-grade stenosis versus occlusion.  Overall likely chronic without infarction seen on same day MRI.    Otherwise unremarkable MRA head.    MRA neck: No significant arterial stenosis throughout the neck.    Electronically signed by resident: Randy Garcia  Date:    12/30/2024  Time:    09:31    Electronically signed by: Ezra Loza DO  Date:    12/30/2024  Time:    10:53   MRI Brain Without Contrast    Impression    Small focus of cortical based restricted diffusion T2 weighted signal abnormality is noted in the parasagittal posterior right frontal lobe.  Finding would be in keeping with small focus of acute to early subacute ischemia.  Focal encephalitis would be considered less likely unless clinically suspected.    Multifocal remote post ischemic sequelae again demonstrated.    This report was flagged in Epic as abnormal.      Electronically signed by: Shankar Noyola  Date:    12/30/2024  Time:    06:18   MRA Brain    Impression    MRA head: Irregularity and question high-grade stenosis in the right mid P2 PCA.  In addition there is irregularity and abrupt termination of the left PCA at the P1 P2 junction concerning for high-grade stenosis versus occlusion.  Overall likely chronic  without infarction seen on same day MRI.    Otherwise unremarkable MRA head.    MRA neck: No significant arterial stenosis throughout the neck.    Electronically signed by resident: Randy Garcia  Date:    12/30/2024  Time:    09:31    Electronically signed by: Ezra Loza DO  Date:    12/30/2024  Time:    10:53     Cardiac Imaging   Results for orders placed during the hospital encounter of 12/29/24    Echo    Interpretation Summary    Left Ventricle: The left ventricle is normal in size. Ventricular mass is normal. Normal wall thickness. There is concentric remodeling. Normal wall motion. There is normal systolic function. Ejection fraction is approximately 55%. Grade II diastolic dysfunction.    Right Ventricle: Normal right ventricular cavity size. Wall thickness is normal. Systolic function is normal.    Left Atrium: Left atrium is mildly dilated.    IVC/SVC: Normal venous pressure at 3 mmHg.      Juan Curry MD  Com[facility] St[facility] Center  Department of Vascular Neurology   [facility]

## 2024-12-31 NOTE — PT/OT/SLP PROGRESS
"Occupational Therapy   Treatment/Discharge Summary    Name: Denise Berrios  MRN: [id number]  Admitting Diagnosis:  History of cerebral infarction       Recommendations:     Discharge Recommendations: No Therapy Indicated  Discharge Equipment Recommendations:  none  Barriers to discharge:  None    Assessment:     Denise Berrios is a 74 y.o. female with a medical diagnosis of History of cerebral infarction.  She presents without performance deficits of physical skills; cognitive skills or psychosocial skills.  Able to organize occupational performance in a timely and safe manner; demonstrating skills necessary to successfully and appropriately participate in everyday tasks and social situations.  No activity limitations noted. No further OT needed.     Rehab Prognosis:  Good; patient would benefit from acute skilled OT services to address these deficits and reach maximum level of function.       Plan:     Discharge from OT services on acute.  Plan of Care Expires: 01/28/25  Plan of Care Reviewed with: patient    Subjective     Patient:  "I should go home today."  Pain/Comfort:  Pain Rating 1: 0/10  Pain Rating Post-Intervention 1: 0/10    Objective:     Communicated with: Nurse prior to session.  Patient found left sidelying with bed alarm, peripheral IV, telemetry, SCD upon OT entry to room.    General Precautions: Standard, fall, aspiration    Orthopedic Precautions:N/A  Braces: N/A  Respiratory Status: Room air     Occupational Performance:     Bed Mobility:    Patient completed Rolling/Turning to Left with  modified independence  Patient completed Rolling/Turning to Right with modified independence  Patient completed Scooting/Bridging with modified independence  Patient completed Supine to Sit with modified independence  Patient completed Sit to Supine with modified independence     Functional Mobility/Transfers:  Patient completed Sit <> Stand Transfer with modified independence  with  no assistive device "   Patient completed Bed <> Chair Transfer using Stand Pivot technique with modified independence with no assistive device  Patient completed Toilet Transfer Stand Pivot technique with modified independence with  no AD    Activities of Daily Living:  Grooming: modified independence    Lower Body Dressing: modified independence    Toileting: modified independence with use of std commode    AMPAC 6 Click ADL: 24    Treatment & Education:  Patient alert and oriented x 3; able to follow 4/4 one step commands.  Patient attentive and interactive throughout the session.      Patient left supine with all lines intact, call button in reach, and bed alarm on    GOALS:   Multidisciplinary Problems       Occupational Therapy Goals       Not on file              Multidisciplinary Problems (Resolved)          Problem: Occupational Therapy    Goal Priority Disciplines Outcome Interventions   Occupational Therapy Goal   (Resolved)     OT, PT/OT Met    Description: Goals set 12/30 with an expiration date, 1/28:  Patient will increase functional independence with ADLs by performing:  Patient will demonstrate lower body dressing with modified independence.   Not met  Patient and/or patient's family will verbalize understanding of stroke prevention guidelines, personal risk factors and stroke warning signs via teachback method.  Not met                                Time Tracking:     OT Date of Treatment: 12/31/24  OT Start Time: 0615  OT Stop Time: 0625  OT Total Time (min): 10 min    Billable Minutes:Self Care/Home Management 10    OT/JAY: OT          12/31/2024

## 2024-12-31 NOTE — PLAN OF CARE
Hospital follow-up was scheduled by ROLA CENTENO.  The patient appointment was scheduled for 1/3/25 at 10:00 am at [facility].        Tomi CENTENO, ROLA  Case Management  [phone]

## 2024-12-31 NOTE — PT/OT/SLP EVAL
"Physical Therapy Evaluation and Discharge Note    Patient Name:  Denise Berrios   MRN:  [id number]    Recommendations:     Discharge Recommendations: No Therapy Indicated  Discharge Equipment Recommendations: none   Barriers to discharge: None    Assessment:     Denise Berrois is a 74 y.o. female admitted with a medical diagnosis of History of cerebral infarction. At this time, patient is functioning at their prior level of function and does not require further acute PT services.     Recent Surgery: * No surgery found *      Plan:     During this hospitalization, patient does not require further acute PT services.  Please re-consult if situation changes.      Subjective     Chief Complaint: R STEPHANI CVA  Patient/Family Comments/goals: "I'm ready to go"  Pain/Comfort:  Pain Rating 1: 0/10  Pain Rating Post-Intervention 1: 0/10    Patients cultural, spiritual, Holiness conflicts given the current situation: no    Living Environment:  Pt lives with spouse in 2SH w/ 0 JELLY. Her bedroom and bathroom are on the 2nd floor with a flight of stairs and a L HR.   Prior to admission, patients level of function was (I) with functional mobility and ADLs using no DME.  Equipment used at home: shower chair.  DME owned (not currently used): none.  Upon discharge, patient will have assistance from spouse.    Objective:     Communicated with RN prior to session.  Patient found HOB elevated with bed alarm, telemetry upon PT entry to room.    General Precautions: Standard, fall    Orthopedic Precautions:N/A   Braces: N/A  Respiratory Status: Room air    Exams:  Cognitive Exam:  Patient is oriented to Person, Place, Time, and Situation  Postural Exam:  Patient presented with the following abnormalities:    -       Rounded shoulders  Sensation:    -       Intact  light/touch B LEs  RLE ROM: WNL  RLE Strength: WFL  LLE ROM: WNL  LLE Strength: WFL    Functional Mobility:  Bed Mobility:     Supine to Sit: independence  Sit to Supine: " independence  Transfers:     Sit to Stand:  independence with no AD  Gait:   Pt ambulated in hallway 55' x2 with supervision and no AD. Pt demos a steady gait with no overt LOB.  Stairs:    Pt ascended/descended 10 stairs with left handrail and Supervision     AM-PAC 6 CLICK MOBILITY  Total Score:24       Treatment and Education:  PT assisted with functional mobility as noted above.  Discussed d/c from acute PT services, pt agreeable.  PT instructed pt to ambulate with staff at least 3x daily to prevent functional decline during hospital stay.      AM-PAC 6 CLICK MOBILITY  Total Score:24     Patient left HOB elevated with all lines intact, call button in reach, bed alarm on, and RN notified.    GOALS:   Multidisciplinary Problems       Physical Therapy Goals       Not on file              Multidisciplinary Problems (Resolved)          Problem: Physical Therapy    Goal Priority Disciplines Outcome Interventions   Physical Therapy Goal   (Resolved)     PT, PT/OT Met    Description: No goals established at this time, see eval 12/31                       History:     Past Medical History:   Diagnosis Date    Acid reflux     Graves disease     diffuse Toxic Goiter    Hypertension     IBS (irritable bowel syndrome)     Lichen sclerosus     Migraine     Stroke        Past Surgical History:   Procedure Laterality Date    CEREBRAL ANGIOGRAM N/A 4/22/2024    Procedure: ANGIOGRAM-CEREBRAL; stroke class A;  Surgeon: Sharon Weclh;  Location: [facility];  Service: Anesthesiology;  Laterality: N/A;    EYE SURGERY      HYSTERECTOMY      CINDI for ?? cervical cancer, had normal paps    INSERTION OF IMPLANTABLE LOOP RECORDER N/A 10/23/2024    Procedure: Insertion, Implantable Loop Recorder;  Surgeon: ONEIDA Nicolas MD;  Location: [facility] EP LAB;  Service: Cardiology;  Laterality: N/A;  Cryptogenic Stroke, ILR,BSci, Local, EH, 3 Prep    LEFT HEART CATHETERIZATION N/A 10/20/2020    Procedure: Left heart cath;  Surgeon: Bony EWING  MD Antoine;  Location: [facility] CATH LAB/EP;  Service: Cardiology;  Laterality: N/A;    REVERSE TOTAL SHOULDER ARTHROPLASTY Right 8/19/2022    Procedure: ARTHROPLASTY, SHOULDER, TOTAL, REVERSE;  Surgeon: Bradley Finnegan Jr., MD;  Location: [facility] OR;  Service: Orthopedics;  Laterality: Right;  SONIA linda CONFIRMED/8/18/Shaista Finnegan- hemosorb AT        Time Tracking:     PT Received On: 12/31/24  PT Start Time: 0922     PT Stop Time: 0939  PT Total Time (min): 17 min     Billable Minutes: Evaluation 9 and Therapeutic Activity 8      12/31/2024

## 2024-12-31 NOTE — PLAN OF CARE
Discharged from OT  Problem: Occupational Therapy  Goal: Occupational Therapy Goal  Description: Goals set 12/30 with an expiration date, 1/28:  Patient will increase functional independence with ADLs by performing:  Patient will demonstrate lower body dressing with modified independence.   met  Patient and/or patient's family will verbalize understanding of stroke prevention guidelines, personal risk factors and stroke warning signs via teachback method.   met           Outcome: Met

## 2024-12-31 NOTE — PLAN OF CARE
[facility] - Neurosurgery (Hospital)  Discharge Final Note    Primary Care Provider: Aleksandra Carey MD    Expected Discharge Date: 12/31/2024    Final Discharge Note (most recent)       Final Note - 12/31/24 1212          Final Note    Assessment Type Final Discharge Note (P)      Anticipated Discharge Disposition Home or Self Care (P)         Post-Acute Status    Post-Acute Authorization Other (P)      Other Status No Post-Acute Service Needs (P)                  Patient discharging home with spouse.  No post acute needs.        Azeb See, LMSW [facility]  [phone]    Future Appointments   Date Time Provider Department Center   1/3/2025 10:00 AM Ceci Cardona MD [facility] IM [facility] PCW   1/24/2025  8:30 AM EKG, APPT [facility] EKG [facility]   1/24/2025  9:30 AM Justina Griffin, EIP [facility]   6/3/2025 10:00 AM Aleks[facility] MD [facility]         Important Message from Medicare             Contact Info       Aleksandra Carey MD   Specialty: Internal Medicine   Relationship: PCP - General    [address]  [address] [place] [address]   Phone: [phone]       Next Steps: Schedule an appointment as soon as possible for a visit    Kuldeep Morales MD   Specialty: Neurology    [address] [place] [address]   Phone: [phone]       Next Steps: Schedule an appointment as soon as possible for a visit in 1 month(s)    Aleksandra Carey MD   Specialty: Internal Medicine   Relationship: PCP - General    [address]  [address] [facility] [address]   Phone: [phone]       Next Steps: Follow up

## 2024-12-31 NOTE — PLAN OF CARE
Problem: Physical Therapy  Goal: Physical Therapy Goal  Description: No goals established at this time, see liseth 12/31  Outcome: Met

## 2024-12-31 NOTE — ASSESSMENT & PLAN NOTE
Multiple prior strokes without obvious etiology. Has ILR since 10/204 but no reported episodes of afib.

## 2025-01-01 ENCOUNTER — ANESTHESIA (OUTPATIENT)
Dept: SURGERY | Facility: HOSPITAL | Age: 75
End: 2025-01-01
Payer: MEDICARE

## 2025-01-01 ENCOUNTER — HOSPITAL ENCOUNTER (INPATIENT)
Facility: HOSPITAL | Age: 75
LOS: 1 days | DRG: 951 | End: 2025-08-22
Attending: STUDENT IN AN ORGANIZED HEALTH CARE EDUCATION/TRAINING PROGRAM | Admitting: STUDENT IN AN ORGANIZED HEALTH CARE EDUCATION/TRAINING PROGRAM
Payer: OTHER MISCELLANEOUS

## 2025-01-01 ENCOUNTER — HOSPITAL ENCOUNTER (INPATIENT)
Facility: HOSPITAL | Age: 75
LOS: 6 days | Discharge: HOSPICE | DRG: 853 | End: 2025-08-22
Payer: MEDICARE

## 2025-01-01 ENCOUNTER — RESULTS FOLLOW-UP (OUTPATIENT)
Dept: PRIMARY CARE CLINIC | Facility: CLINIC | Age: 75
End: 2025-01-01

## 2025-01-01 ENCOUNTER — CLINICAL SUPPORT (OUTPATIENT)
Dept: CARDIOLOGY | Facility: HOSPITAL | Age: 75
End: 2025-01-01
Payer: MEDICARE

## 2025-01-01 ENCOUNTER — ANESTHESIA EVENT (OUTPATIENT)
Dept: SURGERY | Facility: HOSPITAL | Age: 75
End: 2025-01-01
Payer: MEDICARE

## 2025-01-01 VITALS
RESPIRATION RATE: 26 BRPM | HEART RATE: 75 BPM | BODY MASS INDEX: 28.35 KG/M2 | OXYGEN SATURATION: 98 % | WEIGHT: 144.38 LBS | TEMPERATURE: 98 F | SYSTOLIC BLOOD PRESSURE: 147 MMHG | DIASTOLIC BLOOD PRESSURE: 66 MMHG | HEIGHT: 60 IN

## 2025-01-01 DIAGNOSIS — I50.9 CHF (CONGESTIVE HEART FAILURE): ICD-10-CM

## 2025-01-01 DIAGNOSIS — R07.9 CHEST PAIN: ICD-10-CM

## 2025-01-01 DIAGNOSIS — I46.9 CARDIAC ARREST: ICD-10-CM

## 2025-01-01 DIAGNOSIS — G93.1 ACUTE ANOXIC ENCEPHALOPATHY: ICD-10-CM

## 2025-01-01 DIAGNOSIS — J96.01 ACUTE HYPOXEMIC RESPIRATORY FAILURE: ICD-10-CM

## 2025-01-01 DIAGNOSIS — I49.8 OTHER SPECIFIED CARDIAC ARRHYTHMIAS: ICD-10-CM

## 2025-01-01 DIAGNOSIS — K56.600 PARTIAL SMALL BOWEL OBSTRUCTION: Primary | ICD-10-CM

## 2025-01-01 DIAGNOSIS — N17.0 ATN (ACUTE TUBULAR NECROSIS): ICD-10-CM

## 2025-01-01 DIAGNOSIS — R00.0 TACHYCARDIA: ICD-10-CM

## 2025-01-01 LAB
ABSOLUTE EOSINOPHIL (OHS): 0 K/UL
ABSOLUTE EOSINOPHIL (OHS): 0 K/UL
ABSOLUTE EOSINOPHIL (OHS): 0.01 K/UL
ABSOLUTE EOSINOPHIL (OHS): 0.01 K/UL
ABSOLUTE EOSINOPHIL (OHS): 0.05 K/UL
ABSOLUTE EOSINOPHIL (OHS): 0.11 K/UL
ABSOLUTE MONOCYTE (OHS): 0.75 K/UL (ref 0.3–1)
ABSOLUTE MONOCYTE (OHS): 0.77 K/UL (ref 0.3–1)
ABSOLUTE MONOCYTE (OHS): 0.86 K/UL (ref 0.3–1)
ABSOLUTE MONOCYTE (OHS): 0.99 K/UL (ref 0.3–1)
ABSOLUTE MONOCYTE (OHS): 1.07 K/UL (ref 0.3–1)
ABSOLUTE MONOCYTE (OHS): 1.16 K/UL (ref 0.3–1)
ABSOLUTE NEUTROPHIL COUNT (OHS): 10 K/UL (ref 1.8–7.7)
ABSOLUTE NEUTROPHIL COUNT (OHS): 10.65 K/UL (ref 1.8–7.7)
ABSOLUTE NEUTROPHIL COUNT (OHS): 12.63 K/UL (ref 1.8–7.7)
ABSOLUTE NEUTROPHIL COUNT (OHS): 6.91 K/UL (ref 1.8–7.7)
ABSOLUTE NEUTROPHIL COUNT (OHS): 7.91 K/UL (ref 1.8–7.7)
ABSOLUTE NEUTROPHIL COUNT (OHS): 9.53 K/UL (ref 1.8–7.7)
ABSOLUTE NEUTROPHIL MANUAL (OHS): 13.1 K/UL (ref 1.8–7.7)
ABSOLUTE NEUTROPHIL MANUAL (OHS): 19.4 K/UL (ref 1.8–7.7)
ABSOLUTE NEUTROPHIL MANUAL (OHS): 8.3 K/UL (ref 1.8–7.7)
ABSOLUTE NEUTROPHIL MANUAL (OHS): 8.7 K/UL (ref 1.8–7.7)
ALBUMIN SERPL BCP-MCNC: 2.5 G/DL (ref 3.5–5.2)
ALBUMIN SERPL BCP-MCNC: 2.6 G/DL (ref 3.5–5.2)
ALBUMIN SERPL BCP-MCNC: 3 G/DL (ref 3.5–5.2)
ALBUMIN SERPL BCP-MCNC: 3.9 G/DL (ref 3.5–5.2)
ALBUMIN SERPL BCP-MCNC: 4 G/DL (ref 3.5–5.2)
ALLENS TEST: YES
ALP SERPL-CCNC: 60 UNIT/L (ref 40–150)
ALP SERPL-CCNC: 75 UNIT/L (ref 40–150)
ALP SERPL-CCNC: 84 UNIT/L (ref 40–150)
ALT SERPL W/O P-5'-P-CCNC: 10 UNIT/L (ref 10–44)
ALT SERPL W/O P-5'-P-CCNC: 11 UNIT/L (ref 10–44)
ALT SERPL W/O P-5'-P-CCNC: 142 UNIT/L (ref 10–44)
ANION GAP (OHS): 12 MMOL/L (ref 8–16)
ANION GAP (OHS): 13 MMOL/L (ref 8–16)
ANION GAP (OHS): 13 MMOL/L (ref 8–16)
ANION GAP (OHS): 15 MMOL/L (ref 8–16)
ANION GAP (OHS): 16 MMOL/L (ref 8–16)
ANION GAP (OHS): 16 MMOL/L (ref 8–16)
ANION GAP (OHS): 17 MMOL/L (ref 8–16)
ANION GAP (OHS): 18 MMOL/L (ref 8–16)
ANION GAP (OHS): 19 MMOL/L (ref 8–16)
ANION GAP (OHS): 21 MMOL/L (ref 8–16)
ANION GAP (OHS): 24 MMOL/L (ref 8–16)
AORTIC SIZE INDEX (SOV): 2.1 CM/M2
AORTIC SIZE INDEX: 1.8 CM/M2
APICAL FOUR CHAMBER EJECTION FRACTION: 39 %
APICAL TWO CHAMBER EJECTION FRACTION: 33 %
ASCENDING AORTA: 2.8 CM
AST SERPL-CCNC: 262 UNIT/L (ref 11–45)
AST SERPL-CCNC: 28 UNIT/L (ref 11–45)
AST SERPL-CCNC: 33 UNIT/L (ref 11–45)
AV INDEX (PROSTH): 1.17
AV MEAN GRADIENT: 3 MMHG
AV PEAK GRADIENT: 6 MMHG
AV REGURGITATION PRESSURE HALF TIME: 178 MS
AV VALVE AREA BY VELOCITY RATIO: 1.5 CM²
AV VALVE AREA: 2.7 CM²
AV VELOCITY RATIO: 0.67
BACTERIA #/AREA URNS AUTO: ABNORMAL /HPF
BACTERIA BLD CULT: NORMAL
BACTERIA BLD CULT: NORMAL
BACTERIA SPT CULT: ABNORMAL
BASOPHILS # BLD AUTO: 0.02 K/UL
BASOPHILS # BLD AUTO: 0.03 K/UL
BASOPHILS NFR BLD AUTO: 0.2 %
BASOPHILS NFR BLD AUTO: 0.3 %
BASOPHILS NFR BLD AUTO: 0.3 %
BASOPHILS NFR BLD MANUAL: 1 %
BILIRUB SERPL-MCNC: 0.4 MG/DL (ref 0.1–1)
BILIRUB SERPL-MCNC: 0.9 MG/DL (ref 0.1–1)
BILIRUB SERPL-MCNC: 0.9 MG/DL (ref 0.1–1)
BILIRUB UR QL STRIP.AUTO: NEGATIVE
BSA FOR ECHO PROCEDURE: 1.61 M2
BUN SERPL-MCNC: 19 MG/DL (ref 8–23)
BUN SERPL-MCNC: 25 MG/DL (ref 8–23)
BUN SERPL-MCNC: 42 MG/DL (ref 8–23)
BUN SERPL-MCNC: 47 MG/DL (ref 8–23)
BUN SERPL-MCNC: 51 MG/DL (ref 8–23)
BUN SERPL-MCNC: 53 MG/DL (ref 8–23)
BUN SERPL-MCNC: 56 MG/DL (ref 8–23)
BUN SERPL-MCNC: 58 MG/DL (ref 8–23)
BUN SERPL-MCNC: 62 MG/DL (ref 8–23)
BUN SERPL-MCNC: 63 MG/DL (ref 8–23)
BUN SERPL-MCNC: 69 MG/DL (ref 8–23)
BUN SERPL-MCNC: 70 MG/DL (ref 8–23)
BUN SERPL-MCNC: 79 MG/DL (ref 8–23)
C3 SERPL-MCNC: 102 MG/DL (ref 50–180)
C4 COMPLEMENT (OHS): 15 MG/DL (ref 11–44)
CALCIUM SERPL-MCNC: 11.2 MG/DL (ref 8.7–10.5)
CALCIUM SERPL-MCNC: 11.3 MG/DL (ref 8.7–10.5)
CALCIUM SERPL-MCNC: 11.4 MG/DL (ref 8.7–10.5)
CALCIUM SERPL-MCNC: 8.8 MG/DL (ref 8.7–10.5)
CALCIUM SERPL-MCNC: 8.8 MG/DL (ref 8.7–10.5)
CALCIUM SERPL-MCNC: 8.9 MG/DL (ref 8.7–10.5)
CALCIUM SERPL-MCNC: 9.1 MG/DL (ref 8.7–10.5)
CALCIUM SERPL-MCNC: 9.1 MG/DL (ref 8.7–10.5)
CALCIUM SERPL-MCNC: 9.2 MG/DL (ref 8.7–10.5)
CALCIUM SERPL-MCNC: 9.2 MG/DL (ref 8.7–10.5)
CALCIUM SERPL-MCNC: 9.6 MG/DL (ref 8.7–10.5)
CALCIUM SERPL-MCNC: 9.7 MG/DL (ref 8.7–10.5)
CALCIUM SERPL-MCNC: 9.8 MG/DL (ref 8.7–10.5)
CHLORIDE SERPL-SCNC: 103 MMOL/L (ref 95–110)
CHLORIDE SERPL-SCNC: 103 MMOL/L (ref 95–110)
CHLORIDE SERPL-SCNC: 104 MMOL/L (ref 95–110)
CHLORIDE SERPL-SCNC: 104 MMOL/L (ref 95–110)
CHLORIDE SERPL-SCNC: 105 MMOL/L (ref 95–110)
CHLORIDE SERPL-SCNC: 106 MMOL/L (ref 95–110)
CHLORIDE SERPL-SCNC: 107 MMOL/L (ref 95–110)
CHLORIDE SERPL-SCNC: 110 MMOL/L (ref 95–110)
CK SERPL-CCNC: 1494 U/L (ref 20–180)
CLARITY UR: CLEAR
CO2 SERPL-SCNC: 16 MMOL/L (ref 23–29)
CO2 SERPL-SCNC: 17 MMOL/L (ref 23–29)
CO2 SERPL-SCNC: 18 MMOL/L (ref 23–29)
CO2 SERPL-SCNC: 18 MMOL/L (ref 23–29)
CO2 SERPL-SCNC: 19 MMOL/L (ref 23–29)
CO2 SERPL-SCNC: 19 MMOL/L (ref 23–29)
CO2 SERPL-SCNC: 20 MMOL/L (ref 23–29)
CO2 SERPL-SCNC: 23 MMOL/L (ref 23–29)
CO2 SERPL-SCNC: 26 MMOL/L (ref 23–29)
CO2 SERPL-SCNC: 26 MMOL/L (ref 23–29)
COLOR UR AUTO: YELLOW
CREAT SERPL-MCNC: 1.4 MG/DL (ref 0.5–1.4)
CREAT SERPL-MCNC: 1.7 MG/DL (ref 0.5–1.4)
CREAT SERPL-MCNC: 2 MG/DL (ref 0.5–1.4)
CREAT SERPL-MCNC: 3 MG/DL (ref 0.5–1.4)
CREAT SERPL-MCNC: 3.4 MG/DL (ref 0.5–1.4)
CREAT SERPL-MCNC: 3.5 MG/DL (ref 0.5–1.4)
CREAT SERPL-MCNC: 3.5 MG/DL (ref 0.5–1.4)
CREAT SERPL-MCNC: 3.6 MG/DL (ref 0.5–1.4)
CREAT SERPL-MCNC: 3.6 MG/DL (ref 0.5–1.4)
CREAT SERPL-MCNC: 3.7 MG/DL (ref 0.5–1.4)
CREAT SERPL-MCNC: 3.9 MG/DL (ref 0.5–1.4)
CREAT SERPL-MCNC: 3.9 MG/DL (ref 0.5–1.4)
CREAT SERPL-MCNC: 4.1 MG/DL (ref 0.5–1.4)
CREAT UR-MCNC: 76.7 MG/DL (ref 15–325)
CV ECHO LV RWT: 0.63 CM
DOP CALC AO PEAK VEL: 1.2 M/S
DOP CALC AO VTI: 13.6 CM
DOP CALC LVOT AREA: 2.3 CM2
DOP CALC LVOT DIAMETER: 1.7 CM
DOP CALC LVOT PEAK VEL: 0.8 M/S
DOP CALCLVOT PEAK VEL VTI: 15.9 CM
ECHO LV POSTERIOR WALL: 1.3 CM (ref 0.6–1.1)
EOSINOPHIL NFR BLD MANUAL: 2 % (ref 0–8)
ERYTHROCYTE [DISTWIDTH] IN BLOOD BY AUTOMATED COUNT: 13.2 % (ref 11.5–14.5)
ERYTHROCYTE [DISTWIDTH] IN BLOOD BY AUTOMATED COUNT: 13.7 % (ref 11.5–14.5)
ERYTHROCYTE [DISTWIDTH] IN BLOOD BY AUTOMATED COUNT: 14 % (ref 11.5–14.5)
ERYTHROCYTE [DISTWIDTH] IN BLOOD BY AUTOMATED COUNT: 14.1 % (ref 11.5–14.5)
ERYTHROCYTE [DISTWIDTH] IN BLOOD BY AUTOMATED COUNT: 14.2 % (ref 11.5–14.5)
ERYTHROCYTE [DISTWIDTH] IN BLOOD BY AUTOMATED COUNT: 14.3 % (ref 11.5–14.5)
ERYTHROCYTE [DISTWIDTH] IN BLOOD BY AUTOMATED COUNT: 14.3 % (ref 11.5–14.5)
FIO2: 21 %
FIO2: 30 %
FIO2: 30 %
FIO2: 38 %
FRACTIONAL SHORTENING: 26.8 % (ref 28–44)
GFR SERPLBLD CREATININE-BSD FMLA CKD-EPI: 11 ML/MIN/1.73/M2
GFR SERPLBLD CREATININE-BSD FMLA CKD-EPI: 12 ML/MIN/1.73/M2
GFR SERPLBLD CREATININE-BSD FMLA CKD-EPI: 13 ML/MIN/1.73/M2
GFR SERPLBLD CREATININE-BSD FMLA CKD-EPI: 16 ML/MIN/1.73/M2
GFR SERPLBLD CREATININE-BSD FMLA CKD-EPI: 25 ML/MIN/1.73/M2
GFR SERPLBLD CREATININE-BSD FMLA CKD-EPI: 31 ML/MIN/1.73/M2
GFR SERPLBLD CREATININE-BSD FMLA CKD-EPI: 39 ML/MIN/1.73/M2
GLUCOSE SERPL-MCNC: 100 MG/DL (ref 70–110)
GLUCOSE SERPL-MCNC: 108 MG/DL (ref 70–110)
GLUCOSE SERPL-MCNC: 109 MG/DL (ref 70–110)
GLUCOSE SERPL-MCNC: 109 MG/DL (ref 70–110)
GLUCOSE SERPL-MCNC: 142 MG/DL (ref 70–110)
GLUCOSE SERPL-MCNC: 157 MG/DL (ref 70–110)
GLUCOSE SERPL-MCNC: 196 MG/DL (ref 70–110)
GLUCOSE SERPL-MCNC: 253 MG/DL (ref 70–110)
GLUCOSE SERPL-MCNC: 84 MG/DL (ref 70–110)
GLUCOSE SERPL-MCNC: 89 MG/DL (ref 70–110)
GLUCOSE SERPL-MCNC: 93 MG/DL (ref 70–110)
GLUCOSE SERPL-MCNC: 95 MG/DL (ref 70–110)
GLUCOSE SERPL-MCNC: 97 MG/DL (ref 70–110)
GLUCOSE UR QL STRIP: NEGATIVE
GRAM STN SPEC: ABNORMAL
HCT VFR BLD AUTO: 30.2 % (ref 37–48.5)
HCT VFR BLD AUTO: 30.3 % (ref 37–48.5)
HCT VFR BLD AUTO: 30.7 % (ref 37–48.5)
HCT VFR BLD AUTO: 32.2 % (ref 37–48.5)
HCT VFR BLD AUTO: 33.6 % (ref 37–48.5)
HCT VFR BLD AUTO: 33.9 % (ref 37–48.5)
HCT VFR BLD AUTO: 38.4 % (ref 37–48.5)
HCT VFR BLD AUTO: 45.8 % (ref 37–48.5)
HCT VFR BLD AUTO: 46.3 % (ref 37–48.5)
HCT VFR BLD AUTO: 47.2 % (ref 37–48.5)
HCT VFR BLD CALC: 41.4 % (ref 36–54)
HGB BLD-MCNC: 10.3 GM/DL (ref 12–16)
HGB BLD-MCNC: 10.5 GM/DL (ref 12–16)
HGB BLD-MCNC: 10.7 GM/DL (ref 12–16)
HGB BLD-MCNC: 10.8 GM/DL (ref 12–16)
HGB BLD-MCNC: 11.1 GM/DL (ref 12–16)
HGB BLD-MCNC: 11.1 GM/DL (ref 12–16)
HGB BLD-MCNC: 12.5 GM/DL (ref 12–16)
HGB BLD-MCNC: 13.5 G/DL (ref 9–18)
HGB BLD-MCNC: 15.1 GM/DL (ref 12–16)
HGB BLD-MCNC: 15.6 GM/DL (ref 12–16)
HGB BLD-MCNC: 16.3 GM/DL (ref 12–16)
HGB UR QL STRIP: ABNORMAL
HOLD SPECIMEN: NORMAL
HYALINE CASTS UR QL AUTO: 0 /LPF (ref 0–1)
IMM GRANULOCYTES # BLD AUTO: 0.02 K/UL (ref 0–0.04)
IMM GRANULOCYTES # BLD AUTO: 0.08 K/UL (ref 0–0.04)
IMM GRANULOCYTES # BLD AUTO: 0.11 K/UL (ref 0–0.04)
IMM GRANULOCYTES # BLD AUTO: 0.18 K/UL (ref 0–0.04)
IMM GRANULOCYTES # BLD AUTO: 0.32 K/UL (ref 0–0.04)
IMM GRANULOCYTES # BLD AUTO: 0.43 K/UL (ref 0–0.04)
IMM GRANULOCYTES NFR BLD AUTO: 0.2 % (ref 0–0.5)
IMM GRANULOCYTES NFR BLD AUTO: 0.7 % (ref 0–0.5)
IMM GRANULOCYTES NFR BLD AUTO: 0.9 % (ref 0–0.5)
IMM GRANULOCYTES NFR BLD AUTO: 1.2 % (ref 0–0.5)
IMM GRANULOCYTES NFR BLD AUTO: 3 % (ref 0–0.5)
IMM GRANULOCYTES NFR BLD AUTO: 4.6 % (ref 0–0.5)
INR PPP: 1.2 (ref 0.8–1.2)
INTERVENTRICULAR SEPTUM: 1.3 CM (ref 0.6–1.1)
KETONES UR QL STRIP: NEGATIVE
LACTATE SERPL-SCNC: 1.6 MMOL/L (ref 0.5–2.2)
LACTATE SERPL-SCNC: 1.8 MMOL/L (ref 0.5–2.2)
LACTATE SERPL-SCNC: 2.4 MMOL/L (ref 0.5–2.2)
LACTATE SERPL-SCNC: 2.8 MMOL/L (ref 0.5–2.2)
LACTATE SERPL-SCNC: 3.1 MMOL/L (ref 0.5–2.2)
LACTATE SERPL-SCNC: 9.7 MMOL/L (ref 0.5–2.2)
LDH SERPL L TO P-CCNC: 2.5 MMOL/L (ref 0.5–2.2)
LDH SERPL L TO P-CCNC: 8.9 MMOL/L (ref 0.4–1.3)
LEFT ATRIUM AREA SYSTOLIC (APICAL 2 CHAMBER): 11.76 CM2
LEFT ATRIUM AREA SYSTOLIC (APICAL 4 CHAMBER): 9.23 CM2
LEFT ATRIUM VOLUME INDEX MOD: 13 ML/M2
LEFT ATRIUM VOLUME MOD: 20 ML
LEFT INTERNAL DIMENSION IN SYSTOLE: 3 CM (ref 2.1–4)
LEFT VENTRICLE DIASTOLIC VOLUME INDEX: 46.2 ML/M2
LEFT VENTRICLE DIASTOLIC VOLUME: 73 ML
LEFT VENTRICLE END DIASTOLIC VOLUME APICAL 2 CHAMBER: 32.97 ML
LEFT VENTRICLE END DIASTOLIC VOLUME APICAL 4 CHAMBER INDEX BSA: 22.47 ML/M2
LEFT VENTRICLE END DIASTOLIC VOLUME APICAL 4 CHAMBER: 35.51 ML
LEFT VENTRICLE END SYSTOLIC VOLUME APICAL 2 CHAMBER: 23.17 ML
LEFT VENTRICLE END SYSTOLIC VOLUME APICAL 4 CHAMBER: 16.83 ML
LEFT VENTRICLE MASS INDEX: 122.5 G/M2
LEFT VENTRICLE SYSTOLIC VOLUME INDEX: 22.2 ML/M2
LEFT VENTRICLE SYSTOLIC VOLUME: 35 ML
LEFT VENTRICULAR INTERNAL DIMENSION IN DIASTOLE: 4.1 CM (ref 3.5–6)
LEFT VENTRICULAR MASS: 193.5 G
LEUKOCYTE ESTERASE UR QL STRIP: ABNORMAL
LIPASE SERPL-CCNC: 18 U/L (ref 4–60)
LVED V (TEICH): 72.72 ML
LVES V (TEICH): 35.21 ML
LVOT MG: 1.45 MMHG
LVOT MV: 0.59 CM/S
LYMPHOCYTES # BLD AUTO: 0.57 K/UL (ref 1–4.8)
LYMPHOCYTES # BLD AUTO: 0.62 K/UL (ref 1–4.8)
LYMPHOCYTES # BLD AUTO: 0.71 K/UL (ref 1–4.8)
LYMPHOCYTES # BLD AUTO: 0.72 K/UL (ref 1–4.8)
LYMPHOCYTES # BLD AUTO: 1.13 K/UL (ref 1–4.8)
LYMPHOCYTES # BLD AUTO: 1.41 K/UL (ref 1–4.8)
LYMPHOCYTES NFR BLD MANUAL: 10 % (ref 18–48)
LYMPHOCYTES NFR BLD MANUAL: 13 % (ref 18–48)
LYMPHOCYTES NFR BLD MANUAL: 2 % (ref 18–48)
LYMPHOCYTES NFR BLD MANUAL: 9 % (ref 18–48)
Lab: 1.8 CM/M
Lab: 2.2 CM/M
MAGNESIUM SERPL-MCNC: 1.8 MG/DL (ref 1.6–2.6)
MAGNESIUM SERPL-MCNC: 2.4 MG/DL (ref 1.6–2.6)
MAGNESIUM SERPL-MCNC: 2.5 MG/DL (ref 1.6–2.6)
MAGNESIUM SERPL-MCNC: 2.6 MG/DL (ref 1.6–2.6)
MAGNESIUM SERPL-MCNC: 2.6 MG/DL (ref 1.6–2.6)
MAGNESIUM SERPL-MCNC: 2.7 MG/DL (ref 1.6–2.6)
MCH RBC QN AUTO: 30.2 PG (ref 27–31)
MCH RBC QN AUTO: 30.3 PG (ref 27–31)
MCH RBC QN AUTO: 30.7 PG (ref 27–31)
MCH RBC QN AUTO: 30.9 PG (ref 27–31)
MCH RBC QN AUTO: 30.9 PG (ref 27–31)
MCH RBC QN AUTO: 31.1 PG (ref 27–31)
MCH RBC QN AUTO: 31.2 PG (ref 27–31)
MCH RBC QN AUTO: 31.2 PG (ref 27–31)
MCH RBC QN AUTO: 31.3 PG (ref 27–31)
MCH RBC QN AUTO: 31.5 PG (ref 27–31)
MCHC RBC AUTO-ENTMCNC: 32.6 G/DL (ref 32–36)
MCHC RBC AUTO-ENTMCNC: 32.6 G/DL (ref 32–36)
MCHC RBC AUTO-ENTMCNC: 32.7 G/DL (ref 32–36)
MCHC RBC AUTO-ENTMCNC: 33 G/DL (ref 32–36)
MCHC RBC AUTO-ENTMCNC: 33.5 G/DL (ref 32–36)
MCHC RBC AUTO-ENTMCNC: 34 G/DL (ref 32–36)
MCHC RBC AUTO-ENTMCNC: 34.1 G/DL (ref 32–36)
MCHC RBC AUTO-ENTMCNC: 34.2 G/DL (ref 32–36)
MCHC RBC AUTO-ENTMCNC: 34.5 G/DL (ref 32–36)
MCHC RBC AUTO-ENTMCNC: 35.4 G/DL (ref 32–36)
MCV RBC AUTO: 89 FL (ref 82–98)
MCV RBC AUTO: 91 FL (ref 82–98)
MCV RBC AUTO: 92 FL (ref 82–98)
MCV RBC AUTO: 92 FL (ref 82–98)
MCV RBC AUTO: 93 FL (ref 82–98)
MCV RBC AUTO: 93 FL (ref 82–98)
MCV RBC AUTO: 96 FL (ref 82–98)
MICROSCOPIC COMMENT: ABNORMAL
MONOCYTES NFR BLD MANUAL: 4 % (ref 4–15)
MONOCYTES NFR BLD MANUAL: 5 % (ref 4–15)
MONOCYTES NFR BLD MANUAL: 6 % (ref 4–15)
MONOCYTES NFR BLD MANUAL: 8 % (ref 4–15)
MRSA PCR SCRN (OHS): NOT DETECTED
NEUTROPHILS NFR BLD MANUAL: 65 % (ref 38–73)
NEUTROPHILS NFR BLD MANUAL: 65 % (ref 38–73)
NEUTROPHILS NFR BLD MANUAL: 72 % (ref 38–73)
NEUTROPHILS NFR BLD MANUAL: 91 % (ref 38–73)
NEUTS BAND NFR BLD MANUAL: 1 %
NEUTS BAND NFR BLD MANUAL: 13 %
NEUTS BAND NFR BLD MANUAL: 15 %
NEUTS BAND NFR BLD MANUAL: 18 %
NITRITE UR QL STRIP: NEGATIVE
NT-PROBNP SERPL-MCNC: 9606 PG/ML
NUCLEATED RBC (/100WBC) (OHS): 0 /100 WBC
OHS CV CPX PATIENT HEIGHT IN: 60
OHS QRS DURATION: 132 MS
OHS QRS DURATION: 78 MS
OHS QTC CALCULATION: 459 MS
OHS QTC CALCULATION: 549 MS
OSMOLALITY UR: 409 MOSM/KG (ref 50–1200)
PCO2 BLDA: 27.1 MMHG (ref 35–45)
PCO2 BLDA: 32.2 MMHG (ref 35–45)
PCO2 BLDA: 33.8 MMHG (ref 35–45)
PCO2 BLDA: 35.7 MMHG (ref 35–45)
PCO2 BLDA: 40.5 MMHG (ref 35–45)
PCO2 BLDA: 44.3 MMHG (ref 35–45)
PCO2 BLDA: 45 MMHG (ref 35–45)
PCO2 BLDA: 55.2 MMHG (ref 35–45)
PEEP: 5
PH SMN: 7.18 [PH] (ref 7.35–7.45)
PH SMN: 7.29 [PH] (ref 7.35–7.45)
PH SMN: 7.35 [PH] (ref 7.35–7.45)
PH SMN: 7.4 [PH] (ref 7.35–7.45)
PH SMN: 7.41 [PH] (ref 7.35–7.45)
PH SMN: 7.42 [PH] (ref 7.35–7.45)
PH SMN: 7.45 [PH] (ref 7.35–7.45)
PH SMN: 7.5 [PH] (ref 7.35–7.45)
PH UR STRIP: 6 [PH]
PHOSPHATE SERPL-MCNC: 10.8 MG/DL (ref 2.7–4.5)
PHOSPHATE SERPL-MCNC: 4 MG/DL (ref 2.7–4.5)
PHOSPHATE SERPL-MCNC: 4 MG/DL (ref 2.7–4.5)
PISA AR MAX VEL: 2 M/S
PISA MRMAX VEL: 1.68 M/S
PISA TR MAX VEL: 1.5 M/S
PLATELET # BLD AUTO: 137 K/UL (ref 150–450)
PLATELET # BLD AUTO: 142 K/UL (ref 150–450)
PLATELET # BLD AUTO: 143 K/UL (ref 150–450)
PLATELET # BLD AUTO: 143 K/UL (ref 150–450)
PLATELET # BLD AUTO: 152 K/UL (ref 150–450)
PLATELET # BLD AUTO: 157 K/UL (ref 150–450)
PLATELET # BLD AUTO: 165 K/UL (ref 150–450)
PLATELET # BLD AUTO: 208 K/UL (ref 150–450)
PLATELET # BLD AUTO: 218 K/UL (ref 150–450)
PLATELET # BLD AUTO: 237 K/UL (ref 150–450)
PLATELET BLD QL SMEAR: ABNORMAL
PMV BLD AUTO: 10.3 FL (ref 9.2–12.9)
PMV BLD AUTO: 10.6 FL (ref 9.2–12.9)
PMV BLD AUTO: 10.8 FL (ref 9.2–12.9)
PMV BLD AUTO: 10.9 FL (ref 9.2–12.9)
PMV BLD AUTO: 11 FL (ref 9.2–12.9)
PMV BLD AUTO: 11.2 FL (ref 9.2–12.9)
PMV BLD AUTO: 11.3 FL (ref 9.2–12.9)
PMV BLD AUTO: 11.4 FL (ref 9.2–12.9)
PO2 BLDA: 16.5 MMHG (ref 40–60)
PO2 BLDA: 25.6 MMHG (ref 40–60)
PO2 BLDA: 30.7 MMHG (ref 40–60)
PO2 BLDA: 351 MMHG (ref 80–100)
PO2 BLDA: 48.8 MMHG (ref 40–60)
PO2 BLDA: 54.5 MMHG (ref 80–100)
PO2 BLDA: 65.8 MMHG (ref 80–100)
PO2 BLDA: 75.9 MMHG (ref 80–100)
POC BASE DEFICIT: -0.8 MMOL/L (ref -2–2)
POC BASE DEFICIT: -0.8 MMOL/L (ref -2–2)
POC BASE DEFICIT: -1.8 MMOL/L (ref -2–2)
POC BASE DEFICIT: -11.3 MMOL/L (ref -2–2)
POC BASE DEFICIT: -3.2 MMOL/L (ref -2–2)
POC BASE DEFICIT: -3.6 MMOL/L (ref -2–2)
POC BASE DEFICIT: 0.2 MMOL/L (ref -2–2)
POC BASE DEFICIT: 3.4 MMOL/L (ref -2–2)
POC HCO3: 16.8 MMOL/L (ref 24–28)
POC HCO3: 20.3 MMOL/L (ref 24–28)
POC HCO3: 21.4 MMOL/L (ref 24–28)
POC HCO3: 22.2 MMOL/L (ref 24–28)
POC HCO3: 22.3 MMOL/L (ref 24–28)
POC HCO3: 23.5 MMOL/L (ref 24–28)
POC HCO3: 26.7 MMOL/L (ref 24–28)
POC HCO3: 28.7 MMOL/L (ref 24–28)
POC IONIZED CALCIUM: 1.22 MMOL/L (ref 1.06–1.42)
POC PERFORMED BY: ABNORMAL
POC SATURATED O2: 17.8 % (ref 95–100)
POC SATURATED O2: 31.7 % (ref 95–100)
POC SATURATED O2: 56 % (ref 95–100)
POC SATURATED O2: 86.4 % (ref 95–100)
POC SATURATED O2: 87 % (ref 95–100)
POC SATURATED O2: 92.7 % (ref 95–100)
POC SATURATED O2: 93.8 % (ref 95–100)
POC SATURATED O2: 99.6 % (ref 95–100)
POC SET RR: 32
POCT GLUCOSE: 104 MG/DL (ref 70–110)
POCT GLUCOSE: 104 MG/DL (ref 70–110)
POCT GLUCOSE: 105 MG/DL (ref 70–110)
POCT GLUCOSE: 108 MG/DL (ref 70–110)
POCT GLUCOSE: 112 MG/DL (ref 70–110)
POCT GLUCOSE: 115 MG/DL (ref 70–110)
POCT GLUCOSE: 120 MG/DL (ref 70–110)
POCT GLUCOSE: 122 MG/DL (ref 70–110)
POCT GLUCOSE: 124 MG/DL (ref 70–110)
POCT GLUCOSE: 197 MG/DL (ref 70–110)
POCT GLUCOSE: 211 MG/DL (ref 70–110)
POCT GLUCOSE: 274 MG/DL (ref 70–110)
POCT GLUCOSE: 71 MG/DL (ref 70–110)
POCT GLUCOSE: 79 MG/DL (ref 70–110)
POCT GLUCOSE: 86 MG/DL (ref 70–110)
POCT GLUCOSE: 87 MG/DL (ref 70–110)
POCT GLUCOSE: 93 MG/DL (ref 70–110)
POCT GLUCOSE: 95 MG/DL (ref 70–110)
POCT GLUCOSE: 95 MG/DL (ref 70–110)
POCT GLUCOSE: 96 MG/DL (ref 70–110)
POCT GLUCOSE: 99 MG/DL (ref 70–110)
POTASSIUM BLD-SCNC: 4 MMOL/L (ref 3.5–5.1)
POTASSIUM SERPL-SCNC: 3.3 MMOL/L (ref 3.5–5.1)
POTASSIUM SERPL-SCNC: 3.3 MMOL/L (ref 3.5–5.1)
POTASSIUM SERPL-SCNC: 3.4 MMOL/L (ref 3.5–5.1)
POTASSIUM SERPL-SCNC: 3.4 MMOL/L (ref 3.5–5.1)
POTASSIUM SERPL-SCNC: 3.5 MMOL/L (ref 3.5–5.1)
POTASSIUM SERPL-SCNC: 3.6 MMOL/L (ref 3.5–5.1)
POTASSIUM SERPL-SCNC: 3.8 MMOL/L (ref 3.5–5.1)
POTASSIUM SERPL-SCNC: 4.3 MMOL/L (ref 3.5–5.1)
POTASSIUM SERPL-SCNC: 4.4 MMOL/L (ref 3.5–5.1)
POTASSIUM SERPL-SCNC: 4.6 MMOL/L (ref 3.5–5.1)
POTASSIUM SERPL-SCNC: 4.9 MMOL/L (ref 3.5–5.1)
PROT SERPL-MCNC: 6.8 GM/DL (ref 6–8.4)
PROT SERPL-MCNC: 8 GM/DL (ref 6–8.4)
PROT SERPL-MCNC: 8.1 GM/DL (ref 6–8.4)
PROT UR QL STRIP: ABNORMAL
PROTHROMBIN TIME: 13.1 SECONDS (ref 9–12.5)
PV MV: 0.89 M/S
PV PEAK GRADIENT: 9 MMHG
PV PEAK VELOCITY: 1.5 M/S
RA PRESSURE ESTIMATED: 3 MMHG
RA VOL SYS: 7.18 ML
RBC # BLD AUTO: 3.33 M/UL (ref 4–5.4)
RBC # BLD AUTO: 3.37 M/UL (ref 4–5.4)
RBC # BLD AUTO: 3.4 M/UL (ref 4–5.4)
RBC # BLD AUTO: 3.49 M/UL (ref 4–5.4)
RBC # BLD AUTO: 3.62 M/UL (ref 4–5.4)
RBC # BLD AUTO: 3.67 M/UL (ref 4–5.4)
RBC # BLD AUTO: 4.01 M/UL (ref 4–5.4)
RBC # BLD AUTO: 4.98 M/UL (ref 4–5.4)
RBC # BLD AUTO: 5.02 M/UL (ref 4–5.4)
RBC # BLD AUTO: 5.21 M/UL (ref 4–5.4)
RBC #/AREA URNS AUTO: 8 /HPF (ref 0–4)
RELATIVE EOSINOPHIL (OHS): 0 %
RELATIVE EOSINOPHIL (OHS): 0 %
RELATIVE EOSINOPHIL (OHS): 0.1 %
RELATIVE EOSINOPHIL (OHS): 0.1 %
RELATIVE EOSINOPHIL (OHS): 0.5 %
RELATIVE EOSINOPHIL (OHS): 1.2 %
RELATIVE LYMPHOCYTE (OHS): 10.8 % (ref 18–48)
RELATIVE LYMPHOCYTE (OHS): 5.1 % (ref 18–48)
RELATIVE LYMPHOCYTE (OHS): 5.2 % (ref 18–48)
RELATIVE LYMPHOCYTE (OHS): 6.2 % (ref 18–48)
RELATIVE LYMPHOCYTE (OHS): 7.6 % (ref 18–48)
RELATIVE LYMPHOCYTE (OHS): 9.2 % (ref 18–48)
RELATIVE MONOCYTE (OHS): 10.2 % (ref 4–15)
RELATIVE MONOCYTE (OHS): 12.4 % (ref 4–15)
RELATIVE MONOCYTE (OHS): 6.5 % (ref 4–15)
RELATIVE MONOCYTE (OHS): 6.7 % (ref 4–15)
RELATIVE MONOCYTE (OHS): 6.8 % (ref 4–15)
RELATIVE MONOCYTE (OHS): 7 % (ref 4–15)
RELATIVE NEUTROPHIL (OHS): 73.9 % (ref 38–73)
RELATIVE NEUTROPHIL (OHS): 75.3 % (ref 38–73)
RELATIVE NEUTROPHIL (OHS): 82.8 % (ref 38–73)
RELATIVE NEUTROPHIL (OHS): 86.5 % (ref 38–73)
RELATIVE NEUTROPHIL (OHS): 86.8 % (ref 38–73)
RELATIVE NEUTROPHIL (OHS): 87.1 % (ref 38–73)
RIGHT ATRIAL AREA: 5.5 CM2
RIGHT ATRIUM END SYSTOLIC VOLUME APICAL 4 CHAMBER INDEX BSA: 4.3 ML/M2
RIGHT ATRIUM VOLUME AREA LENGTH APICAL 4 CHAMBER: 6.79 ML
RV TB RVSP: 5 MMHG
RV TISSUE DOPPLER FREE WALL SYSTOLIC VELOCITY 1 (APICAL 4 CHAMBER VIEW): 23.66 CM/S
SINUS: 3.3 CM
SITE: ABNORMAL
SODIUM BLD-SCNC: 149 MMOL/L (ref 136–145)
SODIUM SERPL-SCNC: 138 MMOL/L (ref 136–145)
SODIUM SERPL-SCNC: 140 MMOL/L (ref 136–145)
SODIUM SERPL-SCNC: 141 MMOL/L (ref 136–145)
SODIUM SERPL-SCNC: 142 MMOL/L (ref 136–145)
SODIUM SERPL-SCNC: 146 MMOL/L (ref 136–145)
SODIUM SERPL-SCNC: 146 MMOL/L (ref 136–145)
SODIUM SERPL-SCNC: 150 MMOL/L (ref 136–145)
SODIUM UR-SCNC: 14 MMOL/L (ref 20–250)
SP GR UR STRIP: >=1.03
SPECIMEN SOURCE: ABNORMAL
SQUAMOUS #/AREA URNS AUTO: 1 /HPF
STJ: 2.9 CM
T4 FREE SERPL-MCNC: 1.01 NG/DL (ref 0.71–1.51)
T4 FREE SERPL-MCNC: 1.01 NG/DL (ref 0.71–1.51)
TDI LATERAL: 0.04 M/S
TDI SEPTAL: 0.03 M/S
TDI: 0.04 M/S
TR MAX PG: 9 MMHG
TRICUSPID ANNULAR PLANE SYSTOLIC EXCURSION: 1.8 CM
TSH SERPL-ACNC: 0.18 UIU/ML (ref 0.4–4)
TV REST PULMONARY ARTERY PRESSURE: 12 MMHG
URATE SERPL-MCNC: 9.9 MG/DL (ref 2.4–5.7)
UROBILINOGEN UR STRIP-ACNC: NEGATIVE EU/DL
UUN UR-MCNC: 234 MG/DL (ref 140–1050)
VANCOMYCIN SERPL-MCNC: 37.9 UG/ML
VANCOMYCIN SERPL-MCNC: 40.7 UG/ML
VT: 320
W COMPLEMENT, TOTAL (CH50): 61 U/ML
WBC # BLD AUTO: 10.35 K/UL (ref 3.9–12.7)
WBC # BLD AUTO: 10.46 K/UL (ref 3.9–12.7)
WBC # BLD AUTO: 10.5 K/UL (ref 3.9–12.7)
WBC # BLD AUTO: 10.95 K/UL (ref 3.9–12.7)
WBC # BLD AUTO: 11.55 K/UL (ref 3.9–12.7)
WBC # BLD AUTO: 12.26 K/UL (ref 3.9–12.7)
WBC # BLD AUTO: 15.25 K/UL (ref 3.9–12.7)
WBC # BLD AUTO: 15.45 K/UL (ref 3.9–12.7)
WBC # BLD AUTO: 21.09 K/UL (ref 3.9–12.7)
WBC # BLD AUTO: 9.35 K/UL (ref 3.9–12.7)
WBC #/AREA URNS AUTO: 9 /HPF (ref 0–5)
YEAST UR QL AUTO: ABNORMAL /HPF
Z-SCORE OF LEFT VENTRICULAR DIMENSION IN END DIASTOLE: -0.99
Z-SCORE OF LEFT VENTRICULAR DIMENSION IN END SYSTOLE: 0.53

## 2025-01-01 PROCEDURE — 25500020 PHARM REV CODE 255

## 2025-01-01 PROCEDURE — 94761 N-INVAS EAR/PLS OXIMETRY MLT: CPT

## 2025-01-01 PROCEDURE — 27000221 HC OXYGEN, UP TO 24 HOURS

## 2025-01-01 PROCEDURE — 85025 COMPLETE CBC W/AUTO DIFF WBC: CPT | Performed by: FAMILY MEDICINE

## 2025-01-01 PROCEDURE — 63600175 PHARM REV CODE 636 W HCPCS

## 2025-01-01 PROCEDURE — 87077 CULTURE AEROBIC IDENTIFY: CPT | Performed by: STUDENT IN AN ORGANIZED HEALTH CARE EDUCATION/TRAINING PROGRAM

## 2025-01-01 PROCEDURE — 44180 LAP ENTEROLYSIS: CPT | Mod: ,,, | Performed by: STUDENT IN AN ORGANIZED HEALTH CARE EDUCATION/TRAINING PROGRAM

## 2025-01-01 PROCEDURE — 36410 VNPNXR 3YR/> PHY/QHP DX/THER: CPT

## 2025-01-01 PROCEDURE — 95720 EEG PHY/QHP EA INCR W/VEEG: CPT | Mod: ,,, | Performed by: PSYCHIATRY & NEUROLOGY

## 2025-01-01 PROCEDURE — 51798 US URINE CAPACITY MEASURE: CPT

## 2025-01-01 PROCEDURE — 80048 BASIC METABOLIC PNL TOTAL CA: CPT | Performed by: INTERNAL MEDICINE

## 2025-01-01 PROCEDURE — 25000003 PHARM REV CODE 250

## 2025-01-01 PROCEDURE — 83735 ASSAY OF MAGNESIUM: CPT | Performed by: INTERNAL MEDICINE

## 2025-01-01 PROCEDURE — 27200966 HC CLOSED SUCTION SYSTEM

## 2025-01-01 PROCEDURE — 99900035 HC TECH TIME PER 15 MIN (STAT)

## 2025-01-01 PROCEDURE — 82803 BLOOD GASES ANY COMBINATION: CPT

## 2025-01-01 PROCEDURE — 94002 VENT MGMT INPAT INIT DAY: CPT

## 2025-01-01 PROCEDURE — 11000001 HC ACUTE MED/SURG PRIVATE ROOM

## 2025-01-01 PROCEDURE — 94003 VENT MGMT INPAT SUBQ DAY: CPT

## 2025-01-01 PROCEDURE — 80053 COMPREHEN METABOLIC PANEL: CPT

## 2025-01-01 PROCEDURE — 63600175 PHARM REV CODE 636 W HCPCS: Performed by: SURGERY

## 2025-01-01 PROCEDURE — 25000003 PHARM REV CODE 250: Performed by: STUDENT IN AN ORGANIZED HEALTH CARE EDUCATION/TRAINING PROGRAM

## 2025-01-01 PROCEDURE — 99900026 HC AIRWAY MAINTENANCE (STAT)

## 2025-01-01 PROCEDURE — 80202 ASSAY OF VANCOMYCIN: CPT | Performed by: FAMILY MEDICINE

## 2025-01-01 PROCEDURE — 36415 COLL VENOUS BLD VENIPUNCTURE: CPT | Performed by: INTERNAL MEDICINE

## 2025-01-01 PROCEDURE — 84550 ASSAY OF BLOOD/URIC ACID: CPT | Performed by: STUDENT IN AN ORGANIZED HEALTH CARE EDUCATION/TRAINING PROGRAM

## 2025-01-01 PROCEDURE — 85610 PROTHROMBIN TIME: CPT | Performed by: FAMILY MEDICINE

## 2025-01-01 PROCEDURE — 85027 COMPLETE CBC AUTOMATED: CPT | Performed by: FAMILY MEDICINE

## 2025-01-01 PROCEDURE — 85025 COMPLETE CBC W/AUTO DIFF WBC: CPT | Performed by: SURGERY

## 2025-01-01 PROCEDURE — 95714 VEEG EA 12-26 HR UNMNTR: CPT

## 2025-01-01 PROCEDURE — 5A0935A ASSISTANCE WITH RESPIRATORY VENTILATION, LESS THAN 24 CONSECUTIVE HOURS, HIGH NASAL FLOW/VELOCITY: ICD-10-PCS

## 2025-01-01 PROCEDURE — 85025 COMPLETE CBC W/AUTO DIFF WBC: CPT

## 2025-01-01 PROCEDURE — 63600175 PHARM REV CODE 636 W HCPCS: Performed by: STUDENT IN AN ORGANIZED HEALTH CARE EDUCATION/TRAINING PROGRAM

## 2025-01-01 PROCEDURE — 85027 COMPLETE CBC AUTOMATED: CPT

## 2025-01-01 PROCEDURE — 99222 1ST HOSP IP/OBS MODERATE 55: CPT | Mod: ,,, | Performed by: STUDENT IN AN ORGANIZED HEALTH CARE EDUCATION/TRAINING PROGRAM

## 2025-01-01 PROCEDURE — 86162 COMPLEMENT TOTAL (CH50): CPT | Performed by: STUDENT IN AN ORGANIZED HEALTH CARE EDUCATION/TRAINING PROGRAM

## 2025-01-01 PROCEDURE — 0DNB4ZZ RELEASE ILEUM, PERCUTANEOUS ENDOSCOPIC APPROACH: ICD-10-PCS | Performed by: STUDENT IN AN ORGANIZED HEALTH CARE EDUCATION/TRAINING PROGRAM

## 2025-01-01 PROCEDURE — 25000003 PHARM REV CODE 250: Performed by: FAMILY MEDICINE

## 2025-01-01 PROCEDURE — 94640 AIRWAY INHALATION TREATMENT: CPT

## 2025-01-01 PROCEDURE — 37000008 HC ANESTHESIA 1ST 15 MINUTES: Performed by: STUDENT IN AN ORGANIZED HEALTH CARE EDUCATION/TRAINING PROGRAM

## 2025-01-01 PROCEDURE — 63600175 PHARM REV CODE 636 W HCPCS: Performed by: NURSE ANESTHETIST, CERTIFIED REGISTERED

## 2025-01-01 PROCEDURE — 99291 CRITICAL CARE FIRST HOUR: CPT

## 2025-01-01 PROCEDURE — 25500020 PHARM REV CODE 255: Performed by: FAMILY MEDICINE

## 2025-01-01 PROCEDURE — 0BH17EZ INSERTION OF ENDOTRACHEAL AIRWAY INTO TRACHEA, VIA NATURAL OR ARTIFICIAL OPENING: ICD-10-PCS

## 2025-01-01 PROCEDURE — 25000003 PHARM REV CODE 250: Performed by: SURGERY

## 2025-01-01 PROCEDURE — 83605 ASSAY OF LACTIC ACID: CPT | Performed by: FAMILY MEDICINE

## 2025-01-01 PROCEDURE — 80048 BASIC METABOLIC PNL TOTAL CA: CPT

## 2025-01-01 PROCEDURE — 71000033 HC RECOVERY, INTIAL HOUR: Performed by: STUDENT IN AN ORGANIZED HEALTH CARE EDUCATION/TRAINING PROGRAM

## 2025-01-01 PROCEDURE — 99233 SBSQ HOSP IP/OBS HIGH 50: CPT | Mod: ,,, | Performed by: STUDENT IN AN ORGANIZED HEALTH CARE EDUCATION/TRAINING PROGRAM

## 2025-01-01 PROCEDURE — 27201640 HC PAD, ARTICGEL

## 2025-01-01 PROCEDURE — 83605 ASSAY OF LACTIC ACID: CPT | Mod: 91

## 2025-01-01 PROCEDURE — 80202 ASSAY OF VANCOMYCIN: CPT | Performed by: INTERNAL MEDICINE

## 2025-01-01 PROCEDURE — 63600175 PHARM REV CODE 636 W HCPCS: Performed by: INTERNAL MEDICINE

## 2025-01-01 PROCEDURE — 84100 ASSAY OF PHOSPHORUS: CPT | Performed by: FAMILY MEDICINE

## 2025-01-01 PROCEDURE — 87040 BLOOD CULTURE FOR BACTERIA: CPT | Performed by: STUDENT IN AN ORGANIZED HEALTH CARE EDUCATION/TRAINING PROGRAM

## 2025-01-01 PROCEDURE — 82550 ASSAY OF CK (CPK): CPT | Performed by: STUDENT IN AN ORGANIZED HEALTH CARE EDUCATION/TRAINING PROGRAM

## 2025-01-01 PROCEDURE — 99233 SBSQ HOSP IP/OBS HIGH 50: CPT | Mod: 25,,, | Performed by: STUDENT IN AN ORGANIZED HEALTH CARE EDUCATION/TRAINING PROGRAM

## 2025-01-01 PROCEDURE — 83605 ASSAY OF LACTIC ACID: CPT | Performed by: STUDENT IN AN ORGANIZED HEALTH CARE EDUCATION/TRAINING PROGRAM

## 2025-01-01 PROCEDURE — 96374 THER/PROPH/DIAG INJ IV PUSH: CPT

## 2025-01-01 PROCEDURE — 25000003 PHARM REV CODE 250: Performed by: INTERNAL MEDICINE

## 2025-01-01 PROCEDURE — 36415 COLL VENOUS BLD VENIPUNCTURE: CPT

## 2025-01-01 PROCEDURE — 27100171 HC OXYGEN HIGH FLOW UP TO 24 HOURS

## 2025-01-01 PROCEDURE — 83935 ASSAY OF URINE OSMOLALITY: CPT | Performed by: STUDENT IN AN ORGANIZED HEALTH CARE EDUCATION/TRAINING PROGRAM

## 2025-01-01 PROCEDURE — 87040 BLOOD CULTURE FOR BACTERIA: CPT | Mod: 91

## 2025-01-01 PROCEDURE — 36600 WITHDRAWAL OF ARTERIAL BLOOD: CPT

## 2025-01-01 PROCEDURE — 25000003 PHARM REV CODE 250: Performed by: NURSE PRACTITIONER

## 2025-01-01 PROCEDURE — 36415 COLL VENOUS BLD VENIPUNCTURE: CPT | Performed by: FAMILY MEDICINE

## 2025-01-01 PROCEDURE — 99497 ADVNCD CARE PLAN 30 MIN: CPT | Mod: 25,,, | Performed by: STUDENT IN AN ORGANIZED HEALTH CARE EDUCATION/TRAINING PROGRAM

## 2025-01-01 PROCEDURE — 63600175 PHARM REV CODE 636 W HCPCS: Performed by: NURSE PRACTITIONER

## 2025-01-01 PROCEDURE — 94761 N-INVAS EAR/PLS OXIMETRY MLT: CPT | Mod: XB

## 2025-01-01 PROCEDURE — 25000242 PHARM REV CODE 250 ALT 637 W/ HCPCS: Performed by: NURSE PRACTITIONER

## 2025-01-01 PROCEDURE — 63600175 PHARM REV CODE 636 W HCPCS: Performed by: EMERGENCY MEDICINE

## 2025-01-01 PROCEDURE — 95700 EEG CONT REC W/VID EEG TECH: CPT

## 2025-01-01 PROCEDURE — G0378 HOSPITAL OBSERVATION PER HR: HCPCS

## 2025-01-01 PROCEDURE — 81003 URINALYSIS AUTO W/O SCOPE: CPT

## 2025-01-01 PROCEDURE — 63600175 PHARM REV CODE 636 W HCPCS: Performed by: FAMILY MEDICINE

## 2025-01-01 PROCEDURE — 37000009 HC ANESTHESIA EA ADD 15 MINS: Performed by: STUDENT IN AN ORGANIZED HEALTH CARE EDUCATION/TRAINING PROGRAM

## 2025-01-01 PROCEDURE — 83735 ASSAY OF MAGNESIUM: CPT | Performed by: FAMILY MEDICINE

## 2025-01-01 PROCEDURE — 36000712 HC OR TIME LEV V 1ST 15 MIN: Performed by: STUDENT IN AN ORGANIZED HEALTH CARE EDUCATION/TRAINING PROGRAM

## 2025-01-01 PROCEDURE — 99223 1ST HOSP IP/OBS HIGH 75: CPT | Mod: 25,,, | Performed by: STUDENT IN AN ORGANIZED HEALTH CARE EDUCATION/TRAINING PROGRAM

## 2025-01-01 PROCEDURE — 87641 MR-STAPH DNA AMP PROBE: CPT | Performed by: INTERNAL MEDICINE

## 2025-01-01 PROCEDURE — C1751 CATH, INF, PER/CENT/MIDLINE: HCPCS

## 2025-01-01 PROCEDURE — 82570 ASSAY OF URINE CREATININE: CPT | Performed by: STUDENT IN AN ORGANIZED HEALTH CARE EDUCATION/TRAINING PROGRAM

## 2025-01-01 PROCEDURE — 86160 COMPLEMENT ANTIGEN: CPT | Performed by: STUDENT IN AN ORGANIZED HEALTH CARE EDUCATION/TRAINING PROGRAM

## 2025-01-01 PROCEDURE — 83690 ASSAY OF LIPASE: CPT

## 2025-01-01 PROCEDURE — 84540 ASSAY OF URINE/UREA-N: CPT | Performed by: STUDENT IN AN ORGANIZED HEALTH CARE EDUCATION/TRAINING PROGRAM

## 2025-01-01 PROCEDURE — 02HV33Z INSERTION OF INFUSION DEVICE INTO SUPERIOR VENA CAVA, PERCUTANEOUS APPROACH: ICD-10-PCS

## 2025-01-01 PROCEDURE — 37799 UNLISTED PX VASCULAR SURGERY: CPT

## 2025-01-01 PROCEDURE — 31500 INSERT EMERGENCY AIRWAY: CPT | Performed by: UROLOGY

## 2025-01-01 PROCEDURE — 99223 1ST HOSP IP/OBS HIGH 75: CPT | Mod: GC,,, | Performed by: STUDENT IN AN ORGANIZED HEALTH CARE EDUCATION/TRAINING PROGRAM

## 2025-01-01 PROCEDURE — 25000003 PHARM REV CODE 250: Performed by: NURSE ANESTHETIST, CERTIFIED REGISTERED

## 2025-01-01 PROCEDURE — 83880 ASSAY OF NATRIURETIC PEPTIDE: CPT | Performed by: FAMILY MEDICINE

## 2025-01-01 PROCEDURE — 25000242 PHARM REV CODE 250 ALT 637 W/ HCPCS: Performed by: NURSE ANESTHETIST, CERTIFIED REGISTERED

## 2025-01-01 PROCEDURE — 80069 RENAL FUNCTION PANEL: CPT | Performed by: SURGERY

## 2025-01-01 PROCEDURE — 82565 ASSAY OF CREATININE: CPT | Performed by: NURSE PRACTITIONER

## 2025-01-01 PROCEDURE — 84439 ASSAY OF FREE THYROXINE: CPT | Performed by: STUDENT IN AN ORGANIZED HEALTH CARE EDUCATION/TRAINING PROGRAM

## 2025-01-01 PROCEDURE — 83605 ASSAY OF LACTIC ACID: CPT | Performed by: NURSE PRACTITIONER

## 2025-01-01 PROCEDURE — 5A1955Z RESPIRATORY VENTILATION, GREATER THAN 96 CONSECUTIVE HOURS: ICD-10-PCS

## 2025-01-01 PROCEDURE — 99232 SBSQ HOSP IP/OBS MODERATE 35: CPT | Mod: 57,,, | Performed by: STUDENT IN AN ORGANIZED HEALTH CARE EDUCATION/TRAINING PROGRAM

## 2025-01-01 PROCEDURE — 31500 INSERT EMERGENCY AIRWAY: CPT

## 2025-01-01 PROCEDURE — 84300 ASSAY OF URINE SODIUM: CPT | Performed by: STUDENT IN AN ORGANIZED HEALTH CARE EDUCATION/TRAINING PROGRAM

## 2025-01-01 PROCEDURE — 36415 COLL VENOUS BLD VENIPUNCTURE: CPT | Performed by: STUDENT IN AN ORGANIZED HEALTH CARE EDUCATION/TRAINING PROGRAM

## 2025-01-01 PROCEDURE — 93010 ELECTROCARDIOGRAM REPORT: CPT | Mod: ,,, | Performed by: INTERNAL MEDICINE

## 2025-01-01 PROCEDURE — 80048 BASIC METABOLIC PNL TOTAL CA: CPT | Performed by: FAMILY MEDICINE

## 2025-01-01 PROCEDURE — 93005 ELECTROCARDIOGRAM TRACING: CPT

## 2025-01-01 PROCEDURE — 83735 ASSAY OF MAGNESIUM: CPT | Performed by: SURGERY

## 2025-01-01 PROCEDURE — 96372 THER/PROPH/DIAG INJ SC/IM: CPT | Performed by: NURSE PRACTITIONER

## 2025-01-01 PROCEDURE — 85025 COMPLETE CBC W/AUTO DIFF WBC: CPT | Performed by: NURSE PRACTITIONER

## 2025-01-01 PROCEDURE — 27201423 OPTIME MED/SURG SUP & DEVICES STERILE SUPPLY: Performed by: STUDENT IN AN ORGANIZED HEALTH CARE EDUCATION/TRAINING PROGRAM

## 2025-01-01 PROCEDURE — 36415 COLL VENOUS BLD VENIPUNCTURE: CPT | Performed by: NURSE PRACTITIONER

## 2025-01-01 PROCEDURE — 99498 ADVNCD CARE PLAN ADDL 30 MIN: CPT | Mod: ,,, | Performed by: STUDENT IN AN ORGANIZED HEALTH CARE EDUCATION/TRAINING PROGRAM

## 2025-01-01 PROCEDURE — 36000713 HC OR TIME LEV V EA ADD 15 MIN: Performed by: STUDENT IN AN ORGANIZED HEALTH CARE EDUCATION/TRAINING PROGRAM

## 2025-01-01 PROCEDURE — 51701 INSERT BLADDER CATHETER: CPT

## 2025-01-01 RX ORDER — MIDAZOLAM HYDROCHLORIDE 1 MG/ML
4 INJECTION, SOLUTION INTRAMUSCULAR; INTRAVENOUS EVERY 30 MIN PRN
Status: DISCONTINUED | OUTPATIENT
Start: 2025-01-01 | End: 2025-01-01

## 2025-01-01 RX ORDER — SODIUM CHLORIDE, SODIUM LACTATE, POTASSIUM CHLORIDE, CALCIUM CHLORIDE 600; 310; 30; 20 MG/100ML; MG/100ML; MG/100ML; MG/100ML
INJECTION, SOLUTION INTRAVENOUS ONCE
Status: DISCONTINUED | OUTPATIENT
Start: 2025-01-01 | End: 2025-01-01

## 2025-01-01 RX ORDER — DEXAMETHASONE SODIUM PHOSPHATE 4 MG/ML
INJECTION, SOLUTION INTRA-ARTICULAR; INTRALESIONAL; INTRAMUSCULAR; INTRAVENOUS; SOFT TISSUE
Status: DISCONTINUED | OUTPATIENT
Start: 2025-01-01 | End: 2025-01-01

## 2025-01-01 RX ORDER — PREGABALIN 75 MG/1
75 CAPSULE ORAL 2 TIMES DAILY
Status: DISCONTINUED | OUTPATIENT
Start: 2025-01-01 | End: 2025-01-01

## 2025-01-01 RX ORDER — ONDANSETRON HYDROCHLORIDE 2 MG/ML
4 INJECTION, SOLUTION INTRAVENOUS DAILY PRN
Status: DISCONTINUED | OUTPATIENT
Start: 2025-01-01 | End: 2025-01-01

## 2025-01-01 RX ORDER — CARVEDILOL 12.5 MG/1
12.5 TABLET ORAL 2 TIMES DAILY
Status: DISCONTINUED | OUTPATIENT
Start: 2025-01-01 | End: 2025-01-01

## 2025-01-01 RX ORDER — TALC
6 POWDER (GRAM) TOPICAL NIGHTLY PRN
Status: DISCONTINUED | OUTPATIENT
Start: 2025-01-01 | End: 2025-01-01

## 2025-01-01 RX ORDER — BUPIVACAINE HYDROCHLORIDE 5 MG/ML
INJECTION, SOLUTION PERINEURAL
Status: DISCONTINUED | OUTPATIENT
Start: 2025-01-01 | End: 2025-01-01 | Stop reason: HOSPADM

## 2025-01-01 RX ORDER — IPRATROPIUM BROMIDE AND ALBUTEROL SULFATE 2.5; .5 MG/3ML; MG/3ML
3 SOLUTION RESPIRATORY (INHALATION) EVERY 4 HOURS PRN
Status: DISCONTINUED | OUTPATIENT
Start: 2025-01-01 | End: 2025-01-01 | Stop reason: HOSPADM

## 2025-01-01 RX ORDER — DIATRIZOATE MEGLUMINE AND DIATRIZOATE SODIUM 660; 100 MG/ML; MG/ML
100 SOLUTION ORAL; RECTAL ONCE
Status: COMPLETED | OUTPATIENT
Start: 2025-01-01 | End: 2025-01-01

## 2025-01-01 RX ORDER — ACETAMINOPHEN 10 MG/ML
1000 INJECTION, SOLUTION INTRAVENOUS EVERY 8 HOURS
Status: COMPLETED | OUTPATIENT
Start: 2025-01-01 | End: 2025-01-01

## 2025-01-01 RX ORDER — EPINEPHRINE 0.1 MG/ML
INJECTION INTRAVENOUS CODE/TRAUMA/SEDATION MEDICATION
Status: COMPLETED | OUTPATIENT
Start: 2025-01-01 | End: 2025-01-01

## 2025-01-01 RX ORDER — HEPARIN SODIUM 5000 [USP'U]/ML
5000 INJECTION, SOLUTION INTRAVENOUS; SUBCUTANEOUS EVERY 8 HOURS
Status: DISCONTINUED | OUTPATIENT
Start: 2025-01-01 | End: 2025-01-01 | Stop reason: HOSPADM

## 2025-01-01 RX ORDER — OXYCODONE HYDROCHLORIDE 10 MG/1
10 TABLET ORAL EVERY 4 HOURS PRN
Status: DISCONTINUED | OUTPATIENT
Start: 2025-01-01 | End: 2025-01-01

## 2025-01-01 RX ORDER — LEVETIRACETAM 500 MG/5ML
500 INJECTION, SOLUTION, CONCENTRATE INTRAVENOUS EVERY 12 HOURS
Status: DISCONTINUED | OUTPATIENT
Start: 2025-01-01 | End: 2025-01-01

## 2025-01-01 RX ORDER — ONDANSETRON HYDROCHLORIDE 2 MG/ML
4 INJECTION, SOLUTION INTRAVENOUS
Status: COMPLETED | OUTPATIENT
Start: 2025-01-01 | End: 2025-01-01

## 2025-01-01 RX ORDER — SODIUM CHLORIDE 0.9 % (FLUSH) 0.9 %
10 SYRINGE (ML) INJECTION EVERY 12 HOURS PRN
Status: DISCONTINUED | OUTPATIENT
Start: 2025-01-01 | End: 2025-01-01 | Stop reason: HOSPADM

## 2025-01-01 RX ORDER — ACETAMINOPHEN 325 MG/1
650 TABLET ORAL EVERY 4 HOURS PRN
Status: DISCONTINUED | OUTPATIENT
Start: 2025-01-01 | End: 2025-01-01 | Stop reason: HOSPADM

## 2025-01-01 RX ORDER — CEFAZOLIN SODIUM 1 G/3ML
INJECTION, POWDER, FOR SOLUTION INTRAMUSCULAR; INTRAVENOUS
Status: DISCONTINUED | OUTPATIENT
Start: 2025-01-01 | End: 2025-01-01

## 2025-01-01 RX ORDER — METOPROLOL TARTRATE 1 MG/ML
5 INJECTION, SOLUTION INTRAVENOUS ONCE
Status: COMPLETED | OUTPATIENT
Start: 2025-01-01 | End: 2025-01-01

## 2025-01-01 RX ORDER — HYDROXYZINE 50 MG/ML
50 INJECTION, SOLUTION INTRAMUSCULAR ONCE
Status: DISCONTINUED | OUTPATIENT
Start: 2025-01-01 | End: 2025-01-01

## 2025-01-01 RX ORDER — OXYCODONE HYDROCHLORIDE 5 MG/1
5 TABLET ORAL
Status: DISCONTINUED | OUTPATIENT
Start: 2025-01-01 | End: 2025-01-01

## 2025-01-01 RX ORDER — LIDOCAINE HYDROCHLORIDE 10 MG/ML
INJECTION, SOLUTION EPIDURAL; INFILTRATION; INTRACAUDAL; PERINEURAL
Status: DISCONTINUED | OUTPATIENT
Start: 2025-01-01 | End: 2025-01-01 | Stop reason: HOSPADM

## 2025-01-01 RX ORDER — CEFTRIAXONE 2 G/1
2 INJECTION, POWDER, FOR SOLUTION INTRAMUSCULAR; INTRAVENOUS ONCE
Status: DISCONTINUED | OUTPATIENT
Start: 2025-01-01 | End: 2025-01-01

## 2025-01-01 RX ORDER — MIDAZOLAM HYDROCHLORIDE 5 MG/ML
4 INJECTION INTRAMUSCULAR; INTRAVENOUS EVERY 30 MIN PRN
Status: DISCONTINUED | OUTPATIENT
Start: 2025-01-01 | End: 2025-01-01

## 2025-01-01 RX ORDER — MIDAZOLAM IN 0.9 % SOD.CHLORID 1 MG/ML
3 PLASTIC BAG, INJECTION (ML) INTRAVENOUS CONTINUOUS
Status: DISCONTINUED | OUTPATIENT
Start: 2025-01-01 | End: 2025-08-23 | Stop reason: HOSPADM

## 2025-01-01 RX ORDER — LEVOTHYROXINE SODIUM 88 UG/1
88 TABLET ORAL
Status: DISCONTINUED | OUTPATIENT
Start: 2025-01-01 | End: 2025-01-01 | Stop reason: HOSPADM

## 2025-01-01 RX ORDER — AMLODIPINE BESYLATE 5 MG/1
5 TABLET ORAL DAILY
Status: DISCONTINUED | OUTPATIENT
Start: 2025-01-01 | End: 2025-01-01

## 2025-01-01 RX ORDER — EPHEDRINE SULFATE 50 MG/ML
INJECTION, SOLUTION INTRAVENOUS
Status: DISCONTINUED | OUTPATIENT
Start: 2025-01-01 | End: 2025-01-01

## 2025-01-01 RX ORDER — PROPOFOL 10 MG/ML
0-60 INJECTION, EMULSION INTRAVENOUS CONTINUOUS
Status: CANCELLED | OUTPATIENT
Start: 2025-01-01

## 2025-01-01 RX ORDER — HYDRALAZINE HYDROCHLORIDE 20 MG/ML
10 INJECTION INTRAMUSCULAR; INTRAVENOUS EVERY 6 HOURS PRN
Status: DISCONTINUED | OUTPATIENT
Start: 2025-01-01 | End: 2025-01-01 | Stop reason: HOSPADM

## 2025-01-01 RX ORDER — MIDAZOLAM HYDROCHLORIDE 1 MG/ML
2 INJECTION, SOLUTION INTRAMUSCULAR; INTRAVENOUS
Status: DISCONTINUED | OUTPATIENT
Start: 2025-01-01 | End: 2025-08-23 | Stop reason: HOSPADM

## 2025-01-01 RX ORDER — METOCLOPRAMIDE HYDROCHLORIDE 5 MG/ML
5 INJECTION INTRAMUSCULAR; INTRAVENOUS EVERY 6 HOURS PRN
Status: DISCONTINUED | OUTPATIENT
Start: 2025-01-01 | End: 2025-08-23 | Stop reason: HOSPADM

## 2025-01-01 RX ORDER — ROCURONIUM BROMIDE 10 MG/ML
INJECTION, SOLUTION INTRAVENOUS
Status: DISCONTINUED | OUTPATIENT
Start: 2025-01-01 | End: 2025-01-01

## 2025-01-01 RX ORDER — ESCITALOPRAM OXALATE 10 MG/1
20 TABLET ORAL DAILY
Status: DISCONTINUED | OUTPATIENT
Start: 2025-01-01 | End: 2025-01-01

## 2025-01-01 RX ORDER — MUPIROCIN 20 MG/G
OINTMENT TOPICAL 2 TIMES DAILY
Status: COMPLETED | OUTPATIENT
Start: 2025-01-01 | End: 2025-01-01

## 2025-01-01 RX ORDER — SODIUM CHLORIDE 0.9 % (FLUSH) 0.9 %
10 SYRINGE (ML) INJECTION EVERY 8 HOURS PRN
Status: DISCONTINUED | OUTPATIENT
Start: 2025-01-01 | End: 2025-01-01 | Stop reason: HOSPADM

## 2025-01-01 RX ORDER — NOREPINEPHRINE BITARTRATE/D5W 4MG/250ML
0-3 PLASTIC BAG, INJECTION (ML) INTRAVENOUS CONTINUOUS
Status: DISCONTINUED | OUTPATIENT
Start: 2025-01-01 | End: 2025-01-01

## 2025-01-01 RX ORDER — HEPARIN SODIUM 5000 [USP'U]/ML
5000 INJECTION, SOLUTION INTRAVENOUS; SUBCUTANEOUS EVERY 8 HOURS
Status: DISCONTINUED | OUTPATIENT
Start: 2025-01-01 | End: 2025-01-01

## 2025-01-01 RX ORDER — BUSPIRONE HYDROCHLORIDE 5 MG/1
30 TABLET ORAL ONCE
Status: COMPLETED | OUTPATIENT
Start: 2025-01-01 | End: 2025-01-01

## 2025-01-01 RX ORDER — FUROSEMIDE 10 MG/ML
40 INJECTION INTRAMUSCULAR; INTRAVENOUS ONCE
Status: DISCONTINUED | OUTPATIENT
Start: 2025-01-01 | End: 2025-01-01

## 2025-01-01 RX ORDER — GLUCAGON 1 MG
1 KIT INJECTION
Status: DISCONTINUED | OUTPATIENT
Start: 2025-01-01 | End: 2025-01-01 | Stop reason: HOSPADM

## 2025-01-01 RX ORDER — HYDRALAZINE HYDROCHLORIDE 20 MG/ML
10 INJECTION INTRAMUSCULAR; INTRAVENOUS EVERY 4 HOURS PRN
Status: DISCONTINUED | OUTPATIENT
Start: 2025-01-01 | End: 2025-01-01

## 2025-01-01 RX ORDER — ALBUTEROL SULFATE 90 UG/1
INHALANT RESPIRATORY (INHALATION)
Status: DISCONTINUED | OUTPATIENT
Start: 2025-01-01 | End: 2025-01-01

## 2025-01-01 RX ORDER — MORPHINE SULFATE 4 MG/ML
4 INJECTION, SOLUTION INTRAMUSCULAR; INTRAVENOUS
Status: DISCONTINUED | OUTPATIENT
Start: 2025-01-01 | End: 2025-08-23 | Stop reason: HOSPADM

## 2025-01-01 RX ORDER — MAGNESIUM SULFATE HEPTAHYDRATE 40 MG/ML
0.5 INJECTION, SOLUTION INTRAVENOUS CONTINUOUS
Status: DISCONTINUED | OUTPATIENT
Start: 2025-01-01 | End: 2025-01-01

## 2025-01-01 RX ORDER — ONDANSETRON HYDROCHLORIDE 2 MG/ML
8 INJECTION, SOLUTION INTRAVENOUS EVERY 8 HOURS PRN
Status: DISCONTINUED | OUTPATIENT
Start: 2025-01-01 | End: 2025-08-23 | Stop reason: HOSPADM

## 2025-01-01 RX ORDER — LEVETIRACETAM 500 MG/5ML
1000 INJECTION, SOLUTION, CONCENTRATE INTRAVENOUS EVERY 12 HOURS
Status: DISCONTINUED | OUTPATIENT
Start: 2025-01-01 | End: 2025-08-23 | Stop reason: HOSPADM

## 2025-01-01 RX ORDER — LEVETIRACETAM 500 MG/5ML
1500 INJECTION, SOLUTION, CONCENTRATE INTRAVENOUS ONCE
Status: COMPLETED | OUTPATIENT
Start: 2025-01-01 | End: 2025-01-01

## 2025-01-01 RX ORDER — HYDROMORPHONE HYDROCHLORIDE 2 MG/ML
0.2 INJECTION, SOLUTION INTRAMUSCULAR; INTRAVENOUS; SUBCUTANEOUS EVERY 5 MIN PRN
Status: DISCONTINUED | OUTPATIENT
Start: 2025-01-01 | End: 2025-01-01

## 2025-01-01 RX ORDER — LOSARTAN POTASSIUM 50 MG/1
50 TABLET ORAL DAILY
Status: DISCONTINUED | OUTPATIENT
Start: 2025-01-01 | End: 2025-01-01

## 2025-01-01 RX ORDER — PROPOFOL 10 MG/ML
VIAL (ML) INTRAVENOUS
Status: DISCONTINUED | OUTPATIENT
Start: 2025-01-01 | End: 2025-01-01

## 2025-01-01 RX ORDER — HYDROXYZINE 50 MG/ML
25 INJECTION, SOLUTION INTRAMUSCULAR ONCE
Status: DISCONTINUED | OUTPATIENT
Start: 2025-01-01 | End: 2025-01-01

## 2025-01-01 RX ORDER — PROCHLORPERAZINE EDISYLATE 5 MG/ML
5 INJECTION INTRAMUSCULAR; INTRAVENOUS EVERY 30 MIN PRN
Status: DISCONTINUED | OUTPATIENT
Start: 2025-01-01 | End: 2025-01-01

## 2025-01-01 RX ORDER — GLYCOPYRROLATE 0.2 MG/ML
0.1 INJECTION INTRAMUSCULAR; INTRAVENOUS EVERY 8 HOURS PRN
Status: DISCONTINUED | OUTPATIENT
Start: 2025-01-01 | End: 2025-08-23 | Stop reason: HOSPADM

## 2025-01-01 RX ORDER — ONDANSETRON HYDROCHLORIDE 2 MG/ML
4 INJECTION, SOLUTION INTRAVENOUS EVERY 8 HOURS PRN
Status: DISCONTINUED | OUTPATIENT
Start: 2025-01-01 | End: 2025-01-01 | Stop reason: HOSPADM

## 2025-01-01 RX ORDER — METOPROLOL TARTRATE 1 MG/ML
INJECTION, SOLUTION INTRAVENOUS
Status: DISCONTINUED | OUTPATIENT
Start: 2025-01-01 | End: 2025-01-01

## 2025-01-01 RX ORDER — NORTRIPTYLINE HYDROCHLORIDE 10 MG/1
10 CAPSULE ORAL DAILY
Status: DISCONTINUED | OUTPATIENT
Start: 2025-01-01 | End: 2025-01-01

## 2025-01-01 RX ORDER — PROPOFOL 10 MG/ML
0-60 INJECTION, EMULSION INTRAVENOUS CONTINUOUS
Status: DISCONTINUED | OUTPATIENT
Start: 2025-01-01 | End: 2025-01-01 | Stop reason: HOSPADM

## 2025-01-01 RX ORDER — POTASSIUM CHLORIDE 7.45 MG/ML
10 INJECTION INTRAVENOUS
Status: COMPLETED | OUTPATIENT
Start: 2025-01-01 | End: 2025-01-01

## 2025-01-01 RX ORDER — PANTOPRAZOLE SODIUM 40 MG/10ML
40 INJECTION, POWDER, LYOPHILIZED, FOR SOLUTION INTRAVENOUS DAILY
Status: DISCONTINUED | OUTPATIENT
Start: 2025-01-01 | End: 2025-01-01 | Stop reason: HOSPADM

## 2025-01-01 RX ORDER — MIDAZOLAM HYDROCHLORIDE 1 MG/ML
2 INJECTION, SOLUTION INTRAMUSCULAR; INTRAVENOUS ONCE
Status: COMPLETED | OUTPATIENT
Start: 2025-01-01 | End: 2025-01-01

## 2025-01-01 RX ORDER — FENTANYL CITRATE 50 UG/ML
25 INJECTION, SOLUTION INTRAMUSCULAR; INTRAVENOUS
Refills: 0 | Status: DISCONTINUED | OUTPATIENT
Start: 2025-01-01 | End: 2025-01-01 | Stop reason: HOSPADM

## 2025-01-01 RX ORDER — FENTANYL CITRATE 50 UG/ML
INJECTION, SOLUTION INTRAMUSCULAR; INTRAVENOUS
Status: DISCONTINUED | OUTPATIENT
Start: 2025-01-01 | End: 2025-01-01

## 2025-01-01 RX ORDER — LEVETIRACETAM 500 MG/5ML
1000 INJECTION, SOLUTION, CONCENTRATE INTRAVENOUS EVERY 12 HOURS
Status: DISCONTINUED | OUTPATIENT
Start: 2025-01-01 | End: 2025-01-01 | Stop reason: HOSPADM

## 2025-01-01 RX ORDER — LEVETIRACETAM 500 MG/5ML
500 INJECTION, SOLUTION, CONCENTRATE INTRAVENOUS EVERY 24 HOURS
Status: DISCONTINUED | OUTPATIENT
Start: 2025-01-01 | End: 2025-01-01

## 2025-01-01 RX ORDER — SODIUM CHLORIDE, SODIUM LACTATE, POTASSIUM CHLORIDE, CALCIUM CHLORIDE 600; 310; 30; 20 MG/100ML; MG/100ML; MG/100ML; MG/100ML
INJECTION, SOLUTION INTRAVENOUS CONTINUOUS
Status: DISCONTINUED | OUTPATIENT
Start: 2025-01-01 | End: 2025-01-01

## 2025-01-01 RX ORDER — DEXTROSE MONOHYDRATE 50 MG/ML
INJECTION, SOLUTION INTRAVENOUS CONTINUOUS
Status: DISCONTINUED | OUTPATIENT
Start: 2025-01-01 | End: 2025-01-01 | Stop reason: HOSPADM

## 2025-01-01 RX ORDER — NALOXONE HCL 0.4 MG/ML
0.02 VIAL (ML) INJECTION
Status: DISCONTINUED | OUTPATIENT
Start: 2025-01-01 | End: 2025-01-01 | Stop reason: HOSPADM

## 2025-01-01 RX ORDER — FENTANYL CITRATE 50 UG/ML
25 INJECTION, SOLUTION INTRAMUSCULAR; INTRAVENOUS
Refills: 0 | Status: DISCONTINUED | OUTPATIENT
Start: 2025-01-01 | End: 2025-01-01

## 2025-01-01 RX ORDER — ONDANSETRON HYDROCHLORIDE 2 MG/ML
INJECTION, SOLUTION INTRAVENOUS
Status: DISCONTINUED | OUTPATIENT
Start: 2025-01-01 | End: 2025-01-01

## 2025-01-01 RX ORDER — ATORVASTATIN CALCIUM 40 MG/1
80 TABLET, FILM COATED ORAL DAILY
Status: DISCONTINUED | OUTPATIENT
Start: 2025-01-01 | End: 2025-01-01

## 2025-01-01 RX ORDER — FUROSEMIDE 10 MG/ML
40 INJECTION INTRAMUSCULAR; INTRAVENOUS ONCE
Status: COMPLETED | OUTPATIENT
Start: 2025-01-01 | End: 2025-01-01

## 2025-01-01 RX ORDER — AMIODARONE HYDROCHLORIDE 150 MG/3ML
INJECTION, SOLUTION INTRAVENOUS CODE/TRAUMA/SEDATION MEDICATION
Status: COMPLETED | OUTPATIENT
Start: 2025-01-01 | End: 2025-01-01

## 2025-01-01 RX ORDER — FENTANYL CITRATE 50 UG/ML
25 INJECTION, SOLUTION INTRAMUSCULAR; INTRAVENOUS
Refills: 0 | Status: CANCELLED | OUTPATIENT
Start: 2025-01-01

## 2025-01-01 RX ORDER — HYDRALAZINE HYDROCHLORIDE 20 MG/ML
INJECTION INTRAMUSCULAR; INTRAVENOUS
Status: COMPLETED
Start: 2025-01-01 | End: 2025-01-01

## 2025-01-01 RX ORDER — FENTANYL CITRATE 50 UG/ML
25 INJECTION, SOLUTION INTRAMUSCULAR; INTRAVENOUS ONCE
Status: DISCONTINUED | OUTPATIENT
Start: 2025-01-01 | End: 2025-08-23 | Stop reason: HOSPADM

## 2025-01-01 RX ORDER — LIDOCAINE HYDROCHLORIDE 10 MG/ML
INJECTION, SOLUTION EPIDURAL; INFILTRATION; INTRACAUDAL; PERINEURAL
Status: DISCONTINUED | OUTPATIENT
Start: 2025-01-01 | End: 2025-01-01

## 2025-01-01 RX ORDER — SODIUM CHLORIDE, SODIUM LACTATE, POTASSIUM CHLORIDE, CALCIUM CHLORIDE 600; 310; 30; 20 MG/100ML; MG/100ML; MG/100ML; MG/100ML
INJECTION, SOLUTION INTRAVENOUS CONTINUOUS PRN
Status: DISCONTINUED | OUTPATIENT
Start: 2025-01-01 | End: 2025-01-01

## 2025-01-01 RX ORDER — PHENYLEPHRINE HYDROCHLORIDE 10 MG/ML
INJECTION INTRAVENOUS
Status: DISCONTINUED | OUTPATIENT
Start: 2025-01-01 | End: 2025-01-01

## 2025-01-01 RX ORDER — LIDOCAINE HYDROCHLORIDE 20 MG/ML
INJECTION INTRAVENOUS CODE/TRAUMA/SEDATION MEDICATION
Status: COMPLETED | OUTPATIENT
Start: 2025-01-01 | End: 2025-01-01

## 2025-01-01 RX ORDER — PROPOFOL 10 MG/ML
0-60 INJECTION, EMULSION INTRAVENOUS CONTINUOUS
Status: DISCONTINUED | OUTPATIENT
Start: 2025-01-01 | End: 2025-01-01

## 2025-01-01 RX ORDER — LEVETIRACETAM 500 MG/5ML
1000 INJECTION, SOLUTION, CONCENTRATE INTRAVENOUS EVERY 12 HOURS
Status: CANCELLED | OUTPATIENT
Start: 2025-01-01

## 2025-01-01 RX ORDER — CARVEDILOL 3.12 MG/1
6.25 TABLET ORAL 2 TIMES DAILY
Status: DISCONTINUED | OUTPATIENT
Start: 2025-01-01 | End: 2025-01-01

## 2025-01-01 RX ORDER — LEVOTHYROXINE SODIUM 88 UG/1
88 TABLET ORAL
Status: DISCONTINUED | OUTPATIENT
Start: 2025-01-01 | End: 2025-01-01

## 2025-01-01 RX ORDER — DIATRIZOATE MEGLUMINE AND DIATRIZOATE SODIUM 660; 100 MG/ML; MG/ML
100 SOLUTION ORAL; RECTAL
Status: COMPLETED | OUTPATIENT
Start: 2025-01-01 | End: 2025-01-01

## 2025-01-01 RX ORDER — FENTANYL CITRATE 50 UG/ML
25 INJECTION, SOLUTION INTRAMUSCULAR; INTRAVENOUS
Status: DISCONTINUED | OUTPATIENT
Start: 2025-01-01 | End: 2025-01-01

## 2025-01-01 RX ORDER — MORPHINE SULFATE 4 MG/ML
4 INJECTION, SOLUTION INTRAMUSCULAR; INTRAVENOUS
Refills: 0 | Status: ACTIVE | OUTPATIENT
Start: 2025-01-01 | End: 2025-01-01

## 2025-01-01 RX ADMIN — IOHEXOL 75 ML: 350 INJECTION, SOLUTION INTRAVENOUS at 03:08

## 2025-01-01 RX ADMIN — METOROPROLOL TARTRATE 5 MG: 5 INJECTION, SOLUTION INTRAVENOUS at 01:08

## 2025-01-01 RX ADMIN — DEXTROSE MONOHYDRATE: 50 INJECTION, SOLUTION INTRAVENOUS at 06:08

## 2025-01-01 RX ADMIN — PHENYLEPHRINE HYDROCHLORIDE 200 MCG: 10 INJECTION INTRAVENOUS at 11:08

## 2025-01-01 RX ADMIN — PROPOFOL 60 MCG/KG/MIN: 10 INJECTION, EMULSION INTRAVENOUS at 03:08

## 2025-01-01 RX ADMIN — HEPARIN SODIUM 5000 UNITS: 5000 INJECTION INTRAVENOUS; SUBCUTANEOUS at 01:08

## 2025-01-01 RX ADMIN — PIPERACILLIN AND TAZOBACTAM 4.5 G: 4; .5 INJECTION, POWDER, FOR SOLUTION INTRAVENOUS at 01:08

## 2025-01-01 RX ADMIN — PROPOFOL 60 MCG/KG/MIN: 10 INJECTION, EMULSION INTRAVENOUS at 07:08

## 2025-01-01 RX ADMIN — SODIUM CHLORIDE 1 MG/HR: 9 INJECTION, SOLUTION INTRAVENOUS at 03:08

## 2025-01-01 RX ADMIN — VALPROATE SODIUM 300 MG: 100 INJECTION, SOLUTION INTRAVENOUS at 09:08

## 2025-01-01 RX ADMIN — PROPOFOL 60 MCG/KG/MIN: 10 INJECTION, EMULSION INTRAVENOUS at 05:08

## 2025-01-01 RX ADMIN — IPRATROPIUM BROMIDE AND ALBUTEROL SULFATE 3 ML: 2.5; .5 SOLUTION RESPIRATORY (INHALATION) at 08:08

## 2025-01-01 RX ADMIN — SODIUM CHLORIDE, POTASSIUM CHLORIDE, SODIUM LACTATE AND CALCIUM CHLORIDE 500 ML: 600; 310; 30; 20 INJECTION, SOLUTION INTRAVENOUS at 12:08

## 2025-01-01 RX ADMIN — PROPOFOL 60 MCG/KG/MIN: 10 INJECTION, EMULSION INTRAVENOUS at 06:08

## 2025-01-01 RX ADMIN — LEVETIRACETAM 1000 MG: 100 INJECTION, SOLUTION INTRAVENOUS at 08:08

## 2025-01-01 RX ADMIN — LEVETIRACETAM 1500 MG: 100 INJECTION, SOLUTION INTRAVENOUS at 05:08

## 2025-01-01 RX ADMIN — SODIUM CHLORIDE, POTASSIUM CHLORIDE, SODIUM LACTATE AND CALCIUM CHLORIDE: 600; 310; 30; 20 INJECTION, SOLUTION INTRAVENOUS at 11:08

## 2025-01-01 RX ADMIN — VALPROATE SODIUM 300 MG: 100 INJECTION, SOLUTION INTRAVENOUS at 02:08

## 2025-01-01 RX ADMIN — PROPOFOL 10 MCG/KG/MIN: 10 INJECTION, EMULSION INTRAVENOUS at 06:08

## 2025-01-01 RX ADMIN — PANTOPRAZOLE SODIUM 40 MG: 40 INJECTION, POWDER, FOR SOLUTION INTRAVENOUS at 05:08

## 2025-01-01 RX ADMIN — ONDANSETRON 4 MG: 2 INJECTION, SOLUTION INTRAMUSCULAR; INTRAVENOUS at 11:08

## 2025-01-01 RX ADMIN — PIPERACILLIN AND TAZOBACTAM 4.5 G: 4; .5 INJECTION, POWDER, FOR SOLUTION INTRAVENOUS at 02:08

## 2025-01-01 RX ADMIN — NOREPINEPHRINE BITARTRATE 0.04 MCG/KG/MIN: 4 INJECTION, SOLUTION INTRAVENOUS at 02:08

## 2025-01-01 RX ADMIN — GLYCOPYRROLATE 0.1 MG: 0.2 INJECTION INTRAMUSCULAR; INTRAVENOUS at 10:08

## 2025-01-01 RX ADMIN — PIPERACILLIN AND TAZOBACTAM 4.5 G: 4; .5 INJECTION, POWDER, FOR SOLUTION INTRAVENOUS at 09:08

## 2025-01-01 RX ADMIN — HEPARIN SODIUM 5000 UNITS: 5000 INJECTION, SOLUTION INTRAVENOUS; SUBCUTANEOUS at 09:08

## 2025-01-01 RX ADMIN — MUPIROCIN: 20 OINTMENT TOPICAL at 08:08

## 2025-01-01 RX ADMIN — HEPARIN SODIUM 5000 UNITS: 5000 INJECTION INTRAVENOUS; SUBCUTANEOUS at 09:08

## 2025-01-01 RX ADMIN — ALBUTEROL SULFATE 4 PUFF: 90 AEROSOL, METERED RESPIRATORY (INHALATION) at 11:08

## 2025-01-01 RX ADMIN — POTASSIUM CHLORIDE 10 MEQ: 7.46 INJECTION, SOLUTION INTRAVENOUS at 05:08

## 2025-01-01 RX ADMIN — PROPOFOL 60 MCG/KG/MIN: 10 INJECTION, EMULSION INTRAVENOUS at 08:08

## 2025-01-01 RX ADMIN — VALPROATE SODIUM 300 MG: 100 INJECTION, SOLUTION INTRAVENOUS at 05:08

## 2025-01-01 RX ADMIN — FENTANYL CITRATE 100 MCG: 50 INJECTION INTRAMUSCULAR; INTRAVENOUS at 11:08

## 2025-01-01 RX ADMIN — MIDAZOLAM 4 MG: 1 INJECTION INTRAMUSCULAR; INTRAVENOUS at 03:08

## 2025-01-01 RX ADMIN — PANTOPRAZOLE SODIUM 40 MG: 40 INJECTION, POWDER, FOR SOLUTION INTRAVENOUS at 08:08

## 2025-01-01 RX ADMIN — DEXTROSE 2000 MG: 50 INJECTION, SOLUTION INTRAVENOUS at 02:08

## 2025-01-01 RX ADMIN — PHENYLEPHRINE HYDROCHLORIDE 300 MCG: 10 INJECTION INTRAVENOUS at 11:08

## 2025-01-01 RX ADMIN — PIPERACILLIN AND TAZOBACTAM 4.5 G: 4; .5 INJECTION, POWDER, FOR SOLUTION INTRAVENOUS at 03:08

## 2025-01-01 RX ADMIN — LEVETIRACETAM 1000 MG: 100 INJECTION, SOLUTION INTRAVENOUS at 09:08

## 2025-01-01 RX ADMIN — MUPIROCIN: 20 OINTMENT TOPICAL at 09:08

## 2025-01-01 RX ADMIN — EPINEPHRINE 1 MG: 0.1 INJECTION INTRACARDIAC; INTRAVENOUS at 01:08

## 2025-01-01 RX ADMIN — PROPOFOL 50 MCG/KG/MIN: 10 INJECTION, EMULSION INTRAVENOUS at 04:08

## 2025-01-01 RX ADMIN — HEPARIN SODIUM 5000 UNITS: 5000 INJECTION INTRAVENOUS; SUBCUTANEOUS at 02:08

## 2025-01-01 RX ADMIN — LEVOTHYROXINE SODIUM 88 MCG: 0.09 TABLET ORAL at 05:08

## 2025-01-01 RX ADMIN — PROPOFOL 50 MCG/KG/MIN: 10 INJECTION, EMULSION INTRAVENOUS at 12:08

## 2025-01-01 RX ADMIN — HEPARIN SODIUM 5000 UNITS: 5000 INJECTION INTRAVENOUS; SUBCUTANEOUS at 05:08

## 2025-01-01 RX ADMIN — POTASSIUM BICARBONATE 20 MEQ: 391 TABLET, EFFERVESCENT ORAL at 06:08

## 2025-01-01 RX ADMIN — PANTOPRAZOLE SODIUM 40 MG: 40 INJECTION, POWDER, FOR SOLUTION INTRAVENOUS at 09:08

## 2025-01-01 RX ADMIN — DEXAMETHASONE SODIUM PHOSPHATE 4 MG: 4 INJECTION, SOLUTION INTRA-ARTICULAR; INTRALESIONAL; INTRAMUSCULAR; INTRAVENOUS; SOFT TISSUE at 11:08

## 2025-01-01 RX ADMIN — IOHEXOL 150 ML: 350 INJECTION, SOLUTION INTRAVENOUS at 04:08

## 2025-01-01 RX ADMIN — MORPHINE SULFATE 4 MG: 4 INJECTION INTRAVENOUS at 11:08

## 2025-01-01 RX ADMIN — DEXTROSE MONOHYDRATE: 50 INJECTION, SOLUTION INTRAVENOUS at 10:08

## 2025-01-01 RX ADMIN — FENTANYL CITRATE 25 MCG: 50 INJECTION INTRAMUSCULAR; INTRAVENOUS at 10:08

## 2025-01-01 RX ADMIN — PROPOFOL 60 MCG/KG/MIN: 10 INJECTION, EMULSION INTRAVENOUS at 10:08

## 2025-01-01 RX ADMIN — MORPHINE SULFATE 4 MG: 4 INJECTION INTRAVENOUS at 10:08

## 2025-01-01 RX ADMIN — MAGNESIUM SULFATE HEPTAHYDRATE 0.5 G/HR: 40 INJECTION, SOLUTION INTRAVENOUS at 10:08

## 2025-01-01 RX ADMIN — HYDRALAZINE HYDROCHLORIDE 20 MG: 20 INJECTION INTRAMUSCULAR; INTRAVENOUS at 02:08

## 2025-01-01 RX ADMIN — SUGAMMADEX 200 MG: 100 INJECTION, SOLUTION INTRAVENOUS at 11:08

## 2025-01-01 RX ADMIN — HYDRALAZINE HYDROCHLORIDE 10 MG: 20 INJECTION INTRAMUSCULAR; INTRAVENOUS at 11:08

## 2025-01-01 RX ADMIN — PROPOFOL 50 MCG/KG/MIN: 10 INJECTION, EMULSION INTRAVENOUS at 05:08

## 2025-01-01 RX ADMIN — MORPHINE SULFATE 4 MG/HR: 10 INJECTION, SOLUTION INTRAMUSCULAR; INTRAVENOUS at 03:08

## 2025-01-01 RX ADMIN — BUSPIRONE HYDROCHLORIDE 30 MG: 5 TABLET ORAL at 10:08

## 2025-01-01 RX ADMIN — PHENYLEPHRINE HYDROCHLORIDE 400 MCG: 10 INJECTION INTRAVENOUS at 11:08

## 2025-01-01 RX ADMIN — VANCOMYCIN HYDROCHLORIDE 1500 MG: 1.5 INJECTION, POWDER, LYOPHILIZED, FOR SOLUTION INTRAVENOUS at 02:08

## 2025-01-01 RX ADMIN — FUROSEMIDE 40 MG: 10 INJECTION, SOLUTION INTRAMUSCULAR; INTRAVENOUS at 05:08

## 2025-01-01 RX ADMIN — HYDRALAZINE HYDROCHLORIDE 10 MG: 20 INJECTION INTRAMUSCULAR; INTRAVENOUS at 09:08

## 2025-01-01 RX ADMIN — SODIUM CHLORIDE 500 ML: 9 INJECTION, SOLUTION INTRAVENOUS at 03:08

## 2025-01-01 RX ADMIN — GLYCOPYRROLATE 0.1 MG: 0.2 INJECTION INTRAMUSCULAR; INTRAVENOUS at 08:08

## 2025-01-01 RX ADMIN — LIDOCAINE HYDROCHLORIDE 100 MG: 10 INJECTION, SOLUTION EPIDURAL; INFILTRATION; INTRACAUDAL; PERINEURAL at 11:08

## 2025-01-01 RX ADMIN — POTASSIUM BICARBONATE 40 MEQ: 391 TABLET, EFFERVESCENT ORAL at 08:08

## 2025-01-01 RX ADMIN — DIATRIZOATE MEGLUMINE AND DIATRIZOATE SODIUM 100 ML: 660; 100 LIQUID ORAL; RECTAL at 12:08

## 2025-01-01 RX ADMIN — ACETAMINOPHEN 1000 MG: 10 INJECTION INTRAVENOUS at 05:08

## 2025-01-01 RX ADMIN — VALPROATE SODIUM 300 MG: 100 INJECTION, SOLUTION INTRAVENOUS at 06:08

## 2025-01-01 RX ADMIN — LIDOCAINE HYDROCHLORIDE 50 MG: 20 INJECTION, SOLUTION INTRAVENOUS at 01:08

## 2025-01-01 RX ADMIN — VALPROATE SODIUM 300 MG: 100 INJECTION, SOLUTION INTRAVENOUS at 01:08

## 2025-01-01 RX ADMIN — CARVEDILOL 6.25 MG: 3.12 TABLET, FILM COATED ORAL at 10:08

## 2025-01-01 RX ADMIN — ROCURONIUM BROMIDE 80 MG: 10 INJECTION, SOLUTION INTRAVENOUS at 11:08

## 2025-01-01 RX ADMIN — PROPOFOL 60 MCG/KG/MIN: 10 INJECTION, EMULSION INTRAVENOUS at 09:08

## 2025-01-01 RX ADMIN — ONDANSETRON 4 MG: 2 INJECTION INTRAMUSCULAR; INTRAVENOUS at 02:08

## 2025-01-01 RX ADMIN — PROPOFOL 60 MCG/KG/MIN: 10 INJECTION, EMULSION INTRAVENOUS at 12:08

## 2025-01-01 RX ADMIN — PROPOFOL 100 MG: 10 INJECTION, EMULSION INTRAVENOUS at 11:08

## 2025-01-01 RX ADMIN — PROPOFOL 60 MCG/KG/MIN: 10 INJECTION, EMULSION INTRAVENOUS at 11:08

## 2025-01-01 RX ADMIN — PROPOFOL 50 MCG/KG/MIN: 10 INJECTION, EMULSION INTRAVENOUS at 01:08

## 2025-01-01 RX ADMIN — DEXTROSE MONOHYDRATE: 50 INJECTION, SOLUTION INTRAVENOUS at 08:08

## 2025-01-01 RX ADMIN — POTASSIUM CHLORIDE 10 MEQ: 7.46 INJECTION, SOLUTION INTRAVENOUS at 04:08

## 2025-01-01 RX ADMIN — CEFAZOLIN 2 G: 330 INJECTION, POWDER, FOR SOLUTION INTRAMUSCULAR; INTRAVENOUS at 11:08

## 2025-01-01 RX ADMIN — ACETAMINOPHEN 1000 MG: 10 INJECTION INTRAVENOUS at 09:08

## 2025-01-01 RX ADMIN — DIATRIZOATE MEGLUMINE AND DIATRIZOATE SODIUM 100 ML: 660; 100 LIQUID ORAL; RECTAL at 07:08

## 2025-01-01 RX ADMIN — PROPOFOL 35 MCG/KG/MIN: 10 INJECTION, EMULSION INTRAVENOUS at 12:08

## 2025-01-01 RX ADMIN — PROPOFOL 60 MCG/KG/MIN: 10 INJECTION, EMULSION INTRAVENOUS at 01:08

## 2025-01-01 RX ADMIN — SODIUM CHLORIDE, POTASSIUM CHLORIDE, SODIUM LACTATE AND CALCIUM CHLORIDE 1000 ML: 600; 310; 30; 20 INJECTION, SOLUTION INTRAVENOUS at 02:08

## 2025-01-01 RX ADMIN — EPHEDRINE SULFATE 20 MG: 50 INJECTION, SOLUTION INTRAMUSCULAR; INTRAVENOUS; SUBCUTANEOUS at 11:08

## 2025-01-01 RX ADMIN — AMIODARONE HYDROCHLORIDE 150 MG: 50 INJECTION, SOLUTION INTRAVENOUS at 01:08

## 2025-01-01 RX ADMIN — MIDAZOLAM 2 MG: 1 INJECTION INTRAMUSCULAR; INTRAVENOUS at 10:08

## 2025-01-01 RX ADMIN — PIPERACILLIN AND TAZOBACTAM 4.5 G: 4; .5 INJECTION, POWDER, FOR SOLUTION INTRAVENOUS at 05:08

## 2025-01-01 RX ADMIN — FUROSEMIDE 40 MG: 10 INJECTION, SOLUTION INTRAMUSCULAR; INTRAVENOUS at 08:08

## 2025-01-01 RX ADMIN — PROPOFOL 50 MCG/KG/MIN: 10 INJECTION, EMULSION INTRAVENOUS at 07:08

## 2025-01-01 RX ADMIN — MORPHINE SULFATE 4 MG: 4 INJECTION INTRAVENOUS at 03:08

## 2025-01-01 RX ADMIN — METOPROLOL TARTRATE 2.5 MG: 1 INJECTION, SOLUTION INTRAVENOUS at 11:08

## 2025-01-01 RX ADMIN — DEXTROSE MONOHYDRATE 1250 MG: 50 INJECTION, SOLUTION INTRAVENOUS at 06:08

## 2025-01-01 RX ADMIN — LEVETIRACETAM 500 MG: 100 INJECTION, SOLUTION INTRAVENOUS at 08:08

## 2025-01-01 RX ADMIN — SODIUM CHLORIDE, POTASSIUM CHLORIDE, SODIUM LACTATE AND CALCIUM CHLORIDE 1000 ML: 600; 310; 30; 20 INJECTION, SOLUTION INTRAVENOUS at 10:08

## 2025-01-01 RX ADMIN — ACETAMINOPHEN 1000 MG: 10 INJECTION INTRAVENOUS at 02:08

## 2025-01-01 RX ADMIN — SODIUM CHLORIDE, SODIUM LACTATE, POTASSIUM CHLORIDE, AND CALCIUM CHLORIDE: .6; .31; .03; .02 INJECTION, SOLUTION INTRAVENOUS at 11:08

## 2025-01-01 RX ADMIN — PROPOFOL 60 MCG/KG/MIN: 10 INJECTION, EMULSION INTRAVENOUS at 04:08

## 2025-01-01 RX ADMIN — ONDANSETRON 4 MG: 2 INJECTION INTRAMUSCULAR; INTRAVENOUS at 09:08

## 2025-01-01 RX ADMIN — HYDRALAZINE HYDROCHLORIDE 10 MG: 20 INJECTION INTRAMUSCULAR; INTRAVENOUS at 05:08

## 2025-01-02 ENCOUNTER — CLINICAL SUPPORT (OUTPATIENT)
Dept: CARDIOLOGY | Facility: HOSPITAL | Age: 75
End: 2025-01-02
Attending: STUDENT IN AN ORGANIZED HEALTH CARE EDUCATION/TRAINING PROGRAM
Payer: MEDICARE

## 2025-01-02 DIAGNOSIS — I49.8 OTHER SPECIFIED CARDIAC ARRHYTHMIAS: ICD-10-CM

## 2025-01-02 LAB
OHS CV AF BURDEN PERCENT: < 1
OHS CV DC REMOTE DEVICE TYPE: NORMAL

## 2025-01-02 PROCEDURE — 93298 REM INTERROG DEV EVAL SCRMS: CPT | Performed by: STUDENT IN AN ORGANIZED HEALTH CARE EDUCATION/TRAINING PROGRAM

## 2025-01-03 ENCOUNTER — LAB VISIT (OUTPATIENT)
Dept: LAB | Facility: HOSPITAL | Age: 75
End: 2025-01-03
Attending: FAMILY MEDICINE
Payer: MEDICARE

## 2025-01-03 ENCOUNTER — TELEPHONE (OUTPATIENT)
Dept: INTERNAL MEDICINE | Facility: CLINIC | Age: 75
End: 2025-01-03
Payer: MEDICARE

## 2025-01-03 ENCOUNTER — OFFICE VISIT (OUTPATIENT)
Dept: INTERNAL MEDICINE | Facility: CLINIC | Age: 75
End: 2025-01-03
Payer: MEDICARE

## 2025-01-03 VITALS
HEART RATE: 76 BPM | DIASTOLIC BLOOD PRESSURE: 60 MMHG | HEIGHT: 60 IN | SYSTOLIC BLOOD PRESSURE: 102 MMHG | OXYGEN SATURATION: 97 % | WEIGHT: 138.25 LBS | BODY MASS INDEX: 27.14 KG/M2

## 2025-01-03 DIAGNOSIS — N18.32 STAGE 3B CHRONIC KIDNEY DISEASE: ICD-10-CM

## 2025-01-03 DIAGNOSIS — Z95.818 STATUS POST PLACEMENT OF IMPLANTABLE LOOP RECORDER: ICD-10-CM

## 2025-01-03 DIAGNOSIS — N28.9 ACUTE ON CHRONIC RENAL INSUFFICIENCY: ICD-10-CM

## 2025-01-03 DIAGNOSIS — N18.9 ACUTE ON CHRONIC RENAL INSUFFICIENCY: ICD-10-CM

## 2025-01-03 DIAGNOSIS — R53.1 LEFT-SIDED WEAKNESS: ICD-10-CM

## 2025-01-03 DIAGNOSIS — Z79.01 LONG TERM CURRENT USE OF ANTICOAGULANT: ICD-10-CM

## 2025-01-03 DIAGNOSIS — E03.9 HYPOTHYROIDISM, UNSPECIFIED TYPE: ICD-10-CM

## 2025-01-03 DIAGNOSIS — E78.2 MIXED HYPERLIPIDEMIA: ICD-10-CM

## 2025-01-03 DIAGNOSIS — I63.521 ACUTE ISCHEMIC RIGHT ACA STROKE: Primary | ICD-10-CM

## 2025-01-03 DIAGNOSIS — F41.9 ANXIETY: ICD-10-CM

## 2025-01-03 DIAGNOSIS — R26.81 GAIT INSTABILITY: ICD-10-CM

## 2025-01-03 DIAGNOSIS — Z86.73 HISTORY OF CEREBRAL INFARCTION: ICD-10-CM

## 2025-01-03 DIAGNOSIS — I10 PRIMARY HYPERTENSION: ICD-10-CM

## 2025-01-03 LAB
ANION GAP SERPL CALC-SCNC: 10 MMOL/L (ref 8–16)
BUN SERPL-MCNC: 25 MG/DL (ref 8–23)
CALCIUM SERPL-MCNC: 9.7 MG/DL (ref 8.7–10.5)
CHLORIDE SERPL-SCNC: 109 MMOL/L (ref 95–110)
CO2 SERPL-SCNC: 23 MMOL/L (ref 23–29)
CREAT SERPL-MCNC: 1.6 MG/DL (ref 0.5–1.4)
EST. GFR  (NO RACE VARIABLE): 33.2 ML/MIN/1.73 M^2
GLUCOSE SERPL-MCNC: 83 MG/DL (ref 70–110)
POTASSIUM SERPL-SCNC: 4.7 MMOL/L (ref 3.5–5.1)
SODIUM SERPL-SCNC: 142 MMOL/L (ref 136–145)

## 2025-01-03 PROCEDURE — 99214 OFFICE O/P EST MOD 30 MIN: CPT | Mod: PBBFAC | Performed by: FAMILY MEDICINE

## 2025-01-03 PROCEDURE — 99999 PR PBB SHADOW E&M-EST. PATIENT-LVL IV: CPT | Mod: PBBFAC,,, | Performed by: FAMILY MEDICINE

## 2025-01-03 PROCEDURE — 80048 BASIC METABOLIC PNL TOTAL CA: CPT | Performed by: FAMILY MEDICINE

## 2025-01-03 PROCEDURE — 36415 COLL VENOUS BLD VENIPUNCTURE: CPT | Performed by: FAMILY MEDICINE

## 2025-01-03 RX ORDER — LEVETIRACETAM 500 MG/1
500 TABLET, EXTENDED RELEASE ORAL DAILY
COMMUNITY
Start: 2025-01-01 | End: 2025-01-11

## 2025-01-03 NOTE — PROGRESS NOTES
Office Visit    Patient Name: Denise Berrios    : 1950  MRN: [id number]    Subjective:  Denise is a 74 y.o. female who presents today for:    Follow-up (hos)    74-year-old patient of PCP Dr. Aleksandra aCrey, new to me, who presents today for hospital follow-up.    Per D/C Summary Reviewed:     Admit Date: 2024    Discharge Date and Time:  2024 1     History of Present Illness: Denise Berrios is a 74 y.o. female with PMHx  HTN, episodic migraine with aura, Graves disease, L occipital infarct () and corona radiata-left parietal lobe cortex- left insular cortex infarcts on  deemed to be ESUS, who is being evaluated by the Vascular Neurology service after developing increased weakness in the LLE. Symptoms began about 3 days ago. Patient states dysarthria has been fluctuating and LLE buckled yesterday. Patient was brought into ED today by  for evaluation. CT head completed in ED with redemonstration of multifocal regions of prior supratentorial infarct.  No compelling CT evidence of acute intracranial abnormality. MRI Small focus of cortical based restricted diffusion T2 weighted signal abnormality is noted in the parasagittal posterior right frontal lobe. Stroke team consulted.      Patient takes ASA at home. She lives with others and performs all ADLs independently.      Stroke Etiology: Ischemic Undetermined Cause Cryptogenic Embolism / ESUS (Embolic Stroke of Undetermined Source)         Diagnostic Results:        Brain/Vessel Imaging            MRA Neck without contrast     Impression     MRA head: Irregularity and question high-grade stenosis in the right mid P2 PCA.  In addition there is irregularity and abrupt termination of the left PCA at the P1 P2 junction concerning for high-grade stenosis versus occlusion.  Overall likely chronic without infarction seen on same day MRI.     Otherwise unremarkable MRA head.     MRA neck: No significant arterial stenosis throughout the  neck.     Electronically signed by resident: Randy Garcia  Date:                                                12/30/2024  Time:                                                09:31     Electronically signed by:Ezra Loza DO  Date:                                                12/30/2024  Time:                                                10:53   MRI Brain Without Contrast     Impression     Small focus of cortical based restricted diffusion T2 weighted signal abnormality is noted in the parasagittal posterior right frontal lobe.  Finding would be in keeping with small focus of acute to early subacute ischemia.  Focal encephalitis would be considered less likely unless clinically suspected.     Multifocal remote post ischemic sequelae again demonstrated.     This report was flagged in Epic as abnormal.        Electronically signed by:Shankar Noyola  Date:                                                12/30/2024  Time:                                                06:18                 Labs 12/29/2024 reviewed and show LDL less than 100 R 94, NORMAL LIVER FUNCTION, EGFR OF 58, UNREMARKABLE ELECTROLYTES, TSH OF 0.45 on Synthroid 88.   OF NOTE WHEN PATIENT WAS SEEN IN [place] ER ON THE 1ST OF THE YEAR 2 DAYS AGO EGFR HAD DROPPED TO 29, NORMAL HEMATOCRIT.    They went to the [place] ER for Eval of left leg weakness and loss of some sensation of the leg and arm. Imaging negative for acute process-- possibly prior stroke symptoms exacerbated by dehydration     Her PO intake is still decreased from baseline due to recent travel and returning from [place].     Cardiac Imaging:     Echo     Interpretation Summary    Left Ventricle: The left ventricle is normal in size. Ventricular mass is normal. Normal wall thickness. There is concentric remodeling. Normal wall motion. There is normal systolic function. Ejection fraction is approximately 55%. Grade II diastolic dysfunction.    Right Ventricle: Normal right  ventricular cavity size. Wall thickness is normal. Systolic function is normal.    Left Atrium: Left atrium is mildly dilated.    IVC/SVC: Normal venous pressure at 3 mmHg.     Follow-up in Stroke Clinic in 4-6 weeks.      Discharge Plan:  Statin: Atorvastatin 80mg  Anticoagulant: Apixaban 5mg    She was evaluated at an outside ER on 01/01/2025-    Since discharge and ER evaluation she reports episodes of left-sided weakness at come and go-at times she is very strong but then will suddenly have a freezing episode where she can not move and this is creating a concern for fall risk.  Reports that she is not feeling 6/ill but is likely still dehydrated as she has not gotten back on track since returning from [place].     PAST MEDICAL HISTORY, SURGICAL/SOCIAL/FAMILY HISTORY REVIEWED AS PER CHART, WITH PERTINENT FINDINGS INCLUDED IN HISTORY SECTION OF NOTE.     Current Medications    Medication List with Changes/Refills   Current Medications    APIXABAN (ELIQUIS) 5 MG TAB    Take 1 tablet (5 mg total) by mouth 2 (two) times daily.    ATORVASTATIN (LIPITOR) 80 MG TABLET    Take 1 tablet (80 mg total) by mouth once daily.    CARVEDILOL (COREG) 12.5 MG TABLET    TAKE 1 TABLET TWICE A DAY    ESCITALOPRAM OXALATE (LEXAPRO) 20 MG TABLET    TAKE 1 TABLET DAILY    LEVETIRACETAM XR (KEPPRA XR) 500 MG TB24 24 HR TABLET    Take 500 mg by mouth once daily.    LEVOTHYROXINE (SYNTHROID) 88 MCG TABLET    TAKE 1 TABLET BEFORE BREAKFAST    LOSARTAN (COZAAR) 50 MG TABLET    TAKE 1 TABLET DAILY    NORTRIPTYLINE (PAMELOR) 10 MG CAPSULE    TAKE 1 CAPSULE DAILY    OXYBUTYNIN (DITROPAN-XL) 10 MG 24 HR TABLET    Take 1 tablet (10 mg total) by mouth once daily.    PREGABALIN (LYRICA) 75 MG CAPSULE    Take 1 capsule (75 mg total) by mouth 2 (two) times daily.    VITAMIN D2 1,250 MCG (50,000 UNIT) CAPSULE    TAKE 1 CAPSULE ONCE A WEEK       Allergies   Review of patient's allergies indicates:   Allergen Reactions    Phenobarbital Anaphylaxis     Penicillins Other (See Comments)    Phenobarbital sodium     Propylthiouracil     Topamax [topiramate] Diarrhea     GI symptoms    Cefaclor Rash and Other (See Comments)    Clopidogrel Hives and Rash    Methimazole Rash and Other (See Comments)    Methimazole (bulk) Rash    Penicillin Rash    Sulfa (sulfonamide antibiotics) Rash and Other (See Comments)         Review of Systems (Pertinent positives)  Review of Systems   Constitutional:  Negative for chills and fever.   HENT:  Negative for trouble swallowing.    Respiratory:  Negative for shortness of breath.    Cardiovascular:  Negative for chest pain.   Gastrointestinal:  Negative for nausea and vomiting.   Musculoskeletal:  Positive for gait problem.   Neurological:  Positive for speech difficulty (chronic dysarthria from stroke) and weakness. Negative for dizziness, syncope and light-headedness.       /60 (BP Location: Left arm, Patient Position: Sitting)   Pulse 76   Ht 5' (1.524 m)   Wt 62.7 kg (138 lb 3.7 oz)   SpO2 97%   BMI 27.00 kg/m²     Physical Exam  Vitals reviewed.   Constitutional:       General: She is not in acute distress.     Appearance: Normal appearance. She is well-developed.   HENT:      Head: Normocephalic and atraumatic.      Nose: No congestion or rhinorrhea.      Mouth/Throat:      Pharynx: No oropharyngeal exudate or posterior oropharyngeal erythema.   Eyes:      Conjunctiva/sclera: Conjunctivae normal.   Cardiovascular:      Rate and Rhythm: Normal rate and regular rhythm.   Pulmonary:      Effort: Pulmonary effort is normal.      Breath sounds: Normal breath sounds.   Musculoskeletal:      Right lower leg: No edema.      Left lower leg: No edema.   Skin:     General: Skin is warm and dry.   Neurological:      General: No focal deficit present.      Mental Status: She is alert and oriented to person, place, and time.      Cranial Nerves: Dysarthria present.      Motor: Weakness present. No abnormal muscle tone or pronator  drift.      Coordination: Romberg sign negative. Coordination normal. Finger-Nose-Finger Test (but slow with movements) normal.      Comments: Apart weakness of left shoulder with shoulder shrug her strength testing of the left side is actually comparable to the right, however, she was observed to have an episode while walking where she froze up and could not move her left side.  With assistance she was able to resume walking and get to her chair.       Psychiatric:         Mood and Affect: Mood normal.         Behavior: Behavior normal.           Assessment/Plan:  Denise Berrios is a 74 y.o. female who presents today for :        ICD-10-CM ICD-9-CM    1. Acute ischemic right STEPHANI stroke  I63.521 434.91       2. History of cerebral infarction  Z86.73 V12.54       3. Gait instability  R26.81 781.2       4. Stage 3b chronic kidney disease  N18.32 585.3       5. Acute on chronic renal insufficiency  N28.9 593.9     N18.9 585.9       6. Primary hypertension  I10 401.9       7. Mixed hyperlipidemia  E78.2 272.2       8. Hypothyroidism, unspecified type  E03.9 244.9       9. Anxiety  F41.9 300.00         ACUTE ISCHEMIC RIGHT STEPHANI STROKE WITH PRIOR HISTORY OF CEREBRAL INFARCTION/ CRYPTOGENIC ESUS:  Following with Cardiology, has upcoming appointment with electrophysiology and ILR is implanted for monitoring, now on Eliquis 5 b.i.d. and increased dose of Lipitor 80 for stroke prevention.  Also on chronic beta blocker.      Biggest issue is that left sided weakness comes and goes and at times is severe-- may benefit from mobility aid-- advised to use walker more frequently for fall prevention.     Advised of recommendation to follow up with stroke clinic and scheduling information provided.    She is also starting Keppra 500 XR per [facility]-- advised to observe for any change in neurologic symptoms on the Keppra and can advise Dr Ferrara     GAIT INSTABILITY: PT/OT ordered outpatient and can re-evaluate  with Neurology at stroke follow up. Still has some dysarthria and may benefit from from additional speech therapy-- will address with neurology    HYPERTENSION, HYPERLIPIDEMIA:  Echo with normal systolic but grade 2 diastolic dysfunction associated with hypertension, BP controlled Coreg 12.5 b.i.d. with losartan 50.  Most recent LDL just under 100 and Lipitor increased to 80-will need follow-up lipids per PCP and Cardiology.    STAGE III CKD WITH ACUTE RENAL INSUFFICIENCY:  EGFR during hospitalization for stroke workup was 58 but then down to 29 when seen in the [place] ER.  The importance of consistent p.o. intake/hydration was discussed.  Repeat kidney function to be drawn today and will advise based on results.    Has been dehydrated with recent travel and advised on importance of hydration and advised that dehydration can exacerbated her chronic stroke symptoms.     Hypothyroidism:  HISTORY OF GRAVES DISEASE, now with postablative hypothyroidism, TSH stable on Synthroid 88.  Ongoing monitoring per PCP.      ANXIETY:  Lexapro 20    A total of 50 minutes was spent on this encounter that included explaining differentials, symptom counseling, review of recent results, and next steps of diagnosis and management plan, along with direct documentation of the encounter.'    Visit today included increased complexity associated with the care of the episodic problem acute stroke with waxing and waning left-sided weakness, fall risk, acute renal failure addressed and managing the longitudinal care of the patient due to the serious and/or complex managed problem(s) recurring cryptogenic strokes, hypertension, hyperlipidemia, weakness with recent hospitalization and ER visits    Patient Instructions     PLEASE CALL TO ENSURE THE FOLLOWING APPOINTMENT IS SCHEDULED WITH STROKE CLINIC     Kuldeep Morales MD. Schedule an appointment as soon as possible for a visit in 1 month(s).    Specialty: Neurology  Contact  information:  [address]  [facility] [address]  [phone]                  Follow up in about 3 weeks (around 1/24/2025) for return as needed for new concerns.

## 2025-01-03 NOTE — PATIENT INSTRUCTIONS
PLEASE CALL TO ENSURE THE FOLLOWING APPOINTMENT IS SCHEDULED WITH STROKE CLINIC     Kuldeep Morales MD. Schedule an appointment as soon as possible for a visit in 1 month(s).    Specialty: Neurology  Contact information:  [address]  [facility] [address] [phone]

## 2025-01-06 ENCOUNTER — PATIENT OUTREACH (OUTPATIENT)
Dept: ADMINISTRATIVE | Facility: CLINIC | Age: 75
End: 2025-01-06
Payer: MEDICARE

## 2025-01-06 NOTE — TELEPHONE ENCOUNTER
John (spouse)    @ 1963 secure high priority message to pcp staff regarding spouse's stated requests

## 2025-01-06 NOTE — PROGRESS NOTES
C3 nurse spoke with Denise VIVAS Cousins's spouse, John for a TCC post hospital discharge follow up call. The patient has a scheduled HOSFU appointment with Ceci Cardona MD on 01/03/25 @ 1000, completed.

## 2025-01-07 ENCOUNTER — TELEPHONE (OUTPATIENT)
Dept: NEUROLOGY | Facility: CLINIC | Age: 75
End: 2025-01-07
Payer: MEDICARE

## 2025-01-07 ENCOUNTER — TELEPHONE (OUTPATIENT)
Dept: PRIMARY CARE CLINIC | Facility: CLINIC | Age: 75
End: 2025-01-07
Payer: MEDICARE

## 2025-01-07 NOTE — TELEPHONE ENCOUNTER
----- Message from Dang sent at 1/7/2025  2:08 PM CST -----  Contact: John @ [phone]  Patient needs a Hosp follow up appt with their PCP only.       When is the next available appointment:      Symptoms:  Follow up - Stroke (twice) Once at [facility] and Second time at [facility]   Hosp Follow - Discharged [facility] (12/31/2024) Discharged from [email or web address] (01/01/25)       Discharge date:01/01/25    Needs to be seen by:01/9/2025    Would you prefer an answer via [email or web address]?: call      Comments:

## 2025-01-07 NOTE — TELEPHONE ENCOUNTER
----- Message from Evita sent at 1/7/2025  2:22 PM CST -----  Contact: [phone](John)  Patient is returning a phone call.    Who left a message for the patient: kerwin garcia nurse    Does patient know what this is regarding:  apt    Would you like a call back, or a response through your [facility] portal?:   call back     Comments:

## 2025-01-07 NOTE — TELEPHONE ENCOUNTER
----- Message from Shelbi sent at 1/6/2025  2:40 PM CST -----  Type:  Sooner Apoointment Request  Caller is requesting a sooner appointment.  Name of Caller:John   When is the first available appointment?soon   Symptoms: I63.9 (ICD-10-CM) - Stroke  Would the patient rather a call back or a response via [facility]? call  Best Call Back Number: [phone]  Additional Information:

## 2025-01-09 ENCOUNTER — OFFICE VISIT (OUTPATIENT)
Dept: PRIMARY CARE CLINIC | Facility: CLINIC | Age: 75
End: 2025-01-09
Payer: MEDICARE

## 2025-01-09 DIAGNOSIS — I63.9 ISCHEMIC CEREBROVASCULAR ACCIDENT (CVA): Primary | ICD-10-CM

## 2025-01-09 DIAGNOSIS — E78.2 MIXED HYPERLIPIDEMIA: ICD-10-CM

## 2025-01-09 DIAGNOSIS — N18.32 STAGE 3B CHRONIC KIDNEY DISEASE: ICD-10-CM

## 2025-01-09 DIAGNOSIS — R56.9 CONVULSIONS, UNSPECIFIED CONVULSION TYPE: ICD-10-CM

## 2025-01-09 DIAGNOSIS — G81.04 FLACCID HEMIPLEGIA AFFECTING LEFT NONDOMINANT SIDE, UNSPECIFIED ETIOLOGY: ICD-10-CM

## 2025-01-09 DIAGNOSIS — R26.81 GAIT INSTABILITY: ICD-10-CM

## 2025-01-09 DIAGNOSIS — I70.0 AORTIC ATHEROSCLEROSIS: ICD-10-CM

## 2025-01-09 DIAGNOSIS — R56.9 SEIZURE: ICD-10-CM

## 2025-01-09 DIAGNOSIS — I63.89 OTHER CEREBRAL INFARCTION: ICD-10-CM

## 2025-01-09 PROCEDURE — 99999 PR PBB SHADOW E&M-EST. PATIENT-LVL II: CPT | Mod: PBBFAC,,, | Performed by: NURSE PRACTITIONER

## 2025-01-09 PROCEDURE — 99214 OFFICE O/P EST MOD 30 MIN: CPT | Mod: S$PBB,,, | Performed by: NURSE PRACTITIONER

## 2025-01-09 PROCEDURE — 99212 OFFICE O/P EST SF 10 MIN: CPT | Mod: PBBFAC | Performed by: NURSE PRACTITIONER

## 2025-01-09 RX ORDER — LEVETIRACETAM 500 MG/1
500 TABLET, EXTENDED RELEASE ORAL DAILY
Qty: 30 TABLET | Refills: 1 | Status: SHIPPED | OUTPATIENT
Start: 2025-01-09

## 2025-01-09 RX ORDER — ATORVASTATIN CALCIUM 80 MG/1
80 TABLET, FILM COATED ORAL DAILY
Qty: 90 TABLET | Refills: 1 | Status: SHIPPED | OUTPATIENT
Start: 2025-01-09 | End: 2026-01-09

## 2025-01-09 NOTE — PROGRESS NOTES
[facility] Note    Chief Complaint      Chief Complaint   Patient presents with    Follow-up       History of Present Illness      Denise Berrios is a 75 y.o. female with chronic conditions of htn, IBS, migraine who presents today for: hospital follow up for CVA with medication questions. Had a hospital follow up visit with Dr. Cardona on 1/3/25. Patient is feeling better, and  confirms patient is improving. She does have some occasional left sided weakness, but has felt better since being on the Keppra, does report just some mild fatigue since starting.     Reviewed hospital record.    History of Present Illness: Denise Berrios is a 74 y.o. 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 Symptoms began about 3 days ago. Patient states dysarthria has been fluctuating and LLE buckled yesterday. Patient was brought into ED today by  for evaluation. CT head completed in ED with redemonstration of multifocal regions of prior supratentorial infarct.  No compelling CT evidence of acute intracranial abnormality. MRI Small focus of cortical based restricted diffusion T2 weighted signal abnormality is noted in the parasagittal posterior right frontal lobe. Stroke team consulted.   Discharged on Keppra, statin Atorvastatin 80 mg, and Eliquis 5 mg BID.     Patient is planning to go see EP on 1/24, currently has a loop recorder.       Past Medical History:  Past Medical History:   Diagnosis Date    Acid reflux     Graves disease     diffuse Toxic Goiter    Hypertension     IBS (irritable bowel syndrome)     Lichen sclerosus     Migraine     Stroke        Past Surgical History:   has a past surgical history that includes Hysterectomy; Eye  surgery; Left heart catheterization (N/A, 10/20/2020); Reverse total shoulder arthroplasty (Right, 8/19/2022); Cerebral angiogram (N/A, 4/22/2024); and Insertion of implantable loop recorder (N/A, 10/23/2024).    Family History:  family history includes Hypertension in her father; Kidney failure in her father; Stomach cancer in her mother.     Social History:  Social History     Tobacco Use    Smoking status: Never     Passive exposure: Never    Smokeless tobacco: Never   Substance Use Topics    Alcohol use: Yes    Drug use: No       Review of Systems   Constitutional:  Negative for chills and fever.   Respiratory:  Negative for cough and shortness of breath.    Cardiovascular:  Negative for chest pain and palpitations.   Gastrointestinal:  Negative for constipation, diarrhea, nausea and vomiting.   Genitourinary:  Negative for dysuria and hematuria.   Musculoskeletal:  Negative for falls.   Neurological:  Positive for weakness (left side). Negative for headaches.        Medications:  Outpatient Encounter Medications as of 1/9/2025   Medication Sig Note Dispense Refill    apixaban (ELIQUIS) 5 mg Tab Take 1 tablet (5 mg total) by mouth 2 (two) times daily.  180 tablet 1    atorvastatin (LIPITOR) 80 MG tablet Take 1 tablet (80 mg total) by mouth once daily.  90 tablet 1    carvediloL (COREG) 12.5 MG tablet TAKE 1 TABLET TWICE A DAY  180 tablet 3    EScitalopram oxalate (LEXAPRO) 20 MG tablet TAKE 1 TABLET DAILY  90 tablet 3    levetiracetam XR (KEPPRA XR) 500 mg Tb24 24 hr tablet Take 1 tablet (500 mg total) by mouth once daily.  30 tablet 1    levothyroxine (SYNTHROID) 88 MCG tablet TAKE 1 TABLET BEFORE BREAKFAST  90 tablet 3    losartan (COZAAR) 50 MG tablet TAKE 1 TABLET DAILY  90 tablet 3    nortriptyline (PAMELOR) 10 MG capsule TAKE 1 CAPSULE DAILY  90 capsule 3    oxybutynin (DITROPAN-XL) 10 MG 24 hr tablet Take 1 tablet (10 mg total) by mouth once daily.  90 tablet 3    pregabalin (LYRICA) 75 MG capsule Take 1  capsule (75 mg total) by mouth 2 (two) times daily.  180 capsule 3    VITAMIN D2 1,250 mcg (50,000 unit) capsule TAKE 1 CAPSULE ONCE A WEEK  12 capsule 3    [DISCONTINUED] apixaban (ELIQUIS) 5 mg Tab Take 1 tablet (5 mg total) by mouth 2 (two) times daily.  180 tablet 1    [DISCONTINUED] atorvastatin (LIPITOR) 80 MG tablet Take 1 tablet (80 mg total) by mouth once daily.  90 tablet 1    [DISCONTINUED] levetiracetam XR (KEPPRA XR) 500 mg Tb24 24 hr tablet Take 500 mg by mouth once daily. 1/6/2025: 10 doses       No facility-administered encounter medications on file as of 1/9/2025.       Allergies:  Review of patient's allergies indicates:   Allergen Reactions    Phenobarbital Anaphylaxis    Penicillins Other (See Comments)    Phenobarbital sodium     Propylthiouracil     Topamax [topiramate] Diarrhea     GI symptoms    Cefaclor Rash and Other (See Comments)    Clopidogrel Hives and Rash    Methimazole Rash and Other (See Comments)    Methimazole (bulk) Rash    Penicillin Rash    Sulfa (sulfonamide antibiotics) Rash and Other (See Comments)       Health Maintenance:  Immunization History   Administered Date(s) Administered    COVID-19, MRNA, LN-S, PF (Pfizer) (Purple Cap) 01/10/2021, 01/31/2021    COVID-19, mRNA, LNP-S, PF, jhoan-sucrose, 30 mcg/0.3 mL (Pfizer Ages 12+) 10/30/2023, 09/25/2024    COVID-19, mRNA, LNP-S, bivalent booster, PF (PFIZER OMICRON) 10/25/2022    Influenza 10/21/2011, 09/17/2013    Influenza (FLUAD) - Quadrivalent - Adjuvanted - PF *Preferred* (65+) 10/05/2020, 10/29/2021, 09/29/2022, 10/05/2023    Influenza - Trivalent - Fluad - Adjuvanted - PF (65 years and older 09/17/2019    Influenza - Trivalent - Fluzone High Dose - PF (65 years and older) 12/12/2016, 10/05/2017, 09/25/2024    Influenza A (H1N1) 2009 Monovalent - IM 12/01/2009    Pneumococcal Conjugate - 13 Valent 10/05/2017    Pneumococcal Polysaccharide - 23 Valent 10/22/2018    Rsv, Bivalent, Rsvpref (Abrysvo) 11/14/2023    Tdap  08/28/2019, 06/01/2024    Zoster Recombinant 08/28/2019, 11/20/2019      Health Maintenance   Topic Date Due    DEXA Scan  08/28/2023    Lipid Panel  12/29/2029    TETANUS VACCINE  06/01/2034    Hepatitis C Screening  Completed    Shingles Vaccine  Completed    Influenza Vaccine  Completed    COVID-19 Vaccine  Completed    RSV Vaccine (Age 60+ and Pregnant patients)  Completed    Pneumococcal Vaccines (Age 50+)  Completed        Physical Exam       ]    Physical Exam  Constitutional:       Appearance: She is well-developed.   HENT:      Head: Normocephalic and atraumatic.   Cardiovascular:      Rate and Rhythm: Normal rate and regular rhythm.      Heart sounds: Normal heart sounds. No murmur heard.  Pulmonary:      Effort: Pulmonary effort is normal. No respiratory distress.      Breath sounds: Normal breath sounds.   Abdominal:      General: There is no distension.      Palpations: Abdomen is soft.      Tenderness: There is no abdominal tenderness. There is no guarding.   Skin:     General: Skin is warm and dry.   Neurological:      Mental Status: She is alert. Mental status is at baseline.      Motor: Weakness (left side) present.   Psychiatric:         Behavior: Behavior normal.          Laboratory:  CBC:  Recent Labs   Lab 12/29/24 2104 12/31/24  0401 01/01/25  1338   WBC 6.93 7.16 8   RBC 3.98 L 4.18 4.03 L   Hemoglobin 12.5 13.1 12.3   Hematocrit 38.6 39.1  --    Hct  --   --  38.5   Platelets 219 232 223   MCV 97 94 95.5   MCH 31.4 H 31.3 H 30.5   MCHC 32.4 33.5 31.9 L     CMP:  Recent Labs   Lab 12/02/24  1147 12/29/24 2104 12/31/24  0401 01/03/25  1051   Glucose 94 79 91 83   Calcium 9.9 9.5 9.9 9.7   Albumin 3.5 3.6 3.4 L  --    Total Protein 7.2 7.5 7.3  --    Sodium 144 144 142 142   Potassium 4.4 4.0 3.8 4.7   CO2 21 L 22 L 21 L 23   Chloride 111 H 109 111 H 109   BUN 16 22 19 25 H   Alkaline Phosphatase 75 85 81  --    ALT 14 23 16  --    AST 25 29 28  --    Total Bilirubin 0.6 0.3 0.4  --       URINALYSIS:  Recent Labs   Lab 05/04/23  1830 04/23/24  2044   Color, UA Yellow Yellow   Specific Gravity, UA 1.010 1.015   pH, UA 7.0 6.0   Protein, UA Negative Negative   Bacteria  --  Rare   Nitrite, UA Negative Negative   Leukocytes, UA 1+ A 3+ A   Urobilinogen, UA Negative  --       LIPIDS:  Recent Labs   Lab 05/10/24  0855 12/02/24  1147 12/29/24  2104   TSH 2.998 0.207 L 0.455   HDL 32 L 68 65   Cholesterol 135 196 197   Triglycerides 140 121 187 H   LDL Cholesterol 75.0 103.8 94.6   HDL/Cholesterol Ratio 23.7 34.7 33.0   Non-HDL Cholesterol 103 128 132   Total Cholesterol/HDL Ratio 4.2 2.9 3.0     TSH:  Recent Labs   Lab 05/10/24  0855 12/02/24  1147 12/29/24  2104   TSH 2.998 0.207 L 0.455     A1C:  Recent Labs   Lab 10/03/23  0905 04/10/24  1746 05/10/24  0855 12/02/24  1147   Hemoglobin A1C 5.4 5.6 5.6 5.1       Assessment/Plan     Denise Berrios is a 75 y.o.female with:    1. Ischemic cerebrovascular accident (CVA)  - apixaban (ELIQUIS) 5 mg Tab; Take 1 tablet (5 mg total) by mouth 2 (two) times daily.  Dispense: 180 tablet; Refill: 1  - counseled to continue follow up with specialists. Will refill medications to mail in pharmacy.   - discussed red flag signs and symptoms to return to ER     2. Seizure  - levetiracetam XR (KEPPRA XR) 500 mg Tb24 24 hr tablet; Take 1 tablet (500 mg total) by mouth once daily.  Dispense: 30 tablet; Refill: 1  - Pt did a Teleneurology consult to r/o focal seizure- recommended Keppra 500 mg daily for 10 days. ?   Will consult with Dr. Woods about continuing Keppra, and ordering brain MRI and EEG prior to seeing Neuro.     3. Flaccid hemiplegia affecting left nondominant side, unspecified etiology  Improving. Continue PT/OT     4. Aortic atherosclerosis  Counseled on significance. Continue statin     5. Mixed hyperlipidemia  - atorvastatin (LIPITOR) 80 MG tablet; Take 1 tablet (80 mg total) by mouth once daily.  Dispense: 90 tablet; Refill: 1    6. Stage 3b chronic kidney  disease  - BASIC METABOLIC PANEL; Future  - will monitor labs   - continue hydration.     7. Convulsions, unspecified convulsion type  - MRI Brain Without Contrast; Future    8. Other cerebral infarction  - MRI Brain Without Contrast; Future  - order prior to Neuro appt.     9. Gait instability  Discussed fall precautions. Continue Pt/Ot outpatient.     Chronic conditions status updated as per HPI.  Other than changes above, cont current medications and maintain follow up with specialists.  No follow-ups on file.    Future Appointments   Date Time Provider Department Center   1/22/2025  8:30 AM Cindy Woods MD [facility] [id number] [facility]   1/24/2025  8:30 AM EKG, APPT [facility] EKG [facility]   1/24/2025  9:30 AM Justina Griffin NP [facility] ARRHYTH [facility]   6/3/2025 10:00 AM Aleksandra Carey MD [facility] PRICRE [place] Adrienne Cotaya, FNP [facility]

## 2025-01-15 ENCOUNTER — HOSPITAL ENCOUNTER (OUTPATIENT)
Dept: RADIOLOGY | Facility: HOSPITAL | Age: 75
Discharge: HOME | End: 2025-01-15
Attending: NURSE PRACTITIONER
Payer: MEDICARE

## 2025-01-15 DIAGNOSIS — I63.89 OTHER CEREBRAL INFARCTION: ICD-10-CM

## 2025-01-15 DIAGNOSIS — R56.9 CONVULSIONS, UNSPECIFIED CONVULSION TYPE: ICD-10-CM

## 2025-01-15 PROCEDURE — 70551 MRI BRAIN STEM W/O DYE: CPT | Mod: 26,,, | Performed by: RADIOLOGY

## 2025-01-15 PROCEDURE — 70551 MRI BRAIN STEM W/O DYE: CPT | Mod: TC

## 2025-01-16 DIAGNOSIS — N18.9 ACUTE ON CHRONIC RENAL INSUFFICIENCY: ICD-10-CM

## 2025-01-16 DIAGNOSIS — N28.9 ACUTE ON CHRONIC RENAL INSUFFICIENCY: ICD-10-CM

## 2025-01-16 DIAGNOSIS — N18.32 STAGE 3B CHRONIC KIDNEY DISEASE: Primary | ICD-10-CM

## 2025-01-17 ENCOUNTER — TELEPHONE (OUTPATIENT)
Dept: NEUROLOGY | Facility: CLINIC | Age: 75
End: 2025-01-17
Payer: MEDICARE

## 2025-01-30 NOTE — PROGRESS NOTES
Patient came into office today for Flu vaccine. Patient received flu shot to left arm tolerated well. VIS given to patient      VASCULAR SURGERY BRIEF UPDATE NOTE    Patient seen and examined.  Aox3, NAD. Denies CP or SOB. Denies numbness/weakness/tingling in all extremities. No CN deficit. Strength 5/5 in all extremity. There is L neck ecchymosis and soft tissue swelling, no hematoma.     He complains of mild dull headache ipsilateral to CEA side. BP well controlled. Please maintain SBP < 130 and monitor closely.    Shaji Larson MD  Vascular Surgery  [facility]

## 2025-02-04 NOTE — PROGRESS NOTES
Vascular Neurology Clinic  Hospital Follow-up Clinic Visit    Patient Name: Denise Berrios  MRN: [id number]  Date: 2/5/25  Referring Provider: Dr. Juan Curry    CC: Ischemic stroke    SUBJECTIVE:      History of Present Illness: Denise Berrios is a right-handed 75 y.o. female with a past medical history significant for HTN, HLD, migraine headache with aura, Graves disease, prior embolic strokes, traumatic subdural hemorrhage who presents to clinic for follow-up for a recent admission for stroke.     Admitted to [facility] 12/29-12/31/2024 after presenting with worsening left leg weakness and slurred speech x 3 days.     CT head completed in ED with redemonstration of multifocal regions of prior supratentorial infarct without evidence of acute infarction. MRI brain showed a small restricted diffusion in the parasaggital posterior right frontal lobe.      Etiology embolic stroke of undetermined source. She was previously on aspirin monotherapy prior to this admission. Given her history of multifocal, repeated embolic-appearing strokes, she was started on apixaban for secondary stroke prevention. TTE showed ED 55%, grade II diastolic dysfunction, and mild left atrial enlargement. She has a ILR, which has not revealed atrial arrhythmias thus far.    Otherwise, no new or worsening focal neurologic symptoms since she was admitted. She denies recent unexplained weight loss. No known personal or family history of blood clots or abnormal bleeding. Reports some decreased appetite Denies night sweats, chills.     She is on LEV 500mg. Per chart review, she was placed on LEV temporarily for a traumatic left SDH on 06/2024. She was placed back on LEV after recurrent left-sided weakness in 01/2025. EEG was recommended at that time but not performed. She denies KARUNA/LOC events. She does not drive. She denies episodes of abnormal movements. Her left leg will shake when she stands up for a brief period of time, but this  resolves after a few minutes.       Work-up:    Imaging:  - CTA head and neck (01/01/2025):  1. No cervical stenosis, thrombosis, aneurysm, or dissection.   2. No intracranial thrombosis, aneurysm, or dissection.   3. Atherosclerotic changes diffusely involving the proximal left middle   cerebral artery, with soft plaque resulting in multiple short-segment   stenoses up to 40%.     - MRA head and neck (12/30/2024):   ---- MRA head: Irregularity and question high-grade stenosis in the right mid P2 PCA.  In addition there is irregularity and abrupt termination of the left PCA at the P1 P2 junction concerning for high-grade stenosis versus occlusion.  Overall likely chronic without infarction seen on same day MRI. Otherwise unremarkable MRA head.  ---- MRA neck: No significant arterial stenosis throughout the neck.    - MRI brain without contrast (12/30/2024): Small focus of cortical based restricted diffusion T2 weighted signal abnormality is noted in the parasagittal posterior right frontal lobe.  Finding would be in keeping with small focus of acute to early subacute ischemia.  Focal encephalitis would be considered less likely unless clinically suspected. Multifocal remote post ischemic sequelae again demonstrated.    - MRI brain without contrast (01/15/2025): Evolving subcentimeter area of signal abnormality right parasagittal frontal subcortical white matter concerning for subacute age infarction.  Interval petechial hemorrhage in this region.  No significant mass effect or midline shift. No evidence for acute infarction with scattered areas of encephalomalacia compatible with prior infarcts bilateral cerebral hemispheres with additional scattered remote lacunar type infarcts bilateral thalami and cerebellar hemispheres. Clinical correlation and follow-up advised.     Cardiac Evaluation:  - TTE (12/30/2024):    Left Ventricle: The left ventricle is normal in size. Ventricular mass is normal. Normal wall  thickness. There is concentric remodeling. Normal wall motion. There is normal systolic function. Ejection fraction is approximately 55%. Grade II diastolic dysfunction.    Right Ventricle: Normal right ventricular cavity size. Wall thickness is normal. Systolic function is normal.    Left Atrium: Left atrium is mildly dilated.    IVC/SVC: Normal venous pressure at 3 mmHg.    - Cardiac monitor: loop recorder in place    Labs:  Recent Labs   Lab 12/02/24  1147 12/29/24  2104   Hemoglobin A1C 5.1  --    LDL Cholesterol 103.8 94.6   HDL 68 65   Triglycerides 121 187 H   Cholesterol 196 197       Review of Systems: The following systems were reviewed with pertinent positives and negatives documented in the HPI: constitutional, eyes, CV, respiratory, GI, , musculoskeletal, skin, neurological, psychiatric    Past Medical History:  Past Medical History:   Diagnosis Date    Acid reflux     Graves disease     diffuse Toxic Goiter    Hypertension     IBS (irritable bowel syndrome)     Lichen sclerosus     Migraine     Stroke        Medications:    Current Outpatient Medications:     apixaban (ELIQUIS) 5 mg Tab, Take 1 tablet (5 mg total) by mouth 2 (two) times daily., Disp: 180 tablet, Rfl: 1    atorvastatin (LIPITOR) 80 MG tablet, Take 1 tablet (80 mg total) by mouth once daily., Disp: 90 tablet, Rfl: 1    carvediloL (COREG) 12.5 MG tablet, TAKE 1 TABLET TWICE A DAY, Disp: 180 tablet, Rfl: 3    EScitalopram oxalate (LEXAPRO) 20 MG tablet, TAKE 1 TABLET DAILY, Disp: 90 tablet, Rfl: 3    levetiracetam XR (KEPPRA XR) 500 mg Tb24 24 hr tablet, Take 1 tablet (500 mg total) by mouth once daily., Disp: 30 tablet, Rfl: 1    levothyroxine (SYNTHROID) 88 MCG tablet, TAKE 1 TABLET BEFORE BREAKFAST, Disp: 90 tablet, Rfl: 3    losartan (COZAAR) 50 MG tablet, TAKE 1 TABLET DAILY, Disp: 90 tablet, Rfl: 3    nortriptyline (PAMELOR) 10 MG capsule, TAKE 1 CAPSULE DAILY, Disp: 90 capsule, Rfl: 3    oxybutynin (DITROPAN-XL) 10 MG 24 hr  tablet, Take 1 tablet (10 mg total) by mouth once daily., Disp: 90 tablet, Rfl: 3    pregabalin (LYRICA) 75 MG capsule, Take 1 capsule (75 mg total) by mouth 2 (two) times daily., Disp: 180 capsule, Rfl: 3    VITAMIN D2 1,250 mcg (50,000 unit) capsule, TAKE 1 CAPSULE ONCE A WEEK, Disp: 12 capsule, Rfl: 3  Any other notable medications as documented in HPI.    Allergies:  Review of patient's allergies indicates:   Allergen Reactions    Phenobarbital Anaphylaxis    Penicillins Other (See Comments)    Phenobarbital sodium     Propylthiouracil     Topamax [topiramate] Diarrhea     GI symptoms    Cefaclor Rash and Other (See Comments)    Clopidogrel Hives and Rash    Methimazole Rash and Other (See Comments)    Methimazole (bulk) Rash    Penicillin Rash    Sulfa (sulfonamide antibiotics) Rash and Other (See Comments)       Social History:  Social History     Socioeconomic History    Marital status:    Tobacco Use    Smoking status: Never     Passive exposure: Never    Smokeless tobacco: Never   Substance and Sexual Activity    Alcohol use: Yes    Drug use: No    Sexual activity: Yes     Partners: Male     Birth control/protection: See Surgical Hx     Social Drivers of Health     Financial Resource Strain: High Risk (12/30/2024)    Overall Financial Resource Strain (CARDIA)     Difficulty of Paying Living Expenses: Hard   Food Insecurity: No Food Insecurity (12/30/2024)    Hunger Vital Sign     Worried About Running Out of Food in the Last Year: Never true     Ran Out of Food in the Last Year: Never true   Transportation Needs: No Transportation Needs (12/30/2024)    TRANSPORTATION NEEDS     Transportation : No   Physical Activity: Inactive (12/30/2024)    Exercise Vital Sign     Days of Exercise per Week: 0 days     Minutes of Exercise per Session: 0 min   Stress: No Stress Concern Present (12/30/2024)    [employer] - Occupational Stress Questionnaire     Feeling of Stress : Only a  little   Housing Stability: Low Risk  (12/30/2024)    Housing Stability Vital Sign     Unable to Pay for Housing in the Last Year: No     Homeless in the Last Year: No     Any other notable Social History as documented in HPI.    Family History:  Family History   Problem Relation Name Age of Onset    Kidney failure Father      Hypertension Father      Stomach cancer Mother      Breast cancer Neg Hx       Any other notable FMH as documented in HPI.      OBJECTIVE:     Physical Exam:   Vitals:    02/05/25 0901   BP: (!) 123/57   Pulse: 80     GEN: NAD, pleasant, cooperative  CV: RRR  PULM: No signs of resp distress, on room air  EXT: No cyanosis, clubbing, or edema.    NEURO:  Mental status: The patient is alert, attentive, and oriented to self, age, month, year  Speech: Fluent speech with occasional word-finding difficulties, some intermittent dysphonia with intact repetition, comprehension, and naming.     Cranial nerves:  CN II: Visual fields are full to confrontation. Pupils are 3mm and reactive to light OU.  CN III, IV, VI: EOMI, no nystagmus, no ptosis  CN V: Facial sensation is intact in all 3 divisions bilaterally.  CN VII: Face is symmetric with normal eye closure and smile.  CN VIII: Hearing is normal bilaterally.  CN IX, X: Palate elevates symmetrically.   CN XI: Head turning and shoulder shrug are intact.  CN XII: Tongue is midline with normal movements and no atrophy.    Motor: Muscle bulk and tone are normal. No pronator drift.   Right Left   Right  Left   Deltoid 5/5 5/5  Hip Flexion 5/5 4+/5   Biceps 5/5 5/5  Hip Extension 5/5 5/5   Triceps 5/5 5/5  Knee Flexion 5/5 5/5   Wrist Ext 5/5 5/5  Knee Extension 5/5 4+/5   Finger Abd 5/5 5/5  Ankle dorsiflex 5/5 5/5     Reflexes: Reflexes are 2+ and symmetric at the biceps, triceps, knees, and ankles.     Sensory: Light touch, temperature are intact in their bilateral upper and lower extremities.     Coordination: Rapid alternating movements and fine finger  movements are intact. There is no dysmetria on finger-to-nose and heel-knee-shin. There are no abnormal or extraneous movements.    Gait/Stance: Posture is normal. Mildly unsteady gait.       ASSESSMENT/PLAN:     Diagnosis/Etiology:  # History of stroke - embolic strokes of undetermined source as well as infarcts due to small vessel disease. No embolic source has been identified do far, and she has a loop recorder in place. Other vascular risk factors appear well managed. She was placed on a DOAC for secondary stroke prevention after her admission in 12/2024. Plan for additional work-up (including CT A/P) and aggressive vascular risk factor management as detailed below.   - Right STEPHANI territory, 12/2024  - Right BG/insula, 04/2024  - Left corona radiata/parietal lobe, 04/2024  - Left occipital lobe/distal MCA territory, 2021; incidental remote right occipital lobe infarct (seen on MRI in 2021)    # History of traumatic subdural hemorrhage  - After mechanical fall. No e/o SDH on subsequent scans.     # Episode of transient neurologic symptoms  - Seen in the ED for an episode of left-sided weakness 01/2025. This was in the setting of recent ischemic infarction in 12/2024. She was placed on LEV for this with recommendations for follow-up EEG. Overall suspect recrudescence of prior stroke symptoms/persistent mild weakness 2/2 recent stroke. Plan to follow-up EEG. Will consider discontinuing LEV pending these results.     Stroke Risk Factors: HTN, HLD    Recommendations:   - Additional studies: EEG, CT A/P w/ and w/o contrast, continued follow-up with cardiology with ILR interrogation.   - Antiplatelet/Anticoagulation: Continue apixaban 5mg q12 hours. Counseled on medication compliance.   - Lipid Management: Target is LDL < 70mg/dL. Continue atorvastatin 80mg daily.   - Diabetes: Target hemoglobin A1c <7%, measured 2X/year or quarterly if not meeting goals  - Hypertension: Target systolic BP <130mmHg. At goal today.  Continue home BP medications.   - Smoking: Non-smoker  - Therapy: Referrals to PT and ST placed.   - Follow-up: RTC in 6 months. Recommend close follow-up with their PCP for monitoring and management of the above vascular risk factors as needed to meet goals.     Problem List Items Addressed This Visit    None  Visit Diagnoses         Episode of transient neurologic symptoms    -  Primary    Relevant Orders    EEG,w/awake & asleep record    Ambulatory Referral/Consult to Speech Therapy    Ambulatory Referral/Consult to Physical/Occupational Therapy      Stroke        Relevant Orders    Ambulatory Referral/Consult to Speech Therapy    Ambulatory Referral/Consult to Physical/Occupational Therapy    CT Abdomen Pelvis W Wo Contrast          40 minutes of total time spent on the encounter, which includes face to face time and non-face to face time preparing to see the patient (eg, review of tests), obtaining and/or reviewing separately obtained history, documenting clinical information in the electronic or other health record, independently interpreting results (not separately reported), and communicating results to the patient/family/caregiver, patient/family education, and care coordination (not separately reported).     Cindy Woods MD, PhD  [facility]  Division of Vascular Neurology

## 2025-02-05 ENCOUNTER — CLINICAL SUPPORT (OUTPATIENT)
Dept: CARDIOLOGY | Facility: HOSPITAL | Age: 75
End: 2025-02-05
Payer: MEDICARE

## 2025-02-05 ENCOUNTER — CLINICAL SUPPORT (OUTPATIENT)
Dept: CARDIOLOGY | Facility: HOSPITAL | Age: 75
End: 2025-02-05
Attending: STUDENT IN AN ORGANIZED HEALTH CARE EDUCATION/TRAINING PROGRAM
Payer: MEDICARE

## 2025-02-05 ENCOUNTER — OFFICE VISIT (OUTPATIENT)
Dept: NEUROLOGY | Facility: CLINIC | Age: 75
End: 2025-02-05
Payer: MEDICARE

## 2025-02-05 VITALS
DIASTOLIC BLOOD PRESSURE: 57 MMHG | HEIGHT: 60 IN | BODY MASS INDEX: 27.14 KG/M2 | HEART RATE: 80 BPM | WEIGHT: 138.25 LBS | SYSTOLIC BLOOD PRESSURE: 123 MMHG

## 2025-02-05 DIAGNOSIS — I49.8 OTHER SPECIFIED CARDIAC ARRHYTHMIAS: ICD-10-CM

## 2025-02-05 DIAGNOSIS — R29.90 EPISODE OF TRANSIENT NEUROLOGIC SYMPTOMS: Primary | ICD-10-CM

## 2025-02-05 DIAGNOSIS — I63.9 STROKE: ICD-10-CM

## 2025-02-05 PROCEDURE — 99215 OFFICE O/P EST HI 40 MIN: CPT | Mod: S$PBB,,, | Performed by: STUDENT IN AN ORGANIZED HEALTH CARE EDUCATION/TRAINING PROGRAM

## 2025-02-05 PROCEDURE — 99999 PR PBB SHADOW E&M-EST. PATIENT-LVL IV: CPT | Mod: PBBFAC,,, | Performed by: STUDENT IN AN ORGANIZED HEALTH CARE EDUCATION/TRAINING PROGRAM

## 2025-02-05 PROCEDURE — 99214 OFFICE O/P EST MOD 30 MIN: CPT | Mod: PBBFAC,25 | Performed by: STUDENT IN AN ORGANIZED HEALTH CARE EDUCATION/TRAINING PROGRAM

## 2025-02-05 PROCEDURE — 93298 REM INTERROG DEV EVAL SCRMS: CPT | Performed by: STUDENT IN AN ORGANIZED HEALTH CARE EDUCATION/TRAINING PROGRAM

## 2025-02-05 PROCEDURE — 93298 REM INTERROG DEV EVAL SCRMS: CPT | Mod: 26,,, | Performed by: STUDENT IN AN ORGANIZED HEALTH CARE EDUCATION/TRAINING PROGRAM

## 2025-02-12 NOTE — PROGRESS NOTES
Ms. Berrios is a patient of Dr. Nicolas and was last seen in clinic 9/16/2024.      Subjective:   Patient ID:  Denise Berrios is a 75 y.o. female who presents for follow up of Loop recorder  .     HPI:    Ms. Berrios is a 75 y.o. female with CVA hx, SDH, HTN, HLD, Graves dz with hypothyroidism, GERD, IBS, migraines, anxiety, osteoporosis, OA, aortic atherosclerosis here for follow up.     Background:    Ms. Denise Berrios has a past medical history significant for prior cryptogenic strokes with a left occipital infarct in 2021 and a corona radiata, left parietal lobe cortex, and left insular cortex infarcts on 4/10/2024, and a right middle cerebral artery infarct on 4/20/2024, a subsequent fall with a small subdural hematoma on 6/1/2024, hypertension, hyperlipidemia, Graves disease with hypothyroidism, GERD, irritable bowel syndrome - constipation predominate, migraines, anxiety, osteoporosis, osteoarthritis, a right shoulder total arthroplasty, aortic atherosclerosis, and overweight BMI.     She is followed in Neurology with Dr. Morales and was recently seen in clinic on 6/13/2024. In review of her most recent strokes in April of 2024, her stroke etiology was felt to be most consistent with an embolic stroke of undetermined source (ESUS). She initially suffered a left MCA stroke on 4/10/2024, and represented on 4/22/2024 with  acute onset of left-sided weakness, neglect, and aphasia. HPI information gathered from the patient's  due to the patient having aphasia. She had been prescribed DAPT with aspirin and clopidogrel immediately following her initial stroke on 4/10/2024; however, her  states that she had a reaction to clopidogrel and was modified to ticagrelor per Dr. Morales on 4/18/2024 with the plan to continue ticagrelor/ASA for 3 weeks. She had not yet obtained this prescription prior to suffering her second stroke, a right MCA embolic stroke requiring thrombectomy. She underwent a  "comprehensive cardiac evaluation including an unremarkable resting echocardiogram revealing preserved systolic function, LVEF 60-65%, with no overt evidence of a PFO or ASD. She has no personal history of atrial fibrillation, and remained in sinus rhythm on all ECGs and telemetry while admitted. She has completed a 30-day ambulatory event monitor that revealed no evidence of any atrial or ventricular arrhythmias. She has continued to participate with PT/OT/speech therapy, and reports continued word searching and resolving speech deficits. She subsequently sustained a mechanical fall on 6/1/2024 in which she suffered right-sided facial trauma requiring stitches to her right forehead. She tells me that this fall occurred when she got up from bed to go to bathroom in the middle of the night, and "must have tripped". A head CT at that time showed a small thin subdural hemorrhage along the left tentorium and posterior inter hemispheric fissure region. Her ticagrelor was discontinued, with maintenance on aspirin monotherapy. She has not suffered any subsequent falls, and continues to use her rolling walker for ambulation.    In discussion with Ms. Berrios and her  today, she tells me that she is feeling overall "pretty good". She denies any episodes of dizziness, lightheadedness, syncope/presyncope, palpitations, chest pain or chest discomfort, nausea or vomiting, orthopnea, or PND. She reports baseline mild shortness of breath and dyspnea with exertion that he she feels has remained stable. She has not suffered any subsequent falls, and her  reports that her speech and mobility have improved following her continued rehabilitation after her recent stroke. She cannot yet climb stairs, and continues to use her rolling walker.  Ms. Denise Berrios is a geovanny 74-year-old woman who presents today for evaluation and recommendations with possible consideration for implantation of an implantable loop recorder in " the setting of cryptogenic strokes. She has a past medical history significant for prior cryptogenic strokes with a left occipital infarct in 2021 and a corona radiata, left parietal lobe cortex, and left insular cortex infarcts on 4/10/2024, and a right middle cerebral artery infarct on 4/20/2024, a subsequent fall with a small subdural hematoma on 6/1/2024, hypertension, hyperlipidemia, Graves disease with hypothyroidism, GERD, irritable bowel syndrome - constipation predominate, migraines, anxiety, osteoporosis, osteoarthritis, a right shoulder total arthroplasty, aortic atherosclerosis, and overweight BMI.     - We discussed the fact that some strokes may be secondary to an underlying arrhythmic etiology, specifically atrial fibrillation. While she has had no evidence of atrial fibrillation on telemetry while she was admitted, nor on serial ECGs, this does not definitively rule out brief paroxysms of atrial fibrillation.   - We reviewed the results of her recent resting echocardiogram revealing preserved systolic function, LVEF 60-65%, with no overt evidence of a PFO or ASD. She has no personal history of atrial fibrillation. We reviewed the results of her recent 30-day ambulatory event monitor that revealed no evidence of any atrial or ventricular arrhythmias.   - Based on the most recent AHA/ACC/HRS atrial fibrillation guidelines, implantation of an implantable loop recorder or other device detection method of atrial fibrillation is recommended in patients with cryptogenic stroke (Class IIa, GOMEZ B-R recommendation). Furthermore, based on the CRYSTAL-AF study, short and intermediate-term cardiac monitoring may miss patients with paroxysmal atrial fibrillation (In this study, 88% of patients who had atrial fibrillation would have been missed if only monitored with a 30-day ambulatory monitor.)  - We discussed the indications for implantation of an implantable loop recorder, in addition to a description of the  procedure, relative risks and benefits, and proposed follow-up after implantation, including setting up a remote monitor at home and returning for in-office interrogations. She voices understanding is would be amenable to undergoing this procedure. Informed consent was obtained.  - Should she evidence paroxysmal atrial fibrillation, her [id number] would be elevated at 6 (HTN, prior CVA, female gender, age greater than 75), portending an annual adjusted risk of CVA of 9.8%. We would advise initiation of oral anticoagulation should the diagnosis of pAF be determined. She is presently maintained on aspirin monotherapy. Of note, she has previously been at an increased risk of falls and had a small subdural hematoma sustained from a fall in 6/2024. In the event she requires oral anticoagulation and her risk of falls persists, we may need to consider implantation of a left atrial appendage occlusion device with a WATCHMAN. We briefly discussed this possibility.    - She will continue to follow with her PCP and her neurology team.       This patient will present to the EP laboratory to undergo implantation of an ILR for further assessment of her cryptogenic CVAs.      Update (02/13/2025):    10/23/2024: Successful implantation of a [employer] implantable loop recorder (ILR).     12/29/2024: ED with developing increased weakness in the LLE. Symptoms began about 3 days ago. Patient states dysarthria has been fluctuating and LLE buckled yesterday. Patient was brought into ED today by  for evaluation. CT head completed in ED with redemonstration of multifocal regions of prior supratentorial infarct.  No compelling CT evidence of acute intracranial abnormality. MRI Small focus of cortical based restricted diffusion T2 weighted signal abnormality is noted in the parasagittal posterior right frontal lobe. Stroke team consulted.   Multiple AIS history - L distal MCA temporal/occipital 2021, L CR 4/2024, R BG/insula  MCA occlusion 4/2024  Now with new R STEPHANI territory infarct w/o any clear ICAD   Has had thorough evaluation, ILR in place since October - given time stretch, occult malignancy unlikely - did have CTA chest 5/2024 - may consider CT C/A/P  Discussed with patient, given the multifocal, repeated nature of cryptogenic embolic events, we will be escalating to apixaban / DOAC for secondary stroke prevention      1/1/2025: [place]: presenting to the ED via EMS with a recurrent episode of left-sided weakness that began around 1030 CDT and had resolved by the time of arrival to the ED. Patient and spouse report at least three stereotyped episodes of weakness limited an hour or two with no confusion or alteration in awaress. Patient was just evaluated for one of these events at OSH and underwent MRI/A on 12/30 remarkable for bilateral PCA steno-occlusive disease and possible focus of acute to subacute stroke in the right parasagittal posterior frontal lobe. TTE completed during recent admission raised concern for significant diastolic dysfunction, and patient's spouse reports that she was started on empiric AC therapy with Eliquis on 12/31. Review of TTE from [facility] (12/30) remarkable for LVEF 55% with grade II DD and dilated LA.     No AF identified on loop to date    Today she says overall she is doing much better. Completed PT/OT/ST. Some residual slowed speech. Occ positional LH but no presyncope or syncope. No falls. No CP, BARCLAY. Does feel irreg HB at times.  She is now on eliquis 5mg BID. On carvediol 12.5mg BID. Feels much better after BP meds reduced.  TSH wnl    I have personally reviewed the patient's EKG today, which shows sinus rhythm with PVCs at 82bpm. ND interval is 202. QRS is 80. QTc is 481.    Relevant Cardiac Test Results:    2D Echo (12/30/2024):    Left Ventricle: The left ventricle is normal in size. Ventricular mass is normal. Normal wall thickness. There is concentric remodeling. Normal wall  motion. There is normal systolic function. Ejection fraction is approximately 55%. Grade II diastolic dysfunction.    Right Ventricle: Normal right ventricular cavity size. Wall thickness is normal. Systolic function is normal.    Left Atrium: Left atrium is mildly dilated.    IVC/SVC: Normal venous pressure at 3 mmHg.    Current Outpatient Medications   Medication Sig    apixaban (ELIQUIS) 5 mg Tab Take 1 tablet (5 mg total) by mouth 2 (two) times daily.    atorvastatin (LIPITOR) 80 MG tablet Take 1 tablet (80 mg total) by mouth once daily.    carvediloL (COREG) 12.5 MG tablet TAKE 1 TABLET TWICE A DAY    EScitalopram oxalate (LEXAPRO) 20 MG tablet TAKE 1 TABLET DAILY    levetiracetam XR (KEPPRA XR) 500 mg Tb24 24 hr tablet Take 1 tablet (500 mg total) by mouth once daily.    levothyroxine (SYNTHROID) 88 MCG tablet TAKE 1 TABLET BEFORE BREAKFAST    losartan (COZAAR) 50 MG tablet TAKE 1 TABLET DAILY    nortriptyline (PAMELOR) 10 MG capsule TAKE 1 CAPSULE DAILY    oxybutynin (DITROPAN-XL) 10 MG 24 hr tablet Take 1 tablet (10 mg total) by mouth once daily.    pregabalin (LYRICA) 75 MG capsule Take 1 capsule (75 mg total) by mouth 2 (two) times daily.    VITAMIN D2 1,250 mcg (50,000 unit) capsule TAKE 1 CAPSULE ONCE A WEEK     No current facility-administered medications for this visit.       Review of Systems   Constitutional: Negative for malaise/fatigue.   Cardiovascular:  Positive for irregular heartbeat. Negative for chest pain, dyspnea on exertion, leg swelling and palpitations.   Respiratory:  Negative for shortness of breath.    Hematologic/Lymphatic: Negative for bleeding problem.   Skin:  Negative for rash.   Musculoskeletal:  Negative for myalgias.   Gastrointestinal:  Negative for hematemesis, hematochezia and nausea.   Genitourinary:  Negative for hematuria.   Neurological:  Positive for light-headedness (positional).   Psychiatric/Behavioral:  Negative for altered mental status.    Allergic/Immunologic:  Negative for persistent infections.       Objective:          /60   Pulse 82   Ht 5' (1.524 m)   Wt 64.5 kg (142 lb 3.2 oz)   BMI 27.77 kg/m²     Physical Exam  Vitals and nursing note reviewed.   Constitutional:       Appearance: Normal appearance. She is well-developed.   HENT:      Head: Normocephalic.      Nose: Nose normal.   Eyes:      Pupils: Pupils are equal, round, and reactive to light.   Cardiovascular:      Rate and Rhythm: Normal rate and regular rhythm. Occasional Extrasystoles are present.  Pulmonary:      Effort: No respiratory distress.   Musculoskeletal:         General: Normal range of motion.   Skin:     General: Skin is warm and dry.      Findings: No erythema.   Neurological:      Mental Status: She is alert and oriented to person, place, and time.   Psychiatric:         Speech: Speech normal.         Behavior: Behavior normal.           Lab Results   Component Value Date     01/15/2025    K 4.6 01/15/2025    MG 1.6 12/31/2024    BUN 11 01/15/2025    CREATININE 1.3 01/15/2025    ALT 16 12/31/2024    AST 28 12/31/2024    HGB 12.3 01/01/2025    HGB 13.1 12/31/2024    HCT 38.5 01/01/2025    HCT 39.1 12/31/2024    HCT 35 (L) 04/22/2024    TSH 0.455 12/29/2024    LDLCALC 94.6 12/29/2024    LDLCALC 99 03/28/2018       Recent Labs   Lab 06/01/24  0902 12/29/24  2104 12/31/24  0401 01/01/25  1338   INR 1.0 1.0 1.1 1.44 H       Assessment:     1. Status post placement of implantable loop recorder    2. Cryptogenic stroke    3. Primary hypertension    4. Long term current use of anticoagulant      Plan:     In summary, Ms. Berrios is a 75 y.o. female with CVA hx, SDH, HTN, HLD, Graves dz with hypothyroidism, GERD, IBS, migraines, anxiety, osteoporosis, OA, aortic atherosclerosis here for follow up.   Pt 3.5 mo s/p ILR implantation to monitor for occult atrial fibrillation given hx of CVA. No arrhythmias on loop to date (last report 2/12/2025). Unfortunately last month she was hospitalized  with CVA and eliquis was started by neurology.  PVCs on rhythm strip are new - mild symptoms. Will defer increasing carvedilol given her recent hypotension and normal LVEF on echo 12/2024. We reviewed process of monitoring with monthly reports. Home monitor functioning.     Continue current meds  Continue monthly loop monitoring  RTC 6 mo, sooner if needed    *A copy of this note has been sent to Dr. Nicolas*    Follow up in about 6 months (around 8/13/2025).      ------------------------------------------------------------------    BARBI Tijerina, NP-C  Cardiac Electrophysiology

## 2025-02-13 ENCOUNTER — OFFICE VISIT (OUTPATIENT)
Dept: ELECTROPHYSIOLOGY | Facility: CLINIC | Age: 75
End: 2025-02-13
Payer: MEDICARE

## 2025-02-13 ENCOUNTER — HOSPITAL ENCOUNTER (OUTPATIENT)
Dept: CARDIOLOGY | Facility: CLINIC | Age: 75
Discharge: HOME | End: 2025-02-13
Payer: MEDICARE

## 2025-02-13 VITALS
WEIGHT: 142.19 LBS | HEIGHT: 60 IN | BODY MASS INDEX: 27.92 KG/M2 | HEART RATE: 82 BPM | SYSTOLIC BLOOD PRESSURE: 122 MMHG | DIASTOLIC BLOOD PRESSURE: 60 MMHG

## 2025-02-13 DIAGNOSIS — Z79.01 LONG TERM CURRENT USE OF ANTICOAGULANT: ICD-10-CM

## 2025-02-13 DIAGNOSIS — I63.9 CRYPTOGENIC STROKE: ICD-10-CM

## 2025-02-13 DIAGNOSIS — I10 PRIMARY HYPERTENSION: ICD-10-CM

## 2025-02-13 DIAGNOSIS — Z95.818 STATUS POST PLACEMENT OF IMPLANTABLE LOOP RECORDER: Primary | ICD-10-CM

## 2025-02-13 LAB
OHS CV AF BURDEN PERCENT: < 1
OHS CV DC REMOTE DEVICE TYPE: NORMAL
OHS QRS DURATION: 80 MS
OHS QTC CALCULATION: 481 MS

## 2025-02-13 PROCEDURE — 99213 OFFICE O/P EST LOW 20 MIN: CPT | Mod: PBBFAC,25 | Performed by: NURSE PRACTITIONER

## 2025-02-13 PROCEDURE — 93005 ELECTROCARDIOGRAM TRACING: CPT | Mod: PBBFAC | Performed by: INTERNAL MEDICINE

## 2025-02-13 PROCEDURE — 99999 PR PBB SHADOW E&M-EST. PATIENT-LVL III: CPT | Mod: PBBFAC,,, | Performed by: NURSE PRACTITIONER

## 2025-02-21 DIAGNOSIS — Z00.00 ENCOUNTER FOR MEDICARE ANNUAL WELLNESS EXAM: ICD-10-CM

## 2025-03-12 ENCOUNTER — CLINICAL SUPPORT (OUTPATIENT)
Dept: CARDIOLOGY | Facility: HOSPITAL | Age: 75
End: 2025-03-12
Attending: STUDENT IN AN ORGANIZED HEALTH CARE EDUCATION/TRAINING PROGRAM
Payer: MEDICARE

## 2025-03-12 ENCOUNTER — CLINICAL SUPPORT (OUTPATIENT)
Dept: CARDIOLOGY | Facility: HOSPITAL | Age: 75
End: 2025-03-12
Payer: MEDICARE

## 2025-03-12 DIAGNOSIS — I49.8 OTHER SPECIFIED CARDIAC ARRHYTHMIAS: ICD-10-CM

## 2025-03-12 PROCEDURE — 93298 REM INTERROG DEV EVAL SCRMS: CPT | Mod: 26,,, | Performed by: STUDENT IN AN ORGANIZED HEALTH CARE EDUCATION/TRAINING PROGRAM

## 2025-03-17 LAB
OHS CV AF BURDEN PERCENT: < 1
OHS CV DC REMOTE DEVICE TYPE: NORMAL

## 2025-04-14 RX ORDER — OXYBUTYNIN CHLORIDE 10 MG/1
10 TABLET, EXTENDED RELEASE ORAL
Qty: 90 TABLET | Refills: 3 | Status: SHIPPED | OUTPATIENT
Start: 2025-04-14

## 2025-04-14 NOTE — TELEPHONE ENCOUNTER
Care Due:                  Date            Visit Type   Department     Provider  --------------------------------------------------------------------------------                                EP -                              PRIMARY      [facility]  Last Visit: 12-      CARE ([facility])   CARE           Aleksandra Carey                              EP -                              PRIMARY      [facility] PR[facility]  Next Visit: 06-      CARE ([facility])   CARE           Aleksandra Carey                                                            Last  Test          Frequency    Reason                     Performed    Due Date  --------------------------------------------------------------------------------    Vitamin D...  12 months..  VITAMIN..................  Not Found    Overdue    [employer] Embedded Care Due Messages. Reference number: [id number].   4/14/2025 2:24:10 AM CDT

## 2025-04-21 ENCOUNTER — TELEPHONE (OUTPATIENT)
Dept: NEUROLOGY | Facility: HOSPITAL | Age: 75
End: 2025-04-21
Payer: MEDICARE

## 2025-04-24 ENCOUNTER — CLINICAL SUPPORT (OUTPATIENT)
Dept: CARDIOLOGY | Facility: HOSPITAL | Age: 75
End: 2025-04-24
Attending: STUDENT IN AN ORGANIZED HEALTH CARE EDUCATION/TRAINING PROGRAM
Payer: MEDICARE

## 2025-04-24 ENCOUNTER — CLINICAL SUPPORT (OUTPATIENT)
Dept: CARDIOLOGY | Facility: HOSPITAL | Age: 75
End: 2025-04-24
Payer: MEDICARE

## 2025-04-24 DIAGNOSIS — I49.8 OTHER SPECIFIED CARDIAC ARRHYTHMIAS: ICD-10-CM

## 2025-04-24 DIAGNOSIS — I48.0 PAROXYSMAL ATRIAL FIBRILLATION: Primary | ICD-10-CM

## 2025-04-24 LAB
OHS CV AF BURDEN PERCENT: < 1
OHS CV DC REMOTE DEVICE TYPE: NORMAL

## 2025-04-24 PROCEDURE — 93298 REM INTERROG DEV EVAL SCRMS: CPT | Performed by: STUDENT IN AN ORGANIZED HEALTH CARE EDUCATION/TRAINING PROGRAM

## 2025-04-24 PROCEDURE — 93298 REM INTERROG DEV EVAL SCRMS: CPT | Mod: 26,,, | Performed by: STUDENT IN AN ORGANIZED HEALTH CARE EDUCATION/TRAINING PROGRAM

## 2025-05-28 ENCOUNTER — CLINICAL SUPPORT (OUTPATIENT)
Dept: CARDIOLOGY | Facility: HOSPITAL | Age: 75
End: 2025-05-28
Attending: STUDENT IN AN ORGANIZED HEALTH CARE EDUCATION/TRAINING PROGRAM
Payer: MEDICARE

## 2025-05-28 ENCOUNTER — CLINICAL SUPPORT (OUTPATIENT)
Dept: CARDIOLOGY | Facility: HOSPITAL | Age: 75
End: 2025-05-28
Payer: MEDICARE

## 2025-05-28 DIAGNOSIS — I49.8 OTHER SPECIFIED CARDIAC ARRHYTHMIAS: ICD-10-CM

## 2025-05-28 PROCEDURE — 93298 REM INTERROG DEV EVAL SCRMS: CPT | Performed by: STUDENT IN AN ORGANIZED HEALTH CARE EDUCATION/TRAINING PROGRAM

## 2025-05-28 PROCEDURE — 93298 REM INTERROG DEV EVAL SCRMS: CPT | Mod: 26,,, | Performed by: STUDENT IN AN ORGANIZED HEALTH CARE EDUCATION/TRAINING PROGRAM

## 2025-06-03 ENCOUNTER — OFFICE VISIT (OUTPATIENT)
Dept: PRIMARY CARE CLINIC | Facility: CLINIC | Age: 75
End: 2025-06-03
Payer: MEDICARE

## 2025-06-03 VITALS
HEART RATE: 67 BPM | SYSTOLIC BLOOD PRESSURE: 136 MMHG | OXYGEN SATURATION: 98 % | BODY MASS INDEX: 28.17 KG/M2 | WEIGHT: 143.5 LBS | HEIGHT: 60 IN | DIASTOLIC BLOOD PRESSURE: 74 MMHG

## 2025-06-03 DIAGNOSIS — I10 PRIMARY HYPERTENSION: ICD-10-CM

## 2025-06-03 DIAGNOSIS — E78.2 MIXED HYPERLIPIDEMIA: ICD-10-CM

## 2025-06-03 DIAGNOSIS — I63.9 CRYPTOGENIC STROKE: Primary | ICD-10-CM

## 2025-06-03 DIAGNOSIS — N18.32 STAGE 3B CHRONIC KIDNEY DISEASE: ICD-10-CM

## 2025-06-03 DIAGNOSIS — K21.9 GASTROESOPHAGEAL REFLUX DISEASE WITHOUT ESOPHAGITIS: ICD-10-CM

## 2025-06-03 DIAGNOSIS — Z79.01 LONG TERM CURRENT USE OF ANTICOAGULANT: ICD-10-CM

## 2025-06-03 DIAGNOSIS — G43.809 OTHER MIGRAINE WITHOUT STATUS MIGRAINOSUS, NOT INTRACTABLE: ICD-10-CM

## 2025-06-03 DIAGNOSIS — E03.9 HYPOTHYROIDISM, UNSPECIFIED TYPE: ICD-10-CM

## 2025-06-03 DIAGNOSIS — G81.04 FLACCID HEMIPLEGIA AFFECTING LEFT NONDOMINANT SIDE, UNSPECIFIED ETIOLOGY: ICD-10-CM

## 2025-06-03 DIAGNOSIS — Z78.0 ASYMPTOMATIC POSTMENOPAUSAL STATE: ICD-10-CM

## 2025-06-03 DIAGNOSIS — M80.00XD AGE-RELATED OSTEOPOROSIS WITH CURRENT PATHOLOGICAL FRACTURE WITH ROUTINE HEALING: ICD-10-CM

## 2025-06-03 DIAGNOSIS — Z12.31 ENCOUNTER FOR SCREENING MAMMOGRAM FOR MALIGNANT NEOPLASM OF BREAST: ICD-10-CM

## 2025-06-03 DIAGNOSIS — E05.00 GRAVES DISEASE: ICD-10-CM

## 2025-06-03 DIAGNOSIS — N32.81 OVERACTIVE BLADDER: ICD-10-CM

## 2025-06-03 DIAGNOSIS — R56.9 SEIZURE: ICD-10-CM

## 2025-06-03 DIAGNOSIS — F41.9 ANXIETY: ICD-10-CM

## 2025-06-03 PROBLEM — I63.521 ACUTE ISCHEMIC RIGHT ACA STROKE: Status: RESOLVED | Noted: 2024-12-31 | Resolved: 2025-06-03

## 2025-06-03 PROCEDURE — 99214 OFFICE O/P EST MOD 30 MIN: CPT | Mod: PBBFAC | Performed by: INTERNAL MEDICINE

## 2025-06-03 PROCEDURE — 99999 PR PBB SHADOW E&M-EST. PATIENT-LVL IV: CPT | Mod: PBBFAC,,, | Performed by: INTERNAL MEDICINE

## 2025-06-03 RX ORDER — PREGABALIN 75 MG/1
75 CAPSULE ORAL 2 TIMES DAILY
Qty: 180 CAPSULE | Refills: 3 | Status: SHIPPED | OUTPATIENT
Start: 2025-06-03

## 2025-06-06 LAB
OHS CV AF BURDEN PERCENT: < 1
OHS CV DC REMOTE DEVICE TYPE: NORMAL

## 2025-06-17 ENCOUNTER — HOSPITAL ENCOUNTER (OUTPATIENT)
Dept: RADIOLOGY | Facility: HOSPITAL | Age: 75
Discharge: HOME | End: 2025-06-17
Attending: INTERNAL MEDICINE
Payer: MEDICARE

## 2025-06-17 VITALS — BODY MASS INDEX: 28.07 KG/M2 | HEIGHT: 60 IN | WEIGHT: 143 LBS

## 2025-06-17 PROCEDURE — 77067 SCR MAMMO BI INCL CAD: CPT | Mod: 26,,, | Performed by: RADIOLOGY

## 2025-06-17 PROCEDURE — 77063 BREAST TOMOSYNTHESIS BI: CPT | Mod: 26,,, | Performed by: RADIOLOGY

## 2025-06-17 PROCEDURE — 77067 SCR MAMMO BI INCL CAD: CPT | Mod: TC

## 2025-06-27 ENCOUNTER — HOSPITAL ENCOUNTER (OUTPATIENT)
Dept: RADIOLOGY | Facility: OTHER | Age: 75
Discharge: HOME | End: 2025-06-27
Attending: INTERNAL MEDICINE
Payer: MEDICARE

## 2025-06-27 PROCEDURE — 77080 DXA BONE DENSITY AXIAL: CPT | Mod: TC

## 2025-06-27 PROCEDURE — 77080 DXA BONE DENSITY AXIAL: CPT | Mod: 26,,, | Performed by: RADIOLOGY

## 2025-07-02 ENCOUNTER — CLINICAL SUPPORT (OUTPATIENT)
Dept: CARDIOLOGY | Facility: HOSPITAL | Age: 75
End: 2025-07-02
Payer: MEDICARE

## 2025-07-02 ENCOUNTER — CLINICAL SUPPORT (OUTPATIENT)
Dept: CARDIOLOGY | Facility: HOSPITAL | Age: 75
End: 2025-07-02
Attending: STUDENT IN AN ORGANIZED HEALTH CARE EDUCATION/TRAINING PROGRAM
Payer: MEDICARE

## 2025-07-02 DIAGNOSIS — I49.8 OTHER SPECIFIED CARDIAC ARRHYTHMIAS: ICD-10-CM

## 2025-07-02 PROCEDURE — 93298 REM INTERROG DEV EVAL SCRMS: CPT | Mod: 26,,, | Performed by: STUDENT IN AN ORGANIZED HEALTH CARE EDUCATION/TRAINING PROGRAM

## 2025-07-02 PROCEDURE — 93298 REM INTERROG DEV EVAL SCRMS: CPT | Performed by: STUDENT IN AN ORGANIZED HEALTH CARE EDUCATION/TRAINING PROGRAM

## 2025-07-03 LAB
OHS CV AF BURDEN PERCENT: < 1
OHS CV DC REMOTE DEVICE TYPE: NORMAL

## 2025-07-23 DIAGNOSIS — I63.9 ISCHEMIC CEREBROVASCULAR ACCIDENT (CVA): ICD-10-CM

## 2025-07-23 NOTE — TELEPHONE ENCOUNTER
Copied from CRM #[id number]. Topic: Medications - Medication Refill  >> Jul 23, 2025  2:57 PM Judit wrote:  Type:  RX Refill Request    Who Called: pt hubsband  Refill or New Rx:refill  RX Name and Strength:apixaban (ELIQUIS) 5 mg Tab  How is the patient currently taking it? (ex. 1XDay):2xday  Is this a 30 day or 90 day RX:90  Preferred Pharmacy with phone number:[facility] - [address]   Phone: [phone]  Fax: [phone]  Local or Mail Order:mail  Ordering Provider:Aurelio  Would the patient rather a call back or a response via [facility]? Call back  Best Call Back Number:[phone]  Additional Information:

## 2025-07-23 NOTE — TELEPHONE ENCOUNTER
Care Due:                  Date            Visit Type   Department     Provider  --------------------------------------------------------------------------------                                EP -                              PRIMARY      [facility]  Last Visit: 06-      CARE ([facility])   CARE           Aleksandra Carey                              EP -                              PRIMARY      [facility] PR[facility]  Next Visit: 12-      CARE ([facility])   CARE           Aleksandra Carey                                                            Last  Test          Frequency    Reason                     Performed    Due Date  --------------------------------------------------------------------------------    Vitamin D...  12 months..  VITAMIN..................  Not Found    Overdue    [employer] Embedded Care Due Messages. Reference number: [id number].   7/23/2025 3:01:22 PM CDT

## 2025-08-06 ENCOUNTER — CLINICAL SUPPORT (OUTPATIENT)
Dept: CARDIOLOGY | Facility: HOSPITAL | Age: 75
End: 2025-08-06
Attending: STUDENT IN AN ORGANIZED HEALTH CARE EDUCATION/TRAINING PROGRAM
Payer: MEDICARE

## 2025-08-06 ENCOUNTER — CLINICAL SUPPORT (OUTPATIENT)
Dept: CARDIOLOGY | Facility: HOSPITAL | Age: 75
End: 2025-08-06

## 2025-08-06 DIAGNOSIS — I49.8 OTHER SPECIFIED CARDIAC ARRHYTHMIAS: ICD-10-CM

## 2025-08-06 PROCEDURE — 93298 REM INTERROG DEV EVAL SCRMS: CPT | Mod: 26,,, | Performed by: STUDENT IN AN ORGANIZED HEALTH CARE EDUCATION/TRAINING PROGRAM

## 2025-08-09 LAB
OHS CV AF BURDEN PERCENT: < 1
OHS CV DC REMOTE DEVICE TYPE: NORMAL

## 2025-08-12 ENCOUNTER — OFFICE VISIT (OUTPATIENT)
Dept: ELECTROPHYSIOLOGY | Facility: CLINIC | Age: 75
End: 2025-08-12
Payer: MEDICARE

## 2025-08-12 ENCOUNTER — HOSPITAL ENCOUNTER (OUTPATIENT)
Dept: CARDIOLOGY | Facility: CLINIC | Age: 75
Discharge: HOME | End: 2025-08-12
Payer: MEDICARE

## 2025-08-12 VITALS
BODY MASS INDEX: 28.61 KG/M2 | SYSTOLIC BLOOD PRESSURE: 136 MMHG | HEART RATE: 69 BPM | WEIGHT: 145.75 LBS | HEIGHT: 60 IN | DIASTOLIC BLOOD PRESSURE: 58 MMHG

## 2025-08-12 DIAGNOSIS — Z79.01 LONG TERM CURRENT USE OF ANTICOAGULANT: ICD-10-CM

## 2025-08-12 DIAGNOSIS — I48.0 PAROXYSMAL ATRIAL FIBRILLATION: ICD-10-CM

## 2025-08-12 DIAGNOSIS — I63.9 CRYPTOGENIC STROKE: ICD-10-CM

## 2025-08-12 DIAGNOSIS — Z95.818 STATUS POST PLACEMENT OF IMPLANTABLE LOOP RECORDER: Primary | ICD-10-CM

## 2025-08-12 DIAGNOSIS — I10 PRIMARY HYPERTENSION: ICD-10-CM

## 2025-08-12 LAB
OHS QRS DURATION: 80 MS
OHS QTC CALCULATION: 432 MS

## 2025-08-12 PROCEDURE — 99213 OFFICE O/P EST LOW 20 MIN: CPT | Mod: PBBFAC | Performed by: NURSE PRACTITIONER

## 2025-08-12 PROCEDURE — 99999 PR PBB SHADOW E&M-EST. PATIENT-LVL III: CPT | Mod: PBBFAC,,, | Performed by: NURSE PRACTITIONER

## 2025-08-12 PROCEDURE — 93010 ELECTROCARDIOGRAM REPORT: CPT | Mod: S$PBB,,, | Performed by: INTERNAL MEDICINE

## 2025-08-12 PROCEDURE — 93005 ELECTROCARDIOGRAM TRACING: CPT | Mod: PBBFAC | Performed by: INTERNAL MEDICINE

## 2025-08-15 PROBLEM — R10.9 ABDOMINAL PAIN: Status: ACTIVE | Noted: 2025-08-15

## 2025-08-16 PROBLEM — I48.0 PAROXYSMAL A-FIB: Status: ACTIVE | Noted: 2025-01-01

## 2025-08-16 PROBLEM — I50.32 CHRONIC DIASTOLIC HEART FAILURE: Status: ACTIVE | Noted: 2025-01-01

## 2025-08-16 PROBLEM — J96.01 ACUTE HYPOXIC RESPIRATORY FAILURE: Status: ACTIVE | Noted: 2025-01-01

## 2025-08-16 PROBLEM — K56.600 PARTIAL SMALL BOWEL OBSTRUCTION: Status: ACTIVE | Noted: 2025-01-01

## 2025-08-18 PROBLEM — D64.9 ANEMIA: Status: ACTIVE | Noted: 2025-01-01

## 2025-08-18 PROBLEM — E87.6 HYPOKALEMIA: Status: ACTIVE | Noted: 2025-01-01

## 2025-08-18 PROBLEM — D69.6 THROMBOCYTOPENIA: Status: ACTIVE | Noted: 2025-01-01

## 2025-08-18 PROBLEM — E87.0 HYPERNATREMIA: Status: ACTIVE | Noted: 2025-01-01

## 2025-08-19 PROBLEM — I46.9 CARDIAC ARREST: Status: ACTIVE | Noted: 2025-01-01

## 2025-08-19 PROBLEM — G93.1 ACUTE ANOXIC ENCEPHALOPATHY: Status: ACTIVE | Noted: 2025-01-01

## 2025-08-21 PROBLEM — N17.0 ATN (ACUTE TUBULAR NECROSIS): Status: ACTIVE | Noted: 2025-01-01

## 2025-08-22 PROBLEM — Z51.5 COMFORT MEASURES ONLY STATUS: Status: ACTIVE | Noted: 2025-01-01

## 2025-08-23 VITALS — SYSTOLIC BLOOD PRESSURE: 79 MMHG | OXYGEN SATURATION: 59 % | DIASTOLIC BLOOD PRESSURE: 41 MMHG | TEMPERATURE: 99 F

## 2025-08-23 LAB
BACTERIA BLD CULT: NORMAL
BACTERIA BLD CULT: NORMAL

## (undated) DEVICE — CLOSURE SKIN STERI STRIP 1/2X4

## (undated) DEVICE — INTERPULSE SET

## (undated) DEVICE — BANDAGE ACE ELASTIC 6"

## (undated) DEVICE — STAPLER SKIN ROTATING HEAD

## (undated) DEVICE — ADHESIVE DERMABOND ADVANCED

## (undated) DEVICE — DRESSING AQUACEL FOAM 3 X 3

## (undated) DEVICE — GOWN POLY REINF BRTH SLV XL

## (undated) DEVICE — CONTRAST VISIPAQUE 150ML

## (undated) DEVICE — Device

## (undated) DEVICE — CATH IMPULSE 5FR PIGTAIL 125CM

## (undated) DEVICE — DRAPE U SPLIT SHEET 54X76IN

## (undated) DEVICE — PLUG BONE #2
Type: IMPLANTABLE DEVICE | Site: SHOULDER | Status: NON-FUNCTIONAL
Removed: 2022-08-19

## (undated) DEVICE — SPONGE LAP 18X18 PREWASHED

## (undated) DEVICE — GAUZE SPONGE 4X4 12PLY

## (undated) DEVICE — SEE MEDLINE ITEM 157216

## (undated) DEVICE — GLIDESHEATH SLENDER SS 5FR10CM

## (undated) DEVICE — TROCAR ENDOPATH XCEL 5MM 7.5CM

## (undated) DEVICE — ELECTRODE REM PLYHSV RETURN 9

## (undated) DEVICE — COVER PROXIMA MAYO STAND

## (undated) DEVICE — BNDG COFLEX FOAM LF2 ST 4X5YD

## (undated) DEVICE — TOWEL OR DISP STRL BLUE 4/PK

## (undated) DEVICE — GAUZE SPONGE 4'X4 12 PLY

## (undated) DEVICE — TROCAR ENDOPATH XCEL 5X75MM

## (undated) DEVICE — BIT DRILL 3.1MM

## (undated) DEVICE — YANKAUER OPEN TIP W/O VENT

## (undated) DEVICE — PAD PINK TRENDELENBURG POS XL

## (undated) DEVICE — PACK SURGERY START

## (undated) DEVICE — SYR 50CC LL

## (undated) DEVICE — GLOVE SURGICAL LATEX SZ 7

## (undated) DEVICE — SUT ETHIBOND XTRA 1 CTX

## (undated) DEVICE — APPLICATOR CHLORAPREP ORN 26ML

## (undated) DEVICE — DRESSING AQUACEL SACRAL 9 X 9

## (undated) DEVICE — DRAPE STERI INSTRUMENT 1018

## (undated) DEVICE — SEE MEDLINE ITEM 157187

## (undated) DEVICE — R SEALER LIGASURE LAP 37CM 5MM

## (undated) DEVICE — KIT MX BNE CEM REVOLTN W/BRKWY

## (undated) DEVICE — SLING ARM COMFT NAVY BLU MED

## (undated) DEVICE — CATH IMPULSE 5F 100CM FR4

## (undated) DEVICE — BLADE SURG #15 CARBON STEEL

## (undated) DEVICE — COVER PROBE US 5.5X58L NON LTX

## (undated) DEVICE — DRAPE PED LAP SURG 108X77IN

## (undated) DEVICE — CUP MEDICINE GRAD STRL 2OZ

## (undated) DEVICE — SUT MONOCRYL 3-0 PS-2 UND

## (undated) DEVICE — PAD ABDOMINAL 5X9 STERILE

## (undated) DEVICE — SEE L#120831

## (undated) DEVICE — TUBING HPCIL ROT M/F ADPT 48IN

## (undated) DEVICE — GLOVE SURG BIOGEL LATEX SZ 7.5

## (undated) DEVICE — DRAPE THREE-QTR REINF 53X77IN

## (undated) DEVICE — COUNT NDL FOAM MAGNET 40COUNT

## (undated) DEVICE — TIP FEM CANAL INTPULSE IRR SUC

## (undated) DEVICE — DRAPE THYROID SOFT STERILE

## (undated) DEVICE — MANIFOLD 4 PORT

## (undated) DEVICE — GAUZE WOVEN STRL 8PLY 2X2IN

## (undated) DEVICE — BLADE SCALP OPHTL BEVEL STR

## (undated) DEVICE — PAD PREP CUFFED NS 24X48IN

## (undated) DEVICE — PAD DEFIB CADENCE ADULT R2

## (undated) DEVICE — BLADE SAW SAG 25.40MM X 1.27MM

## (undated) DEVICE — KIT TOTAL HIP BN PREPARATION

## (undated) DEVICE — DRESSING XEROFORM 1X8IN

## (undated) DEVICE — KIT LEFT HEART MANIFOLD CUSTOM

## (undated) DEVICE — BLADE SURG CARBON STEEL #10

## (undated) DEVICE — PAD ABD 8X10 STERILE

## (undated) DEVICE — ALCOHOL 70% ISOP W/GREEN 16OZ

## (undated) DEVICE — WIRE VERSCORE .035 260CM

## (undated) DEVICE — PACK BASIC

## (undated) DEVICE — GOWN POLY REINF BRTH SLV LG

## (undated) DEVICE — HEMOSTAT VASC BAND REG 24CM

## (undated) DEVICE — SYR MARK 7 ARTERION 150ML

## (undated) DEVICE — BLADE SAW SAG 22.13MM 0.92MM

## (undated) DEVICE — CATH OPTITORQUE TIGER 5F 100CM

## (undated) DEVICE — COVER OVERHEAD SURG LT BLUE